# Patient Record
Sex: FEMALE | Race: WHITE | NOT HISPANIC OR LATINO
[De-identification: names, ages, dates, MRNs, and addresses within clinical notes are randomized per-mention and may not be internally consistent; named-entity substitution may affect disease eponyms.]

---

## 2016-06-08 RX ORDER — METOPROLOL TARTRATE 50 MG
1 TABLET ORAL
Qty: 0 | Refills: 2 | DISCHARGE
Start: 2016-06-08 | End: 2016-09-05

## 2017-01-09 ENCOUNTER — APPOINTMENT (OUTPATIENT)
Dept: CARDIOLOGY | Facility: CLINIC | Age: 72
End: 2017-01-09

## 2017-04-18 ENCOUNTER — OUTPATIENT (OUTPATIENT)
Dept: OUTPATIENT SERVICES | Facility: HOSPITAL | Age: 72
LOS: 1 days | End: 2017-04-18
Payer: MEDICARE

## 2017-04-18 VITALS
TEMPERATURE: 98 F | HEART RATE: 72 BPM | HEIGHT: 67 IN | SYSTOLIC BLOOD PRESSURE: 122 MMHG | RESPIRATION RATE: 16 BRPM | WEIGHT: 174.17 LBS | DIASTOLIC BLOOD PRESSURE: 70 MMHG

## 2017-04-18 DIAGNOSIS — E11.9 TYPE 2 DIABETES MELLITUS WITHOUT COMPLICATIONS: ICD-10-CM

## 2017-04-18 DIAGNOSIS — Z01.818 ENCOUNTER FOR OTHER PREPROCEDURAL EXAMINATION: ICD-10-CM

## 2017-04-18 DIAGNOSIS — I10 ESSENTIAL (PRIMARY) HYPERTENSION: ICD-10-CM

## 2017-04-18 DIAGNOSIS — Z90.710 ACQUIRED ABSENCE OF BOTH CERVIX AND UTERUS: Chronic | ICD-10-CM

## 2017-04-18 DIAGNOSIS — Z98.89 OTHER SPECIFIED POSTPROCEDURAL STATES: Chronic | ICD-10-CM

## 2017-04-18 DIAGNOSIS — I73.9 PERIPHERAL VASCULAR DISEASE, UNSPECIFIED: ICD-10-CM

## 2017-04-18 DIAGNOSIS — I48.0 PAROXYSMAL ATRIAL FIBRILLATION: ICD-10-CM

## 2017-04-18 LAB
ANION GAP SERPL CALC-SCNC: 15 MMOL/L — SIGNIFICANT CHANGE UP (ref 5–17)
APTT BLD: 37 SEC — SIGNIFICANT CHANGE UP (ref 27.5–37.4)
BASOPHILS # BLD AUTO: 0 K/UL — SIGNIFICANT CHANGE UP (ref 0–0.2)
BASOPHILS NFR BLD AUTO: 0.2 % — SIGNIFICANT CHANGE UP (ref 0–2)
BLD GP AB SCN SERPL QL: SIGNIFICANT CHANGE UP
BUN SERPL-MCNC: 20 MG/DL — SIGNIFICANT CHANGE UP (ref 8–20)
CALCIUM SERPL-MCNC: 10.3 MG/DL — HIGH (ref 8.6–10.2)
CHLORIDE SERPL-SCNC: 97 MMOL/L — LOW (ref 98–107)
CO2 SERPL-SCNC: 27 MMOL/L — SIGNIFICANT CHANGE UP (ref 22–29)
CREAT SERPL-MCNC: 0.51 MG/DL — SIGNIFICANT CHANGE UP (ref 0.5–1.3)
EOSINOPHIL # BLD AUTO: 0.1 K/UL — SIGNIFICANT CHANGE UP (ref 0–0.5)
EOSINOPHIL NFR BLD AUTO: 1.6 % — SIGNIFICANT CHANGE UP (ref 0–6)
GLUCOSE SERPL-MCNC: 248 MG/DL — HIGH (ref 70–115)
HBA1C BLD-MCNC: 9.1 % — HIGH (ref 4–5.6)
HCT VFR BLD CALC: 40.3 % — SIGNIFICANT CHANGE UP (ref 37–47)
HGB BLD-MCNC: 13.6 G/DL — SIGNIFICANT CHANGE UP (ref 12–16)
INR BLD: 1.23 RATIO — HIGH (ref 0.88–1.16)
LYMPHOCYTES # BLD AUTO: 2.4 K/UL — SIGNIFICANT CHANGE UP (ref 1–4.8)
LYMPHOCYTES # BLD AUTO: 27.5 % — SIGNIFICANT CHANGE UP (ref 20–55)
MCHC RBC-ENTMCNC: 28.3 PG — SIGNIFICANT CHANGE UP (ref 27–31)
MCHC RBC-ENTMCNC: 33.7 G/DL — SIGNIFICANT CHANGE UP (ref 32–36)
MCV RBC AUTO: 84 FL — SIGNIFICANT CHANGE UP (ref 81–99)
MONOCYTES # BLD AUTO: 0.6 K/UL — SIGNIFICANT CHANGE UP (ref 0–0.8)
MONOCYTES NFR BLD AUTO: 6.2 % — SIGNIFICANT CHANGE UP (ref 3–10)
NEUTROPHILS # BLD AUTO: 5.7 K/UL — SIGNIFICANT CHANGE UP (ref 1.8–8)
NEUTROPHILS NFR BLD AUTO: 64.4 % — SIGNIFICANT CHANGE UP (ref 37–73)
PLATELET # BLD AUTO: 286 K/UL — SIGNIFICANT CHANGE UP (ref 150–400)
POTASSIUM SERPL-MCNC: 4.8 MMOL/L — SIGNIFICANT CHANGE UP (ref 3.5–5.3)
POTASSIUM SERPL-SCNC: 4.8 MMOL/L — SIGNIFICANT CHANGE UP (ref 3.5–5.3)
PROTHROM AB SERPL-ACNC: 13.6 SEC — HIGH (ref 9.8–12.7)
RBC # BLD: 4.8 M/UL — SIGNIFICANT CHANGE UP (ref 4.4–5.2)
RBC # FLD: 14.5 % — SIGNIFICANT CHANGE UP (ref 11–15.6)
SODIUM SERPL-SCNC: 139 MMOL/L — SIGNIFICANT CHANGE UP (ref 135–145)
TYPE + AB SCN PNL BLD: SIGNIFICANT CHANGE UP
WBC # BLD: 8.8 K/UL — SIGNIFICANT CHANGE UP (ref 4.8–10.8)
WBC # FLD AUTO: 8.8 K/UL — SIGNIFICANT CHANGE UP (ref 4.8–10.8)

## 2017-04-18 PROCEDURE — 85610 PROTHROMBIN TIME: CPT

## 2017-04-18 PROCEDURE — 83036 HEMOGLOBIN GLYCOSYLATED A1C: CPT

## 2017-04-18 PROCEDURE — 86901 BLOOD TYPING SEROLOGIC RH(D): CPT

## 2017-04-18 PROCEDURE — 85730 THROMBOPLASTIN TIME PARTIAL: CPT

## 2017-04-18 PROCEDURE — 86900 BLOOD TYPING SEROLOGIC ABO: CPT

## 2017-04-18 PROCEDURE — 86850 RBC ANTIBODY SCREEN: CPT

## 2017-04-18 PROCEDURE — 85027 COMPLETE CBC AUTOMATED: CPT

## 2017-04-18 PROCEDURE — 80048 BASIC METABOLIC PNL TOTAL CA: CPT

## 2017-04-18 PROCEDURE — G0463: CPT

## 2017-04-18 NOTE — H&P PST ADULT - PMH
Diabetes    Hypercholesterolemia    Hypertension    PAD (peripheral artery disease)    Paroxysmal atrial fibrillation

## 2017-04-18 NOTE — H&P PST ADULT - NSANTHOSAYNRD_GEN_A_CORE
No. TOYIN screening performed.  STOP BANG Legend: 0-2 = LOW Risk; 3-4 = INTERMEDIATE Risk; 5-8 = HIGH Risk

## 2017-04-18 NOTE — H&P PST ADULT - FAMILY HISTORY
Father  Still living? No  Family history of abdominal aortic aneurysm, Age at diagnosis: 51-60  Family history of thoracic aortic aneurysm, Age at diagnosis: 61-70

## 2017-04-18 NOTE — H&P PST ADULT - PROBLEM SELECTOR PLAN 1
Right groin exploration; common femoral, deep femoral and superficial femoral endarterectomy with patch, left groin exploration; common femoral, deep femoral and superficial femoral endarterectomy with patch. Aortogram with bilateral common iliac angioplasty and stenting. Bilateral superficial femoral angioplasty.

## 2017-04-19 DIAGNOSIS — Z01.818 ENCOUNTER FOR OTHER PREPROCEDURAL EXAMINATION: ICD-10-CM

## 2017-04-19 DIAGNOSIS — I70.213 ATHEROSCLEROSIS OF NATIVE ARTERIES OF EXTREMITIES WITH INTERMITTENT CLAUDICATION, BILATERAL LEGS: ICD-10-CM

## 2017-04-24 ENCOUNTER — RESULT REVIEW (OUTPATIENT)
Age: 72
End: 2017-04-24

## 2017-04-25 ENCOUNTER — INPATIENT (INPATIENT)
Facility: HOSPITAL | Age: 72
LOS: 1 days | Discharge: HOME | DRG: 254 | End: 2017-04-27
Attending: SURGERY | Admitting: SURGERY
Payer: MEDICARE

## 2017-04-25 VITALS
HEIGHT: 67 IN | HEART RATE: 62 BPM | WEIGHT: 174.17 LBS | TEMPERATURE: 98 F | SYSTOLIC BLOOD PRESSURE: 115 MMHG | DIASTOLIC BLOOD PRESSURE: 72 MMHG | RESPIRATION RATE: 16 BRPM | OXYGEN SATURATION: 100 %

## 2017-04-25 DIAGNOSIS — Z98.89 OTHER SPECIFIED POSTPROCEDURAL STATES: Chronic | ICD-10-CM

## 2017-04-25 DIAGNOSIS — Z90.710 ACQUIRED ABSENCE OF BOTH CERVIX AND UTERUS: Chronic | ICD-10-CM

## 2017-04-25 DIAGNOSIS — I70.213 ATHEROSCLEROSIS OF NATIVE ARTERIES OF EXTREMITIES WITH INTERMITTENT CLAUDICATION, BILATERAL LEGS: ICD-10-CM

## 2017-04-25 LAB
ANION GAP SERPL CALC-SCNC: 12 MMOL/L — SIGNIFICANT CHANGE UP (ref 5–17)
APTT BLD: 28.5 SEC — SIGNIFICANT CHANGE UP (ref 27.5–37.4)
BASOPHILS # BLD AUTO: 0 K/UL — SIGNIFICANT CHANGE UP (ref 0–0.2)
BASOPHILS NFR BLD AUTO: 0.1 % — SIGNIFICANT CHANGE UP (ref 0–2)
BLD GP AB SCN SERPL QL: SIGNIFICANT CHANGE UP
BUN SERPL-MCNC: 15 MG/DL — SIGNIFICANT CHANGE UP (ref 8–20)
CALCIUM SERPL-MCNC: 8 MG/DL — LOW (ref 8.6–10.2)
CHLORIDE SERPL-SCNC: 101 MMOL/L — SIGNIFICANT CHANGE UP (ref 98–107)
CO2 SERPL-SCNC: 24 MMOL/L — SIGNIFICANT CHANGE UP (ref 22–29)
CREAT SERPL-MCNC: 0.48 MG/DL — LOW (ref 0.5–1.3)
EOSINOPHIL # BLD AUTO: 0 K/UL — SIGNIFICANT CHANGE UP (ref 0–0.5)
EOSINOPHIL NFR BLD AUTO: 0.1 % — SIGNIFICANT CHANGE UP (ref 0–6)
GLUCOSE SERPL-MCNC: 288 MG/DL — HIGH (ref 70–115)
HCT VFR BLD CALC: 30.4 % — LOW (ref 37–47)
HGB BLD-MCNC: 10.1 G/DL — LOW (ref 12–16)
INR BLD: 0.98 RATIO — SIGNIFICANT CHANGE UP (ref 0.88–1.16)
LYMPHOCYTES # BLD AUTO: 0.9 K/UL — LOW (ref 1–4.8)
LYMPHOCYTES # BLD AUTO: 7.9 % — LOW (ref 20–55)
MCHC RBC-ENTMCNC: 27.8 PG — SIGNIFICANT CHANGE UP (ref 27–31)
MCHC RBC-ENTMCNC: 33.2 G/DL — SIGNIFICANT CHANGE UP (ref 32–36)
MCV RBC AUTO: 83.7 FL — SIGNIFICANT CHANGE UP (ref 81–99)
MONOCYTES # BLD AUTO: 0.3 K/UL — SIGNIFICANT CHANGE UP (ref 0–0.8)
MONOCYTES NFR BLD AUTO: 2.5 % — LOW (ref 3–10)
NEUTROPHILS # BLD AUTO: 10.5 K/UL — HIGH (ref 1.8–8)
NEUTROPHILS NFR BLD AUTO: 89.1 % — HIGH (ref 37–73)
PLATELET # BLD AUTO: 270 K/UL — SIGNIFICANT CHANGE UP (ref 150–400)
POTASSIUM SERPL-MCNC: 4 MMOL/L — SIGNIFICANT CHANGE UP (ref 3.5–5.3)
POTASSIUM SERPL-SCNC: 4 MMOL/L — SIGNIFICANT CHANGE UP (ref 3.5–5.3)
PROTHROM AB SERPL-ACNC: 10.8 SEC — SIGNIFICANT CHANGE UP (ref 9.8–12.7)
RBC # BLD: 3.63 M/UL — LOW (ref 4.4–5.2)
RBC # FLD: 14.6 % — SIGNIFICANT CHANGE UP (ref 11–15.6)
SODIUM SERPL-SCNC: 137 MMOL/L — SIGNIFICANT CHANGE UP (ref 135–145)
TYPE + AB SCN PNL BLD: SIGNIFICANT CHANGE UP
WBC # BLD: 11.8 K/UL — HIGH (ref 4.8–10.8)
WBC # FLD AUTO: 11.8 K/UL — HIGH (ref 4.8–10.8)

## 2017-04-25 PROCEDURE — 88311 DECALCIFY TISSUE: CPT | Mod: 26

## 2017-04-25 PROCEDURE — 88304 TISSUE EXAM BY PATHOLOGIST: CPT | Mod: 26

## 2017-04-25 RX ORDER — DOCUSATE SODIUM 100 MG
100 CAPSULE ORAL THREE TIMES A DAY
Qty: 0 | Refills: 0 | Status: DISCONTINUED | OUTPATIENT
Start: 2017-04-25 | End: 2017-04-27

## 2017-04-25 RX ORDER — METOPROLOL TARTRATE 50 MG
100 TABLET ORAL DAILY
Qty: 0 | Refills: 0 | Status: DISCONTINUED | OUTPATIENT
Start: 2017-04-25 | End: 2017-04-27

## 2017-04-25 RX ORDER — SODIUM CHLORIDE 9 MG/ML
1000 INJECTION, SOLUTION INTRAVENOUS
Qty: 0 | Refills: 0 | Status: DISCONTINUED | OUTPATIENT
Start: 2017-04-25 | End: 2017-04-27

## 2017-04-25 RX ORDER — FENTANYL CITRATE 50 UG/ML
25 INJECTION INTRAVENOUS
Qty: 0 | Refills: 0 | Status: DISCONTINUED | OUTPATIENT
Start: 2017-04-25 | End: 2017-04-25

## 2017-04-25 RX ORDER — DEXTROSE 50 % IN WATER 50 %
25 SYRINGE (ML) INTRAVENOUS ONCE
Qty: 0 | Refills: 0 | Status: DISCONTINUED | OUTPATIENT
Start: 2017-04-25 | End: 2017-04-27

## 2017-04-25 RX ORDER — MORPHINE SULFATE 50 MG/1
4 CAPSULE, EXTENDED RELEASE ORAL EVERY 4 HOURS
Qty: 0 | Refills: 0 | Status: DISCONTINUED | OUTPATIENT
Start: 2017-04-25 | End: 2017-04-26

## 2017-04-25 RX ORDER — SODIUM CHLORIDE 9 MG/ML
1000 INJECTION, SOLUTION INTRAVENOUS
Qty: 0 | Refills: 0 | Status: DISCONTINUED | OUTPATIENT
Start: 2017-04-25 | End: 2017-04-26

## 2017-04-25 RX ORDER — INSULIN LISPRO 100/ML
VIAL (ML) SUBCUTANEOUS
Qty: 0 | Refills: 0 | Status: DISCONTINUED | OUTPATIENT
Start: 2017-04-25 | End: 2017-04-27

## 2017-04-25 RX ORDER — DEXTROSE 50 % IN WATER 50 %
12.5 SYRINGE (ML) INTRAVENOUS ONCE
Qty: 0 | Refills: 0 | Status: DISCONTINUED | OUTPATIENT
Start: 2017-04-25 | End: 2017-04-27

## 2017-04-25 RX ORDER — MORPHINE SULFATE 50 MG/1
2 CAPSULE, EXTENDED RELEASE ORAL EVERY 4 HOURS
Qty: 0 | Refills: 0 | Status: DISCONTINUED | OUTPATIENT
Start: 2017-04-25 | End: 2017-04-26

## 2017-04-25 RX ORDER — CEFAZOLIN SODIUM 1 G
2000 VIAL (EA) INJECTION EVERY 8 HOURS
Qty: 0 | Refills: 0 | Status: DISCONTINUED | OUTPATIENT
Start: 2017-04-25 | End: 2017-04-27

## 2017-04-25 RX ORDER — CEFAZOLIN SODIUM 1 G
2000 VIAL (EA) INJECTION ONCE
Qty: 0 | Refills: 0 | Status: COMPLETED | OUTPATIENT
Start: 2017-04-25 | End: 2017-04-25

## 2017-04-25 RX ORDER — ONDANSETRON 8 MG/1
4 TABLET, FILM COATED ORAL ONCE
Qty: 0 | Refills: 0 | Status: DISCONTINUED | OUTPATIENT
Start: 2017-04-25 | End: 2017-04-25

## 2017-04-25 RX ORDER — CEFAZOLIN SODIUM 1 G
VIAL (EA) INJECTION
Qty: 0 | Refills: 0 | Status: DISCONTINUED | OUTPATIENT
Start: 2017-04-25 | End: 2017-04-27

## 2017-04-25 RX ORDER — GLUCAGON INJECTION, SOLUTION 0.5 MG/.1ML
1 INJECTION, SOLUTION SUBCUTANEOUS ONCE
Qty: 0 | Refills: 0 | Status: DISCONTINUED | OUTPATIENT
Start: 2017-04-25 | End: 2017-04-27

## 2017-04-25 RX ORDER — DEXTROSE 50 % IN WATER 50 %
1 SYRINGE (ML) INTRAVENOUS ONCE
Qty: 0 | Refills: 0 | Status: DISCONTINUED | OUTPATIENT
Start: 2017-04-25 | End: 2017-04-27

## 2017-04-25 RX ORDER — SODIUM CHLORIDE 9 MG/ML
1000 INJECTION, SOLUTION INTRAVENOUS
Qty: 0 | Refills: 0 | Status: DISCONTINUED | OUTPATIENT
Start: 2017-04-25 | End: 2017-04-25

## 2017-04-25 RX ORDER — SODIUM CHLORIDE 9 MG/ML
3 INJECTION INTRAMUSCULAR; INTRAVENOUS; SUBCUTANEOUS EVERY 8 HOURS
Qty: 0 | Refills: 0 | Status: DISCONTINUED | OUTPATIENT
Start: 2017-04-25 | End: 2017-04-25

## 2017-04-25 RX ORDER — ASPIRIN/CALCIUM CARB/MAGNESIUM 324 MG
81 TABLET ORAL DAILY
Qty: 0 | Refills: 0 | Status: DISCONTINUED | OUTPATIENT
Start: 2017-04-25 | End: 2017-04-27

## 2017-04-25 RX ADMIN — Medication 100 MILLIGRAM(S): at 18:51

## 2017-04-25 RX ADMIN — SODIUM CHLORIDE 100 MILLILITER(S): 9 INJECTION, SOLUTION INTRAVENOUS at 17:20

## 2017-04-25 RX ADMIN — Medication 100 MILLIGRAM(S): at 21:44

## 2017-04-25 RX ADMIN — Medication 5 MILLIGRAM(S): at 16:32

## 2017-04-25 RX ADMIN — Medication 2: at 19:28

## 2017-04-25 RX ADMIN — Medication 81 MILLIGRAM(S): at 16:32

## 2017-04-25 NOTE — BRIEF OPERATIVE NOTE - OPERATION/FINDINGS
Right groin exploration with common, superficial, and deep femoral endarterectomy with bovine patch 2x14cm, Left groin exploration with common, deep and superficial femoral endarterectomy with patch 2x9 cm.  Right SFA angioplasty and stenting 6x25 and 6x5 Viabahn, Left SFA angioplasty and stenting 6x25 and 6x10 Viabahn, Bilateral lower extremity angiogram.

## 2017-04-26 LAB
ANION GAP SERPL CALC-SCNC: 13 MMOL/L — SIGNIFICANT CHANGE UP (ref 5–17)
BUN SERPL-MCNC: 9 MG/DL — SIGNIFICANT CHANGE UP (ref 8–20)
CALCIUM SERPL-MCNC: 8.3 MG/DL — LOW (ref 8.6–10.2)
CHLORIDE SERPL-SCNC: 100 MMOL/L — SIGNIFICANT CHANGE UP (ref 98–107)
CO2 SERPL-SCNC: 25 MMOL/L — SIGNIFICANT CHANGE UP (ref 22–29)
CREAT SERPL-MCNC: 0.59 MG/DL — SIGNIFICANT CHANGE UP (ref 0.5–1.3)
GLUCOSE SERPL-MCNC: 295 MG/DL — HIGH (ref 70–115)
POTASSIUM SERPL-MCNC: 3.7 MMOL/L — SIGNIFICANT CHANGE UP (ref 3.5–5.3)
POTASSIUM SERPL-SCNC: 3.7 MMOL/L — SIGNIFICANT CHANGE UP (ref 3.5–5.3)
SODIUM SERPL-SCNC: 138 MMOL/L — SIGNIFICANT CHANGE UP (ref 135–145)

## 2017-04-26 RX ORDER — CLOPIDOGREL BISULFATE 75 MG/1
75 TABLET, FILM COATED ORAL DAILY
Qty: 0 | Refills: 0 | Status: DISCONTINUED | OUTPATIENT
Start: 2017-04-26 | End: 2017-04-27

## 2017-04-26 RX ORDER — POTASSIUM CHLORIDE 20 MEQ
40 PACKET (EA) ORAL ONCE
Qty: 0 | Refills: 0 | Status: COMPLETED | OUTPATIENT
Start: 2017-04-26 | End: 2017-04-26

## 2017-04-26 RX ORDER — MORPHINE SULFATE 50 MG/1
2 CAPSULE, EXTENDED RELEASE ORAL EVERY 4 HOURS
Qty: 0 | Refills: 0 | Status: DISCONTINUED | OUTPATIENT
Start: 2017-04-26 | End: 2017-04-27

## 2017-04-26 RX ADMIN — MORPHINE SULFATE 4 MILLIGRAM(S): 50 CAPSULE, EXTENDED RELEASE ORAL at 02:25

## 2017-04-26 RX ADMIN — CLOPIDOGREL BISULFATE 75 MILLIGRAM(S): 75 TABLET, FILM COATED ORAL at 11:44

## 2017-04-26 RX ADMIN — Medication 3: at 12:11

## 2017-04-26 RX ADMIN — Medication 4: at 16:56

## 2017-04-26 RX ADMIN — Medication 100 MILLIGRAM(S): at 14:42

## 2017-04-26 RX ADMIN — Medication 40 MILLIEQUIVALENT(S): at 14:42

## 2017-04-26 RX ADMIN — Medication 100 MILLIGRAM(S): at 22:31

## 2017-04-26 RX ADMIN — SODIUM CHLORIDE 100 MILLILITER(S): 9 INJECTION, SOLUTION INTRAVENOUS at 01:23

## 2017-04-26 RX ADMIN — Medication 100 MILLIGRAM(S): at 05:36

## 2017-04-26 RX ADMIN — Medication 81 MILLIGRAM(S): at 11:44

## 2017-04-26 RX ADMIN — MORPHINE SULFATE 4 MILLIGRAM(S): 50 CAPSULE, EXTENDED RELEASE ORAL at 01:55

## 2017-04-26 RX ADMIN — Medication 3: at 09:00

## 2017-04-26 NOTE — PROGRESS NOTE ADULT - SUBJECTIVE AND OBJECTIVE BOX
pt pod 1 sp right and left groin exploration etc. under GETA. Tolerated well. Denies any A/c.  pt with no n/v @ this time. using pain meds as ordered/needed with some pain relief. vss. spont vent. no issues with anesthesia at this time. pt resting comfortably @ this time.

## 2017-04-26 NOTE — PROGRESS NOTE ADULT - SUBJECTIVE AND OBJECTIVE BOX
POST OPERATIVE ASSESSMENT     INTERVAL HPI/OVERNIGHT EVENTS: 70 yo female s/p right groin exploration with common, superficial, and deep femoral endarterectomy with bovine patch, left groin exploration with common, deep and superficial femoral endarterectomy with bovine patch. Right SFA angioplasty and stenting, left SFA angioplasty and stenting, bilateral lower extremity angiogram, 4/25/17, POD #1. Patient endorses groin pain, denies b/l leg pain, nausea, vomiting, shortness of breath, chest pain.       MEDICATIONS  (STANDING):  lactated ringers. 1000milliLiter(s) IV Continuous <Continuous>  ceFAZolin   IVPB  IV Intermittent   aspirin enteric coated 81milliGRAM(s) Oral daily  docusate sodium 100milliGRAM(s) Oral three times a day  enalapril 5milliGRAM(s) Oral daily  metoprolol succinate ER 100milliGRAM(s) Oral daily  ceFAZolin   IVPB 2000milliGRAM(s) IV Intermittent every 8 hours  insulin lispro (HumaLOG) corrective regimen sliding scale  SubCutaneous three times a day before meals  dextrose 5%. 1000milliLiter(s) IV Continuous <Continuous>  dextrose 50% Injectable 12.5Gram(s) IV Push once  dextrose 50% Injectable 25Gram(s) IV Push once  dextrose 50% Injectable 25Gram(s) IV Push once    MEDICATIONS  (PRN):  morphine  - Injectable 2milliGRAM(s) IV Push every 4 hours PRN Moderate Pain  morphine  - Injectable 4milliGRAM(s) IV Push every 4 hours PRN Severe Pain  dextrose Gel 1Dose(s) Oral once PRN Blood Glucose LESS THAN 70 milliGRAM(s)/deciliter  glucagon  Injectable 1milliGRAM(s) IntraMuscular once PRN Glucose LESS THAN 70 milligrams/deciliter      Vital Signs Last 24 Hrs  T(C): 37.2, Max: 37.4 (04-25 @ 14:25)  T(F): 99, Max: 99.4 (04-25 @ 14:25)  HR: 71 (62 - 80)  BP: 88/46 (88/46 - 142/67)  BP(mean): --  RR: 18 (12 - 18)  SpO2: 97% (97% - 100%)    PHYSICAL EXAM:       Constitutional: A&Ox3, in NAD, laying comfortably in bed.     Eyes: EOMI.     Respiratory: Breathing comfortably on room air.     Cardiovascular: RRR    Gastrointestinal: Abdomen soft, NT, ND.     Genitourinary: Faith catheter in place, draining.     Extremities: Femoral dressings C/D/I, b/l. B/L LE +PT, DP pulses. Feet warm, bilaterally.         I&O's Detail    I & Os for current day (as of 26 Apr 2017 01:16)  =============================================  IN:    lactated ringers.: 600 ml    Total IN: 600 ml  ---------------------------------------------  OUT:    Indwelling Catheter - Urethral: 875 ml    Total OUT: 875 ml  ---------------------------------------------  Total NET: -275 ml      LABS:                        10.1   11.8  )-----------( 270      ( 25 Apr 2017 15:26 )             30.4     04-25    137  |  101  |  15.0  ----------------------------<  288<H>  4.0   |  24.0  |  0.48<L>    Ca    8.0<L>      25 Apr 2017 15:26      PT/INR - ( 25 Apr 2017 08:38 )   PT: 10.8 sec;   INR: 0.98 ratio         PTT - ( 25 Apr 2017 08:38 )  PTT:28.5 sec      RADIOLOGY & ADDITIONAL STUDIES:

## 2017-04-27 ENCOUNTER — TRANSCRIPTION ENCOUNTER (OUTPATIENT)
Age: 72
End: 2017-04-27

## 2017-04-27 VITALS
TEMPERATURE: 99 F | HEART RATE: 94 BPM | RESPIRATION RATE: 18 BRPM | DIASTOLIC BLOOD PRESSURE: 64 MMHG | SYSTOLIC BLOOD PRESSURE: 109 MMHG | OXYGEN SATURATION: 95 %

## 2017-04-27 DIAGNOSIS — I73.9 PERIPHERAL VASCULAR DISEASE, UNSPECIFIED: ICD-10-CM

## 2017-04-27 LAB
HCT VFR BLD CALC: 29 % — LOW (ref 37–47)
HGB BLD-MCNC: 9.5 G/DL — LOW (ref 12–16)
MCHC RBC-ENTMCNC: 28 PG — SIGNIFICANT CHANGE UP (ref 27–31)
MCHC RBC-ENTMCNC: 32.8 G/DL — SIGNIFICANT CHANGE UP (ref 32–36)
MCV RBC AUTO: 85.5 FL — SIGNIFICANT CHANGE UP (ref 81–99)
PLATELET # BLD AUTO: 250 K/UL — SIGNIFICANT CHANGE UP (ref 150–400)
RBC # BLD: 3.39 M/UL — LOW (ref 4.4–5.2)
RBC # FLD: 14.7 % — SIGNIFICANT CHANGE UP (ref 11–15.6)
WBC # BLD: 11.7 K/UL — HIGH (ref 4.8–10.8)
WBC # FLD AUTO: 11.7 K/UL — HIGH (ref 4.8–10.8)

## 2017-04-27 PROCEDURE — C1725: CPT

## 2017-04-27 PROCEDURE — 86920 COMPATIBILITY TEST SPIN: CPT

## 2017-04-27 PROCEDURE — 80048 BASIC METABOLIC PNL TOTAL CA: CPT

## 2017-04-27 PROCEDURE — C1889: CPT

## 2017-04-27 PROCEDURE — 88311 DECALCIFY TISSUE: CPT

## 2017-04-27 PROCEDURE — 85730 THROMBOPLASTIN TIME PARTIAL: CPT

## 2017-04-27 PROCEDURE — 88304 TISSUE EXAM BY PATHOLOGIST: CPT

## 2017-04-27 PROCEDURE — 76000 FLUOROSCOPY <1 HR PHYS/QHP: CPT

## 2017-04-27 PROCEDURE — 85610 PROTHROMBIN TIME: CPT

## 2017-04-27 PROCEDURE — 85027 COMPLETE CBC AUTOMATED: CPT

## 2017-04-27 PROCEDURE — 86850 RBC ANTIBODY SCREEN: CPT

## 2017-04-27 PROCEDURE — C1887: CPT

## 2017-04-27 PROCEDURE — 86900 BLOOD TYPING SEROLOGIC ABO: CPT

## 2017-04-27 PROCEDURE — C1874: CPT

## 2017-04-27 PROCEDURE — 86901 BLOOD TYPING SEROLOGIC RH(D): CPT

## 2017-04-27 PROCEDURE — C1894: CPT

## 2017-04-27 PROCEDURE — 36415 COLL VENOUS BLD VENIPUNCTURE: CPT

## 2017-04-27 RX ORDER — OXYCODONE HYDROCHLORIDE 5 MG/1
1 TABLET ORAL
Qty: 0 | Refills: 0 | COMMUNITY
Start: 2017-04-27

## 2017-04-27 RX ORDER — INSULIN LISPRO 100/ML
4 VIAL (ML) SUBCUTANEOUS ONCE
Qty: 0 | Refills: 0 | Status: COMPLETED | OUTPATIENT
Start: 2017-04-27 | End: 2017-04-27

## 2017-04-27 RX ORDER — OXYCODONE HYDROCHLORIDE 5 MG/1
2 TABLET ORAL
Qty: 36 | Refills: 0 | OUTPATIENT
Start: 2017-04-27 | End: 2017-04-30

## 2017-04-27 RX ORDER — ASPIRIN/CALCIUM CARB/MAGNESIUM 324 MG
1 TABLET ORAL
Qty: 0 | Refills: 0 | DISCHARGE
Start: 2017-04-27

## 2017-04-27 RX ORDER — CLOPIDOGREL BISULFATE 75 MG/1
1 TABLET, FILM COATED ORAL
Qty: 30 | Refills: 0
Start: 2017-04-27 | End: 2017-05-27

## 2017-04-27 RX ADMIN — Medication 4: at 08:23

## 2017-04-27 RX ADMIN — Medication 81 MILLIGRAM(S): at 11:35

## 2017-04-27 RX ADMIN — CLOPIDOGREL BISULFATE 75 MILLIGRAM(S): 75 TABLET, FILM COATED ORAL at 11:35

## 2017-04-27 RX ADMIN — Medication 2: at 17:20

## 2017-04-27 RX ADMIN — Medication 100 MILLIGRAM(S): at 13:40

## 2017-04-27 RX ADMIN — Medication 100 MILLIGRAM(S): at 05:27

## 2017-04-27 RX ADMIN — Medication 4 UNIT(S): at 05:20

## 2017-04-27 RX ADMIN — Medication 100 MILLIGRAM(S): at 06:04

## 2017-04-27 RX ADMIN — Medication 5 MILLIGRAM(S): at 11:35

## 2017-04-27 RX ADMIN — Medication 5: at 11:35

## 2017-04-27 NOTE — DISCHARGE NOTE ADULT - PLAN OF CARE
Post op recovery and follow up Patient should schedule a follow up appointment with Dr. Haywood in 1-2 weeks. Keep incisions clean and dry gently after gentle cleansing.  Do not submerge in pool or tub.  Medications to be taken as prescribed.

## 2017-04-27 NOTE — DISCHARGE NOTE ADULT - HOSPITAL COURSE
71F with PAD presented for scheduled vascular surgery.  She underwent the following: Right groin exploration with common, superficial, and deep femoral endarterectomy with bovine patch 2x14cm, Left groin exploration with common, deep and superficial femoral endarterectomy with patch 2x9 cm.  Right SFA angioplasty and stenting 6x25 and 6x5 Viabahn, Left SFA angioplasty and stenting 6x25 and 6x10 Viabahn, Bilateral lower extremity angiogram.    Patient tolerated the procedure well and was monitored over the following days for wound healing, bleeding, etc.  Her shaw was removed on POD 1 and she voided appropriately. She was able to get out of bed and ambulate, and tolerated a diet.  She recovered into POD 2 when she was deemed safe for discharge home after being seen by Dr. Haywood.  She was provided all necessary prescriptions and instructions to schedule a follow up visit with her surgeon in 1-2 weeks.

## 2017-04-27 NOTE — DISCHARGE NOTE ADULT - CARE PLAN
Principal Discharge DX:	PAD (peripheral artery disease)  Goal:	Post op recovery and follow up  Instructions for follow-up, activity and diet:	Patient should schedule a follow up appointment with Dr. Haywood in 1-2 weeks. Keep incisions clean and dry gently after gentle cleansing.  Do not submerge in pool or tub.  Medications to be taken as prescribed.  Secondary Diagnosis:	Diabetes  Secondary Diagnosis:	Hypercholesterolemia  Secondary Diagnosis:	Hypertension  Secondary Diagnosis:	Paroxysmal atrial fibrillation

## 2017-04-27 NOTE — PROGRESS NOTE ADULT - ASSESSMENT
72 yo female s/p right groin exploration with common, superficial, and deep femoral endarterectomy with bovine patch, left groin exploration with common, deep and superficial femoral endarterectomy with bovine patch. Right SFA angioplasty and stenting, left SFA angioplasty and stenting, bilateral lower extremity angiogram.  / continue pain control  / Continue CLD diet, ADAT  / OOB ambulating as per Dr. Haywood  / Plan per Dr. Haywood
stable

## 2017-04-27 NOTE — PROGRESS NOTE ADULT - SUBJECTIVE AND OBJECTIVE BOX
Pt seen and examined.  Ambulating well, incisions clean and dry, 2+ PT pulses bilaterally.  Stable for d/c on Plavix 75 mg daily.  Pt to resume Xarelto 20 mg daily Saturday.  Follow-up in 10 days.

## 2017-04-27 NOTE — PROGRESS NOTE ADULT - SUBJECTIVE AND OBJECTIVE BOX
SURGICAL PA NOTE:     STATUS POST:    Diagnosis:   Pre-Op Diagnosis:  PVD (peripheral vascular disease) with claudication  04/25/2017    Active  Dennis Haywood.    Post-Op Dx:  PVD (peripheral vascular disease) with claudication  04/25/2017    Active  Dennis Haywood.    Procedure:   Procedure:  <<-----Click on this checkbox to enter Procedure .      Operative Findings:  · Operative Findings	Right groin exploration with common, superficial, and deep femoral endarterectomy with bovine patch 2x14cm, Left groin exploration with common, deep and superficial femoral endarterectomy with patch 2x9 cm.  Right SFA angioplasty and stenting 6x25 and 6x5 Viabahn, Left SFA angioplasty and stenting 6x25 and 6x10 Viabahn, Bilateral lower extremity angiogram.	    Specimens/Blood Loss/IV/Output/Protocol/VTE:   Specimens/Blood Loss/IV/Output/Protocol/VTE:  · Specimens	Plaque both groins	  · Estimated Blood Loss	700 milliLiter(s)	      POST OPERATIVE DAY #: 1    Vital Signs Last 24 Hrs  T(C): 36.8, Max: 36.8 (04-26 @ 15:30)  T(F): 98.3, Max: 98.3 (04-26 @ 15:30)  HR: 90 (81 - 90)  BP: 102/67 (91/53 - 110/63)  BP(mean): --  RR: 16 (16 - 17)  SpO2: 93% (93% - 96%)    HPI:      Atherosclerosis of native artery of both lower extremities with intermittent claudication  Family history of thoracic aortic aneurysm (Father)  Family history of abdominal aortic aneurysm (Father)  No pertinent family history in first degree relatives  Handoff  MEWS Score  PAD (peripheral artery disease)  Paroxysmal atrial fibrillation  Hypercholesterolemia  Diabetes  Hypertension  Diabetes  Hypertension  PVD (peripheral vascular disease) with claudication  PVD (peripheral vascular disease) with claudication  H/O hernia repair  H/O abdominal hysterectomy  No significant past surgical history  No significant past surgical history  No significant past surgical history  ATHSCL NATIVE ARTERIES OF EXTR      SUBJECTIVE: Pt seen lying supine with HOB up, c/o mild RLE swelling, no c/o pain, says sugars were very high overnight - given humulog insulin, able to ambulate for BRP    Diet: regular/diabetic    Activity: OOB for BRP    Fevers: [ ]Yes [ x]NO  Chills: [ ] Yes [x ] NO  SOB:  [ ] YES [x ] NO  Dyspnea: [ ]YES [x ]NO  Chest Discomfort: [ ] YES [ x] NO    Nausea: [ ] YES [x ] NO           Vomiting: [ ] YES [x ] NO  Flatus: [x ] YES [ ] NO             Bowel Movement: [ ] YES [x] NO  Diarrhea: [ ] YES [ x] NO         Void: [ x]YES [ ]No  Constipation: [ ] YES [ ] NO       Pain (0-10):           3   Pain Control Adequate: [x YES [ ] NO    Faith:    NGT:      I&O's Detail    I & Os for current day (as of 27 Apr 2017 08:54)  =============================================  IN:    IV PiggyBack: 100 ml    Total IN: 100 ml  ---------------------------------------------  OUT:    Voided: 600 ml    Total OUT: 600 ml  ---------------------------------------------  Total NET: -500 ml    I&O's Summary    I & Os for current day (as of 27 Apr 2017 08:54)  =============================================  IN: 100 ml / OUT: 600 ml / NET: -500 ml    I&O's Detail    I & Os for current day (as of 27 Apr 2017 08:54)  =============================================  IN:    IV PiggyBack: 100 ml    Total IN: 100 ml  ---------------------------------------------  OUT:    Voided: 600 ml    Total OUT: 600 ml  ---------------------------------------------  Total NET: -500 ml      MEDICATIONS  (STANDING):  ceFAZolin   IVPB  IV Intermittent   aspirin enteric coated 81milliGRAM(s) Oral daily  docusate sodium 100milliGRAM(s) Oral three times a day  enalapril 5milliGRAM(s) Oral daily  metoprolol succinate ER 100milliGRAM(s) Oral daily  ceFAZolin   IVPB 2000milliGRAM(s) IV Intermittent every 8 hours  insulin lispro (HumaLOG) corrective regimen sliding scale  SubCutaneous three times a day before meals  dextrose 5%. 1000milliLiter(s) IV Continuous <Continuous>  dextrose 50% Injectable 12.5Gram(s) IV Push once  dextrose 50% Injectable 25Gram(s) IV Push once  dextrose 50% Injectable 25Gram(s) IV Push once  clopidogrel Tablet 75milliGRAM(s) Oral daily    MEDICATIONS  (PRN):  dextrose Gel 1Dose(s) Oral once PRN Blood Glucose LESS THAN 70 milliGRAM(s)/deciliter  glucagon  Injectable 1milliGRAM(s) IntraMuscular once PRN Glucose LESS THAN 70 milligrams/deciliter  oxyCODONE  5 mG/acetaminophen 325 mG 1Tablet(s) Oral every 4 hours PRN Moderate Pain (4 - 6)  oxyCODONE  5 mG/acetaminophen 325 mG 2Tablet(s) Oral every 4 hours PRN Severe Pain (7 - 10)  morphine  - Injectable 2milliGRAM(s) IV Push every 4 hours PRN Breakthrough pain      LABS:                        9.5    11.7  )-----------( 250      ( 27 Apr 2017 07:10 )             29.0     04-26    138  |  100  |  9.0  ----------------------------<  295<H>  3.7   |  25.0  |  0.59    Ca    8.3<L>      26 Apr 2017 10:11              RADIOLOGY & ADDITIONAL STUDIES:

## 2017-04-27 NOTE — DISCHARGE NOTE ADULT - MEDICATION SUMMARY - MEDICATIONS TO TAKE
I will START or STAY ON the medications listed below when I get home from the hospital:    Vitamin B  -- 1 tab(s) by mouth once a day  -- Indication: For Per PMD    acetaminophen-oxycodone 325 mg-5 mg oral tablet  -- 1 tab(s) by mouth every 4 hours, As needed, Moderate Pain (4 - 6)  -- Indication: For PAin    acetaminophen-oxycodone 325 mg-5 mg oral tablet  -- 2 tab(s) by mouth every 4 hours, As needed, Severe Pain (7 - 10) MDD:6  -- Indication: For PAin    aspirin 81 mg oral delayed release tablet  -- 1 tab(s) by mouth once a day  -- Indication: For Per PMD    enalapril 5 mg oral tablet  -- 1 tab(s) by mouth once a day  -- Indication: For Per PMD    Xarelto 20 mg oral tablet  -- 1 tab(s) by mouth once a day (in the evening)  -- Indication: For Per PMD    Tresiba FlexTouch 100 units/mL subcutaneous solution  -- 25 milligram(s) subcutaneous once a day  -- Indication: For Per PMD    Byetta Prefilled Pen 10 mcg/0.04 mL subcutaneous solution  --  subcutaneous 2 times a day  -- Indication: For Per PMD    metFORMIN 500 mg oral tablet  -- 3 tab(s) by mouth once a day  -- given in pm  -- Indication: For Per PMD    metFORMIN 500 mg oral tablet  -- 2 tab(s) by mouth once a day evening  -- Indication: For Per PMD    pravastatin 80 mg oral tablet  -- 1 tab(s) by mouth once a day (at bedtime)  -- Indication: For Per PMD    clopidogrel 75 mg oral tablet  -- 1 tab(s) by mouth once a day  -- Indication: For Antiplatelet    Toprol- mg oral tablet, extended release  -- 1 tab(s) by mouth once a day  -- It is very important that you take or use this exactly as directed.  Do not skip doses or discontinue unless directed by your doctor.  May cause drowsiness.  Alcohol may intensify this effect.  Use care when operating dangerous machinery.  Some non-prescription drugs may aggravate your condition.  Read all labels carefully.  If a warning appears, check with your doctor before taking.  Swallow whole.  Do not crush.  Take with food or milk.  This drug may impair the ability to drive or operate machinery.  Use care until you become familiar with its effects.    -- Indication: For Per PMD    magnesium oxide 200 mg oral tablet  -- 1 tab(s) by mouth 2 times a day  -- Indication: For Per PMD    Co Q-10  -- 200 milligram(s) by mouth once a day  -- Indication: For Per PMD    multivitamin  -- 1 tab(s) by mouth once a day  -- Indication: For Per PMD    Vitamin C 1000 mg oral tablet  -- 1 tab(s) by mouth once a day  -- Indication: For Per PMD    Vitamin D3 5000 intl units oral tablet  -- 1 tab(s) by mouth once a day  -- Indication: For Per PMD

## 2017-04-27 NOTE — DISCHARGE NOTE ADULT - CARE PROVIDER_API CALL
Dennis Haywood), Surgery  [address] [place] [id number]  Phone: [phone]  Fax: [phone]

## 2017-04-27 NOTE — PROGRESS NOTE ADULT - VASCULAR DETAILS
Bilat groin dressings CDI, no hematoma  mild RLE edema  bilat LE: warm, mobile, distal NVM intact, toes warm/mobile

## 2017-04-27 NOTE — DISCHARGE NOTE ADULT - PATIENT PORTAL LINK FT
“You can access the [employer] Patient Portal, offered by [facility], by registering with the following website: [email or web address]”

## 2017-04-27 NOTE — DISCHARGE NOTE ADULT - NS AS ACTIVITY OBS
Walking-Indoors allowed/Showering allowed/Do not make important decisions/Do not drive or operate machinery/No Heavy lifting/straining/Walking-Outdoors allowed/Stairs allowed

## 2017-04-28 LAB — SURGICAL PATHOLOGY FINAL REPORT - CH: SIGNIFICANT CHANGE UP

## 2018-04-22 ENCOUNTER — TRANSCRIPTION ENCOUNTER (OUTPATIENT)
Age: 73
End: 2018-04-22

## 2018-04-29 ENCOUNTER — TRANSCRIPTION ENCOUNTER (OUTPATIENT)
Age: 73
End: 2018-04-29

## 2018-09-14 PROBLEM — I48.0 PAROXYSMAL ATRIAL FIBRILLATION: Chronic | Status: ACTIVE | Noted: 2017-04-18

## 2018-09-14 PROBLEM — I73.9 PERIPHERAL VASCULAR DISEASE, UNSPECIFIED: Chronic | Status: ACTIVE | Noted: 2017-04-18

## 2018-09-21 ENCOUNTER — OUTPATIENT (OUTPATIENT)
Dept: OUTPATIENT SERVICES | Facility: HOSPITAL | Age: 73
LOS: 1 days | End: 2018-09-21
Payer: MEDICARE

## 2018-09-21 VITALS
HEART RATE: 73 BPM | WEIGHT: 189.6 LBS | DIASTOLIC BLOOD PRESSURE: 78 MMHG | TEMPERATURE: 97 F | SYSTOLIC BLOOD PRESSURE: 126 MMHG | RESPIRATION RATE: 16 BRPM | HEIGHT: 68 IN

## 2018-09-21 DIAGNOSIS — Z01.818 ENCOUNTER FOR OTHER PREPROCEDURAL EXAMINATION: ICD-10-CM

## 2018-09-21 DIAGNOSIS — Z98.89 OTHER SPECIFIED POSTPROCEDURAL STATES: Chronic | ICD-10-CM

## 2018-09-21 DIAGNOSIS — E11.9 TYPE 2 DIABETES MELLITUS WITHOUT COMPLICATIONS: ICD-10-CM

## 2018-09-21 DIAGNOSIS — I10 ESSENTIAL (PRIMARY) HYPERTENSION: ICD-10-CM

## 2018-09-21 DIAGNOSIS — Z90.710 ACQUIRED ABSENCE OF BOTH CERVIX AND UTERUS: Chronic | ICD-10-CM

## 2018-09-21 DIAGNOSIS — E78.00 PURE HYPERCHOLESTEROLEMIA, UNSPECIFIED: ICD-10-CM

## 2018-09-21 DIAGNOSIS — I48.0 PAROXYSMAL ATRIAL FIBRILLATION: ICD-10-CM

## 2018-09-21 DIAGNOSIS — Z29.9 ENCOUNTER FOR PROPHYLACTIC MEASURES, UNSPECIFIED: ICD-10-CM

## 2018-09-21 DIAGNOSIS — I70.213 ATHEROSCLEROSIS OF NATIVE ARTERIES OF EXTREMITIES WITH INTERMITTENT CLAUDICATION, BILATERAL LEGS: ICD-10-CM

## 2018-09-21 LAB
ALBUMIN SERPL ELPH-MCNC: 4.8 G/DL — SIGNIFICANT CHANGE UP (ref 3.3–5.2)
ALP SERPL-CCNC: 54 U/L — SIGNIFICANT CHANGE UP (ref 40–120)
ALT FLD-CCNC: 19 U/L — SIGNIFICANT CHANGE UP
ANION GAP SERPL CALC-SCNC: 14 MMOL/L — SIGNIFICANT CHANGE UP (ref 5–17)
APTT BLD: 37.5 SEC — HIGH (ref 27.5–37.4)
AST SERPL-CCNC: 20 U/L — SIGNIFICANT CHANGE UP
BASOPHILS # BLD AUTO: 0 K/UL — SIGNIFICANT CHANGE UP (ref 0–0.2)
BASOPHILS NFR BLD AUTO: 0.3 % — SIGNIFICANT CHANGE UP (ref 0–2)
BILIRUB SERPL-MCNC: 1.1 MG/DL — SIGNIFICANT CHANGE UP (ref 0.4–2)
BLD GP AB SCN SERPL QL: SIGNIFICANT CHANGE UP
BUN SERPL-MCNC: 20 MG/DL — SIGNIFICANT CHANGE UP (ref 8–20)
CALCIUM SERPL-MCNC: 10.1 MG/DL — SIGNIFICANT CHANGE UP (ref 8.6–10.2)
CHLORIDE SERPL-SCNC: 103 MMOL/L — SIGNIFICANT CHANGE UP (ref 98–107)
CO2 SERPL-SCNC: 25 MMOL/L — SIGNIFICANT CHANGE UP (ref 22–29)
CREAT SERPL-MCNC: 0.54 MG/DL — SIGNIFICANT CHANGE UP (ref 0.5–1.3)
EOSINOPHIL # BLD AUTO: 0.1 K/UL — SIGNIFICANT CHANGE UP (ref 0–0.5)
EOSINOPHIL NFR BLD AUTO: 1.1 % — SIGNIFICANT CHANGE UP (ref 0–6)
GLUCOSE SERPL-MCNC: 203 MG/DL — HIGH (ref 70–115)
HCT VFR BLD CALC: 38.7 % — SIGNIFICANT CHANGE UP (ref 37–47)
HGB BLD-MCNC: 12.8 G/DL — SIGNIFICANT CHANGE UP (ref 12–16)
INR BLD: 1.67 RATIO — HIGH (ref 0.88–1.16)
LYMPHOCYTES # BLD AUTO: 1.6 K/UL — SIGNIFICANT CHANGE UP (ref 1–4.8)
LYMPHOCYTES # BLD AUTO: 20.8 % — SIGNIFICANT CHANGE UP (ref 20–55)
MCHC RBC-ENTMCNC: 27.9 PG — SIGNIFICANT CHANGE UP (ref 27–31)
MCHC RBC-ENTMCNC: 33.1 G/DL — SIGNIFICANT CHANGE UP (ref 32–36)
MCV RBC AUTO: 84.5 FL — SIGNIFICANT CHANGE UP (ref 81–99)
MONOCYTES # BLD AUTO: 0.7 K/UL — SIGNIFICANT CHANGE UP (ref 0–0.8)
MONOCYTES NFR BLD AUTO: 8.8 % — SIGNIFICANT CHANGE UP (ref 3–10)
NEUTROPHILS # BLD AUTO: 5.4 K/UL — SIGNIFICANT CHANGE UP (ref 1.8–8)
NEUTROPHILS NFR BLD AUTO: 68.7 % — SIGNIFICANT CHANGE UP (ref 37–73)
PLATELET # BLD AUTO: 338 K/UL — SIGNIFICANT CHANGE UP (ref 150–400)
POTASSIUM SERPL-MCNC: 4.7 MMOL/L — SIGNIFICANT CHANGE UP (ref 3.5–5.3)
POTASSIUM SERPL-SCNC: 4.7 MMOL/L — SIGNIFICANT CHANGE UP (ref 3.5–5.3)
PROT SERPL-MCNC: 7.5 G/DL — SIGNIFICANT CHANGE UP (ref 6.6–8.7)
PROTHROM AB SERPL-ACNC: 18.6 SEC — HIGH (ref 9.8–12.7)
RBC # BLD: 4.58 M/UL — SIGNIFICANT CHANGE UP (ref 4.4–5.2)
RBC # FLD: 14.9 % — SIGNIFICANT CHANGE UP (ref 11–15.6)
SODIUM SERPL-SCNC: 142 MMOL/L — SIGNIFICANT CHANGE UP (ref 135–145)
TYPE + AB SCN PNL BLD: SIGNIFICANT CHANGE UP
WBC # BLD: 7.9 K/UL — SIGNIFICANT CHANGE UP (ref 4.8–10.8)
WBC # FLD AUTO: 7.9 K/UL — SIGNIFICANT CHANGE UP (ref 4.8–10.8)

## 2018-09-21 PROCEDURE — 93010 ELECTROCARDIOGRAM REPORT: CPT

## 2018-09-21 RX ORDER — MAGNESIUM OXIDE 400 MG ORAL TABLET 241.3 MG
1 TABLET ORAL
Qty: 0 | Refills: 0 | COMMUNITY

## 2018-09-21 RX ORDER — INSULIN DEGLUDEC 100 U/ML
25 INJECTION, SOLUTION SUBCUTANEOUS
Qty: 0 | Refills: 0 | COMMUNITY

## 2018-09-21 RX ORDER — INFLUENZA VIRUS VACCINE 15; 15; 15; 15 UG/.5ML; UG/.5ML; UG/.5ML; UG/.5ML
0.5 SUSPENSION INTRAMUSCULAR ONCE
Qty: 0 | Refills: 0 | Status: COMPLETED | OUTPATIENT
Start: 2018-09-21 | End: 2018-09-21

## 2018-09-21 NOTE — H&P PST ADULT - ASSESSMENT
medications reviewed, instructions given on what medications to take and what not to take. Patient was instructed by dr. Haywood's office to stop Plavix a week before and start ASA and also about holding Xarelto and Metformin before the surgery.  CAPRINI SCORE [CLOT]    AGE RELATED RISK FACTORS                                                       MOBILITY RELATED FACTORS  [ ] Age 41-60 years                                            (1 Point)                  [ ] Bed rest                                                        (1 Point)  [x ] Age: 61-74 years                                           (2 Points)                 [ ] Plaster cast                                                   (2 Points)  [ ] Age= 75 years                                              (3 Points)                 [ ] Bed bound for more than 72 hours                 (2 Points)    DISEASE RELATED RISK FACTORS                                               GENDER SPECIFIC FACTORS  [x ] Edema in the lower extremities                       (1 Point)                  [ ] Pregnancy                                                     (1 Point)  [ ] Varicose veins                                               (1 Point)                  [ ] Post-partum < 6 weeks                                   (1 Point)             [x ] BMI > 25 Kg/m2                                            (1 Point)                  [ ] Hormonal therapy  or oral contraception          (1 Point)                 [ ] Sepsis (in the previous month)                        (1 Point)                  [ ] History of pregnancy complications                 (1 point)  [ ] Pneumonia or serious lung disease                                               [ ] Unexplained or recurrent                     (1 Point)           (in the previous month)                               (1 Point)  [ ] Abnormal pulmonary function test                     (1 Point)                 SURGERY RELATED RISK FACTORS  [ ] Acute myocardial infarction                              (1 Point)                 [ ]  Section                                             (1 Point)  [ ] Congestive heart failure (in the previous month)  (1 Point)               [ ] Minor surgery                                                  (1 Point)   [ ] Inflammatory bowel disease                             (1 Point)                 [ ] Arthroscopic surgery                                        (2 Points)  [ ] Central venous access                                      (2 Points)                [ x] General surgery lasting more than 45 minutes   (2 Points)       [ ] Stroke (in the previous month)                          (5 Points)               [ ] Elective arthroplasty                                         (5 Points)                                                                                                                                               HEMATOLOGY RELATED FACTORS                                                 TRAUMA RELATED RISK FACTORS  [ ] Prior episodes of VTE                                     (3 Points)                 [ ] Fracture of the hip, pelvis, or leg                       (5 Points)  [ ] Positive family history for VTE                         (3 Points)                 [ ] Acute spinal cord injury (in the previous month)  (5 Points)  [ ] Prothrombin 23814 A                                     (3 Points)                 [ ] Paralysis  (less than 1 month)                             (5 Points)  [ ] Factor V Leiden                                             (3 Points)                  [ ] Multiple Trauma within 1 month                        (5 Points)  [ ] Lupus anticoagulants                                     (3 Points)                                                           [ ] Anticardiolipin antibodies                               (3 Points)                                                       [ ] High homocysteine in the blood                      (3 Points)                                             [ ] Other congenital or acquired thrombophilia      (3 Points)                                                [ ] Heparin induced thrombocytopenia                  (3 Points)                                          Total Score [     6     ]

## 2018-09-21 NOTE — H&P PST ADULT - HISTORY OF PRESENT ILLNESS
72 yo female with bilateral PAD, with leg pain for 8 months. 71 yo female with bilateral PAD, with leg pain for many years, she had angioplasty with stents done a year ago but she states that all the stents are blocked and she has redness and swelling to her right leg and pain in both LE.

## 2018-09-21 NOTE — H&P PST ADULT - PROBLEM SELECTOR PLAN 2
right femoral popliteal bypass with Goretex, right leg angiogram arteriogram left leg angiogram with left superficial femoral artery angioplasty and stenting.  Medical and cardiac clearance pending

## 2018-10-03 ENCOUNTER — TRANSCRIPTION ENCOUNTER (OUTPATIENT)
Age: 73
End: 2018-10-03

## 2018-10-04 ENCOUNTER — RESULT REVIEW (OUTPATIENT)
Age: 73
End: 2018-10-04

## 2018-10-04 ENCOUNTER — INPATIENT (INPATIENT)
Facility: HOSPITAL | Age: 73
LOS: 4 days | Discharge: HOME | DRG: 254 | End: 2018-10-09
Attending: SURGERY | Admitting: SURGERY
Payer: MEDICARE

## 2018-10-04 VITALS — WEIGHT: 179.9 LBS | HEIGHT: 68 IN | HEART RATE: 95 BPM | OXYGEN SATURATION: 100 % | RESPIRATION RATE: 16 BRPM

## 2018-10-04 DIAGNOSIS — I70.213 ATHEROSCLEROSIS OF NATIVE ARTERIES OF EXTREMITIES WITH INTERMITTENT CLAUDICATION, BILATERAL LEGS: ICD-10-CM

## 2018-10-04 DIAGNOSIS — Z90.710 ACQUIRED ABSENCE OF BOTH CERVIX AND UTERUS: Chronic | ICD-10-CM

## 2018-10-04 DIAGNOSIS — Z98.89 OTHER SPECIFIED POSTPROCEDURAL STATES: Chronic | ICD-10-CM

## 2018-10-04 LAB
APTT BLD: 28.6 SEC — SIGNIFICANT CHANGE UP (ref 27.5–37.4)
INR BLD: 1.11 RATIO — SIGNIFICANT CHANGE UP (ref 0.88–1.16)
PROTHROM AB SERPL-ACNC: 12.2 SEC — SIGNIFICANT CHANGE UP (ref 9.8–12.7)

## 2018-10-04 PROCEDURE — 36415 COLL VENOUS BLD VENIPUNCTURE: CPT

## 2018-10-04 PROCEDURE — 88304 TISSUE EXAM BY PATHOLOGIST: CPT | Mod: 26

## 2018-10-04 PROCEDURE — 85610 PROTHROMBIN TIME: CPT

## 2018-10-04 PROCEDURE — 93005 ELECTROCARDIOGRAM TRACING: CPT

## 2018-10-04 PROCEDURE — 80053 COMPREHEN METABOLIC PANEL: CPT

## 2018-10-04 PROCEDURE — G0463: CPT

## 2018-10-04 PROCEDURE — 86850 RBC ANTIBODY SCREEN: CPT

## 2018-10-04 PROCEDURE — 86900 BLOOD TYPING SEROLOGIC ABO: CPT

## 2018-10-04 PROCEDURE — 86923 COMPATIBILITY TEST ELECTRIC: CPT

## 2018-10-04 PROCEDURE — 85027 COMPLETE CBC AUTOMATED: CPT

## 2018-10-04 PROCEDURE — 86901 BLOOD TYPING SEROLOGIC RH(D): CPT

## 2018-10-04 PROCEDURE — 85730 THROMBOPLASTIN TIME PARTIAL: CPT

## 2018-10-04 PROCEDURE — 88311 DECALCIFY TISSUE: CPT | Mod: 26

## 2018-10-04 RX ORDER — GLUCAGON INJECTION, SOLUTION 0.5 MG/.1ML
1 INJECTION, SOLUTION SUBCUTANEOUS ONCE
Qty: 0 | Refills: 0 | Status: DISCONTINUED | OUTPATIENT
Start: 2018-10-04 | End: 2018-10-09

## 2018-10-04 RX ORDER — METOPROLOL TARTRATE 50 MG
100 TABLET ORAL DAILY
Qty: 0 | Refills: 0 | Status: DISCONTINUED | OUTPATIENT
Start: 2018-10-04 | End: 2018-10-05

## 2018-10-04 RX ORDER — DEXTROSE 50 % IN WATER 50 %
25 SYRINGE (ML) INTRAVENOUS ONCE
Qty: 0 | Refills: 0 | Status: DISCONTINUED | OUTPATIENT
Start: 2018-10-04 | End: 2018-10-09

## 2018-10-04 RX ORDER — CEFAZOLIN SODIUM 1 G
2000 VIAL (EA) INJECTION EVERY 8 HOURS
Qty: 0 | Refills: 0 | Status: COMPLETED | OUTPATIENT
Start: 2018-10-04 | End: 2018-10-05

## 2018-10-04 RX ORDER — FENTANYL CITRATE 50 UG/ML
50 INJECTION INTRAVENOUS
Qty: 0 | Refills: 0 | Status: DISCONTINUED | OUTPATIENT
Start: 2018-10-04 | End: 2018-10-04

## 2018-10-04 RX ORDER — SODIUM CHLORIDE 9 MG/ML
3 INJECTION INTRAMUSCULAR; INTRAVENOUS; SUBCUTANEOUS EVERY 8 HOURS
Qty: 0 | Refills: 0 | Status: DISCONTINUED | OUTPATIENT
Start: 2018-10-04 | End: 2018-10-04

## 2018-10-04 RX ORDER — CEFAZOLIN SODIUM 1 G
2000 VIAL (EA) INJECTION ONCE
Qty: 0 | Refills: 0 | Status: COMPLETED | OUTPATIENT
Start: 2018-10-04 | End: 2018-10-04

## 2018-10-04 RX ORDER — HYDROMORPHONE HYDROCHLORIDE 2 MG/ML
1 INJECTION INTRAMUSCULAR; INTRAVENOUS; SUBCUTANEOUS EVERY 4 HOURS
Qty: 0 | Refills: 0 | Status: DISCONTINUED | OUTPATIENT
Start: 2018-10-04 | End: 2018-10-09

## 2018-10-04 RX ORDER — ASPIRIN/CALCIUM CARB/MAGNESIUM 324 MG
81 TABLET ORAL DAILY
Qty: 0 | Refills: 0 | Status: DISCONTINUED | OUTPATIENT
Start: 2018-10-04 | End: 2018-10-09

## 2018-10-04 RX ORDER — HYDROMORPHONE HYDROCHLORIDE 2 MG/ML
2 INJECTION INTRAMUSCULAR; INTRAVENOUS; SUBCUTANEOUS EVERY 4 HOURS
Qty: 0 | Refills: 0 | Status: DISCONTINUED | OUTPATIENT
Start: 2018-10-04 | End: 2018-10-09

## 2018-10-04 RX ORDER — SODIUM CHLORIDE 9 MG/ML
1000 INJECTION, SOLUTION INTRAVENOUS
Qty: 0 | Refills: 0 | Status: DISCONTINUED | OUTPATIENT
Start: 2018-10-04 | End: 2018-10-04

## 2018-10-04 RX ORDER — DOCUSATE SODIUM 100 MG
100 CAPSULE ORAL THREE TIMES A DAY
Qty: 0 | Refills: 0 | Status: DISCONTINUED | OUTPATIENT
Start: 2018-10-04 | End: 2018-10-09

## 2018-10-04 RX ORDER — ONDANSETRON 8 MG/1
4 TABLET, FILM COATED ORAL ONCE
Qty: 0 | Refills: 0 | Status: DISCONTINUED | OUTPATIENT
Start: 2018-10-04 | End: 2018-10-04

## 2018-10-04 RX ORDER — SODIUM CHLORIDE 9 MG/ML
1000 INJECTION, SOLUTION INTRAVENOUS
Qty: 0 | Refills: 0 | Status: DISCONTINUED | OUTPATIENT
Start: 2018-10-04 | End: 2018-10-08

## 2018-10-04 RX ORDER — INSULIN LISPRO 100/ML
VIAL (ML) SUBCUTANEOUS AT BEDTIME
Qty: 0 | Refills: 0 | Status: DISCONTINUED | OUTPATIENT
Start: 2018-10-04 | End: 2018-10-05

## 2018-10-04 RX ORDER — DEXTROSE 50 % IN WATER 50 %
12.5 SYRINGE (ML) INTRAVENOUS ONCE
Qty: 0 | Refills: 0 | Status: DISCONTINUED | OUTPATIENT
Start: 2018-10-04 | End: 2018-10-09

## 2018-10-04 RX ORDER — DEXTROSE 50 % IN WATER 50 %
15 SYRINGE (ML) INTRAVENOUS ONCE
Qty: 0 | Refills: 0 | Status: DISCONTINUED | OUTPATIENT
Start: 2018-10-04 | End: 2018-10-09

## 2018-10-04 RX ORDER — SODIUM CHLORIDE 9 MG/ML
1000 INJECTION, SOLUTION INTRAVENOUS
Qty: 0 | Refills: 0 | Status: DISCONTINUED | OUTPATIENT
Start: 2018-10-04 | End: 2018-10-09

## 2018-10-04 RX ORDER — OXYCODONE AND ACETAMINOPHEN 5; 325 MG/1; MG/1
1 TABLET ORAL EVERY 4 HOURS
Qty: 0 | Refills: 0 | Status: DISCONTINUED | OUTPATIENT
Start: 2018-10-04 | End: 2018-10-09

## 2018-10-04 RX ADMIN — Medication 100 MILLIGRAM(S): at 14:45

## 2018-10-04 RX ADMIN — Medication 100 MILLIGRAM(S): at 22:03

## 2018-10-04 RX ADMIN — Medication 100 MILLIGRAM(S): at 22:02

## 2018-10-04 RX ADMIN — FENTANYL CITRATE 50 MICROGRAM(S): 50 INJECTION INTRAVENOUS at 20:13

## 2018-10-04 RX ADMIN — OXYCODONE AND ACETAMINOPHEN 1 TABLET(S): 5; 325 TABLET ORAL at 23:03

## 2018-10-04 RX ADMIN — FENTANYL CITRATE 50 MICROGRAM(S): 50 INJECTION INTRAVENOUS at 20:00

## 2018-10-04 NOTE — BRIEF OPERATIVE NOTE - PROCEDURE
<<-----Click on this checkbox to enter Procedure Femoral artery stent placement  10/04/2018  Right femoral to below knee popliteal bypass with 8 mm gortex, right femoral and popiteal endarterectomy, left SFA angioplasty and stenting 6x40 luminex, aortogram with bilateral runoff  Active  [id number]

## 2018-10-05 LAB
ANION GAP SERPL CALC-SCNC: 13 MMOL/L — SIGNIFICANT CHANGE UP (ref 5–17)
BUN SERPL-MCNC: 11 MG/DL — SIGNIFICANT CHANGE UP (ref 8–20)
CALCIUM SERPL-MCNC: 8.8 MG/DL — SIGNIFICANT CHANGE UP (ref 8.6–10.2)
CHLORIDE SERPL-SCNC: 101 MMOL/L — SIGNIFICANT CHANGE UP (ref 98–107)
CO2 SERPL-SCNC: 26 MMOL/L — SIGNIFICANT CHANGE UP (ref 22–29)
CREAT SERPL-MCNC: 0.78 MG/DL — SIGNIFICANT CHANGE UP (ref 0.5–1.3)
GLUCOSE SERPL-MCNC: 238 MG/DL — HIGH (ref 70–115)
HBA1C BLD-MCNC: 8.6 % — HIGH (ref 4–5.6)
HCT VFR BLD CALC: 34.5 % — LOW (ref 37–47)
HGB BLD-MCNC: 10.5 G/DL — LOW (ref 12–16)
MAGNESIUM SERPL-MCNC: 1.7 MG/DL — SIGNIFICANT CHANGE UP (ref 1.6–2.6)
MCHC RBC-ENTMCNC: 26.5 PG — LOW (ref 27–31)
MCHC RBC-ENTMCNC: 30.4 G/DL — LOW (ref 32–36)
MCV RBC AUTO: 87.1 FL — SIGNIFICANT CHANGE UP (ref 81–99)
PHOSPHATE SERPL-MCNC: 4.3 MG/DL — SIGNIFICANT CHANGE UP (ref 2.4–4.7)
PLATELET # BLD AUTO: 278 K/UL — SIGNIFICANT CHANGE UP (ref 150–400)
POTASSIUM SERPL-MCNC: 4.8 MMOL/L — SIGNIFICANT CHANGE UP (ref 3.5–5.3)
POTASSIUM SERPL-SCNC: 4.8 MMOL/L — SIGNIFICANT CHANGE UP (ref 3.5–5.3)
RBC # BLD: 3.96 M/UL — LOW (ref 4.4–5.2)
RBC # FLD: 15.2 % — SIGNIFICANT CHANGE UP (ref 11–15.6)
SODIUM SERPL-SCNC: 140 MMOL/L — SIGNIFICANT CHANGE UP (ref 135–145)
WBC # BLD: 8.8 K/UL — SIGNIFICANT CHANGE UP (ref 4.8–10.8)
WBC # FLD AUTO: 8.8 K/UL — SIGNIFICANT CHANGE UP (ref 4.8–10.8)

## 2018-10-05 RX ORDER — INSULIN LISPRO 100/ML
4 VIAL (ML) SUBCUTANEOUS ONCE
Qty: 0 | Refills: 0 | Status: COMPLETED | OUTPATIENT
Start: 2018-10-05 | End: 2018-10-05

## 2018-10-05 RX ORDER — DEXTROSE 50 % IN WATER 50 %
25 SYRINGE (ML) INTRAVENOUS ONCE
Qty: 0 | Refills: 0 | Status: DISCONTINUED | OUTPATIENT
Start: 2018-10-05 | End: 2018-10-09

## 2018-10-05 RX ORDER — SODIUM CHLORIDE 9 MG/ML
500 INJECTION, SOLUTION INTRAVENOUS ONCE
Qty: 0 | Refills: 0 | Status: COMPLETED | OUTPATIENT
Start: 2018-10-05 | End: 2018-10-05

## 2018-10-05 RX ORDER — CLOPIDOGREL BISULFATE 75 MG/1
75 TABLET, FILM COATED ORAL DAILY
Qty: 0 | Refills: 0 | Status: DISCONTINUED | OUTPATIENT
Start: 2018-10-05 | End: 2018-10-09

## 2018-10-05 RX ORDER — SODIUM CHLORIDE 9 MG/ML
1000 INJECTION, SOLUTION INTRAVENOUS
Qty: 0 | Refills: 0 | Status: DISCONTINUED | OUTPATIENT
Start: 2018-10-05 | End: 2018-10-09

## 2018-10-05 RX ORDER — INSULIN GLARGINE 100 [IU]/ML
30 INJECTION, SOLUTION SUBCUTANEOUS EVERY MORNING
Qty: 0 | Refills: 0 | Status: DISCONTINUED | OUTPATIENT
Start: 2018-10-05 | End: 2018-10-09

## 2018-10-05 RX ORDER — DEXTROSE 50 % IN WATER 50 %
15 SYRINGE (ML) INTRAVENOUS ONCE
Qty: 0 | Refills: 0 | Status: DISCONTINUED | OUTPATIENT
Start: 2018-10-05 | End: 2018-10-09

## 2018-10-05 RX ORDER — METOPROLOL TARTRATE 50 MG
100 TABLET ORAL
Qty: 0 | Refills: 0 | Status: DISCONTINUED | OUTPATIENT
Start: 2018-10-05 | End: 2018-10-09

## 2018-10-05 RX ORDER — INSULIN LISPRO 100/ML
VIAL (ML) SUBCUTANEOUS
Qty: 0 | Refills: 0 | Status: DISCONTINUED | OUTPATIENT
Start: 2018-10-05 | End: 2018-10-06

## 2018-10-05 RX ORDER — ACETAMINOPHEN 500 MG
1000 TABLET ORAL ONCE
Qty: 0 | Refills: 0 | Status: COMPLETED | OUTPATIENT
Start: 2018-10-05 | End: 2018-10-05

## 2018-10-05 RX ORDER — DEXTROSE 50 % IN WATER 50 %
12.5 SYRINGE (ML) INTRAVENOUS ONCE
Qty: 0 | Refills: 0 | Status: DISCONTINUED | OUTPATIENT
Start: 2018-10-05 | End: 2018-10-09

## 2018-10-05 RX ORDER — INSULIN LISPRO 100/ML
VIAL (ML) SUBCUTANEOUS AT BEDTIME
Qty: 0 | Refills: 0 | Status: DISCONTINUED | OUTPATIENT
Start: 2018-10-05 | End: 2018-10-06

## 2018-10-05 RX ORDER — GLUCAGON INJECTION, SOLUTION 0.5 MG/.1ML
1 INJECTION, SOLUTION SUBCUTANEOUS ONCE
Qty: 0 | Refills: 0 | Status: DISCONTINUED | OUTPATIENT
Start: 2018-10-05 | End: 2018-10-09

## 2018-10-05 RX ORDER — INSULIN GLARGINE 100 [IU]/ML
15 INJECTION, SOLUTION SUBCUTANEOUS AT BEDTIME
Qty: 0 | Refills: 0 | Status: DISCONTINUED | OUTPATIENT
Start: 2018-10-05 | End: 2018-10-09

## 2018-10-05 RX ADMIN — INSULIN GLARGINE 30 UNIT(S): 100 INJECTION, SOLUTION SUBCUTANEOUS at 09:01

## 2018-10-05 RX ADMIN — CLOPIDOGREL BISULFATE 75 MILLIGRAM(S): 75 TABLET, FILM COATED ORAL at 13:23

## 2018-10-05 RX ADMIN — Medication 100 MILLIGRAM(S): at 05:45

## 2018-10-05 RX ADMIN — Medication 100 MILLIGRAM(S): at 23:48

## 2018-10-05 RX ADMIN — Medication 1000 MILLIGRAM(S): at 15:05

## 2018-10-05 RX ADMIN — Medication 100 MILLIGRAM(S): at 18:08

## 2018-10-05 RX ADMIN — OXYCODONE AND ACETAMINOPHEN 1 TABLET(S): 5; 325 TABLET ORAL at 06:47

## 2018-10-05 RX ADMIN — Medication 100 MILLIGRAM(S): at 13:23

## 2018-10-05 RX ADMIN — OXYCODONE AND ACETAMINOPHEN 1 TABLET(S): 5; 325 TABLET ORAL at 05:45

## 2018-10-05 RX ADMIN — INSULIN GLARGINE 15 UNIT(S): 100 INJECTION, SOLUTION SUBCUTANEOUS at 23:48

## 2018-10-05 RX ADMIN — OXYCODONE AND ACETAMINOPHEN 1 TABLET(S): 5; 325 TABLET ORAL at 00:00

## 2018-10-05 RX ADMIN — Medication 5 MILLIGRAM(S): at 05:45

## 2018-10-05 RX ADMIN — OXYCODONE AND ACETAMINOPHEN 1 TABLET(S): 5; 325 TABLET ORAL at 23:56

## 2018-10-05 RX ADMIN — Medication 400 MILLIGRAM(S): at 14:50

## 2018-10-05 RX ADMIN — SODIUM CHLORIDE 100 MILLILITER(S): 9 INJECTION, SOLUTION INTRAVENOUS at 05:49

## 2018-10-05 RX ADMIN — Medication 10: at 13:23

## 2018-10-05 RX ADMIN — Medication 100 MILLIGRAM(S): at 05:44

## 2018-10-05 RX ADMIN — SODIUM CHLORIDE 500 MILLILITER(S): 9 INJECTION, SOLUTION INTRAVENOUS at 10:18

## 2018-10-05 RX ADMIN — Medication 81 MILLIGRAM(S): at 13:23

## 2018-10-05 RX ADMIN — Medication 4 UNIT(S): at 18:33

## 2018-10-05 NOTE — PROGRESS NOTE ADULT - SUBJECTIVE AND OBJECTIVE BOX
Pt seen and examined.  POD # 1 s/p right femoral to popliteal bypass and left SFA angioplasty and stenting.  Complaint of incision pain this am.  Dressing clean and dry.  2+ PT pulse bilaterally.  Currently on aspirin.  Plan to resume plavix today but will hold off on resuming Xarelto until Monday due to concerns over post operative bleeding.  Resume Diabetic meds, cardiology evaluation for rapid afib.

## 2018-10-05 NOTE — CONSULT NOTE ADULT - SUBJECTIVE AND OBJECTIVE BOX
[facility], [facility]                                   [address] [address]                                                      PHONE: [phone]                                                         FAX: [phone]  ----------------------------------------------------------------------------------------------------    73y Female with past medical history as under  s/p right femoral to popliteal bypass and left SFA angioplasty and stenting.  She denies any associated chest discomfort has infrequent symptoms of palpitations with her  atrial fibrillation. As her last visit, she reported no significant dyspnea and she has been free from smoking for 2 years. COPD has been well-managed on her current medical therapy. At the time of evaluation, pt reports mild incisional pain. She does reports any significant palpitations.    PAST MEDICAL & SURGICAL HISTORY:  PAD (peripheral artery disease)  Paroxysmal atrial fibrillation  Hypercholesterolemia  Diabetes  Hypertension  H/O hernia repair  H/O abdominal hysterectomy      warfarin (Rash)      Review of Systems:  Positive for leg pain  Rest of the systems were reviewed and was negative.     Family history:   No pertinent family history in first degree relative of early CAD, sudden cardiac death or  congenital heart disease    Social History:  No smoking   No alcohol  No other drug use    Vital Signs Last 24 Hrs  T(C): 37.3 (05 Oct 2018 08:05), Max: 37.3 (05 Oct 2018 08:05)  T(F): 99.1 (05 Oct 2018 08:05), Max: 99.1 (05 Oct 2018 08:05)  HR: 116 (05 Oct 2018 08:05) (66 - 116)  BP: 99/65 (05 Oct 2018 08:05) (99/65 - 137/71)  BP(mean): --  RR: 18 (05 Oct 2018 08:05) (11 - 18)  SpO2: 93% (05 Oct 2018 08:05) (93% - 100%)    PHYSICAL EXAM:  Constitutional: Appears well developed, well nourished; alert and co-operative  HEENT:     Head: Normocephalic and atraumatic  Eyes: Conjunctiva normal, No scleral icterus  Neck: Supple, No JVD  Mouth/Throat: Mucous membranes are normal. Mucosa are not pale or dry.  Cardiovascular: S1, S2 irregular  Respiratory: Lungs clear to auscultation; no crepitations, no wheeze  Gastrointestinal:  Soft, Non-tender, + BS	  Musculoskeletal: Normal range of motion. No edema  Skin: Dressing clean and dry. No cyanosis . No diaphoresis.  Neurologic: Alert oriented to time place and person. Normal strength and no tremor.  Psychiatric: Normal mood and affect, Speech is normal and behavior is normal.        LABS:        PT/INR - ( 04 Oct 2018 12:20 )   PT: 12.2 sec;   INR: 1.11 ratio         PTT - ( 04 Oct 2018 12:20 )  PTT:28.6 sec    RADIOLOGY & ADDITIONAL STUDIES: (reviewed)    CARDIOLOGY TESTING:(reviewed)     ECG (Independent visualization): AF with mild RVR    August 2016 pharmacologic nuclear stress test normal stress rest myocardial perfusion no evidence of ischemia ejection fraction 63%     June 2016 GERALDINE LVEF normal 65% aortic sclerosis without stenosis nor insufficiency    MEDICATIONS:(reviewed)  MEDICATIONS  (STANDING):  aspirin enteric coated 81 milliGRAM(s) Oral daily  clopidogrel Tablet 75 milliGRAM(s) Oral daily  dextrose 5%. 1000 milliLiter(s) (50 mL/Hr) IV Continuous <Continuous>  dextrose 5%. 1000 milliLiter(s) (50 mL/Hr) IV Continuous <Continuous>  dextrose 50% Injectable 12.5 Gram(s) IV Push once  dextrose 50% Injectable 25 Gram(s) IV Push once  dextrose 50% Injectable 25 Gram(s) IV Push once  dextrose 50% Injectable 12.5 Gram(s) IV Push once  dextrose 50% Injectable 25 Gram(s) IV Push once  dextrose 50% Injectable 25 Gram(s) IV Push once  docusate sodium 100 milliGRAM(s) Oral three times a day  enalapril 5 milliGRAM(s) Oral daily  influenza   Vaccine 0.5 milliLiter(s) IntraMuscular once  insulin glargine Injectable (LANTUS) 30 Unit(s) SubCutaneous every morning  insulin glargine Injectable (LANTUS) 15 Unit(s) SubCutaneous at bedtime  insulin lispro (HumaLOG) corrective regimen sliding scale   SubCutaneous at bedtime  insulin lispro (HumaLOG) corrective regimen sliding scale   SubCutaneous at bedtime  lactated ringers. 1000 milliLiter(s) (50 mL/Hr) IV Continuous <Continuous>  metoprolol succinate  milliGRAM(s) Oral daily    MEDICATIONS  (PRN):  dextrose 40% Gel 15 Gram(s) Oral once PRN Blood Glucose LESS THAN 70 milliGRAM(s)/deciliter  dextrose 40% Gel 15 Gram(s) Oral once PRN Blood Glucose LESS THAN 70 milliGRAM(s)/deciliter  glucagon  Injectable 1 milliGRAM(s) IntraMuscular once PRN Glucose LESS THAN 70 milligrams/deciliter  glucagon  Injectable 1 milliGRAM(s) IntraMuscular once PRN Glucose LESS THAN 70 milligrams/deciliter  HYDROmorphone  Injectable 1 milliGRAM(s) IV Push every 4 hours PRN Moderate Pain (4 - 6)  HYDROmorphone  Injectable 2 milliGRAM(s) IV Push every 4 hours PRN Severe Pain (7 - 10)  oxyCODONE    5 mG/acetaminophen 325 mG 1 Tablet(s) Oral every 4 hours PRN Moderate Pain (4 - 6)    ASSESSMENT AND PLAN:    73y Female with prior history of HTN, persistent atrial fibrillation ChadsVasc score of 3 on Xarelto, hyperlipidemia, no evidence of significant structural nor ischemic heart disease on prior noninvasive testing, PVD status post lower extremity stenting with persistent claudication  s/p right femoral to popliteal bypass and left SFA angioplasty and stenting.  Noted to be in AF with mild RVR post op. Mild leg pain but no significant shortness of breath or palpitations. Volume status stable.    1.	AF:  - Rates mildly elevated. Increase to 100 mg BID for now  - Continue telemetry monitoring for now  - Plan to resume plavix today but will hold off on resuming Xarelto until Monday due to concerns over post operative bleeding as per surgery    2.	s/p right femoral to popliteal bypass and left SFA angioplasty and stenting.   - Mx as per vascular surgery  - Pain control

## 2018-10-05 NOTE — CHART NOTE - NSCHARTNOTEFT_GEN_A_CORE
Was called by nasrin olmos that patient is sustaining rapid afib for longer periods of time, reaching 120's-150's for > 1 min.  Patient seen and examined, very pleasant and asymptomatic.   Spoke with Dr. Ronquillo, he will start cardizem.

## 2018-10-05 NOTE — PROGRESS NOTE ADULT - SUBJECTIVE AND OBJECTIVE BOX
Pt seen, chart reviewed.  S/p Right Femoral Popliteal Bypass with Goretex, Right Leg Angiogram, Arteriogram, Left Leg Angiogram with Left Superficial Femoral Artery, Angioplasty and Stenting, POD#1.  VSS.  Adequate pain control.  Resting comfortably.   Tolerating PO intake.  No N/V.  No anesthesia problems noted.

## 2018-10-05 NOTE — PROGRESS NOTE ADULT - SUBJECTIVE AND OBJECTIVE BOX
INTERVAL HPI/OVERNIGHT EVENTS/SUBJECTIVE:  Pt seen and examined this AM. c/o incisional right calf pain. Tolerating CLD. Denies cp, sob, n/v/d, loss of sensation, HA.     ICU Vital Signs Last 24 Hrs  T(C): 37.3 (05 Oct 2018 08:05), Max: 37.3 (05 Oct 2018 08:05)  T(F): 99.1 (05 Oct 2018 08:05), Max: 99.1 (05 Oct 2018 08:05)  HR: 116 (05 Oct 2018 08:05) (66 - 116)  BP: 99/65 (05 Oct 2018 08:05) (99/65 - 137/71)  BP(mean): --  ABP: --  ABP(mean): --  RR: 18 (05 Oct 2018 08:05) (11 - 18)  SpO2: 93% (05 Oct 2018 08:05) (93% - 100%)      I&O's Detail    04 Oct 2018 07:01  -  05 Oct 2018 07:00  --------------------------------------------------------  IN:    lactated ringers.: 100 mL  Total IN: 100 mL    OUT:    Indwelling Catheter - Urethral: 1225 mL  Total OUT: 1225 mL    Total NET: -1125 mL    MEDICATIONS  (STANDING):  aspirin enteric coated 81 milliGRAM(s) Oral daily  clopidogrel Tablet 75 milliGRAM(s) Oral daily  dextrose 5%. 1000 milliLiter(s) (50 mL/Hr) IV Continuous <Continuous>  dextrose 5%. 1000 milliLiter(s) (50 mL/Hr) IV Continuous <Continuous>  dextrose 50% Injectable 12.5 Gram(s) IV Push once  dextrose 50% Injectable 25 Gram(s) IV Push once  dextrose 50% Injectable 25 Gram(s) IV Push once  dextrose 50% Injectable 12.5 Gram(s) IV Push once  dextrose 50% Injectable 25 Gram(s) IV Push once  dextrose 50% Injectable 25 Gram(s) IV Push once  docusate sodium 100 milliGRAM(s) Oral three times a day  enalapril 5 milliGRAM(s) Oral daily  influenza   Vaccine 0.5 milliLiter(s) IntraMuscular once  insulin glargine Injectable (LANTUS) 30 Unit(s) SubCutaneous every morning  insulin glargine Injectable (LANTUS) 15 Unit(s) SubCutaneous at bedtime  insulin lispro (HumaLOG) corrective regimen sliding scale   SubCutaneous at bedtime  insulin lispro (HumaLOG) corrective regimen sliding scale   SubCutaneous at bedtime  lactated ringers. 1000 milliLiter(s) (50 mL/Hr) IV Continuous <Continuous>  metoprolol succinate  milliGRAM(s) Oral daily    MEDICATIONS  (PRN):  dextrose 40% Gel 15 Gram(s) Oral once PRN Blood Glucose LESS THAN 70 milliGRAM(s)/deciliter  dextrose 40% Gel 15 Gram(s) Oral once PRN Blood Glucose LESS THAN 70 milliGRAM(s)/deciliter  glucagon  Injectable 1 milliGRAM(s) IntraMuscular once PRN Glucose LESS THAN 70 milligrams/deciliter  glucagon  Injectable 1 milliGRAM(s) IntraMuscular once PRN Glucose LESS THAN 70 milligrams/deciliter  HYDROmorphone  Injectable 1 milliGRAM(s) IV Push every 4 hours PRN Moderate Pain (4 - 6)  HYDROmorphone  Injectable 2 milliGRAM(s) IV Push every 4 hours PRN Severe Pain (7 - 10)  oxyCODONE    5 mG/acetaminophen 325 mG 1 Tablet(s) Oral every 4 hours PRN Moderate Pain (4 - 6)    MISC:     PHYSICAL EXAM:    Gen: pleasant, NAD, laying comfortably.     Neurological: neurovasc intact, no loss of sensation.     Neck: supple    Pulmonary: non-labored    Cardiovascular: slightly tachycardic     Gastrointestinal: benign     Genitourinary: shaw with outputs as recorded     Extremities: dopplerable PT pulses, extremities WWP, sensations intact, compartments soft, no signs of hematoma, dressings c/d/i        PT/INR - ( 04 Oct 2018 12:20 )   PT: 12.2 sec;   INR: 1.11 ratio         PTT - ( 04 Oct 2018 12:20 )  PTT:28.6 sec

## 2018-10-05 NOTE — PROGRESS NOTE ADULT - ASSESSMENT
ASSESSMENT/PLAN:  73yFemale s/p fem-pop bypass. Has periods of rapid afib this AM, with hx of PAF. Pain well controlled. tolerating diet. Faiht with outputs as recorded.  -restart plavix, continue asa - hold xarelto for now  -advance to cc diet  -cards consult for PAF  -bed rest, may get OOB for up to 1 hour today  -nv checks per routine  -monitor HR  -check labs  -discussed with attending

## 2018-10-06 LAB — HBA1C BLD-MCNC: 8.6 % — HIGH (ref 4–5.6)

## 2018-10-06 PROCEDURE — 93971 EXTREMITY STUDY: CPT | Mod: 26,RT

## 2018-10-06 RX ORDER — INSULIN LISPRO 100/ML
VIAL (ML) SUBCUTANEOUS
Qty: 0 | Refills: 0 | Status: DISCONTINUED | OUTPATIENT
Start: 2018-10-06 | End: 2018-10-09

## 2018-10-06 RX ADMIN — OXYCODONE AND ACETAMINOPHEN 1 TABLET(S): 5; 325 TABLET ORAL at 17:48

## 2018-10-06 RX ADMIN — Medication 4: at 12:43

## 2018-10-06 RX ADMIN — CLOPIDOGREL BISULFATE 75 MILLIGRAM(S): 75 TABLET, FILM COATED ORAL at 12:42

## 2018-10-06 RX ADMIN — Medication 100 MILLIGRAM(S): at 14:18

## 2018-10-06 RX ADMIN — OXYCODONE AND ACETAMINOPHEN 1 TABLET(S): 5; 325 TABLET ORAL at 00:56

## 2018-10-06 RX ADMIN — Medication 81 MILLIGRAM(S): at 12:43

## 2018-10-06 RX ADMIN — OXYCODONE AND ACETAMINOPHEN 1 TABLET(S): 5; 325 TABLET ORAL at 18:37

## 2018-10-06 RX ADMIN — Medication 4: at 08:42

## 2018-10-06 RX ADMIN — INSULIN GLARGINE 30 UNIT(S): 100 INJECTION, SOLUTION SUBCUTANEOUS at 08:46

## 2018-10-06 RX ADMIN — Medication 100 MILLIGRAM(S): at 05:43

## 2018-10-06 RX ADMIN — Medication 100 MILLIGRAM(S): at 21:11

## 2018-10-06 RX ADMIN — Medication 100 MILLIGRAM(S): at 17:43

## 2018-10-06 RX ADMIN — SODIUM CHLORIDE 50 MILLILITER(S): 9 INJECTION, SOLUTION INTRAVENOUS at 06:37

## 2018-10-06 RX ADMIN — Medication 5 MILLIGRAM(S): at 05:43

## 2018-10-06 RX ADMIN — INSULIN GLARGINE 15 UNIT(S): 100 INJECTION, SOLUTION SUBCUTANEOUS at 21:10

## 2018-10-06 RX ADMIN — Medication 6: at 17:42

## 2018-10-06 NOTE — PROGRESS NOTE ADULT - SUBJECTIVE AND OBJECTIVE BOX
Pt seen and examined on rounds this AM, was c/o right calf pain which felt "crampy, like a gini horse."  Otherwise, pain well controlled.     Dressings removed and replaced with island dressings. No active drainage or bleeding. no signs of hematoma. compartments soft. incisions with staples, well approximated.     shaw removed and passed TOV  tolerating regular diet  duplex of was ordered which showed b/l PT DVT's (spoke with radiologist Dr. Goodwin) - Dr. Collazo made aware  At this time, risk of bleeding out weight benefits of restarting AC. Patient is on ASA and plavix at this time. Continue plan to restart xarelto on monday.   Will continue to monitor closely.

## 2018-10-06 NOTE — PROGRESS NOTE ADULT - SUBJECTIVE AND OBJECTIVE BOX
[place] [facility], [facility]                                   [address] [place] [address]                                                      PHONE: [phone]                                                         FAX: [phone]  [email or web address]  ---------------------------------------------------------------------------------------------------------------------------------    Overnight events/patient complaints: AF rates controlled now      warfarin (Rash)    MEDICATIONS  (STANDING):  aspirin enteric coated 81 milliGRAM(s) Oral daily  clopidogrel Tablet 75 milliGRAM(s) Oral daily  dextrose 5%. 1000 milliLiter(s) (50 mL/Hr) IV Continuous <Continuous>  dextrose 5%. 1000 milliLiter(s) (50 mL/Hr) IV Continuous <Continuous>  dextrose 50% Injectable 12.5 Gram(s) IV Push once  dextrose 50% Injectable 25 Gram(s) IV Push once  dextrose 50% Injectable 25 Gram(s) IV Push once  dextrose 50% Injectable 12.5 Gram(s) IV Push once  dextrose 50% Injectable 25 Gram(s) IV Push once  dextrose 50% Injectable 25 Gram(s) IV Push once  diltiazem    Tablet 60 milliGRAM(s) Oral three times a day  docusate sodium 100 milliGRAM(s) Oral three times a day  enalapril 5 milliGRAM(s) Oral daily  influenza   Vaccine 0.5 milliLiter(s) IntraMuscular once  insulin glargine Injectable (LANTUS) 30 Unit(s) SubCutaneous every morning  insulin glargine Injectable (LANTUS) 15 Unit(s) SubCutaneous at bedtime  insulin lispro (HumaLOG) corrective regimen sliding scale   SubCutaneous at bedtime  insulin lispro (HumaLOG) corrective regimen sliding scale   SubCutaneous three times a day before meals  lactated ringers. 1000 milliLiter(s) (50 mL/Hr) IV Continuous <Continuous>  metoprolol succinate  milliGRAM(s) Oral two times a day    MEDICATIONS  (PRN):  dextrose 40% Gel 15 Gram(s) Oral once PRN Blood Glucose LESS THAN 70 milliGRAM(s)/deciliter  dextrose 40% Gel 15 Gram(s) Oral once PRN Blood Glucose LESS THAN 70 milliGRAM(s)/deciliter  glucagon  Injectable 1 milliGRAM(s) IntraMuscular once PRN Glucose LESS THAN 70 milligrams/deciliter  glucagon  Injectable 1 milliGRAM(s) IntraMuscular once PRN Glucose LESS THAN 70 milligrams/deciliter  HYDROmorphone  Injectable 1 milliGRAM(s) IV Push every 4 hours PRN Moderate Pain (4 - 6)  HYDROmorphone  Injectable 2 milliGRAM(s) IV Push every 4 hours PRN Severe Pain (7 - 10)  oxyCODONE    5 mG/acetaminophen 325 mG 1 Tablet(s) Oral every 4 hours PRN Moderate Pain (4 - 6)      Vital Signs Last 24 Hrs  T(C): 36.3 (06 Oct 2018 04:59), Max: 37.3 (05 Oct 2018 08:05)  T(F): 97.3 (06 Oct 2018 04:59), Max: 99.1 (05 Oct 2018 08:05)  HR: 100 (06 Oct 2018 04:59) (77 - 116)  BP: 113/65 (06 Oct 2018 04:59) (98/55 - 113/89)  BP(mean): --  RR: 18 (06 Oct 2018 04:59) (18 - 189)  SpO2: 96% (06 Oct 2018 04:59) (93% - 100%)  ICU Vital Signs Last 24 Hrs  DONALD SAUNDERS  I&O's Detail    04 Oct 2018 07:01  -  05 Oct 2018 07:00  --------------------------------------------------------  IN:    lactated ringers.: 100 mL  Total IN: 100 mL    OUT:    Indwelling Catheter - Urethral: 1225 mL  Total OUT: 1225 mL    Total NET: -1125 mL      05 Oct 2018 07:01  -  06 Oct 2018 06:43  --------------------------------------------------------  IN:  Total IN: 0 mL    OUT:    Indwelling Catheter - Urethral: 1500 mL  Total OUT: 1500 mL    Total NET: -1500 mL        I&O's Summary    04 Oct 2018 07:01  -  05 Oct 2018 07:00  --------------------------------------------------------  IN: 100 mL / OUT: 1225 mL / NET: -1125 mL    05 Oct 2018 07:01  -  06 Oct 2018 06:43  --------------------------------------------------------  IN: 0 mL / OUT: 1500 mL / NET: -1500 mL      Drug Dosing Weight  DONALD CHIP      PHYSICAL EXAM:  General: NAD.  HEENT: Head; normocephalic.  Eyes: Pupils reactive, cornea wnl.  Neck: Supple, no nodes adenopathy.  CARDIOVASCULAR: Irregular.   LUNGS: Normal breath sounds bilaterally.  ABDOMEN: Soft, nontender.  EXTREMITIES: No clubbing, cyanosis or edema.   SKIN: warm and dry.  NEURO: Alert/oriented x 3.    PSYCH: normal affect.        LABS:                        10.5   8.8   )-----------( 278      ( 05 Oct 2018 11:27 )             34.5     10-05    140  |  101  |  11.0  ----------------------------<  238<H>  4.8   |  26.0  |  0.78    Ca    8.8      05 Oct 2018 11:27  Phos  4.3     10-05  Mg     1.7     10-05      DONALD SAUNDERS      PT/INR - ( 04 Oct 2018 12:20 )   PT: 12.2 sec;   INR: 1.11 ratio         PTT - ( 04 Oct 2018 12:20 )  PTT:28.6 sec      RADIOLOGY & ADDITIONAL STUDIES:    INTERPRETATION OF TELEMETRY (personally reviewed): AF 80's        ASSESSMENT AND PLAN:  73y Female with prior history of HTN, persistent atrial fibrillation, ChadsVasc score of 3 on Xarelto, hyperlipidemia, no evidence of significant structural nor ischemic heart disease on prior noninvasive testing, PVD status post lower extremity stenting with persistent claudication  s/p right femoral to popliteal bypass and left SFA angioplasty and stenting.  Noted to be in AF with mild RVR post op. Mild leg pain but no significant shortness of breath or palpitations. Volume status stable.    AF:  - Rates controlled now. Continue BB and CCB.  - Continue telemetry monitoring  - Resumed plavix yesterday but will hold off on resuming Xarelto until Monday due to concerns over post operative bleeding as per surgery    s/p right femoral to popliteal bypass and left SFA angioplasty and stenting:  - plans per vascular surgery

## 2018-10-06 NOTE — PROGRESS NOTE ADULT - SUBJECTIVE AND OBJECTIVE BOX
Pt with right calf pain on extension, otherwise without complaints, tolerating diet      AVSS  Gen NAD  Abd soft  Extr RLE incisions c/d/i

## 2018-10-07 LAB
ANION GAP SERPL CALC-SCNC: 12 MMOL/L — SIGNIFICANT CHANGE UP (ref 5–17)
BUN SERPL-MCNC: 12 MG/DL — SIGNIFICANT CHANGE UP (ref 8–20)
CALCIUM SERPL-MCNC: 8.7 MG/DL — SIGNIFICANT CHANGE UP (ref 8.6–10.2)
CHLORIDE SERPL-SCNC: 101 MMOL/L — SIGNIFICANT CHANGE UP (ref 98–107)
CO2 SERPL-SCNC: 25 MMOL/L — SIGNIFICANT CHANGE UP (ref 22–29)
CREAT SERPL-MCNC: 0.56 MG/DL — SIGNIFICANT CHANGE UP (ref 0.5–1.3)
GLUCOSE SERPL-MCNC: 207 MG/DL — HIGH (ref 70–115)
HCT VFR BLD CALC: 28.6 % — LOW (ref 37–47)
HGB BLD-MCNC: 9.3 G/DL — LOW (ref 12–16)
MAGNESIUM SERPL-MCNC: 1.9 MG/DL — SIGNIFICANT CHANGE UP (ref 1.6–2.6)
MCHC RBC-ENTMCNC: 27.9 PG — SIGNIFICANT CHANGE UP (ref 27–31)
MCHC RBC-ENTMCNC: 32.5 G/DL — SIGNIFICANT CHANGE UP (ref 32–36)
MCV RBC AUTO: 85.9 FL — SIGNIFICANT CHANGE UP (ref 81–99)
PHOSPHATE SERPL-MCNC: 2.4 MG/DL — SIGNIFICANT CHANGE UP (ref 2.4–4.7)
PLATELET # BLD AUTO: 247 K/UL — SIGNIFICANT CHANGE UP (ref 150–400)
POTASSIUM SERPL-MCNC: 4.2 MMOL/L — SIGNIFICANT CHANGE UP (ref 3.5–5.3)
POTASSIUM SERPL-SCNC: 4.2 MMOL/L — SIGNIFICANT CHANGE UP (ref 3.5–5.3)
RBC # BLD: 3.33 M/UL — LOW (ref 4.4–5.2)
RBC # FLD: 14.7 % — SIGNIFICANT CHANGE UP (ref 11–15.6)
SODIUM SERPL-SCNC: 138 MMOL/L — SIGNIFICANT CHANGE UP (ref 135–145)
WBC # BLD: 7.8 K/UL — SIGNIFICANT CHANGE UP (ref 4.8–10.8)
WBC # FLD AUTO: 7.8 K/UL — SIGNIFICANT CHANGE UP (ref 4.8–10.8)

## 2018-10-07 RX ORDER — RIVAROXABAN 15 MG-20MG
20 KIT ORAL
Qty: 0 | Refills: 0 | Status: DISCONTINUED | OUTPATIENT
Start: 2018-10-07 | End: 2018-10-07

## 2018-10-07 RX ORDER — RIVAROXABAN 15 MG-20MG
20 KIT ORAL EVERY 24 HOURS
Qty: 0 | Refills: 0 | Status: DISCONTINUED | OUTPATIENT
Start: 2018-10-07 | End: 2018-10-07

## 2018-10-07 RX ORDER — RIVAROXABAN 15 MG-20MG
20 KIT ORAL ONCE
Qty: 0 | Refills: 0 | Status: COMPLETED | OUTPATIENT
Start: 2018-10-07 | End: 2018-10-07

## 2018-10-07 RX ORDER — RIVAROXABAN 15 MG-20MG
20 KIT ORAL EVERY 24 HOURS
Qty: 0 | Refills: 0 | Status: DISCONTINUED | OUTPATIENT
Start: 2018-10-08 | End: 2018-10-09

## 2018-10-07 RX ADMIN — Medication 4: at 11:53

## 2018-10-07 RX ADMIN — OXYCODONE AND ACETAMINOPHEN 1 TABLET(S): 5; 325 TABLET ORAL at 23:58

## 2018-10-07 RX ADMIN — INSULIN GLARGINE 30 UNIT(S): 100 INJECTION, SOLUTION SUBCUTANEOUS at 11:15

## 2018-10-07 RX ADMIN — Medication 100 MILLIGRAM(S): at 05:33

## 2018-10-07 RX ADMIN — OXYCODONE AND ACETAMINOPHEN 1 TABLET(S): 5; 325 TABLET ORAL at 16:25

## 2018-10-07 RX ADMIN — Medication 100 MILLIGRAM(S): at 13:54

## 2018-10-07 RX ADMIN — Medication 100 MILLIGRAM(S): at 17:28

## 2018-10-07 RX ADMIN — SODIUM CHLORIDE 50 MILLILITER(S): 9 INJECTION, SOLUTION INTRAVENOUS at 05:35

## 2018-10-07 RX ADMIN — Medication 4: at 16:39

## 2018-10-07 RX ADMIN — RIVAROXABAN 20 MILLIGRAM(S): KIT at 13:55

## 2018-10-07 RX ADMIN — Medication 100 MILLIGRAM(S): at 22:17

## 2018-10-07 RX ADMIN — OXYCODONE AND ACETAMINOPHEN 1 TABLET(S): 5; 325 TABLET ORAL at 18:07

## 2018-10-07 RX ADMIN — Medication 81 MILLIGRAM(S): at 11:20

## 2018-10-07 RX ADMIN — Medication 4: at 09:06

## 2018-10-07 RX ADMIN — CLOPIDOGREL BISULFATE 75 MILLIGRAM(S): 75 TABLET, FILM COATED ORAL at 11:20

## 2018-10-07 RX ADMIN — Medication 5 MILLIGRAM(S): at 05:33

## 2018-10-07 RX ADMIN — INSULIN GLARGINE 15 UNIT(S): 100 INJECTION, SOLUTION SUBCUTANEOUS at 22:18

## 2018-10-07 RX ADMIN — Medication 100 MILLIGRAM(S): at 05:34

## 2018-10-07 NOTE — PROGRESS NOTE ADULT - ASSESSMENT
S/p right leg bypass, doing well, afib, PVD.  POD#2.  Faith out today.  OOB as tolerated.  Pt on antiplatelets, watchful waiting on DVT, risk of below knee DVT low, will restart AC tomorrow.

## 2018-10-07 NOTE — PROGRESS NOTE ADULT - SUBJECTIVE AND OBJECTIVE BOX
INTERVAL HPI/OVERNIGHT EVENTS/SUBJECTIVE:  Pt seen and examined. Very pleasant. Still c/o right calf pain, pt found with acute R PT DVT. Otherwise, pain well controlled. Heart rate NSR. afebrile.   Denies cp, sob, n/v/d, numbness/tingling, loss of sensation.     ICU Vital Signs Last 24 Hrs  T(C): 36.7 (07 Oct 2018 08:19), Max: 37.6 (07 Oct 2018 04:40)  T(F): 98.1 (07 Oct 2018 08:19), Max: 99.7 (07 Oct 2018 04:40)  HR: 88 (07 Oct 2018 08:19) (83 - 97)  BP: 101/65 (07 Oct 2018 08:19) (97/55 - 127/66)  BP(mean): --  ABP: --  ABP(mean): --  RR: 17 (07 Oct 2018 08:19) (16 - 18)  SpO2: 96% (07 Oct 2018 08:19) (94% - 98%)      I&O's Detail    06 Oct 2018 07:01  -  07 Oct 2018 07:00  --------------------------------------------------------  IN:    lactated ringers.: 1050 mL  Total IN: 1050 mL    OUT:    Voided: 975 mL  Total OUT: 975 mL    Total NET: 75 mL    MEDICATIONS  (STANDING):  aspirin enteric coated 81 milliGRAM(s) Oral daily  clopidogrel Tablet 75 milliGRAM(s) Oral daily  dextrose 5%. 1000 milliLiter(s) (50 mL/Hr) IV Continuous <Continuous>  dextrose 5%. 1000 milliLiter(s) (50 mL/Hr) IV Continuous <Continuous>  dextrose 50% Injectable 12.5 Gram(s) IV Push once  dextrose 50% Injectable 25 Gram(s) IV Push once  dextrose 50% Injectable 25 Gram(s) IV Push once  dextrose 50% Injectable 12.5 Gram(s) IV Push once  dextrose 50% Injectable 25 Gram(s) IV Push once  dextrose 50% Injectable 25 Gram(s) IV Push once  diltiazem    Tablet 60 milliGRAM(s) Oral three times a day  docusate sodium 100 milliGRAM(s) Oral three times a day  enalapril 5 milliGRAM(s) Oral daily  influenza   Vaccine 0.5 milliLiter(s) IntraMuscular once  insulin glargine Injectable (LANTUS) 30 Unit(s) SubCutaneous every morning  insulin glargine Injectable (LANTUS) 15 Unit(s) SubCutaneous at bedtime  insulin lispro (HumaLOG) corrective regimen sliding scale   SubCutaneous three times a day before meals  lactated ringers. 1000 milliLiter(s) (50 mL/Hr) IV Continuous <Continuous>  metoprolol succinate  milliGRAM(s) Oral two times a day  rivaroxaban 20 milliGRAM(s) Oral <User Schedule>    MEDICATIONS  (PRN):  dextrose 40% Gel 15 Gram(s) Oral once PRN Blood Glucose LESS THAN 70 milliGRAM(s)/deciliter  dextrose 40% Gel 15 Gram(s) Oral once PRN Blood Glucose LESS THAN 70 milliGRAM(s)/deciliter  glucagon  Injectable 1 milliGRAM(s) IntraMuscular once PRN Glucose LESS THAN 70 milligrams/deciliter  glucagon  Injectable 1 milliGRAM(s) IntraMuscular once PRN Glucose LESS THAN 70 milligrams/deciliter  HYDROmorphone  Injectable 1 milliGRAM(s) IV Push every 4 hours PRN Moderate Pain (4 - 6)  HYDROmorphone  Injectable 2 milliGRAM(s) IV Push every 4 hours PRN Severe Pain (7 - 10)  oxyCODONE    5 mG/acetaminophen 325 mG 1 Tablet(s) Oral every 4 hours PRN Moderate Pain (4 - 6)      MISC:     PHYSICAL EXAM:    Gen: pleasant, NAD, laying comfortably    Neurological: NV intact, motor/sensory intact    Neck: supple    Pulmonary: non-labored, no accessory muscle use    Cardiovascular: RRR, s1/s2    Genitourinary: voiding spontaneously    Extremities: RLE dressings removed and replaced, no active bleeding or drainage, compartments soft, no signs of hematomas, R groin soft. Strong dopplerable R PT and DP pulses, still with R calf TTP. RLE calf-foot more edematous today.       RADIOLOGY & ADDITIONAL STUDIES:  < from: US Duplex Venous Lower Ext Ltd, Right (10.06.18 @ 18:41) >    IMPRESSION:     Limited visualization of the right lower extremity venous system due to   overlying bandages.    Deep venous thrombosis involving the right posterior tibial veins (below   the knee DVT).    < end of copied text >      ASSESSMENT/PLAN:  73yFemale s/p R fem-pop bypass. With acute right posterior tibial DVT. HR better controlled. Pain to R calf, otherwise, no complaints.   -Per Dr. Haywood, will restart xarelto today  -continue asa,plavix  -PT consult ordered  -regular diet  -f/u am labs  -glucose control  -discussed with attending Dr. Collazo

## 2018-10-07 NOTE — PROGRESS NOTE ADULT - SUBJECTIVE AND OBJECTIVE BOX
Pt with right calf pain on extension, otherwise without complaints, tolerating diet, venous duplex yesterday shows acute posterior tibial vein DVT    Gen NAD  Abd soft  Extr dressings with minimal strike thru, otherwise clear and intact

## 2018-10-08 RX ADMIN — INSULIN GLARGINE 30 UNIT(S): 100 INJECTION, SOLUTION SUBCUTANEOUS at 09:08

## 2018-10-08 RX ADMIN — Medication 100 MILLIGRAM(S): at 17:12

## 2018-10-08 RX ADMIN — Medication 4: at 12:27

## 2018-10-08 RX ADMIN — Medication 2: at 17:12

## 2018-10-08 RX ADMIN — Medication 100 MILLIGRAM(S): at 14:19

## 2018-10-08 RX ADMIN — OXYCODONE AND ACETAMINOPHEN 1 TABLET(S): 5; 325 TABLET ORAL at 19:51

## 2018-10-08 RX ADMIN — OXYCODONE AND ACETAMINOPHEN 1 TABLET(S): 5; 325 TABLET ORAL at 20:51

## 2018-10-08 RX ADMIN — Medication 100 MILLIGRAM(S): at 21:13

## 2018-10-08 RX ADMIN — RIVAROXABAN 20 MILLIGRAM(S): KIT at 17:12

## 2018-10-08 RX ADMIN — OXYCODONE AND ACETAMINOPHEN 1 TABLET(S): 5; 325 TABLET ORAL at 12:00

## 2018-10-08 RX ADMIN — Medication 5 MILLIGRAM(S): at 06:24

## 2018-10-08 RX ADMIN — Medication 100 MILLIGRAM(S): at 06:24

## 2018-10-08 RX ADMIN — INSULIN GLARGINE 15 UNIT(S): 100 INJECTION, SOLUTION SUBCUTANEOUS at 21:37

## 2018-10-08 RX ADMIN — OXYCODONE AND ACETAMINOPHEN 1 TABLET(S): 5; 325 TABLET ORAL at 00:58

## 2018-10-08 RX ADMIN — OXYCODONE AND ACETAMINOPHEN 1 TABLET(S): 5; 325 TABLET ORAL at 10:52

## 2018-10-08 RX ADMIN — Medication 2: at 09:07

## 2018-10-08 RX ADMIN — Medication 81 MILLIGRAM(S): at 11:35

## 2018-10-08 RX ADMIN — CLOPIDOGREL BISULFATE 75 MILLIGRAM(S): 75 TABLET, FILM COATED ORAL at 11:35

## 2018-10-08 NOTE — PROGRESS NOTE ADULT - SUBJECTIVE AND OBJECTIVE BOX
Pt seen and examined.  Events of weekend noted.  Right PT DVT on duplex, restarted on full dose anti-coagulation.  (Required for afib).  Difficult time with ambulation.  Plan PT ambulation.  Heplock IVF.

## 2018-10-08 NOTE — PHYSICAL THERAPY INITIAL EVALUATION ADULT - ADDITIONAL COMMENTS
90.3
Pt lives in a house with 3 steps to enter with rails and  3 stairs inside with  right rail.  Pt owns medical equipment: None   Pt lives with: Spouse

## 2018-10-08 NOTE — PHYSICAL THERAPY INITIAL EVALUATION ADULT - GAIT TRAINING, PT EVAL
Time Frame:   wks  days   Goal:   Modified Independent with RW x 150  feet / Stairs: pt will navigate  3+3   stairs with 1 rail independently

## 2018-10-08 NOTE — PHYSICAL THERAPY INITIAL EVALUATION ADULT - DID THE PATIENT HAVE SURGERY?
Femoral artery stent placement  10/04/2018  Right femoral to below knee popliteal bypass with 8 mm gortex, right femoral and popiteal endarterectomy, left SFA angioplasty and stenting 6x40 luminex, aortogram with bilateral runoff: 10/7 pt found with acute Right posterior tibial DVT/yes

## 2018-10-08 NOTE — PHYSICAL THERAPY INITIAL EVALUATION ADULT - GAIT DEVIATIONS NOTED, PT EVAL
Pt unable to straighten right leg and is wbat through toes / forefoot/decreased padmini/decreased step length/decreased stride length

## 2018-10-09 ENCOUNTER — TRANSCRIPTION ENCOUNTER (OUTPATIENT)
Age: 73
End: 2018-10-09

## 2018-10-09 VITALS
TEMPERATURE: 98 F | RESPIRATION RATE: 18 BRPM | OXYGEN SATURATION: 100 % | DIASTOLIC BLOOD PRESSURE: 66 MMHG | HEART RATE: 85 BPM | SYSTOLIC BLOOD PRESSURE: 116 MMHG

## 2018-10-09 RX ORDER — RIVAROXABAN 15 MG-20MG
1 KIT ORAL
Qty: 30 | Refills: 2
Start: 2018-10-09 | End: 2019-01-06

## 2018-10-09 RX ORDER — DILTIAZEM HCL 120 MG
1 CAPSULE, EXT RELEASE 24 HR ORAL
Qty: 90 | Refills: 1
Start: 2018-10-09 | End: 2018-12-07

## 2018-10-09 RX ORDER — RIVAROXABAN 15 MG-20MG
1 KIT ORAL
Qty: 0 | Refills: 0 | COMMUNITY

## 2018-10-09 RX ORDER — DILTIAZEM HCL 120 MG
1 CAPSULE, EXT RELEASE 24 HR ORAL
Qty: 0 | Refills: 0 | COMMUNITY
Start: 2018-10-09

## 2018-10-09 RX ADMIN — Medication 100 MILLIGRAM(S): at 12:40

## 2018-10-09 RX ADMIN — Medication 5 MILLIGRAM(S): at 12:41

## 2018-10-09 RX ADMIN — Medication 100 MILLIGRAM(S): at 17:01

## 2018-10-09 RX ADMIN — Medication 100 MILLIGRAM(S): at 05:49

## 2018-10-09 RX ADMIN — CLOPIDOGREL BISULFATE 75 MILLIGRAM(S): 75 TABLET, FILM COATED ORAL at 12:40

## 2018-10-09 RX ADMIN — Medication 4: at 12:40

## 2018-10-09 RX ADMIN — Medication 81 MILLIGRAM(S): at 12:40

## 2018-10-09 RX ADMIN — OXYCODONE AND ACETAMINOPHEN 1 TABLET(S): 5; 325 TABLET ORAL at 11:33

## 2018-10-09 RX ADMIN — Medication 2: at 17:01

## 2018-10-09 RX ADMIN — RIVAROXABAN 20 MILLIGRAM(S): KIT at 17:01

## 2018-10-09 RX ADMIN — INSULIN GLARGINE 30 UNIT(S): 100 INJECTION, SOLUTION SUBCUTANEOUS at 09:17

## 2018-10-09 RX ADMIN — Medication 2: at 09:19

## 2018-10-09 RX ADMIN — OXYCODONE AND ACETAMINOPHEN 1 TABLET(S): 5; 325 TABLET ORAL at 10:59

## 2018-10-09 NOTE — PROGRESS NOTE ADULT - SUBJECTIVE AND OBJECTIVE BOX
Pt seen and examined.  Ambulated with PT.  Incisions clean and dry, 1+ right leg swelling in setting of right PT DVT.  On Xarelto, asa and plavix.  Plan to d/c home.

## 2018-10-09 NOTE — DISCHARGE NOTE ADULT - PATIENT PORTAL LINK FT
You can access the [facility], offered by [facility], by registering with the following website: [email or web address]

## 2018-10-09 NOTE — DISCHARGE NOTE ADULT - MEDICATION SUMMARY - MEDICATIONS TO TAKE
I will START or STAY ON the medications listed below when I get home from the hospital:    Vitamin B  -- 1 tab(s) by mouth once a day  -- Indication: For Atherosclerosis of native artery of both lower extremities with intermittent claudication    aspirin 81 mg oral delayed release tablet  -- 1 tab(s) by mouth once a day  -- Indication: For Atherosclerosis of native artery of both lower extremities with intermittent claudication    oxyCODONE-acetaminophen 5 mg-325 mg oral tablet  -- 1 tab(s) by mouth every 4 hours, As needed, Moderate Pain (4 - 6)  -- Indication: For Atherosclerosis of native artery of both lower extremities with intermittent claudication    enalapril 5 mg oral tablet  -- 1 tab(s) by mouth once a day  -- Indication: For Atherosclerosis of native artery of both lower extremities with intermittent claudication    dilTIAZem 60 mg oral tablet  -- 1 tab(s) by mouth 3 times a day  -- Indication: For Atherosclerosis of native artery of both lower extremities with intermittent claudication    Xarelto 20 mg oral tablet  -- 1 tab(s) by mouth once a day (in the evening)  -- Indication: For Atherosclerosis of native artery of both lower extremities with intermittent claudication    metFORMIN 500 mg oral tablet  -- 3 tab(s) by mouth once a day  -- given in pm  -- Indication: For Atherosclerosis of native artery of both lower extremities with intermittent claudication    metFORMIN 500 mg oral tablet  -- 2 tab(s) by mouth once a day evening  -- Indication: For Atherosclerosis of native artery of both lower extremities with intermittent claudication    glipiZIDE 10 mg oral tablet  -- 1 tab(s) by mouth 2 times a day  -- Indication: For Atherosclerosis of native artery of both lower extremities with intermittent claudication    Levemir FlexPen 100 units/mL subcutaneous solution  -- 30 unit(s) subcutaneous 2 times a day  -- Indication: For Atherosclerosis of native artery of both lower extremities with intermittent claudication    pravastatin 80 mg oral tablet  -- 1 tab(s) by mouth once a day (at bedtime)  -- Indication: For Atherosclerosis of native artery of both lower extremities with intermittent claudication    clopidogrel 75 mg oral tablet  -- 1 tab(s) by mouth once a day  -- Indication: For Atherosclerosis of native artery of both lower extremities with intermittent claudication    Toprol- mg oral tablet, extended release  -- 1 tab(s) by mouth once a day  -- It is very important that you take or use this exactly as directed.  Do not skip doses or discontinue unless directed by your doctor.  May cause drowsiness.  Alcohol may intensify this effect.  Use care when operating dangerous machinery.  Some non-prescription drugs may aggravate your condition.  Read all labels carefully.  If a warning appears, check with your doctor before taking.  Swallow whole.  Do not crush.  Take with food or milk.  This drug may impair the ability to drive or operate machinery.  Use care until you become familiar with its effects.    -- Indication: For Atherosclerosis of native artery of both lower extremities with intermittent claudication    magnesium oxide 200 mg oral tablet  -- 6 tab(s) by mouth once a day  -- Indication: For Atherosclerosis of native artery of both lower extremities with intermittent claudication    Zinc 50 mg Pink oral capsule  -- 1 cap(s) by mouth once a day  -- Indication: For Atherosclerosis of native artery of both lower extremities with intermittent claudication    Co Q-10  -- 200 milligram(s) by mouth once a day  -- Indication: For Atherosclerosis of native artery of both lower extremities with intermittent claudication    multivitamin  -- 1 tab(s) by mouth once a day  -- Indication: For Atherosclerosis of native artery of both lower extremities with intermittent claudication    Vitamin C 1000 mg oral tablet  -- 1 tab(s) by mouth once a day  -- Indication: For Atherosclerosis of native artery of both lower extremities with intermittent claudication    Vitamin D3 5000 intl units oral tablet  -- 1 tab(s) by mouth once a day  -- Indication: For Atherosclerosis of native artery of both lower extremities with intermittent claudication    vitamin E 400 intl units oral capsule  -- 2 cap(s) by mouth once a day  -- Indication: For Atherosclerosis of native artery of both lower extremities with intermittent claudication    vitamin A 10,000 intl units oral capsule  -- orally once a day  -- Indication: For Atherosclerosis of native artery of both lower extremities with intermittent claudication    Vitamin B12 1000 mcg oral tablet  -- 5 tab(s) by mouth once a day  -- Indication: For Atherosclerosis of native artery of both lower extremities with intermittent claudication I will START or STAY ON the medications listed below when I get home from the hospital:    Vitamin B  -- 1 tab(s) by mouth once a day  -- Indication: For Atherosclerosis of native artery of both lower extremities with intermittent claudication    ROLLING WALKER  -- Indication: For Ambulation    aspirin 81 mg oral delayed release tablet  -- 1 tab(s) by mouth once a day  -- Indication: For Atherosclerosis of native artery of both lower extremities with intermittent claudication    oxyCODONE-acetaminophen 5 mg-325 mg oral tablet  -- 1 tab(s) by mouth every 6 hours, As Needed -Moderate Pain (4 - 6) MDD:4 tabs  -- Indication: For pain    enalapril 5 mg oral tablet  -- 1 tab(s) by mouth once a day  -- Indication: For Atherosclerosis of native artery of both lower extremities with intermittent claudication    dilTIAZem 60 mg oral tablet  -- 1 tab(s) by mouth 3 times a day MDD:3 tabs  -- Indication: For heart rate    Xarelto 20 mg oral tablet  -- 1 tab(s) by mouth once a day (in the evening) MDD:1 tab  -- Indication: For blood thinner    metFORMIN 500 mg oral tablet  -- 3 tab(s) by mouth once a day  -- given in pm  -- Indication: For Atherosclerosis of native artery of both lower extremities with intermittent claudication    metFORMIN 500 mg oral tablet  -- 2 tab(s) by mouth once a day evening  -- Indication: For Atherosclerosis of native artery of both lower extremities with intermittent claudication    glipiZIDE 10 mg oral tablet  -- 1 tab(s) by mouth 2 times a day  -- Indication: For Atherosclerosis of native artery of both lower extremities with intermittent claudication    Levemir FlexPen 100 units/mL subcutaneous solution  -- 30 unit(s) subcutaneous 2 times a day  -- Indication: For Atherosclerosis of native artery of both lower extremities with intermittent claudication    pravastatin 80 mg oral tablet  -- 1 tab(s) by mouth once a day (at bedtime)  -- Indication: For Atherosclerosis of native artery of both lower extremities with intermittent claudication    clopidogrel 75 mg oral tablet  -- 1 tab(s) by mouth once a day  -- Indication: For Atherosclerosis of native artery of both lower extremities with intermittent claudication    Toprol- mg oral tablet, extended release  -- 1 tab(s) by mouth once a day  -- It is very important that you take or use this exactly as directed.  Do not skip doses or discontinue unless directed by your doctor.  May cause drowsiness.  Alcohol may intensify this effect.  Use care when operating dangerous machinery.  Some non-prescription drugs may aggravate your condition.  Read all labels carefully.  If a warning appears, check with your doctor before taking.  Swallow whole.  Do not crush.  Take with food or milk.  This drug may impair the ability to drive or operate machinery.  Use care until you become familiar with its effects.    -- Indication: For Atherosclerosis of native artery of both lower extremities with intermittent claudication    magnesium oxide 200 mg oral tablet  -- 6 tab(s) by mouth once a day  -- Indication: For Atherosclerosis of native artery of both lower extremities with intermittent claudication    Zinc 50 mg Pink oral capsule  -- 1 cap(s) by mouth once a day  -- Indication: For Atherosclerosis of native artery of both lower extremities with intermittent claudication    Co Q-10  -- 200 milligram(s) by mouth once a day  -- Indication: For Atherosclerosis of native artery of both lower extremities with intermittent claudication    multivitamin  -- 1 tab(s) by mouth once a day  -- Indication: For Atherosclerosis of native artery of both lower extremities with intermittent claudication    Vitamin C 1000 mg oral tablet  -- 1 tab(s) by mouth once a day  -- Indication: For Atherosclerosis of native artery of both lower extremities with intermittent claudication    Vitamin D3 5000 intl units oral tablet  -- 1 tab(s) by mouth once a day  -- Indication: For Atherosclerosis of native artery of both lower extremities with intermittent claudication    vitamin E 400 intl units oral capsule  -- 2 cap(s) by mouth once a day  -- Indication: For Atherosclerosis of native artery of both lower extremities with intermittent claudication    vitamin A 10,000 intl units oral capsule  -- orally once a day  -- Indication: For Atherosclerosis of native artery of both lower extremities with intermittent claudication    Vitamin B12 1000 mcg oral tablet  -- 5 tab(s) by mouth once a day  -- Indication: For Atherosclerosis of native artery of both lower extremities with intermittent claudication

## 2018-10-09 NOTE — DISCHARGE NOTE ADULT - CARE PLAN
Principal Discharge DX:	Atherosclerosis of native artery of both lower extremities with intermittent claudication  Goal:	resume activities of daily living  Assessment and plan of treatment:	stable, follow up with Dr Haywood in 1-2 weeks

## 2018-10-09 NOTE — DISCHARGE NOTE ADULT - CARE PROVIDER_API CALL
Dennis Haywood), Surgery  [address] [place] [address]  Phone: [phone]  Fax: [phone]

## 2018-10-09 NOTE — DISCHARGE NOTE ADULT - NS AS ACTIVITY OBS
Do not make important decisions/Stairs allowed/Walking-Indoors allowed/No Heavy lifting/straining/Do not drive or operate machinery/Walking-Outdoors allowed

## 2018-10-09 NOTE — DISCHARGE NOTE ADULT - HOSPITAL COURSE
71 yo female with bilateral PAD, with leg pain for many years, she had angioplasty with stents done a year ago but she states that all the stents are blocked and she has redness and swelling to her right leg and pain in both LE.    Pt underwent:     Procedure:    Procedure:  Femoral artery stent placement  10/04/2018  Right femoral to below knee popliteal bypass with 8 mm gortex, right femoral and popiteal endarterectomy, left SFA angioplasty and stenting 6x40 luminex, aortogram with bilateral runoff      Pt c/o calf pain on surgical limb. Found to have DVT. Started on Xarelto.    Cardiology consulted for A fib with RVR. Pt started on Diltiazem with good response.    DR Haywood saw pt 10/9. Ok for d/c home for outpt follow up.

## 2018-10-12 LAB — SURGICAL PATHOLOGY FINAL REPORT - CH: SIGNIFICANT CHANGE UP

## 2018-11-11 PROCEDURE — C1768: CPT

## 2018-11-11 PROCEDURE — 88304 TISSUE EXAM BY PATHOLOGIST: CPT

## 2018-11-11 PROCEDURE — 84100 ASSAY OF PHOSPHORUS: CPT

## 2018-11-11 PROCEDURE — 80048 BASIC METABOLIC PNL TOTAL CA: CPT

## 2018-11-11 PROCEDURE — C1769: CPT

## 2018-11-11 PROCEDURE — 88311 DECALCIFY TISSUE: CPT

## 2018-11-11 PROCEDURE — 97110 THERAPEUTIC EXERCISES: CPT

## 2018-11-11 PROCEDURE — 83735 ASSAY OF MAGNESIUM: CPT

## 2018-11-11 PROCEDURE — 82962 GLUCOSE BLOOD TEST: CPT

## 2018-11-11 PROCEDURE — C1725: CPT

## 2018-11-11 PROCEDURE — 85027 COMPLETE CBC AUTOMATED: CPT

## 2018-11-11 PROCEDURE — 85730 THROMBOPLASTIN TIME PARTIAL: CPT

## 2018-11-11 PROCEDURE — C1889: CPT

## 2018-11-11 PROCEDURE — C1876: CPT

## 2018-11-11 PROCEDURE — 36415 COLL VENOUS BLD VENIPUNCTURE: CPT

## 2018-11-11 PROCEDURE — 93971 EXTREMITY STUDY: CPT

## 2018-11-11 PROCEDURE — 85610 PROTHROMBIN TIME: CPT

## 2018-11-11 PROCEDURE — 97163 PT EVAL HIGH COMPLEX 45 MIN: CPT

## 2018-11-11 PROCEDURE — C1894: CPT

## 2018-11-11 PROCEDURE — C1887: CPT

## 2018-11-11 PROCEDURE — 76000 FLUOROSCOPY <1 HR PHYS/QHP: CPT

## 2018-11-11 PROCEDURE — 83036 HEMOGLOBIN GLYCOSYLATED A1C: CPT

## 2018-11-11 PROCEDURE — 97116 GAIT TRAINING THERAPY: CPT

## 2019-05-08 ENCOUNTER — APPOINTMENT (OUTPATIENT)
Dept: PULMONOLOGY | Facility: CLINIC | Age: 74
End: 2019-05-08
Payer: MEDICARE

## 2019-05-08 VITALS
HEART RATE: 62 BPM | WEIGHT: 195 LBS | SYSTOLIC BLOOD PRESSURE: 130 MMHG | OXYGEN SATURATION: 97 % | DIASTOLIC BLOOD PRESSURE: 72 MMHG | HEIGHT: 66.5 IN | BODY MASS INDEX: 30.97 KG/M2

## 2019-05-08 DIAGNOSIS — I10 ESSENTIAL (PRIMARY) HYPERTENSION: ICD-10-CM

## 2019-05-08 DIAGNOSIS — Z83.3 FAMILY HISTORY OF DIABETES MELLITUS: ICD-10-CM

## 2019-05-08 DIAGNOSIS — Z87.891 PERSONAL HISTORY OF NICOTINE DEPENDENCE: ICD-10-CM

## 2019-05-08 DIAGNOSIS — Z86.39 PERSONAL HISTORY OF OTHER ENDOCRINE, NUTRITIONAL AND METABOLIC DISEASE: ICD-10-CM

## 2019-05-08 PROCEDURE — 94010 BREATHING CAPACITY TEST: CPT

## 2019-05-08 PROCEDURE — 94729 DIFFUSING CAPACITY: CPT

## 2019-05-08 PROCEDURE — 99204 OFFICE O/P NEW MOD 45 MIN: CPT | Mod: 25

## 2019-05-08 PROCEDURE — 85018 HEMOGLOBIN: CPT | Mod: QW

## 2019-05-08 PROCEDURE — 94727 GAS DIL/WSHOT DETER LNG VOL: CPT

## 2019-05-08 RX ORDER — ZINC SULFATE 50(220)MG
50 CAPSULE ORAL
Refills: 0 | Status: ACTIVE | COMMUNITY

## 2019-05-08 RX ORDER — UBIDECARENONE 100 MG
CAPSULE ORAL
Refills: 0 | Status: ACTIVE | COMMUNITY

## 2019-05-08 RX ORDER — VITAMIN E ACID SUCCINATE 268 MG
TABLET ORAL
Refills: 0 | Status: ACTIVE | COMMUNITY

## 2019-05-08 RX ORDER — B-COMPLEX WITH VITAMIN C
TABLET ORAL
Refills: 0 | Status: ACTIVE | COMMUNITY

## 2019-05-08 RX ORDER — MULTIVIT-MIN/FOLIC/VIT K/LYCOP 400-300MCG
500 TABLET ORAL
Refills: 0 | Status: ACTIVE | COMMUNITY

## 2019-05-08 NOTE — REVIEW OF SYSTEMS
[As Noted in HPI] : as noted in HPI [Hypertension] : ~T hypertension [Dysrhythmia] : dysrhythmia [Palpitations] : palpitations [Diabetes] : diabetes mellitus [Negative] : Sleep Disorder

## 2019-05-08 NOTE — PHYSICAL EXAM
[General Appearance - Well Developed] : well developed [Well Groomed] : well groomed [Normal Appearance] : normal appearance [No Deformities] : no deformities [General Appearance - Well Nourished] : well nourished [General Appearance - In No Acute Distress] : no acute distress [Normal Conjunctiva] : the conjunctiva exhibited no abnormalities [Eyelids - No Xanthelasma] : the eyelids demonstrated no xanthelasmas [Normal Oropharynx] : normal oropharynx [Neck Appearance] : the appearance of the neck was normal [Neck Cervical Mass (___cm)] : no neck mass was observed [Jugular Venous Distention Increased] : there was no jugular-venous distention [Thyroid Nodule] : there were no palpable thyroid nodules [Thyroid Diffuse Enlargement] : the thyroid was not enlarged [Murmurs] : no murmurs present [Heart Sounds] : normal S1 and S2 [Arterial Pulses Normal] : the arterial pulses were normal [Normal] : the heart rate was normal [Respiration, Rhythm And Depth] : normal respiratory rhythm and effort [Irregularly Irregular] : the rhythm was irregularly irregular [Auscultation Breath Sounds / Voice Sounds] : lungs were clear to auscultation bilaterally [Abdomen Soft] : soft [Exaggerated Use Of Accessory Muscles For Inspiration] : no accessory muscle use [Abdomen Tenderness] : non-tender [Abdomen Mass (___ Cm)] : no abdominal mass palpated [] : no hepato-splenomegaly [Abnormal Walk] : normal gait [Gait - Sufficient For Exercise Testing] : the gait was sufficient for exercise testing [Nail Clubbing] : no clubbing of the fingernails [No Focal Deficits] : no focal deficits [Cyanosis, Localized] : no localized cyanosis [Oriented To Time, Place, And Person] : oriented to person, place, and time [Affect] : the affect was normal [Impaired Insight] : insight and judgment were intact

## 2019-05-08 NOTE — CONSULT LETTER
[Dear  ___] : Dear  [unfilled], [Consult Letter:] : I had the pleasure of evaluating your patient, [unfilled]. [Please see my note below.] : Please see my note below. [FreeTextEntry3] : Esteban Mercer MD\par  [Consult Closing:] : Thank you very much for allowing me to participate in the care of this patient.  If you have any questions, please do not hesitate to contact me. [Sincerely,] : Sincerely, [DrFaviola  ___] : Dr. VORA

## 2019-06-12 ENCOUNTER — OUTPATIENT (OUTPATIENT)
Dept: OUTPATIENT SERVICES | Facility: HOSPITAL | Age: 74
LOS: 1 days | End: 2019-06-12
Payer: MEDICARE

## 2019-06-12 VITALS
SYSTOLIC BLOOD PRESSURE: 165 MMHG | DIASTOLIC BLOOD PRESSURE: 94 MMHG | TEMPERATURE: 98 F | RESPIRATION RATE: 18 BRPM | HEART RATE: 85 BPM | OXYGEN SATURATION: 98 %

## 2019-06-12 DIAGNOSIS — Z98.89 OTHER SPECIFIED POSTPROCEDURAL STATES: Chronic | ICD-10-CM

## 2019-06-12 DIAGNOSIS — Z95.828 PRESENCE OF OTHER VASCULAR IMPLANTS AND GRAFTS: Chronic | ICD-10-CM

## 2019-06-12 DIAGNOSIS — Z90.710 ACQUIRED ABSENCE OF BOTH CERVIX AND UTERUS: Chronic | ICD-10-CM

## 2019-06-12 DIAGNOSIS — Z01.810 ENCOUNTER FOR PREPROCEDURAL CARDIOVASCULAR EXAMINATION: ICD-10-CM

## 2019-06-12 DIAGNOSIS — Z95.820 PERIPHERAL VASCULAR ANGIOPLASTY STATUS WITH IMPLANTS AND GRAFTS: Chronic | ICD-10-CM

## 2019-06-12 LAB
ANION GAP SERPL CALC-SCNC: 15 MMOL/L — SIGNIFICANT CHANGE UP (ref 5–17)
APTT BLD: 37.6 SEC — HIGH (ref 27.5–36.3)
BUN SERPL-MCNC: 21 MG/DL — HIGH (ref 8–20)
CALCIUM SERPL-MCNC: 10.2 MG/DL — SIGNIFICANT CHANGE UP (ref 8.6–10.2)
CHLORIDE SERPL-SCNC: 100 MMOL/L — SIGNIFICANT CHANGE UP (ref 98–107)
CO2 SERPL-SCNC: 24 MMOL/L — SIGNIFICANT CHANGE UP (ref 22–29)
CREAT SERPL-MCNC: 0.54 MG/DL — SIGNIFICANT CHANGE UP (ref 0.5–1.3)
GLUCOSE SERPL-MCNC: 315 MG/DL — HIGH (ref 70–115)
HCT VFR BLD CALC: 40.2 % — SIGNIFICANT CHANGE UP (ref 37–47)
HGB BLD-MCNC: 13.1 G/DL — SIGNIFICANT CHANGE UP (ref 12–16)
INR BLD: 1.98 RATIO — HIGH (ref 0.88–1.16)
MCHC RBC-ENTMCNC: 27.2 PG — SIGNIFICANT CHANGE UP (ref 27–31)
MCHC RBC-ENTMCNC: 32.6 G/DL — SIGNIFICANT CHANGE UP (ref 32–36)
MCV RBC AUTO: 83.4 FL — SIGNIFICANT CHANGE UP (ref 81–99)
PLATELET # BLD AUTO: 273 K/UL — SIGNIFICANT CHANGE UP (ref 150–400)
POTASSIUM SERPL-MCNC: 4.7 MMOL/L — SIGNIFICANT CHANGE UP (ref 3.5–5.3)
POTASSIUM SERPL-SCNC: 4.7 MMOL/L — SIGNIFICANT CHANGE UP (ref 3.5–5.3)
PROTHROM AB SERPL-ACNC: 23.2 SEC — HIGH (ref 10–12.9)
RBC # BLD: 4.82 M/UL — SIGNIFICANT CHANGE UP (ref 4.4–5.2)
RBC # FLD: 15.1 % — SIGNIFICANT CHANGE UP (ref 11–15.6)
SODIUM SERPL-SCNC: 139 MMOL/L — SIGNIFICANT CHANGE UP (ref 135–145)
WBC # BLD: 6.9 K/UL — SIGNIFICANT CHANGE UP (ref 4.8–10.8)
WBC # FLD AUTO: 6.9 K/UL — SIGNIFICANT CHANGE UP (ref 4.8–10.8)

## 2019-06-12 PROCEDURE — G0463: CPT

## 2019-06-12 PROCEDURE — 85730 THROMBOPLASTIN TIME PARTIAL: CPT

## 2019-06-12 PROCEDURE — 36415 COLL VENOUS BLD VENIPUNCTURE: CPT

## 2019-06-12 PROCEDURE — 93005 ELECTROCARDIOGRAM TRACING: CPT

## 2019-06-12 PROCEDURE — 85027 COMPLETE CBC AUTOMATED: CPT

## 2019-06-12 PROCEDURE — 93010 ELECTROCARDIOGRAM REPORT: CPT

## 2019-06-12 PROCEDURE — 80048 BASIC METABOLIC PNL TOTAL CA: CPT

## 2019-06-12 PROCEDURE — 85610 PROTHROMBIN TIME: CPT

## 2019-06-12 NOTE — H&P PST ADULT - NSICDXFAMILYHX_GEN_ALL_CORE_FT
FAMILY HISTORY:  Father  Still living? No  Family history of abdominal aortic aneurysm, Age at diagnosis: 51-60  Family history of thoracic aortic aneurysm, Age at diagnosis: 61-70

## 2019-06-12 NOTE — H&P PST ADULT - ASSESSMENT
Addended by: MARVIN VILLALOBOS on: 8/30/2018 07:08 AM     Modules accepted: Maranda Rodriguez    
Persistent atrial fibrillation for elective GERALDINE/DCCV on AC (Xarelto)    Note:  Elevated serum glucose--check FS preprocedure; may continue all meds

## 2019-06-12 NOTE — H&P PST ADULT - HISTORY OF PRESENT ILLNESS
This is a 72 y/o female with h/o paroxysmal atrial fibrillation chads score 3 on chronic AC, HPL, previous noninvasive workup without evidence of significant structural nor ischemic heart disease, PVD s/p LE revascularization now with persistent atrial fibrillation with adequate rate control as seen on Holter monitor.  Denies any associated chest discomfort, palpitations, nor dyspnea.  TTE revealed mild MR, mild TR, borderline left atrial enlargement, borderline dilated right atrium, LV normal in size, EF 55-60%; RV normal in size; normal RV systolic function.  She presents today for PST for elective GERALDINE/DCCV.

## 2019-06-12 NOTE — H&P PST ADULT - NSICDXPASTMEDICALHX_GEN_ALL_CORE_FT
PAST MEDICAL HISTORY:  Diabetes     Hypercholesterolemia     Hypertension     PAD (peripheral artery disease)     Paroxysmal atrial fibrillation

## 2019-06-12 NOTE — H&P PST ADULT - NSICDXPASTSURGICALHX_GEN_ALL_CORE_FT
PAST SURGICAL HISTORY:  H/O abdominal hysterectomy     H/O hernia repair     S/P femoral-popliteal bypass surgery     S/P peripheral artery angioplasty with stent placement

## 2019-06-17 ENCOUNTER — TRANSCRIPTION ENCOUNTER (OUTPATIENT)
Age: 74
End: 2019-06-17

## 2019-06-17 ENCOUNTER — OUTPATIENT (OUTPATIENT)
Dept: OUTPATIENT SERVICES | Facility: HOSPITAL | Age: 74
LOS: 1 days | End: 2019-06-17
Payer: MEDICARE

## 2019-06-17 VITALS
SYSTOLIC BLOOD PRESSURE: 163 MMHG | WEIGHT: 190.04 LBS | RESPIRATION RATE: 20 BRPM | OXYGEN SATURATION: 95 % | DIASTOLIC BLOOD PRESSURE: 76 MMHG | HEART RATE: 76 BPM | HEIGHT: 68 IN | TEMPERATURE: 98 F

## 2019-06-17 DIAGNOSIS — Z98.89 OTHER SPECIFIED POSTPROCEDURAL STATES: Chronic | ICD-10-CM

## 2019-06-17 DIAGNOSIS — Z90.710 ACQUIRED ABSENCE OF BOTH CERVIX AND UTERUS: Chronic | ICD-10-CM

## 2019-06-17 DIAGNOSIS — Z95.828 PRESENCE OF OTHER VASCULAR IMPLANTS AND GRAFTS: Chronic | ICD-10-CM

## 2019-06-17 DIAGNOSIS — I48.91 UNSPECIFIED ATRIAL FIBRILLATION: ICD-10-CM

## 2019-06-17 DIAGNOSIS — Z95.820 PERIPHERAL VASCULAR ANGIOPLASTY STATUS WITH IMPLANTS AND GRAFTS: Chronic | ICD-10-CM

## 2019-06-17 LAB
GLUCOSE BLDC GLUCOMTR-MCNC: 137 MG/DL — HIGH (ref 70–99)
GLUCOSE BLDC GLUCOMTR-MCNC: 147 MG/DL — HIGH (ref 70–99)

## 2019-06-17 PROCEDURE — 82962 GLUCOSE BLOOD TEST: CPT

## 2019-06-17 PROCEDURE — 93325 DOPPLER ECHO COLOR FLOW MAPG: CPT

## 2019-06-17 PROCEDURE — 93005 ELECTROCARDIOGRAM TRACING: CPT

## 2019-06-17 PROCEDURE — 93320 DOPPLER ECHO COMPLETE: CPT

## 2019-06-17 PROCEDURE — 93312 ECHO TRANSESOPHAGEAL: CPT

## 2019-06-17 PROCEDURE — 93010 ELECTROCARDIOGRAM REPORT: CPT

## 2019-06-17 RX ORDER — SODIUM CHLORIDE 9 MG/ML
1000 INJECTION INTRAMUSCULAR; INTRAVENOUS; SUBCUTANEOUS
Refills: 0 | Status: DISCONTINUED | OUTPATIENT
Start: 2019-06-17 | End: 2019-07-02

## 2019-06-17 RX ADMIN — SODIUM CHLORIDE 50 MILLILITER(S): 9 INJECTION INTRAMUSCULAR; INTRAVENOUS; SUBCUTANEOUS at 07:59

## 2019-06-17 NOTE — DISCHARGE NOTE PROVIDER - CARE PROVIDER_API CALL
Mary Man)  Internal Medicine  [address] [place] [id number]  Phone: [phone]  Fax: [phone]  Follow Up Time:

## 2019-06-17 NOTE — DISCHARGE NOTE PROVIDER - NSDCFUADDAPPT_GEN_ALL_CORE_FT
Choose lean meats and poultry without skin and prepare them without added saturated and trans fat.  Eat fish at least twice a week. Recent research shows that eating oily fish containing omega-3 fatty acids (for example, salmon, trout and herring) may help lower your risk of death from coronary artery disease.  Select fat-free, 1 percent fat and low-fat dairy products.  Cut back on foods containing partially hydrogenated vegetable oils to reduce trans fat in your diet.   To lower cholesterol, reduce saturated fat to no more than 5 to 6 percent of total calories. For someone eating 2,000 calories a day, that’s about 13 grams of saturated fat.  Cut back on beverages and foods with added sugars.  Choose and prepare foods with little or no salt. To lower blood pressure, aim to eat no more than 2,400 milligrams of sodium per day. Reducing daily intake to 1,500 mg is desirable because it can lower blood pressure even further.  If you drink alcohol, drink in moderation. That means one drink per day if you’re a woman and two drinks  per day if you’re a man.  Follow the American Heart Association recommendations when you eat out, and keep an eye on your portion sizes.  No caffiene

## 2019-06-17 NOTE — PROGRESS NOTE ADULT - SUBJECTIVE AND OBJECTIVE BOX
[place] [facility]                                   [address] [place] [address]                                                      PHONE: [phone]                                                         FAX: [phone]  [email or web address]  ---------------------------------------------------------------------------------------------------------------------------------    Overnight events/patient complaints: here for GERALDINE DCCV      warfarin (Rash)    MEDICATIONS  (STANDING):  sodium chloride 0.9%. 1000 milliLiter(s) (50 mL/Hr) IV Continuous <Continuous>    MEDICATIONS  (PRN):      Vital Signs Last 24 Hrs  T(C): 36.7 (17 Jun 2019 07:36), Max: 36.7 (17 Jun 2019 07:36)  T(F): 98.1 (17 Jun 2019 07:36), Max: 98.1 (17 Jun 2019 07:36)  HR: 76 (17 Jun 2019 07:36) (76 - 76)  BP: 163/76 (17 Jun 2019 07:36) (163/76 - 163/76)  BP(mean): --  RR: 20 (17 Jun 2019 07:36) (20 - 20)  SpO2: 95% (17 Jun 2019 07:36) (95% - 95%)  Daily Height in cm: 172.72 (17 Jun 2019 07:36)    Daily   ICU Vital Signs Last 24 Hrs  DONALD SAUNDERS  I&O's Detail    I&O's Summary    Drug Dosing Weight  DONALD SAUNDERS      PHYSICAL EXAM:  General: Appears well developed, well nourished alert and cooperative.  HEENT: Head; normocephalic, atraumatic.  Eyes: Pupils reactive, cornea wnl.  Neck: Supple, no nodes adenopathy, no NVD or carotid bruit or thyromegaly.  CARDIOVASCULAR: Normal S1 and S2, No murmur, rub, gallop or lift.   LUNGS: No rales, rhonchi or wheeze. Normal breath sounds bilaterally.  ABDOMEN: Soft, nontender without mass or organomegaly. bowel sounds normoactive.  EXTREMITIES: No clubbing, cyanosis or edema. Distal pulses wnl.   SKIN: warm and dry with normal turgor.  NEURO: Alert/oriented x 3/normal motor exam. No pathologic reflexes.    PSYCH: normal affect.        LABS:          DONALD SAUNDERS      Pt underwent GERALDINE with anesthesia standby  Results  LVH with normal EF  mild MR  enlarged LA/DUANE w/o thrombus  aortic sclerosis    In light of absence of thrombus pt underwent successfull DCCV 200 j to restore NSR

## 2019-06-17 NOTE — DISCHARGE NOTE PROVIDER - NSDCCPTREATMENT_GEN_ALL_CORE_FT
PRINCIPAL PROCEDURE  Procedure: Transesophageal echocardiography (GERALDINE)  Findings and Treatment:       SECONDARY PROCEDURE  Procedure: Cardioversion, external  Findings and Treatment:

## 2019-06-17 NOTE — PROGRESS NOTE ADULT - ASSESSMENT
Assessment  s/p GERALDINE DCCV aflutter to NSR  mild MR normal EF  CAD  HTN  COPD      Rec   cont current meds incl xarelto  monitor on tele post DCCV  holter in 1 week to assess heart rate in SR  if recurrent aflutter will refer for possible ablation

## 2019-06-17 NOTE — PROGRESS NOTE ADULT - SUBJECTIVE AND OBJECTIVE BOX
NPO> 8hours  Took Xarelto this morning and states daily compliance  IVF ordered  Labs reviewed INR noted  Spouse at bedside for escort discharge  Consent pending  History of Present Illness		  This is a 74 y/o female with h/o paroxysmal atrial fibrillation chads score 3 on chronic AC, HPL, previous noninvasive workup without evidence of significant structural nor ischemic heart disease, PVD s/p LE revascularization now with persistent atrial fibrillation with adequate rate control as seen on Holter monitor.  Denies any associated chest discomfort, palpitations, nor dyspnea.  TTE revealed mild MR, mild TR, borderline left atrial enlargement, borderline dilated right atrium, LV normal in size, EF 55-60%; RV normal in size; normal RV systolic function.  She presents today for PST for elective GERALDINE/DCCV.            REVIEW OF SYSTEMS:  Denies SOB, CP, NV, HA, dizziness, palpitations, site pain    PHYSICAL EXAM: A&Ox3 NAD Skin warm and dry  NEURO: Speech intact +gag +swallow Tongue midline [place]  NECK: No JVD, trachea midline. Eupneic  HEART: irreg/irreg AFIb on watAgame VR 78-84 no ventricular ectopy  PULMONARY:  CTA kiah  ABDOMEN: Soft nontender X4 +BS Vdg/eating  EXTREMITIES: No edema All PP+

## 2019-06-17 NOTE — DISCHARGE NOTE PROVIDER - HOSPITAL COURSE
This is a 72 y/o female with h/o paroxysmal atrial fibrillation chads score 3 on chronic AC, HPL, previous noninvasive workup without evidence of significant structural nor ischemic heart disease, PVD s/p LE revascularization now with persistent atrial fibrillation with adequate rate control as seen on Holter monitor.  Denies any associated chest discomfort, palpitations, nor dyspnea.  TTE revealed mild MR, mild TR, borderline left atrial enlargement, borderline dilated right atrium, LV normal in size, EF 55-60%; RV normal in size; normal RV systolic function.  She presents today for PST for elective GERALDINE/DCCV.        S/P GERALDINE no clot    Successful CV at 200 joules 1st attempt    Post procedure teaching done    Patient verbalizes understanding    VSS    ICU Vital Signs Last 24 Hrs    T(C): 36.7 (17 Jun 2019 07:36), Max: 36.7 (17 Jun 2019 07:36)    T(F): 98.1 (17 Jun 2019 07:36), Max: 98.1 (17 Jun 2019 07:36)    HR: 76 (17 Jun 2019 07:36) (76 - 76)    BP: 163/76 (17 Jun 2019 07:36) (163/76 - 163/76)    BP(mean): --    ABP: --    ABP(mean): --    RR: 20 (17 Jun 2019 07:36) (20 - 20)    SpO2: 95% (17 Jun 2019 07:36) (95% - 95%)    12 lead EKG NSR nl axis no ectopy HR 61    OK to d/c home    F/U as outpatient in 7-10 days

## 2019-06-17 NOTE — DISCHARGE NOTE PROVIDER - NSDCFUADDINST_GEN_ALL_CORE_FT
NPO until 1030  Continue medications as prior  Follow up in 2 weeks with cardiology  D/C when tolerating po fluids

## 2019-06-17 NOTE — DISCHARGE NOTE NURSING/CASE MANAGEMENT/SOCIAL WORK - NSDCDPATPORTLINK_GEN_ALL_CORE
You can access the [facility] Patient Portal, offered by [facility], by registering with the following website: [email or web address]

## 2021-01-09 ENCOUNTER — EMERGENCY (EMERGENCY)
Facility: HOSPITAL | Age: 76
LOS: 1 days | Discharge: HOME | End: 2021-01-09
Attending: EMERGENCY MEDICINE
Payer: MEDICARE

## 2021-01-09 VITALS
HEIGHT: 68 IN | SYSTOLIC BLOOD PRESSURE: 152 MMHG | DIASTOLIC BLOOD PRESSURE: 72 MMHG | OXYGEN SATURATION: 98 % | RESPIRATION RATE: 18 BRPM | TEMPERATURE: 98 F | HEART RATE: 56 BPM | WEIGHT: 169.98 LBS

## 2021-01-09 DIAGNOSIS — Z95.828 PRESENCE OF OTHER VASCULAR IMPLANTS AND GRAFTS: Chronic | ICD-10-CM

## 2021-01-09 DIAGNOSIS — Z95.820 PERIPHERAL VASCULAR ANGIOPLASTY STATUS WITH IMPLANTS AND GRAFTS: Chronic | ICD-10-CM

## 2021-01-09 DIAGNOSIS — Z90.710 ACQUIRED ABSENCE OF BOTH CERVIX AND UTERUS: Chronic | ICD-10-CM

## 2021-01-09 DIAGNOSIS — Z98.89 OTHER SPECIFIED POSTPROCEDURAL STATES: Chronic | ICD-10-CM

## 2021-01-09 LAB
ANION GAP SERPL CALC-SCNC: 12 MMOL/L — SIGNIFICANT CHANGE UP (ref 5–17)
BASOPHILS # BLD AUTO: 0.04 K/UL — SIGNIFICANT CHANGE UP (ref 0–0.2)
BASOPHILS NFR BLD AUTO: 0.3 % — SIGNIFICANT CHANGE UP (ref 0–2)
BUN SERPL-MCNC: 16 MG/DL — SIGNIFICANT CHANGE UP (ref 8–20)
CALCIUM SERPL-MCNC: 9.1 MG/DL — SIGNIFICANT CHANGE UP (ref 8.6–10.2)
CHLORIDE SERPL-SCNC: 105 MMOL/L — SIGNIFICANT CHANGE UP (ref 98–107)
CO2 SERPL-SCNC: 22 MMOL/L — SIGNIFICANT CHANGE UP (ref 22–29)
CREAT SERPL-MCNC: 0.64 MG/DL — SIGNIFICANT CHANGE UP (ref 0.5–1.3)
EOSINOPHIL # BLD AUTO: 0.09 K/UL — SIGNIFICANT CHANGE UP (ref 0–0.5)
EOSINOPHIL NFR BLD AUTO: 0.8 % — SIGNIFICANT CHANGE UP (ref 0–6)
GLUCOSE SERPL-MCNC: 177 MG/DL — HIGH (ref 70–99)
HCT VFR BLD CALC: 33.5 % — LOW (ref 34.5–45)
HGB BLD-MCNC: 10.3 G/DL — LOW (ref 11.5–15.5)
IMM GRANULOCYTES NFR BLD AUTO: 0.3 % — SIGNIFICANT CHANGE UP (ref 0–1.5)
LYMPHOCYTES # BLD AUTO: 1.14 K/UL — SIGNIFICANT CHANGE UP (ref 1–3.3)
LYMPHOCYTES # BLD AUTO: 9.6 % — LOW (ref 13–44)
MCHC RBC-ENTMCNC: 26.5 PG — LOW (ref 27–34)
MCHC RBC-ENTMCNC: 30.7 GM/DL — LOW (ref 32–36)
MCV RBC AUTO: 86.1 FL — SIGNIFICANT CHANGE UP (ref 80–100)
MONOCYTES # BLD AUTO: 0.69 K/UL — SIGNIFICANT CHANGE UP (ref 0–0.9)
MONOCYTES NFR BLD AUTO: 5.8 % — SIGNIFICANT CHANGE UP (ref 2–14)
NEUTROPHILS # BLD AUTO: 9.84 K/UL — HIGH (ref 1.8–7.4)
NEUTROPHILS NFR BLD AUTO: 83.2 % — HIGH (ref 43–77)
PLATELET # BLD AUTO: 291 K/UL — SIGNIFICANT CHANGE UP (ref 150–400)
POTASSIUM SERPL-MCNC: 5 MMOL/L — SIGNIFICANT CHANGE UP (ref 3.5–5.3)
POTASSIUM SERPL-SCNC: 5 MMOL/L — SIGNIFICANT CHANGE UP (ref 3.5–5.3)
RBC # BLD: 3.89 M/UL — SIGNIFICANT CHANGE UP (ref 3.8–5.2)
RBC # FLD: 15.5 % — HIGH (ref 10.3–14.5)
SODIUM SERPL-SCNC: 139 MMOL/L — SIGNIFICANT CHANGE UP (ref 135–145)
WBC # BLD: 11.83 K/UL — HIGH (ref 3.8–10.5)
WBC # FLD AUTO: 11.83 K/UL — HIGH (ref 3.8–10.5)

## 2021-01-09 PROCEDURE — 99284 EMERGENCY DEPT VISIT MOD MDM: CPT

## 2021-01-09 RX ORDER — ACETAMINOPHEN 500 MG
975 TABLET ORAL ONCE
Refills: 0 | Status: DISCONTINUED | OUTPATIENT
Start: 2021-01-09 | End: 2021-01-09

## 2021-01-10 PROCEDURE — 36415 COLL VENOUS BLD VENIPUNCTURE: CPT

## 2021-01-10 PROCEDURE — 85025 COMPLETE CBC W/AUTO DIFF WBC: CPT

## 2021-01-10 PROCEDURE — 75635 CT ANGIO ABDOMINAL ARTERIES: CPT | Mod: 26

## 2021-01-10 PROCEDURE — 99284 EMERGENCY DEPT VISIT MOD MDM: CPT

## 2021-01-10 PROCEDURE — 80048 BASIC METABOLIC PNL TOTAL CA: CPT

## 2021-01-10 PROCEDURE — 75635 CT ANGIO ABDOMINAL ARTERIES: CPT

## 2021-01-10 RX ORDER — OXYCODONE HYDROCHLORIDE 5 MG/1
1 TABLET ORAL
Qty: 9 | Refills: 0
Start: 2021-01-10 | End: 2021-01-12

## 2021-01-10 RX ORDER — OXYCODONE HYDROCHLORIDE 5 MG/1
5 TABLET ORAL ONCE
Refills: 0 | Status: DISCONTINUED | OUTPATIENT
Start: 2021-01-10 | End: 2021-01-10

## 2021-01-10 RX ADMIN — OXYCODONE HYDROCHLORIDE 5 MILLIGRAM(S): 5 TABLET ORAL at 02:51

## 2021-01-10 NOTE — ED PROVIDER NOTE - PSH
H/O abdominal hysterectomy    H/O hernia repair    S/P femoral-popliteal bypass surgery    S/P peripheral artery angioplasty with stent placement

## 2021-01-10 NOTE — ED PROVIDER NOTE - CARE PROVIDER_API CALL
Dennis Haywood)  Surgery  [address] [place] [address]  Phone: [phone]  Fax: [phone]  Follow Up Time:

## 2021-01-10 NOTE — ED PROVIDER NOTE - PHYSICAL EXAMINATION
RLE: + large hematoma to the right lateral thigh area, compartments are soft, skin intact, + TTP over site. Right foot is warm to the touch, unable to palpate dorsalis pedis or PT pulses ( PT reports this  is her baseline after the surgery), no calf TTP, no lower extremity edema

## 2021-01-10 NOTE — ED PROVIDER NOTE - NSFOLLOWUPINSTRUCTIONS_ED_ALL_ED_FT
Contusion    A contusion is a deep bruise. Contusions are the result of a blunt injury to tissues and muscle fibers under the skin. The skin overlying the contusion may turn blue, purple, or yellow. Symptoms also include pain and swelling in the injured area.    SEEK IMMEDIATE MEDICAL CARE IF YOU HAVE ANY OF THE FOLLOWING SYMPTOMS: severe pain, numbness, tingling, pain, weakness, or skin color/temperature change in any part of your body distal to the injury.    Please follow up with your doctor within 24 hours  Please follow up with vascular surgery within 48-72 hours

## 2021-01-10 NOTE — ED ADULT NURSE NOTE - CAS ELECT INFOMATION PROVIDED
Pt evaluated by MD, treated as ordered, cleared for discharge. Pt ambulated out of the ED in NAD and without medical devices./DC instructions

## 2021-01-10 NOTE — ED PROVIDER NOTE - OBJECTIVE STATEMENT
74 y/o female on Plavix and Xarelto presents c/o right lateral thigh pain after striking her leg into a cabinet. PT reports swelling to the area. PT also reports h/o fem/pop bypass and is concerned the swelling could be causing a problem with her surgeries. She denies any numbness to foot, temperature changes to foot, tingling in foot.

## 2021-01-10 NOTE — ED PROVIDER NOTE - PROGRESS NOTE DETAILS
Results discussed. ED return precautions discussed. PT is ambulatory in ED. Will d/c with analgesics / supportive care follow up with vascular surgeon and PMD

## 2021-01-10 NOTE — ED PROVIDER NOTE - PATIENT PORTAL LINK FT
You can access the FollowMyHealth Patient Portal offered by [facility] by registering at the following website: [email or web address]. By joining [employer]’s FollowMyHealth portal, you will also be able to view your health information using other applications (apps) compatible with our system.

## 2021-01-10 NOTE — ED PROVIDER NOTE - ATTENDING CONTRIBUTION TO CARE
I, Reji House, performed a face to face bedside interview with this patient regarding history of present illness, review of symptoms and relevant past medical, social and family history.  I completed an independent physical examination. I have communicated the patient’s plan of care and disposition with the ACP.      76 yo female pmh PAD, with h/o fem-pop bypass surgery on xarelto with trauma to rt leg with increased swelling and pain; pe awake alert nad; heent ncat neck supple cor s1 s2 lungs clear abd soft  rt thigh moderate swelling contusion laterally   ; hip and knee nontender;  dx thigh contusion PAD, labs, ct angio of leg;

## 2021-01-10 NOTE — ED PROVIDER NOTE - CLINICAL SUMMARY MEDICAL DECISION MAKING FREE TEXT BOX
Right leg hematoma on blood thinners CT results noted / ED return precautions discussed.   Compression , Elevation, Analgesics, follow up with PMD/ vascular surgery

## 2021-01-11 ENCOUNTER — INPATIENT (INPATIENT)
Facility: HOSPITAL | Age: 76
LOS: 16 days | Discharge: NURSING, REHAB OR LONG-TERM CARE | DRG: 216 | End: 2021-01-28
Attending: THORACIC SURGERY (CARDIOTHORACIC VASCULAR SURGERY) | Admitting: HOSPITALIST
Payer: MEDICARE

## 2021-01-11 VITALS
HEIGHT: 68 IN | DIASTOLIC BLOOD PRESSURE: 56 MMHG | OXYGEN SATURATION: 100 % | SYSTOLIC BLOOD PRESSURE: 120 MMHG | WEIGHT: 190.04 LBS | HEART RATE: 143 BPM | RESPIRATION RATE: 20 BRPM

## 2021-01-11 DIAGNOSIS — Z95.820 PERIPHERAL VASCULAR ANGIOPLASTY STATUS WITH IMPLANTS AND GRAFTS: Chronic | ICD-10-CM

## 2021-01-11 DIAGNOSIS — Z95.828 PRESENCE OF OTHER VASCULAR IMPLANTS AND GRAFTS: Chronic | ICD-10-CM

## 2021-01-11 DIAGNOSIS — Z98.89 OTHER SPECIFIED POSTPROCEDURAL STATES: Chronic | ICD-10-CM

## 2021-01-11 DIAGNOSIS — Z90.710 ACQUIRED ABSENCE OF BOTH CERVIX AND UTERUS: Chronic | ICD-10-CM

## 2021-01-11 LAB
ALBUMIN SERPL ELPH-MCNC: 4.1 G/DL — SIGNIFICANT CHANGE UP (ref 3.3–5.2)
ALP SERPL-CCNC: 65 U/L — SIGNIFICANT CHANGE UP (ref 40–120)
ALT FLD-CCNC: 27 U/L — SIGNIFICANT CHANGE UP
ANION GAP SERPL CALC-SCNC: 15 MMOL/L — SIGNIFICANT CHANGE UP (ref 5–17)
AST SERPL-CCNC: 151 U/L — HIGH
BASOPHILS # BLD AUTO: 0.02 K/UL — SIGNIFICANT CHANGE UP (ref 0–0.2)
BASOPHILS NFR BLD AUTO: 0.1 % — SIGNIFICANT CHANGE UP (ref 0–2)
BILIRUB SERPL-MCNC: 0.9 MG/DL — SIGNIFICANT CHANGE UP (ref 0.4–2)
BUN SERPL-MCNC: 19 MG/DL — SIGNIFICANT CHANGE UP (ref 8–20)
CALCIUM SERPL-MCNC: 9.1 MG/DL — SIGNIFICANT CHANGE UP (ref 8.6–10.2)
CHLORIDE SERPL-SCNC: 101 MMOL/L — SIGNIFICANT CHANGE UP (ref 98–107)
CO2 SERPL-SCNC: 23 MMOL/L — SIGNIFICANT CHANGE UP (ref 22–29)
CREAT SERPL-MCNC: 0.75 MG/DL — SIGNIFICANT CHANGE UP (ref 0.5–1.3)
EOSINOPHIL # BLD AUTO: 0 K/UL — SIGNIFICANT CHANGE UP (ref 0–0.5)
EOSINOPHIL NFR BLD AUTO: 0 % — SIGNIFICANT CHANGE UP (ref 0–6)
GLUCOSE SERPL-MCNC: 142 MG/DL — HIGH (ref 70–99)
HCT VFR BLD CALC: 28.9 % — LOW (ref 34.5–45)
HGB BLD-MCNC: 9.1 G/DL — LOW (ref 11.5–15.5)
IMM GRANULOCYTES NFR BLD AUTO: 0.4 % — SIGNIFICANT CHANGE UP (ref 0–1.5)
INR BLD: 1.67 RATIO — HIGH (ref 0.88–1.16)
LIDOCAIN IGE QN: 16 U/L — LOW (ref 22–51)
LYMPHOCYTES # BLD AUTO: 1.36 K/UL — SIGNIFICANT CHANGE UP (ref 1–3.3)
LYMPHOCYTES # BLD AUTO: 8.3 % — LOW (ref 13–44)
MAGNESIUM SERPL-MCNC: 2.1 MG/DL — SIGNIFICANT CHANGE UP (ref 1.6–2.6)
MCHC RBC-ENTMCNC: 26.6 PG — LOW (ref 27–34)
MCHC RBC-ENTMCNC: 31.5 GM/DL — LOW (ref 32–36)
MCV RBC AUTO: 84.5 FL — SIGNIFICANT CHANGE UP (ref 80–100)
MONOCYTES # BLD AUTO: 1.02 K/UL — HIGH (ref 0–0.9)
MONOCYTES NFR BLD AUTO: 6.3 % — SIGNIFICANT CHANGE UP (ref 2–14)
NEUTROPHILS # BLD AUTO: 13.85 K/UL — HIGH (ref 1.8–7.4)
NEUTROPHILS NFR BLD AUTO: 84.9 % — HIGH (ref 43–77)
NT-PROBNP SERPL-SCNC: 3604 PG/ML — HIGH (ref 0–300)
PLATELET # BLD AUTO: 372 K/UL — SIGNIFICANT CHANGE UP (ref 150–400)
POTASSIUM SERPL-MCNC: 4 MMOL/L — SIGNIFICANT CHANGE UP (ref 3.5–5.3)
POTASSIUM SERPL-SCNC: 4 MMOL/L — SIGNIFICANT CHANGE UP (ref 3.5–5.3)
PROT SERPL-MCNC: 7 G/DL — SIGNIFICANT CHANGE UP (ref 6.6–8.7)
PROTHROM AB SERPL-ACNC: 18.9 SEC — HIGH (ref 10.6–13.6)
RBC # BLD: 3.42 M/UL — LOW (ref 3.8–5.2)
RBC # FLD: 15.8 % — HIGH (ref 10.3–14.5)
SODIUM SERPL-SCNC: 139 MMOL/L — SIGNIFICANT CHANGE UP (ref 135–145)
TROPONIN T SERPL-MCNC: 0.61 NG/ML — HIGH (ref 0–0.06)
WBC # BLD: 16.32 K/UL — HIGH (ref 3.8–10.5)
WBC # FLD AUTO: 16.32 K/UL — HIGH (ref 3.8–10.5)

## 2021-01-11 PROCEDURE — 71045 X-RAY EXAM CHEST 1 VIEW: CPT | Mod: 26

## 2021-01-11 PROCEDURE — 99053 MED SERV 10PM-8AM 24 HR FAC: CPT

## 2021-01-11 PROCEDURE — 99291 CRITICAL CARE FIRST HOUR: CPT

## 2021-01-11 RX ORDER — DILTIAZEM HCL 120 MG
10 CAPSULE, EXT RELEASE 24 HR ORAL ONCE
Refills: 0 | Status: COMPLETED | OUTPATIENT
Start: 2021-01-11 | End: 2021-01-11

## 2021-01-11 RX ADMIN — Medication 10 MILLIGRAM(S): at 22:51

## 2021-01-11 NOTE — ED PROVIDER NOTE - PROGRESS NOTE DETAILS
Pt HR noted to improve , blood pressure somewhat smaller in the 90/50. Labs noted with elevate troponin which indicate acute myocardiac ischemia. Spoke with Dr López who is on call for Dr Santos and he recommend giving pt an oral dose digoxin and iv fluid to help bp and rate control. He states when rate control not reached and blood pressure is better to give dose of oral Beta blockers.

## 2021-01-11 NOTE — ED ADULT NURSE NOTE - OBJECTIVE STATEMENT
patient a&ox4 patient states that this morning she woke up with shortness of breath, states that she has a history of afib. patient states that tonight she was having chest pain and was unable to lean back in bed due to the pain. Patient received 25mg cardizem by ems, patient received an additional 10mg IVP of cardizem with some change. patient states that she has intermittent periods of SOB she states when her heart rate increases.

## 2021-01-11 NOTE — ED PROVIDER NOTE - OBJECTIVE STATEMENT
76 y/o F BIBA with PMHx of Diabetes, Hypercholesterolemia, Hypertension, PAD, Paroxysmal atrial fibrillation. PSHx of abdominal hysterectomy, hernia repair,  femoral-popliteal bypass surgery,  peripheral artery angioplasty with stent placement presents to the ED c/o worsening SOB onset this morning. Pt states that she started feeling SOB this morning and RENTERIA which worsened over time until she had SOB while resting which prompted her to call for an ambulance. Pt received 25mg Cardizem by EMS and was put on nasal canula. Pt on blood thinners and was here yesterday secondary to hitting her R leg to a cabinet, her R leg was scanned.   Denies n/v

## 2021-01-11 NOTE — ED ADULT TRIAGE NOTE - CHIEF COMPLAINT QUOTE
patient states that this morning she woke up with shortness of breath, states that she has a history of afib. patient states that tonight she was having chest pain and was unable to lean back in bed. History of afib. Patient received 25mg cardizem by ems, patient brought to critical care Dr Salazar at bedside

## 2021-01-11 NOTE — ED PROVIDER NOTE - SKIN, MLM
Pale, warm, dry and intact. No evidence of rash. Swelling to the right leg with hematoma to the right lateral thigh.

## 2021-01-12 DIAGNOSIS — I48.91 UNSPECIFIED ATRIAL FIBRILLATION: ICD-10-CM

## 2021-01-12 LAB
A1C WITH ESTIMATED AVERAGE GLUCOSE RESULT: 7.8 % — HIGH (ref 4–5.6)
ANION GAP SERPL CALC-SCNC: 14 MMOL/L — SIGNIFICANT CHANGE UP (ref 5–17)
APPEARANCE UR: CLEAR — SIGNIFICANT CHANGE UP
APTT BLD: 28.9 SEC — SIGNIFICANT CHANGE UP (ref 27.5–35.5)
BACTERIA # UR AUTO: ABNORMAL
BASOPHILS # BLD AUTO: 0.01 K/UL — SIGNIFICANT CHANGE UP (ref 0–0.2)
BASOPHILS NFR BLD AUTO: 0.1 % — SIGNIFICANT CHANGE UP (ref 0–2)
BILIRUB UR-MCNC: NEGATIVE — SIGNIFICANT CHANGE UP
BLD GP AB SCN SERPL QL: SIGNIFICANT CHANGE UP
BUN SERPL-MCNC: 18 MG/DL — SIGNIFICANT CHANGE UP (ref 8–20)
CALCIUM SERPL-MCNC: 8.3 MG/DL — LOW (ref 8.6–10.2)
CHLORIDE SERPL-SCNC: 102 MMOL/L — SIGNIFICANT CHANGE UP (ref 98–107)
CK MB CFR SERPL CALC: 275.7 NG/ML — HIGH (ref 0–6.7)
CK MB CFR SERPL CALC: 435.7 NG/ML — HIGH (ref 0–6.7)
CK SERPL-CCNC: 1801 U/L — HIGH (ref 25–170)
CK SERPL-CCNC: 3181 U/L — HIGH (ref 25–170)
CO2 SERPL-SCNC: 20 MMOL/L — LOW (ref 22–29)
COLOR SPEC: YELLOW — SIGNIFICANT CHANGE UP
CREAT SERPL-MCNC: 0.71 MG/DL — SIGNIFICANT CHANGE UP (ref 0.5–1.3)
DIFF PNL FLD: NEGATIVE — SIGNIFICANT CHANGE UP
EOSINOPHIL # BLD AUTO: 0 K/UL — SIGNIFICANT CHANGE UP (ref 0–0.5)
EOSINOPHIL NFR BLD AUTO: 0 % — SIGNIFICANT CHANGE UP (ref 0–6)
EPI CELLS # UR: ABNORMAL
ESTIMATED AVERAGE GLUCOSE: 177 MG/DL — HIGH (ref 68–114)
GLUCOSE BLDC GLUCOMTR-MCNC: 119 MG/DL — HIGH (ref 70–99)
GLUCOSE BLDC GLUCOMTR-MCNC: 121 MG/DL — HIGH (ref 70–99)
GLUCOSE BLDC GLUCOMTR-MCNC: 137 MG/DL — HIGH (ref 70–99)
GLUCOSE BLDC GLUCOMTR-MCNC: 59 MG/DL — LOW (ref 70–99)
GLUCOSE BLDC GLUCOMTR-MCNC: 62 MG/DL — LOW (ref 70–99)
GLUCOSE BLDC GLUCOMTR-MCNC: 72 MG/DL — SIGNIFICANT CHANGE UP (ref 70–99)
GLUCOSE BLDC GLUCOMTR-MCNC: 91 MG/DL — SIGNIFICANT CHANGE UP (ref 70–99)
GLUCOSE SERPL-MCNC: 108 MG/DL — HIGH (ref 70–99)
GLUCOSE UR QL: NEGATIVE MG/DL — SIGNIFICANT CHANGE UP
HCT VFR BLD CALC: 25.6 % — LOW (ref 34.5–45)
HCT VFR BLD CALC: 27 % — LOW (ref 34.5–45)
HGB BLD-MCNC: 8.1 G/DL — LOW (ref 11.5–15.5)
HGB BLD-MCNC: 8.4 G/DL — LOW (ref 11.5–15.5)
IMM GRANULOCYTES NFR BLD AUTO: 0.4 % — SIGNIFICANT CHANGE UP (ref 0–1.5)
KETONES UR-MCNC: ABNORMAL
LEUKOCYTE ESTERASE UR-ACNC: ABNORMAL
LYMPHOCYTES # BLD AUTO: 1.32 K/UL — SIGNIFICANT CHANGE UP (ref 1–3.3)
LYMPHOCYTES # BLD AUTO: 8.9 % — LOW (ref 13–44)
MAGNESIUM SERPL-MCNC: 2.1 MG/DL — SIGNIFICANT CHANGE UP (ref 1.6–2.6)
MCHC RBC-ENTMCNC: 26.5 PG — LOW (ref 27–34)
MCHC RBC-ENTMCNC: 31.1 GM/DL — LOW (ref 32–36)
MCV RBC AUTO: 85.2 FL — SIGNIFICANT CHANGE UP (ref 80–100)
MONOCYTES # BLD AUTO: 1.21 K/UL — HIGH (ref 0–0.9)
MONOCYTES NFR BLD AUTO: 8.1 % — SIGNIFICANT CHANGE UP (ref 2–14)
NEUTROPHILS # BLD AUTO: 12.31 K/UL — HIGH (ref 1.8–7.4)
NEUTROPHILS NFR BLD AUTO: 82.5 % — HIGH (ref 43–77)
NITRITE UR-MCNC: NEGATIVE — SIGNIFICANT CHANGE UP
PH UR: 5 — SIGNIFICANT CHANGE UP (ref 5–8)
PHOSPHATE SERPL-MCNC: 3.6 MG/DL — SIGNIFICANT CHANGE UP (ref 2.4–4.7)
PLATELET # BLD AUTO: 329 K/UL — SIGNIFICANT CHANGE UP (ref 150–400)
POTASSIUM SERPL-MCNC: 4.1 MMOL/L — SIGNIFICANT CHANGE UP (ref 3.5–5.3)
POTASSIUM SERPL-SCNC: 4.1 MMOL/L — SIGNIFICANT CHANGE UP (ref 3.5–5.3)
PROCALCITONIN SERPL-MCNC: 0.1 NG/ML — SIGNIFICANT CHANGE UP (ref 0.02–0.1)
PROT UR-MCNC: 30 MG/DL
RAPID RVP RESULT: SIGNIFICANT CHANGE UP
RBC # BLD: 3.17 M/UL — LOW (ref 3.8–5.2)
RBC # FLD: 15.9 % — HIGH (ref 10.3–14.5)
RBC CASTS # UR COMP ASSIST: ABNORMAL /HPF (ref 0–4)
SARS-COV-2 RNA SPEC QL NAA+PROBE: SIGNIFICANT CHANGE UP
SODIUM SERPL-SCNC: 136 MMOL/L — SIGNIFICANT CHANGE UP (ref 135–145)
SP GR SPEC: 1.02 — SIGNIFICANT CHANGE UP (ref 1.01–1.02)
TROPONIN T SERPL-MCNC: 0.9 NG/ML — HIGH (ref 0–0.06)
TROPONIN T SERPL-MCNC: 1.69 NG/ML — HIGH (ref 0–0.06)
UROBILINOGEN FLD QL: NEGATIVE MG/DL — SIGNIFICANT CHANGE UP
WBC # BLD: 14.91 K/UL — HIGH (ref 3.8–10.5)
WBC # FLD AUTO: 14.91 K/UL — HIGH (ref 3.8–10.5)
WBC UR QL: ABNORMAL

## 2021-01-12 PROCEDURE — 93010 ELECTROCARDIOGRAM REPORT: CPT

## 2021-01-12 PROCEDURE — 99223 1ST HOSP IP/OBS HIGH 75: CPT

## 2021-01-12 PROCEDURE — 12345: CPT | Mod: NC

## 2021-01-12 RX ORDER — DEXTROSE 50 % IN WATER 50 %
12.5 SYRINGE (ML) INTRAVENOUS ONCE
Refills: 0 | Status: DISCONTINUED | OUTPATIENT
Start: 2021-01-12 | End: 2021-01-14

## 2021-01-12 RX ORDER — MORPHINE SULFATE 50 MG/1
2 CAPSULE, EXTENDED RELEASE ORAL ONCE
Refills: 0 | Status: DISCONTINUED | OUTPATIENT
Start: 2021-01-12 | End: 2021-01-12

## 2021-01-12 RX ORDER — DIPHENHYDRAMINE HCL 50 MG
25 CAPSULE ORAL ONCE
Refills: 0 | Status: COMPLETED | OUTPATIENT
Start: 2021-01-12 | End: 2021-01-12

## 2021-01-12 RX ORDER — DEXTROSE 50 % IN WATER 50 %
25 SYRINGE (ML) INTRAVENOUS ONCE
Refills: 0 | Status: DISCONTINUED | OUTPATIENT
Start: 2021-01-12 | End: 2021-01-14

## 2021-01-12 RX ORDER — PREGABALIN 225 MG/1
5000 CAPSULE ORAL DAILY
Refills: 0 | Status: DISCONTINUED | OUTPATIENT
Start: 2021-01-12 | End: 2021-01-15

## 2021-01-12 RX ORDER — SODIUM CHLORIDE 9 MG/ML
1000 INJECTION, SOLUTION INTRAVENOUS
Refills: 0 | Status: DISCONTINUED | OUTPATIENT
Start: 2021-01-12 | End: 2021-01-14

## 2021-01-12 RX ORDER — ALPRAZOLAM 0.25 MG
0.25 TABLET ORAL ONCE
Refills: 0 | Status: DISCONTINUED | OUTPATIENT
Start: 2021-01-12 | End: 2021-01-12

## 2021-01-12 RX ORDER — CLOPIDOGREL BISULFATE 75 MG/1
75 TABLET, FILM COATED ORAL DAILY
Refills: 0 | Status: DISCONTINUED | OUTPATIENT
Start: 2021-01-12 | End: 2021-01-13

## 2021-01-12 RX ORDER — DIGOXIN 250 MCG
0.25 TABLET ORAL ONCE
Refills: 0 | Status: COMPLETED | OUTPATIENT
Start: 2021-01-12 | End: 2021-01-12

## 2021-01-12 RX ORDER — ACETAMINOPHEN 500 MG
650 TABLET ORAL EVERY 6 HOURS
Refills: 0 | Status: DISCONTINUED | OUTPATIENT
Start: 2021-01-12 | End: 2021-01-19

## 2021-01-12 RX ORDER — METOPROLOL TARTRATE 50 MG
50 TABLET ORAL EVERY 8 HOURS
Refills: 0 | Status: DISCONTINUED | OUTPATIENT
Start: 2021-01-12 | End: 2021-01-13

## 2021-01-12 RX ORDER — DILTIAZEM HCL 120 MG
30 CAPSULE, EXT RELEASE 24 HR ORAL ONCE
Refills: 0 | Status: DISCONTINUED | OUTPATIENT
Start: 2021-01-12 | End: 2021-01-12

## 2021-01-12 RX ORDER — DEXTROSE 50 % IN WATER 50 %
15 SYRINGE (ML) INTRAVENOUS ONCE
Refills: 0 | Status: COMPLETED | OUTPATIENT
Start: 2021-01-12 | End: 2021-01-12

## 2021-01-12 RX ORDER — FUROSEMIDE 40 MG
20 TABLET ORAL ONCE
Refills: 0 | Status: COMPLETED | OUTPATIENT
Start: 2021-01-12 | End: 2021-01-13

## 2021-01-12 RX ORDER — METFORMIN HYDROCHLORIDE 850 MG/1
3 TABLET ORAL
Qty: 0 | Refills: 0 | DISCHARGE

## 2021-01-12 RX ORDER — FUROSEMIDE 40 MG
40 TABLET ORAL DAILY
Refills: 0 | Status: DISCONTINUED | OUTPATIENT
Start: 2021-01-12 | End: 2021-01-13

## 2021-01-12 RX ORDER — INSULIN LISPRO 100/ML
VIAL (ML) SUBCUTANEOUS
Refills: 0 | Status: DISCONTINUED | OUTPATIENT
Start: 2021-01-12 | End: 2021-01-14

## 2021-01-12 RX ORDER — VITAMIN E 100 UNIT
400 CAPSULE ORAL DAILY
Refills: 0 | Status: DISCONTINUED | OUTPATIENT
Start: 2021-01-12 | End: 2021-01-13

## 2021-01-12 RX ORDER — ASCORBIC ACID 60 MG
500 TABLET,CHEWABLE ORAL DAILY
Refills: 0 | Status: DISCONTINUED | OUTPATIENT
Start: 2021-01-12 | End: 2021-01-19

## 2021-01-12 RX ORDER — DEXTROSE 50 % IN WATER 50 %
15 SYRINGE (ML) INTRAVENOUS ONCE
Refills: 0 | Status: DISCONTINUED | OUTPATIENT
Start: 2021-01-12 | End: 2021-01-14

## 2021-01-12 RX ORDER — ONDANSETRON 8 MG/1
4 TABLET, FILM COATED ORAL EVERY 6 HOURS
Refills: 0 | Status: DISCONTINUED | OUTPATIENT
Start: 2021-01-12 | End: 2021-01-19

## 2021-01-12 RX ORDER — CHOLECALCIFEROL (VITAMIN D3) 125 MCG
5000 CAPSULE ORAL DAILY
Refills: 0 | Status: DISCONTINUED | OUTPATIENT
Start: 2021-01-12 | End: 2021-01-15

## 2021-01-12 RX ORDER — ONDANSETRON 8 MG/1
4 TABLET, FILM COATED ORAL ONCE
Refills: 0 | Status: COMPLETED | OUTPATIENT
Start: 2021-01-12 | End: 2021-01-12

## 2021-01-12 RX ORDER — VITAMIN E 100 UNIT
2 CAPSULE ORAL
Qty: 0 | Refills: 0 | DISCHARGE

## 2021-01-12 RX ORDER — METFORMIN HYDROCHLORIDE 850 MG/1
2 TABLET ORAL
Qty: 0 | Refills: 0 | DISCHARGE

## 2021-01-12 RX ORDER — INSULIN DETEMIR 100/ML (3)
30 INSULIN PEN (ML) SUBCUTANEOUS
Qty: 0 | Refills: 0 | DISCHARGE

## 2021-01-12 RX ORDER — RIVAROXABAN 15 MG-20MG
20 KIT ORAL DAILY
Refills: 0 | Status: DISCONTINUED | OUTPATIENT
Start: 2021-01-12 | End: 2021-01-12

## 2021-01-12 RX ORDER — METOPROLOL TARTRATE 50 MG
50 TABLET ORAL
Refills: 0 | Status: DISCONTINUED | OUTPATIENT
Start: 2021-01-12 | End: 2021-01-12

## 2021-01-12 RX ORDER — SODIUM CHLORIDE 9 MG/ML
1000 INJECTION INTRAMUSCULAR; INTRAVENOUS; SUBCUTANEOUS ONCE
Refills: 0 | Status: COMPLETED | OUTPATIENT
Start: 2021-01-12 | End: 2021-01-12

## 2021-01-12 RX ORDER — METOPROLOL TARTRATE 50 MG
25 TABLET ORAL ONCE
Refills: 0 | Status: COMPLETED | OUTPATIENT
Start: 2021-01-12 | End: 2021-01-12

## 2021-01-12 RX ORDER — INSULIN LISPRO 100/ML
VIAL (ML) SUBCUTANEOUS AT BEDTIME
Refills: 0 | Status: DISCONTINUED | OUTPATIENT
Start: 2021-01-12 | End: 2021-01-14

## 2021-01-12 RX ORDER — SODIUM CHLORIDE 9 MG/ML
1000 INJECTION INTRAMUSCULAR; INTRAVENOUS; SUBCUTANEOUS
Refills: 0 | Status: DISCONTINUED | OUTPATIENT
Start: 2021-01-12 | End: 2021-01-12

## 2021-01-12 RX ORDER — FAMOTIDINE 10 MG/ML
20 INJECTION INTRAVENOUS ONCE
Refills: 0 | Status: DISCONTINUED | OUTPATIENT
Start: 2021-01-12 | End: 2021-01-12

## 2021-01-12 RX ORDER — ATORVASTATIN CALCIUM 80 MG/1
20 TABLET, FILM COATED ORAL AT BEDTIME
Refills: 0 | Status: DISCONTINUED | OUTPATIENT
Start: 2021-01-12 | End: 2021-01-19

## 2021-01-12 RX ORDER — SODIUM CHLORIDE 9 MG/ML
1000 INJECTION INTRAMUSCULAR; INTRAVENOUS; SUBCUTANEOUS
Refills: 0 | Status: DISCONTINUED | OUTPATIENT
Start: 2021-01-12 | End: 2021-01-13

## 2021-01-12 RX ORDER — GLUCAGON INJECTION, SOLUTION 0.5 MG/.1ML
1 INJECTION, SOLUTION SUBCUTANEOUS ONCE
Refills: 0 | Status: DISCONTINUED | OUTPATIENT
Start: 2021-01-12 | End: 2021-01-14

## 2021-01-12 RX ADMIN — Medication 400 INTERNATIONAL UNIT(S): at 11:40

## 2021-01-12 RX ADMIN — RIVAROXABAN 20 MILLIGRAM(S): KIT at 11:38

## 2021-01-12 RX ADMIN — SODIUM CHLORIDE 75 MILLILITER(S): 9 INJECTION INTRAMUSCULAR; INTRAVENOUS; SUBCUTANEOUS at 05:33

## 2021-01-12 RX ADMIN — Medication 40 MILLIGRAM(S): at 16:47

## 2021-01-12 RX ADMIN — CLOPIDOGREL BISULFATE 75 MILLIGRAM(S): 75 TABLET, FILM COATED ORAL at 11:39

## 2021-01-12 RX ADMIN — ATORVASTATIN CALCIUM 20 MILLIGRAM(S): 80 TABLET, FILM COATED ORAL at 20:13

## 2021-01-12 RX ADMIN — PREGABALIN 5000 MICROGRAM(S): 225 CAPSULE ORAL at 11:40

## 2021-01-12 RX ADMIN — Medication 500 MILLIGRAM(S): at 11:39

## 2021-01-12 RX ADMIN — SODIUM CHLORIDE 1000 MILLILITER(S): 9 INJECTION INTRAMUSCULAR; INTRAVENOUS; SUBCUTANEOUS at 00:14

## 2021-01-12 RX ADMIN — Medication 0.25 MILLIGRAM(S): at 22:28

## 2021-01-12 RX ADMIN — ONDANSETRON 4 MILLIGRAM(S): 8 TABLET, FILM COATED ORAL at 20:13

## 2021-01-12 RX ADMIN — Medication 50 MILLIGRAM(S): at 20:13

## 2021-01-12 RX ADMIN — Medication 15 GRAM(S): at 08:33

## 2021-01-12 RX ADMIN — Medication 0.25 MILLIGRAM(S): at 00:15

## 2021-01-12 RX ADMIN — Medication 25 MILLIGRAM(S): at 03:21

## 2021-01-12 RX ADMIN — ONDANSETRON 4 MILLIGRAM(S): 8 TABLET, FILM COATED ORAL at 05:33

## 2021-01-12 RX ADMIN — SODIUM CHLORIDE 1000 MILLILITER(S): 9 INJECTION INTRAMUSCULAR; INTRAVENOUS; SUBCUTANEOUS at 02:16

## 2021-01-12 RX ADMIN — Medication 5000 UNIT(S): at 11:40

## 2021-01-12 RX ADMIN — MORPHINE SULFATE 2 MILLIGRAM(S): 50 CAPSULE, EXTENDED RELEASE ORAL at 03:59

## 2021-01-12 RX ADMIN — Medication 0.25 MILLIGRAM(S): at 03:36

## 2021-01-12 RX ADMIN — ONDANSETRON 4 MILLIGRAM(S): 8 TABLET, FILM COATED ORAL at 00:16

## 2021-01-12 RX ADMIN — Medication 650 MILLIGRAM(S): at 23:52

## 2021-01-12 RX ADMIN — Medication 25 MILLIGRAM(S): at 23:51

## 2021-01-12 NOTE — H&P ADULT - ASSESSMENT
75yoF hx Afib on Xarelto, PAD s/p B-bsqicje-nybzvqejl bypass, R-femoral angioplasty with stenting, on Plavix, HTN, HLD, DM presenting with acute dyspnea and palpitations yesterday, found to be in Afib with RVR    Afib with hypotension  -On admission, HR in 140s, episodic relative hypotension with SBP 90s  -In ED, received IV Cardizem 10mg and PO metoprolol 25mg without improvement  -ED called [place] [facility], spoke with Dr. Duque who recommended digoxin and IVF  -Received 2L in ED, will start maintenance IVF at NS 75cc/hr x 14hr  -Pt received IV digoxin 0.25mg x 2 with improvement in HR and BP  -Admitted to step down, close BP monitoring  -Held home dose Cardizem for now, resume as clinically indicated  -Resumed metoprolol but at lower dose after BP improved (reduced from 100mg to 50mg BID)  -Xarelto resumed  -Cardiology to follow up in AM    Elevated troponin  -Troponin 0.61, repeat troponin rising, no chest pain  -Suspect demand ischemia from Afib with RVR  -EKG showing ST depressions in leads I, II, V4-V6  -Pt already on Xarelto, no heparin gtt recommended by cards to ED team  -Trend cardiac enzymes and repeat EKG  -Telemetry monitoring    Leukocytosis  -WBC 16, pt with some vomiting yesterday, now resolved but no other localizing symptoms  -Possibly reactive from recent fall with R-thigh hematoma  -Check UA, urine cx and procalcitonin     Right thigh hematoma with normocytic anemia  -Pt hit R-thigh into cabinet at home on 1/9  -Came to ED found to have R-thigh hematoma, no active extravasation on CT  -R-thigh wrapped in ACE bandage  -Pt was instructed to continue AC upon recent ED discharge  -Hgb decrease from 10.3 to 9.1 noted  -Cautiously resumed Xarelto and Plavix for now as no active bleeding of hematoma  -Monitor CBC closely, if down-trending, re-assess leg, hold AC, repeat imaging to see if hematoma worsening    PAD  -Plavix resumed    DM   -On Tresiba 25U and 30U, held basal insulin due to vomiting/poor PO intake  -ISS for now, consistent carbohydrate diet    HTN  -Holding Cardizem and enalapril due to Afib with RVR and hypotension  -Metoprolol resumed at lower dose    HLD  -Statin resumed    Prophylactic measure  -On Xarelto 75yoF hx Afib on Xarelto, PAD s/p E-dugcysh-kqqhipyvp bypass, R-femoral angioplasty with stenting, on Plavix, HTN, HLD, DM presenting with acute dyspnea and palpitations yesterday, found to be in Afib with RVR    Afib with hypotension  -On admission, HR in 140s, episodic relative hypotension with SBP 90s  -In ED, received IV Cardizem 10mg and PO metoprolol 25mg without improvement  -ED called [place] [facility], spoke with Dr. Duque who recommended digoxin and IVF  -Received 2L in ED, will start maintenance IVF at NS 75cc/hr x 14hr  -Pt received IV digoxin 0.25mg x 2 with improvement in HR and BP  -Admitted to step down, close BP monitoring  -Held home dose Cardizem for now, resume as clinically indicated  -Resumed metoprolol but at lower dose after BP improved (reduced from 100mg to 50mg BID)  -Xarelto resumed  -Cardiology to follow up in AM    Elevated troponin  -Troponin 0.61, repeat troponin rising, no chest pain  -Suspect demand ischemia from Afib with RVR  -EKG showing ST depressions in leads I, II, V4-V6  -Pt already on Xarelto, no heparin gtt recommended by cards to ED team  -Trend cardiac enzymes and repeat EKG  -Telemetry monitoring    Leukocytosis  -WBC 16, afebrile, pt with some vomiting yesterday, now resolved, no other localizing symptoms  -Possibly reactive from recent fall with R-thigh hematoma  -Check UA, urine cx and procalcitonin   -Monitoring off abx  -Trend CBC    Right thigh hematoma with normocytic anemia  -Pt hit R-thigh into cabinet at home on 1/9  -Came to ED found to have R-thigh hematoma, no active extravasation on CT  -R-thigh wrapped in ACE bandage  -Pt was instructed to continue AC upon recent ED discharge  -Hgb decrease from 10.3 to 9.1 noted  -Cautiously resumed Xarelto and Plavix for now as no active bleeding of hematoma  -Monitor CBC closely, if down-trending, re-assess leg, hold AC, repeat imaging to see if hematoma worsening    PAD  -Plavix resumed    DM   -On Tresiba 25U and 30U, held basal insulin due to vomiting/poor PO intake  -ISS for now, consistent carbohydrate diet    HTN  -Holding Cardizem and enalapril due to Afib with RVR and hypotension  -Metoprolol resumed at lower dose    HLD  -Statin resumed    Prophylactic measure  -On Xarelto 75yoF hx Afib on Xarelto, PAD s/p M-gygljqr-mjqjzrnnp bypass, R-femoral angioplasty with stenting, on Plavix, HTN, HLD, DM presenting with acute dyspnea and palpitations yesterday, found to be in Afib with RVR    Afib with hypotension  -On admission, HR in 140s, episodic relative hypotension with SBP 90s  -In ED, received IV Cardizem 10mg and PO metoprolol 25mg without improvement  -ED called [place] [facility], spoke with Dr. Duque who recommended digoxin and IVF  -Received 2L in ED, will start maintenance IVF at NS 75cc/hr x 14hr  -Pt received IV digoxin 0.25mg x 2 with improvement in HR and BP  -Admitted to step down, close BP monitoring  -Held home dose Cardizem for now, resume as clinically indicated  -Resumed metoprolol but at lower dose after BP improved (reduced from 100mg to 50mg BID)  -Xarelto resumed  -Cardiology to follow up in AM    Elevated troponin  -Troponin 0.61, repeat troponin rising, no chest pain  -Suspect demand ischemia from Afib with RVR  -EKG showing ST depressions in leads I, II, V4-V6  -Pt already on Xarelto, no heparin gtt recommended by cards to ED team  -Trend cardiac enzymes and repeat EKG  -Telemetry monitoring    Leukocytosis  -WBC 16, afebrile, pt with some vomiting yesterday, now resolved, no other localizing symptoms  -Possibly reactive from recent fall with R-thigh hematoma  -CXR with some R-sided haziness, follow up read  -Check UA, urine cx and procalcitonin   -Monitoring off abx  -Trend CBC    Right thigh hematoma with normocytic anemia  -Pt hit R-thigh into cabinet at home on 1/9  -Came to ED found to have R-thigh hematoma, no active extravasation on CT  -R-thigh wrapped in ACE bandage  -Pt was instructed to continue AC upon recent ED discharge  -Hgb decrease from 10.3 to 9.1 noted  -Cautiously resumed Xarelto and Plavix for now as no active bleeding of hematoma  -Monitor CBC closely, if down-trending, re-assess leg, hold AC, repeat imaging to see if hematoma worsening    PAD  -Plavix resumed    DM   -On Tresiba 25U and 30U, held basal insulin due to vomiting/poor PO intake  -ISS for now, consistent carbohydrate diet    HTN  -Holding Cardizem and enalapril due to Afib with RVR and hypotension  -Metoprolol resumed at lower dose    HLD  -Statin resumed    Prophylactic measure  -On Xarelto

## 2021-01-12 NOTE — PROGRESS NOTE ADULT - SUBJECTIVE AND OBJECTIVE BOX
Patient ordered to receive 1Unit PRBC after having low H/H fro right tigh hematoma. Patient with mild dyspnea, will transfuse slowly overnight and give Lasix 20mg IVP prior,  Will premedicate with Tylenol 650mg x1 and Benadryl 25mg oral x1.  CBC in AM

## 2021-01-12 NOTE — CONSULT NOTE ADULT - SUBJECTIVE AND OBJECTIVE BOX
[place] [facility], THE HEART CENTER                                   [address] [place] [address]                                                      PHONE: [phone]                                                         FAX: [phone]  [email or web address]  ---------------------------------------------------------------------------------------------------------------------------------    Reason for Consult: Af    HPI:  DONALD SAUNDERS is an 75y Female with HTN HLD PVD s/p LE revasc in past, PAF previously in SR on AC, normal EF mild MR, negative ischemia workup admitted with palpitations, recurrent Af and trop elevation.    PAST MEDICAL & SURGICAL HISTORY:  PAD (peripheral artery disease)    Paroxysmal atrial fibrillation    Hypercholesterolemia    Diabetes    Hypertension    S/P peripheral artery angioplasty with stent placement    S/P femoral-popliteal bypass surgery    H/O hernia repair    H/O abdominal hysterectomy        warfarin (Rash)      MEDICATIONS  (STANDING):  ascorbic acid 500 milliGRAM(s) Oral daily  atorvastatin 20 milliGRAM(s) Oral at bedtime  cholecalciferol 5000 Unit(s) Oral daily  clopidogrel Tablet 75 milliGRAM(s) Oral daily  cyanocobalamin 5000 MICROGram(s) Oral daily  dextrose 40% Gel 15 Gram(s) Oral once  dextrose 5%. 1000 milliLiter(s) (50 mL/Hr) IV Continuous <Continuous>  dextrose 5%. 1000 milliLiter(s) (100 mL/Hr) IV Continuous <Continuous>  dextrose 50% Injectable 25 Gram(s) IV Push once  dextrose 50% Injectable 12.5 Gram(s) IV Push once  dextrose 50% Injectable 25 Gram(s) IV Push once  glucagon  Injectable 1 milliGRAM(s) IntraMuscular once  insulin lispro (ADMELOG) corrective regimen sliding scale   SubCutaneous three times a day before meals  insulin lispro (ADMELOG) corrective regimen sliding scale   SubCutaneous at bedtime  metoprolol succinate ER 50 milliGRAM(s) Oral two times a day  rivaroxaban 20 milliGRAM(s) Oral daily  sodium chloride 0.9%. 1000 milliLiter(s) (75 mL/Hr) IV Continuous <Continuous>  vitamin E 400 International Unit(s) Oral daily    MEDICATIONS  (PRN):  ondansetron Injectable 4 milliGRAM(s) IV Push every 6 hours PRN Nausea and/or Vomiting      Social History:  Cigarettes:        neg            Alchohol:           neg      Illicit Drug Abuse:  neg  neg FH CAD    ROS: Negative other than as mentioned in HPI.    Vital Signs Last 24 Hrs  T(C): 36.4 (2021 08:00), Max: 36.6 (2021 04:00)  T(F): 97.5 (2021 08:00), Max: 97.8 (2021 04:00)  HR: 89 (2021 08:00) (83 - 143)  BP: 100/63 (2021 05:59) (95/57 - 138/77)  BP(mean): --  RR: 20 (2021 08:00) (18 - 20)  SpO2: 100% (2021 08:00) (100% - 100%)  ICU Vital Signs Last 24 Hrs  DONALD SAUNDERS  I&O's Detail    2021 07:01  -  2021 07:00  --------------------------------------------------------  IN:    sodium chloride 0.9%: 225 mL  Total IN: 225 mL    OUT:  Total OUT: 0 mL    Total NET: 225 mL        I&O's Summary    2021 07:01  -  2021 07:00  --------------------------------------------------------  IN: 225 mL / OUT: 0 mL / NET: 225 mL      Drug Dosing Weight  DONALD SAUNDERS      PHYSICAL EXAM:  General: Appears well developed, well nourished alert and cooperative.  HEENT: Head; normocephalic, atraumatic.  Eyes: Pupils reactive, cornea wnl.  Neck: Supple, no nodes adenopathy, no NVD or carotid bruit or thyromegaly.  CARDIOVASCULAR: Normal S1 and S2, No murmur, rub, gallop or lift.   LUNGS: No rales, rhonchi or wheeze. Normal breath sounds bilaterally.  ABDOMEN: Soft, nontender without mass or organomegaly. bowel sounds normoactive.  EXTREMITIES: No clubbing, cyanosis or edema. Distal pulses wnl.   SKIN: warm and dry with normal turgor.  NEURO: Alert/oriented x 3/normal motor exam. No pathologic reflexes.    PSYCH: normal affect.        LABS:                        8.4    14.91 )-----------( 329      ( 2021 08:22 )             27.0     01-    139  |  101  |  19.0  ----------------------------<  142<H>  4.0   |  23.0  |  0.75    Ca    9.1      2021 23:15  Mg     2.1         TPro  7.0  /  Alb  4.1  /  TBili  0.9  /  DBili  x   /  AST  151<H>  /  ALT  27  /  AlkPhos  65  0111    DONALD SAUNDERS  CARDIAC MARKERS ( 2021 08:22 )  x     / 1.69 ng/mL / 3181 U/L / x     / 435.7 ng/mL  CARDIAC MARKERS ( 2021 02:25 )  x     / 0.90 ng/mL / 1801 U/L / x     / 275.7 ng/mL  CARDIAC MARKERS ( 2021 23:15 )  x     / 0.61 ng/mL / x     / x     / x          PT/INR - ( 2021 23:15 )   PT: 18.9 sec;   INR: 1.67 ratio           Urinalysis Basic - ( 2021 06:04 )    Color: Yellow / Appearance: Clear / S.025 / pH: x  Gluc: x / Ketone: Trace  / Bili: Negative / Urobili: Negative mg/dL   Blood: x / Protein: 30 mg/dL / Nitrite: Negative   Leuk Esterase: Small / RBC: 3-5 /HPF / WBC 6-10   Sq Epi: x / Non Sq Epi: Many / Bacteria: Few        RADIOLOGY & ADDITIONAL STUDIES:    INTERPRETATION OF TELEMETRY (personally reviewed):    ECG: Af with  MVR diffuse St depressions    ECHO:< from: [facility] Echo Doppler (19 @ 08:16) >  Summary:   1. Left ventricular ejection fraction, by visual estimation, is 55 to   60%.   2. Mildly increased LV wall thickness.   3. Normal left ventricular internal cavity size.   4. The left ventricular diastolic function could not be assessed in this   study.   5. There is mild concentric left ventricular hypertrophy.   6. Mod SEC in LA and DUANE w/o thrombus.   7. Moderately enlarged left atrium.   8. Mild mitral valve regurgitation.   9. Moderate tricuspid regurgitation.  10. Color flow doppler and intravenous injection of agitated saline   demonstrates the presence of an intact intra atrial septum.  11. No left atrial appendage thrombus, prominent left atrial appendage   and normal left atrial appendage velocities.  12. In light of the absence of intracardiac thrombus, pt underwent   successful DCCV 200 j converting atrial flutter to NSR    MD Sammy Electronically signed on 2019 at 9:00:31 AM              *** Final ***                  JOSE CORMIER M.D.  This document has been electronically signed. 2019  8:16AM        < end of copied text >      STRESS TEST:< from: Nuclear Stress Test-Pharmacologic (16 @ 09:22) >    IMPRESSIONS:Normal Study  * Chest Pain: No chest pain with administration of  Regadenoson.  * Symptom: No Symptom.  * HR Response: Appropriate.  * BP Response: Appropriate.  * Heart Rhythm: Sinus Bradycardia - 59 BPM.  * ECG Abnormalities: There were no diagnostic changes.  * Arrhythmia: None.  * Review of raw data shows: Increased bowel uptake.  * The left ventricle was normal in size. Normal myocardial  perfusion scan, with no evidence of infarction or  inducible ischemia.  * Gated wall motion analysis is performed, and shows  normal wall motion with post stress LVEF of 63%.    ------------------------------------------------------------------------      ------------------------------------------------------------------------    Confirmed on  2016 - 18:08:38 by Evan Hoff MD   Cardiology Fellow: SERGIO Rae-C  ------------------------------------------------------------------------    < end of copied text >                         [place] [facility], [facility]                                   [address] [place] [address]                                                      PHONE: [phone]                                                         FAX: [phone]  [email or web address]  ---------------------------------------------------------------------------------------------------------------------------------    Reason for Consult: Af    HPI:  DONALD SAUNDERS is an 75y Female with HTN HLD PVD s/p LE revasc in past, PAF previously in SR on AC, normal EF mild MR, negative ischemia workup admitted with palpitations, recurrent Af and trop elevation.  Pt came to ER c/o 1 day h/o dyspnea, orthopnea and PND, chest pain approx 12 hours relieved by morphine.  Currently w/o chest pain.  Recent leg trauma on xarelto w/o obviously bleeding.    PAST MEDICAL & SURGICAL HISTORY:  PAD (peripheral artery disease)    Paroxysmal atrial fibrillation    Hypercholesterolemia    Diabetes    Hypertension    S/P peripheral artery angioplasty with stent placement    S/P femoral-popliteal bypass surgery    H/O hernia repair    H/O abdominal hysterectomy        warfarin (Rash)      MEDICATIONS  (STANDING):  ascorbic acid 500 milliGRAM(s) Oral daily  atorvastatin 20 milliGRAM(s) Oral at bedtime  cholecalciferol 5000 Unit(s) Oral daily  clopidogrel Tablet 75 milliGRAM(s) Oral daily  cyanocobalamin 5000 MICROGram(s) Oral daily  dextrose 40% Gel 15 Gram(s) Oral once  dextrose 5%. 1000 milliLiter(s) (50 mL/Hr) IV Continuous <Continuous>  dextrose 5%. 1000 milliLiter(s) (100 mL/Hr) IV Continuous <Continuous>  dextrose 50% Injectable 25 Gram(s) IV Push once  dextrose 50% Injectable 12.5 Gram(s) IV Push once  dextrose 50% Injectable 25 Gram(s) IV Push once  glucagon  Injectable 1 milliGRAM(s) IntraMuscular once  insulin lispro (ADMELOG) corrective regimen sliding scale   SubCutaneous three times a day before meals  insulin lispro (ADMELOG) corrective regimen sliding scale   SubCutaneous at bedtime  metoprolol succinate ER 50 milliGRAM(s) Oral two times a day  rivaroxaban 20 milliGRAM(s) Oral daily  sodium chloride 0.9%. 1000 milliLiter(s) (75 mL/Hr) IV Continuous <Continuous>  vitamin E 400 International Unit(s) Oral daily    MEDICATIONS  (PRN):  ondansetron Injectable 4 milliGRAM(s) IV Push every 6 hours PRN Nausea and/or Vomiting      Social History:  Cigarettes:        neg            Alchohol:           neg      Illicit Drug Abuse:  neg  neg FH CAD    ROS: Negative other than as mentioned in HPI.    Vital Signs Last 24 Hrs  T(C): 36.4 (2021 08:00), Max: 36.6 (2021 04:00)  T(F): 97.5 (2021 08:00), Max: 97.8 (2021 04:00)  HR: 89 (2021 08:00) (83 - 143)  BP: 100/63 (2021 05:59) (95/57 - 138/77)  BP(mean): --  RR: 20 (2021 08:00) (18 - 20)  SpO2: 100% (2021 08:00) (100% - 100%)  ICU Vital Signs Last 24 Hrs  DONALD SAUNDERS  I&O's Detail    2021 07:01  -  2021 07:00  --------------------------------------------------------  IN:    sodium chloride 0.9%: 225 mL  Total IN: 225 mL    OUT:  Total OUT: 0 mL    Total NET: 225 mL        I&O's Summary    2021 07:01  -  2021 07:00  --------------------------------------------------------  IN: 225 mL / OUT: 0 mL / NET: 225 mL      Drug Dosing Weight  DONALD SAUNDERS      PHYSICAL EXAM:  General: Appears well developed, well nourished alert and cooperative.  HEENT: Head; normocephalic, atraumatic.  Eyes: Pupils reactive, cornea wnl.  Neck: Supple, no nodes adenopathy, pos JVD or carotid bruit or thyromegaly.  CARDIOVASCULAR: Normal S1 and S2, No murmur, rub, gallop or lift.   LUNGS:rales 1/3 right  ABDOMEN: Soft, nontender without mass or organomegaly. bowel sounds normoactive.  EXTREMITIES: No clubbing, cyanosis or edema. Distal pulses wnl.   SKIN: warm and dry with normal turgor.  NEURO: Alert/oriented x 3/normal motor exam. No pathologic reflexes.    PSYCH: normal affect.        LABS:                        8.4    14.91 )-----------( 329      ( 2021 08:22 )             27.0     01-    139  |  101  |  19.0  ----------------------------<  142<H>  4.0   |  23.0  |  0.75    Ca    9.1      2021 23:15  Mg     2.1         TPro  7.0  /  Alb  4.1  /  TBili  0.9  /  DBili  x   /  AST  151<H>  /  ALT  27  /  AlkPhos  65      DONALD SAUNDERS  CARDIAC MARKERS ( 2021 08:22 )  x     / 1.69 ng/mL / 3181 U/L / x     / 435.7 ng/mL  CARDIAC MARKERS ( 2021 02:25 )  x     / 0.90 ng/mL / 1801 U/L / x     / 275.7 ng/mL  CARDIAC MARKERS ( 2021 23:15 )  x     / 0.61 ng/mL / x     / x     / x          PT/INR - ( 2021 23:15 )   PT: 18.9 sec;   INR: 1.67 ratio           Urinalysis Basic - ( 2021 06:04 )    Color: Yellow / Appearance: Clear / S.025 / pH: x  Gluc: x / Ketone: Trace  / Bili: Negative / Urobili: Negative mg/dL   Blood: x / Protein: 30 mg/dL / Nitrite: Negative   Leuk Esterase: Small / RBC: 3-5 /HPF / WBC 6-10   Sq Epi: x / Non Sq Epi: Many / Bacteria: Few        RADIOLOGY & ADDITIONAL STUDIES:    INTERPRETATION OF TELEMETRY (personally reviewed):    ECG: Af with  MVR diffuse St depressions    ECHO:< from: [facility] Echo Doppler (19 @ 08:16) >  Summary:   1. Left ventricular ejection fraction, by visual estimation, is 55 to   60%.   2. Mildly increased LV wall thickness.   3. Normal left ventricular internal cavity size.   4. The left ventricular diastolic function could not be assessed in this   study.   5. There is mild concentric left ventricular hypertrophy.   6. Mod SEC in LA and DUANE w/o thrombus.   7. Moderately enlarged left atrium.   8. Mild mitral valve regurgitation.   9. Moderate tricuspid regurgitation.  10. Color flow doppler and intravenous injection of agitated saline   demonstrates the presence of an intact intra atrial septum.  11. No left atrial appendage thrombus, prominent left atrial appendage   and normal left atrial appendage velocities.  12. In light of the absence of intracardiac thrombus, pt underwent   successful DCCV 200 j converting atrial flutter to NSR    MD Sammy Electronically signed on 2019 at 9:00:31 AM              *** Final ***                  JOSE CORMIER M.D.  This document has been electronically signed. 2019  8:16AM          Repeat echo today reviewed:  LVEF 40% mid to distal ant, apical akinesia, mod MR , normal RV  < end of copied text >      STRESS TEST:< from: Nuclear Stress Test-Pharmacologic (16 @ 09:22) >    IMPRESSIONS:Normal Study  * Chest Pain: No chest pain with administration of  Regadenoson.  * Symptom: No Symptom.  * HR Response: Appropriate.  * BP Response: Appropriate.  * Heart Rhythm: Sinus Bradycardia - 59 BPM.  * ECG Abnormalities: There were no diagnostic changes.  * Arrhythmia: None.  * Review of raw data shows: Increased bowel uptake.  * The left ventricle was normal in size. Normal myocardial  perfusion scan, with no evidence of infarction or  inducible ischemia.  * Gated wall motion analysis is performed, and shows  normal wall motion with post stress LVEF of 63%.    ------------------------------------------------------------------------      ------------------------------------------------------------------------    Confirmed on  2016 - 18:08:38 by Evan Hoff MD   Cardiology Fellow: GABRIELLA Rae  ------------------------------------------------------------------------    < end of copied text >    < from: Xray Chest 1 View-PORTABLE IMMEDIATE (21 @ 23:12) >  IMPRESSION:    Ill-defined infiltrates bilaterally and a possible right pleural effusion. Correlate with CT imaging.              KACEY PALMA MD; Attending Radiologist  This document has been electronically signed. 2021  9:15AM    < end of copied text >

## 2021-01-12 NOTE — H&P ADULT - NSHPLABSRESULTS_GEN_ALL_CORE
9.1    16.32 )-----------( 372      ( 11 Jan 2021 23:15 )             28.9       01-11    139  |  101  |  19.0  ----------------------------<  142<H>  4.0   |  23.0  |  0.75    Ca    9.1      11 Jan 2021 23:15  Mg     2.1     01-11    TPro  7.0  /  Alb  4.1  /  TBili  0.9  /  DBili  x   /  AST  151<H>  /  ALT  27  /  AlkPhos  65  01-11      EKG: Afib, ST depressions in leads I, II, V4-V6

## 2021-01-12 NOTE — H&P ADULT - HISTORY OF PRESENT ILLNESS
75yoF hx Afib on Xarelto, PAD s/p W-qrmzdyh-bbmekgzgq bypass, R-femoral angioplasty with stenting, on Plavix, HTN, HLD, DM presenting with acute dyspnea and palpitations yesterday.  Pt was in her normal state of health, then developed acute onset of exertional dyspnea associated with palpitations that progressed to dyspnea at rest which prompted her to come to the hospital.  She also reports some transient vomiting yesterday that is now improving.  In ED, pt was in Afib with RVR associated with soft SBP in 90s.  She was given IV Cardizem and PO metoprolol without improvement and ED staff spoke with [place] cardiology who recommended digoxin.   Labs notable for elevated troponin.  Pt denies any chest pain, lightheadedness, syncope.     Of note, pt came to [facility] ED on 1/9 for R-thigh pain after walking into a cabinet at home. She underwent a CT angio A/P that showed R-thigh hematoma w/out extravasation.  Her leg was wrapped and she was d/c’ed from ED with instruction to continue wtith blood thinners and outpatient follow up. She denies any worsening R-thigh or leg pain.   75yoF hx Afib on Xarelto, PAD s/p L-ebqgrdp-voqoysjoh bypass, R-femoral angioplasty with stenting, on Plavix, HTN, HLD, DM presenting with acute dyspnea and palpitations yesterday.  Pt was in her normal state of health, then developed acute onset of exertional dyspnea associated with palpitations that progressed to dyspnea at rest which prompted her to come to the hospital.  She also reports some transient vomiting yesterday that is now improving.  In ED, pt was in Afib with RVR associated with soft SBP in 90s.  She was given IV Cardizem and PO metoprolol without improvement and ED staff spoke with [place] cardiology who recommended digoxin.   Labs notable for leukocytosis and elevated troponin.  Pt denies any chest pain, lightheadedness, syncope, cough, abdominal pain, diarrhea, or urinary symptoms.     Of note, pt came to [facility] ED on 1/9 for R-thigh pain after walking into a cabinet at home. She underwent a CT angio A/P that showed R-thigh hematoma w/out extravasation.  Her leg was wrapped and she was d/c’ed from ED with instruction to continue wtith blood thinners and outpatient follow up. She denies any worsening R-thigh or leg pain. 75yoF hx Afib on Xarelto, PAD s/p R-phslyte-erptorbnz bypass, R-femoral angioplasty with stenting, on Plavix, HTN, HLD, DM presenting with acute dyspnea and palpitations yesterday.  Pt was in her normal state of health, then developed acute onset of exertional dyspnea associated with palpitations that progressed to dyspnea at rest which prompted her to come to the hospital.  She also reports some transient vomiting yesterday that is now improving.  In ED, pt was in Afib with RVR associated with soft SBP in 90s.  She was given IV Cardizem and PO metoprolol without improvement and ED staff spoke with [place] cardiology who recommended digoxin.   Labs notable for leukocytosis and elevated troponin.  Pt denies any chest pain, lightheadedness, syncope, cough, abdominal pain, diarrhea, or urinary symptoms.   Of note, pt came to [facility] ED on 1/9 for R-thigh pain after walking into a cabinet at home. She underwent a CT angio A/P that showed R-thigh hematoma w/out extravasation.  Her leg was wrapped and she was d/c’ed from ED with instruction to continue wtith blood thinners and outpatient follow up. She denies any worsening R-thigh or leg pain. Patient comes into the hospital with afib

## 2021-01-12 NOTE — H&P ADULT - NSHPPHYSICALEXAM_GEN_ALL_CORE
Vital Signs Last 24 Hrs  T(C): 36.6 (12 Jan 2021 04:00), Max: 36.6 (12 Jan 2021 04:00)  T(F): 97.8 (12 Jan 2021 04:00), Max: 97.8 (12 Jan 2021 04:00)  HR: 84 (12 Jan 2021 05:59) (83 - 143)  BP: 100/63 (12 Jan 2021 05:59) (95/57 - 138/77)  BP(mean): --  RR: 18 (12 Jan 2021 04:33) (18 - 20)  SpO2: 100% (12 Jan 2021 04:33) (100% - 100%)    GENERAL:  Well-appearing, not in acute distress  EYES:  Clear conjuctiva, extraocular movement intact  ENT: Moist mucous membranes  RESP:  Non-labored breathing pattern, lungs clear to ausculation in anterior fields  CV: Regular rate and rhythm, no murmurs appreciated, no lower extremity edema  GI: Soft, non-tender, non-distended  NEURO: Awake, alert, conversant, upper and lower extremity strength 5/5, light touch sensation grossly intact  PSYCH: Calm, cooperative  SKIN: No rash or lesions, warm and dry

## 2021-01-12 NOTE — CHART NOTE - NSCHARTNOTEFT_GEN_A_CORE
Patient seen and examined at bedside. Agree with above H & P. Pt c/o exertional SOB & fatigue with palpitations. HR currently controlled in 80's. No cough, diarrhea, N/V or other signs of infection. Will monitor WBC. leukocytosis likely reactive. Panculture. 12 beats of NSVT. Non sustained. Pt was asymptomatic. Increase metoprolol to 50 q8 as per Dr. Moyer. F/U repeat H/H. Transfuse if Hg < 8. c/w IVF for rhabdo. f/u CPK. Vascular sx called for CTA showing Atherosclerotic vascular disease. Occluded bilateral anterior tibial arteries and probable dorsalis pedis arteries. Plantar arteries are patent. Vascular sx to f/u as outpatient        PHYSICAL EXAM-  GENERAL: NAD, well-groomed, well-developed  HEAD:  Atraumatic, Normocephalic  EYES: EOMI, PERRLA, conjunctiva and sclera clear  NECK: Supple, No JVD  NERVOUS SYSTEM:  Alert & Oriented X3, Motor Strength 5/5 B/L upper and lower extremities; DTRs 2+ intact and symmetric  CHEST/LUNG: Clear to auscultation bilaterally; No rales, rhonchi, wheezing, or rubs  HEART: Regular rate and rhythm; No murmurs, rubs, or gallops  ABDOMEN: Soft, Nontender, Nondistended; Bowel sounds present  EXTREMITIES:  2+ Peripheral Pulses, moderate sized bulging R thigh hematoma with L leg edema

## 2021-01-12 NOTE — CONSULT NOTE ADULT - ASSESSMENT
assessment  recurrent Af with MVR/ ECg changes and trop c/w NSTEMI  HTN  HLD  Remote GERALDINE DCCV  Mild MR normal Ef, remote negative ischemia workup  PVD s/p LE revasc      Rec   assessment  Recurrent Af with MVR/ ECg changes and trop c/w NSTEMI- echo revealed new anteroapical wall motion abnormality EF 40%  PAF on AC last dose yesterday  Dyspnea c/w acute systolic heart failure in setting of recent NSTEMI  New anemia w/o overt bleeding r/o GI source  HTN  HLD  DM  Remote GERALDINE DCCV  PVD s/p LE revasc      Rec  IV lasix  cont lopressor for rate control AF  hold AC in light of anemia  stool guiac  repeat H/H  transfuse Hgb , 8.5 with IV lasix  asa  statin  hold ACEI in light of BP  If Hgb stable and no evidence of bleeding will perform cardiac cath in am

## 2021-01-13 DIAGNOSIS — E11.9 TYPE 2 DIABETES MELLITUS WITHOUT COMPLICATIONS: ICD-10-CM

## 2021-01-13 DIAGNOSIS — I25.118 ATHEROSCLEROTIC HEART DISEASE OF NATIVE CORONARY ARTERY WITH OTHER FORMS OF ANGINA PECTORIS: ICD-10-CM

## 2021-01-13 DIAGNOSIS — I48.91 UNSPECIFIED ATRIAL FIBRILLATION: ICD-10-CM

## 2021-01-13 DIAGNOSIS — I48.0 PAROXYSMAL ATRIAL FIBRILLATION: ICD-10-CM

## 2021-01-13 DIAGNOSIS — E78.00 PURE HYPERCHOLESTEROLEMIA, UNSPECIFIED: ICD-10-CM

## 2021-01-13 LAB
ALBUMIN SERPL ELPH-MCNC: 3.8 G/DL — SIGNIFICANT CHANGE UP (ref 3.3–5.2)
ALP SERPL-CCNC: 63 U/L — SIGNIFICANT CHANGE UP (ref 40–120)
ALT FLD-CCNC: 369 U/L — HIGH
ANION GAP SERPL CALC-SCNC: 14 MMOL/L — SIGNIFICANT CHANGE UP (ref 5–17)
ANION GAP SERPL CALC-SCNC: 14 MMOL/L — SIGNIFICANT CHANGE UP (ref 5–17)
APTT BLD: 88.2 SEC — HIGH (ref 27.5–35.5)
AST SERPL-CCNC: 756 U/L — HIGH
BASOPHILS # BLD AUTO: 0.02 K/UL — SIGNIFICANT CHANGE UP (ref 0–0.2)
BASOPHILS NFR BLD AUTO: 0.1 % — SIGNIFICANT CHANGE UP (ref 0–2)
BILIRUB SERPL-MCNC: 2.6 MG/DL — HIGH (ref 0.4–2)
BLD GP AB SCN SERPL QL: SIGNIFICANT CHANGE UP
BUN SERPL-MCNC: 23 MG/DL — HIGH (ref 8–20)
BUN SERPL-MCNC: 25 MG/DL — HIGH (ref 8–20)
CALCIUM SERPL-MCNC: 8.4 MG/DL — LOW (ref 8.6–10.2)
CALCIUM SERPL-MCNC: 8.7 MG/DL — SIGNIFICANT CHANGE UP (ref 8.6–10.2)
CHLORIDE SERPL-SCNC: 101 MMOL/L — SIGNIFICANT CHANGE UP (ref 98–107)
CHLORIDE SERPL-SCNC: 99 MMOL/L — SIGNIFICANT CHANGE UP (ref 98–107)
CK MB CFR SERPL CALC: 188.6 NG/ML — HIGH (ref 0–6.7)
CK SERPL-CCNC: 2305 U/L — HIGH (ref 25–170)
CO2 SERPL-SCNC: 22 MMOL/L — SIGNIFICANT CHANGE UP (ref 22–29)
CO2 SERPL-SCNC: 23 MMOL/L — SIGNIFICANT CHANGE UP (ref 22–29)
CREAT SERPL-MCNC: 0.75 MG/DL — SIGNIFICANT CHANGE UP (ref 0.5–1.3)
CREAT SERPL-MCNC: 0.75 MG/DL — SIGNIFICANT CHANGE UP (ref 0.5–1.3)
CULTURE RESULTS: SIGNIFICANT CHANGE UP
EOSINOPHIL # BLD AUTO: 0 K/UL — SIGNIFICANT CHANGE UP (ref 0–0.5)
EOSINOPHIL NFR BLD AUTO: 0 % — SIGNIFICANT CHANGE UP (ref 0–6)
GLUCOSE BLDC GLUCOMTR-MCNC: 86 MG/DL — SIGNIFICANT CHANGE UP (ref 70–99)
GLUCOSE BLDC GLUCOMTR-MCNC: 86 MG/DL — SIGNIFICANT CHANGE UP (ref 70–99)
GLUCOSE BLDC GLUCOMTR-MCNC: 96 MG/DL — SIGNIFICANT CHANGE UP (ref 70–99)
GLUCOSE SERPL-MCNC: 63 MG/DL — LOW (ref 70–99)
GLUCOSE SERPL-MCNC: 88 MG/DL — SIGNIFICANT CHANGE UP (ref 70–99)
HCT VFR BLD CALC: 25.7 % — LOW (ref 34.5–45)
HCT VFR BLD CALC: 27.4 % — LOW (ref 34.5–45)
HCT VFR BLD CALC: 29.3 % — LOW (ref 34.5–45)
HCV AB S/CO SERPL IA: 0.1 S/CO — SIGNIFICANT CHANGE UP (ref 0–0.99)
HCV AB SERPL-IMP: SIGNIFICANT CHANGE UP
HGB BLD-MCNC: 8.1 G/DL — LOW (ref 11.5–15.5)
HGB BLD-MCNC: 8.8 G/DL — LOW (ref 11.5–15.5)
HGB BLD-MCNC: 9.3 G/DL — LOW (ref 11.5–15.5)
IMM GRANULOCYTES NFR BLD AUTO: 0.7 % — SIGNIFICANT CHANGE UP (ref 0–1.5)
INR BLD: 1.98 RATIO — HIGH (ref 0.88–1.16)
LYMPHOCYTES # BLD AUTO: 1.96 K/UL — SIGNIFICANT CHANGE UP (ref 1–3.3)
LYMPHOCYTES # BLD AUTO: 12.8 % — LOW (ref 13–44)
MAGNESIUM SERPL-MCNC: 2.1 MG/DL — SIGNIFICANT CHANGE UP (ref 1.6–2.6)
MAGNESIUM SERPL-MCNC: 2.2 MG/DL — SIGNIFICANT CHANGE UP (ref 1.6–2.6)
MCHC RBC-ENTMCNC: 26.6 PG — LOW (ref 27–34)
MCHC RBC-ENTMCNC: 26.9 PG — LOW (ref 27–34)
MCHC RBC-ENTMCNC: 31.5 GM/DL — LOW (ref 32–36)
MCHC RBC-ENTMCNC: 31.7 GM/DL — LOW (ref 32–36)
MCV RBC AUTO: 84.5 FL — SIGNIFICANT CHANGE UP (ref 80–100)
MCV RBC AUTO: 84.7 FL — SIGNIFICANT CHANGE UP (ref 80–100)
MONOCYTES # BLD AUTO: 1.67 K/UL — HIGH (ref 0–0.9)
MONOCYTES NFR BLD AUTO: 10.9 % — SIGNIFICANT CHANGE UP (ref 2–14)
NEUTROPHILS # BLD AUTO: 11.53 K/UL — HIGH (ref 1.8–7.4)
NEUTROPHILS NFR BLD AUTO: 75.5 % — SIGNIFICANT CHANGE UP (ref 43–77)
NT-PROBNP SERPL-SCNC: HIGH PG/ML (ref 0–300)
PA ADP PRP-ACNC: 267 PRU — SIGNIFICANT CHANGE UP (ref 180–376)
PHOSPHATE SERPL-MCNC: 4.2 MG/DL — SIGNIFICANT CHANGE UP (ref 2.4–4.7)
PLATELET # BLD AUTO: 301 K/UL — SIGNIFICANT CHANGE UP (ref 150–400)
PLATELET # BLD AUTO: 314 K/UL — SIGNIFICANT CHANGE UP (ref 150–400)
POTASSIUM SERPL-MCNC: 3.9 MMOL/L — SIGNIFICANT CHANGE UP (ref 3.5–5.3)
POTASSIUM SERPL-MCNC: 4.5 MMOL/L — SIGNIFICANT CHANGE UP (ref 3.5–5.3)
POTASSIUM SERPL-SCNC: 3.9 MMOL/L — SIGNIFICANT CHANGE UP (ref 3.5–5.3)
POTASSIUM SERPL-SCNC: 4.5 MMOL/L — SIGNIFICANT CHANGE UP (ref 3.5–5.3)
PROCALCITONIN SERPL-MCNC: 0.21 NG/ML — HIGH (ref 0.02–0.1)
PROT SERPL-MCNC: 6.5 G/DL — LOW (ref 6.6–8.7)
PROTHROM AB SERPL-ACNC: 22.2 SEC — HIGH (ref 10.6–13.6)
RBC # BLD: 3.04 M/UL — LOW (ref 3.8–5.2)
RBC # BLD: 3.46 M/UL — LOW (ref 3.8–5.2)
RBC # FLD: 15.9 % — HIGH (ref 10.3–14.5)
RBC # FLD: 16.2 % — HIGH (ref 10.3–14.5)
SARS-COV-2 IGG SERPL QL IA: NEGATIVE — SIGNIFICANT CHANGE UP
SARS-COV-2 IGM SERPL IA-ACNC: 0.07 INDEX — SIGNIFICANT CHANGE UP
SODIUM SERPL-SCNC: 136 MMOL/L — SIGNIFICANT CHANGE UP (ref 135–145)
SODIUM SERPL-SCNC: 137 MMOL/L — SIGNIFICANT CHANGE UP (ref 135–145)
SPECIMEN SOURCE: SIGNIFICANT CHANGE UP
TROPONIN T SERPL-MCNC: 2.85 NG/ML — HIGH (ref 0–0.06)
TROPONIN T SERPL-MCNC: 4.33 NG/ML — HIGH (ref 0–0.06)
TROPONIN T SERPL-MCNC: 5.85 NG/ML — HIGH (ref 0–0.06)
WBC # BLD: 12.63 K/UL — HIGH (ref 3.8–10.5)
WBC # BLD: 15.29 K/UL — HIGH (ref 3.8–10.5)
WBC # FLD AUTO: 12.63 K/UL — HIGH (ref 3.8–10.5)
WBC # FLD AUTO: 15.29 K/UL — HIGH (ref 3.8–10.5)

## 2021-01-13 PROCEDURE — 93010 ELECTROCARDIOGRAM REPORT: CPT

## 2021-01-13 PROCEDURE — 71045 X-RAY EXAM CHEST 1 VIEW: CPT | Mod: 26

## 2021-01-13 PROCEDURE — 71045 X-RAY EXAM CHEST 1 VIEW: CPT | Mod: 26,77

## 2021-01-13 PROCEDURE — 93880 EXTRACRANIAL BILAT STUDY: CPT | Mod: 26

## 2021-01-13 PROCEDURE — 99232 SBSQ HOSP IP/OBS MODERATE 35: CPT

## 2021-01-13 RX ORDER — CHLORHEXIDINE GLUCONATE 213 G/1000ML
4 SOLUTION TOPICAL ONCE
Refills: 0 | Status: DISCONTINUED | OUTPATIENT
Start: 2021-01-13 | End: 2021-01-14

## 2021-01-13 RX ORDER — HEPARIN SODIUM 5000 [USP'U]/ML
1400 INJECTION INTRAVENOUS; SUBCUTANEOUS
Qty: 25000 | Refills: 0 | Status: DISCONTINUED | OUTPATIENT
Start: 2021-01-13 | End: 2021-01-13

## 2021-01-13 RX ORDER — METOPROLOL TARTRATE 50 MG
50 TABLET ORAL EVERY 6 HOURS
Refills: 0 | Status: DISCONTINUED | OUTPATIENT
Start: 2021-01-13 | End: 2021-01-13

## 2021-01-13 RX ORDER — PANTOPRAZOLE SODIUM 20 MG/1
40 TABLET, DELAYED RELEASE ORAL
Refills: 0 | Status: DISCONTINUED | OUTPATIENT
Start: 2021-01-13 | End: 2021-01-19

## 2021-01-13 RX ORDER — CEFUROXIME AXETIL 250 MG
1500 TABLET ORAL ONCE
Refills: 0 | Status: DISCONTINUED | OUTPATIENT
Start: 2021-01-13 | End: 2021-01-14

## 2021-01-13 RX ORDER — HEPARIN SODIUM 5000 [USP'U]/ML
7000 INJECTION INTRAVENOUS; SUBCUTANEOUS EVERY 6 HOURS
Refills: 0 | Status: DISCONTINUED | OUTPATIENT
Start: 2021-01-13 | End: 2021-01-13

## 2021-01-13 RX ORDER — FUROSEMIDE 40 MG
40 TABLET ORAL EVERY 12 HOURS
Refills: 0 | Status: DISCONTINUED | OUTPATIENT
Start: 2021-01-13 | End: 2021-01-14

## 2021-01-13 RX ORDER — FUROSEMIDE 40 MG
40 TABLET ORAL EVERY 12 HOURS
Refills: 0 | Status: DISCONTINUED | OUTPATIENT
Start: 2021-01-13 | End: 2021-01-13

## 2021-01-13 RX ORDER — IPRATROPIUM/ALBUTEROL SULFATE 18-103MCG
3 AEROSOL WITH ADAPTER (GRAM) INHALATION ONCE
Refills: 0 | Status: COMPLETED | OUTPATIENT
Start: 2021-01-13 | End: 2021-01-13

## 2021-01-13 RX ORDER — CHLORHEXIDINE GLUCONATE 213 G/1000ML
1 SOLUTION TOPICAL ONCE
Refills: 0 | Status: COMPLETED | OUTPATIENT
Start: 2021-01-13 | End: 2021-01-14

## 2021-01-13 RX ORDER — HEPARIN SODIUM 5000 [USP'U]/ML
1300 INJECTION INTRAVENOUS; SUBCUTANEOUS
Qty: 25000 | Refills: 0 | Status: DISCONTINUED | OUTPATIENT
Start: 2021-01-13 | End: 2021-01-14

## 2021-01-13 RX ORDER — METOPROLOL TARTRATE 50 MG
25 TABLET ORAL EVERY 12 HOURS
Refills: 0 | Status: DISCONTINUED | OUTPATIENT
Start: 2021-01-13 | End: 2021-01-14

## 2021-01-13 RX ORDER — HEPARIN SODIUM 5000 [USP'U]/ML
3500 INJECTION INTRAVENOUS; SUBCUTANEOUS EVERY 6 HOURS
Refills: 0 | Status: DISCONTINUED | OUTPATIENT
Start: 2021-01-13 | End: 2021-01-13

## 2021-01-13 RX ORDER — SENNA PLUS 8.6 MG/1
2 TABLET ORAL AT BEDTIME
Refills: 0 | Status: DISCONTINUED | OUTPATIENT
Start: 2021-01-13 | End: 2021-01-19

## 2021-01-13 RX ADMIN — SODIUM CHLORIDE 75 MILLILITER(S): 9 INJECTION INTRAMUSCULAR; INTRAVENOUS; SUBCUTANEOUS at 05:29

## 2021-01-13 RX ADMIN — Medication 40 MILLIGRAM(S): at 17:40

## 2021-01-13 RX ADMIN — HEPARIN SODIUM 13 UNIT(S)/HR: 5000 INJECTION INTRAVENOUS; SUBCUTANEOUS at 21:00

## 2021-01-13 RX ADMIN — CLOPIDOGREL BISULFATE 75 MILLIGRAM(S): 75 TABLET, FILM COATED ORAL at 12:09

## 2021-01-13 RX ADMIN — Medication 3 MILLILITER(S): at 06:59

## 2021-01-13 RX ADMIN — PANTOPRAZOLE SODIUM 40 MILLIGRAM(S): 20 TABLET, DELAYED RELEASE ORAL at 21:58

## 2021-01-13 RX ADMIN — Medication 25 MILLIGRAM(S): at 17:40

## 2021-01-13 RX ADMIN — Medication 5000 UNIT(S): at 21:57

## 2021-01-13 RX ADMIN — SENNA PLUS 2 TABLET(S): 8.6 TABLET ORAL at 21:57

## 2021-01-13 RX ADMIN — Medication 50 MILLIGRAM(S): at 05:28

## 2021-01-13 RX ADMIN — Medication 500 MILLIGRAM(S): at 17:40

## 2021-01-13 RX ADMIN — ATORVASTATIN CALCIUM 20 MILLIGRAM(S): 80 TABLET, FILM COATED ORAL at 21:58

## 2021-01-13 RX ADMIN — PREGABALIN 5000 MICROGRAM(S): 225 CAPSULE ORAL at 21:58

## 2021-01-13 RX ADMIN — ONDANSETRON 4 MILLIGRAM(S): 8 TABLET, FILM COATED ORAL at 06:08

## 2021-01-13 RX ADMIN — Medication 20 MILLIGRAM(S): at 03:28

## 2021-01-13 RX ADMIN — Medication 40 MILLIGRAM(S): at 06:08

## 2021-01-13 NOTE — CONSULT NOTE ADULT - PROBLEM SELECTOR RECOMMENDATION 9
Patient with multivessel disease now with IABP   remains on heparin gtt for now   pre op for possible OHS is pending at this time

## 2021-01-13 NOTE — PROGRESS NOTE ADULT - SUBJECTIVE AND OBJECTIVE BOX
75yoF hx Afib on Xarelto, PAD s/p F-fxghvks-kqkadhccz bypass, R-femoral angioplasty with stenting, on Plavix, HTN, HLD, DM presenting with acute dyspnea and palpitations yesterday.  Pt was in her normal state of health, then developed acute onset of exertional dyspnea associated with palpitations that progressed to dyspnea at rest which prompted her to come to the hospital.  She also reports some transient vomiting yesterday that is now improving.  In ED, pt was in Afib with RVR associated with soft SBP in 90s.  She was given IV Cardizem and PO metoprolol without improvement and ED staff spoke with [place] cardiology who recommended digoxin.   Labs notable for leukocytosis and elevated troponin. Admitted as NSTEMI, last dose of Xarelto 1/12 am. Has been on IV lasix with good effect; but still with rales throughout.    Patient seen and examined in cath lab holding. VSS, not able to lay flat for planned procedure. Discussed with Dr. Smith who at this time is recommending BIPAP for oxygenation support and possibly ability to lay flat and have left heart catheterization performed. Respiratory called and will try patient on BIPAP in holding. If patient is able to lay flat, will then go ahead with planned procedure.  75yoF hx Afib on Xarelto, PAD s/p I-yfrmplb-hmkfhbjpt bypass, R-femoral angioplasty with stenting, on Plavix, HTN, HLD, DM presenting with acute dyspnea and palpitations yesterday.  Pt was in her normal state of health, then developed acute onset of exertional dyspnea associated with palpitations that progressed to dyspnea at rest which prompted her to come to the hospital.  She also reports some transient vomiting yesterday that is now improving.  In ED, pt was in Afib with RVR associated with soft SBP in 90s.  She was given IV Cardizem and PO metoprolol without improvement and ED staff spoke with [place] cardiology who recommended digoxin.   Labs notable for leukocytosis and elevated troponin. Admitted as NSTEMI, last dose of Xarelto 1/12 am. Has been on IV lasix with good effect; but still with rales throughout.    Patient seen and examined in cath lab holding. VSS, not able to lay flat for planned procedure. Discussed with Dr. Smith who at this time is recommending BIPAP for oxygenation support and possibly ability to lay flat and have left heart catheterization performed. Respiratory called and will try patient on BIPAP in holding. If patient is able to lay flat, will then go ahead with planned procedure.       General: No fatigue, no fevers/chills  Respiratory: dyspneic, rales on auscultation   CV: No chest pain, no palpitations  Abd: No nausea  Neuro: No headache, no dizziness  warfarin (Rash)      Objective:  Vital Signs Last 24 Hrs  T(C): 36.9 (13 Jan 2021 11:49), Max: 37.2 (13 Jan 2021 10:09)  T(F): 98.4 (13 Jan 2021 11:49), Max: 99 (13 Jan 2021 10:09)  HR: 92 (13 Jan 2021 13:59) (68 - 134)  BP: 116/69 (13 Jan 2021 12:56) (100/76 - 121/68)  BP(mean): 77 (13 Jan 2021 06:04) (77 - 88)  RR: 17 (13 Jan 2021 13:07) (17 - 24)  SpO2: 100% (13 Jan 2021 13:59) (97% - 100%)    CM: SR  Neuro: A&OX3, CN 2-12 intact  HEENT: NC, AT  Lungs: dyspneic, rales on auscultation; placed on BIPAP 12/5 30%    CV: S1, S2, no murmur, RRR  Abd: Soft  Right Groin: Soft, no bleeding, no hematoma; Balloon pump in place 1:1, aug 150's  Extremity: + distal pulses by doppler   EKG:   Atrial Fibrillation  75yoF hx Afib on Xarelto, PAD s/p I-wqrnynd-yujcqrkwh bypass, R-femoral angioplasty with stenting, on Plavix, HTN, HLD, DM presenting with acute dyspnea and palpitations yesterday.  Pt was in her normal state of health, then developed acute onset of exertional dyspnea associated with palpitations that progressed to dyspnea at rest which prompted her to come to the hospital.  She also reports some transient vomiting yesterday that is now improving.  In ED, pt was in Afib with RVR associated with soft SBP in 90s.  She was given IV Cardizem and PO metoprolol without improvement and ED staff spoke with [place] cardiology who recommended digoxin.   Labs notable for leukocytosis and elevated troponin. Admitted as NSTEMI, last dose of Xarelto 1/12 am. Has been on IV lasix with good effect; but still with rales throughout.    Patient seen and examined in cath lab holding. VSS, not able to lay flat for planned procedure. Discussed with Dr. Smith who at this time is recommending BIPAP for oxygenation support and possibly ability to lay flat and have left heart catheterization performed. Respiratory called and will try patient on BIPAP in holding. If patient is able to lay flat, will then go ahead with planned procedure.       General: No fatigue, no fevers/chills  Respiratory: dyspneic, rales on auscultation   CV: No chest pain, no palpitations  Abd: No nausea  Neuro: No headache, no dizziness  warfarin (Rash)      Objective:  Vital Signs Last 24 Hrs  T(C): 36.9 (13 Jan 2021 11:49), Max: 37.2 (13 Jan 2021 10:09)  T(F): 98.4 (13 Jan 2021 11:49), Max: 99 (13 Jan 2021 10:09)  HR: 92 (13 Jan 2021 13:59) (68 - 134)  BP: 116/69 (13 Jan 2021 12:56) (100/76 - 121/68)  BP(mean): 77 (13 Jan 2021 06:04) (77 - 88)  RR: 17 (13 Jan 2021 13:07) (17 - 24)  SpO2: 100% (13 Jan 2021 13:59) (97% - 100%)    CM: SR  Neuro: A&OX3, CN 2-12 intact  HEENT: NC, AT  Lungs: dyspneic, rales on auscultation; placed on BIPAP 12/5 30%    CV: S1, S2, no murmur, RRR  Abd: Soft  Right Groin: Soft, no bleeding, no hematoma; Balloon pump in place 1:1, aug 150's  Extremity: + distal pulses by doppler   EKG:   Atrial Fibrillation     < from: TTE Echo Complete w/o Contrast w/ Doppler (01.12.21 @ 10:48) >  Summary:   1. Left ventricular ejection fraction, by visual estimation, is 40 to 45%.   2. Mildly to moderately decreased global left ventricular systolic function.   3. Entire apex is abnormal as described above.   4. The mitral in-flow pattern reveals no discernable A-wave, therefore no comment on diastolic function can be made.   5. Moderately enlarged left atrium.   6. Moderately enlarged right atrium.   7. There is no evidence of pericardial effusion.   8. Mild to moderate mitral valve regurgitation.   9. Moderate tricuspid regurgitation.  10. Estimated pulmonary artery systolic pressure is 59.6 mmHg assuming a right atrial pressure of 10 mmHg, which is consistent with moderate pulmonary hypertension.  11. Endocardial visualization was enhanced with intravenous echo contrast.  12. Peak transaortic gradient equals 18.7 mmHg, mean transaortic gradient equals 9.0 mmHg, the calculated aortic valve area equals 1.34 cm² by the continuity equation consistent with moderate aortic stenosis.    MD Sarah Electronically signed on 1/12/2021 at 3:46:18 PM  *** Final ***      NISHA MAN MD; Attending Cardiologist  This document has been electronically signed. Jan 12 2021 10:48AM    < end of copied text >      PLAN:    - LHC with Dr. Smith to evaluate progression of CAD  -Xarelto last dose 1/12 am; Plavix 75 mg given prior to cath 1/13   -right thigh hematoma 2/2 trauma from home; hb 10.3 --> 8.1 s/p 1 unit PRBC hb 9.3 today   -acute CHF on IV lasix 40 mg BID; with rales on exam  -started on BIPAP 12/5 30% for respiratory support   -Plan of care discussed with patient

## 2021-01-13 NOTE — CONSULT NOTE ADULT - ASSESSMENT
1/13 patient comes in with SOB, had LHC and was found to have MVD (right ostial, left main and LAD) , left IABP (cath was via the right radial atery)

## 2021-01-13 NOTE — CONSULT NOTE ADULT - SUBJECTIVE AND OBJECTIVE BOX
Surgeon:    Consult requesting by:    HISTORY OF PRESENT ILLNESS:  75y Female    PAST MEDICAL & SURGICAL HISTORY:  PAD (peripheral artery disease)    Paroxysmal atrial fibrillation    Hypercholesterolemia    Diabetes    Hypertension    S/P peripheral artery angioplasty with stent placement    S/P femoral-popliteal bypass surgery    H/O hernia repair    H/O abdominal hysterectomy        MEDICATIONS  (STANDING):  ascorbic acid 500 milliGRAM(s) Oral daily  atorvastatin 20 milliGRAM(s) Oral at bedtime  cholecalciferol 5000 Unit(s) Oral daily  cyanocobalamin 5000 MICROGram(s) Oral daily  dextrose 40% Gel 15 Gram(s) Oral once  dextrose 5%. 1000 milliLiter(s) (50 mL/Hr) IV Continuous <Continuous>  dextrose 5%. 1000 milliLiter(s) (100 mL/Hr) IV Continuous <Continuous>  dextrose 50% Injectable 25 Gram(s) IV Push once  dextrose 50% Injectable 12.5 Gram(s) IV Push once  dextrose 50% Injectable 25 Gram(s) IV Push once  furosemide   Injectable 40 milliGRAM(s) IV Push every 12 hours  glucagon  Injectable 1 milliGRAM(s) IntraMuscular once  heparin  Infusion. 1400 Unit(s)/Hr (14 mL/Hr) IV Continuous <Continuous>  insulin lispro (ADMELOG) corrective regimen sliding scale   SubCutaneous three times a day before meals  insulin lispro (ADMELOG) corrective regimen sliding scale   SubCutaneous at bedtime  vitamin E 400 International Unit(s) Oral daily    MEDICATIONS  (PRN):  acetaminophen   Tablet .. 650 milliGRAM(s) Oral every 6 hours PRN Temp greater or equal to 38C (100.4F), Mild Pain (1 - 3)  heparin   Injectable 7000 Unit(s) IV Push every 6 hours PRN For aPTT less than 40  heparin   Injectable 3500 Unit(s) IV Push every 6 hours PRN For aPTT between 40 - 57  ondansetron Injectable 4 milliGRAM(s) IV Push every 6 hours PRN Nausea and/or Vomiting    Antiplatelet therapy:                           Last dose/amt:    Allergies    warfarin (Rash)    Intolerances        SOCIAL HISTORY:  Smoker: [ ] Yes  [ ] No        PACK YEARS:                         WHEN QUIT?  ETOH use: [ ] Yes  [ ] No              FREQUENCY / QUANTITY:  Ilicit Drug use:  [ ] Yes  [ ] No  Occupation:  Live with:  Assisted device use:    FAMILY HISTORY:  Family history of thoracic aortic aneurysm (Father)    Family history of abdominal aortic aneurysm (Father)        Review of Systems  CONSTITUTIONAL:  Fevers[ ] chills[ ] sweats[ ] fatigue[ ] weight loss[ ] weight gain [ ]                                     NEGATIVE [ ]   NEURO:  parathesias[ ] seizures [ ]  syncope [ ]  confusion [ ]                                                                                NEGATIVE[ ]   EYES: glasses[ ]  blurry vision[ ]  discharge[ ] pain[ ] glaucoma [ ]                                                                          NEGATIVE[ ]   ENMT:  difficulty hearing [ ]  vertigo[ ]  dysphagia[ ] epistaxis[ ] recent dental work [ ]                                    NEGATIVE[ ]   CV:  chest pain[ ] palpitations[ ] RENTERIA [ ] diaphoresis [ ]                                                                                           NEGATIVE[ ]   RESPIRATORY:  wheezing[ ] SOB[ ] cough [ ] sputum[ ] hemoptysis[ ]                                                                  NEGATIVE[ ]   GI:  nausea[ ]  vommiting [ ]  diarrhea[ ] constipation [ ] melena [ ]                                                                      NEGATIVE[ ]   : hematuria[ ]  dysuria[ ] urgency[ ] incontinence[ ]                                                                                            NEGATIVE[ ]   MUSKULOSKELETAL:  arthritis[ ]  joint swelling [ ] muscle weakness [ ]                                                                NEGATIVE[ ]   SKIN/BREAST:  rash[ ] itching [ ]  hair loss[ ] masses[ ]                                                                                              NEGATIVE[ ]   PSYCH:  dementia [ ] depresion [ ] anxiety[ ]                                                                                                               NEGATIVE[ ]   HEME/LYMPH:  bruises easily[ ] enlarged lymph nodes[ ] tender lymph nodes[ ]                                               NEGATIVE[ ]   ENDOCRINE:  cold intolerance[ ] heat intolerance[ ] polydipsia[ ]                                                                          NEGATIVE[ ]     PHYSICAL EXAM  Vital Signs Last 24 Hrs  T(C): 36.9 (2021 11:49), Max: 37.2 (2021 10:09)  T(F): 98.4 (2021 11:49), Max: 99 (2021 10:09)  HR: 92 (2021 13:59) (68 - 134)  BP: 116/69 (2021 12:56) (100/76 - 121/68)  BP(mean): 77 (2021 06:04) (77 - 88)  RR: 17 (2021 13:07) (17 - 24)  SpO2: 100% (2021 13:59) (97% - 100%)    CONSTITUTIONAL:                                                                          WNL[ ]   Neuro: WNL[ ] Normal exam oriented to person/place & time with no focal motor or sensory  deficits. Other                     Eyes: WNL[ ]   Normal exam of conjunctiva & lids, pupils equally reactive. Other     ENT: WNL[ ]    Normal exam of nasal/oral mucosa with absence of cyanosis. Other  Neck: WNL[ ]  Normal exam of jugular veins, trachea & thyroid. Other  Chest: WNL[ ] Normal lung exam with good air movement absence of wheezes, rales, or rhonchi: Other                                                                                CV:  Auscultation: normal [ ] S3[ ] S4[ ] Irregular [ ] Rub[ ] Clicks[ ]    Murmurs none:[ ]systolic [ ]  diastolic [ ] holosystolic [ ]  Carotids: No Bruits[ ] Other                 Abdominal Aorta: normal [ ] nonpalpable[ ]Other                                                                                      GI:           WNL[ ] Normal exam of abdomen, liver & spleen with no noted masses or tenderness. Other                                                                                                        Extremities: WNL[ ] Normal no evidence of cyanosis or deformity Edema: none[ ]trace[ ]1+[ ]2+[ ]3+[ ]4+[ ]  Lower Extremity Pulses: Right[ ] Left[ ]Varicosities[ ]  SKIN :WNL[ ] Normal exam to inspection & palation. Other:                                                          LABS:                        9.3    15.29 )-----------( 314      ( 2021 07:21 )             29.3     01-    137  |  101  |  23.0<H>  ----------------------------<  88  4.5   |  22.0  |  0.75    Ca    8.7      2021 07:21  Phos  4.2     01-13  Mg     2.2     -    TPro  7.0  /  Alb  4.1  /  TBili  0.9  /  DBili  x   /  AST  151<H>  /  ALT  27  /  AlkPhos  65  01-11    PT/INR - ( 2021 23:15 )   PT: 18.9 sec;   INR: 1.67 ratio         PTT - ( 2021 12:32 )  PTT:28.9 sec  Urinalysis Basic - ( 2021 06:04 )    Color: Yellow / Appearance: Clear / S.025 / pH: x  Gluc: x / Ketone: Trace  / Bili: Negative / Urobili: Negative mg/dL   Blood: x / Protein: 30 mg/dL / Nitrite: Negative   Leuk Esterase: Small / RBC: 3-5 /HPF / WBC 6-10   Sq Epi: x / Non Sq Epi: Many / Bacteria: Few      CARDIAC MARKERS ( 2021 07:21 )  x     / 2.85 ng/mL / 2305 U/L / x     / 188.6 ng/mL  CARDIAC MARKERS ( 2021 08:22 )  x     / 1.69 ng/mL / 3181 U/L / x     / 435.7 ng/mL  CARDIAC MARKERS ( 2021 02:25 )  x     / 0.90 ng/mL / 1801 U/L / x     / 275.7 ng/mL  CARDIAC MARKERS ( 2021 23:15 )  x     / 0.61 ng/mL / x     / x     / x              Cardiac Cath:    TTE / GERALDINE:                               Surgeon: Dr. Orellana     Consult requesting by: Dr. Smith     HISTORY OF PRESENT ILLNESS:   75yoF hx Afib on Xarelto, PAD s/p F-pvpwnmr-lnhhbnzrk bypass, R-femoral angioplasty with stenting, on Plavix, HTN, HLD, DM presenting with acute dyspnea and palpitations yesterday.  Pt was in her normal state of health, then developed acute onset of exertional dyspnea associated with palpitations that progressed to dyspnea at rest which prompted her to come to the hospital.  She also reports some transient vomiting yesterday that is now improving.  In ED, pt was in Afib with RVR associated with soft SBP in 90s.  She was given IV Cardizem and PO metoprolol without improvement and ED staff spoke with [place] cardiology who recommended digoxin.   Labs notable for leukocytosis and elevated troponin. Admitted as NSTEMI, last dose of Xarelto 1/12 am. Has been on IV lasix with good effect; but still with rales throughout. Patient was having difficulty breathing with lying flat and was placed on bipap.  Patient has a Left Heart Catheterization with Dr. Smith which showed severe diffuse disease: Ostial, LM, LAD. LFA Balloon pump was placed: 1:1, aug 150's, heparin 1400 units/hr   RRA band in place     PAST MEDICAL & SURGICAL HISTORY:  PAD (peripheral artery disease)  Paroxysmal atrial fibrillation  Hypercholesterolemia  Diabetes  Hypertension  S/P peripheral artery angioplasty with stent placement  S/P femoral-popliteal bypass surgery  H/O hernia repair  H/O abdominal hysterectomy        MEDICATIONS  (STANDING):  ascorbic acid 500 milliGRAM(s) Oral daily  atorvastatin 20 milliGRAM(s) Oral at bedtime  cholecalciferol 5000 Unit(s) Oral daily  cyanocobalamin 5000 MICROGram(s) Oral daily  dextrose 40% Gel 15 Gram(s) Oral once  dextrose 5%. 1000 milliLiter(s) (50 mL/Hr) IV Continuous <Continuous>  dextrose 5%. 1000 milliLiter(s) (100 mL/Hr) IV Continuous <Continuous>  dextrose 50% Injectable 25 Gram(s) IV Push once  dextrose 50% Injectable 12.5 Gram(s) IV Push once  dextrose 50% Injectable 25 Gram(s) IV Push once  furosemide   Injectable 40 milliGRAM(s) IV Push every 12 hours  glucagon  Injectable 1 milliGRAM(s) IntraMuscular once  heparin  Infusion. 1400 Unit(s)/Hr (14 mL/Hr) IV Continuous <Continuous>  insulin lispro (ADMELOG) corrective regimen sliding scale   SubCutaneous three times a day before meals  insulin lispro (ADMELOG) corrective regimen sliding scale   SubCutaneous at bedtime  vitamin E 400 International Unit(s) Oral daily    MEDICATIONS  (PRN):  acetaminophen   Tablet .. 650 milliGRAM(s) Oral every 6 hours PRN Temp greater or equal to 38C (100.4F), Mild Pain (1 - 3)  heparin   Injectable 7000 Unit(s) IV Push every 6 hours PRN For aPTT less than 40  heparin   Injectable 3500 Unit(s) IV Push every 6 hours PRN For aPTT between 40 - 57  ondansetron Injectable 4 milliGRAM(s) IV Push every 6 hours PRN Nausea and/or Vomiting    Antiplatelet therapy: xarelto and plavix     Allergies:  warfarin (Rash)        SOCIAL HISTORY:  Smoker: [ ] Yes  [x ] No        PACK YEARS: 20 years, 1/2 pac a day                        WHEN QUIT 4 years ago   ETOH use: [ ] Yes  [x ] No              FREQUENCY / QUANTITY:  Ilicit Drug use:  [ ] Yes  [x ] No  Occupation:  retired, from [employer]   Live with: home with  and grandson   Assisted device use: none     FAMILY HISTORY:  Family history of thoracic aortic aneurysm (Father)  Family history of abdominal aortic aneurysm (Father)      Review of Systems  ]   NEURO:  parathesias[ ] seizures [ ]  syncope [ ]  confusion [ ]                                                                                                                                                        CV:  chest pain[ ] palpitations[ ] RENTERIA [ ] diaphoresis [ ]                                                                                           NEGATIVE[ ]   RESPIRATORY:  wheezing[ ] SOB[x ] cough [ ] sputum[ ] hemoptysis[ ]                                                                  NEGATIVE[ ]   GI:  nausea[ ]  vommiting [ ]  diarrhea[ ] constipation [ ] melena [ ]                                                                      NEGATIVE[ ]   : hematuria[ ]  dysuria[x ] urgency[ ] incontinence[ ]                                                                                                                                                                               NEGATIVE[ ]   PSYCH:  dementia [ ] depresion [ ] anxiety[ ]                                                                                                               NEGATIVE[ ]       PHYSICAL EXAM  Vital Signs Last 24 Hrs  T(C): 36.9 (2021 11:49), Max: 37.2 (2021 10:09)  T(F): 98.4 (2021 11:49), Max: 99 (2021 10:09)  HR: 92 (2021 13:59) (68 - 134)  BP: 116/69 (2021 12:56) (100/76 - 121/68)  BP(mean): 77 (2021 06:04) (77 - 88)  RR: 17 (2021 13:07) (17 - 24)  SpO2: 100% (2021 13:59) (97% - 100%)    CONSTITUTIONAL:                                                                        Constitutional: NAD, well developed, well nourished  Neuro: A+O x 3, non-focal, speech clear and intact  HEENT: NC/AT, PERRL, EOMI, anicteric sclerae, oral mucosa pink and moist  Neck: supple, no JVD  CV: regular rate, regular rhythm, +S1S2, no murmurs or rub  Pulm/chest: lung sounds CTA and equal bilaterally, no accessory muscle use noted  Abd: soft, NT, ND, +BS  Ext: [place] x 4, no C/C/E, left IABP, distal pulses intact   Skin: warm, well perfused  Psych: calm, appropriate affect                                                           LABS:                        9.3    15.29 )-----------( 314      ( 2021 07:21 )             29.3     01-13    137  |  101  |  23.0<H>  ----------------------------<  88  4.5   |  22.0  |  0.75    Ca    8.7      2021 07:21  Phos  4.2     01-13  Mg     2.2     01-13    TPro  7.0  /  Alb  4.1  /  TBili  0.9  /  DBili  x   /  AST  151<H>  /  ALT  27  /  AlkPhos  65  01-11    PT/INR - ( 2021 23:15 )   PT: 18.9 sec;   INR: 1.67 ratio         PTT - ( 2021 12:32 )  PTT:28.9 sec  Urinalysis Basic - ( 2021 06:04 )    Color: Yellow / Appearance: Clear / S.025 / pH: x  Gluc: x / Ketone: Trace  / Bili: Negative / Urobili: Negative mg/dL   Blood: x / Protein: 30 mg/dL / Nitrite: Negative   Leuk Esterase: Small / RBC: 3-5 /HPF / WBC 6-10   Sq Epi: x / Non Sq Epi: Many / Bacteria: Few      CARDIAC MARKERS ( 2021 07:21 )  x     / 2.85 ng/mL / 2305 U/L / x     / 188.6 ng/mL  CARDIAC MARKERS ( 2021 08:22 )  x     / 1.69 ng/mL / 3181 U/L / x     / 435.7 ng/mL  CARDIAC MARKERS ( 2021 02:25 )  x     / 0.90 ng/mL / 1801 U/L / x     / 275.7 ng/mL  CARDIAC MARKERS ( 2021 23:15 )  x     / 0.61 ng/mL / x     / x     / x          Cardiac Cath:  < from: Cardiac Cath Lab - Adult (21 @ 13:45) >  CORONARY VESSELS: The coronary circulation is left dominant.  LM:   --  Proximal left main: There was a 90 % stenosis.  --  Distal left main: There was a 90 % stenosis.  LAD:   --  Proximal LAD: There was a 70 % stenosis.  --  Mid LAD: There was a 70 % stenosis.  --  Distal LAD: Angiography showed minor luminal irregularities with no  flow limiting lesions.  CX:   --  Proximal circumflex: Normal.  --  Mid circumflex: Normal.  --  Distal circumflex: Normal.  --  OM1: There was a 20 % stenosis.  --  L AV groove: Angiography showed minor luminal irregularities with no  flow limiting lesions.  --  LPDA: Angiography showed minor luminal irregularities with no flow  limiting lesions.  RCA:   --  Proximal RCA: There was a 70 % stenosis.  COMPLICATIONS: There were no complications. No complications occurred  during the cath lab visit.    < end of copied text >      TTE / GERALDINE: pending       Labs pending

## 2021-01-13 NOTE — CHART NOTE - NSCHARTNOTEFT_GEN_A_CORE
pt. received from Cath lab on bipap12/5/30% machine placed on standby as per provider 2ln/c in use 02 sat 99%

## 2021-01-13 NOTE — PROGRESS NOTE ADULT - ASSESSMENT
Assessment  Acute systolic HF improved  s/p NSTEMI EF 40-45% apical MI  PAF with MVR  thigh hematoma with blood loss  mild to mod AS  COPD  PVD s/p LE PCI in past      Rec  cont lasix IV  increase lopressor for rate control  cardiac cath today with Dr Smith   AC on hold until hematoma resolves  monitor DP pulses

## 2021-01-13 NOTE — PROGRESS NOTE ADULT - SUBJECTIVE AND OBJECTIVE BOX
Post Procedure Cardiac Cath NP Note       Narrative:    75yoF hx Afib on Xarelto, PAD s/p Y-skihxdh-xxgznrrnz bypass, R-femoral angioplasty with stenting, on Plavix, HTN, HLD, DM presenting with acute dyspnea and palpitations yesterday.  Pt was in her normal state of health, then developed acute onset of exertional dyspnea associated with palpitations that progressed to dyspnea at rest which prompted her to come to the hospital.  She also reports some transient vomiting yesterday that is now improving.  In ED, pt was in Afib with RVR associated with soft SBP in 90s.  She was given IV Cardizem and PO metoprolol without improvement and ED staff spoke with [place] cardiology who recommended digoxin.   Labs notable for leukocytosis and elevated troponin. Admitted as NSTEMI, last dose of Xarelto 1/12 am. Has been on IV lasix with good effect; but still with rales throughout.    Patient seen and examined in cath lab holding. VSS, not able to lay flat for planned procedure. Discussed with Dr. Smith who at this time is recommending BIPAP for oxygenation support and possibly ability to lay flat and have left heart catheterization performed. Respiratory called and will try patient on BIPAP in holding. If patient is able to lay flat, will then go ahead with planned procedure.       Left Heart Catheterization with Dr. Smith which showed severe diffuse disease: Ostial, LM, RCA   Balloon pump placement   CTS evaluation - PA called  report given   Patient remains on BIPAP       PAST MEDICAL & SURGICAL HISTORY:  PAD (peripheral artery disease)  Paroxysmal atrial fibrillation  Hypercholesterolemia  Diabetes  Hypertension  S/P peripheral artery angioplasty with stent placement  S/P femoral-popliteal bypass surgery  H/O hernia repair  H/O abdominal hysterectomy      FAMILY HISTORY:  Family history of thoracic aortic aneurysm (Father)  Family history of abdominal aortic aneurysm (Father)      Home Medications:  Co Q-10: 200 milligram(s) orally once a day (12 Jan 2021 06:51)  dilTIAZem 180 mg/24 hours oral capsule, extended release: 1 cap(s) orally once a day (12 Jan 2021 06:51)  enalapril 5 mg oral tablet: 1 tab(s) orally once a day (12 Jan 2021 06:51)  magnesium oxide 200 mg oral tablet: 6 tab(s) orally once a day (12 Jan 2021 06:51)  metFORMIN 500 mg oral tablet: 2 tab(s) orally 2 times a day (12 Jan 2021 06:51)  pravastatin 80 mg oral tablet: 1 tab(s) orally once a day (at bedtime) (12 Jan 2021 06:51)  Toprol- mg oral tablet, extended release: 1 tab(s) orally 2 times a day (12 Jan 2021 06:51)  Tresiba 100 units/mL subcutaneous solution: twice daily, 25U in AM and 30U in PM (12 Jan 2021 06:51)  vitamin A 10,000 intl units oral capsule: orally once a day (12 Jan 2021 06:51)  Vitamin B12 1000 mcg oral tablet: 5 tab(s) orally once a day (12 Jan 2021 06:51)  Vitamin C 1000 mg oral tablet: 1 tab(s) orally once a day (12 Jan 2021 06:51)  Vitamin D3 5000 intl units oral tablet: 1 tab(s) orally once a day (12 Jan 2021 06:51)  vitamin E 400 intl units oral capsule: 1 cap(s) orally once a day (12 Jan 2021 06:51)  Zinc 50 mg Pink oral capsule: 1 cap(s) orally once a day (12 Jan 2021 06:51)                   9.3    15.29 )-----------( 314      ( 13 Jan 2021 07:21 )             29.3     01-13    137  |  101  |  23.0<H>  ----------------------------<  88  4.5   |  22.0  |  0.75    Ca    8.7      13 Jan 2021 07:21  Phos  4.2     01-13  Mg     2.2     01-13    TPro  7.0  /  Alb  4.1  /  TBili  0.9  /  DBili  x   /  AST  151<H>  /  ALT  27  /  AlkPhos  65  01-11  PT/INR - ( 11 Jan 2021 23:15 )   PT: 18.9 sec;   INR: 1.67 ratio    PTT - ( 12 Jan 2021 12:32 )  PTT:28.9 sec    Patient is a 75y old  Female who presents with a chief complaint of Afib with RVR with hypotension, elevated troponin (13 Jan 2021 12:43)    HEALTH ISSUES - PROBLEM Dx:    HPI:  75yoF hx Afib on Xarelto, PAD s/p Q-movkskq-nrrcduehy bypass, R-femoral angioplasty with stenting, on Plavix, HTN, HLD, DM presenting with acute dyspnea and palpitations yesterday.  Pt was in her normal state of health, then developed acute onset of exertional dyspnea associated with palpitations that progressed to dyspnea at rest which prompted her to come to the hospital.  She also reports some transient vomiting yesterday that is now improving.  In ED, pt was in Afib with RVR associated with soft SBP in 90s.  She was given IV Cardizem and PO metoprolol without improvement and ED staff spoke with [place] cardiology who recommended digoxin.   Labs notable for leukocytosis and elevated troponin.  Pt denies any chest pain, lightheadedness, syncope, cough, abdominal pain, diarrhea, or urinary symptoms.     Of note, pt came to [facility] ED on 1/9 for R-thigh pain after walking into a cabinet at home. She underwent a CT angio A/P that showed R-thigh hematoma w/out extravasation.  Her leg was wrapped and she was d/c’ed from ED with instruction to continue wtith blood thinners and outpatient follow up. She denies any worsening R-thigh or leg pain. (12 Jan 2021 03:52)      General: No fatigue, no fevers/chills  Respiratory: No dyspnea, no cough, no wheeze  CV: No chest pain, no palpitations  Abd: No nausea  Neuro: No headache, no dizziness  warfarin (Rash)      Objective:  Vital Signs Last 24 Hrs  T(C): 36.9 (13 Jan 2021 11:49), Max: 37.2 (13 Jan 2021 10:09)  T(F): 98.4 (13 Jan 2021 11:49), Max: 99 (13 Jan 2021 10:09)  HR: 92 (13 Jan 2021 13:59) (68 - 134)  BP: 116/69 (13 Jan 2021 12:56) (100/76 - 121/68)  BP(mean): 77 (13 Jan 2021 06:04) (77 - 88)  RR: 17 (13 Jan 2021 13:07) (17 - 24)  SpO2: 100% (13 Jan 2021 13:59) (97% - 100%)    CM: SR  Neuro: A&OX3, CN 2-12 intact  HEENT: NC, AT  Lungs: CTA B/L  CV: S1, S2, no murmur, RRR  Abd: Soft  Right Groin: Soft, no bleeding, no hematoma  Extremity: + distal pulses  EKG:   Atrial Fibrillation     DIAGNOSTICS:     75yoF hx Afib on Xarelto, PAD s/p M-jlnqmae-qteigyucc bypass, R-femoral angioplasty with stenting, on Plavix, HTN, HLD, DM presenting with acute dyspnea and palpitations yesterday.  Pt was in her normal state of health, then developed acute onset of exertional dyspnea associated with palpitations that progressed to dyspnea at rest which prompted her to come to the hospital. she is now s/p LHC which showed severe diffuse disease requiring balloon pump assist and CTS evaluation.      ASSESSMENT/PLAN:    Coronary artery disease  -bilateral groin precautions/ protocol  -strict bed rest; balloon pump in place  -hx of PAF Xarelto currently on hold; consider starting heparin gtt   -CTS evaluation pending, will need CABG this admit  -Plan of care discussed with patient  -labs, EKG ordered                                                     Post Procedure Cardiac Cath NP Note       Narrative:    75yoF hx Afib on Xarelto, PAD s/p D-slugsmu-ydooqswqw bypass, R-femoral angioplasty with stenting, on Plavix, HTN, HLD, DM presenting with acute dyspnea and palpitations yesterday.  Pt was in her normal state of health, then developed acute onset of exertional dyspnea associated with palpitations that progressed to dyspnea at rest which prompted her to come to the hospital.  She also reports some transient vomiting yesterday that is now improving.  In ED, pt was in Afib with RVR associated with soft SBP in 90s.  She was given IV Cardizem and PO metoprolol without improvement and ED staff spoke with [place] cardiology who recommended digoxin.   Labs notable for leukocytosis and elevated troponin. Admitted as NSTEMI, last dose of Xarelto 1/12 am. Has been on IV lasix with good effect; but still with rales throughout.    Patient seen and examined in cath lab holding. VSS, not able to lay flat for planned procedure. Discussed with Dr. Smith who at this time is recommending BIPAP for oxygenation support and possibly ability to lay flat and have left heart catheterization performed. Respiratory called and will try patient on BIPAP in holding. If patient is able to lay flat, will then go ahead with planned procedure.       Left Heart Catheterization with Dr. Smith which showed severe diffuse disease: Ostial, LM, RCA   Balloon pump placement   CTS evaluation - PA called  report given   Patient remains on BIPAP       PAST MEDICAL & SURGICAL HISTORY:  PAD (peripheral artery disease)  Paroxysmal atrial fibrillation  Hypercholesterolemia  Diabetes  Hypertension  S/P peripheral artery angioplasty with stent placement  S/P femoral-popliteal bypass surgery  H/O hernia repair  H/O abdominal hysterectomy      FAMILY HISTORY:  Family history of thoracic aortic aneurysm (Father)  Family history of abdominal aortic aneurysm (Father)      Home Medications:  Co Q-10: 200 milligram(s) orally once a day (12 Jan 2021 06:51)  dilTIAZem 180 mg/24 hours oral capsule, extended release: 1 cap(s) orally once a day (12 Jan 2021 06:51)  enalapril 5 mg oral tablet: 1 tab(s) orally once a day (12 Jan 2021 06:51)  magnesium oxide 200 mg oral tablet: 6 tab(s) orally once a day (12 Jan 2021 06:51)  metFORMIN 500 mg oral tablet: 2 tab(s) orally 2 times a day (12 Jan 2021 06:51)  pravastatin 80 mg oral tablet: 1 tab(s) orally once a day (at bedtime) (12 Jan 2021 06:51)  Toprol- mg oral tablet, extended release: 1 tab(s) orally 2 times a day (12 Jan 2021 06:51)  Tresiba 100 units/mL subcutaneous solution: twice daily, 25U in AM and 30U in PM (12 Jan 2021 06:51)  vitamin A 10,000 intl units oral capsule: orally once a day (12 Jan 2021 06:51)  Vitamin B12 1000 mcg oral tablet: 5 tab(s) orally once a day (12 Jan 2021 06:51)  Vitamin C 1000 mg oral tablet: 1 tab(s) orally once a day (12 Jan 2021 06:51)  Vitamin D3 5000 intl units oral tablet: 1 tab(s) orally once a day (12 Jan 2021 06:51)  vitamin E 400 intl units oral capsule: 1 cap(s) orally once a day (12 Jan 2021 06:51)  Zinc 50 mg Pink oral capsule: 1 cap(s) orally once a day (12 Jan 2021 06:51)                   9.3    15.29 )-----------( 314      ( 13 Jan 2021 07:21 )             29.3     01-13    137  |  101  |  23.0<H>  ----------------------------<  88  4.5   |  22.0  |  0.75    Ca    8.7      13 Jan 2021 07:21  Phos  4.2     01-13  Mg     2.2     01-13    TPro  7.0  /  Alb  4.1  /  TBili  0.9  /  DBili  x   /  AST  151<H>  /  ALT  27  /  AlkPhos  65  01-11  PT/INR - ( 11 Jan 2021 23:15 )   PT: 18.9 sec;   INR: 1.67 ratio    PTT - ( 12 Jan 2021 12:32 )  PTT:28.9 sec    Patient is a 75y old  Female who presents with a chief complaint of Afib with RVR with hypotension, elevated troponin (13 Jan 2021 12:43)    HEALTH ISSUES - PROBLEM Dx:    HPI:  75yoF hx Afib on Xarelto, PAD s/p I-bfkgyrc-gvoembwmf bypass, R-femoral angioplasty with stenting, on Plavix, HTN, HLD, DM presenting with acute dyspnea and palpitations yesterday.  Pt was in her normal state of health, then developed acute onset of exertional dyspnea associated with palpitations that progressed to dyspnea at rest which prompted her to come to the hospital.  She also reports some transient vomiting yesterday that is now improving.  In ED, pt was in Afib with RVR associated with soft SBP in 90s.  She was given IV Cardizem and PO metoprolol without improvement and ED staff spoke with [place] cardiology who recommended digoxin.   Labs notable for leukocytosis and elevated troponin.  Pt denies any chest pain, lightheadedness, syncope, cough, abdominal pain, diarrhea, or urinary symptoms.     Of note, pt came to [facility] ED on 1/9 for R-thigh pain after walking into a cabinet at home. She underwent a CT angio A/P that showed R-thigh hematoma w/out extravasation.  Her leg was wrapped and she was d/c’ed from ED with instruction to continue wtith blood thinners and outpatient follow up. She denies any worsening R-thigh or leg pain. (12 Jan 2021 03:52)      General: No fatigue, no fevers/chills  Respiratory: No dyspnea, no cough, no wheeze  CV: No chest pain, no palpitations  Abd: No nausea  Neuro: No headache, no dizziness  warfarin (Rash)      Objective:  Vital Signs Last 24 Hrs  T(C): 36.9 (13 Jan 2021 11:49), Max: 37.2 (13 Jan 2021 10:09)  T(F): 98.4 (13 Jan 2021 11:49), Max: 99 (13 Jan 2021 10:09)  HR: 92 (13 Jan 2021 13:59) (68 - 134)  BP: 116/69 (13 Jan 2021 12:56) (100/76 - 121/68)  BP(mean): 77 (13 Jan 2021 06:04) (77 - 88)  RR: 17 (13 Jan 2021 13:07) (17 - 24)  SpO2: 100% (13 Jan 2021 13:59) (97% - 100%)    CM: SR  Neuro: A&OX3, CN 2-12 intact  HEENT: NC, AT  Lungs: CTA B/L  CV: S1, S2, no murmur, RRR  Abd: Soft  Right Groin: Soft, no bleeding, no hematoma  Extremity: + distal pulses  EKG:   Atrial Fibrillation     DIAGNOSTICS:     75yoF hx Afib on Xarelto, PAD s/p H-ijtrjhj-xhpfdehxd bypass, R-femoral angioplasty with stenting, on Plavix, HTN, HLD, DM presenting with acute dyspnea and palpitations yesterday.  Pt was in her normal state of health, then developed acute onset of exertional dyspnea associated with palpitations that progressed to dyspnea at rest which prompted her to come to the hospital. she is now s/p LHC which showed severe diffuse disease requiring balloon pump assist and CTS evaluation.      ASSESSMENT/PLAN:    Coronary artery disease  -bilateral groin precautions/ protocol  -strict bed rest; balloon pump in place  -hx of PAF Xarelto currently on hold; consider starting heparin gtt if acceptable with CTS while awaiting OR   -xarelto last dose 1/12 am; Plavix 75 mg given prior to cath 1/13   -CTS evaluation pending, will need CABG this admit  -Plan of care discussed with patient  -labs, EKG ordered                                                           Post Procedure Cardiac Cath NP Note       Narrative:    75yoF hx Afib on Xarelto, PAD s/p E-yiylzeq-yqnrlndtd bypass, R-femoral angioplasty with stenting, on Plavix, HTN, HLD, DM presenting with acute dyspnea and palpitations yesterday.  Pt was in her normal state of health, then developed acute onset of exertional dyspnea associated with palpitations that progressed to dyspnea at rest which prompted her to come to the hospital.  She also reports some transient vomiting yesterday that is now improving.  In ED, pt was in Afib with RVR associated with soft SBP in 90s.  She was given IV Cardizem and PO metoprolol without improvement and ED staff spoke with [facility] cardiology who recommended digoxin.   Labs notable for leukocytosis and elevated troponin. Admitted as NSTEMI, last dose of Xarelto 1/12 am. Has been on IV lasix with good effect; but still with rales throughout.    Patient seen and examined in cath lab holding. VSS, not able to lay flat for planned procedure. Discussed with Dr. Smith who at this time is recommending BIPAP for oxygenation support and possibly ability to lay flat and have left heart catheterization performed. Respiratory called and will try patient on BIPAP in holding. If patient is able to lay flat, will then go ahead with planned procedure.       Left Heart Catheterization with Dr. Smith which showed severe diffuse disease: Ostial, LM, RCA   Balloon pump placement   CTS evaluation - PA called  report given   Patient remains on BIPAP       PAST MEDICAL & SURGICAL HISTORY:  PAD (peripheral artery disease)  Paroxysmal atrial fibrillation  Hypercholesterolemia  Diabetes  Hypertension  S/P peripheral artery angioplasty with stent placement  S/P femoral-popliteal bypass surgery  H/O hernia repair  H/O abdominal hysterectomy      FAMILY HISTORY:  Family history of thoracic aortic aneurysm (Father)  Family history of abdominal aortic aneurysm (Father)      Home Medications:  Co Q-10: 200 milligram(s) orally once a day (12 Jan 2021 06:51)  dilTIAZem 180 mg/24 hours oral capsule, extended release: 1 cap(s) orally once a day (12 Jan 2021 06:51)  enalapril 5 mg oral tablet: 1 tab(s) orally once a day (12 Jan 2021 06:51)  magnesium oxide 200 mg oral tablet: 6 tab(s) orally once a day (12 Jan 2021 06:51)  metFORMIN 500 mg oral tablet: 2 tab(s) orally 2 times a day (12 Jan 2021 06:51)  pravastatin 80 mg oral tablet: 1 tab(s) orally once a day (at bedtime) (12 Jan 2021 06:51)  Toprol- mg oral tablet, extended release: 1 tab(s) orally 2 times a day (12 Jan 2021 06:51)  Tresiba 100 units/mL subcutaneous solution: twice daily, 25U in AM and 30U in PM (12 Jan 2021 06:51)  vitamin A 10,000 intl units oral capsule: orally once a day (12 Jan 2021 06:51)  Vitamin B12 1000 mcg oral tablet: 5 tab(s) orally once a day (12 Jan 2021 06:51)  Vitamin C 1000 mg oral tablet: 1 tab(s) orally once a day (12 Jan 2021 06:51)  Vitamin D3 5000 intl units oral tablet: 1 tab(s) orally once a day (12 Jan 2021 06:51)  vitamin E 400 intl units oral capsule: 1 cap(s) orally once a day (12 Jan 2021 06:51)  Zinc 50 mg Pink oral capsule: 1 cap(s) orally once a day (12 Jan 2021 06:51)                   9.3    15.29 )-----------( 314      ( 13 Jan 2021 07:21 )             29.3     01-13    137  |  101  |  23.0<H>  ----------------------------<  88  4.5   |  22.0  |  0.75    Ca    8.7      13 Jan 2021 07:21  Phos  4.2     01-13  Mg     2.2     01-13    TPro  7.0  /  Alb  4.1  /  TBili  0.9  /  DBili  x   /  AST  151<H>  /  ALT  27  /  AlkPhos  65  01-11  PT/INR - ( 11 Jan 2021 23:15 )   PT: 18.9 sec;   INR: 1.67 ratio    PTT - ( 12 Jan 2021 12:32 )  PTT:28.9 sec    Patient is a 75y old  Female who presents with a chief complaint of Afib with RVR with hypotension, elevated troponin (13 Jan 2021 12:43)    HEALTH ISSUES - PROBLEM Dx:    HPI:  75yoF hx Afib on Xarelto, PAD s/p O-lbwupqm-pbnaftodo bypass, R-femoral angioplasty with stenting, on Plavix, HTN, HLD, DM presenting with acute dyspnea and palpitations yesterday.  Pt was in her normal state of health, then developed acute onset of exertional dyspnea associated with palpitations that progressed to dyspnea at rest which prompted her to come to the hospital.  She also reports some transient vomiting yesterday that is now improving.  In ED, pt was in Afib with RVR associated with soft SBP in 90s.  She was given IV Cardizem and PO metoprolol without improvement and ED staff spoke with [facility] cardiology who recommended digoxin.   Labs notable for leukocytosis and elevated troponin.  Pt denies any chest pain, lightheadedness, syncope, cough, abdominal pain, diarrhea, or urinary symptoms.     Of note, pt came to [facility] ED on 1/9 for R-thigh pain after walking into a cabinet at home. She underwent a CT angio A/P that showed R-thigh hematoma w/out extravasation.  Her leg was wrapped and she was d/c’ed from ED with instruction to continue wtith blood thinners and outpatient follow up. She denies any worsening R-thigh or leg pain. (12 Jan 2021 03:52)      General: No fatigue, no fevers/chills  Respiratory: No dyspnea, no cough, no wheeze  CV: No chest pain, no palpitations  Abd: No nausea  Neuro: No headache, no dizziness  warfarin (Rash)      Objective:  Vital Signs Last 24 Hrs  T(C): 36.9 (13 Jan 2021 11:49), Max: 37.2 (13 Jan 2021 10:09)  T(F): 98.4 (13 Jan 2021 11:49), Max: 99 (13 Jan 2021 10:09)  HR: 92 (13 Jan 2021 13:59) (68 - 134)  BP: 116/69 (13 Jan 2021 12:56) (100/76 - 121/68)  BP(mean): 77 (13 Jan 2021 06:04) (77 - 88)  RR: 17 (13 Jan 2021 13:07) (17 - 24)  SpO2: 100% (13 Jan 2021 13:59) (97% - 100%)    CM: SR  Neuro: A&OX3, CN 2-12 intact  HEENT: NC, AT  Lungs: CTA B/L  CV: S1, S2, no murmur, RRR  Abd: Soft  Right Groin: Soft, no bleeding, no hematoma  Extremity: + distal pulses  EKG:   Atrial Fibrillation     DIAGNOSTICS:     75yoF hx Afib on Xarelto, PAD s/p F-xjhutnk-vbntbrhad bypass, R-femoral angioplasty with stenting, on Plavix, HTN, HLD, DM presenting with acute dyspnea and palpitations yesterday.  Pt was in her normal state of health, then developed acute onset of exertional dyspnea associated with palpitations that progressed to dyspnea at rest which prompted her to come to the hospital. she is now s/p C which showed severe diffuse disease requiring balloon pump assist and CTS evaluation.      ASSESSMENT/PLAN:    Severe Coronary artery disease  -bilateral groin precautions/ protocol  -strict bed rest; balloon pump in place 1:1, augmenting 150's   -heparin gtt started at 1400 units/hr   -hx of PAF Xarelto currently on hold   -Xarelto last dose 1/12 am; Plavix 75 mg given prior to cath 1/13   -CTS evaluation pending, will need CABG this admit  -Plan of care discussed with patient  -labs, EKG ordered                                                           Post Procedure Cardiac Cath NP Note       Narrative:    75yoF hx Afib on Xarelto, PAD s/p F-lbvwive-mzrswttyh bypass, R-femoral angioplasty with stenting, on Plavix, HTN, HLD, DM presenting with acute dyspnea and palpitations yesterday.  Pt was in her normal state of health, then developed acute onset of exertional dyspnea associated with palpitations that progressed to dyspnea at rest which prompted her to come to the hospital.  She also reports some transient vomiting yesterday that is now improving.  In ED, pt was in Afib with RVR associated with soft SBP in 90s.  She was given IV Cardizem and PO metoprolol without improvement and ED staff spoke with [place] cardiology who recommended digoxin.   Labs notable for leukocytosis and elevated troponin. Admitted as NSTEMI, last dose of Xarelto 1/12 am. Has been on IV lasix with good effect; but still with rales throughout.    Patient seen and examined in cath lab holding. VSS, not able to lay flat for planned procedure. Discussed with Dr. Smith who at this time is recommending BIPAP for oxygenation support and possibly ability to lay flat and have left heart catheterization performed. Respiratory called and will try patient on BIPAP in holding. If patient is able to lay flat, will then go ahead with planned procedure.       Left Heart Catheterization with Dr. Smith which showed severe diffuse disease: Ostial, LM, RCA   Balloon pump placement   CTS evaluation - PA called  report given   Patient remains on BIPAP       PAST MEDICAL & SURGICAL HISTORY:  PAD (peripheral artery disease)  Paroxysmal atrial fibrillation  Hypercholesterolemia  Diabetes  Hypertension  S/P peripheral artery angioplasty with stent placement  S/P femoral-popliteal bypass surgery  H/O hernia repair  H/O abdominal hysterectomy      FAMILY HISTORY:  Family history of thoracic aortic aneurysm (Father)  Family history of abdominal aortic aneurysm (Father)      Home Medications:  Co Q-10: 200 milligram(s) orally once a day (12 Jan 2021 06:51)  dilTIAZem 180 mg/24 hours oral capsule, extended release: 1 cap(s) orally once a day (12 Jan 2021 06:51)  enalapril 5 mg oral tablet: 1 tab(s) orally once a day (12 Jan 2021 06:51)  magnesium oxide 200 mg oral tablet: 6 tab(s) orally once a day (12 Jan 2021 06:51)  metFORMIN 500 mg oral tablet: 2 tab(s) orally 2 times a day (12 Jan 2021 06:51)  pravastatin 80 mg oral tablet: 1 tab(s) orally once a day (at bedtime) (12 Jan 2021 06:51)  Toprol- mg oral tablet, extended release: 1 tab(s) orally 2 times a day (12 Jan 2021 06:51)  Tresiba 100 units/mL subcutaneous solution: twice daily, 25U in AM and 30U in PM (12 Jan 2021 06:51)  vitamin A 10,000 intl units oral capsule: orally once a day (12 Jan 2021 06:51)  Vitamin B12 1000 mcg oral tablet: 5 tab(s) orally once a day (12 Jan 2021 06:51)  Vitamin C 1000 mg oral tablet: 1 tab(s) orally once a day (12 Jan 2021 06:51)  Vitamin D3 5000 intl units oral tablet: 1 tab(s) orally once a day (12 Jan 2021 06:51)  vitamin E 400 intl units oral capsule: 1 cap(s) orally once a day (12 Jan 2021 06:51)  Zinc 50 mg Pink oral capsule: 1 cap(s) orally once a day (12 Jan 2021 06:51)                   9.3    15.29 )-----------( 314      ( 13 Jan 2021 07:21 )             29.3     01-13    137  |  101  |  23.0<H>  ----------------------------<  88  4.5   |  22.0  |  0.75    Ca    8.7      13 Jan 2021 07:21  Phos  4.2     01-13  Mg     2.2     01-13    TPro  7.0  /  Alb  4.1  /  TBili  0.9  /  DBili  x   /  AST  151<H>  /  ALT  27  /  AlkPhos  65  01-11  PT/INR - ( 11 Jan 2021 23:15 )   PT: 18.9 sec;   INR: 1.67 ratio    PTT - ( 12 Jan 2021 12:32 )  PTT:28.9 sec    Patient is a 75y old  Female who presents with a chief complaint of Afib with RVR with hypotension, elevated troponin (13 Jan 2021 12:43)    HEALTH ISSUES - PROBLEM Dx:    HPI:  75yoF hx Afib on Xarelto, PAD s/p O-leblpix-nypdgenwy bypass, R-femoral angioplasty with stenting, on Plavix, HTN, HLD, DM presenting with acute dyspnea and palpitations yesterday.  Pt was in her normal state of health, then developed acute onset of exertional dyspnea associated with palpitations that progressed to dyspnea at rest which prompted her to come to the hospital.  She also reports some transient vomiting yesterday that is now improving.  In ED, pt was in Afib with RVR associated with soft SBP in 90s.  She was given IV Cardizem and PO metoprolol without improvement and ED staff spoke with [place] cardiology who recommended digoxin.   Labs notable for leukocytosis and elevated troponin.  Pt denies any chest pain, lightheadedness, syncope, cough, abdominal pain, diarrhea, or urinary symptoms.     Of note, pt came to [facility] ED on 1/9 for R-thigh pain after walking into a cabinet at home. She underwent a CT angio A/P that showed R-thigh hematoma w/out extravasation.  Her leg was wrapped and she was d/c’ed from ED with instruction to continue wtith blood thinners and outpatient follow up. She denies any worsening R-thigh or leg pain. (12 Jan 2021 03:52)      General: No fatigue, no fevers/chills  Respiratory: No dyspnea, no cough, no wheeze  CV: No chest pain, no palpitations  Abd: No nausea  Neuro: No headache, no dizziness  warfarin (Rash)      Objective:  Vital Signs Last 24 Hrs  T(C): 36.9 (13 Jan 2021 11:49), Max: 37.2 (13 Jan 2021 10:09)  T(F): 98.4 (13 Jan 2021 11:49), Max: 99 (13 Jan 2021 10:09)  HR: 92 (13 Jan 2021 13:59) (68 - 134)  BP: 116/69 (13 Jan 2021 12:56) (100/76 - 121/68)  BP(mean): 77 (13 Jan 2021 06:04) (77 - 88)  RR: 17 (13 Jan 2021 13:07) (17 - 24)  SpO2: 100% (13 Jan 2021 13:59) (97% - 100%)    CM: SR  Neuro: A&OX3, CN 2-12 intact  HEENT: NC, AT  Lungs: CTA B/L  CV: S1, S2, no murmur, RRR  Abd: Soft  Right Groin: Soft, no bleeding, no hematoma  Extremity: + distal pulses  EKG:   Atrial Fibrillation     DIAGNOSTICS:     75yoF hx Afib on Xarelto, PAD s/p V-dndpoln-qfzpfwxjl bypass, R-femoral angioplasty with stenting, on Plavix, HTN, HLD, DM presenting with acute dyspnea and palpitations yesterday.  Pt was in her normal state of health, then developed acute onset of exertional dyspnea associated with palpitations that progressed to dyspnea at rest which prompted her to come to the hospital. she is now s/p C which showed severe diffuse disease requiring balloon pump assist and CTS evaluation.      ASSESSMENT/PLAN:    Severe Coronary artery disease  -bilateral groin precautions/ protocol  -strict bed rest; balloon pump in place 1:1, augmenting 150's   -heparin gtt started at 1400 units/hr   -hx of PAF Xarelto currently on hold   -Xarelto last dose 1/12 am; Plavix 75 mg given prior to cath 1/13   -acute CHF on IV lasix 40 mg BID; with rales on exam  -started on BIPAP 12/5 30% for respiratory support   -CTS evaluation pending, will need CABG this admit  -Plan of care discussed with patient  -labs, EKG ordered                                                           Post Procedure Cardiac Cath NP Note       Narrative:    75yoF hx Afib on Xarelto, PAD s/p C-zdeekeu-gmewgmdaw bypass, R-femoral angioplasty with stenting, on Plavix, HTN, HLD, DM presenting with acute dyspnea and palpitations yesterday.  Pt was in her normal state of health, then developed acute onset of exertional dyspnea associated with palpitations that progressed to dyspnea at rest which prompted her to come to the hospital.  She also reports some transient vomiting yesterday that is now improving.  In ED, pt was in Afib with RVR associated with soft SBP in 90s.  She was given IV Cardizem and PO metoprolol without improvement and ED staff spoke with [place] cardiology who recommended digoxin.   Labs notable for leukocytosis and elevated troponin. Admitted as NSTEMI, last dose of Xarelto 1/12 am. Has been on IV lasix with good effect; but still with rales throughout.    Patient seen and examined in cath lab holding. VSS, not able to lay flat for planned procedure. Discussed with Dr. Smith who at this time is recommending BIPAP for oxygenation support and possibly ability to lay flat and have left heart catheterization performed. Respiratory called and will try patient on BIPAP in holding. If patient is able to lay flat, will then go ahead with planned procedure.       Left Heart Catheterization with Dr. Smith which showed severe diffuse disease: Ostial, LM, RCA   Balloon pump placement   CTS evaluation - PA called  report given   Patient remains on BIPAP       PAST MEDICAL & SURGICAL HISTORY:  PAD (peripheral artery disease)  Paroxysmal atrial fibrillation  Hypercholesterolemia  Diabetes  Hypertension  S/P peripheral artery angioplasty with stent placement  S/P femoral-popliteal bypass surgery  H/O hernia repair  H/O abdominal hysterectomy      FAMILY HISTORY:  Family history of thoracic aortic aneurysm (Father)  Family history of abdominal aortic aneurysm (Father)      Home Medications:  Co Q-10: 200 milligram(s) orally once a day (12 Jan 2021 06:51)  dilTIAZem 180 mg/24 hours oral capsule, extended release: 1 cap(s) orally once a day (12 Jan 2021 06:51)  enalapril 5 mg oral tablet: 1 tab(s) orally once a day (12 Jan 2021 06:51)  magnesium oxide 200 mg oral tablet: 6 tab(s) orally once a day (12 Jan 2021 06:51)  metFORMIN 500 mg oral tablet: 2 tab(s) orally 2 times a day (12 Jan 2021 06:51)  pravastatin 80 mg oral tablet: 1 tab(s) orally once a day (at bedtime) (12 Jan 2021 06:51)  Toprol- mg oral tablet, extended release: 1 tab(s) orally 2 times a day (12 Jan 2021 06:51)  Tresiba 100 units/mL subcutaneous solution: twice daily, 25U in AM and 30U in PM (12 Jan 2021 06:51)  vitamin A 10,000 intl units oral capsule: orally once a day (12 Jan 2021 06:51)  Vitamin B12 1000 mcg oral tablet: 5 tab(s) orally once a day (12 Jan 2021 06:51)  Vitamin C 1000 mg oral tablet: 1 tab(s) orally once a day (12 Jan 2021 06:51)  Vitamin D3 5000 intl units oral tablet: 1 tab(s) orally once a day (12 Jan 2021 06:51)  vitamin E 400 intl units oral capsule: 1 cap(s) orally once a day (12 Jan 2021 06:51)  Zinc 50 mg Pink oral capsule: 1 cap(s) orally once a day (12 Jan 2021 06:51)                   9.3    15.29 )-----------( 314      ( 13 Jan 2021 07:21 )             29.3     01-13    137  |  101  |  23.0<H>  ----------------------------<  88  4.5   |  22.0  |  0.75    Ca    8.7      13 Jan 2021 07:21  Phos  4.2     01-13  Mg     2.2     01-13    TPro  7.0  /  Alb  4.1  /  TBili  0.9  /  DBili  x   /  AST  151<H>  /  ALT  27  /  AlkPhos  65  01-11  PT/INR - ( 11 Jan 2021 23:15 )   PT: 18.9 sec;   INR: 1.67 ratio    PTT - ( 12 Jan 2021 12:32 )  PTT:28.9 sec    Patient is a 75y old  Female who presents with a chief complaint of Afib with RVR with hypotension, elevated troponin (13 Jan 2021 12:43)    HEALTH ISSUES - PROBLEM Dx:    HPI:  75yoF hx Afib on Xarelto, PAD s/p G-koyaoez-psbgzptxo bypass, R-femoral angioplasty with stenting, on Plavix, HTN, HLD, DM presenting with acute dyspnea and palpitations yesterday.  Pt was in her normal state of health, then developed acute onset of exertional dyspnea associated with palpitations that progressed to dyspnea at rest which prompted her to come to the hospital.  She also reports some transient vomiting yesterday that is now improving.  In ED, pt was in Afib with RVR associated with soft SBP in 90s.  She was given IV Cardizem and PO metoprolol without improvement and ED staff spoke with [place] cardiology who recommended digoxin.   Labs notable for leukocytosis and elevated troponin.  Pt denies any chest pain, lightheadedness, syncope, cough, abdominal pain, diarrhea, or urinary symptoms.     Of note, pt came to [facility] ED on 1/9 for R-thigh pain after walking into a cabinet at home. She underwent a CT angio A/P that showed R-thigh hematoma w/out extravasation.  Her leg was wrapped and she was d/c’ed from ED with instruction to continue wtith blood thinners and outpatient follow up. She denies any worsening R-thigh or leg pain. (12 Jan 2021 03:52)      General: No fatigue, no fevers/chills  Respiratory: dyspneic, rales on auscultation   CV: No chest pain, no palpitations  Abd: No nausea  Neuro: No headache, no dizziness  warfarin (Rash)      Objective:  Vital Signs Last 24 Hrs  T(C): 36.9 (13 Jan 2021 11:49), Max: 37.2 (13 Jan 2021 10:09)  T(F): 98.4 (13 Jan 2021 11:49), Max: 99 (13 Jan 2021 10:09)  HR: 92 (13 Jan 2021 13:59) (68 - 134)  BP: 116/69 (13 Jan 2021 12:56) (100/76 - 121/68)  BP(mean): 77 (13 Jan 2021 06:04) (77 - 88)  RR: 17 (13 Jan 2021 13:07) (17 - 24)  SpO2: 100% (13 Jan 2021 13:59) (97% - 100%)    CM: SR  Neuro: A&OX3, CN 2-12 intact  HEENT: NC, AT  Lungs: dyspneic, rales on auscultation; placed on BIPAP 12/5 30%    CV: S1, S2, no murmur, RRR  Abd: Soft  Right Groin: Soft, no bleeding, no hematoma; Balloon pump in place 1:1, aug 150's  Extremity: + distal pulses  EKG:   Atrial Fibrillation     DIAGNOSTICS:     75yoF hx Afib on Xarelto, PAD s/p K-xibnynw-hboirjzcv bypass, R-femoral angioplasty with stenting, on Plavix, HTN, HLD, DM presenting with acute dyspnea and palpitations yesterday.  Pt was in her normal state of health, then developed acute onset of exertional dyspnea associated with palpitations that progressed to dyspnea at rest which prompted her to come to the hospital. she is now s/p C which showed severe diffuse disease requiring balloon pump assist and CTS evaluation.      ASSESSMENT/PLAN:    Severe Coronary artery disease  -bilateral groin precautions/ protocol  -strict bed rest; balloon pump in place 1:1, augmenting 150's   -heparin gtt started at 1400 units/hr   -hx of PAF Xarelto currently on hold   -Xarelto last dose 1/12 am; Plavix 75 mg given prior to cath 1/13   -acute CHF on IV lasix 40 mg BID; with rales on exam  -started on BIPAP 12/5 30% for respiratory support   -CTS evaluation pending, will need CABG this admit  -Plan of care discussed with patient  -labs, EKG ordered                                                           Post Procedure Cardiac Cath NP Note       Narrative:    75yoF hx Afib on Xarelto, PAD s/p U-abhgcig-nfoarnmrc bypass, R-femoral angioplasty with stenting, on Plavix, HTN, HLD, DM presenting with acute dyspnea and palpitations yesterday.  Pt was in her normal state of health, then developed acute onset of exertional dyspnea associated with palpitations that progressed to dyspnea at rest which prompted her to come to the hospital.  She also reports some transient vomiting yesterday that is now improving.  In ED, pt was in Afib with RVR associated with soft SBP in 90s.  She was given IV Cardizem and PO metoprolol without improvement and ED staff spoke with [place] cardiology who recommended digoxin.   Labs notable for leukocytosis and elevated troponin. Admitted as NSTEMI, last dose of Xarelto 1/12 am. Has been on IV lasix with good effect; but still with rales throughout.    Patient seen and examined in cath lab holding. VSS, not able to lay flat for planned procedure. Discussed with Dr. Smith who at this time is recommending BIPAP for oxygenation support and possibly ability to lay flat and have left heart catheterization performed. Respiratory called and will try patient on BIPAP in holding. If patient is able to lay flat, will then go ahead with planned procedure.       Left Heart Catheterization with Dr. Smith which showed severe diffuse disease: Ostial, LM, LAD   Balloon pump placement   CTS evaluation - PA called  report given   Patient remains on BIPAP       PAST MEDICAL & SURGICAL HISTORY:  PAD (peripheral artery disease)  Paroxysmal atrial fibrillation  Hypercholesterolemia  Diabetes  Hypertension  S/P peripheral artery angioplasty with stent placement  S/P femoral-popliteal bypass surgery  H/O hernia repair  H/O abdominal hysterectomy      FAMILY HISTORY:  Family history of thoracic aortic aneurysm (Father)  Family history of abdominal aortic aneurysm (Father)      Home Medications:  Co Q-10: 200 milligram(s) orally once a day (12 Jan 2021 06:51)  dilTIAZem 180 mg/24 hours oral capsule, extended release: 1 cap(s) orally once a day (12 Jan 2021 06:51)  enalapril 5 mg oral tablet: 1 tab(s) orally once a day (12 Jan 2021 06:51)  magnesium oxide 200 mg oral tablet: 6 tab(s) orally once a day (12 Jan 2021 06:51)  metFORMIN 500 mg oral tablet: 2 tab(s) orally 2 times a day (12 Jan 2021 06:51)  pravastatin 80 mg oral tablet: 1 tab(s) orally once a day (at bedtime) (12 Jan 2021 06:51)  Toprol- mg oral tablet, extended release: 1 tab(s) orally 2 times a day (12 Jan 2021 06:51)  Tresiba 100 units/mL subcutaneous solution: twice daily, 25U in AM and 30U in PM (12 Jan 2021 06:51)  vitamin A 10,000 intl units oral capsule: orally once a day (12 Jan 2021 06:51)  Vitamin B12 1000 mcg oral tablet: 5 tab(s) orally once a day (12 Jan 2021 06:51)  Vitamin C 1000 mg oral tablet: 1 tab(s) orally once a day (12 Jan 2021 06:51)  Vitamin D3 5000 intl units oral tablet: 1 tab(s) orally once a day (12 Jan 2021 06:51)  vitamin E 400 intl units oral capsule: 1 cap(s) orally once a day (12 Jan 2021 06:51)  Zinc 50 mg Pink oral capsule: 1 cap(s) orally once a day (12 Jan 2021 06:51)                   9.3    15.29 )-----------( 314      ( 13 Jan 2021 07:21 )             29.3     01-13    137  |  101  |  23.0<H>  ----------------------------<  88  4.5   |  22.0  |  0.75    Ca    8.7      13 Jan 2021 07:21  Phos  4.2     01-13  Mg     2.2     01-13    TPro  7.0  /  Alb  4.1  /  TBili  0.9  /  DBili  x   /  AST  151<H>  /  ALT  27  /  AlkPhos  65  01-11  PT/INR - ( 11 Jan 2021 23:15 )   PT: 18.9 sec;   INR: 1.67 ratio    PTT - ( 12 Jan 2021 12:32 )  PTT:28.9 sec    Patient is a 75y old  Female who presents with a chief complaint of Afib with RVR with hypotension, elevated troponin (13 Jan 2021 12:43)    HEALTH ISSUES - PROBLEM Dx:    HPI:  75yoF hx Afib on Xarelto, PAD s/p F-ntfcfiy-vofrslcam bypass, R-femoral angioplasty with stenting, on Plavix, HTN, HLD, DM presenting with acute dyspnea and palpitations yesterday.  Pt was in her normal state of health, then developed acute onset of exertional dyspnea associated with palpitations that progressed to dyspnea at rest which prompted her to come to the hospital.  She also reports some transient vomiting yesterday that is now improving.  In ED, pt was in Afib with RVR associated with soft SBP in 90s.  She was given IV Cardizem and PO metoprolol without improvement and ED staff spoke with [place] cardiology who recommended digoxin.   Labs notable for leukocytosis and elevated troponin.  Pt denies any chest pain, lightheadedness, syncope, cough, abdominal pain, diarrhea, or urinary symptoms.     Of note, pt came to [facility] ED on 1/9 for R-thigh pain after walking into a cabinet at home. She underwent a CT angio A/P that showed R-thigh hematoma w/out extravasation.  Her leg was wrapped and she was d/c’ed from ED with instruction to continue wtith blood thinners and outpatient follow up. She denies any worsening R-thigh or leg pain. (12 Jan 2021 03:52)      General: No fatigue, no fevers/chills  Respiratory: dyspneic, rales on auscultation   CV: No chest pain, no palpitations  Abd: No nausea  Neuro: No headache, no dizziness  warfarin (Rash)      Objective:  Vital Signs Last 24 Hrs  T(C): 36.9 (13 Jan 2021 11:49), Max: 37.2 (13 Jan 2021 10:09)  T(F): 98.4 (13 Jan 2021 11:49), Max: 99 (13 Jan 2021 10:09)  HR: 92 (13 Jan 2021 13:59) (68 - 134)  BP: 116/69 (13 Jan 2021 12:56) (100/76 - 121/68)  BP(mean): 77 (13 Jan 2021 06:04) (77 - 88)  RR: 17 (13 Jan 2021 13:07) (17 - 24)  SpO2: 100% (13 Jan 2021 13:59) (97% - 100%)    CM: SR  Neuro: A&OX3, CN 2-12 intact  HEENT: NC, AT  Lungs: dyspneic, rales on auscultation; placed on BIPAP 12/5 30%    CV: S1, S2, no murmur, RRR  Abd: Soft  Right Groin: Soft, no bleeding, no hematoma; Balloon pump in place 1:1, aug 150's  Extremity: + distal pulses  EKG:   Atrial Fibrillation     DIAGNOSTICS:     75yoF hx Afib on Xarelto, PAD s/p A-kfnjeeb-yxmjdcvqw bypass, R-femoral angioplasty with stenting, on Plavix, HTN, HLD, DM presenting with acute dyspnea and palpitations yesterday.  Pt was in her normal state of health, then developed acute onset of exertional dyspnea associated with palpitations that progressed to dyspnea at rest which prompted her to come to the hospital. she is now s/p LHC which showed severe diffuse disease requiring balloon pump assist and CTS evaluation.      ASSESSMENT/PLAN:    Severe Coronary artery disease: Ostial, Left Main, LAD   -bilateral groin precautions/ protocol  -strict bed rest; balloon pump in place 1:1, augmenting 150's   -heparin gtt started at 1400 units/hr   -hx of PAF Xarelto currently on hold   -Xarelto last dose 1/12 am; Plavix 75 mg given prior to cath 1/13   -right thigh hematoma 2/2 trauma from home; hb 10.3 --> 8.1 s/p 1 unit PRBC hb 9.3 today   -acute CHF on IV lasix 40 mg BID; with rales on exam  -started on BIPAP 12/5 30% for respiratory support   -CTS evaluation pending, will need CABG this admit  -Plan of care discussed with patient  -labs, EKG ordered                                                           Post Procedure Cardiac Cath NP Note       Narrative:    75yoF hx Afib on Xarelto, PAD s/p P-ktuxcqj-oxbngxacx bypass, R-femoral angioplasty with stenting, on Plavix, HTN, HLD, DM presenting with acute dyspnea and palpitations yesterday.  Pt was in her normal state of health, then developed acute onset of exertional dyspnea associated with palpitations that progressed to dyspnea at rest which prompted her to come to the hospital.  She also reports some transient vomiting yesterday that is now improving.  In ED, pt was in Afib with RVR associated with soft SBP in 90s.  She was given IV Cardizem and PO metoprolol without improvement and ED staff spoke with [place] cardiology who recommended digoxin.   Labs notable for leukocytosis and elevated troponin. Admitted as NSTEMI, last dose of Xarelto 1/12 am. Has been on IV lasix with good effect; but still with rales throughout.    Patient seen and examined in cath lab holding. VSS, not able to lay flat for planned procedure. Discussed with Dr. Smith who at this time is recommending BIPAP for oxygenation support and possibly ability to lay flat and have left heart catheterization performed. Respiratory called and will try patient on BIPAP in holding. If patient is able to lay flat, will then go ahead with planned procedure.       Left Heart Catheterization with Dr. Smith which showed severe diffuse disease: Ostial, LM, LAD   LFA Balloon pump placement: 1:1, aug 150's, heparin 1400 units/hr   RRA band in place   CTS evaluation - PA called  report given / pt seen in Cath lab by CTS team  Patient remains on BIPAP 12/5 30%       PAST MEDICAL & SURGICAL HISTORY:  PAD (peripheral artery disease)  Paroxysmal atrial fibrillation  Hypercholesterolemia  Diabetes  Hypertension  S/P peripheral artery angioplasty with stent placement  S/P femoral-popliteal bypass surgery  H/O hernia repair  H/O abdominal hysterectomy      FAMILY HISTORY:  Family history of thoracic aortic aneurysm (Father)  Family history of abdominal aortic aneurysm (Father)      Home Medications:  Co Q-10: 200 milligram(s) orally once a day (12 Jan 2021 06:51)  dilTIAZem 180 mg/24 hours oral capsule, extended release: 1 cap(s) orally once a day (12 Jan 2021 06:51)  enalapril 5 mg oral tablet: 1 tab(s) orally once a day (12 Jan 2021 06:51)  magnesium oxide 200 mg oral tablet: 6 tab(s) orally once a day (12 Jan 2021 06:51)  metFORMIN 500 mg oral tablet: 2 tab(s) orally 2 times a day (12 Jan 2021 06:51)  pravastatin 80 mg oral tablet: 1 tab(s) orally once a day (at bedtime) (12 Jan 2021 06:51)  Toprol- mg oral tablet, extended release: 1 tab(s) orally 2 times a day (12 Jan 2021 06:51)  Tresiba 100 units/mL subcutaneous solution: twice daily, 25U in AM and 30U in PM (12 Jan 2021 06:51)  vitamin A 10,000 intl units oral capsule: orally once a day (12 Jan 2021 06:51)  Vitamin B12 1000 mcg oral tablet: 5 tab(s) orally once a day (12 Jan 2021 06:51)  Vitamin C 1000 mg oral tablet: 1 tab(s) orally once a day (12 Jan 2021 06:51)  Vitamin D3 5000 intl units oral tablet: 1 tab(s) orally once a day (12 Jan 2021 06:51)  vitamin E 400 intl units oral capsule: 1 cap(s) orally once a day (12 Jan 2021 06:51)  Zinc 50 mg Pink oral capsule: 1 cap(s) orally once a day (12 Jan 2021 06:51)                   9.3    15.29 )-----------( 314      ( 13 Jan 2021 07:21 )             29.3     01-13    137  |  101  |  23.0<H>  ----------------------------<  88  4.5   |  22.0  |  0.75    Ca    8.7      13 Jan 2021 07:21  Phos  4.2     01-13  Mg     2.2     01-13    TPro  7.0  /  Alb  4.1  /  TBili  0.9  /  DBili  x   /  AST  151<H>  /  ALT  27  /  AlkPhos  65  01-11  PT/INR - ( 11 Jan 2021 23:15 )   PT: 18.9 sec;   INR: 1.67 ratio    PTT - ( 12 Jan 2021 12:32 )  PTT:28.9 sec    Patient is a 75y old  Female who presents with a chief complaint of Afib with RVR with hypotension, elevated troponin (13 Jan 2021 12:43)    HEALTH ISSUES - PROBLEM Dx:    HPI:  75yoF hx Afib on Xarelto, PAD s/p F-hkzybno-magwqkdfo bypass, R-femoral angioplasty with stenting, on Plavix, HTN, HLD, DM presenting with acute dyspnea and palpitations yesterday.  Pt was in her normal state of health, then developed acute onset of exertional dyspnea associated with palpitations that progressed to dyspnea at rest which prompted her to come to the hospital.  She also reports some transient vomiting yesterday that is now improving.  In ED, pt was in Afib with RVR associated with soft SBP in 90s.  She was given IV Cardizem and PO metoprolol without improvement and ED staff spoke with [place] cardiology who recommended digoxin.   Labs notable for leukocytosis and elevated troponin.  Pt denies any chest pain, lightheadedness, syncope, cough, abdominal pain, diarrhea, or urinary symptoms.     Of note, pt came to [facility] ED on 1/9 for R-thigh pain after walking into a cabinet at home. She underwent a CT angio A/P that showed R-thigh hematoma w/out extravasation.  Her leg was wrapped and she was d/c’ed from ED with instruction to continue wtith blood thinners and outpatient follow up. She denies any worsening R-thigh or leg pain. (12 Jan 2021 03:52)      General: No fatigue, no fevers/chills  Respiratory: dyspneic, rales on auscultation   CV: No chest pain, no palpitations  Abd: No nausea  Neuro: No headache, no dizziness  warfarin (Rash)      Objective:  Vital Signs Last 24 Hrs  T(C): 36.9 (13 Jan 2021 11:49), Max: 37.2 (13 Jan 2021 10:09)  T(F): 98.4 (13 Jan 2021 11:49), Max: 99 (13 Jan 2021 10:09)  HR: 92 (13 Jan 2021 13:59) (68 - 134)  BP: 116/69 (13 Jan 2021 12:56) (100/76 - 121/68)  BP(mean): 77 (13 Jan 2021 06:04) (77 - 88)  RR: 17 (13 Jan 2021 13:07) (17 - 24)  SpO2: 100% (13 Jan 2021 13:59) (97% - 100%)    CM: SR  Neuro: A&OX3, CN 2-12 intact  HEENT: NC, AT  Lungs: dyspneic, rales on auscultation; placed on BIPAP 12/5 30%    CV: S1, S2, no murmur, RRR  Abd: Soft  Right Groin: Soft, no bleeding, no hematoma; Balloon pump in place 1:1, aug 150's  Extremity: + distal pulses by doppler   EKG:   Atrial Fibrillation     DIAGNOSTICS:     75yoF hx Afib on Xarelto, PAD s/p B-dxhptnh-bolqjlrti bypass, R-femoral angioplasty with stenting, on Plavix, HTN, HLD, DM presenting with acute dyspnea and palpitations yesterday.  Pt was in her normal state of health, then developed acute onset of exertional dyspnea associated with palpitations that progressed to dyspnea at rest which prompted her to come to the hospital. she is now s/p LHC which showed severe diffuse disease requiring balloon pump assist and CTS evaluation.      ASSESSMENT/PLAN:    Severe Coronary artery disease: Ostial, Left Main, LAD   -bilateral groin precautions/ protocol  -LFA balloon pump in place 1:1, augmenting 150's  - strict bedrest   -RRA band in place   -heparin gtt started at 1400 units/hr   -hx of PAF Xarelto currently on hold   -Xarelto last dose 1/12 am; Plavix 75 mg given prior to cath 1/13   -right thigh hematoma 2/2 trauma from home; hb 10.3 --> 8.1 s/p 1 unit PRBC hb 9.3 today   -acute CHF on IV lasix 40 mg BID; with rales on exam  -started on BIPAP 12/5 30% for respiratory support   -Plan of care discussed with patient  -labs, EKG ordered   -CTS eval; going to Rm 4102, pt was seen by CTS team in cath lab Rm 2  -plan for OR tomorrow                                                           Post Procedure Cardiac Cath NP Note       Narrative:    75yoF hx Afib on Xarelto, PAD s/p F-eybprve-riutihwki bypass, R-femoral angioplasty with stenting, on Plavix, HTN, HLD, DM presenting with acute dyspnea and palpitations yesterday.  Pt was in her normal state of health, then developed acute onset of exertional dyspnea associated with palpitations that progressed to dyspnea at rest which prompted her to come to the hospital.  She also reports some transient vomiting yesterday that is now improving.  In ED, pt was in Afib with RVR associated with soft SBP in 90s.  She was given IV Cardizem and PO metoprolol without improvement and ED staff spoke with [place] cardiology who recommended digoxin.   Labs notable for leukocytosis and elevated troponin. Admitted as NSTEMI, last dose of Xarelto 1/12 am. Has been on IV lasix with good effect; but still with rales throughout.    Patient seen and examined in cath lab holding. VSS, not able to lay flat for planned procedure. Discussed with Dr. Smith who at this time is recommending BIPAP for oxygenation support and possibly ability to lay flat and have left heart catheterization performed. Respiratory called and will try patient on BIPAP in holding. If patient is able to lay flat, will then go ahead with planned procedure.       Left Heart Catheterization with Dr. Smith which showed severe diffuse disease: Ostial, LM, LAD   LFA Balloon pump placement: 1:1, aug 150's, heparin 1400 units/hr   RRA band in place   CTS evaluation - PA called  report given / pt seen in Cath lab by CTS team  Patient remains on BIPAP 12/5 30%       PAST MEDICAL & SURGICAL HISTORY:  PAD (peripheral artery disease)  Paroxysmal atrial fibrillation  Hypercholesterolemia  Diabetes  Hypertension  S/P peripheral artery angioplasty with stent placement  S/P femoral-popliteal bypass surgery  H/O hernia repair  H/O abdominal hysterectomy      FAMILY HISTORY:  Family history of thoracic aortic aneurysm (Father)  Family history of abdominal aortic aneurysm (Father)      Home Medications:  Co Q-10: 200 milligram(s) orally once a day (12 Jan 2021 06:51)  dilTIAZem 180 mg/24 hours oral capsule, extended release: 1 cap(s) orally once a day (12 Jan 2021 06:51)  enalapril 5 mg oral tablet: 1 tab(s) orally once a day (12 Jan 2021 06:51)  magnesium oxide 200 mg oral tablet: 6 tab(s) orally once a day (12 Jan 2021 06:51)  metFORMIN 500 mg oral tablet: 2 tab(s) orally 2 times a day (12 Jan 2021 06:51)  pravastatin 80 mg oral tablet: 1 tab(s) orally once a day (at bedtime) (12 Jan 2021 06:51)  Toprol- mg oral tablet, extended release: 1 tab(s) orally 2 times a day (12 Jan 2021 06:51)  Tresiba 100 units/mL subcutaneous solution: twice daily, 25U in AM and 30U in PM (12 Jan 2021 06:51)  vitamin A 10,000 intl units oral capsule: orally once a day (12 Jan 2021 06:51)  Vitamin B12 1000 mcg oral tablet: 5 tab(s) orally once a day (12 Jan 2021 06:51)  Vitamin C 1000 mg oral tablet: 1 tab(s) orally once a day (12 Jan 2021 06:51)  Vitamin D3 5000 intl units oral tablet: 1 tab(s) orally once a day (12 Jan 2021 06:51)  vitamin E 400 intl units oral capsule: 1 cap(s) orally once a day (12 Jan 2021 06:51)  Zinc 50 mg Pink oral capsule: 1 cap(s) orally once a day (12 Jan 2021 06:51)                   9.3    15.29 )-----------( 314      ( 13 Jan 2021 07:21 )             29.3     01-13    137  |  101  |  23.0<H>  ----------------------------<  88  4.5   |  22.0  |  0.75    Ca    8.7      13 Jan 2021 07:21  Phos  4.2     01-13  Mg     2.2     01-13    TPro  7.0  /  Alb  4.1  /  TBili  0.9  /  DBili  x   /  AST  151<H>  /  ALT  27  /  AlkPhos  65  01-11  PT/INR - ( 11 Jan 2021 23:15 )   PT: 18.9 sec;   INR: 1.67 ratio    PTT - ( 12 Jan 2021 12:32 )  PTT:28.9 sec    Patient is a 75y old  Female who presents with a chief complaint of Afib with RVR with hypotension, elevated troponin (13 Jan 2021 12:43)    HEALTH ISSUES - PROBLEM Dx:    HPI:  75yoF hx Afib on Xarelto, PAD s/p Z-kwsgiqv-jccquauot bypass, R-femoral angioplasty with stenting, on Plavix, HTN, HLD, DM presenting with acute dyspnea and palpitations yesterday.  Pt was in her normal state of health, then developed acute onset of exertional dyspnea associated with palpitations that progressed to dyspnea at rest which prompted her to come to the hospital.  She also reports some transient vomiting yesterday that is now improving.  In ED, pt was in Afib with RVR associated with soft SBP in 90s.  She was given IV Cardizem and PO metoprolol without improvement and ED staff spoke with [place] cardiology who recommended digoxin.   Labs notable for leukocytosis and elevated troponin.  Pt denies any chest pain, lightheadedness, syncope, cough, abdominal pain, diarrhea, or urinary symptoms.     Of note, pt came to [facility] ED on 1/9 for R-thigh pain after walking into a cabinet at home. She underwent a CT angio A/P that showed R-thigh hematoma w/out extravasation.  Her leg was wrapped and she was d/c’ed from ED with instruction to continue wtith blood thinners and outpatient follow up. She denies any worsening R-thigh or leg pain. (12 Jan 2021 03:52)      General: No fatigue, no fevers/chills  Respiratory: dyspneic, rales on auscultation   CV: No chest pain, no palpitations  Abd: No nausea  Neuro: No headache, no dizziness  warfarin (Rash)      Objective:  Vital Signs Last 24 Hrs  T(C): 36.9 (13 Jan 2021 11:49), Max: 37.2 (13 Jan 2021 10:09)  T(F): 98.4 (13 Jan 2021 11:49), Max: 99 (13 Jan 2021 10:09)  HR: 92 (13 Jan 2021 13:59) (68 - 134)  BP: 116/69 (13 Jan 2021 12:56) (100/76 - 121/68)  BP(mean): 77 (13 Jan 2021 06:04) (77 - 88)  RR: 17 (13 Jan 2021 13:07) (17 - 24)  SpO2: 100% (13 Jan 2021 13:59) (97% - 100%)    CM: SR  Neuro: A&OX3, CN 2-12 intact  HEENT: NC, AT  Lungs: dyspneic, rales on auscultation; placed on BIPAP 12/5 30%    CV: S1, S2, no murmur, RRR  Abd: Soft  Right Groin: Soft, no bleeding, no hematoma; Balloon pump in place 1:1, aug 150's  Extremity: + distal pulses by doppler   EKG:   Atrial Fibrillation     DIAGNOSTICS:     75yoF hx Afib on Xarelto, PAD s/p B-xmleogw-khbcvugcy bypass, R-femoral angioplasty with stenting, on Plavix, HTN, HLD, DM presenting with acute dyspnea and palpitations yesterday.  Pt was in her normal state of health, then developed acute onset of exertional dyspnea associated with palpitations that progressed to dyspnea at rest which prompted her to come to the hospital. she is now s/p LHC which showed severe diffuse disease requiring balloon pump assist and CTS evaluation.      ASSESSMENT/PLAN:    Severe Coronary artery disease: Ostial, Left Main, LAD   -bilateral groin precautions/ protocol  -LFA balloon pump in place 1:1, augmenting 150's  - strict bedrest   -RRA band in place   -heparin gtt started at 1400 units/hr   -hx of PAF Xarelto currently on hold   -Xarelto last dose 1/12 am; Plavix 75 mg given prior to cath 1/13   -right thigh hematoma 2/2 trauma from home; hb 10.3 --> 8.1 s/p 1 unit PRBC hb 9.3 today   -acute CHF on IV lasix 40 mg BID; with rales on exam  -started on BIPAP 12/5 30% for respiratory support   -Metoprolol and Plavxi d/c   -Plan of care discussed with patient  -labs, EKG ordered   -CTS eval; going to Room 4102, pt was seen by CTS team in cath lab Rm 2  -plan for OR tomorrow

## 2021-01-13 NOTE — PROGRESS NOTE ADULT - SUBJECTIVE AND OBJECTIVE BOX
CHIEF COMPLAINT/INTERVAL HISTORY:    Patient is a 75y old  Female who presents with a chief complaint of Afib with RVR with hypotension, elevated troponin (2021 12:43)      HPI:  75yoF hx Afib on Xarelto, PAD s/p R-thqvead-bgqmuzwrq bypass, R-femoral angioplasty with stenting, on Plavix, HTN, HLD, DM presenting with acute dyspnea and palpitations yesterday.  Pt was in her normal state of health, then developed acute onset of exertional dyspnea associated with palpitations that progressed to dyspnea at rest which prompted her to come to the hospital.  She also reports some transient vomiting yesterday that is now improving.  In ED, pt was in Afib with RVR associated with soft SBP in 90s.  She was given IV Cardizem and PO metoprolol without improvement and ED staff spoke with [place] cardiology who recommended digoxin.   Labs notable for leukocytosis and elevated troponin.  Pt denies any chest pain, lightheadedness, syncope, cough, abdominal pain, diarrhea, or urinary symptoms.     Of note, pt came to [facility] ED on  for R-thigh pain after walking into a cabinet at home. She underwent a CT angio A/P that showed R-thigh hematoma w/out extravasation.  Her leg was wrapped and she was d/c’ed from ED with instruction to continue wtith blood thinners and outpatient follow up. She denies any worsening R-thigh or leg pain. (2021 03:52)      SUBJECTIVE & OBJECTIVE/ ROS: Pt seen and examined at bedside. c/o SOB & chest pain this am. Awaiting cath. Was given lasix 60 IVP. No palpitations,  light headedness/dizziness, cough, fevers/chills, abdominal pain, n/v, diarrhea/constipation, dysuria or increased urinary frequency.     ICU Vital Signs Last 24 Hrs  T(C): 36.9 (2021 11:49), Max: 37.2 (2021 10:09)  T(F): 98.4 (2021 11:49), Max: 99 (2021 10:09)  HR: 92 (2021 13:59) (68 - 134)  BP: 116/69 (2021 12:56) (100/76 - 121/68)  BP(mean): 77 (2021 06:04) (77 - 88)  ABP: --  ABP(mean): --  RR: 17 (2021 13:07) (17 - 24)  SpO2: 100% (2021 13:59) (97% - 100%)      REVIEW OF SYSTEMS:    CONSTITUTIONAL: No weakness, fevers or chills  EYES/ENT: No visual changes;  No vertigo or throat pain   NECK: No pain or stiffness  RESPIRATORY: No cough, wheezing, hemoptysis;   CARDIOVASCULAR: No palpitations  GASTROINTESTINAL: No abdominal or epigastric pain. No nausea, vomiting, or hematemesis; No diarrhea or constipation. No melena or hematochezia.  GENITOURINARY: No dysuria, frequency or hematuria  NEUROLOGICAL: No numbness or weakness  SKIN: No itching, rashes        MEDICATIONS  (STANDING):  ascorbic acid 500 milliGRAM(s) Oral daily  atorvastatin 20 milliGRAM(s) Oral at bedtime  cholecalciferol 5000 Unit(s) Oral daily  clopidogrel Tablet 75 milliGRAM(s) Oral daily  cyanocobalamin 5000 MICROGram(s) Oral daily  dextrose 40% Gel 15 Gram(s) Oral once  dextrose 5%. 1000 milliLiter(s) (50 mL/Hr) IV Continuous <Continuous>  dextrose 5%. 1000 milliLiter(s) (100 mL/Hr) IV Continuous <Continuous>  dextrose 50% Injectable 25 Gram(s) IV Push once  dextrose 50% Injectable 12.5 Gram(s) IV Push once  dextrose 50% Injectable 25 Gram(s) IV Push once  furosemide   Injectable 40 milliGRAM(s) IV Push every 12 hours  glucagon  Injectable 1 milliGRAM(s) IntraMuscular once  insulin lispro (ADMELOG) corrective regimen sliding scale   SubCutaneous three times a day before meals  insulin lispro (ADMELOG) corrective regimen sliding scale   SubCutaneous at bedtime  metoprolol tartrate 50 milliGRAM(s) Oral every 6 hours  vitamin E 400 International Unit(s) Oral daily    MEDICATIONS  (PRN):  acetaminophen   Tablet .. 650 milliGRAM(s) Oral every 6 hours PRN Temp greater or equal to 38C (100.4F), Mild Pain (1 - 3)  ondansetron Injectable 4 milliGRAM(s) IV Push every 6 hours PRN Nausea and/or Vomiting      LABS:                        9.3    15.29 )-----------( 314      ( 2021 07:21 )             29.3         137  |  101  |  23.0<H>  ----------------------------<  88  4.5   |  22.0  |  0.75    Ca    8.7      2021 07:21  Phos  4.2       Mg     2.2         TPro  7.0  /  Alb  4.1  /  TBili  0.9  /  DBili  x   /  AST  151<H>  /  ALT  27  /  AlkPhos  65  11    PT/INR - ( 2021 23:15 )   PT: 18.9 sec;   INR: 1.67 ratio         PTT - ( 2021 12:32 )  PTT:28.9 sec  Urinalysis Basic - ( 2021 06:04 )    Color: Yellow / Appearance: Clear / S.025 / pH: x  Gluc: x / Ketone: Trace  / Bili: Negative / Urobili: Negative mg/dL   Blood: x / Protein: 30 mg/dL / Nitrite: Negative   Leuk Esterase: Small / RBC: 3-5 /HPF / WBC 6-10   Sq Epi: x / Non Sq Epi: Many / Bacteria: Few        CAPILLARY BLOOD GLUCOSE      POCT Blood Glucose.: 96 mg/dL (2021 12:34)  POCT Blood Glucose.: 86 mg/dL (2021 08:26)  POCT Blood Glucose.: 119 mg/dL (2021 21:22)  POCT Blood Glucose.: 137 mg/dL (2021 20:16)  POCT Blood Glucose.: 121 mg/dL (2021 17:06)      RECENT CULTURES:        21 @ 07:21 @ 07:00  --------------------------------------------------------  IN: 5 mL / OUT: 0 mL / NET: 2075 mL    21 @ 07:21 @ 14:28  --------------------------------------------------------  IN: 0 mL / OUT: 550 mL / NET: -550 mL          RADIOLOGY & ADDITIONAL TESTS:      PHYSICAL EXAM:    PHYSICAL EXAM-  GENERAL: NAD, well-groomed, well-developed  HEAD:  Atraumatic, Normocephalic  EYES: EOMI, PERRLA, conjunctiva and sclera clear  NECK: Supple, No JVD  NERVOUS SYSTEM:  Alert & Oriented X3, Motor Strength 5/5 B/L upper and lower extremities; DTRs 2+ intact and symmetric  CHEST/LUNG: bibasilar crackles  HEART: Regular rate and rhythm; No murmurs, rubs, or gallops  ABDOMEN: Soft, Nontender, Nondistended; Bowel sounds present  EXTREMITIES:  2+ Peripheral Pulses, moderate sized bulging R thigh hematoma with L leg edema.

## 2021-01-13 NOTE — PROGRESS NOTE ADULT - SUBJECTIVE AND OBJECTIVE BOX
[place] [facility] - [facility], [facility]                                   [address] [place] [address]                                                      PHONE: [phone]                                                         FAX: [phone]  [email or web address]  ---------------------------------------------------------------------------------------------------------------------------------    Overnight events/patient complaints:  tele AF with MVR, duriesed/ s/p 1 u prbc      warfarin (Rash)    MEDICATIONS  (STANDING):  ascorbic acid 500 milliGRAM(s) Oral daily  atorvastatin 20 milliGRAM(s) Oral at bedtime  cholecalciferol 5000 Unit(s) Oral daily  clopidogrel Tablet 75 milliGRAM(s) Oral daily  cyanocobalamin 5000 MICROGram(s) Oral daily  dextrose 40% Gel 15 Gram(s) Oral once  dextrose 5%. 1000 milliLiter(s) (50 mL/Hr) IV Continuous <Continuous>  dextrose 5%. 1000 milliLiter(s) (100 mL/Hr) IV Continuous <Continuous>  dextrose 50% Injectable 25 Gram(s) IV Push once  dextrose 50% Injectable 12.5 Gram(s) IV Push once  dextrose 50% Injectable 25 Gram(s) IV Push once  furosemide   Injectable 40 milliGRAM(s) IV Push daily  glucagon  Injectable 1 milliGRAM(s) IntraMuscular once  insulin lispro (ADMELOG) corrective regimen sliding scale   SubCutaneous three times a day before meals  insulin lispro (ADMELOG) corrective regimen sliding scale   SubCutaneous at bedtime  metoprolol tartrate 50 milliGRAM(s) Oral every 8 hours  vitamin E 400 International Unit(s) Oral daily    MEDICATIONS  (PRN):  acetaminophen   Tablet .. 650 milliGRAM(s) Oral every 6 hours PRN Temp greater or equal to 38C (100.4F), Mild Pain (1 - 3)  ondansetron Injectable 4 milliGRAM(s) IV Push every 6 hours PRN Nausea and/or Vomiting      Vital Signs Last 24 Hrs  T(C): 36.8 (2021 03:05), Max: 36.9 (2021 00:19)  T(F): 98.2 (2021 03:05), Max: 98.5 (2021 00:19)  HR: 79 (2021 08:00) (68 - 134)  BP: 109/69 (2021 08:00) (100/76 - 121/68)  BP(mean): 77 (2021 06:04) (73 - 88)  RR: 22 (2021 06:52) (18 - 24)  SpO2: 98% (2021 06:59) (97% - 100%)  Daily     Daily   ICU Vital Signs Last 24 Hrs  DONALD SAUNDERS  I&O's Detail    2021 07:01  -  2021 07:00  --------------------------------------------------------  IN:    PRBCs (Packed Red Blood Cells): 275 mL    sodium chloride 0.9%: 1650 mL    sodium chloride 0.9%: 150 mL  Total IN: 2075 mL    OUT:  Total OUT: 0 mL    Total NET: 2075 mL        I&O's Summary    2021 07:01  -  2021 07:00  --------------------------------------------------------  IN: 2075 mL / OUT: 0 mL / NET: 2075 mL      Drug Dosing Weight  DONALD SAUNDERS      PHYSICAL EXAM:  General: Appears well developed, well nourished alert and cooperative.  HEENT: Head; normocephalic, atraumatic.  Eyes: Pupils reactive, cornea wnl.  Neck: Supple, no nodes adenopathy, no NVD or carotid bruit or thyromegaly.  CARDIOVASCULAR: Normal S1 and S2, No murmur, rub, gallop or lift.   LUNGS: decreased BS right base  ABDOMEN: Soft, nontender without mass or organomegaly. bowel sounds normoactive.  EXTREMITIES: No clubbing, cyanosis or edema. Distal pulses wnl.   SKIN: warm and dry with normal turgor.  NEURO: Alert/oriented x 3/normal motor exam. No pathologic reflexes.    PSYCH: normal affect.        LABS:                        9.3    15.29 )-----------( 314      ( 2021 07:21 )             29.3     01-13    137  |  101  |  23.0<H>  ----------------------------<  88  4.5   |  22.0  |  0.75    Ca    8.7      2021 07:21  Phos  4.2     01-  Mg     2.2     -    TPro  7.0  /  Alb  4.1  /  TBili  0.9  /  DBili  x   /  AST  151<H>  /  ALT  27  /  AlkPhos  65  01-11    DONALD SAUNDERS  CARDIAC MARKERS ( 2021 07:21 )  x     / 2.85 ng/mL / 2305 U/L / x     / 188.6 ng/mL  CARDIAC MARKERS ( 2021 08:22 )  x     / 1.69 ng/mL / 3181 U/L / x     / 435.7 ng/mL  CARDIAC MARKERS ( 2021 02:25 )  x     / 0.90 ng/mL / 1801 U/L / x     / 275.7 ng/mL  CARDIAC MARKERS ( 2021 23:15 )  x     / 0.61 ng/mL / x     / x     / x          PT/INR - ( 2021 23:15 )   PT: 18.9 sec;   INR: 1.67 ratio         PTT - ( 2021 12:32 )  PTT:28.9 sec  Urinalysis Basic - ( 2021 06:04 )    Color: Yellow / Appearance: Clear / S.025 / pH: x  Gluc: x / Ketone: Trace  / Bili: Negative / Urobili: Negative mg/dL   Blood: x / Protein: 30 mg/dL / Nitrite: Negative   Leuk Esterase: Small / RBC: 3-5 /HPF / WBC 6-10   Sq Epi: x / Non Sq Epi: Many / Bacteria: Few        RADIOLOGY & ADDITIONAL STUDIES:    INTERPRETATION OF TELEMETRY (personally reviewed):    ECG:    ECHO:< from: TTE Echo Complete w/o Contrast w/ Doppler (21 @ 10:48) >    PHYSICIAN INTERPRETATION:  Left Ventricle: Endocardial visualization was enhanced with intravenous echo contrast. The left ventricular internal cavity size is normal. Left ventricular wall thickness is normal.  Global LV systolic function was mildly to moderately decreased. Left ventricular ejection fraction, by visual estimation, is 40 to 45%. The mitral in-flow pattern reveals no discernableA-wave, therefore no comment on diastolic function can be made.      LV Wall Scoring:  The entire apex is hypokinetic.    Right Ventricle: Normal right ventricular size and function.  Left Atrium: Moderately enlarged left atrium.  Right Atrium: Moderately enlarged right atrium.  Pericardium: There is no evidence of pericardial effusion. There is a small pleural effusion in both left and right lateral regions.  Mitral Valve: Structurally normal mitral valve, with normal leaflet excursion. Mild to moderate mitral valve regurgitation is seen.  Tricuspid Valve: Structurally normal tricuspid valve, with normal leaflet excursion. Moderate tricuspid regurgitation is visualized. Estimated pulmonary artery systolic pressure is 59.6 mmHg assuming a right atrial pressure of 10 mmHg, which is consistent with moderate pulmonary hypertension.  Aortic Valve: Peak transaortic gradient equals 18.7 mmHg, mean transaortic gradient equals 9.0 mmHg, the calculated aortic valve area equals 1.34 cm² by the continuity equation consistent with moderate aortic stenosis. No evidence of aortic valve regurgitation is seen.  Pulmonic Valve: Structurally normal pulmonic valve, with normal leaflet excursion. Trace pulmonic valve regurgitation.  Aorta: The aortic root is normal in size and structure.  Pulmonary Artery: The main pulmonary artery is normal in size.  Venous: The inferior vena cava was dilated, with respiratory size variation less than 50%.      Summary:   1. Left ventricular ejection fraction, by visual estimation, is 40 to 45%.   2. Mildly to moderately decreased global left ventricular systolic function.   3. Entire apex is abnormal as described above.   4. The mitral in-flow pattern reveals no discernable A-wave, therefore no comment on diastolic function can be made.   5. Moderately enlarged left atrium.   6. Moderately enlarged right atrium.   7. There is no evidence of pericardial effusion.   8. Mild to moderate mitral valve regurgitation.   9. Moderate tricuspid regurgitation.  10. Estimated pulmonary artery systolic pressure is 59.6 mmHg assuming a right atrial pressure of 10 mmHg, which is consistent with moderate pulmonary hypertension.  11. Endocardial visualization was enhanced with intravenous echo contrast.  12. Peak transaortic gradient equals 18.7 mmHg, mean transaortic gradient equals 9.0 mmHg, the calculated aortic valve area equals 1.34 cm² by the continuity equation consistent with moderate aortic stenosis.    MD Sarah Electronically signed on 2021 at 3:46:18 PM            *** Final ***

## 2021-01-13 NOTE — PROGRESS NOTE ADULT - ASSESSMENT
75yoF hx Afib on Xarelto, PAD s/p G-zbkirlt-vknsdvhdb bypass, R-femoral angioplasty with stenting, on Plavix, HTN, HLD, DM presenting with acute dyspnea and palpitations yesterday, found to be in Afib with RVR    Acute systolic HF-  Pt was given IVF yesterday as was hypotensive on arrival.   D/c all IVF  increase Lasix 40 IV daily to q12  CXR with worsening Pulm vasc congestion & b/l pleural effusions  Strict I & O  Daily weight  Fluid restriction < 1L  Echo: EF 40%, mod MR< mod TR, mod PHTN, mod AS  Cardio appreciated      PAF with RVR  -Held home dose Cardizem for now, resume as clinically indicated  -metoprolol 50 q6  -Xarelto held per cardio  -Cardiology appreciated  c/t tele      NSTEMI EF 40-45% apical MI  -Troponin 0.61, repeat troponin rising to 2.1  -EKG showing ST depressions in leads I, II, V4-V6  -Pt already on Xarelto, no heparin gtt recommended by cards to ED team  -Trend cardiac enzymes and repeat EKG  -Telemetry monitoring  Awaiting cath today      Leukocytosis  -WBC 16, afebrile, pt with no signs  of infection  -Possibly reactive from recent fall with R-thigh hematoma  -CXR with  pulm vasc congestion  -UA weakly positive, f/u urine cx & blood cx   procalcitonin wnl  -Monitoring off abx  -Trend CBC      Right thigh hematoma with normocytic anemia  -Pt hit R-thigh into cabinet at home on 1/9  -Came to ED found to have R-thigh hematoma, no active extravasation on CT  -R-thigh wrapped in ACE bandage  -Pt was instructed to continue AC upon recent ED discharge  -Hgb decrease from 10.3 to 9.1 to 8.1 noted  -D/fran  Xarelto as per cardio but Plavix to yet continue as per them      PAD  -Plavix resumed    DM   -On Tresiba 25U and 30U, held basal insulin due to vomiting/poor PO intake  -ISS for now, consistent carbohydrate diet    HTN  -Holding Cardizem and enalapril due to Afib with RVR and hypotension  -Metoprolol resumed at lower dose    HLD  -Statin resumed    Prophylactic measure  -On Xarelto

## 2021-01-14 DIAGNOSIS — Z29.9 ENCOUNTER FOR PROPHYLACTIC MEASURES, UNSPECIFIED: ICD-10-CM

## 2021-01-14 DIAGNOSIS — I10 ESSENTIAL (PRIMARY) HYPERTENSION: ICD-10-CM

## 2021-01-14 LAB
ALBUMIN SERPL ELPH-MCNC: 3.5 G/DL — SIGNIFICANT CHANGE UP (ref 3.3–5.2)
ALP SERPL-CCNC: 63 U/L — SIGNIFICANT CHANGE UP (ref 40–120)
ALT FLD-CCNC: 352 U/L — HIGH
ANION GAP SERPL CALC-SCNC: 10 MMOL/L — SIGNIFICANT CHANGE UP (ref 5–17)
APTT BLD: 35.5 SEC — SIGNIFICANT CHANGE UP (ref 27.5–35.5)
APTT BLD: 44.6 SEC — HIGH (ref 27.5–35.5)
APTT BLD: 49 SEC — HIGH (ref 27.5–35.5)
AST SERPL-CCNC: 486 U/L — HIGH
BASOPHILS # BLD AUTO: 0.03 K/UL — SIGNIFICANT CHANGE UP (ref 0–0.2)
BASOPHILS NFR BLD AUTO: 0.2 % — SIGNIFICANT CHANGE UP (ref 0–2)
BILIRUB SERPL-MCNC: 2.5 MG/DL — HIGH (ref 0.4–2)
BUN SERPL-MCNC: 25 MG/DL — HIGH (ref 8–20)
CALCIUM SERPL-MCNC: 8.3 MG/DL — LOW (ref 8.6–10.2)
CHLORIDE SERPL-SCNC: 98 MMOL/L — SIGNIFICANT CHANGE UP (ref 98–107)
CK MB CFR SERPL CALC: 49.4 NG/ML — HIGH (ref 0–6.7)
CK SERPL-CCNC: 1139 U/L — HIGH (ref 25–170)
CO2 SERPL-SCNC: 26 MMOL/L — SIGNIFICANT CHANGE UP (ref 22–29)
CREAT SERPL-MCNC: 0.84 MG/DL — SIGNIFICANT CHANGE UP (ref 0.5–1.3)
EOSINOPHIL # BLD AUTO: 0.01 K/UL — SIGNIFICANT CHANGE UP (ref 0–0.5)
EOSINOPHIL NFR BLD AUTO: 0.1 % — SIGNIFICANT CHANGE UP (ref 0–6)
GLUCOSE BLDC GLUCOMTR-MCNC: 125 MG/DL — HIGH (ref 70–99)
GLUCOSE BLDC GLUCOMTR-MCNC: 148 MG/DL — HIGH (ref 70–99)
GLUCOSE BLDC GLUCOMTR-MCNC: 179 MG/DL — HIGH (ref 70–99)
GLUCOSE SERPL-MCNC: 104 MG/DL — HIGH (ref 70–99)
HCT VFR BLD CALC: 25.2 % — LOW (ref 34.5–45)
HGB BLD-MCNC: 8.3 G/DL — LOW (ref 11.5–15.5)
IMM GRANULOCYTES NFR BLD AUTO: 0.5 % — SIGNIFICANT CHANGE UP (ref 0–1.5)
INR BLD: 1.63 RATIO — HIGH (ref 0.88–1.16)
INR BLD: 1.65 RATIO — HIGH (ref 0.88–1.16)
LYMPHOCYTES # BLD AUTO: 16.3 % — SIGNIFICANT CHANGE UP (ref 13–44)
LYMPHOCYTES # BLD AUTO: 2.01 K/UL — SIGNIFICANT CHANGE UP (ref 1–3.3)
MAGNESIUM SERPL-MCNC: 2.1 MG/DL — SIGNIFICANT CHANGE UP (ref 1.6–2.6)
MCHC RBC-ENTMCNC: 27.5 PG — SIGNIFICANT CHANGE UP (ref 27–34)
MCHC RBC-ENTMCNC: 32.9 GM/DL — SIGNIFICANT CHANGE UP (ref 32–36)
MCV RBC AUTO: 83.4 FL — SIGNIFICANT CHANGE UP (ref 80–100)
MONOCYTES # BLD AUTO: 1.24 K/UL — HIGH (ref 0–0.9)
MONOCYTES NFR BLD AUTO: 10 % — SIGNIFICANT CHANGE UP (ref 2–14)
MRSA PCR RESULT.: SIGNIFICANT CHANGE UP
NEUTROPHILS # BLD AUTO: 9 K/UL — HIGH (ref 1.8–7.4)
NEUTROPHILS NFR BLD AUTO: 72.9 % — SIGNIFICANT CHANGE UP (ref 43–77)
PHOSPHATE SERPL-MCNC: 3 MG/DL — SIGNIFICANT CHANGE UP (ref 2.4–4.7)
PLATELET # BLD AUTO: 335 K/UL — SIGNIFICANT CHANGE UP (ref 150–400)
POTASSIUM SERPL-MCNC: 3.6 MMOL/L — SIGNIFICANT CHANGE UP (ref 3.5–5.3)
POTASSIUM SERPL-SCNC: 3.6 MMOL/L — SIGNIFICANT CHANGE UP (ref 3.5–5.3)
PROT SERPL-MCNC: 6.6 G/DL — SIGNIFICANT CHANGE UP (ref 6.6–8.7)
PROTHROM AB SERPL-ACNC: 18.5 SEC — HIGH (ref 10.6–13.6)
PROTHROM AB SERPL-ACNC: 18.7 SEC — HIGH (ref 10.6–13.6)
RBC # BLD: 3.02 M/UL — LOW (ref 3.8–5.2)
RBC # FLD: 15.9 % — HIGH (ref 10.3–14.5)
S AUREUS DNA NOSE QL NAA+PROBE: DETECTED
SODIUM SERPL-SCNC: 134 MMOL/L — LOW (ref 135–145)
WBC # BLD: 12.35 K/UL — HIGH (ref 3.8–10.5)
WBC # FLD AUTO: 12.35 K/UL — HIGH (ref 3.8–10.5)

## 2021-01-14 PROCEDURE — 99232 SBSQ HOSP IP/OBS MODERATE 35: CPT

## 2021-01-14 PROCEDURE — 93010 ELECTROCARDIOGRAM REPORT: CPT

## 2021-01-14 PROCEDURE — 71045 X-RAY EXAM CHEST 1 VIEW: CPT | Mod: 26

## 2021-01-14 RX ORDER — HEPARIN SODIUM 5000 [USP'U]/ML
1400 INJECTION INTRAVENOUS; SUBCUTANEOUS
Qty: 25000 | Refills: 0 | Status: DISCONTINUED | OUTPATIENT
Start: 2021-01-14 | End: 2021-01-14

## 2021-01-14 RX ORDER — METOPROLOL TARTRATE 50 MG
50 TABLET ORAL EVERY 12 HOURS
Refills: 0 | Status: DISCONTINUED | OUTPATIENT
Start: 2021-01-14 | End: 2021-01-15

## 2021-01-14 RX ORDER — POTASSIUM CHLORIDE 20 MEQ
40 PACKET (EA) ORAL ONCE
Refills: 0 | Status: COMPLETED | OUTPATIENT
Start: 2021-01-14 | End: 2021-01-14

## 2021-01-14 RX ORDER — POTASSIUM CHLORIDE 20 MEQ
10 PACKET (EA) ORAL
Refills: 0 | Status: COMPLETED | OUTPATIENT
Start: 2021-01-14 | End: 2021-01-14

## 2021-01-14 RX ORDER — BUMETANIDE 0.25 MG/ML
2 INJECTION INTRAMUSCULAR; INTRAVENOUS EVERY 12 HOURS
Refills: 0 | Status: DISCONTINUED | OUTPATIENT
Start: 2021-01-14 | End: 2021-01-18

## 2021-01-14 RX ORDER — NICARDIPINE HYDROCHLORIDE 30 MG/1
5 CAPSULE, EXTENDED RELEASE ORAL
Qty: 40 | Refills: 0 | Status: DISCONTINUED | OUTPATIENT
Start: 2021-01-14 | End: 2021-01-14

## 2021-01-14 RX ORDER — ALPRAZOLAM 0.25 MG
0.25 TABLET ORAL ONCE
Refills: 0 | Status: DISCONTINUED | OUTPATIENT
Start: 2021-01-14 | End: 2021-01-14

## 2021-01-14 RX ORDER — INSULIN LISPRO 100/ML
VIAL (ML) SUBCUTANEOUS
Refills: 0 | Status: DISCONTINUED | OUTPATIENT
Start: 2021-01-14 | End: 2021-01-19

## 2021-01-14 RX ADMIN — PREGABALIN 5000 MICROGRAM(S): 225 CAPSULE ORAL at 12:45

## 2021-01-14 RX ADMIN — Medication 500 MILLIGRAM(S): at 12:45

## 2021-01-14 RX ADMIN — Medication 50 MILLIGRAM(S): at 05:02

## 2021-01-14 RX ADMIN — HEPARIN SODIUM 14 UNIT(S)/HR: 5000 INJECTION INTRAVENOUS; SUBCUTANEOUS at 06:08

## 2021-01-14 RX ADMIN — Medication 100 MILLIEQUIVALENT(S): at 09:22

## 2021-01-14 RX ADMIN — Medication 2: at 23:28

## 2021-01-14 RX ADMIN — Medication 40 MILLIGRAM(S): at 05:02

## 2021-01-14 RX ADMIN — Medication 100 MILLIEQUIVALENT(S): at 07:15

## 2021-01-14 RX ADMIN — CHLORHEXIDINE GLUCONATE 1 APPLICATION(S): 213 SOLUTION TOPICAL at 00:05

## 2021-01-14 RX ADMIN — NICARDIPINE HYDROCHLORIDE 25 MG/HR: 30 CAPSULE, EXTENDED RELEASE ORAL at 06:08

## 2021-01-14 RX ADMIN — SENNA PLUS 2 TABLET(S): 8.6 TABLET ORAL at 23:24

## 2021-01-14 RX ADMIN — PANTOPRAZOLE SODIUM 40 MILLIGRAM(S): 20 TABLET, DELAYED RELEASE ORAL at 12:44

## 2021-01-14 RX ADMIN — Medication 40 MILLIEQUIVALENT(S): at 17:19

## 2021-01-14 RX ADMIN — Medication 100 MILLIEQUIVALENT(S): at 06:42

## 2021-01-14 RX ADMIN — Medication 5000 UNIT(S): at 15:28

## 2021-01-14 RX ADMIN — BUMETANIDE 2 MILLIGRAM(S): 0.25 INJECTION INTRAMUSCULAR; INTRAVENOUS at 17:19

## 2021-01-14 RX ADMIN — Medication 0.25 MILLIGRAM(S): at 06:07

## 2021-01-14 RX ADMIN — ATORVASTATIN CALCIUM 20 MILLIGRAM(S): 80 TABLET, FILM COATED ORAL at 23:24

## 2021-01-14 RX ADMIN — Medication 50 MILLIGRAM(S): at 17:19

## 2021-01-14 NOTE — CHART NOTE - NSCHARTNOTEFT_GEN_A_CORE
IABP in place.  Groin soft, nontender, no hematoma, no bleed, DP 2+. IABP in place.  Left groin soft, nontender, no hematoma, no bleed, DP 2+.

## 2021-01-14 NOTE — PROGRESS NOTE ADULT - SUBJECTIVE AND OBJECTIVE BOX
GERALDINE Report    1. Moderately decreased global left ventricular systolic function. Left ventricular ejection fraction, by visual estimation, is 35 to 40%.   2. Normal right ventricular size and function.   3. Mild to moderately enlarged left atrium.   4. Mildly enlarged right atrium.   5. Moderate mitral valve regurgitation.   6. Mild-moderate tricuspid regurgitation.   7. Mild aortic valve stenosis. The non-coronary cusp is calcified and immobile, but the right and left coronary cusps open normally. The aortic valve area is 1.6 cm2 by planimetry.   8. No left atrial appendage thrombus. There is spontaneous echo contrast seen in the left atrial appendage, but there is no filling defect seen with the administration of Definity.

## 2021-01-14 NOTE — DIETITIAN INITIAL EVALUATION ADULT. - OTHER INFO
Pt is a 75 year old Female with pmhx Afib on Xarelto, PAD s/p H-iyprdym-vvstrgilj bypass, R-femoral angioplasty with stenting, on Plavix, HTN, HLD, DM presenting with acute dyspnea and palpitations yesterday.  Pt was in her normal state of health, then developed acute onset of exertional dyspnea associated with palpitations that progressed to dyspnea at rest which prompted her to come to the hospital.  She also reports some transient vomiting yesterday that is now improving.  In ED, pt was in Afib with RVR associated with soft SBP in 90s.  She was given IV Cardizem and PO metoprolol without improvement and ED staff spoke with [place] cardiology who recommended digoxin.   Labs notable for leukocytosis and elevated troponin.  Of note, pt came to [facility] ED on 1/9 for R-thigh pain after walking into a cabinet at home. She underwent a CT angio A/P that showed R-thigh hematoma w/out extravasation.  Her leg was wrapped and she was d/c’ed from ED with instruction to continue with blood thinners and outpatient follow up. She denies any worsening R-thigh or leg pain. Patient comes into the hospital with afib. Pt was found to have MVD (right ostial, left main and LAD) , left IABP (cath was via the right radial artery).  Plan for OR Friday 1/15 for CABG with Dr. Orellana. Pt required BIPAP post IABP, NPO status maintained at this time. Unable to interview pt at this time.

## 2021-01-14 NOTE — DIETITIAN INITIAL EVALUATION ADULT. - PERTINENT LABORATORY DATA
01-14 Na134 mmol/L<L> Glu 104 mg/dL<H> K+ 3.6 mmol/L Cr  0.84 mg/dL BUN 25.0 mg/dL<H> Phos 3.0 mg/dL Alb 3.5 g/dL PAB n/a

## 2021-01-14 NOTE — PROGRESS NOTE ADULT - ASSESSMENT
1/13 patient comes in with SOB, had LHC and was found to have MVD (right ostial, left main and LAD) , left IABP (cath was via the right radial atery).  Plan for OR Friday 1/15 for CABG with Dr. Orellana.

## 2021-01-14 NOTE — PROGRESS NOTE ADULT - SUBJECTIVE AND OBJECTIVE BOX
Subjective:  76yo F resting comfortable, no c/o pain.      HPI:  75yoF hx Afib on Xarelto, PAD s/p N-eszmsmf-vhygzndln bypass, R-femoral angioplasty with stenting, on Plavix, HTN, HLD, DM presenting with acute dyspnea and palpitations yesterday.  Pt was in her normal state of health, then developed acute onset of exertional dyspnea associated with palpitations that progressed to dyspnea at rest which prompted her to come to the hospital.  She also reports some transient vomiting yesterday that is now improving.  In ED, pt was in Afib with RVR associated with soft SBP in 90s.  She was given IV Cardizem and PO metoprolol without improvement and ED staff spoke with [place] cardiology who recommended digoxin.   Labs notable for leukocytosis and elevated troponin.  Pt denies any chest pain, lightheadedness, syncope, cough, abdominal pain, diarrhea, or urinary symptoms.   Of note, pt came to [facility] ED on 1/9 for R-thigh pain after walking into a cabinet at home. She underwent a CT angio A/P that showed R-thigh hematoma w/out extravasation.  Her leg was wrapped and she was d/c’ed from ED with instruction to continue wtith blood thinners and outpatient follow up. She denies any worsening R-thigh or leg pain. Patient comes into the hospital with afib (12 Jan 2021 03:52)          PAST MEDICAL & SURGICAL HISTORY:  PAD (peripheral artery disease)    Paroxysmal atrial fibrillation    Hypercholesterolemia    Diabetes    Hypertension    S/P peripheral artery angioplasty with stent placement    S/P femoral-popliteal bypass surgery    H/O hernia repair    H/O abdominal hysterectomy            MEDICATIONS  (STANDING):  ascorbic acid 500 milliGRAM(s) Oral daily  atorvastatin 20 milliGRAM(s) Oral at bedtime  cefuroxime  IVPB 1500 milliGRAM(s) IV Intermittent once  chlorhexidine 0.12% Liquid 4 milliLiter(s) Swish and Spit once  cholecalciferol 5000 Unit(s) Oral daily  cyanocobalamin 5000 MICROGram(s) Oral daily  dextrose 40% Gel 15 Gram(s) Oral once  dextrose 5%. 1000 milliLiter(s) (50 mL/Hr) IV Continuous <Continuous>  dextrose 5%. 1000 milliLiter(s) (100 mL/Hr) IV Continuous <Continuous>  dextrose 50% Injectable 25 Gram(s) IV Push once  dextrose 50% Injectable 12.5 Gram(s) IV Push once  dextrose 50% Injectable 25 Gram(s) IV Push once  furosemide   Injectable 40 milliGRAM(s) IV Push every 12 hours  glucagon  Injectable 1 milliGRAM(s) IntraMuscular once  heparin  Infusion 1300 Unit(s)/Hr (13 mL/Hr) IV Continuous <Continuous>  insulin lispro (ADMELOG) corrective regimen sliding scale   SubCutaneous three times a day before meals  insulin lispro (ADMELOG) corrective regimen sliding scale   SubCutaneous at bedtime  metoprolol tartrate 25 milliGRAM(s) Oral every 12 hours  niCARdipine Infusion 5 mG/Hr (25 mL/Hr) IV Continuous <Continuous>  pantoprazole    Tablet 40 milliGRAM(s) Oral before breakfast  senna 2 Tablet(s) Oral at bedtime    MEDICATIONS  (PRN):  acetaminophen   Tablet .. 650 milliGRAM(s) Oral every 6 hours PRN Temp greater or equal to 38C (100.4F), Mild Pain (1 - 3)  ondansetron Injectable 4 milliGRAM(s) IV Push every 6 hours PRN Nausea and/or Vomiting          Allergies    warfarin (Rash)    Intolerances          WEIGHTS:  Daily Height in cm: 177.8 (13 Jan 2021 11:49)    Daily   Admit Wt: Drug Dosing Weight  Height (cm): 177.8 (13 Jan 2021 11:49)  Weight (kg): 86.2 (13 Jan 2021 11:49)  BMI (kg/m2): 27.3 (13 Jan 2021 11:49)  BSA (m2): 2.04 (13 Jan 2021 11:49)  I&O's Summary    12 Jan 2021 07:01  -  13 Jan 2021 07:00  --------------------------------------------------------  IN: 2075 mL / OUT: 0 mL / NET: 2075 mL    13 Jan 2021 07:01  -  14 Jan 2021 03:15  --------------------------------------------------------  IN: 350.5 mL / OUT: 1350 mL / NET: -999.5 mL        VITAL SIGNS:  ICU Vital Signs Last 24 Hrs  T(C): 36.7 (14 Jan 2021 00:37), Max: 37.2 (13 Jan 2021 10:09)  T(F): 98 (14 Jan 2021 00:37), Max: 99 (13 Jan 2021 10:09)  HR: 101 (14 Jan 2021 03:00) (68 - 113)  BP: 147/69 (14 Jan 2021 03:00) (103/58 - 182/70)  BP(mean): 99 (14 Jan 2021 03:00) (77 - 113)  ABP: --  ABP(mean): --  RR: 21 (14 Jan 2021 03:00) (17 - 31)  SpO2: 92% (14 Jan 2021 03:00) (91% - 100%)        All laboratory results, radiology and medications reviewed.    LABS:  01-13    136  |  99  |  25.0<H>  ----------------------------<  63<L>  3.9   |  23.0  |  0.75    Ca    8.4<L>      13 Jan 2021 16:47  Phos  4.2     01-13  Mg     2.1     01-13    TPro  6.5<L>  /  Alb  3.8  /  TBili  2.6<H>  /  DBili  x   /  AST  756<H>  /  ALT  369<H>  /  AlkPhos  63  01-13                                 8.8    x     )-----------( x        ( 13 Jan 2021 22:35 )             27.4          PT/INR - ( 13 Jan 2021 16:47 )   PT: 22.2 sec;   INR: 1.98 ratio         PTT - ( 13 Jan 2021 16:47 )  PTT:88.2 sec  Bilirubin Total, Serum: 2.6 mg/dL (01-13 @ 16:47)      CARDIAC MARKERS ( 13 Jan 2021 22:34 )  x     / 5.85 ng/mL / x     / x     / x      CARDIAC MARKERS ( 13 Jan 2021 16:45 )  x     / 4.33 ng/mL / x     / x     / x      CARDIAC MARKERS ( 13 Jan 2021 07:21 )  x     / 2.85 ng/mL / 2305 U/L / x     / 188.6 ng/mL  CARDIAC MARKERS ( 12 Jan 2021 08:22 )  x     / 1.69 ng/mL / 3181 U/L / x     / 435.7 ng/mL      CAPILLARY BLOOD GLUCOSE      POCT Blood Glucose.: 86 mg/dL (13 Jan 2021 20:35)  POCT Blood Glucose.: 96 mg/dL (13 Jan 2021 12:34)  POCT Blood Glucose.: 86 mg/dL (13 Jan 2021 08:26)             PHYSICAL EXAM:  General:  well nourished, no acute distress  Neurology:  alert and oriented X 4, nonfocal, no gross deficits  Respiratory:  clear to auscultation bilateral  CV:  A Fib, rate controlled  Abdomen:  soft, nontender, nondistended, positive bowel sounds  Extremities:  warm, well perfused, trace edema +DP pulses

## 2021-01-14 NOTE — CHART NOTE - NSCHARTNOTEFT_GEN_A_CORE
spoke to pt's son Derrick at her request to update him on her current condition, anticipated plan of care as well as answer any questions he had

## 2021-01-15 LAB
ANION GAP SERPL CALC-SCNC: 14 MMOL/L — SIGNIFICANT CHANGE UP (ref 5–17)
ANION GAP SERPL CALC-SCNC: 16 MMOL/L — SIGNIFICANT CHANGE UP (ref 5–17)
APTT BLD: 27 SEC — LOW (ref 27.5–35.5)
BUN SERPL-MCNC: 23 MG/DL — HIGH (ref 8–20)
BUN SERPL-MCNC: 23 MG/DL — HIGH (ref 8–20)
CALCIUM SERPL-MCNC: 8.5 MG/DL — LOW (ref 8.6–10.2)
CALCIUM SERPL-MCNC: 8.6 MG/DL — SIGNIFICANT CHANGE UP (ref 8.6–10.2)
CHLORIDE SERPL-SCNC: 96 MMOL/L — LOW (ref 98–107)
CHLORIDE SERPL-SCNC: 97 MMOL/L — LOW (ref 98–107)
CK MB CFR SERPL CALC: 13.2 NG/ML — HIGH (ref 0–6.7)
CK SERPL-CCNC: 404 U/L — HIGH (ref 25–170)
CO2 SERPL-SCNC: 25 MMOL/L — SIGNIFICANT CHANGE UP (ref 22–29)
CO2 SERPL-SCNC: 27 MMOL/L — SIGNIFICANT CHANGE UP (ref 22–29)
CREAT SERPL-MCNC: 0.77 MG/DL — SIGNIFICANT CHANGE UP (ref 0.5–1.3)
CREAT SERPL-MCNC: 0.78 MG/DL — SIGNIFICANT CHANGE UP (ref 0.5–1.3)
GLUCOSE BLDC GLUCOMTR-MCNC: 128 MG/DL — HIGH (ref 70–99)
GLUCOSE BLDC GLUCOMTR-MCNC: 166 MG/DL — HIGH (ref 70–99)
GLUCOSE BLDC GLUCOMTR-MCNC: 249 MG/DL — HIGH (ref 70–99)
GLUCOSE SERPL-MCNC: 127 MG/DL — HIGH (ref 70–99)
GLUCOSE SERPL-MCNC: 144 MG/DL — HIGH (ref 70–99)
HCT VFR BLD CALC: 28.4 % — LOW (ref 34.5–45)
HGB BLD-MCNC: 9.2 G/DL — LOW (ref 11.5–15.5)
INR BLD: 1.56 RATIO — HIGH (ref 0.88–1.16)
MAGNESIUM SERPL-MCNC: 1.9 MG/DL — SIGNIFICANT CHANGE UP (ref 1.6–2.6)
MAGNESIUM SERPL-MCNC: 2 MG/DL — SIGNIFICANT CHANGE UP (ref 1.8–2.6)
MCHC RBC-ENTMCNC: 27.3 PG — SIGNIFICANT CHANGE UP (ref 27–34)
MCHC RBC-ENTMCNC: 32.4 GM/DL — SIGNIFICANT CHANGE UP (ref 32–36)
MCV RBC AUTO: 84.3 FL — SIGNIFICANT CHANGE UP (ref 80–100)
PHOSPHATE SERPL-MCNC: 2.7 MG/DL — SIGNIFICANT CHANGE UP (ref 2.4–4.7)
PLATELET # BLD AUTO: 310 K/UL — SIGNIFICANT CHANGE UP (ref 150–400)
POTASSIUM SERPL-MCNC: 3.5 MMOL/L — SIGNIFICANT CHANGE UP (ref 3.5–5.3)
POTASSIUM SERPL-MCNC: 3.8 MMOL/L — SIGNIFICANT CHANGE UP (ref 3.5–5.3)
POTASSIUM SERPL-SCNC: 3.5 MMOL/L — SIGNIFICANT CHANGE UP (ref 3.5–5.3)
POTASSIUM SERPL-SCNC: 3.8 MMOL/L — SIGNIFICANT CHANGE UP (ref 3.5–5.3)
PROTHROM AB SERPL-ACNC: 17.7 SEC — HIGH (ref 10.6–13.6)
RBC # BLD: 3.37 M/UL — LOW (ref 3.8–5.2)
RBC # FLD: 15.7 % — HIGH (ref 10.3–14.5)
SODIUM SERPL-SCNC: 136 MMOL/L — SIGNIFICANT CHANGE UP (ref 135–145)
SODIUM SERPL-SCNC: 138 MMOL/L — SIGNIFICANT CHANGE UP (ref 135–145)
TROPONIN T SERPL-MCNC: 7.55 NG/ML — HIGH (ref 0–0.06)
WBC # BLD: 11.31 K/UL — HIGH (ref 3.8–10.5)
WBC # FLD AUTO: 11.31 K/UL — HIGH (ref 3.8–10.5)

## 2021-01-15 PROCEDURE — 99232 SBSQ HOSP IP/OBS MODERATE 35: CPT | Mod: 25

## 2021-01-15 PROCEDURE — 71045 X-RAY EXAM CHEST 1 VIEW: CPT | Mod: 26

## 2021-01-15 PROCEDURE — 76937 US GUIDE VASCULAR ACCESS: CPT | Mod: 26,59

## 2021-01-15 PROCEDURE — 36569 INSJ PICC 5 YR+ W/O IMAGING: CPT

## 2021-01-15 PROCEDURE — 33968 REMOVE AORTIC ASSIST DEVICE: CPT

## 2021-01-15 RX ORDER — ONDANSETRON 8 MG/1
4 TABLET, FILM COATED ORAL ONCE
Refills: 0 | Status: COMPLETED | OUTPATIENT
Start: 2021-01-15 | End: 2021-01-15

## 2021-01-15 RX ORDER — MUPIROCIN 20 MG/G
1 OINTMENT TOPICAL
Refills: 0 | Status: DISCONTINUED | OUTPATIENT
Start: 2021-01-15 | End: 2021-01-19

## 2021-01-15 RX ORDER — POTASSIUM CHLORIDE 20 MEQ
10 PACKET (EA) ORAL
Refills: 0 | Status: COMPLETED | OUTPATIENT
Start: 2021-01-15 | End: 2021-01-15

## 2021-01-15 RX ORDER — MAGNESIUM SULFATE 500 MG/ML
2 VIAL (ML) INJECTION ONCE
Refills: 0 | Status: COMPLETED | OUTPATIENT
Start: 2021-01-15 | End: 2021-01-15

## 2021-01-15 RX ORDER — ALPRAZOLAM 0.25 MG
0.25 TABLET ORAL ONCE
Refills: 0 | Status: DISCONTINUED | OUTPATIENT
Start: 2021-01-15 | End: 2021-01-15

## 2021-01-15 RX ORDER — AMIODARONE HYDROCHLORIDE 400 MG/1
150 TABLET ORAL ONCE
Refills: 0 | Status: COMPLETED | OUTPATIENT
Start: 2021-01-15 | End: 2021-01-15

## 2021-01-15 RX ORDER — AMIODARONE HYDROCHLORIDE 400 MG/1
200 TABLET ORAL DAILY
Refills: 0 | Status: DISCONTINUED | OUTPATIENT
Start: 2021-01-19 | End: 2021-01-19

## 2021-01-15 RX ORDER — METOPROLOL TARTRATE 50 MG
5 TABLET ORAL ONCE
Refills: 0 | Status: COMPLETED | OUTPATIENT
Start: 2021-01-15 | End: 2021-01-15

## 2021-01-15 RX ORDER — POTASSIUM CHLORIDE 20 MEQ
40 PACKET (EA) ORAL ONCE
Refills: 0 | Status: COMPLETED | OUTPATIENT
Start: 2021-01-15 | End: 2021-01-15

## 2021-01-15 RX ORDER — AMIODARONE HYDROCHLORIDE 400 MG/1
400 TABLET ORAL EVERY 8 HOURS
Refills: 0 | Status: COMPLETED | OUTPATIENT
Start: 2021-01-15 | End: 2021-01-18

## 2021-01-15 RX ORDER — SODIUM CHLORIDE 9 MG/ML
3 INJECTION INTRAMUSCULAR; INTRAVENOUS; SUBCUTANEOUS EVERY 8 HOURS
Refills: 0 | Status: DISCONTINUED | OUTPATIENT
Start: 2021-01-15 | End: 2021-01-19

## 2021-01-15 RX ORDER — ONDANSETRON 8 MG/1
4 TABLET, FILM COATED ORAL ONCE
Refills: 0 | Status: DISCONTINUED | OUTPATIENT
Start: 2021-01-15 | End: 2021-01-15

## 2021-01-15 RX ORDER — IPRATROPIUM/ALBUTEROL SULFATE 18-103MCG
3 AEROSOL WITH ADAPTER (GRAM) INHALATION EVERY 6 HOURS
Refills: 0 | Status: DISCONTINUED | OUTPATIENT
Start: 2021-01-15 | End: 2021-01-19

## 2021-01-15 RX ORDER — METOPROLOL TARTRATE 50 MG
50 TABLET ORAL EVERY 8 HOURS
Refills: 0 | Status: DISCONTINUED | OUTPATIENT
Start: 2021-01-15 | End: 2021-01-19

## 2021-01-15 RX ORDER — HYDRALAZINE HCL 50 MG
10 TABLET ORAL ONCE
Refills: 0 | Status: COMPLETED | OUTPATIENT
Start: 2021-01-15 | End: 2021-01-15

## 2021-01-15 RX ORDER — ASPIRIN/CALCIUM CARB/MAGNESIUM 324 MG
81 TABLET ORAL DAILY
Refills: 0 | Status: DISCONTINUED | OUTPATIENT
Start: 2021-01-15 | End: 2021-01-19

## 2021-01-15 RX ADMIN — PANTOPRAZOLE SODIUM 40 MILLIGRAM(S): 20 TABLET, DELAYED RELEASE ORAL at 06:23

## 2021-01-15 RX ADMIN — Medication 40 MILLIEQUIVALENT(S): at 13:45

## 2021-01-15 RX ADMIN — SODIUM CHLORIDE 3 MILLILITER(S): 9 INJECTION INTRAMUSCULAR; INTRAVENOUS; SUBCUTANEOUS at 23:00

## 2021-01-15 RX ADMIN — SENNA PLUS 2 TABLET(S): 8.6 TABLET ORAL at 21:35

## 2021-01-15 RX ADMIN — BUMETANIDE 2 MILLIGRAM(S): 0.25 INJECTION INTRAMUSCULAR; INTRAVENOUS at 18:56

## 2021-01-15 RX ADMIN — Medication 10 MILLIGRAM(S): at 05:25

## 2021-01-15 RX ADMIN — Medication 2: at 17:48

## 2021-01-15 RX ADMIN — Medication 500 MILLIGRAM(S): at 12:47

## 2021-01-15 RX ADMIN — ATORVASTATIN CALCIUM 20 MILLIGRAM(S): 80 TABLET, FILM COATED ORAL at 21:35

## 2021-01-15 RX ADMIN — AMIODARONE HYDROCHLORIDE 400 MILLIGRAM(S): 400 TABLET ORAL at 21:35

## 2021-01-15 RX ADMIN — Medication 50 MILLIGRAM(S): at 05:10

## 2021-01-15 RX ADMIN — BUMETANIDE 2 MILLIGRAM(S): 0.25 INJECTION INTRAMUSCULAR; INTRAVENOUS at 06:21

## 2021-01-15 RX ADMIN — Medication 100 MILLIEQUIVALENT(S): at 15:00

## 2021-01-15 RX ADMIN — Medication 50 MILLIGRAM(S): at 21:35

## 2021-01-15 RX ADMIN — Medication 5000 UNIT(S): at 12:47

## 2021-01-15 RX ADMIN — ONDANSETRON 4 MILLIGRAM(S): 8 TABLET, FILM COATED ORAL at 08:00

## 2021-01-15 RX ADMIN — MUPIROCIN 1 APPLICATION(S): 20 OINTMENT TOPICAL at 17:48

## 2021-01-15 RX ADMIN — Medication 3 MILLILITER(S): at 08:50

## 2021-01-15 RX ADMIN — Medication 100 MILLIEQUIVALENT(S): at 16:30

## 2021-01-15 RX ADMIN — Medication 5 MILLIGRAM(S): at 12:46

## 2021-01-15 RX ADMIN — Medication 0.25 MILLIGRAM(S): at 08:00

## 2021-01-15 RX ADMIN — AMIODARONE HYDROCHLORIDE 400 MILLIGRAM(S): 400 TABLET ORAL at 12:51

## 2021-01-15 RX ADMIN — AMIODARONE HYDROCHLORIDE 618 MILLIGRAM(S): 400 TABLET ORAL at 12:46

## 2021-01-15 RX ADMIN — Medication 100 MILLIEQUIVALENT(S): at 13:46

## 2021-01-15 RX ADMIN — Medication 4: at 21:44

## 2021-01-15 RX ADMIN — Medication 50 GRAM(S): at 13:45

## 2021-01-15 NOTE — PROGRESS NOTE ADULT - ASSESSMENT
1/13 patient comes in with SOB, had LHC and was found to have MVD (right ostial, left main and LAD) , left IABP (cath was via the right radial atery).  Plan for OR week of Jan 19th for CABG with Dr. Orellana.

## 2021-01-15 NOTE — CHART NOTE - NSCHARTNOTEFT_GEN_A_CORE
Removal of Intra-Aortic Balloon Pump Removal Note    The IABP (intra-aortic balloon pump) was weaned according to protocol. Hemodynamics remained stable. Pulses in the left lower extremity are palpable. The patient was placed in the supine position. The insertion site was identified and the stat locks were removed. The IABP was turned off and the balloon deflated. The IABP was then removed. Direct pressure was applied for 32 minutes. A sandbag was applied and is to remain in place for three hours.    Monitoring of the left groin and both lower extremities including neuro-vascular checks and vital signs every 15 minutes x 4, then every 30 minutes x 2, then every 1 hour x 4 was ordered.    Complications: none

## 2021-01-15 NOTE — PROGRESS NOTE ADULT - SUBJECTIVE AND OBJECTIVE BOX
Subjective:  74yo F resting comfortable, no c/o pain.      HPI:  75yoF hx Afib on Xarelto, PAD s/p M-tjpkxib-znvrclnok bypass, R-femoral angioplasty with stenting, on Plavix, HTN, HLD, DM presenting with acute dyspnea and palpitations yesterday.  Pt was in her normal state of health, then developed acute onset of exertional dyspnea associated with palpitations that progressed to dyspnea at rest which prompted her to come to the hospital.  She also reports some transient vomiting yesterday that is now improving.  In ED, pt was in Afib with RVR associated with soft SBP in 90s.  She was given IV Cardizem and PO metoprolol without improvement and ED staff spoke with [place] cardiology who recommended digoxin.   Labs notable for leukocytosis and elevated troponin.  Pt denies any chest pain, lightheadedness, syncope, cough, abdominal pain, diarrhea, or urinary symptoms.   Of note, pt came to [facility] ED on  for R-thigh pain after walking into a cabinet at home. She underwent a CT angio A/P that showed R-thigh hematoma w/out extravasation.  Her leg was wrapped and she was d/c’ed from ED with instruction to continue wtith blood thinners and outpatient follow up. She denies any worsening R-thigh or leg pain. Patient comes into the hospital with afib (2021 03:52)          PAST MEDICAL & SURGICAL HISTORY:  PAD (peripheral artery disease)    Paroxysmal atrial fibrillation    Hypercholesterolemia    Diabetes    Hypertension    S/P peripheral artery angioplasty with stent placement    S/P femoral-popliteal bypass surgery    H/O hernia repair    H/O abdominal hysterectomy            MEDICATIONS  (STANDING):  ascorbic acid 500 milliGRAM(s) Oral daily  atorvastatin 20 milliGRAM(s) Oral at bedtime  buMETAnide Injectable 2 milliGRAM(s) IV Push every 12 hours  cholecalciferol 5000 Unit(s) Oral daily  cyanocobalamin 5000 MICROGram(s) Oral daily  insulin lispro (ADMELOG) corrective regimen sliding scale   SubCutaneous Before meals and at bedtime  metoprolol tartrate 50 milliGRAM(s) Oral every 12 hours  pantoprazole    Tablet 40 milliGRAM(s) Oral before breakfast  senna 2 Tablet(s) Oral at bedtime    MEDICATIONS  (PRN):  acetaminophen   Tablet .. 650 milliGRAM(s) Oral every 6 hours PRN Temp greater or equal to 38C (100.4F), Mild Pain (1 - 3)  ondansetron Injectable 4 milliGRAM(s) IV Push every 6 hours PRN Nausea and/or Vomiting          Allergies    warfarin (Rash)    Intolerances          WEIGHTS:  Daily     Daily Weight in k (2021 06:15)  Admit Wt: Drug Dosing Weight  Height (cm): 177.8 (2021 11:49)  Weight (kg): 86.2 (2021 11:49)  BMI (kg/m2): 27.3 (2021 11:49)  BSA (m2): 2.04 (2021 11:49)  I&O's Summary    2021 07:01  -  2021 07:00  --------------------------------------------------------  IN: 567 mL / OUT: 1350 mL / NET: -783 mL    2021 07:01  -  15 Addison 2021 02:27  --------------------------------------------------------  IN: 1247 mL / OUT: 1000 mL / NET: 247 mL        VITAL SIGNS:  ICU Vital Signs Last 24 Hrs  T(C): 37.1 (15 Addison 2021 00:55), Max: 37.1 (2021 08:01)  T(F): 98.8 (15 Addison 2021 00:55), Max: 98.8 (2021 08:01)  HR: 75 (15 Addison 2021 01:00) (75 - 124)  BP: 155/103 (15 Addison 2021 01:00) (98/61 - 165/84)  BP(mean): 124 (15 Addison 2021 01:00) (75 - 124)  ABP: --  ABP(mean): --  RR: 24 (15 Addison 2021 01:00) (15 - 32)  SpO2: 97% (15 Addison 2021 01:00) (90% - 100%)        All laboratory results, radiology and medications reviewed.    LABS:      134<L>  |  98  |  25.0<H>  ----------------------------<  104<H>  3.6   |  26.0  |  0.84    Ca    8.3<L>      2021 04:14  Phos  3.0       Mg     2.1         TPro  6.6  /  Alb  3.5  /  TBili  2.5<H>  /  DBili  x   /  AST  486<H>  /  ALT  352<H>  /  AlkPhos  63                                   8.3    12.35 )-----------( 335      ( 2021 04:14 )             25.2          PT/INR - ( 2021 14:51 )   PT: 18.5 sec;   INR: 1.63 ratio         PTT - ( 2021 23:36 )  PTT:49.0 sec  Bilirubin Total, Serum: 2.5 mg/dL ( @ 04:14)      CARDIAC MARKERS ( 2021 04:14 )  x     / x     / 1139 U/L / x     / 49.4 ng/mL  CARDIAC MARKERS ( 2021 22:34 )  x     / 5.85 ng/mL / x     / x     / x      CARDIAC MARKERS ( 2021 16:45 )  x     / 4.33 ng/mL / x     / x     / x      CARDIAC MARKERS ( 2021 07:21 )  x     / 2.85 ng/mL / 2305 U/L / x     / 188.6 ng/mL      CAPILLARY BLOOD GLUCOSE      POCT Blood Glucose.: 179 mg/dL (2021 23:27)  POCT Blood Glucose.: 148 mg/dL (2021 17:07)  POCT Blood Glucose.: 125 mg/dL (2021 12:38)             PHYSICAL EXAM:  General:  well nourished, no acute distress  Neurology:  alert and oriented X 4, nonfocal, no gross deficits  Respiratory:  clear to auscultation bilaterally  CV:  A Fib, rate controlled  Abdomen:  soft, nontender, nondistended, positive bowel sounds  Extremities:  warm, well perfused, no edema +DP pulses, large old hematoma R lateral thigh area

## 2021-01-15 NOTE — PROCEDURE NOTE - ADDITIONAL PROCEDURE DETAILS
Lot #: [id number]  catheter trimmed to 10 cm in length  arm circumference at catheter insertion site is 34 cm

## 2021-01-15 NOTE — CHART NOTE - NSCHARTNOTEFT_GEN_A_CORE
6 hour post-removal of IABP (intra-aortic balloon pump) evaluation    Vital signs are stable, neuro-vascular status of the lower extremities is intact/stable and there is no evidence of hematoma of the left groin.

## 2021-01-15 NOTE — PROGRESS NOTE ADULT - SUBJECTIVE AND OBJECTIVE BOX
[facility] - [facility]                                   [address] [place] [address]                                                      PHONE: [phone]                                                         FAX: [phone]  [email or web address]  ---------------------------------------------------------------------------------------------------------------------------------    Overnight events/patient complaints: patient seen at bedside. She complains of SOB but denies chest pain.       warfarin (Rash)    MEDICATIONS  (STANDING):  aMIOdarone    Tablet 400 milliGRAM(s) Oral every 8 hours  ascorbic acid 500 milliGRAM(s) Oral daily  atorvastatin 20 milliGRAM(s) Oral at bedtime  buMETAnide Injectable 2 milliGRAM(s) IV Push every 12 hours  cholecalciferol 5000 Unit(s) Oral daily  insulin lispro (ADMELOG) corrective regimen sliding scale   SubCutaneous Before meals and at bedtime  metoprolol tartrate 50 milliGRAM(s) Oral every 12 hours  mupirocin 2% Nasal 1 Application(s) Nasal two times a day  pantoprazole    Tablet 40 milliGRAM(s) Oral before breakfast  senna 2 Tablet(s) Oral at bedtime    MEDICATIONS  (PRN):  acetaminophen   Tablet .. 650 milliGRAM(s) Oral every 6 hours PRN Temp greater or equal to 38C (100.4F), Mild Pain (1 - 3)  albuterol/ipratropium for Nebulization 3 milliLiter(s) Nebulizer every 6 hours PRN Shortness of Breath and/or Wheezing  ondansetron Injectable 4 milliGRAM(s) IV Push every 6 hours PRN Nausea and/or Vomiting      Vital Signs Last 24 Hrs  T(C): 36.4 (15 Addison 2021 12:00), Max: 37.1 (15 Addison 2021 00:00)  T(F): 97.5 (15 Addison 2021 12:00), Max: 98.8 (15 Addison 2021 00:00)  HR: 92 (15 Addison 2021 12:00) (72 - 96)  BP: 131/68 (15 Addison 2021 12:00) (110/55 - 180/104)  BP(mean): 95 (15 Addison 2021 12:00) (77 - 135)  RR: 26 (15 Addison 2021 12:00) (19 - 31)  SpO2: 95% (15 Addison 2021 12:00) (89% - 98%)  ICU Vital Signs Last 24 Hrs  DONALD SAUNDERS  I&O's Detail    14 Jan 2021 07:01  -  15 Addison 2021 07:00  --------------------------------------------------------  IN:    Heparin: 232 mL    IV PiggyBack: 300 mL    NiCARdipine: 25 mL    Oral Fluid: 450 mL    PRBCs (Packed Red Blood Cells): 300 mL  Total IN: 1307 mL    OUT:    Voided (mL): 2650 mL  Total OUT: 2650 mL    Total NET: -1343 mL        Drug Dosing Weight  DONALD SAUNDERS      PHYSICAL EXAM:  General: Appears well developed, well nourished alert and cooperative.  HEENT: Head; normocephalic, atraumatic.  Eyes: Pupils reactive, cornea wnl.  Neck: Supple, no nodes adenopathy, no NVD or carotid bruit or thyromegaly.  CARDIOVASCULAR: Normal S1 and S2, No murmur, rub, gallop or lift.   LUNGS: reduced breath sounds b/l   ABDOMEN: Soft, nontender without mass or organomegaly. bowel sounds normoactive.  EXTREMITIES: No clubbing, cyanosis or edema. Distal pulses wnl.   SKIN: warm and dry with normal turgor.  NEURO: Alert/oriented x 3/normal motor exam. No pathologic reflexes.    PSYCH: normal affect.        LABS:                        9.2    11.31 )-----------( 310      ( 15 Addison 2021 03:32 )             28.4     01-15    138  |  96<L>  |  23.0<H>  ----------------------------<  144<H>  3.5   |  27.0  |  0.78    Ca    8.5<L>      15 Addison 2021 12:48  Phos  2.7     01-15  Mg     1.9     01-15    TPro  6.6  /  Alb  3.5  /  TBili  2.5<H>  /  DBili  x   /  AST  486<H>  /  ALT  352<H>  /  AlkPhos  63  01-14    DONALD SAUNDERS  CARDIAC MARKERS ( 15 Addison 2021 03:32 )  x     / 7.55 ng/mL / 404 U/L / x     / 13.2 ng/mL  CARDIAC MARKERS ( 14 Jan 2021 04:14 )  x     / x     / 1139 U/L / x     / 49.4 ng/mL  CARDIAC MARKERS ( 13 Jan 2021 22:34 )  x     / 5.85 ng/mL / x     / x     / x      CARDIAC MARKERS ( 13 Jan 2021 16:45 )  x     / 4.33 ng/mL / x     / x     / x          PT/INR - ( 15 Addison 2021 03:32 )   PT: 17.7 sec;   INR: 1.56 ratio         PTT - ( 15 Addison 2021 03:32 )  PTT:27.0 sec      RADIOLOGY & ADDITIONAL STUDIES:    INTERPRETATION OF TELEMETRY (personally reviewed): normal sinus rhythm     GERALDINE 1/14/21  1. Moderately decreased global left ventricular systolic function. Left ventricular ejection fraction, by visual estimation, is 35 to 40%.   2. Normal right ventricular size and function.   3. Mild to moderately enlarged left atrium.   4. Mildly enlarged right atrium.   5. Moderate mitral valve regurgitation.   6. Mild-moderate tricuspid regurgitation.   7. Mild aortic valve stenosis. The non-coronary cusp is calcified and immobile, but the right and left coronary cusps open normally. The aortic valve area is 1.6 cm2 by planimetry.   8. No left atrial appendage thrombus. There is spontaneous echo contrast seen in the left atrial appendage, but there is no filling defect seen with the administration of Definity.    CARDIAC CATHETERIZATION 1/13/21   90% left main coronary artery stenosis  70% proximal and 70% mid LAD stenosis  70% stenosis of proximal RCA in a very small and nondominant artery      ASSESSMENT AND PLAN:  In summary, DONALD SAUNDERS is an 75y Female with past medical history significant for HTN, HLD, PVD s/p LE revasc in past, PAF on AC admitted with palpitations, recurrent Af and trop elevation.  Pt came to ER c/o 1 day h/o dyspnea, orthopnea and PND, chest pain approx 12 hours relieved by morphine.    Severe Multivessel CAD, NSTEMI, Paroxysmal Afib, Moderate MR, PVD -- cath shows severe 3V disease with severe LM lesion. GERALDINE shows moderate MR and mild AS. She is planned for CABG on 1/19/21.  - continue Amiodarone PO load 400 mg TID  - continue ASA and statin therapy  - continue Bumex 2 mg IVP BID  - continue Lopressor 50 mg BID  - supportive care per CT ICU    Will follow closely with you.

## 2021-01-15 NOTE — PROCEDURE NOTE - NSANESTHESIA_GEN_A_CORE
Pt waiting in room to schedule CT and lab appt. Also will need education by Jhoana today and then to lab for Port flush. Is considering when he wants CT and labs done, this week or early next.
2cc/1% lidocaine

## 2021-01-16 LAB
ANION GAP SERPL CALC-SCNC: 15 MMOL/L — SIGNIFICANT CHANGE UP (ref 5–17)
BUN SERPL-MCNC: 23 MG/DL — HIGH (ref 8–20)
CALCIUM SERPL-MCNC: 8.6 MG/DL — SIGNIFICANT CHANGE UP (ref 8.6–10.2)
CHLORIDE SERPL-SCNC: 96 MMOL/L — LOW (ref 98–107)
CO2 SERPL-SCNC: 26 MMOL/L — SIGNIFICANT CHANGE UP (ref 22–29)
CREAT SERPL-MCNC: 0.72 MG/DL — SIGNIFICANT CHANGE UP (ref 0.5–1.3)
GLUCOSE BLDC GLUCOMTR-MCNC: 172 MG/DL — HIGH (ref 70–99)
GLUCOSE BLDC GLUCOMTR-MCNC: 238 MG/DL — HIGH (ref 70–99)
GLUCOSE BLDC GLUCOMTR-MCNC: 255 MG/DL — HIGH (ref 70–99)
GLUCOSE BLDC GLUCOMTR-MCNC: 256 MG/DL — HIGH (ref 70–99)
GLUCOSE BLDC GLUCOMTR-MCNC: 263 MG/DL — HIGH (ref 70–99)
GLUCOSE SERPL-MCNC: 152 MG/DL — HIGH (ref 70–99)
HCT VFR BLD CALC: 33.5 % — LOW (ref 34.5–45)
HGB BLD-MCNC: 10.8 G/DL — LOW (ref 11.5–15.5)
INR BLD: 1.53 RATIO — HIGH (ref 0.88–1.16)
MAGNESIUM SERPL-MCNC: 2.2 MG/DL — SIGNIFICANT CHANGE UP (ref 1.6–2.6)
MCHC RBC-ENTMCNC: 27.5 PG — SIGNIFICANT CHANGE UP (ref 27–34)
MCHC RBC-ENTMCNC: 32.2 GM/DL — SIGNIFICANT CHANGE UP (ref 32–36)
MCV RBC AUTO: 85.2 FL — SIGNIFICANT CHANGE UP (ref 80–100)
PHOSPHATE SERPL-MCNC: 2.5 MG/DL — SIGNIFICANT CHANGE UP (ref 2.4–4.7)
PLATELET # BLD AUTO: 295 K/UL — SIGNIFICANT CHANGE UP (ref 150–400)
POTASSIUM SERPL-MCNC: 3.9 MMOL/L — SIGNIFICANT CHANGE UP (ref 3.5–5.3)
POTASSIUM SERPL-SCNC: 3.9 MMOL/L — SIGNIFICANT CHANGE UP (ref 3.5–5.3)
PROTHROM AB SERPL-ACNC: 17.4 SEC — HIGH (ref 10.6–13.6)
RBC # BLD: 3.93 M/UL — SIGNIFICANT CHANGE UP (ref 3.8–5.2)
RBC # FLD: 15.5 % — HIGH (ref 10.3–14.5)
SODIUM SERPL-SCNC: 137 MMOL/L — SIGNIFICANT CHANGE UP (ref 135–145)
WBC # BLD: 11 K/UL — HIGH (ref 3.8–10.5)
WBC # FLD AUTO: 11 K/UL — HIGH (ref 3.8–10.5)

## 2021-01-16 PROCEDURE — 99223 1ST HOSP IP/OBS HIGH 75: CPT

## 2021-01-16 PROCEDURE — 71045 X-RAY EXAM CHEST 1 VIEW: CPT | Mod: 26

## 2021-01-16 PROCEDURE — 93010 ELECTROCARDIOGRAM REPORT: CPT

## 2021-01-16 PROCEDURE — 99232 SBSQ HOSP IP/OBS MODERATE 35: CPT

## 2021-01-16 RX ORDER — INSULIN LISPRO 100/ML
4 VIAL (ML) SUBCUTANEOUS
Refills: 0 | Status: DISCONTINUED | OUTPATIENT
Start: 2021-01-16 | End: 2021-01-17

## 2021-01-16 RX ORDER — PHYTONADIONE (VIT K1) 5 MG
5 TABLET ORAL DAILY
Refills: 0 | Status: COMPLETED | OUTPATIENT
Start: 2021-01-16 | End: 2021-01-18

## 2021-01-16 RX ORDER — ALPRAZOLAM 0.25 MG
0.25 TABLET ORAL ONCE
Refills: 0 | Status: DISCONTINUED | OUTPATIENT
Start: 2021-01-16 | End: 2021-01-16

## 2021-01-16 RX ORDER — INSULIN GLARGINE 100 [IU]/ML
15 INJECTION, SOLUTION SUBCUTANEOUS AT BEDTIME
Refills: 0 | Status: DISCONTINUED | OUTPATIENT
Start: 2021-01-16 | End: 2021-01-17

## 2021-01-16 RX ORDER — POTASSIUM CHLORIDE 20 MEQ
40 PACKET (EA) ORAL EVERY 4 HOURS
Refills: 0 | Status: COMPLETED | OUTPATIENT
Start: 2021-01-16 | End: 2021-01-16

## 2021-01-16 RX ORDER — DILTIAZEM HCL 120 MG
30 CAPSULE, EXT RELEASE 24 HR ORAL
Refills: 0 | Status: DISCONTINUED | OUTPATIENT
Start: 2021-01-16 | End: 2021-01-19

## 2021-01-16 RX ORDER — POLYETHYLENE GLYCOL 3350 17 G/17G
17 POWDER, FOR SOLUTION ORAL AT BEDTIME
Refills: 0 | Status: DISCONTINUED | OUTPATIENT
Start: 2021-01-16 | End: 2021-01-19

## 2021-01-16 RX ADMIN — Medication 50 MILLIGRAM(S): at 06:24

## 2021-01-16 RX ADMIN — AMIODARONE HYDROCHLORIDE 400 MILLIGRAM(S): 400 TABLET ORAL at 14:27

## 2021-01-16 RX ADMIN — SENNA PLUS 2 TABLET(S): 8.6 TABLET ORAL at 22:30

## 2021-01-16 RX ADMIN — SODIUM CHLORIDE 3 MILLILITER(S): 9 INJECTION INTRAMUSCULAR; INTRAVENOUS; SUBCUTANEOUS at 06:22

## 2021-01-16 RX ADMIN — AMIODARONE HYDROCHLORIDE 400 MILLIGRAM(S): 400 TABLET ORAL at 06:24

## 2021-01-16 RX ADMIN — Medication 30 MILLIGRAM(S): at 18:23

## 2021-01-16 RX ADMIN — Medication 101 MILLIGRAM(S): at 16:35

## 2021-01-16 RX ADMIN — MUPIROCIN 1 APPLICATION(S): 20 OINTMENT TOPICAL at 18:23

## 2021-01-16 RX ADMIN — Medication 30 MILLIGRAM(S): at 12:31

## 2021-01-16 RX ADMIN — Medication 500 MILLIGRAM(S): at 12:31

## 2021-01-16 RX ADMIN — Medication 2: at 08:35

## 2021-01-16 RX ADMIN — Medication 81 MILLIGRAM(S): at 12:31

## 2021-01-16 RX ADMIN — Medication 4 UNIT(S): at 17:51

## 2021-01-16 RX ADMIN — Medication 50 MILLIGRAM(S): at 14:27

## 2021-01-16 RX ADMIN — MUPIROCIN 1 APPLICATION(S): 20 OINTMENT TOPICAL at 06:24

## 2021-01-16 RX ADMIN — Medication 0.25 MILLIGRAM(S): at 22:30

## 2021-01-16 RX ADMIN — Medication 6: at 17:50

## 2021-01-16 RX ADMIN — ATORVASTATIN CALCIUM 20 MILLIGRAM(S): 80 TABLET, FILM COATED ORAL at 22:31

## 2021-01-16 RX ADMIN — SODIUM CHLORIDE 3 MILLILITER(S): 9 INJECTION INTRAMUSCULAR; INTRAVENOUS; SUBCUTANEOUS at 13:57

## 2021-01-16 RX ADMIN — Medication 50 MILLIGRAM(S): at 22:31

## 2021-01-16 RX ADMIN — Medication 4 UNIT(S): at 12:32

## 2021-01-16 RX ADMIN — BUMETANIDE 2 MILLIGRAM(S): 0.25 INJECTION INTRAMUSCULAR; INTRAVENOUS at 06:24

## 2021-01-16 RX ADMIN — BUMETANIDE 2 MILLIGRAM(S): 0.25 INJECTION INTRAMUSCULAR; INTRAVENOUS at 18:23

## 2021-01-16 RX ADMIN — Medication 0.25 MILLIGRAM(S): at 01:25

## 2021-01-16 RX ADMIN — PANTOPRAZOLE SODIUM 40 MILLIGRAM(S): 20 TABLET, DELAYED RELEASE ORAL at 06:24

## 2021-01-16 RX ADMIN — Medication 4: at 12:32

## 2021-01-16 RX ADMIN — Medication 30 MILLIGRAM(S): at 23:37

## 2021-01-16 RX ADMIN — Medication 6: at 22:44

## 2021-01-16 RX ADMIN — Medication 40 MILLIEQUIVALENT(S): at 20:24

## 2021-01-16 RX ADMIN — INSULIN GLARGINE 15 UNIT(S): 100 INJECTION, SOLUTION SUBCUTANEOUS at 22:44

## 2021-01-16 RX ADMIN — POLYETHYLENE GLYCOL 3350 17 GRAM(S): 17 POWDER, FOR SOLUTION ORAL at 22:30

## 2021-01-16 RX ADMIN — Medication 40 MILLIEQUIVALENT(S): at 23:36

## 2021-01-16 RX ADMIN — AMIODARONE HYDROCHLORIDE 400 MILLIGRAM(S): 400 TABLET ORAL at 22:30

## 2021-01-16 RX ADMIN — SODIUM CHLORIDE 3 MILLILITER(S): 9 INJECTION INTRAMUSCULAR; INTRAVENOUS; SUBCUTANEOUS at 22:48

## 2021-01-16 NOTE — CONSULT NOTE ADULT - SUBJECTIVE AND OBJECTIVE BOX
HPI:  75yoF hx Afib on Xarelto, PAD s/p A-epypihb-ygsnuzeps bypass, R-femoral angioplasty with stenting, on Plavix, HTN, HLD, DM presenting with acute dyspnea and palpitations yesterday.  Pt was in her normal state of health, then developed acute onset of exertional dyspnea associated with palpitations that progressed to dyspnea at rest which prompted her to come to the hospital.  She also reports some transient vomiting yesterday that is now improving.  In ED, pt was in Afib with RVR associated with soft SBP in 90s.  She was given IV Cardizem and PO metoprolol without improvement and ED staff spoke with [place] cardiology who recommended digoxin.   Labs notable for leukocytosis and elevated troponin.  Pt denies any chest pain, lightheadedness, syncope, cough, abdominal pain, diarrhea, or urinary symptoms.   Of note, pt came to [facility] ED on 1/9 for R-thigh pain after walking into a cabinet at home. She underwent a CT angio A/P that showed R-thigh hematoma w/out extravasation.  Her leg was wrapped and she was d/c’ed from ED with instruction to continue wtith blood thinners and outpatient follow up. She denies any worsening R-thigh or leg pain.  Pt  scheduled to go for cardiac surgery         PAST MEDICAL & SURGICAL HISTORY:  PAD (peripheral artery disease) s/p fem-pop bypass    Paroxysmal atrial fibrillation    Hypercholesterolemia    Diabetes x 30 years   No retinopathy   +neuropathy    On tresiba 25 untis  in AM and 30 in Pm   as well as MFN 500mg bid  Amaryl 4 mg bid   checks BS bid  occasionally fells low around 70's   Hypertension    S/P peripheral artery angioplasty with stent placement    S/P femoral-popliteal bypass surgery    H/O hernia repair    H/O abdominal hysterectomy        FAMILY HISTORY:  Family history of thoracic aortic aneurysm (Father)  PAt GM  DM   Paternal uncle DM     Family history of abdominal aortic aneurysm (Father)        SOCIAL HISTORY: Former smoker  quit 4 years ago    retired RN     REVIEW OF SYSTEMS:    Constitutional: No fever, no chills, no change in weight.    Eyes: No eye swelling,no  blurry vision, no redness, no loss of vision.    Neck: No neck pain, no change in voice.    Lungs: No shortness of breath, no wheezing, no cough    CV: No chest pain, no palpitations, no pain with walking.    GI: No nausea, no vomiting, no constipation, no diarrhea, no abdominal pain    : No urinary frequency, no blood in urine, no urinary burning, no difficulty voiding.    Musculoskeletal:right  thigh still painful     Skin: No rash, no infections.    Neurologic: No headaches, no weakness, no burning or pain in feet, no tremor.    Endocrine: No heat intolerance, no cold intolerance, no increased sweating, no shakiness between meals.    Psych: No depression, no anxiety, no trouble concentrating    MEDICATIONS  (STANDING):  aMIOdarone    Tablet 400 milliGRAM(s) Oral every 8 hours  ascorbic acid 500 milliGRAM(s) Oral daily  aspirin  chewable 81 milliGRAM(s) Oral daily  atorvastatin 20 milliGRAM(s) Oral at bedtime  buMETAnide Injectable 2 milliGRAM(s) IV Push every 12 hours  diltiazem    Tablet 30 milliGRAM(s) Oral four times a day  insulin glargine Injectable (LANTUS) 15 Unit(s) SubCutaneous at bedtime  insulin lispro (ADMELOG) corrective regimen sliding scale   SubCutaneous Before meals and at bedtime  insulin lispro Injectable (ADMELOG) 4 Unit(s) SubCutaneous three times a day before meals  metoprolol tartrate 50 milliGRAM(s) Oral every 8 hours  mupirocin 2% Nasal 1 Application(s) Nasal two times a day  pantoprazole    Tablet 40 milliGRAM(s) Oral before breakfast  phytonadione  IVPB 5 milliGRAM(s) IV Intermittent daily  senna 2 Tablet(s) Oral at bedtime  sodium chloride 0.9% lock flush 3 milliLiter(s) IV Push every 8 hours    MEDICATIONS  (PRN):  acetaminophen   Tablet .. 650 milliGRAM(s) Oral every 6 hours PRN Temp greater or equal to 38C (100.4F), Mild Pain (1 - 3)  albuterol/ipratropium for Nebulization 3 milliLiter(s) Nebulizer every 6 hours PRN Shortness of Breath and/or Wheezing  ondansetron Injectable 4 milliGRAM(s) IV Push every 6 hours PRN Nausea and/or Vomiting  polyethylene glycol 3350 17 Gram(s) Oral at bedtime PRN Constipation      Allergies    warfarin (Rash)    Intolerances          CAPILLARY BLOOD GLUCOSE      POCT Blood Glucose.: 255 mg/dL (16 Jan 2021 22:43)      PHYSICAL EXAM:    Vital Signs Last 24 Hrs  T(C): 37.6 (16 Jan 2021 22:27), Max: 37.6 (16 Jan 2021 22:27)  T(F): 99.7 (16 Jan 2021 22:27), Max: 99.7 (16 Jan 2021 22:27)  HR: 90 (16 Jan 2021 23:33) (79 - 112)  BP: 122/78 (16 Jan 2021 23:33) (104/74 - 132/78)  BP(mean): --  RR: 18 (16 Jan 2021 23:33) (17 - 18)  SpO2: 93% (16 Jan 2021 23:33) (93% - 99%)    General appearance: Well developed, well nourished.    Eyes: Pupils equal and reactive to light. EOM full. No exophthalmos.    Neck: Trachea midline. No thyroid enlargement.    Lungs: Normal respiratory excursion. Lungs clear.    CV: Regular cardiac rhythm. No murmur. 1+ Bialteral pedal pulses   Musculoskeletal: right thigh large hematoma Skin: Warm and moist. No rash. No acanthosis.    Neuro: Cranial nerves intact. Normal motor and sensory function. DTR's normal.    Psych: Normal affect, good judgement.            LABS:                        10.8   11.00 )-----------( 295      ( 16 Jan 2021 07:03 )             33.5     01-16    137  |  96<L>  |  23.0<H>  ----------------------------<  152<H>  3.9   |  26.0  |  0.72    Ca    8.6      16 Jan 2021 07:03  Phos  2.5     01-16  Mg     2.2     01-16        A1C with Estimated Average Glucose Result: 7.8 % (01.12.21 @ 09:22)         CAPILLARY BLOOD GLUCOSE      RADIOLOGY & ADDITIONAL STUDIES:

## 2021-01-16 NOTE — CONSULT NOTE ADULT - ASSESSMENT
T2DM   started on Lantus and premela Admelog   cotn RX    will follow with you whike in pt    Pt does have outpt ENdo-  Dr Soriano

## 2021-01-16 NOTE — PROGRESS NOTE ADULT - PROBLEM SELECTOR PLAN 3
Continue ROBERT ACHS  Elevated HgbA1C > Endocrine consult called  Lantus and premeal initiated  Diabetic specialist consult requested

## 2021-01-16 NOTE — PROGRESS NOTE ADULT - ASSESSMENT
75F h/o AF, PAC, s/p R fem-pop bypass, HTN, HLD, DM presented to ED c/o SOB on 1/11. On 1/13 she underwent a LHC and was found to have MVD (right ostial, left main and LAD), left IABP (cath was via the right radial artery). CTS consulted and preop workup initiated. Plan for OR likely Tuesday Jan 19th for CABG with Dr. Orellana.

## 2021-01-16 NOTE — PROGRESS NOTE ADULT - SUBJECTIVE AND OBJECTIVE BOX
[facility]                                   [address] [place] [address]                                                      PHONE: [phone]                                                         FAX: [phone]  [email or web address]  ---------------------------------------------------------------------------------------------------------------------------------    Overnight events/patient complaints: patient seen at bedside. She denies chest pain or SOB.       warfarin (Rash)    MEDICATIONS  (STANDING):  aMIOdarone    Tablet 400 milliGRAM(s) Oral every 8 hours  ascorbic acid 500 milliGRAM(s) Oral daily  aspirin  chewable 81 milliGRAM(s) Oral daily  atorvastatin 20 milliGRAM(s) Oral at bedtime  buMETAnide Injectable 2 milliGRAM(s) IV Push every 12 hours  diltiazem    Tablet 30 milliGRAM(s) Oral four times a day  insulin glargine Injectable (LANTUS) 15 Unit(s) SubCutaneous at bedtime  insulin lispro (ADMELOG) corrective regimen sliding scale   SubCutaneous Before meals and at bedtime  insulin lispro Injectable (ADMELOG) 4 Unit(s) SubCutaneous three times a day before meals  metoprolol tartrate 50 milliGRAM(s) Oral every 8 hours  mupirocin 2% Nasal 1 Application(s) Nasal two times a day  pantoprazole    Tablet 40 milliGRAM(s) Oral before breakfast  phytonadione  IVPB 5 milliGRAM(s) IV Intermittent daily  senna 2 Tablet(s) Oral at bedtime  sodium chloride 0.9% lock flush 3 milliLiter(s) IV Push every 8 hours    MEDICATIONS  (PRN):  acetaminophen   Tablet .. 650 milliGRAM(s) Oral every 6 hours PRN Temp greater or equal to 38C (100.4F), Mild Pain (1 - 3)  albuterol/ipratropium for Nebulization 3 milliLiter(s) Nebulizer every 6 hours PRN Shortness of Breath and/or Wheezing  ondansetron Injectable 4 milliGRAM(s) IV Push every 6 hours PRN Nausea and/or Vomiting      Vital Signs Last 24 Hrs  T(C): 36.8 (16 Jan 2021 09:34), Max: 37.1 (16 Jan 2021 06:14)  T(F): 98.2 (16 Jan 2021 09:34), Max: 98.8 (16 Jan 2021 06:14)  HR: 96 (16 Jan 2021 09:34) (91 - 145)  BP: 119/72 (16 Jan 2021 09:34) (119/72 - 144/71)  BP(mean): 99 (15 Addison 2021 22:00) (84 - 113)  RR: 18 (16 Jan 2021 09:34) (18 - 32)  SpO2: 94% (16 Jan 2021 09:34) (93% - 100%)  ICU Vital Signs Last 24 Hrs  DONALD SAUNDERS  I&O's Detail    15 Addison 2021 07:01  -  16 Jan 2021 07:00  --------------------------------------------------------  IN:    IV PiggyBack: 300 mL  Total IN: 300 mL    OUT:    Voided (mL): 2600 mL  Total OUT: 2600 mL    Total NET: -2300 mL        Drug Dosing Weight  DONALD CHIP      PHYSICAL EXAM:  General: Appears well developed, well nourished alert and cooperative.  HEENT: Head; normocephalic, atraumatic.  Eyes: Pupils reactive, cornea wnl.  Neck: Supple, no nodes adenopathy, no NVD or carotid bruit or thyromegaly.  CARDIOVASCULAR: Normal S1 and S2, No murmur, rub, gallop or lift.   LUNGS: No rales, rhonchi or wheeze. Normal breath sounds bilaterally.  ABDOMEN: Soft, nontender without mass or organomegaly. bowel sounds normoactive.  EXTREMITIES: No clubbing, cyanosis or edema. Distal pulses wnl.   SKIN: warm and dry with normal turgor.  NEURO: Alert/oriented x 3/normal motor exam. No pathologic reflexes.    PSYCH: normal affect.        LABS:                        10.8   11.00 )-----------( 295      ( 16 Jan 2021 07:03 )             33.5     01-16    137  |  96<L>  |  23.0<H>  ----------------------------<  152<H>  3.9   |  26.0  |  0.72    Ca    8.6      16 Jan 2021 07:03  Phos  2.5     01-16  Mg     2.2     01-16      DONALD SAUNDERS  CARDIAC MARKERS ( 15 Addison 2021 03:32 )  x     / 7.55 ng/mL / 404 U/L / x     / 13.2 ng/mL      PT/INR - ( 16 Jan 2021 07:03 )   PT: 17.4 sec;   INR: 1.53 ratio         PTT - ( 15 Addison 2021 03:32 )  PTT:27.0 sec      RADIOLOGY & ADDITIONAL STUDIES:    INTERPRETATION OF TELEMETRY (personally reviewed): atrial fibrillation at controlled HR    ASSESSMENT AND PLAN:  In summary, DONALD SAUNDERS is an 75y Female with past medical history significant for HTN, HLD, PVD s/p LE revasc in past, PAF on AC admitted with palpitations, recurrent Af and trop elevation.  Pt came to ER c/o 1 day h/o dyspnea, orthopnea and PND, chest pain approx 12 hours relieved by morphine.    Acute Hypoxic Respiratory Failure, Acute HFrEF, Ischemic Cardiomyopathy, Severe Multivessel CAD, NSTEMI, Paroxysmal Afib, Moderate MR, PVD -- cath shows severe 3V disease with severe LM lesion. GERALDINE shows moderate MR and mild AS. .  - continue Amiodarone PO load 400 mg TID  - continue ASA and statin therapy  - continue Bumex 2 mg IVP BID  - continue Lopressor 50 mg TID -- would be cautious with Cardizem in the setting of low EF and heart failure  - supportive care per CT ICU -- plan for CABG with Dr. Orellana on 1/19/21    Will follow closely with you.

## 2021-01-16 NOTE — PROGRESS NOTE ADULT - SUBJECTIVE AND OBJECTIVE BOX
Subjective: "I feel ok, much better than I did before" NAD denies cp sob.     VITAL SIGNS  Vital Signs Last 24 Hrs  T(C): 36.8 (01-16-21 @ 15:55), Max: 37.1 (01-16-21 @ 06:14)  T(F): 98.2 (01-16-21 @ 15:55), Max: 98.8 (01-16-21 @ 06:14)  HR: 79 (01-16-21 @ 15:55) (79 - 118)  BP: 111/73 (01-16-21 @ 15:55) (104/74 - 144/71)  RR: 17 (01-16-21 @ 15:55) (17 - 32)  SpO2: 99% (01-16-21 @ 15:55) (93% - 100%)  on 2L NC <-> RA         Telemetry/Alarms:  AF , RVR to 140s overnight  LVEF: 40    MEDICATIONS  acetaminophen   Tablet .. 650 milliGRAM(s) Oral every 6 hours PRN  albuterol/ipratropium for Nebulization 3 milliLiter(s) Nebulizer every 6 hours PRN  aMIOdarone    Tablet 400 milliGRAM(s) Oral every 8 hours  ascorbic acid 500 milliGRAM(s) Oral daily  aspirin  chewable 81 milliGRAM(s) Oral daily  atorvastatin 20 milliGRAM(s) Oral at bedtime  buMETAnide Injectable 2 milliGRAM(s) IV Push every 12 hours  diltiazem    Tablet 30 milliGRAM(s) Oral four times a day  insulin glargine Injectable (LANTUS) 15 Unit(s) SubCutaneous at bedtime  insulin lispro (ADMELOG) corrective regimen sliding scale   SubCutaneous Before meals and at bedtime  insulin lispro Injectable (ADMELOG) 4 Unit(s) SubCutaneous three times a day before meals  metoprolol tartrate 50 milliGRAM(s) Oral every 8 hours  mupirocin 2% Nasal 1 Application(s) Nasal two times a day  ondansetron Injectable 4 milliGRAM(s) IV Push every 6 hours PRN  pantoprazole    Tablet 40 milliGRAM(s) Oral before breakfast  phytonadione  IVPB 5 milliGRAM(s) IV Intermittent daily  senna 2 Tablet(s) Oral at bedtime  sodium chloride 0.9% lock flush 3 milliLiter(s) IV Push every 8 hours      PHYSICAL EXAM  General: well nourished, well developed, no acute distress  Neurology: alert and oriented x 3, nonfocal, no gross deficits  Respiratory: few crackles b/l bases  CV: regular rate and rhythm, normal S1, S2  Abdomen: soft, nontender, nondistended, positive bowel sounds   Extremities: warm, well perfused. no edema. + DP pulses  R lateral thigh large hematoma and extensive bruising        01-15 @ 07:01  -  01-16 @ 07:00  --------------------------------------------------------  IN: 300 mL / OUT: 2600 mL / NET: -2300 mL    01-16 @ 07:01 - 01-16 @ 16:46  --------------------------------------------------------  IN: 1140 mL / OUT: 0 mL / NET: 1140 mL        Weights:  Daily     Daily   Admit Wt: Drug Dosing Weight  Height (cm): 177.8 (13 Jan 2021 11:49)  Weight (kg): 86.2 (13 Jan 2021 11:49)  BMI (kg/m2): 27.3 (13 Jan 2021 11:49)  BSA (m2): 2.04 (13 Jan 2021 11:49)    All laboratory results, radiology and medications reviewed.    LABS  01-16    137  |  96<L>  |  23.0<H>  ----------------------------<  152<H>  3.9   |  26.0  |  0.72    Ca    8.6      16 Jan 2021 07:03  Phos  2.5     01-16  Mg     2.2     01-16                                   10.8   11.00 )-----------( 295      ( 16 Jan 2021 07:03 )             33.5          PT/INR - ( 16 Jan 2021 07:03 )   PT: 17.4 sec;   INR: 1.53 ratio         PTT - ( 15 Addison 2021 03:32 )  PTT:27.0 sec    CAPILLARY BLOOD GLUCOSE      POCT Blood Glucose.: 238 mg/dL (16 Jan 2021 12:09)  POCT Blood Glucose.: 172 mg/dL (16 Jan 2021 08:25)  POCT Blood Glucose.: 249 mg/dL (15 Addison 2021 21:40)  POCT Blood Glucose.: 166 mg/dL (15 Addison 2021 17:46)           PAST MEDICAL & SURGICAL HISTORY:  PAD (peripheral artery disease)    Paroxysmal atrial fibrillation    Hypercholesterolemia    Diabetes    Hypertension    S/P peripheral artery angioplasty with stent placement    S/P femoral-popliteal bypass surgery    H/O hernia repair    H/O abdominal hysterectomy

## 2021-01-17 LAB
ANION GAP SERPL CALC-SCNC: 14 MMOL/L — SIGNIFICANT CHANGE UP (ref 5–17)
APTT BLD: 33.2 SEC — SIGNIFICANT CHANGE UP (ref 27.5–35.5)
BUN SERPL-MCNC: 21 MG/DL — HIGH (ref 8–20)
CALCIUM SERPL-MCNC: 8.8 MG/DL — SIGNIFICANT CHANGE UP (ref 8.6–10.2)
CHLORIDE SERPL-SCNC: 96 MMOL/L — LOW (ref 98–107)
CO2 SERPL-SCNC: 27 MMOL/L — SIGNIFICANT CHANGE UP (ref 22–29)
CREAT SERPL-MCNC: 0.67 MG/DL — SIGNIFICANT CHANGE UP (ref 0.5–1.3)
CULTURE RESULTS: SIGNIFICANT CHANGE UP
CULTURE RESULTS: SIGNIFICANT CHANGE UP
GLUCOSE BLDC GLUCOMTR-MCNC: 201 MG/DL — HIGH (ref 70–99)
GLUCOSE BLDC GLUCOMTR-MCNC: 214 MG/DL — HIGH (ref 70–99)
GLUCOSE BLDC GLUCOMTR-MCNC: 227 MG/DL — HIGH (ref 70–99)
GLUCOSE BLDC GLUCOMTR-MCNC: 365 MG/DL — HIGH (ref 70–99)
GLUCOSE SERPL-MCNC: 197 MG/DL — HIGH (ref 70–99)
HCT VFR BLD CALC: 35.8 % — SIGNIFICANT CHANGE UP (ref 34.5–45)
HGB BLD-MCNC: 11.6 G/DL — SIGNIFICANT CHANGE UP (ref 11.5–15.5)
INR BLD: 1.44 RATIO — HIGH (ref 0.88–1.16)
MAGNESIUM SERPL-MCNC: 1.9 MG/DL — SIGNIFICANT CHANGE UP (ref 1.6–2.6)
MCHC RBC-ENTMCNC: 27.4 PG — SIGNIFICANT CHANGE UP (ref 27–34)
MCHC RBC-ENTMCNC: 32.4 GM/DL — SIGNIFICANT CHANGE UP (ref 32–36)
MCV RBC AUTO: 84.6 FL — SIGNIFICANT CHANGE UP (ref 80–100)
NT-PROBNP SERPL-SCNC: 8077 PG/ML — HIGH (ref 0–300)
PLATELET # BLD AUTO: 319 K/UL — SIGNIFICANT CHANGE UP (ref 150–400)
POTASSIUM SERPL-MCNC: 4.1 MMOL/L — SIGNIFICANT CHANGE UP (ref 3.5–5.3)
POTASSIUM SERPL-SCNC: 4.1 MMOL/L — SIGNIFICANT CHANGE UP (ref 3.5–5.3)
PROTHROM AB SERPL-ACNC: 16.4 SEC — HIGH (ref 10.6–13.6)
RBC # BLD: 4.23 M/UL — SIGNIFICANT CHANGE UP (ref 3.8–5.2)
RBC # FLD: 15.7 % — HIGH (ref 10.3–14.5)
SODIUM SERPL-SCNC: 137 MMOL/L — SIGNIFICANT CHANGE UP (ref 135–145)
SPECIMEN SOURCE: SIGNIFICANT CHANGE UP
SPECIMEN SOURCE: SIGNIFICANT CHANGE UP
WBC # BLD: 11.44 K/UL — HIGH (ref 3.8–10.5)
WBC # FLD AUTO: 11.44 K/UL — HIGH (ref 3.8–10.5)

## 2021-01-17 PROCEDURE — 71045 X-RAY EXAM CHEST 1 VIEW: CPT | Mod: 26

## 2021-01-17 PROCEDURE — 99232 SBSQ HOSP IP/OBS MODERATE 35: CPT

## 2021-01-17 RX ORDER — INSULIN LISPRO 100/ML
6 VIAL (ML) SUBCUTANEOUS
Refills: 0 | Status: DISCONTINUED | OUTPATIENT
Start: 2021-01-17 | End: 2021-01-19

## 2021-01-17 RX ORDER — CHLORHEXIDINE GLUCONATE 213 G/1000ML
15 SOLUTION TOPICAL
Refills: 0 | Status: DISCONTINUED | OUTPATIENT
Start: 2021-01-17 | End: 2021-01-19

## 2021-01-17 RX ORDER — CHLORHEXIDINE GLUCONATE 213 G/1000ML
1 SOLUTION TOPICAL
Refills: 0 | Status: DISCONTINUED | OUTPATIENT
Start: 2021-01-17 | End: 2021-01-19

## 2021-01-17 RX ORDER — INSULIN GLARGINE 100 [IU]/ML
20 INJECTION, SOLUTION SUBCUTANEOUS AT BEDTIME
Refills: 0 | Status: DISCONTINUED | OUTPATIENT
Start: 2021-01-17 | End: 2021-01-19

## 2021-01-17 RX ORDER — HEPARIN SODIUM 5000 [USP'U]/ML
800 INJECTION INTRAVENOUS; SUBCUTANEOUS
Qty: 25000 | Refills: 0 | Status: DISCONTINUED | OUTPATIENT
Start: 2021-01-17 | End: 2021-01-19

## 2021-01-17 RX ADMIN — Medication 30 MILLIGRAM(S): at 05:36

## 2021-01-17 RX ADMIN — Medication 4: at 12:29

## 2021-01-17 RX ADMIN — CHLORHEXIDINE GLUCONATE 15 MILLILITER(S): 213 SOLUTION TOPICAL at 17:53

## 2021-01-17 RX ADMIN — Medication 50 MILLIGRAM(S): at 05:36

## 2021-01-17 RX ADMIN — MUPIROCIN 1 APPLICATION(S): 20 OINTMENT TOPICAL at 05:37

## 2021-01-17 RX ADMIN — Medication 30 MILLIGRAM(S): at 17:53

## 2021-01-17 RX ADMIN — Medication 4 UNIT(S): at 08:40

## 2021-01-17 RX ADMIN — Medication 4: at 08:39

## 2021-01-17 RX ADMIN — AMIODARONE HYDROCHLORIDE 400 MILLIGRAM(S): 400 TABLET ORAL at 15:11

## 2021-01-17 RX ADMIN — CHLORHEXIDINE GLUCONATE 15 MILLILITER(S): 213 SOLUTION TOPICAL at 05:36

## 2021-01-17 RX ADMIN — HEPARIN SODIUM 8 UNIT(S)/HR: 5000 INJECTION INTRAVENOUS; SUBCUTANEOUS at 11:57

## 2021-01-17 RX ADMIN — MUPIROCIN 1 APPLICATION(S): 20 OINTMENT TOPICAL at 17:53

## 2021-01-17 RX ADMIN — SODIUM CHLORIDE 3 MILLILITER(S): 9 INJECTION INTRAMUSCULAR; INTRAVENOUS; SUBCUTANEOUS at 05:46

## 2021-01-17 RX ADMIN — Medication 6 UNIT(S): at 17:52

## 2021-01-17 RX ADMIN — CHLORHEXIDINE GLUCONATE 1 APPLICATION(S): 213 SOLUTION TOPICAL at 22:33

## 2021-01-17 RX ADMIN — Medication 4: at 22:49

## 2021-01-17 RX ADMIN — AMIODARONE HYDROCHLORIDE 400 MILLIGRAM(S): 400 TABLET ORAL at 22:46

## 2021-01-17 RX ADMIN — Medication 30 MILLIGRAM(S): at 11:59

## 2021-01-17 RX ADMIN — Medication 500 MILLIGRAM(S): at 11:59

## 2021-01-17 RX ADMIN — SENNA PLUS 2 TABLET(S): 8.6 TABLET ORAL at 22:46

## 2021-01-17 RX ADMIN — Medication 81 MILLIGRAM(S): at 11:59

## 2021-01-17 RX ADMIN — AMIODARONE HYDROCHLORIDE 400 MILLIGRAM(S): 400 TABLET ORAL at 05:36

## 2021-01-17 RX ADMIN — ATORVASTATIN CALCIUM 20 MILLIGRAM(S): 80 TABLET, FILM COATED ORAL at 22:47

## 2021-01-17 RX ADMIN — SODIUM CHLORIDE 3 MILLILITER(S): 9 INJECTION INTRAMUSCULAR; INTRAVENOUS; SUBCUTANEOUS at 22:48

## 2021-01-17 RX ADMIN — BUMETANIDE 2 MILLIGRAM(S): 0.25 INJECTION INTRAMUSCULAR; INTRAVENOUS at 17:52

## 2021-01-17 RX ADMIN — PANTOPRAZOLE SODIUM 40 MILLIGRAM(S): 20 TABLET, DELAYED RELEASE ORAL at 05:36

## 2021-01-17 RX ADMIN — Medication 50 MILLIGRAM(S): at 22:47

## 2021-01-17 RX ADMIN — BUMETANIDE 2 MILLIGRAM(S): 0.25 INJECTION INTRAMUSCULAR; INTRAVENOUS at 05:36

## 2021-01-17 RX ADMIN — Medication 101 MILLIGRAM(S): at 11:59

## 2021-01-17 RX ADMIN — Medication 50 MILLIGRAM(S): at 15:11

## 2021-01-17 RX ADMIN — SODIUM CHLORIDE 3 MILLILITER(S): 9 INJECTION INTRAMUSCULAR; INTRAVENOUS; SUBCUTANEOUS at 15:12

## 2021-01-17 RX ADMIN — INSULIN GLARGINE 20 UNIT(S): 100 INJECTION, SOLUTION SUBCUTANEOUS at 22:46

## 2021-01-17 RX ADMIN — Medication 4 UNIT(S): at 12:29

## 2021-01-17 RX ADMIN — Medication 10: at 17:52

## 2021-01-17 NOTE — PROGRESS NOTE ADULT - PROBLEM SELECTOR PLAN 3
HA1c on 7.8 on Metformin and Tresiba as an outpatient.  Lantus 15u and premeal 4u ordered.  Fingersticks AC/HS with ROBERT ordered for further BS management.   Endocrine consult appreciated.   Diabetic specialist consult requested.

## 2021-01-17 NOTE — PROGRESS NOTE ADULT - ASSESSMENT
75 year old Female with a medical history of paroxysmal Afib (on Xarelto), PAD (s/p Z-gfhgywp-ujppkaqcw bypass, R-femoral angioplasty with stenting, maintained on Plavix), HTN, HLD, type 2 diabetes (HA1c on 7.8 on Metformin and Tresiba as an outpatient), and recent trauma to Right thigh with stable hematoma, presented with SOB, acute on chronic combined diastolic and systolic heart failure (requiring IV diuresis), and NSTEMI on 1/11/20. On 1/13 she underwent a LHC via Right radial artery and was found to have Multivessel CAD (right ostial, left main and LAD) with left femoral IABP placed. GERALDINE 1/14 showed Moderate MR and Mild AS. IABP removed successfully 1/15. Preoperative course significant for afib with RVR (requiring PO amio load, increased Lopressor dosing, and initiation of Cardizem). Plan for OR likely Tuesday 1/19 for CABG and possible MVR with Dr. Orellana.

## 2021-01-17 NOTE — PROGRESS NOTE ADULT - SUBJECTIVE AND OBJECTIVE BOX
Preoperative workup for patient with Multivessel CAD and Moderate MR    PAST MEDICAL & SURGICAL HISTORY:  PAD (peripheral artery disease)  Paroxysmal atrial fibrillation  Hypercholesterolemia  Diabetes  Hypertension  S/P peripheral artery angioplasty with stent placement  S/P femoral-popliteal bypass surgery  H/O hernia repair  H/O abdominal hysterectomy    FAMILY HISTORY:  Family history of thoracic aortic aneurysm (Father)  Family history of abdominal aortic aneurysm (Father)    Brief Hospital Course: Patient presented with SOB, acute on chronic combined diastolic and systolic heart failure (requiring IV diuresis), and NSTEMI on 1/11/20. On 1/13 she underwent a LHC via Right radial artery and was found to have Multivessel CAD (right ostial, left main and LAD) with left femoral IABP placed. GERALDINE 1/14 showed Moderate MR and Mild AS. IABP removed successfully 1/15. Preoperative course significant for afib with RVR (requiring PO amio load, increased Lopressor dosing, and initiation of Cardizem), as well as hyperglycemia (requiring insulin coverage and Endocrine consult). Plan for OR likely Tuesday 1/19 for CABG and possible MVR with Dr. Orellana.    Subjective: Patient sitting up in bed in no acute distress. +Pain in Right thigh stable secondary to hematoma since arrival. +Tolerating diet. +Constipation. Denies fevers, chills, lightheadedness, dizziness, HA, CP, palpitations, SOB, cough, abdominal pain, N/V, diarrhea, numbness/tingling in extremities, or any other acute complaints.    MEDICATIONS  (STANDING):  aMIOdarone    Tablet 400 milliGRAM(s) Oral every 8 hours  ascorbic acid 500 milliGRAM(s) Oral daily  aspirin  chewable 81 milliGRAM(s) Oral daily  atorvastatin 20 milliGRAM(s) Oral at bedtime  buMETAnide Injectable 2 milliGRAM(s) IV Push every 12 hours  diltiazem    Tablet 30 milliGRAM(s) Oral four times a day  insulin glargine Injectable (LANTUS) 15 Unit(s) SubCutaneous at bedtime  insulin lispro (ADMELOG) corrective regimen sliding scale   SubCutaneous Before meals and at bedtime  insulin lispro Injectable (ADMELOG) 4 Unit(s) SubCutaneous three times a day before meals  metoprolol tartrate 50 milliGRAM(s) Oral every 8 hours  mupirocin 2% Nasal 1 Application(s) Nasal two times a day  pantoprazole    Tablet 40 milliGRAM(s) Oral before breakfast  phytonadione  IVPB 5 milliGRAM(s) IV Intermittent daily  senna 2 Tablet(s) Oral at bedtime  sodium chloride 0.9% lock flush 3 milliLiter(s) IV Push every 8 hours    MEDICATIONS  (PRN):  acetaminophen   Tablet .. 650 milliGRAM(s) Oral every 6 hours PRN Temp greater or equal to 38C (100.4F), Mild Pain (1 - 3)  albuterol/ipratropium for Nebulization 3 milliLiter(s) Nebulizer every 6 hours PRN Shortness of Breath and/or Wheezing  ondansetron Injectable 4 milliGRAM(s) IV Push every 6 hours PRN Nausea and/or Vomiting  polyethylene glycol 3350 17 Gram(s) Oral at bedtime PRN Constipation    Allergies: warfarin (Rash)    Vitals   T(C): 36.4 (17 Jan 2021 01:20), Max: 37.6 (16 Jan 2021 22:27)  T(F): 97.6 (17 Jan 2021 01:20), Max: 99.7 (16 Jan 2021 22:27)  HR: 85 (17 Jan 2021 01:20) (79 - 112)  BP: 117/71 (17 Jan 2021 01:20) (104/74 - 132/78)  RR: 18 (17 Jan 2021 01:20) (17 - 18)  SpO2: 91% (17 Jan 2021 01:20) (91% - 99%)    I&O's Detail    15 Addison 2021 07:01  -  16 Jan 2021 07:00  --------------------------------------------------------  IN:    IV PiggyBack: 300 mL  Total IN: 300 mL    OUT:    Voided (mL): 2600 mL  Total OUT: 2600 mL    Total NET: -2300 mL      16 Jan 2021 07:01  -  17 Jan 2021 03:48  --------------------------------------------------------  IN:    Oral Fluid: 1140 mL  Total IN: 1140 mL    OUT:    Voided (mL): 950 mL  Total OUT: 950 mL    Total NET: 190 mL    Physical Exam  Neuro: A+O x 3, non-focal, speech clear and intact  HEENT:  NCAT, PERRL, EOMI. No conjuctival edema or icterus, no thrush.    Neck:  Supple, trachea midline  Pulm: Few crackles at b/l bases, otherwise no stridor or wheezing noted, no accessory muscle use noted  CV: regular rate, regular rhythm, +S1S2, no murmur or rub noted  Abd: soft, NT, ND, + BS  Ext: Right lateral thigh large hematoma +ecchymosis, MCLAUGHLIN x 4, no edema, no cyanosis or clubbing, distal motor/neuro/circ intact  Skin: warm, dry, well perfused    LABS                        10.8   11.00 )-----------( 295      ( 16 Jan 2021 07:03 )             33.5     01-16    137  |  96<L>  |  23.0<H>  ----------------------------<  152<H>  3.9   |  26.0  |  0.72    Ca    8.6      16 Jan 2021 07:03  Phos  2.5     01-16  Mg     2.2     01-16    PT/INR - ( 16 Jan 2021 07:03 )   PT: 17.4 sec;   INR: 1.53 ratio      POCT Blood Glucose.: 255 mg/dL (01-16-21 @ 22:43)  POCT Blood Glucose.: 256 mg/dL (01-16-21 @ 21:20)  POCT Blood Glucose.: 263 mg/dL (01-16-21 @ 16:59)  POCT Blood Glucose.: 238 mg/dL (01-16-21 @ 12:09)  POCT Blood Glucose.: 172 mg/dL (01-16-21 @ 08:25)      Last CXR:  < from: Xray Chest 1 View- PORTABLE-Routine (Xray Chest 1 View- PORTABLE-Routine in AM.) (01.15.21 @ 07:25) >  IMPRESSION: Bilateral effusions and/or basilar airspace disease.  Aortic balloon pump catheter tip just below aortic knob.  < end of copied text >

## 2021-01-17 NOTE — CHART NOTE - NSCHARTNOTEFT_GEN_A_CORE
Per Dr. Orellana, plan to start heparin gtt at 800units/HR with PTT goal 45-55 and will not titrate above 800 units until tomorrow AM. Plan to discontinue heparin gtt at midnight on day of OR.

## 2021-01-17 NOTE — PROGRESS NOTE ADULT - PROBLEM SELECTOR PLAN 6
Pantoprazole for GI prophylaxis.  SCDs for DVT prophylaxis.    Plan to be discussed further with CT Surgeon Dr. Villarreal in AM rounds

## 2021-01-17 NOTE — PROGRESS NOTE ADULT - SUBJECTIVE AND OBJECTIVE BOX
[facility], THE [facility]ART CENTER                                   [address] [place] [address]                                                      PHONE: [phone]                                                         FAX: [phone]  [email or web address]  ---------------------------------------------------------------------------------------------------------------------------------    Overnight events/patient complaints: patient seen at bedside. She denies chest pain or SOB this morning.       warfarin (Rash)    MEDICATIONS  (STANDING):  aMIOdarone    Tablet 400 milliGRAM(s) Oral every 8 hours  ascorbic acid 500 milliGRAM(s) Oral daily  aspirin  chewable 81 milliGRAM(s) Oral daily  atorvastatin 20 milliGRAM(s) Oral at bedtime  buMETAnide Injectable 2 milliGRAM(s) IV Push every 12 hours  chlorhexidine 0.12% Liquid 15 milliLiter(s) Swish and Spit two times a day  chlorhexidine 4% Liquid 1 Application(s) Topical two times a day  diltiazem    Tablet 30 milliGRAM(s) Oral four times a day  heparin  Infusion 800 Unit(s)/Hr (8 mL/Hr) IV Continuous <Continuous>  insulin glargine Injectable (LANTUS) 15 Unit(s) SubCutaneous at bedtime  insulin lispro (ADMELOG) corrective regimen sliding scale   SubCutaneous Before meals and at bedtime  insulin lispro Injectable (ADMELOG) 4 Unit(s) SubCutaneous three times a day before meals  metoprolol tartrate 50 milliGRAM(s) Oral every 8 hours  mupirocin 2% Nasal 1 Application(s) Nasal two times a day  pantoprazole    Tablet 40 milliGRAM(s) Oral before breakfast  phytonadione  IVPB 5 milliGRAM(s) IV Intermittent daily  senna 2 Tablet(s) Oral at bedtime  sodium chloride 0.9% lock flush 3 milliLiter(s) IV Push every 8 hours    MEDICATIONS  (PRN):  acetaminophen   Tablet .. 650 milliGRAM(s) Oral every 6 hours PRN Temp greater or equal to 38C (100.4F), Mild Pain (1 - 3)  albuterol/ipratropium for Nebulization 3 milliLiter(s) Nebulizer every 6 hours PRN Shortness of Breath and/or Wheezing  ondansetron Injectable 4 milliGRAM(s) IV Push every 6 hours PRN Nausea and/or Vomiting  polyethylene glycol 3350 17 Gram(s) Oral at bedtime PRN Constipation      Vital Signs Last 24 Hrs  T(C): 36.7 (17 Jan 2021 09:36), Max: 37.6 (16 Jan 2021 22:27)  T(F): 98.1 (17 Jan 2021 09:36), Max: 99.7 (16 Jan 2021 22:27)  HR: 78 (17 Jan 2021 09:36) (78 - 112)  BP: 129/68 (17 Jan 2021 09:36) (104/74 - 132/78)  BP(mean): --  RR: 16 (17 Jan 2021 09:36) (16 - 18)  SpO2: 95% (17 Jan 2021 09:36) (91% - 99%)  ICU Vital Signs Last 24 Hrs  DONALD SAUNDERS  I&O's Detail    16 Jan 2021 07:01  -  17 Jan 2021 07:00  --------------------------------------------------------  IN:    Oral Fluid: 1140 mL  Total IN: 1140 mL    OUT:    Voided (mL): 1450 mL  Total OUT: 1450 mL    Total NET: -310 mL        Drug Dosing Weight  DONALD SAUNDERS      PHYSICAL EXAM:  General: Appears well developed, well nourished alert and cooperative.  HEENT: Head; normocephalic, atraumatic.  Eyes: Pupils reactive, cornea wnl.  Neck: Supple, no nodes adenopathy, no NVD or carotid bruit or thyromegaly.  CARDIOVASCULAR: Normal S1 and S2, No murmur, rub, gallop or lift.   LUNGS: No rales, rhonchi or wheeze. Normal breath sounds bilaterally.  ABDOMEN: Soft, nontender without mass or organomegaly. bowel sounds normoactive.  EXTREMITIES: No clubbing, cyanosis or edema. Distal pulses wnl.   SKIN: warm and dry with normal turgor.  NEURO: Alert/oriented x 3/normal motor exam. No pathologic reflexes.    PSYCH: normal affect.        LABS:                        11.6   11.44 )-----------( 319      ( 17 Jan 2021 07:39 )             35.8     01-17    137  |  96<L>  |  21.0<H>  ----------------------------<  197<H>  4.1   |  27.0  |  0.67    Ca    8.8      17 Jan 2021 07:39  Phos  2.5     01-16  Mg     1.9     01-17      DONALD SAUNDERS      PT/INR - ( 17 Jan 2021 07:39 )   PT: 16.4 sec;   INR: 1.44 ratio        INTERPRETATION OF TELEMETRY (personally reviewed): atrial fibrillation at controlled HR    ASSESSMENT AND PLAN:  In summary, DONALD SAUNDERS is an 75y Female with past medical history significant for HTN, HLD, PVD s/p LE revasc in past, PAF on AC admitted with palpitations, recurrent Af and trop elevation.  Pt came to ER c/o 1 day h/o dyspnea, orthopnea and PND, chest pain approx 12 hours relieved by morphine.    Acute Hypoxic Respiratory Failure, Acute HFrEF, Ischemic Cardiomyopathy, Severe Multivessel CAD, NSTEMI, Paroxysmal Afib, Moderate MR, PVD -- cath shows severe 3V disease with severe LM lesion. GERALDINE shows moderate MR and mild AS. .  - continue Amiodarone PO load 400 mg TID  - continue ASA and statin therapy  - continue Bumex 2 mg IVP BID  - continue Lopressor 50 mg TID -- would be cautious with Cardizem in the setting of low EF and heart failure  - supportive care per CT ICU -- plan for CABG with Dr. Orellana on 1/19/21    Will follow closely with you.

## 2021-01-18 ENCOUNTER — TRANSCRIPTION ENCOUNTER (OUTPATIENT)
Age: 76
End: 2021-01-18

## 2021-01-18 LAB
ALBUMIN SERPL ELPH-MCNC: 3.4 G/DL — SIGNIFICANT CHANGE UP (ref 3.3–5.2)
ALBUMIN SERPL ELPH-MCNC: 3.6 G/DL — SIGNIFICANT CHANGE UP (ref 3.3–5.2)
ALP SERPL-CCNC: 75 U/L — SIGNIFICANT CHANGE UP (ref 40–120)
ALP SERPL-CCNC: 76 U/L — SIGNIFICANT CHANGE UP (ref 40–120)
ALT FLD-CCNC: 110 U/L — HIGH
ALT FLD-CCNC: 112 U/L — HIGH
ANION GAP SERPL CALC-SCNC: 13 MMOL/L — SIGNIFICANT CHANGE UP (ref 5–17)
ANION GAP SERPL CALC-SCNC: 14 MMOL/L — SIGNIFICANT CHANGE UP (ref 5–17)
APTT BLD: 31 SEC — SIGNIFICANT CHANGE UP (ref 27.5–35.5)
APTT BLD: 32.1 SEC — SIGNIFICANT CHANGE UP (ref 27.5–35.5)
AST SERPL-CCNC: 32 U/L — HIGH
AST SERPL-CCNC: 33 U/L — HIGH
BILIRUB DIRECT SERPL-MCNC: 0.6 MG/DL — HIGH (ref 0–0.3)
BILIRUB INDIRECT FLD-MCNC: 2.6 MG/DL — HIGH (ref 0.2–1)
BILIRUB SERPL-MCNC: 3.1 MG/DL — HIGH (ref 0.4–2)
BILIRUB SERPL-MCNC: 3.2 MG/DL — HIGH (ref 0.4–2)
BLD GP AB SCN SERPL QL: SIGNIFICANT CHANGE UP
BUN SERPL-MCNC: 21 MG/DL — HIGH (ref 8–20)
BUN SERPL-MCNC: 21 MG/DL — HIGH (ref 8–20)
CALCIUM SERPL-MCNC: 9 MG/DL — SIGNIFICANT CHANGE UP (ref 8.6–10.2)
CALCIUM SERPL-MCNC: 9.1 MG/DL — SIGNIFICANT CHANGE UP (ref 8.6–10.2)
CHLORIDE SERPL-SCNC: 94 MMOL/L — LOW (ref 98–107)
CHLORIDE SERPL-SCNC: 97 MMOL/L — LOW (ref 98–107)
CO2 SERPL-SCNC: 26 MMOL/L — SIGNIFICANT CHANGE UP (ref 22–29)
CO2 SERPL-SCNC: 29 MMOL/L — SIGNIFICANT CHANGE UP (ref 22–29)
CREAT SERPL-MCNC: 0.86 MG/DL — SIGNIFICANT CHANGE UP (ref 0.5–1.3)
CREAT SERPL-MCNC: 0.94 MG/DL — SIGNIFICANT CHANGE UP (ref 0.5–1.3)
GLUCOSE BLDC GLUCOMTR-MCNC: 168 MG/DL — HIGH (ref 70–99)
GLUCOSE BLDC GLUCOMTR-MCNC: 257 MG/DL — HIGH (ref 70–99)
GLUCOSE BLDC GLUCOMTR-MCNC: 280 MG/DL — HIGH (ref 70–99)
GLUCOSE BLDC GLUCOMTR-MCNC: 385 MG/DL — HIGH (ref 70–99)
GLUCOSE SERPL-MCNC: 147 MG/DL — HIGH (ref 70–99)
GLUCOSE SERPL-MCNC: 220 MG/DL — HIGH (ref 70–99)
HCT VFR BLD CALC: 35.2 % — SIGNIFICANT CHANGE UP (ref 34.5–45)
HGB BLD-MCNC: 11.3 G/DL — LOW (ref 11.5–15.5)
INR BLD: 1.32 RATIO — HIGH (ref 0.88–1.16)
MAGNESIUM SERPL-MCNC: 1.8 MG/DL — SIGNIFICANT CHANGE UP (ref 1.8–2.6)
MAGNESIUM SERPL-MCNC: 2.6 MG/DL — SIGNIFICANT CHANGE UP (ref 1.6–2.6)
MCHC RBC-ENTMCNC: 27.2 PG — SIGNIFICANT CHANGE UP (ref 27–34)
MCHC RBC-ENTMCNC: 32.1 GM/DL — SIGNIFICANT CHANGE UP (ref 32–36)
MCV RBC AUTO: 84.8 FL — SIGNIFICANT CHANGE UP (ref 80–100)
PLATELET # BLD AUTO: 336 K/UL — SIGNIFICANT CHANGE UP (ref 150–400)
POTASSIUM SERPL-MCNC: 3.6 MMOL/L — SIGNIFICANT CHANGE UP (ref 3.5–5.3)
POTASSIUM SERPL-MCNC: 3.9 MMOL/L — SIGNIFICANT CHANGE UP (ref 3.5–5.3)
POTASSIUM SERPL-SCNC: 3.6 MMOL/L — SIGNIFICANT CHANGE UP (ref 3.5–5.3)
POTASSIUM SERPL-SCNC: 3.9 MMOL/L — SIGNIFICANT CHANGE UP (ref 3.5–5.3)
PROT SERPL-MCNC: 6.8 G/DL — SIGNIFICANT CHANGE UP (ref 6.6–8.7)
PROT SERPL-MCNC: 6.8 G/DL — SIGNIFICANT CHANGE UP (ref 6.6–8.7)
PROTHROM AB SERPL-ACNC: 15.1 SEC — HIGH (ref 10.6–13.6)
RBC # BLD: 4.15 M/UL — SIGNIFICANT CHANGE UP (ref 3.8–5.2)
RBC # FLD: 15.9 % — HIGH (ref 10.3–14.5)
SARS-COV-2 RNA SPEC QL NAA+PROBE: SIGNIFICANT CHANGE UP
SODIUM SERPL-SCNC: 134 MMOL/L — LOW (ref 135–145)
SODIUM SERPL-SCNC: 139 MMOL/L — SIGNIFICANT CHANGE UP (ref 135–145)
T3 SERPL-MCNC: 75 NG/DL — LOW (ref 80–200)
T4 AB SER-ACNC: 8.8 UG/DL — SIGNIFICANT CHANGE UP (ref 4.5–12)
TSH RECEP AB FLD-ACNC: <1.1 IU/L — SIGNIFICANT CHANGE UP (ref 0–1.75)
TSH SERPL-MCNC: 3.56 UIU/ML — SIGNIFICANT CHANGE UP (ref 0.27–4.2)
WBC # BLD: 10.77 K/UL — HIGH (ref 3.8–10.5)
WBC # FLD AUTO: 10.77 K/UL — HIGH (ref 3.8–10.5)

## 2021-01-18 PROCEDURE — 99232 SBSQ HOSP IP/OBS MODERATE 35: CPT

## 2021-01-18 RX ORDER — POTASSIUM CHLORIDE 20 MEQ
40 PACKET (EA) ORAL ONCE
Refills: 0 | Status: COMPLETED | OUTPATIENT
Start: 2021-01-18 | End: 2021-01-18

## 2021-01-18 RX ORDER — ALPRAZOLAM 0.25 MG
0.25 TABLET ORAL ONCE
Refills: 0 | Status: DISCONTINUED | OUTPATIENT
Start: 2021-01-18 | End: 2021-01-18

## 2021-01-18 RX ORDER — CEFUROXIME AXETIL 250 MG
1500 TABLET ORAL ONCE
Refills: 0 | Status: DISCONTINUED | OUTPATIENT
Start: 2021-01-18 | End: 2021-01-18

## 2021-01-18 RX ORDER — MAGNESIUM SULFATE 500 MG/ML
2 VIAL (ML) INJECTION ONCE
Refills: 0 | Status: COMPLETED | OUTPATIENT
Start: 2021-01-18 | End: 2021-01-18

## 2021-01-18 RX ORDER — VANCOMYCIN HCL 1 G
1250 VIAL (EA) INTRAVENOUS ONCE
Refills: 0 | Status: DISCONTINUED | OUTPATIENT
Start: 2021-01-18 | End: 2021-01-19

## 2021-01-18 RX ORDER — CEFUROXIME AXETIL 250 MG
1500 TABLET ORAL ONCE
Refills: 0 | Status: DISCONTINUED | OUTPATIENT
Start: 2021-01-18 | End: 2021-01-19

## 2021-01-18 RX ADMIN — Medication 40 MILLIEQUIVALENT(S): at 14:48

## 2021-01-18 RX ADMIN — AMIODARONE HYDROCHLORIDE 400 MILLIGRAM(S): 400 TABLET ORAL at 12:47

## 2021-01-18 RX ADMIN — Medication 101 MILLIGRAM(S): at 14:48

## 2021-01-18 RX ADMIN — CHLORHEXIDINE GLUCONATE 15 MILLILITER(S): 213 SOLUTION TOPICAL at 21:29

## 2021-01-18 RX ADMIN — Medication 0.25 MILLIGRAM(S): at 23:44

## 2021-01-18 RX ADMIN — CHLORHEXIDINE GLUCONATE 1 APPLICATION(S): 213 SOLUTION TOPICAL at 12:37

## 2021-01-18 RX ADMIN — Medication 50 MILLIGRAM(S): at 21:30

## 2021-01-18 RX ADMIN — Medication 10: at 17:18

## 2021-01-18 RX ADMIN — Medication 30 MILLIGRAM(S): at 00:30

## 2021-01-18 RX ADMIN — SODIUM CHLORIDE 3 MILLILITER(S): 9 INJECTION INTRAMUSCULAR; INTRAVENOUS; SUBCUTANEOUS at 20:12

## 2021-01-18 RX ADMIN — Medication 50 GRAM(S): at 09:10

## 2021-01-18 RX ADMIN — Medication 40 MILLIEQUIVALENT(S): at 08:25

## 2021-01-18 RX ADMIN — Medication 50 MILLIGRAM(S): at 12:48

## 2021-01-18 RX ADMIN — SODIUM CHLORIDE 3 MILLILITER(S): 9 INJECTION INTRAMUSCULAR; INTRAVENOUS; SUBCUTANEOUS at 05:40

## 2021-01-18 RX ADMIN — Medication 0.25 MILLIGRAM(S): at 00:30

## 2021-01-18 RX ADMIN — Medication 6 UNIT(S): at 12:46

## 2021-01-18 RX ADMIN — ATORVASTATIN CALCIUM 20 MILLIGRAM(S): 80 TABLET, FILM COATED ORAL at 21:30

## 2021-01-18 RX ADMIN — Medication 81 MILLIGRAM(S): at 08:24

## 2021-01-18 RX ADMIN — Medication 5 MILLIGRAM(S): at 10:27

## 2021-01-18 RX ADMIN — Medication 30 MILLIGRAM(S): at 12:48

## 2021-01-18 RX ADMIN — Medication 30 MILLIGRAM(S): at 23:44

## 2021-01-18 RX ADMIN — MUPIROCIN 1 APPLICATION(S): 20 OINTMENT TOPICAL at 17:18

## 2021-01-18 RX ADMIN — Medication 500 MILLIGRAM(S): at 08:24

## 2021-01-18 RX ADMIN — SENNA PLUS 2 TABLET(S): 8.6 TABLET ORAL at 21:30

## 2021-01-18 RX ADMIN — AMIODARONE HYDROCHLORIDE 400 MILLIGRAM(S): 400 TABLET ORAL at 05:39

## 2021-01-18 RX ADMIN — SODIUM CHLORIDE 3 MILLILITER(S): 9 INJECTION INTRAMUSCULAR; INTRAVENOUS; SUBCUTANEOUS at 12:51

## 2021-01-18 RX ADMIN — Medication 2: at 08:24

## 2021-01-18 RX ADMIN — CHLORHEXIDINE GLUCONATE 1 APPLICATION(S): 213 SOLUTION TOPICAL at 21:26

## 2021-01-18 RX ADMIN — Medication 6 UNIT(S): at 17:17

## 2021-01-18 RX ADMIN — Medication 6: at 21:32

## 2021-01-18 RX ADMIN — AMIODARONE HYDROCHLORIDE 400 MILLIGRAM(S): 400 TABLET ORAL at 21:29

## 2021-01-18 RX ADMIN — PANTOPRAZOLE SODIUM 40 MILLIGRAM(S): 20 TABLET, DELAYED RELEASE ORAL at 05:39

## 2021-01-18 RX ADMIN — Medication 30 MILLIGRAM(S): at 17:21

## 2021-01-18 RX ADMIN — Medication 6: at 12:46

## 2021-01-18 RX ADMIN — CHLORHEXIDINE GLUCONATE 15 MILLILITER(S): 213 SOLUTION TOPICAL at 05:39

## 2021-01-18 RX ADMIN — Medication 6 UNIT(S): at 08:24

## 2021-01-18 RX ADMIN — MUPIROCIN 1 APPLICATION(S): 20 OINTMENT TOPICAL at 05:48

## 2021-01-18 RX ADMIN — INSULIN GLARGINE 20 UNIT(S): 100 INJECTION, SOLUTION SUBCUTANEOUS at 21:29

## 2021-01-18 NOTE — PROGRESS NOTE ADULT - ASSESSMENT
Patient scheduled to go fror cardiac surgery.  Endo consulted for diabetes management.    T2DM  continue  Lantus 20 units daily.  Go up on  dose of  Admelog to 8 units daily  fingersticks ac tid hs  Pt does have outpt ENdo-  Dr Soriano

## 2021-01-18 NOTE — PROGRESS NOTE ADULT - SUBJECTIVE AND OBJECTIVE BOX
Cardiac Surgery Pre-op Note:    CC: Patient is a 75y old  Female who presents with a chief complaint of Afib with RVR with hypotension, elevated troponin (18 Jan 2021 11:24)                                                                                                             Surgeon:    Procedure:     Allergies    warfarin (Rash)    Intolerances    OHS (Unknown)      HPI:  75yoF hx Afib on Xarelto, PAD s/p O-shcshvc-vwignmoke bypass, R-femoral angioplasty with stenting, on Plavix, HTN, HLD, DM presenting with acute dyspnea and palpitations yesterday.  Pt was in her normal state of health, then developed acute onset of exertional dyspnea associated with palpitations that progressed to dyspnea at rest which prompted her to come to the hospital.  She also reports some transient vomiting yesterday that is now improving.  In ED, pt was in Afib with RVR associated with soft SBP in 90s.  She was given IV Cardizem and PO metoprolol without improvement and ED staff spoke with [facility] cardiology who recommended digoxin.   Labs notable for leukocytosis and elevated troponin.  Pt denies any chest pain, lightheadedness, syncope, cough, abdominal pain, diarrhea, or urinary symptoms.   Of note, pt came to [facility] ED on 1/9 for R-thigh pain after walking into a cabinet at home. She underwent a CT angio A/P that showed R-thigh hematoma w/out extravasation.  Her leg was wrapped and she was d/c’ed from ED with instruction to continue wtith blood thinners and outpatient follow up. She denies any worsening R-thigh or leg pain. Patient comes into the hospital with afib (12 Jan 2021 03:52)      PAST MEDICAL & SURGICAL HISTORY:  PAD (peripheral artery disease)    Paroxysmal atrial fibrillation    Hypercholesterolemia    Diabetes    Hypertension    S/P peripheral artery angioplasty with stent placement    S/P femoral-popliteal bypass surgery    H/O hernia repair    H/O abdominal hysterectomy        MEDICATIONS  (STANDING):  aMIOdarone    Tablet 400 milliGRAM(s) Oral every 8 hours  ascorbic acid 500 milliGRAM(s) Oral daily  aspirin  chewable 81 milliGRAM(s) Oral daily  atorvastatin 20 milliGRAM(s) Oral at bedtime  cefuroxime  IVPB 1500 milliGRAM(s) IV Intermittent once  chlorhexidine 0.12% Liquid 15 milliLiter(s) Swish and Spit two times a day  chlorhexidine 4% Liquid 1 Application(s) Topical two times a day  diltiazem    Tablet 30 milliGRAM(s) Oral four times a day  heparin  Infusion 800 Unit(s)/Hr (8 mL/Hr) IV Continuous <Continuous>  insulin glargine Injectable (LANTUS) 20 Unit(s) SubCutaneous at bedtime  insulin lispro (ADMELOG) corrective regimen sliding scale   SubCutaneous Before meals and at bedtime  insulin lispro Injectable (ADMELOG) 6 Unit(s) SubCutaneous three times a day before meals  metoprolol tartrate 50 milliGRAM(s) Oral every 8 hours  mupirocin 2% Nasal 1 Application(s) Nasal two times a day  pantoprazole    Tablet 40 milliGRAM(s) Oral before breakfast  phytonadione  IVPB 5 milliGRAM(s) IV Intermittent daily  senna 2 Tablet(s) Oral at bedtime  sodium chloride 0.9% lock flush 3 milliLiter(s) IV Push every 8 hours    MEDICATIONS  (PRN):  acetaminophen   Tablet .. 650 milliGRAM(s) Oral every 6 hours PRN Temp greater or equal to 38C (100.4F), Mild Pain (1 - 3)  albuterol/ipratropium for Nebulization 3 milliLiter(s) Nebulizer every 6 hours PRN Shortness of Breath and/or Wheezing  ondansetron Injectable 4 milliGRAM(s) IV Push every 6 hours PRN Nausea and/or Vomiting  polyethylene glycol 3350 17 Gram(s) Oral at bedtime PRN Constipation        Labs:                        11.3   10.77 )-----------( 336      ( 18 Jan 2021 06:04 )             35.2     01-18    134<L>  |  94<L>  |  21.0<H>  ----------------------------<  220<H>  3.9   |  26.0  |  0.86    Ca    9.1      18 Jan 2021 11:36  Mg     2.6     01-18    TPro  6.8  /  Alb  3.6  /  TBili  3.2<H>  /  DBili  0.6<H>  /  AST  32<H>  /  ALT  112<H>  /  AlkPhos  75  01-18    PT/INR - ( 18 Jan 2021 06:04 )   PT: 15.1 sec;   INR: 1.32 ratio         PTT - ( 18 Jan 2021 11:38 )  PTT:32.1 sec    Blood Type: Type + Screen (01.13.21 @ 22:25)    ABO RH Interpretation: A NEG      HGB A1C: A1C with Estimated Average Glucose (01.12.21 @ 09:22)    A1C with Estimated Average Glucose Result: 7.8 %    Estimated Average Glucose: 177 mg/dL      Pro-BNP: Serum Pro-Brain Natriuretic Peptide: 8077 pg/mL (01-17 @ 07:39)    Thyroid Panel: 01-18 @ 11:36/3.56  --/8.8/75    MRSA: MRSA PCR Result.: NotDetec (01-14 @ 16:27)   / MSSA:       CXR: < from: Xray Chest 1 View- PORTABLE-Routine (Xray Chest 1 View- PORTABLE-Routine in AM.) (01.17.21 @ 05:35) >  The lungs are clear of airspace consolidations or effusions. No pneumothorax.    The heart and mediastinum are within normal limits.    Visualized osseous structures are intact.    < end of copied text >      EKG: < from: 12 Lead ECG (01.16.21 @ 09:04) >  Atrial fibrillation  Cannot rule out Anterior infarct , age undetermined  Marked ST abnormality, possible inferolateral subendocardial injury  Abnormal ECG    < end of copied text >      Carotid Duplex:  < from: US Duplex Carotid Arteries Complete, Bilateral (01.13.21 @ 19:05) >  RIGHT: Small amount of calcified plaque in the bulb and internal carotid artery    CCA: 102  ICA: 94  ICA/CCA ratio: 0.9  There is antegrade flow in the right vertebral artery.    LEFT: Small amount of calcified plaque in the bulb and internal carotid artery    CCA: 88  ICA: 1:15  ICA/CCA ratio: 1.3  There is antegrade flow in the left vertebral artery.    Multiple small thyroid nodules are seen bilaterally and may be further evaluated with ultrasound on a nonemergent basis    No evidence of hemodynamically significant stenosis by velocity measurements.    < end of copied text >      PFT's:    Echocardiogram:< from: GERALDINE Echo Doppler (01.14.21 @ 09:20) >  Summary:   1. Moderately decreased global left ventricular systolic function. Left ventricular ejection fraction, by visual estimation, is 35 to 40%.   2. Normal right ventricular size and function.   3. Mild to moderately enlarged left atrium.   4. Mildly enlarged right atrium.   5. Moderate mitral valve regurgitation.   6. Mild-moderate tricuspid regurgitation.   7. Mild aortic valve stenosis. The non-coronary cusp is calcified and immobile, but the right and left coronary cusps open normally. The aortic valve area is 1.6 cm2 by planimetry.   8. No left atrial appendage thrombus. There is spontaneous echo contrast seen in the left atrial appendage, but there is no filling defect seen with the administration of Definity.        Cardiac catheterization:  < from: Cardiac Cath Lab - Adult (01.13.21 @ 13:45) >  VENTRICLES:Global left ventricular function was severely depressed. EF  calculated by contrast ventriculography was 25 %.  VALVES: MITRAL VALVE: The mitral valve exhibited moderate regurgitation.  CORONARY VESSELS: The coronary circulation is left dominant.  LM:   --  Proximal left main: There was a 90 % stenosis.  --  Distal left main: There was a 90 % stenosis.  LAD:   --  Proximal LAD: There was a 70 % stenosis.  --  Mid LAD: There was a 70 % stenosis.  --  Distal LAD: Angiography showed minor luminal irregularities with no  flow limiting lesions.  CX:   --  Proximal circumflex: Normal.  --  Mid circumflex: Normal.  --  Distal circumflex: Normal.  --  OM1: There was a 20 % stenosis.  --  L AV groove: Angiography showed minor luminal irregularities with no  flow limiting lesions.  --  LPDA: Angiography showed minor luminal irregularities with no flow  limiting lesions.  RCA:   --  Proximal RCA: There was a 70 % stenosis.    < end of copied text >    Vein Mapping:    Gen: WN/WD NAD  Neuro: AAOx3, nonfocal  Pulm: CTA B/L  CV: RRR, S1S2  Abd: Soft, NT, ND +BS  Ext: No edema, + peripheral pulses      Pt has AICD/PPM [ x] Yes  [ ] No             Brand Name:  Pre-op Beta Blocker ordered within 24 hrs of surgery?  [ x] Yes  [ ] No  If not, Why?  Type & Cross  [ x] Yes  [ ] No  NPO after Midnight [ x] Yes  [ ] No  Pre-op ABX ordered, to be taped on chart:  [x ] Yes  [ ] No     Hibiclens/Peridex ordered [x Yes  [ ] No  Intraop on Hold: PRBCs, CXR, GERALDINE [ x   Consent obtained  [ ] Yes  [ ] No

## 2021-01-18 NOTE — PROGRESS NOTE ADULT - SUBJECTIVE AND OBJECTIVE BOX
[facility], THE HEART CENTER                                   [address] [place] [address]                                                      PHONE: [phone]                                                         FAX: [phone]  [email or web address]  ---------------------------------------------------------------------------------------------------------------------------------    Overnight events/patient complaints:  Pt feels well breathing is back to normal, RLE outer thigh hematoma stable     OHS (Unknown)  warfarin (Rash)    MEDICATIONS  (STANDING):  aMIOdarone    Tablet 400 milliGRAM(s) Oral every 8 hours  ascorbic acid 500 milliGRAM(s) Oral daily  aspirin  chewable 81 milliGRAM(s) Oral daily  atorvastatin 20 milliGRAM(s) Oral at bedtime  buMETAnide Injectable 2 milliGRAM(s) IV Push every 12 hours  chlorhexidine 0.12% Liquid 15 milliLiter(s) Swish and Spit two times a day  chlorhexidine 4% Liquid 1 Application(s) Topical two times a day  diltiazem    Tablet 30 milliGRAM(s) Oral four times a day  heparin  Infusion 800 Unit(s)/Hr (8 mL/Hr) IV Continuous <Continuous>  insulin glargine Injectable (LANTUS) 20 Unit(s) SubCutaneous at bedtime  insulin lispro (ADMELOG) corrective regimen sliding scale   SubCutaneous Before meals and at bedtime  insulin lispro Injectable (ADMELOG) 6 Unit(s) SubCutaneous three times a day before meals  magnesium sulfate  IVPB 2 Gram(s) IV Intermittent once  metoprolol tartrate 50 milliGRAM(s) Oral every 8 hours  mupirocin 2% Nasal 1 Application(s) Nasal two times a day  pantoprazole    Tablet 40 milliGRAM(s) Oral before breakfast  phytonadione  IVPB 5 milliGRAM(s) IV Intermittent daily  potassium chloride    Tablet ER 40 milliEquivalent(s) Oral once  senna 2 Tablet(s) Oral at bedtime  sodium chloride 0.9% lock flush 3 milliLiter(s) IV Push every 8 hours    MEDICATIONS  (PRN):  acetaminophen   Tablet .. 650 milliGRAM(s) Oral every 6 hours PRN Temp greater or equal to 38C (100.4F), Mild Pain (1 - 3)  albuterol/ipratropium for Nebulization 3 milliLiter(s) Nebulizer every 6 hours PRN Shortness of Breath and/or Wheezing  ondansetron Injectable 4 milliGRAM(s) IV Push every 6 hours PRN Nausea and/or Vomiting  polyethylene glycol 3350 17 Gram(s) Oral at bedtime PRN Constipation      Vital Signs Last 24 Hrs  T(C): 36.4 (18 Jan 2021 05:30), Max: 37.5 (17 Jan 2021 22:57)  T(F): 97.5 (18 Jan 2021 05:30), Max: 99.5 (17 Jan 2021 22:57)  HR: 83 (18 Jan 2021 05:30) (78 - 97)  BP: 97/62 (18 Jan 2021 05:30) (97/62 - 129/68)  BP(mean): --  RR: 18 (18 Jan 2021 05:30) (16 - 18)  SpO2: 96% (18 Jan 2021 05:30) (95% - 98%)  ICU Vital Signs Last 24 Hrs  DONALD SAUNDERS  I&O's Detail    17 Jan 2021 07:01  -  18 Jan 2021 07:00  --------------------------------------------------------  IN:    Heparin: 80 mL  Total IN: 80 mL    OUT:  Total OUT: 0 mL    Total NET: 80 mL        Drug Dosing Weight  DONALD SAUNDERS      PHYSICAL EXAM:  General: Appears well developed, well nourished alert and cooperative.  HEENT: Head; normocephalic, atraumatic.  Eyes: Pupils reactive, cornea wnl.  Neck: Supple, no nodes adenopathy, no NVD or carotid bruit or thyromegaly.  CARDIOVASCULAR: Normal S1 and S2, No murmur, rub, gallop or lift.   LUNGS: No rales, rhonchi or wheeze. Normal breath sounds bilaterally.  ABDOMEN: Soft, nontender without mass or organomegaly. bowel sounds normoactive.  EXTREMITIES: No clubbing, cyanosis or edema. Distal pulses wnl. Right outer thigh hematoma stable  SKIN: warm and dry with normal turgor.  NEURO: Alert/oriented x 3/normal motor exam. No pathologic reflexes.    PSYCH: normal affect.        LABS:                        11.3   10.77 )-----------( 336      ( 18 Jan 2021 06:04 )             35.2     01-18    139  |  97<L>  |  21.0<H>  ----------------------------<  147<H>  3.6   |  29.0  |  0.94    Ca    9.0      18 Jan 2021 06:04  Mg     1.8     01-18      DONALD SAUNDERS      PT/INR - ( 18 Jan 2021 06:04 )   PT: 15.1 sec;   INR: 1.32 ratio         PTT - ( 18 Jan 2021 06:04 )  PTT:31.0 sec      RADIOLOGY & ADDITIONAL STUDIES:    INTERPRETATION OF TELEMETRY (personally reviewed): afib HRs controlled     ECHO: < from: GERALDINE Echo Doppler (01.14.21 @ 09:20) >    Summary:   1. Moderately decreased global left ventricular systolic function. Left ventricular ejection fraction, by visual estimation, is 35 to 40%.   2. Normal right ventricular size and function.   3. Mild to moderately enlarged left atrium.   4. Mildly enlarged right atrium.   5. Moderate mitral valve regurgitation.   6. Mild-moderate tricuspid regurgitation.   7. Mild aortic valve stenosis. The non-coronary cusp is calcified and immobile, but the right and left coronary cusps open normally. The aortic valve area is 1.6 cm2 by planimetry.   8. No left atrial appendage thrombus. There is spontaneous echo contrast seen in the left atrial appendage, but there is no filling defect seen with the administration of Definity.    Juan Diego Arroyo MD Electronically signed on 1/14/2021 at 10:57:13 AM    < end of copied text >         CARDIAC CATHETERIZATION: < from: Cardiac Cath Lab - Adult (01.13.21 @ 13:45) >  HEMODYNAMICS: Hemodynamic assessment demonstrates moderately elevated  LVEDP.  VENTRICLES:Global left ventricular function was severely depressed. EF  calculated by contrast ventriculography was 25 %.  VALVES: MITRAL VALVE: The mitral valve exhibited moderate regurgitation.  CORONARY VESSELS: The coronary circulation is left dominant.  LM:   --  Proximal left main: There was a 90 % stenosis.  --  Distal left main: There was a 90 % stenosis.  LAD:   --  Proximal LAD: There was a 70 % stenosis.  --  Mid LAD: There was a 70 % stenosis.  --  Distal LAD: Angiography showed minor luminal irregularities with no  flow limiting lesions.  CX:   --  Proximal circumflex: Normal.  --  Mid circumflex: Normal.  --  Distal circumflex: Normal.  --  OM1: There was a 20 % stenosis.  --  L AV groove: Angiography showed minor luminal irregularities with no  flow limiting lesions.  --  LPDA: Angiography showed minor luminal irregularities with no flow  limiting lesions.  RCA:   --  Proximal RCA: There was a 70 % stenosis.  COMPLICATIONS: There were no complications. No complications occurred  during the cath lab visit.  SUMMARY:  HEMODYNAMICS: Hemodynamic assessment demonstrates moderately elevated  LVEDP.  DIAGNOSTIC IMPRESSIONS: 90% left main coronary artery stenosis  70% proximal and 70% mid LAD stenosis  70% stenosis of proximal RCA in a very small and nondominant artery  DIAGNOSTIC RECOMMENDATIONS: Balloon pump was placed for severe LMCA  stenosis and elevated LVEDP/decompensated heart failure.  Heparin drip at 1400U/h  Continue asa 81mg daily  Can hold plavix  Would hold beta blocker due to decompensated heart failure. Can give back  half the dose (25mg q6h) if patient become tachycardic.  Continue diuresis with lasix 40mg IV bid  CT surgery evaluation for LIMA to LAD and SVG to LCx  INTERVENTIONAL IMPRESSIONS: 90% left main coronary artery stenosis  70% proximal and 70% mid LAD stenosis  70% stenosis of proximal RCA in a very small and nondominant artery  INTERVENTIONAL RECOMMENDATIONS: Balloon pump was placed for severe LMCA  stenosis and elevated LVEDP/decompensated heart failure.  Heparin drip at 1400U/h  Continue asa 81mg daily  Can hold plavix  Would hold beta blocker due to decompensated heart failure. Can give back  half the dose (25mg q6h) if patient become tachycardic.  Continue diuresis with lasix 40mg IV bid  CT surgery evaluation for LIMA to LAD and SVG to LCx  Prepared and signed by  David Smith MD  Signed 01/13/2021 15:10:23    < end of copied text >      ASSESSMENT AND PLAN:  In summary, DONALD SAUNDERS is an 75y Female with past medical history significant for HTN, HLD, PAD, PAF on Xarelto, aw acute HFrEF found to have severe 3V CAD/LM, Mod MR.    1) Pt currently euvolemic would DC diuretics  2) CW amiodarone load, heparin gtt  3) Cw ASA, Statin for CAD  -Plan for CABG tomorrow with possible MV repair/replacement  4) LE hematoma is stable   5) Will follow on tele

## 2021-01-18 NOTE — PROGRESS NOTE ADULT - SUBJECTIVE AND OBJECTIVE BOX
INTERVAL HPI/OVERNIGHT EVENTS:  Follow up on diabetes.  a1c 7.8%    MEDICATIONS  (STANDING):  aMIOdarone    Tablet 400 milliGRAM(s) Oral every 8 hours  ascorbic acid 500 milliGRAM(s) Oral daily  aspirin  chewable 81 milliGRAM(s) Oral daily  atorvastatin 20 milliGRAM(s) Oral at bedtime  cefuroxime  IVPB 1500 milliGRAM(s) IV Intermittent once  chlorhexidine 0.12% Liquid 15 milliLiter(s) Swish and Spit two times a day  chlorhexidine 4% Liquid 1 Application(s) Topical two times a day  diltiazem    Tablet 30 milliGRAM(s) Oral four times a day  heparin  Infusion 800 Unit(s)/Hr (8 mL/Hr) IV Continuous <Continuous>  insulin glargine Injectable (LANTUS) 20 Unit(s) SubCutaneous at bedtime  insulin lispro (ADMELOG) corrective regimen sliding scale   SubCutaneous Before meals and at bedtime  insulin lispro Injectable (ADMELOG) 6 Unit(s) SubCutaneous three times a day before meals  metoprolol tartrate 50 milliGRAM(s) Oral every 8 hours  mupirocin 2% Nasal 1 Application(s) Nasal two times a day  pantoprazole    Tablet 40 milliGRAM(s) Oral before breakfast  phytonadione  IVPB 5 milliGRAM(s) IV Intermittent daily  senna 2 Tablet(s) Oral at bedtime  sodium chloride 0.9% lock flush 3 milliLiter(s) IV Push every 8 hours    MEDICATIONS  (PRN):  acetaminophen   Tablet .. 650 milliGRAM(s) Oral every 6 hours PRN Temp greater or equal to 38C (100.4F), Mild Pain (1 - 3)  albuterol/ipratropium for Nebulization 3 milliLiter(s) Nebulizer every 6 hours PRN Shortness of Breath and/or Wheezing  ondansetron Injectable 4 milliGRAM(s) IV Push every 6 hours PRN Nausea and/or Vomiting  polyethylene glycol 3350 17 Gram(s) Oral at bedtime PRN Constipation      Allergies    warfarin (Rash)        Review of systems:  No CP, no SOB, no n/v    Vital Signs Last 24 Hrs  T(C): 36.8 (18 Jan 2021 10:00), Max: 37.5 (17 Jan 2021 22:57)  T(F): 98.3 (18 Jan 2021 10:00), Max: 99.5 (17 Jan 2021 22:57)  HR: 82 (18 Jan 2021 10:00) (82 - 97)  BP: 108/68 (18 Jan 2021 10:00) (97/62 - 113/67)  BP(mean): --  RR: 18 (18 Jan 2021 10:00) (17 - 18)  SpO2: 96% (18 Jan 2021 10:00) (96% - 98%)    PHYSICAL EXAM:    Constitutional: NAD, well-groomed, well-developed  Respiratory: CTAB  Cardiovascular: S1 and S2, RRR, no M/G/R  Gastrointestinal: BS+, soft, no organomegaly or mass  Extremities: No peripheral edema, no pedal lesions  Psychiatric: Normal mood, normal affect        LABS:                        11.3   10.77 )-----------( 336      ( 18 Jan 2021 06:04 )             35.2     01-18    139  |  97<L>  |  21.0<H>  ----------------------------<  147<H>  3.6   |  29.0  |  0.94    Ca    9.0      18 Jan 2021 06:04  Mg     1.8     01-18

## 2021-01-19 ENCOUNTER — APPOINTMENT (OUTPATIENT)
Dept: CARDIOTHORACIC SURGERY | Facility: HOSPITAL | Age: 76
End: 2021-01-19
Payer: MEDICARE

## 2021-01-19 ENCOUNTER — RESULT REVIEW (OUTPATIENT)
Age: 76
End: 2021-01-19

## 2021-01-19 LAB
ALBUMIN SERPL ELPH-MCNC: 2.7 G/DL — LOW (ref 3.3–5.2)
ALP SERPL-CCNC: 51 U/L — SIGNIFICANT CHANGE UP (ref 40–120)
ALT FLD-CCNC: 52 U/L — HIGH
ANION GAP SERPL CALC-SCNC: 16 MMOL/L — SIGNIFICANT CHANGE UP (ref 5–17)
APTT BLD: 27.6 SEC — SIGNIFICANT CHANGE UP (ref 27.5–35.5)
AST SERPL-CCNC: 66 U/L — HIGH
BASE EXCESS BLDV CALC-SCNC: -4.1 MMOL/L — LOW (ref -2–2)
BILIRUB SERPL-MCNC: 3.8 MG/DL — HIGH (ref 0.4–2)
BUN SERPL-MCNC: 16 MG/DL — SIGNIFICANT CHANGE UP (ref 8–20)
CALCIUM SERPL-MCNC: 8.4 MG/DL — LOW (ref 8.6–10.2)
CHLORIDE SERPL-SCNC: 103 MMOL/L — SIGNIFICANT CHANGE UP (ref 98–107)
CO2 SERPL-SCNC: 21 MMOL/L — LOW (ref 22–29)
CREAT SERPL-MCNC: 0.51 MG/DL — SIGNIFICANT CHANGE UP (ref 0.5–1.3)
GAS PNL BLDA: SIGNIFICANT CHANGE UP
GAS PNL BLDA: SIGNIFICANT CHANGE UP
GAS PNL BLDV: SIGNIFICANT CHANGE UP
GLUCOSE BLDC GLUCOMTR-MCNC: 157 MG/DL — HIGH (ref 70–99)
GLUCOSE BLDC GLUCOMTR-MCNC: 390 MG/DL — HIGH (ref 70–99)
GLUCOSE BLDC GLUCOMTR-MCNC: 397 MG/DL — HIGH (ref 70–99)
GLUCOSE BLDC GLUCOMTR-MCNC: 408 MG/DL — HIGH (ref 70–99)
GLUCOSE BLDC GLUCOMTR-MCNC: 408 MG/DL — HIGH (ref 70–99)
GLUCOSE BLDC GLUCOMTR-MCNC: 410 MG/DL — HIGH (ref 70–99)
GLUCOSE BLDC GLUCOMTR-MCNC: 412 MG/DL — HIGH (ref 70–99)
GLUCOSE BLDC GLUCOMTR-MCNC: 433 MG/DL — HIGH (ref 70–99)
GLUCOSE SERPL-MCNC: 323 MG/DL — HIGH (ref 70–99)
HCO3 BLDV-SCNC: 20 MMOL/L — LOW (ref 21–29)
HCT VFR BLD CALC: 31.7 % — LOW (ref 34.5–45)
HGB BLD-MCNC: 10.3 G/DL — LOW (ref 11.5–15.5)
HOROWITZ INDEX BLDV+IHG-RTO: 1 — SIGNIFICANT CHANGE UP
INR BLD: 1.31 RATIO — HIGH (ref 0.88–1.16)
INR BLD: 1.54 RATIO — HIGH (ref 0.88–1.16)
MAGNESIUM SERPL-MCNC: 2.9 MG/DL — HIGH (ref 1.8–2.6)
MCHC RBC-ENTMCNC: 28.4 PG — SIGNIFICANT CHANGE UP (ref 27–34)
MCHC RBC-ENTMCNC: 32.5 GM/DL — SIGNIFICANT CHANGE UP (ref 32–36)
MCV RBC AUTO: 87.3 FL — SIGNIFICANT CHANGE UP (ref 80–100)
PCO2 BLDV: 47 MMHG — SIGNIFICANT CHANGE UP (ref 35–50)
PH BLDV: 7.29 — LOW (ref 7.32–7.43)
PLATELET # BLD AUTO: 293 K/UL — SIGNIFICANT CHANGE UP (ref 150–400)
PO2 BLDV: 37 MMHG — SIGNIFICANT CHANGE UP (ref 25–45)
POTASSIUM SERPL-MCNC: 4.7 MMOL/L — SIGNIFICANT CHANGE UP (ref 3.5–5.3)
POTASSIUM SERPL-SCNC: 4.7 MMOL/L — SIGNIFICANT CHANGE UP (ref 3.5–5.3)
PROT SERPL-MCNC: 4.8 G/DL — LOW (ref 6.6–8.7)
PROTHROM AB SERPL-ACNC: 15 SEC — HIGH (ref 10.6–13.6)
PROTHROM AB SERPL-ACNC: 17.5 SEC — HIGH (ref 10.6–13.6)
RBC # BLD: 3.63 M/UL — LOW (ref 3.8–5.2)
RBC # FLD: 15.5 % — HIGH (ref 10.3–14.5)
SAO2 % BLDV: 62 % — SIGNIFICANT CHANGE UP
SODIUM SERPL-SCNC: 139 MMOL/L — SIGNIFICANT CHANGE UP (ref 135–145)
WBC # BLD: 25.82 K/UL — HIGH (ref 3.8–10.5)
WBC # FLD AUTO: 25.82 K/UL — HIGH (ref 3.8–10.5)

## 2021-01-19 PROCEDURE — 33518 CABG ARTERY-VEIN TWO: CPT | Mod: AS

## 2021-01-19 PROCEDURE — 33426 REPAIR OF MITRAL VALVE: CPT

## 2021-01-19 PROCEDURE — 71045 X-RAY EXAM CHEST 1 VIEW: CPT | Mod: 26

## 2021-01-19 PROCEDURE — 33533 CABG ARTERIAL SINGLE: CPT | Mod: AS

## 2021-01-19 PROCEDURE — 33518 CABG ARTERY-VEIN TWO: CPT

## 2021-01-19 PROCEDURE — 33533 CABG ARTERIAL SINGLE: CPT

## 2021-01-19 PROCEDURE — 33508 ENDOSCOPIC VEIN HARVEST: CPT | Mod: 59

## 2021-01-19 PROCEDURE — 93010 ELECTROCARDIOGRAM REPORT: CPT

## 2021-01-19 PROCEDURE — 88304 TISSUE EXAM BY PATHOLOGIST: CPT | Mod: 26

## 2021-01-19 PROCEDURE — 33426 REPAIR OF MITRAL VALVE: CPT | Mod: AS

## 2021-01-19 RX ORDER — AMIODARONE HYDROCHLORIDE 400 MG/1
150 TABLET ORAL ONCE
Refills: 0 | Status: COMPLETED | OUTPATIENT
Start: 2021-01-19 | End: 2021-01-20

## 2021-01-19 RX ORDER — SODIUM CHLORIDE 9 MG/ML
1000 INJECTION INTRAMUSCULAR; INTRAVENOUS; SUBCUTANEOUS
Refills: 0 | Status: DISCONTINUED | OUTPATIENT
Start: 2021-01-19 | End: 2021-01-24

## 2021-01-19 RX ORDER — CHLORHEXIDINE GLUCONATE 213 G/1000ML
15 SOLUTION TOPICAL EVERY 12 HOURS
Refills: 0 | Status: DISCONTINUED | OUTPATIENT
Start: 2021-01-19 | End: 2021-01-19

## 2021-01-19 RX ORDER — PANTOPRAZOLE SODIUM 20 MG/1
40 TABLET, DELAYED RELEASE ORAL DAILY
Refills: 0 | Status: DISCONTINUED | OUTPATIENT
Start: 2021-01-20 | End: 2021-01-28

## 2021-01-19 RX ORDER — EPINEPHRINE 0.3 MG/.3ML
0.03 INJECTION INTRAMUSCULAR; SUBCUTANEOUS
Qty: 4 | Refills: 0 | Status: DISCONTINUED | OUTPATIENT
Start: 2021-01-19 | End: 2021-01-19

## 2021-01-19 RX ORDER — ALBUMIN HUMAN 25 %
250 VIAL (ML) INTRAVENOUS ONCE
Refills: 0 | Status: COMPLETED | OUTPATIENT
Start: 2021-01-19 | End: 2021-01-19

## 2021-01-19 RX ORDER — DEXTROSE 50 % IN WATER 50 %
50 SYRINGE (ML) INTRAVENOUS
Refills: 0 | Status: DISCONTINUED | OUTPATIENT
Start: 2021-01-19 | End: 2021-01-28

## 2021-01-19 RX ORDER — DEXTROSE 50 % IN WATER 50 %
25 SYRINGE (ML) INTRAVENOUS
Refills: 0 | Status: DISCONTINUED | OUTPATIENT
Start: 2021-01-19 | End: 2021-01-28

## 2021-01-19 RX ORDER — SENNA PLUS 8.6 MG/1
2 TABLET ORAL AT BEDTIME
Refills: 0 | Status: DISCONTINUED | OUTPATIENT
Start: 2021-01-19 | End: 2021-01-28

## 2021-01-19 RX ORDER — MILRINONE LACTATE 1 MG/ML
0.12 INJECTION, SOLUTION INTRAVENOUS
Qty: 20 | Refills: 0 | Status: DISCONTINUED | OUTPATIENT
Start: 2021-01-19 | End: 2021-01-21

## 2021-01-19 RX ORDER — PROPOFOL 10 MG/ML
10 INJECTION, EMULSION INTRAVENOUS
Qty: 1000 | Refills: 0 | Status: DISCONTINUED | OUTPATIENT
Start: 2021-01-19 | End: 2021-01-19

## 2021-01-19 RX ORDER — SODIUM CHLORIDE 9 MG/ML
1000 INJECTION, SOLUTION INTRAVENOUS ONCE
Refills: 0 | Status: COMPLETED | OUTPATIENT
Start: 2021-01-19 | End: 2021-01-19

## 2021-01-19 RX ORDER — AMIODARONE HYDROCHLORIDE 400 MG/1
TABLET ORAL
Refills: 0 | Status: DISCONTINUED | OUTPATIENT
Start: 2021-01-19 | End: 2021-01-20

## 2021-01-19 RX ORDER — ASPIRIN/CALCIUM CARB/MAGNESIUM 324 MG
81 TABLET ORAL DAILY
Refills: 0 | Status: DISCONTINUED | OUTPATIENT
Start: 2021-01-20 | End: 2021-01-28

## 2021-01-19 RX ORDER — AMIODARONE HYDROCHLORIDE 400 MG/1
1 TABLET ORAL
Qty: 900 | Refills: 0 | Status: DISCONTINUED | OUTPATIENT
Start: 2021-01-19 | End: 2021-01-20

## 2021-01-19 RX ORDER — POTASSIUM CHLORIDE 20 MEQ
10 PACKET (EA) ORAL
Refills: 0 | Status: DISCONTINUED | OUTPATIENT
Start: 2021-01-19 | End: 2021-01-19

## 2021-01-19 RX ORDER — VANCOMYCIN HCL 1 G
1250 VIAL (EA) INTRAVENOUS EVERY 12 HOURS
Refills: 0 | Status: DISCONTINUED | OUTPATIENT
Start: 2021-01-19 | End: 2021-01-21

## 2021-01-19 RX ORDER — CHLORHEXIDINE GLUCONATE 213 G/1000ML
1 SOLUTION TOPICAL DAILY
Refills: 0 | Status: DISCONTINUED | OUTPATIENT
Start: 2021-01-19 | End: 2021-01-23

## 2021-01-19 RX ORDER — AMIODARONE HYDROCHLORIDE 400 MG/1
400 TABLET ORAL EVERY 8 HOURS
Refills: 0 | Status: DISCONTINUED | OUTPATIENT
Start: 2021-01-19 | End: 2021-01-20

## 2021-01-19 RX ORDER — INSULIN HUMAN 100 [IU]/ML
2 INJECTION, SOLUTION SUBCUTANEOUS
Qty: 50 | Refills: 0 | Status: DISCONTINUED | OUTPATIENT
Start: 2021-01-19 | End: 2021-01-20

## 2021-01-19 RX ORDER — POLYETHYLENE GLYCOL 3350 17 G/17G
17 POWDER, FOR SOLUTION ORAL DAILY
Refills: 0 | Status: DISCONTINUED | OUTPATIENT
Start: 2021-01-21 | End: 2021-01-23

## 2021-01-19 RX ORDER — ACETAMINOPHEN 500 MG
1000 TABLET ORAL ONCE
Refills: 0 | Status: COMPLETED | OUTPATIENT
Start: 2021-01-19 | End: 2021-01-19

## 2021-01-19 RX ORDER — ONDANSETRON 8 MG/1
4 TABLET, FILM COATED ORAL EVERY 4 HOURS
Refills: 0 | Status: DISCONTINUED | OUTPATIENT
Start: 2021-01-19 | End: 2021-01-24

## 2021-01-19 RX ORDER — FENTANYL CITRATE 50 UG/ML
50 INJECTION INTRAVENOUS
Refills: 0 | Status: DISCONTINUED | OUTPATIENT
Start: 2021-01-19 | End: 2021-01-19

## 2021-01-19 RX ORDER — PANTOPRAZOLE SODIUM 20 MG/1
40 TABLET, DELAYED RELEASE ORAL ONCE
Refills: 0 | Status: COMPLETED | OUTPATIENT
Start: 2021-01-19 | End: 2021-01-19

## 2021-01-19 RX ORDER — CEFUROXIME AXETIL 250 MG
1500 TABLET ORAL EVERY 8 HOURS
Refills: 0 | Status: COMPLETED | OUTPATIENT
Start: 2021-01-19 | End: 2021-01-21

## 2021-01-19 RX ORDER — INSULIN HUMAN 100 [IU]/ML
10 INJECTION, SOLUTION SUBCUTANEOUS ONCE
Refills: 0 | Status: COMPLETED | OUTPATIENT
Start: 2021-01-19 | End: 2021-01-19

## 2021-01-19 RX ORDER — INSULIN HUMAN 100 [IU]/ML
3 INJECTION, SOLUTION SUBCUTANEOUS ONCE
Refills: 0 | Status: COMPLETED | OUTPATIENT
Start: 2021-01-19 | End: 2021-01-19

## 2021-01-19 RX ORDER — DOBUTAMINE HCL 250MG/20ML
2 VIAL (ML) INTRAVENOUS
Qty: 500 | Refills: 0 | Status: DISCONTINUED | OUTPATIENT
Start: 2021-01-19 | End: 2021-01-24

## 2021-01-19 RX ORDER — ASPIRIN/CALCIUM CARB/MAGNESIUM 324 MG
81 TABLET ORAL ONCE
Refills: 0 | Status: COMPLETED | OUTPATIENT
Start: 2021-01-19 | End: 2021-01-19

## 2021-01-19 RX ORDER — NOREPINEPHRINE BITARTRATE/D5W 8 MG/250ML
0.05 PLASTIC BAG, INJECTION (ML) INTRAVENOUS
Qty: 8 | Refills: 0 | Status: DISCONTINUED | OUTPATIENT
Start: 2021-01-19 | End: 2021-01-21

## 2021-01-19 RX ORDER — ATORVASTATIN CALCIUM 80 MG/1
40 TABLET, FILM COATED ORAL AT BEDTIME
Refills: 0 | Status: DISCONTINUED | OUTPATIENT
Start: 2021-01-19 | End: 2021-01-28

## 2021-01-19 RX ADMIN — MUPIROCIN 1 APPLICATION(S): 20 OINTMENT TOPICAL at 05:21

## 2021-01-19 RX ADMIN — ATORVASTATIN CALCIUM 40 MILLIGRAM(S): 80 TABLET, FILM COATED ORAL at 21:07

## 2021-01-19 RX ADMIN — CHLORHEXIDINE GLUCONATE 15 MILLILITER(S): 213 SOLUTION TOPICAL at 05:21

## 2021-01-19 RX ADMIN — Medication 400 MILLIGRAM(S): at 21:37

## 2021-01-19 RX ADMIN — SODIUM CHLORIDE 1000 MILLILITER(S): 9 INJECTION, SOLUTION INTRAVENOUS at 15:30

## 2021-01-19 RX ADMIN — AMIODARONE HYDROCHLORIDE 200 MILLIGRAM(S): 400 TABLET ORAL at 05:24

## 2021-01-19 RX ADMIN — Medication 166.67 MILLIGRAM(S): at 20:09

## 2021-01-19 RX ADMIN — CHLORHEXIDINE GLUCONATE 1 APPLICATION(S): 213 SOLUTION TOPICAL at 05:17

## 2021-01-19 RX ADMIN — Medication 30 MILLIGRAM(S): at 05:24

## 2021-01-19 RX ADMIN — SODIUM CHLORIDE 3 MILLILITER(S): 9 INJECTION INTRAMUSCULAR; INTRAVENOUS; SUBCUTANEOUS at 05:17

## 2021-01-19 RX ADMIN — PANTOPRAZOLE SODIUM 40 MILLIGRAM(S): 20 TABLET, DELAYED RELEASE ORAL at 05:24

## 2021-01-19 RX ADMIN — SENNA PLUS 2 TABLET(S): 8.6 TABLET ORAL at 21:07

## 2021-01-19 RX ADMIN — FENTANYL CITRATE 50 MICROGRAM(S): 50 INJECTION INTRAVENOUS at 16:33

## 2021-01-19 RX ADMIN — PANTOPRAZOLE SODIUM 40 MILLIGRAM(S): 20 TABLET, DELAYED RELEASE ORAL at 16:35

## 2021-01-19 RX ADMIN — INSULIN HUMAN 3 UNIT(S): 100 INJECTION, SOLUTION SUBCUTANEOUS at 17:15

## 2021-01-19 RX ADMIN — Medication 125 MILLILITER(S): at 18:16

## 2021-01-19 RX ADMIN — CHLORHEXIDINE GLUCONATE 15 MILLILITER(S): 213 SOLUTION TOPICAL at 16:35

## 2021-01-19 RX ADMIN — Medication 81 MILLIGRAM(S): at 20:09

## 2021-01-19 RX ADMIN — INSULIN HUMAN 10 UNIT(S): 100 INJECTION, SOLUTION SUBCUTANEOUS at 18:16

## 2021-01-19 RX ADMIN — Medication 100 MILLIGRAM(S): at 16:35

## 2021-01-19 NOTE — BRIEF OPERATIVE NOTE - NSICDXBRIEFPOSTOP_GEN_ALL_CORE_FT
POST-OP DIAGNOSIS:  Mitral regurgitation 19-Jan-2021 13:59:18  Doug Lal  Afib 19-Jan-2021 13:59:03  Doug Lal  CAD, multiple vessel 19-Jan-2021 13:58:55  Doug Lal

## 2021-01-19 NOTE — BRIEF OPERATIVE NOTE - OPERATION/FINDINGS
CAD in multiple vessels, moderate mitral regurgitation, left atrial thrombus seen on usha CAD in multiple vessels, moderate mitral regurgitation, left atrial thrombus seen on usha    116 minute cross clamp time

## 2021-01-19 NOTE — BRIEF OPERATIVE NOTE - NSICDXBRIEFPROCEDURE_GEN_ALL_CORE_FT
PROCEDURES:  Atrial thrombus removal 19-Jan-2021 14:00:39  Doug Lal  Clipping, left atrial appendage 19-Jan-2021 13:56:44  Doug Lal  Repair, mitral valve, with GERALDINE 19-Jan-2021 13:56:21  Doug Lal  CABG, with GERALDINE 19-Jan-2021 13:56:00  Doug Lal

## 2021-01-19 NOTE — ASU PREOP CHECKLIST - HEART RATE (BEATS/MIN)
"RX PROGRESS NOTE: Vancomycin Therapeutic Drug Monitoring    Day of therapy: 4    Indication and target trough: UTI (10-15 mcg/mL) (originally ordered for sepsis with goal 15-20 mcg/mL)    Current vancomycin dosing regimen: 1250 mg IVPB every 12 hours    Most recent height and weight information:  Weight: 77.2 kg (12/30/18 0423)  Height: 6' 1"" (185.4 cm) (12/28/18 1322)    The Following are the Calculated  Current Weights for [place]     Adjusted Ideal        77.2 kg 79.9 kg            Labs:  Serum Creatinine and Creatinine Clearance:  Serum creatinine: 0.87 mg/dL 12/29/18 0550  Estimated creatinine clearance: 92.4 mL/min    Vancomycin Serum Concentrations:  VANCOMYCIN (TROUGH) (mcg/mL)   Date/Time Value   12/30/2018 0530 23.1 (H)       Assessment:  Serum concentration of 23.1 mcg/mL after the 5th dose. Based on the serum concentration, will adjust regimen to vancomycin 1250 mg IVPB every 24 hours. Additional serum concentrations may be necessary depending on pathogen identified, risk factors for adverse events, and/or duration of therapy. Pharmacy will continue to follow and adjust as needed.     Thank you,    Carissa Joel, [facility]  12/30/2018 7:23 AM  Contact # [phone]    " 80

## 2021-01-20 ENCOUNTER — NON-APPOINTMENT (OUTPATIENT)
Age: 76
End: 2021-01-20

## 2021-01-20 LAB
ALBUMIN SERPL ELPH-MCNC: 2.9 G/DL — LOW (ref 3.3–5.2)
ALP SERPL-CCNC: 46 U/L — SIGNIFICANT CHANGE UP (ref 40–120)
ALT FLD-CCNC: 54 U/L — HIGH
ANION GAP SERPL CALC-SCNC: 12 MMOL/L — SIGNIFICANT CHANGE UP (ref 5–17)
ANION GAP SERPL CALC-SCNC: 15 MMOL/L — SIGNIFICANT CHANGE UP (ref 5–17)
AST SERPL-CCNC: 71 U/L — HIGH
BILIRUB SERPL-MCNC: 4.9 MG/DL — HIGH (ref 0.4–2)
BUN SERPL-MCNC: 22 MG/DL — HIGH (ref 8–20)
BUN SERPL-MCNC: 24 MG/DL — HIGH (ref 8–20)
CALCIUM SERPL-MCNC: 8.1 MG/DL — LOW (ref 8.6–10.2)
CALCIUM SERPL-MCNC: 8.1 MG/DL — LOW (ref 8.6–10.2)
CHLORIDE SERPL-SCNC: 103 MMOL/L — SIGNIFICANT CHANGE UP (ref 98–107)
CHLORIDE SERPL-SCNC: 105 MMOL/L — SIGNIFICANT CHANGE UP (ref 98–107)
CO2 SERPL-SCNC: 21 MMOL/L — LOW (ref 22–29)
CO2 SERPL-SCNC: 22 MMOL/L — SIGNIFICANT CHANGE UP (ref 22–29)
CREAT SERPL-MCNC: 1.06 MG/DL — SIGNIFICANT CHANGE UP (ref 0.5–1.3)
CREAT SERPL-MCNC: 1.1 MG/DL — SIGNIFICANT CHANGE UP (ref 0.5–1.3)
GAS PNL BLDA: SIGNIFICANT CHANGE UP
GAS PNL BLDA: SIGNIFICANT CHANGE UP
GLUCOSE BLDC GLUCOMTR-MCNC: 115 MG/DL — HIGH (ref 70–99)
GLUCOSE BLDC GLUCOMTR-MCNC: 115 MG/DL — HIGH (ref 70–99)
GLUCOSE BLDC GLUCOMTR-MCNC: 121 MG/DL — HIGH (ref 70–99)
GLUCOSE BLDC GLUCOMTR-MCNC: 123 MG/DL — HIGH (ref 70–99)
GLUCOSE BLDC GLUCOMTR-MCNC: 131 MG/DL — HIGH (ref 70–99)
GLUCOSE BLDC GLUCOMTR-MCNC: 141 MG/DL — HIGH (ref 70–99)
GLUCOSE BLDC GLUCOMTR-MCNC: 145 MG/DL — HIGH (ref 70–99)
GLUCOSE BLDC GLUCOMTR-MCNC: 145 MG/DL — HIGH (ref 70–99)
GLUCOSE BLDC GLUCOMTR-MCNC: 162 MG/DL — HIGH (ref 70–99)
GLUCOSE BLDC GLUCOMTR-MCNC: 188 MG/DL — HIGH (ref 70–99)
GLUCOSE BLDC GLUCOMTR-MCNC: 192 MG/DL — HIGH (ref 70–99)
GLUCOSE BLDC GLUCOMTR-MCNC: 193 MG/DL — HIGH (ref 70–99)
GLUCOSE BLDC GLUCOMTR-MCNC: 221 MG/DL — HIGH (ref 70–99)
GLUCOSE BLDC GLUCOMTR-MCNC: 240 MG/DL — HIGH (ref 70–99)
GLUCOSE BLDC GLUCOMTR-MCNC: 271 MG/DL — HIGH (ref 70–99)
GLUCOSE BLDC GLUCOMTR-MCNC: 301 MG/DL — HIGH (ref 70–99)
GLUCOSE BLDC GLUCOMTR-MCNC: 305 MG/DL — HIGH (ref 70–99)
GLUCOSE BLDC GLUCOMTR-MCNC: 351 MG/DL — HIGH (ref 70–99)
GLUCOSE BLDC GLUCOMTR-MCNC: 359 MG/DL — HIGH (ref 70–99)
GLUCOSE BLDC GLUCOMTR-MCNC: 380 MG/DL — HIGH (ref 70–99)
GLUCOSE SERPL-MCNC: 122 MG/DL — HIGH (ref 70–99)
GLUCOSE SERPL-MCNC: 332 MG/DL — HIGH (ref 70–99)
HCT VFR BLD CALC: 28.4 % — LOW (ref 34.5–45)
HCT VFR BLD CALC: 28.5 % — LOW (ref 34.5–45)
HGB BLD-MCNC: 9.2 G/DL — LOW (ref 11.5–15.5)
HGB BLD-MCNC: 9.2 G/DL — LOW (ref 11.5–15.5)
MAGNESIUM SERPL-MCNC: 2.5 MG/DL — SIGNIFICANT CHANGE UP (ref 1.6–2.6)
MAGNESIUM SERPL-MCNC: 2.5 MG/DL — SIGNIFICANT CHANGE UP (ref 1.8–2.6)
MCHC RBC-ENTMCNC: 27.9 PG — SIGNIFICANT CHANGE UP (ref 27–34)
MCHC RBC-ENTMCNC: 28 PG — SIGNIFICANT CHANGE UP (ref 27–34)
MCHC RBC-ENTMCNC: 32.3 GM/DL — SIGNIFICANT CHANGE UP (ref 32–36)
MCHC RBC-ENTMCNC: 32.4 GM/DL — SIGNIFICANT CHANGE UP (ref 32–36)
MCV RBC AUTO: 86.4 FL — SIGNIFICANT CHANGE UP (ref 80–100)
MCV RBC AUTO: 86.6 FL — SIGNIFICANT CHANGE UP (ref 80–100)
PLATELET # BLD AUTO: 195 K/UL — SIGNIFICANT CHANGE UP (ref 150–400)
PLATELET # BLD AUTO: 200 K/UL — SIGNIFICANT CHANGE UP (ref 150–400)
POTASSIUM SERPL-MCNC: 4.1 MMOL/L — SIGNIFICANT CHANGE UP (ref 3.5–5.3)
POTASSIUM SERPL-MCNC: 5.1 MMOL/L — SIGNIFICANT CHANGE UP (ref 3.5–5.3)
POTASSIUM SERPL-SCNC: 4.1 MMOL/L — SIGNIFICANT CHANGE UP (ref 3.5–5.3)
POTASSIUM SERPL-SCNC: 5.1 MMOL/L — SIGNIFICANT CHANGE UP (ref 3.5–5.3)
PROT SERPL-MCNC: 4.9 G/DL — LOW (ref 6.6–8.7)
RBC # BLD: 3.28 M/UL — LOW (ref 3.8–5.2)
RBC # BLD: 3.3 M/UL — LOW (ref 3.8–5.2)
RBC # FLD: 15.5 % — HIGH (ref 10.3–14.5)
RBC # FLD: 16 % — HIGH (ref 10.3–14.5)
SODIUM SERPL-SCNC: 138 MMOL/L — SIGNIFICANT CHANGE UP (ref 135–145)
SODIUM SERPL-SCNC: 139 MMOL/L — SIGNIFICANT CHANGE UP (ref 135–145)
WBC # BLD: 16.74 K/UL — HIGH (ref 3.8–10.5)
WBC # BLD: 18.83 K/UL — HIGH (ref 3.8–10.5)
WBC # FLD AUTO: 16.74 K/UL — HIGH (ref 3.8–10.5)
WBC # FLD AUTO: 18.83 K/UL — HIGH (ref 3.8–10.5)

## 2021-01-20 PROCEDURE — 99232 SBSQ HOSP IP/OBS MODERATE 35: CPT

## 2021-01-20 PROCEDURE — 93010 ELECTROCARDIOGRAM REPORT: CPT

## 2021-01-20 PROCEDURE — 71045 X-RAY EXAM CHEST 1 VIEW: CPT | Mod: 26

## 2021-01-20 RX ORDER — SODIUM CHLORIDE 9 MG/ML
1000 INJECTION, SOLUTION INTRAVENOUS
Refills: 0 | Status: DISCONTINUED | OUTPATIENT
Start: 2021-01-20 | End: 2021-01-21

## 2021-01-20 RX ORDER — INSULIN LISPRO 100/ML
VIAL (ML) SUBCUTANEOUS
Refills: 0 | Status: DISCONTINUED | OUTPATIENT
Start: 2021-01-20 | End: 2021-01-28

## 2021-01-20 RX ORDER — INSULIN LISPRO 100/ML
4 VIAL (ML) SUBCUTANEOUS
Refills: 0 | Status: DISCONTINUED | OUTPATIENT
Start: 2021-01-20 | End: 2021-01-20

## 2021-01-20 RX ORDER — NICARDIPINE HYDROCHLORIDE 30 MG/1
5 CAPSULE, EXTENDED RELEASE ORAL
Qty: 40 | Refills: 0 | Status: DISCONTINUED | OUTPATIENT
Start: 2021-01-20 | End: 2021-01-21

## 2021-01-20 RX ORDER — ALBUMIN HUMAN 25 %
250 VIAL (ML) INTRAVENOUS ONCE
Refills: 0 | Status: COMPLETED | OUTPATIENT
Start: 2021-01-20 | End: 2021-01-20

## 2021-01-20 RX ORDER — ACETAMINOPHEN 500 MG
1000 TABLET ORAL ONCE
Refills: 0 | Status: COMPLETED | OUTPATIENT
Start: 2021-01-20 | End: 2021-01-20

## 2021-01-20 RX ORDER — INSULIN GLARGINE 100 [IU]/ML
40 INJECTION, SOLUTION SUBCUTANEOUS AT BEDTIME
Refills: 0 | Status: DISCONTINUED | OUTPATIENT
Start: 2021-01-20 | End: 2021-01-21

## 2021-01-20 RX ORDER — INSULIN LISPRO 100/ML
8 VIAL (ML) SUBCUTANEOUS
Refills: 0 | Status: DISCONTINUED | OUTPATIENT
Start: 2021-01-20 | End: 2021-01-25

## 2021-01-20 RX ORDER — HYDROMORPHONE HYDROCHLORIDE 2 MG/ML
0.25 INJECTION INTRAMUSCULAR; INTRAVENOUS; SUBCUTANEOUS ONCE
Refills: 0 | Status: DISCONTINUED | OUTPATIENT
Start: 2021-01-20 | End: 2021-01-20

## 2021-01-20 RX ORDER — AMIODARONE HYDROCHLORIDE 400 MG/1
200 TABLET ORAL DAILY
Refills: 0 | Status: DISCONTINUED | OUTPATIENT
Start: 2021-01-20 | End: 2021-01-28

## 2021-01-20 RX ORDER — FUROSEMIDE 40 MG
40 TABLET ORAL ONCE
Refills: 0 | Status: COMPLETED | OUTPATIENT
Start: 2021-01-20 | End: 2021-01-20

## 2021-01-20 RX ORDER — INSULIN LISPRO 100/ML
10 VIAL (ML) SUBCUTANEOUS ONCE
Refills: 0 | Status: COMPLETED | OUTPATIENT
Start: 2021-01-20 | End: 2021-01-20

## 2021-01-20 RX ORDER — FUROSEMIDE 40 MG
20 TABLET ORAL ONCE
Refills: 0 | Status: COMPLETED | OUTPATIENT
Start: 2021-01-20 | End: 2021-01-20

## 2021-01-20 RX ORDER — DEXTROSE 50 % IN WATER 50 %
15 SYRINGE (ML) INTRAVENOUS ONCE
Refills: 0 | Status: DISCONTINUED | OUTPATIENT
Start: 2021-01-20 | End: 2021-01-21

## 2021-01-20 RX ORDER — GLUCAGON INJECTION, SOLUTION 0.5 MG/.1ML
1 INJECTION, SOLUTION SUBCUTANEOUS ONCE
Refills: 0 | Status: DISCONTINUED | OUTPATIENT
Start: 2021-01-20 | End: 2021-01-21

## 2021-01-20 RX ORDER — INSULIN GLARGINE 100 [IU]/ML
10 INJECTION, SOLUTION SUBCUTANEOUS ONCE
Refills: 0 | Status: COMPLETED | OUTPATIENT
Start: 2021-01-20 | End: 2021-01-20

## 2021-01-20 RX ORDER — BUMETANIDE 0.25 MG/ML
2 INJECTION INTRAMUSCULAR; INTRAVENOUS ONCE
Refills: 0 | Status: COMPLETED | OUTPATIENT
Start: 2021-01-20 | End: 2021-01-20

## 2021-01-20 RX ORDER — BUMETANIDE 0.25 MG/ML
1 INJECTION INTRAMUSCULAR; INTRAVENOUS
Qty: 20 | Refills: 0 | Status: DISCONTINUED | OUTPATIENT
Start: 2021-01-20 | End: 2021-01-21

## 2021-01-20 RX ORDER — ENOXAPARIN SODIUM 100 MG/ML
40 INJECTION SUBCUTANEOUS DAILY
Refills: 0 | Status: DISCONTINUED | OUTPATIENT
Start: 2021-01-20 | End: 2021-01-22

## 2021-01-20 RX ADMIN — Medication 100 MILLIGRAM(S): at 00:03

## 2021-01-20 RX ADMIN — ENOXAPARIN SODIUM 40 MILLIGRAM(S): 100 INJECTION SUBCUTANEOUS at 11:58

## 2021-01-20 RX ADMIN — Medication 125 MILLILITER(S): at 03:08

## 2021-01-20 RX ADMIN — INSULIN HUMAN 2 UNIT(S)/HR: 100 INJECTION, SOLUTION SUBCUTANEOUS at 08:41

## 2021-01-20 RX ADMIN — Medication 400 MILLIGRAM(S): at 12:05

## 2021-01-20 RX ADMIN — Medication 2: at 21:52

## 2021-01-20 RX ADMIN — HYDROMORPHONE HYDROCHLORIDE 0.25 MILLIGRAM(S): 2 INJECTION INTRAMUSCULAR; INTRAVENOUS; SUBCUTANEOUS at 08:41

## 2021-01-20 RX ADMIN — Medication 8 UNIT(S): at 22:20

## 2021-01-20 RX ADMIN — Medication 40 MILLIGRAM(S): at 16:30

## 2021-01-20 RX ADMIN — Medication 400 MILLIGRAM(S): at 04:08

## 2021-01-20 RX ADMIN — Medication 10 UNIT(S): at 02:15

## 2021-01-20 RX ADMIN — MILRINONE LACTATE 6.45 MICROGRAM(S)/KG/MIN: 1 INJECTION, SOLUTION INTRAVENOUS at 00:02

## 2021-01-20 RX ADMIN — Medication 100 MILLIGRAM(S): at 17:06

## 2021-01-20 RX ADMIN — INSULIN GLARGINE 40 UNIT(S): 100 INJECTION, SOLUTION SUBCUTANEOUS at 23:09

## 2021-01-20 RX ADMIN — Medication 20 MILLIGRAM(S): at 11:58

## 2021-01-20 RX ADMIN — HYDROMORPHONE HYDROCHLORIDE 0.25 MILLIGRAM(S): 2 INJECTION INTRAMUSCULAR; INTRAVENOUS; SUBCUTANEOUS at 16:45

## 2021-01-20 RX ADMIN — Medication 166.67 MILLIGRAM(S): at 21:15

## 2021-01-20 RX ADMIN — Medication 12.9 MICROGRAM(S)/KG/MIN: at 17:05

## 2021-01-20 RX ADMIN — AMIODARONE HYDROCHLORIDE 200 MILLIGRAM(S): 400 TABLET ORAL at 11:58

## 2021-01-20 RX ADMIN — AMIODARONE HYDROCHLORIDE 618 MILLIGRAM(S): 400 TABLET ORAL at 00:07

## 2021-01-20 RX ADMIN — ONDANSETRON 4 MILLIGRAM(S): 8 TABLET, FILM COATED ORAL at 14:10

## 2021-01-20 RX ADMIN — ATORVASTATIN CALCIUM 40 MILLIGRAM(S): 80 TABLET, FILM COATED ORAL at 21:47

## 2021-01-20 RX ADMIN — Medication 125 MILLILITER(S): at 07:00

## 2021-01-20 RX ADMIN — BUMETANIDE 2 MILLIGRAM(S): 0.25 INJECTION INTRAMUSCULAR; INTRAVENOUS at 20:23

## 2021-01-20 RX ADMIN — Medication 100 MILLIGRAM(S): at 08:41

## 2021-01-20 RX ADMIN — AMIODARONE HYDROCHLORIDE 33.3 MG/MIN: 400 TABLET ORAL at 00:02

## 2021-01-20 RX ADMIN — Medication 166.67 MILLIGRAM(S): at 08:59

## 2021-01-20 RX ADMIN — Medication 81 MILLIGRAM(S): at 11:58

## 2021-01-20 RX ADMIN — AMIODARONE HYDROCHLORIDE 400 MILLIGRAM(S): 400 TABLET ORAL at 00:06

## 2021-01-20 RX ADMIN — INSULIN HUMAN 2 UNIT(S)/HR: 100 INJECTION, SOLUTION SUBCUTANEOUS at 00:02

## 2021-01-20 RX ADMIN — Medication 125 MILLILITER(S): at 05:57

## 2021-01-20 RX ADMIN — INSULIN HUMAN 2 UNIT(S)/HR: 100 INJECTION, SOLUTION SUBCUTANEOUS at 04:08

## 2021-01-20 RX ADMIN — ONDANSETRON 4 MILLIGRAM(S): 8 TABLET, FILM COATED ORAL at 05:57

## 2021-01-20 RX ADMIN — Medication 400 MILLIGRAM(S): at 23:10

## 2021-01-20 RX ADMIN — CHLORHEXIDINE GLUCONATE 1 APPLICATION(S): 213 SOLUTION TOPICAL at 14:10

## 2021-01-20 RX ADMIN — INSULIN GLARGINE 10 UNIT(S): 100 INJECTION, SOLUTION SUBCUTANEOUS at 01:02

## 2021-01-20 RX ADMIN — BUMETANIDE 5 MG/HR: 0.25 INJECTION INTRAMUSCULAR; INTRAVENOUS at 21:44

## 2021-01-20 RX ADMIN — PANTOPRAZOLE SODIUM 40 MILLIGRAM(S): 20 TABLET, DELAYED RELEASE ORAL at 14:10

## 2021-01-20 RX ADMIN — SENNA PLUS 2 TABLET(S): 8.6 TABLET ORAL at 21:47

## 2021-01-20 NOTE — PROGRESS NOTE ADULT - SUBJECTIVE AND OBJECTIVE BOX
Subjective:  74yo F resting comfortable, extubated, no c/o pain.      HPI:  75yoF hx Afib on Xarelto, PAD s/p F-[id number]-nocfyrpkk bypass, R-femoral angioplasty with stenting, on Plavix, HTN, HLD, DM presenting with acute dyspnea and palpitations yesterday.  Pt was in her normal state of health, then developed acute onset of exertional dyspnea associated with palpitations that progressed to dyspnea at rest which prompted her to come to the hospital.  She also reports some transient vomiting yesterday that is now improving.  In ED, pt was in Afib with RVR associated with soft SBP in 90s.  She was given IV Cardizem and PO metoprolol without improvement and ED staff spoke with [facility] who recommended digoxin.   Labs notable for leukocytosis and elevated troponin.  Pt denies any chest pain, lightheadedness, syncope, cough, abdominal pain, diarrhea, or urinary symptoms.   Of note, pt came to [facility] ED on  for R-thigh pain after walking into a cabinet at home. She underwent a CT angio A/P that showed R-thigh hematoma w/out extravasation.  Her leg was wrapped and she was d/c’ed from ED with instruction to continue wtith blood thinners and outpatient follow up. She denies any worsening R-thigh or leg pain. Patient comes into the hospital with afib (2021 03:52)          PAST MEDICAL & SURGICAL HISTORY:  PAD (peripheral artery disease)    Paroxysmal atrial fibrillation    Hypercholesterolemia    Diabetes    Hypertension    S/P peripheral artery angioplasty with stent placement    S/P femoral-popliteal bypass surgery    H/O hernia repair    H/O abdominal hysterectomy            MEDICATIONS  (STANDING):  aMIOdarone    Tablet 400 milliGRAM(s) Oral every 8 hours  aMIOdarone    Tablet   Oral   aMIOdarone Infusion 1 mG/Min (33.3 mL/Hr) IV Continuous <Continuous>  aspirin enteric coated 81 milliGRAM(s) Oral daily  atorvastatin 40 milliGRAM(s) Oral at bedtime  cefuroxime  IVPB 1500 milliGRAM(s) IV Intermittent every 8 hours  chlorhexidine 2% Cloths 1 Application(s) Topical daily  dextrose 50% Injectable 50 milliLiter(s) IV Push every 15 minutes  dextrose 50% Injectable 25 milliLiter(s) IV Push every 15 minutes  DOBUTamine Infusion 5 MICROgram(s)/kG/Min (12.9 mL/Hr) IV Continuous <Continuous>  insulin lispro Injectable (ADMELOG). 10 Unit(s) SubCutaneous once  insulin regular Infusion 2 Unit(s)/Hr (2 mL/Hr) IV Continuous <Continuous>  milrinone Infusion 0.25 MICROgram(s)/kG/Min (6.45 mL/Hr) IV Continuous <Continuous>  norepinephrine Infusion 0.05 MICROgram(s)/kG/Min (8.06 mL/Hr) IV Continuous <Continuous>  pantoprazole    Tablet 40 milliGRAM(s) Oral daily  senna 2 Tablet(s) Oral at bedtime  sodium chloride 0.9%. 1000 milliLiter(s) (10 mL/Hr) IV Continuous <Continuous>  sodium chloride 0.9%. 1000 milliLiter(s) (5 mL/Hr) IV Continuous <Continuous>  vancomycin  IVPB 1250 milliGRAM(s) IV Intermittent every 12 hours    MEDICATIONS  (PRN):  ondansetron Injectable 4 milliGRAM(s) IV Push every 4 hours PRN Nausea and/or Vomiting          Allergies    warfarin (Rash)    Intolerances    OHS (Unknown)        WEIGHTS:  Daily Height in cm: 177.8 (2021 06:50)    Daily Weight in k.6 (2021 04:00)  Admit Wt: Drug Dosing Weight  Height (cm): 177.8 (2021 06:50)  Weight (kg): 86 (2021 06:50)  BMI (kg/m2): 27.2 (2021 06:50)  BSA (m2): 2.04 (2021 06:50)  I&O's Summary    2021 07:01  -  2021 07:00  --------------------------------------------------------  IN: 0 mL / OUT: 450 mL / NET: -450 mL    2021 07:01  -  2021 02:08  --------------------------------------------------------  IN: 2213.6 mL / OUT: 1025 mL / NET: 1188.6 mL        VITAL SIGNS:  ICU Vital Signs Last 24 Hrs  T(C): 36.8 (2021 23:00), Max: 37 (2021 05:00)  T(F): 98.2 (2021 23:00), Max: 98.6 (2021 05:00)  HR: 72 (2021 01:15) (54 - 80)  BP: 105/60 (2021 06:50) (105/60 - 107/65)  BP(mean): --  ABP: 134/46 (2021 01:15) (33/8 - 171/56)  ABP(mean): 65 (2021 01:15) (45 - 83)  RR: 15 (2021 01:15) (10 - 36)  SpO2: 96% (2021 01:15) (95% - 100%)        All laboratory results, radiology and medications reviewed.    LABS:      139  |  103  |  16.0  ----------------------------<  323<H>  4.7   |  21.0<L>  |  0.51    Ca    8.4<L>      2021 15:50  Mg     2.9         TPro  4.8<L>  /  Alb  2.7<L>  /  TBili  3.8<H>  /  DBili  x   /  AST  66<H>  /  ALT  52<H>  /  AlkPhos  51                                   10.3   25.82 )-----------( 293      ( 2021 15:50 )             31.7          PT/INR - ( 2021 15:50 )   PT: 17.5 sec;   INR: 1.54 ratio         PTT - ( 2021 15:50 )  PTT:27.6 sec  Bilirubin Total, Serum: 3.8 mg/dL ( @ 15:50)    ABG - ( 2021 22:12 )  pH, Arterial: 7.38  pH, Blood: x     /  pCO2: 39    /  pO2: 99    / HCO3: 23    / Base Excess: -1.7  /  SaO2: 98                    CAPILLARY BLOOD GLUCOSE      POCT Blood Glucose.: 351 mg/dL (2021 01:55)  POCT Blood Glucose.: 359 mg/dL (2021 01:00)  POCT Blood Glucose.: 380 mg/dL (2021 00:16)  POCT Blood Glucose.: 397 mg/dL (2021 22:58)  POCT Blood Glucose.: 408 mg/dL (2021 22:01)  POCT Blood Glucose.: 390 mg/dL (2021 20:52)  POCT Blood Glucose.: 433 mg/dL (2021 19:56)  POCT Blood Glucose.: 408 mg/dL (2021 19:08)  POCT Blood Glucose.: 412 mg/dL (2021 18:09)  POCT Blood Glucose.: 410 mg/dL (2021 17:20)  POCT Blood Glucose.: 157 mg/dL (2021 02:17)             PHYSICAL EXAM:  General:  well nourished, no acute distress  Neurology:  alert and oriented X 4, nonfocal, no gross deficits  Respiratory:  clear to auscultation bilaterally  CV:  regular rate and rhythm, normal S1 S2, short burst of A Fib, rate controlled  Abdomen:  soft, nontender, nondistended, positive bowel sounds  Extremities:  warm, well perfused, no edema +DP pulses  Incisions:  midline sternal incision, c/d/i, sternum stable

## 2021-01-20 NOTE — PROGRESS NOTE ADULT - SUBJECTIVE AND OBJECTIVE BOX
INTERVAL HPI/OVERNIGHT EVENTS:  Follow up on diabetes.  a1c 7.8%    MEDICATIONS  (STANDING):  aMIOdarone    Tablet 400 milliGRAM(s) Oral every 8 hours  ascorbic acid 500 milliGRAM(s) Oral daily  aspirin  chewable 81 milliGRAM(s) Oral daily  atorvastatin 20 milliGRAM(s) Oral at bedtime  cefuroxime  IVPB 1500 milliGRAM(s) IV Intermittent once  chlorhexidine 0.12% Liquid 15 milliLiter(s) Swish and Spit two times a day  chlorhexidine 4% Liquid 1 Application(s) Topical two times a day  diltiazem    Tablet 30 milliGRAM(s) Oral four times a day  heparin  Infusion 800 Unit(s)/Hr (8 mL/Hr) IV Continuous <Continuous>  insulin glargine Injectable (LANTUS) 20 Unit(s) SubCutaneous at bedtime  insulin lispro (ADMELOG) corrective regimen sliding scale   SubCutaneous Before meals and at bedtime  insulin lispro Injectable (ADMELOG) 6 Unit(s) SubCutaneous three times a day before meals  metoprolol tartrate 50 milliGRAM(s) Oral every 8 hours  mupirocin 2% Nasal 1 Application(s) Nasal two times a day  pantoprazole    Tablet 40 milliGRAM(s) Oral before breakfast  phytonadione  IVPB 5 milliGRAM(s) IV Intermittent daily  senna 2 Tablet(s) Oral at bedtime  sodium chloride 0.9% lock flush 3 milliLiter(s) IV Push every 8 hours    MEDICATIONS  (PRN):  acetaminophen   Tablet .. 650 milliGRAM(s) Oral every 6 hours PRN Temp greater or equal to 38C (100.4F), Mild Pain (1 - 3)  albuterol/ipratropium for Nebulization 3 milliLiter(s) Nebulizer every 6 hours PRN Shortness of Breath and/or Wheezing  ondansetron Injectable 4 milliGRAM(s) IV Push every 6 hours PRN Nausea and/or Vomiting  polyethylene glycol 3350 17 Gram(s) Oral at bedtime PRN Constipation      Allergies    warfarin (Rash)        Review of systems:  No CP, no SOB, no n/v    Vital Signs Last 24 Hrs  T(C): 36.8 (18 Jan 2021 10:00), Max: 37.5 (17 Jan 2021 22:57)  T(F): 98.3 (18 Jan 2021 10:00), Max: 99.5 (17 Jan 2021 22:57)  HR: 82 (18 Jan 2021 10:00) (82 - 97)  BP: 108/68 (18 Jan 2021 10:00) (97/62 - 113/67)  BP(mean): --  RR: 18 (18 Jan 2021 10:00) (17 - 18)  SpO2: 96% (18 Jan 2021 10:00) (96% - 98%)    PHYSICAL EXAM:    Constitutional: NAD, well-groomed, well-developed  Respiratory: CTAB  Cardiovascular: S1 and S2, RRR, no M/G/R  Gastrointestinal: BS+, soft, no organomegaly or mass  Extremities: No peripheral edema, no pedal lesions  Psychiatric: Normal mood, normal affect        LABS:                        11.3   10.77 )-----------( 336      ( 18 Jan 2021 06:04 )             35.2     01-18    139  |  97<L>  |  21.0<H>  ----------------------------<  147<H>  3.6   |  29.0  |  0.94    Ca    9.0      18 Jan 2021 06:04  Mg     1.8     01-18         INTERVAL HPI/OVERNIGHT EVENTS:  Follow up on diabetes.  a1c 7.8%    MEDICATIONS  (STANDING):  aMIOdarone    Tablet 400 milliGRAM(s) Oral every 8 hours  ascorbic acid 500 milliGRAM(s) Oral daily  aspirin  chewable 81 milliGRAM(s) Oral daily  atorvastatin 20 milliGRAM(s) Oral at bedtime  cefuroxime  IVPB 1500 milliGRAM(s) IV Intermittent once  chlorhexidine 0.12% Liquid 15 milliLiter(s) Swish and Spit two times a day  chlorhexidine 4% Liquid 1 Application(s) Topical two times a day  diltiazem    Tablet 30 milliGRAM(s) Oral four times a day  heparin  Infusion 800 Unit(s)/Hr (8 mL/Hr) IV Continuous <Continuous>  insulin glargine Injectable (LANTUS) 20 Unit(s) SubCutaneous at bedtime  insulin lispro (ADMELOG) corrective regimen sliding scale   SubCutaneous Before meals and at bedtime  insulin lispro Injectable (ADMELOG) 6 Unit(s) SubCutaneous three times a day before meals  metoprolol tartrate 50 milliGRAM(s) Oral every 8 hours  mupirocin 2% Nasal 1 Application(s) Nasal two times a day  pantoprazole    Tablet 40 milliGRAM(s) Oral before breakfast  phytonadione  IVPB 5 milliGRAM(s) IV Intermittent daily  senna 2 Tablet(s) Oral at bedtime  sodium chloride 0.9% lock flush 3 milliLiter(s) IV Push every 8 hours    MEDICATIONS  (PRN):  acetaminophen   Tablet .. 650 milliGRAM(s) Oral every 6 hours PRN Temp greater or equal to 38C (100.4F), Mild Pain (1 - 3)  albuterol/ipratropium for Nebulization 3 milliLiter(s) Nebulizer every 6 hours PRN Shortness of Breath and/or Wheezing  ondansetron Injectable 4 milliGRAM(s) IV Push every 6 hours PRN Nausea and/or Vomiting  polyethylene glycol 3350 17 Gram(s) Oral at bedtime PRN Constipation      Allergies    warfarin (Rash)        Review of systems:  Not feeling that great, some discomfort.      Vital Signs Last 24 Hrs  T(C): 36.8 (18 Jan 2021 10:00), Max: 37.5 (17 Jan 2021 22:57)  T(F): 98.3 (18 Jan 2021 10:00), Max: 99.5 (17 Jan 2021 22:57)  HR: 82 (18 Jan 2021 10:00) (82 - 97)  BP: 108/68 (18 Jan 2021 10:00) (97/62 - 113/67)  BP(mean): --  RR: 18 (18 Jan 2021 10:00) (17 - 18)  SpO2: 96% (18 Jan 2021 10:00) (96% - 98%)    PHYSICAL EXAM:  Constitutional: NAD, well-groomed, well-developed  Respiratory: CTAB  Cardiovascular: S1 and S2, RRR, no M/G/R  Psychiatric: Normal mood, normal affect        LABS:                        11.3   10.77 )-----------( 336      ( 18 Jan 2021 06:04 )             35.2     01-18    139  |  97<L>  |  21.0<H>  ----------------------------<  147<H>  3.6   |  29.0  |  0.94    Ca    9.0      18 Jan 2021 06:04  Mg     1.8     01-18

## 2021-01-20 NOTE — PROGRESS NOTE ADULT - SUBJECTIVE AND OBJECTIVE BOX
[facility] - [facility], [facility]                                   [address] [place] [address]                                                      PHONE: [phone]                                                         FAX: [phone]  [email or web address]  ---------------------------------------------------------------------------------------------------------------------------------    Overnight events/patient complaints:    POD 1 - s/p C3/DUANE Clip/MV Repair and DUANE thrombectomy with Dr. Orellana on 1/19   Pt extubated overnight seen HAILE BLANKENSHIP (Unknown)  warfarin (Rash)    MEDICATIONS  (STANDING):  aMIOdarone    Tablet 400 milliGRAM(s) Oral every 8 hours  ascorbic acid 500 milliGRAM(s) Oral daily  aspirin  chewable 81 milliGRAM(s) Oral daily  atorvastatin 20 milliGRAM(s) Oral at bedtime  buMETAnide Injectable 2 milliGRAM(s) IV Push every 12 hours  chlorhexidine 0.12% Liquid 15 milliLiter(s) Swish and Spit two times a day  chlorhexidine 4% Liquid 1 Application(s) Topical two times a day  diltiazem    Tablet 30 milliGRAM(s) Oral four times a day  heparin  Infusion 800 Unit(s)/Hr (8 mL/Hr) IV Continuous <Continuous>  insulin glargine Injectable (LANTUS) 20 Unit(s) SubCutaneous at bedtime  insulin lispro (ADMELOG) corrective regimen sliding scale   SubCutaneous Before meals and at bedtime  insulin lispro Injectable (ADMELOG) 6 Unit(s) SubCutaneous three times a day before meals  magnesium sulfate  IVPB 2 Gram(s) IV Intermittent once  metoprolol tartrate 50 milliGRAM(s) Oral every 8 hours  mupirocin 2% Nasal 1 Application(s) Nasal two times a day  pantoprazole    Tablet 40 milliGRAM(s) Oral before breakfast  phytonadione  IVPB 5 milliGRAM(s) IV Intermittent daily  potassium chloride    Tablet ER 40 milliEquivalent(s) Oral once  senna 2 Tablet(s) Oral at bedtime  sodium chloride 0.9% lock flush 3 milliLiter(s) IV Push every 8 hours    MEDICATIONS  (PRN):  acetaminophen   Tablet .. 650 milliGRAM(s) Oral every 6 hours PRN Temp greater or equal to 38C (100.4F), Mild Pain (1 - 3)  albuterol/ipratropium for Nebulization 3 milliLiter(s) Nebulizer every 6 hours PRN Shortness of Breath and/or Wheezing  ondansetron Injectable 4 milliGRAM(s) IV Push every 6 hours PRN Nausea and/or Vomiting  polyethylene glycol 3350 17 Gram(s) Oral at bedtime PRN Constipation      Vital Signs Last 24 Hrs  T(C): 36.4 (18 Jan 2021 05:30), Max: 37.5 (17 Jan 2021 22:57)  T(F): 97.5 (18 Jan 2021 05:30), Max: 99.5 (17 Jan 2021 22:57)  HR: 83 (18 Jan 2021 05:30) (78 - 97)  BP: 97/62 (18 Jan 2021 05:30) (97/62 - 129/68)  BP(mean): --  RR: 18 (18 Jan 2021 05:30) (16 - 18)  SpO2: 96% (18 Jan 2021 05:30) (95% - 98%)  ICU Vital Signs Last 24 Hrs  DONALD SAUNDERS  I&O's Detail    17 Jan 2021 07:01  -  18 Jan 2021 07:00  --------------------------------------------------------  IN:    Heparin: 80 mL  Total IN: 80 mL    OUT:  Total OUT: 0 mL    Total NET: 80 mL        Drug Dosing Weight  DONALD SAUNDERS      PHYSICAL EXAM:  General: Appears well developed, well nourished alert and cooperative.  HEENT: Head; normocephalic, atraumatic.  Eyes: Pupils reactive, cornea wnl.  Neck: Supple, no nodes adenopathy, no NVD or carotid bruit or thyromegaly.  CARDIOVASCULAR: Normal S1 and S2, No murmur, rub, gallop or lift.   LUNGS: No rales, rhonchi or wheeze. Normal breath sounds bilaterally.  ABDOMEN: Soft, nontender without mass or organomegaly. bowel sounds normoactive.  EXTREMITIES: No clubbing, cyanosis or edema. Distal pulses wnl. Right outer thigh hematoma stable  SKIN: warm and dry with normal turgor.  NEURO: Alert/oriented x 3/normal motor exam. No pathologic reflexes.    PSYCH: normal affect.        LABS:                        11.3   10.77 )-----------( 336      ( 18 Jan 2021 06:04 )             35.2     01-18    139  |  97<L>  |  21.0<H>  ----------------------------<  147<H>  3.6   |  29.0  |  0.94    Ca    9.0      18 Jan 2021 06:04  Mg     1.8     01-18      DONALD SAUNDERS      PT/INR - ( 18 Jan 2021 06:04 )   PT: 15.1 sec;   INR: 1.32 ratio         PTT - ( 18 Jan 2021 06:04 )  PTT:31.0 sec      RADIOLOGY & ADDITIONAL STUDIES:    INTERPRETATION OF TELEMETRY (personally reviewed): afib HRs controlled     ECHO: < from: [facility] Echo Doppler (01.14.21 @ 09:20) >    Summary:   1. Moderately decreased global left ventricular systolic function. Left ventricular ejection fraction, by visual estimation, is 35 to 40%.   2. Normal right ventricular size and function.   3. Mild to moderately enlarged left atrium.   4. Mildly enlarged right atrium.   5. Moderate mitral valve regurgitation.   6. Mild-moderate tricuspid regurgitation.   7. Mild aortic valve stenosis. The non-coronary cusp is calcified and immobile, but the right and left coronary cusps open normally. The aortic valve area is 1.6 cm2 by planimetry.   8. No left atrial appendage thrombus. There is spontaneous echo contrast seen in the left atrial appendage, but there is no filling defect seen with the administration of Definity.    Juan Diego Arroyo MD Electronically signed on 1/14/2021 at 10:57:13 AM    < end of copied text >         CARDIAC CATHETERIZATION: < from: Cardiac Cath Lab - Adult (01.13.21 @ 13:45) >  HEMODYNAMICS: Hemodynamic assessment demonstrates moderately elevated  LVEDP.  VENTRICLES:Global left ventricular function was severely depressed. EF  calculated by contrast ventriculography was 25 %.  VALVES: MITRAL VALVE: The mitral valve exhibited moderate regurgitation.  CORONARY VESSELS: The coronary circulation is left dominant.  LM:   --  Proximal left main: There was a 90 % stenosis.  --  Distal left main: There was a 90 % stenosis.  LAD:   --  Proximal LAD: There was a 70 % stenosis.  --  Mid LAD: There was a 70 % stenosis.  --  Distal LAD: Angiography showed minor luminal irregularities with no  flow limiting lesions.  CX:   --  Proximal circumflex: Normal.  --  Mid circumflex: Normal.  --  Distal circumflex: Normal.  --  OM1: There was a 20 % stenosis.  --  L AV groove: Angiography showed minor luminal irregularities with no  flow limiting lesions.  --  LPDA: Angiography showed minor luminal irregularities with no flow  limiting lesions.  RCA:   --  Proximal RCA: There was a 70 % stenosis.  COMPLICATIONS: There were no complications. No complications occurred  during the cath lab visit.  SUMMARY:  HEMODYNAMICS: Hemodynamic assessment demonstrates moderately elevated  LVEDP.  DIAGNOSTIC IMPRESSIONS: 90% left main coronary artery stenosis  70% proximal and 70% mid LAD stenosis  70% stenosis of proximal RCA in a very small and nondominant artery  DIAGNOSTIC RECOMMENDATIONS: Balloon pump was placed for severe LMCA  stenosis and elevated LVEDP/decompensated heart failure.  Heparin drip at 1400U/h  Continue asa 81mg daily  Can hold plavix  Would hold beta blocker due to decompensated heart failure. Can give back  half the dose (25mg q6h) if patient become tachycardic.  Continue diuresis with lasix 40mg IV bid  CT surgery evaluation for LIMA to LAD and SVG to LCx  INTERVENTIONAL IMPRESSIONS: 90% left main coronary artery stenosis  70% proximal and 70% mid LAD stenosis  70% stenosis of proximal RCA in a very small and nondominant artery  INTERVENTIONAL RECOMMENDATIONS: Balloon pump was placed for severe LMCA  stenosis and elevated LVEDP/decompensated heart failure.  Heparin drip at 1400U/h  Continue asa 81mg daily  Can hold plavix  Would hold beta blocker due to decompensated heart failure. Can give back  half the dose (25mg q6h) if patient become tachycardic.  Continue diuresis with lasix 40mg IV bid  CT surgery evaluation for LIMA to LAD and SVG to LCx  Prepared and signed by  David Smith MD  Signed 01/13/2021 15:10:23    < end of copied text >      ASSESSMENT AND PLAN:  In summary, DONALD SAUNDERS is an 75y Female with past medical history significant for HTN, HLD, PAD, PAF on Xarelto, aw acute HFrEF found to have severe 3V CAD/LM, Mod MR.  s/p C3/DUANE Clip/MV Repair and UDANE thrombectomy with Dr. Orellana on 1/19    1) Pt currently EXTUBATED OOB  2) CT Surgical care    Discussed with CT Team

## 2021-01-20 NOTE — PROGRESS NOTE ADULT - ASSESSMENT
75 year old Female with a medical history of paroxysmal Afib (on Xarelto), PAD (s/p W-gafdjjq-naszvgxvp bypass, R-femoral angioplasty with stenting, maintained on Plavix), HTN, HLD, type 2 diabetes (HA1c on 7.8 on Metformin and Tresiba as an outpatient), and recent trauma to Right thigh with stable hematoma, presented with SOB, acute on chronic combined diastolic and systolic heart failure (requiring IV diuresis), and NSTEMI on 1/11/20. On 1/13 she underwent a LHC via Right radial artery and was found to have Multivessel CAD (right ostial, left main and LAD) with left femoral IABP placed. GERALDINE 1/14 showed Moderate MR and Mild AS. IABP removed successfully 1/15. Preoperative course significant for afib with RVR (requiring PO amio load, increased Lopressor dosing, and initiation of Cardizem).     On 1/19/2021 pt underwent C3/DUANE Clip/MV Repair and DUANE thrombectomy with Dr. Orellana.

## 2021-01-20 NOTE — PROGRESS NOTE ADULT - PROBLEM SELECTOR PLAN 3
HA1c on 7.8 on Metformin and Tresiba as an outpatient.  Continue Insulin gtt in the immediate post op period  Lantus 10 uts given for glucoses in 350 range on 21 uts insulin q 1 hr  Several Reg insulin boluses given  All gttts in NS  Continue accuchecks q 1 hr with aggressive management

## 2021-01-20 NOTE — PROGRESS NOTE ADULT - ASSESSMENT
On 1/19/2021 pt underwent C3/DUANE Clip/MV Repair and DUANE thrombectomy with Dr. Orellana.  Today POD#1  Before surgery she was on  Lantus 20 units daily  and Admelog to 8 units daily.    Post op sugars were high started on insulin ggt.  Currently on insulin ggt 2 u/hr      T2DM  Will recommend transition when appropriate.  fingersticks ac tid hs  Pt does have outpt ENdo-  Dr Soriano      On 1/19/2021 pt underwent C3/DUANE Clip/MV Repair and DUANE thrombectomy with Dr. Orellana.  Today POD#1  Before surgery she was on  Lantus 20 units daily  and Admelog to 8 units daily.    Post op sugars were high started on insulin ggt.  Her insulin requirements have been very high, now slowly coming down.  Currently on insulin ggt 3 u/hr      T2DM  Will recommend transition when appropriate.  fingersticks ac tid hs  Pt does have outpt ENdo-  Dr Soriano

## 2021-01-21 LAB
ANION GAP SERPL CALC-SCNC: 13 MMOL/L — SIGNIFICANT CHANGE UP (ref 5–17)
ANION GAP SERPL CALC-SCNC: 15 MMOL/L — SIGNIFICANT CHANGE UP (ref 5–17)
BUN SERPL-MCNC: 31 MG/DL — HIGH (ref 8–20)
BUN SERPL-MCNC: 34 MG/DL — HIGH (ref 8–20)
CALCIUM SERPL-MCNC: 7.7 MG/DL — LOW (ref 8.6–10.2)
CALCIUM SERPL-MCNC: 8.5 MG/DL — LOW (ref 8.6–10.2)
CHLORIDE SERPL-SCNC: 100 MMOL/L — SIGNIFICANT CHANGE UP (ref 98–107)
CHLORIDE SERPL-SCNC: 101 MMOL/L — SIGNIFICANT CHANGE UP (ref 98–107)
CO2 SERPL-SCNC: 21 MMOL/L — LOW (ref 22–29)
CO2 SERPL-SCNC: 25 MMOL/L — SIGNIFICANT CHANGE UP (ref 22–29)
CREAT SERPL-MCNC: 1.28 MG/DL — SIGNIFICANT CHANGE UP (ref 0.5–1.3)
CREAT SERPL-MCNC: 1.55 MG/DL — HIGH (ref 0.5–1.3)
GAS PNL BLDA: SIGNIFICANT CHANGE UP
GAS PNL BLDA: SIGNIFICANT CHANGE UP
GLUCOSE BLDC GLUCOMTR-MCNC: 114 MG/DL — HIGH (ref 70–99)
GLUCOSE BLDC GLUCOMTR-MCNC: 117 MG/DL — HIGH (ref 70–99)
GLUCOSE BLDC GLUCOMTR-MCNC: 119 MG/DL — HIGH (ref 70–99)
GLUCOSE BLDC GLUCOMTR-MCNC: 121 MG/DL — HIGH (ref 70–99)
GLUCOSE BLDC GLUCOMTR-MCNC: 127 MG/DL — HIGH (ref 70–99)
GLUCOSE BLDC GLUCOMTR-MCNC: 140 MG/DL — HIGH (ref 70–99)
GLUCOSE BLDC GLUCOMTR-MCNC: 168 MG/DL — HIGH (ref 70–99)
GLUCOSE BLDC GLUCOMTR-MCNC: 227 MG/DL — HIGH (ref 70–99)
GLUCOSE BLDC GLUCOMTR-MCNC: 232 MG/DL — HIGH (ref 70–99)
GLUCOSE BLDC GLUCOMTR-MCNC: 237 MG/DL — HIGH (ref 70–99)
GLUCOSE BLDC GLUCOMTR-MCNC: 253 MG/DL — HIGH (ref 70–99)
GLUCOSE BLDC GLUCOMTR-MCNC: 294 MG/DL — HIGH (ref 70–99)
GLUCOSE BLDC GLUCOMTR-MCNC: 297 MG/DL — HIGH (ref 70–99)
GLUCOSE BLDC GLUCOMTR-MCNC: 302 MG/DL — HIGH (ref 70–99)
GLUCOSE BLDC GLUCOMTR-MCNC: 330 MG/DL — HIGH (ref 70–99)
GLUCOSE BLDC GLUCOMTR-MCNC: 337 MG/DL — HIGH (ref 70–99)
GLUCOSE SERPL-MCNC: 220 MG/DL — HIGH (ref 70–99)
GLUCOSE SERPL-MCNC: 271 MG/DL — HIGH (ref 70–99)
HCT VFR BLD CALC: 28.7 % — LOW (ref 34.5–45)
HGB BLD-MCNC: 9.1 G/DL — LOW (ref 11.5–15.5)
MAGNESIUM SERPL-MCNC: 2.3 MG/DL — SIGNIFICANT CHANGE UP (ref 1.6–2.6)
MAGNESIUM SERPL-MCNC: 2.4 MG/DL — SIGNIFICANT CHANGE UP (ref 1.6–2.6)
MCHC RBC-ENTMCNC: 27.6 PG — SIGNIFICANT CHANGE UP (ref 27–34)
MCHC RBC-ENTMCNC: 31.7 GM/DL — LOW (ref 32–36)
MCV RBC AUTO: 87 FL — SIGNIFICANT CHANGE UP (ref 80–100)
PHOSPHATE SERPL-MCNC: 4.3 MG/DL — SIGNIFICANT CHANGE UP (ref 2.4–4.7)
PLATELET # BLD AUTO: 215 K/UL — SIGNIFICANT CHANGE UP (ref 150–400)
POTASSIUM SERPL-MCNC: 4 MMOL/L — SIGNIFICANT CHANGE UP (ref 3.5–5.3)
POTASSIUM SERPL-MCNC: 5.3 MMOL/L — SIGNIFICANT CHANGE UP (ref 3.5–5.3)
POTASSIUM SERPL-SCNC: 4 MMOL/L — SIGNIFICANT CHANGE UP (ref 3.5–5.3)
POTASSIUM SERPL-SCNC: 5.3 MMOL/L — SIGNIFICANT CHANGE UP (ref 3.5–5.3)
RBC # BLD: 3.3 M/UL — LOW (ref 3.8–5.2)
RBC # FLD: 16.1 % — HIGH (ref 10.3–14.5)
SODIUM SERPL-SCNC: 137 MMOL/L — SIGNIFICANT CHANGE UP (ref 135–145)
SODIUM SERPL-SCNC: 138 MMOL/L — SIGNIFICANT CHANGE UP (ref 135–145)
WBC # BLD: 18.45 K/UL — HIGH (ref 3.8–10.5)
WBC # FLD AUTO: 18.45 K/UL — HIGH (ref 3.8–10.5)

## 2021-01-21 PROCEDURE — 93010 ELECTROCARDIOGRAM REPORT: CPT

## 2021-01-21 PROCEDURE — 99232 SBSQ HOSP IP/OBS MODERATE 35: CPT

## 2021-01-21 PROCEDURE — 71045 X-RAY EXAM CHEST 1 VIEW: CPT | Mod: 26,77

## 2021-01-21 PROCEDURE — 71045 X-RAY EXAM CHEST 1 VIEW: CPT | Mod: 26,76

## 2021-01-21 PROCEDURE — 99223 1ST HOSP IP/OBS HIGH 75: CPT

## 2021-01-21 RX ORDER — INSULIN HUMAN 100 [IU]/ML
3 INJECTION, SOLUTION SUBCUTANEOUS
Qty: 100 | Refills: 0 | Status: DISCONTINUED | OUTPATIENT
Start: 2021-01-21 | End: 2021-01-22

## 2021-01-21 RX ORDER — INSULIN GLARGINE 100 [IU]/ML
50 INJECTION, SOLUTION SUBCUTANEOUS AT BEDTIME
Refills: 0 | Status: DISCONTINUED | OUTPATIENT
Start: 2021-01-21 | End: 2021-01-24

## 2021-01-21 RX ORDER — OXYCODONE HYDROCHLORIDE 5 MG/1
10 TABLET ORAL EVERY 4 HOURS
Refills: 0 | Status: DISCONTINUED | OUTPATIENT
Start: 2021-01-22 | End: 2021-01-28

## 2021-01-21 RX ORDER — OXYCODONE HYDROCHLORIDE 5 MG/1
5 TABLET ORAL EVERY 4 HOURS
Refills: 0 | Status: DISCONTINUED | OUTPATIENT
Start: 2021-01-22 | End: 2021-01-28

## 2021-01-21 RX ORDER — GABAPENTIN 400 MG/1
300 CAPSULE ORAL THREE TIMES A DAY
Refills: 0 | Status: DISCONTINUED | OUTPATIENT
Start: 2021-01-21 | End: 2021-01-28

## 2021-01-21 RX ORDER — ACETAMINOPHEN 500 MG
650 TABLET ORAL EVERY 6 HOURS
Refills: 0 | Status: COMPLETED | OUTPATIENT
Start: 2021-01-21 | End: 2021-01-23

## 2021-01-21 RX ORDER — ACETAMINOPHEN 500 MG
1000 TABLET ORAL ONCE
Refills: 0 | Status: COMPLETED | OUTPATIENT
Start: 2021-01-21 | End: 2021-01-21

## 2021-01-21 RX ORDER — ASCORBIC ACID 60 MG
500 TABLET,CHEWABLE ORAL DAILY
Refills: 0 | Status: DISCONTINUED | OUTPATIENT
Start: 2021-01-21 | End: 2021-01-28

## 2021-01-21 RX ORDER — MULTIVIT-MIN/FERROUS GLUCONATE 9 MG/15 ML
1 LIQUID (ML) ORAL DAILY
Refills: 0 | Status: DISCONTINUED | OUTPATIENT
Start: 2021-01-21 | End: 2021-01-28

## 2021-01-21 RX ORDER — CALCIUM GLUCONATE 100 MG/ML
2 VIAL (ML) INTRAVENOUS ONCE
Refills: 0 | Status: COMPLETED | OUTPATIENT
Start: 2021-01-21 | End: 2021-01-21

## 2021-01-21 RX ORDER — HYDROMORPHONE HYDROCHLORIDE 2 MG/ML
0.5 INJECTION INTRAMUSCULAR; INTRAVENOUS; SUBCUTANEOUS EVERY 4 HOURS
Refills: 0 | Status: DISCONTINUED | OUTPATIENT
Start: 2021-01-21 | End: 2021-01-23

## 2021-01-21 RX ORDER — INSULIN LISPRO 100/ML
10 VIAL (ML) SUBCUTANEOUS ONCE
Refills: 0 | Status: COMPLETED | OUTPATIENT
Start: 2021-01-21 | End: 2021-01-21

## 2021-01-21 RX ORDER — LIDOCAINE 4 G/100G
1 CREAM TOPICAL DAILY
Refills: 0 | Status: DISCONTINUED | OUTPATIENT
Start: 2021-01-21 | End: 2021-01-28

## 2021-01-21 RX ORDER — BUMETANIDE 0.25 MG/ML
2 INJECTION INTRAMUSCULAR; INTRAVENOUS ONCE
Refills: 0 | Status: COMPLETED | OUTPATIENT
Start: 2021-01-21 | End: 2021-01-21

## 2021-01-21 RX ORDER — BUMETANIDE 0.25 MG/ML
0.5 INJECTION INTRAMUSCULAR; INTRAVENOUS
Qty: 20 | Refills: 0 | Status: DISCONTINUED | OUTPATIENT
Start: 2021-01-21 | End: 2021-01-23

## 2021-01-21 RX ADMIN — HYDROMORPHONE HYDROCHLORIDE 0.5 MILLIGRAM(S): 2 INJECTION INTRAMUSCULAR; INTRAVENOUS; SUBCUTANEOUS at 08:00

## 2021-01-21 RX ADMIN — Medication 100 MILLIGRAM(S): at 07:50

## 2021-01-21 RX ADMIN — PANTOPRAZOLE SODIUM 40 MILLIGRAM(S): 20 TABLET, DELAYED RELEASE ORAL at 10:14

## 2021-01-21 RX ADMIN — Medication 1 TABLET(S): at 11:07

## 2021-01-21 RX ADMIN — Medication 500 MILLIGRAM(S): at 10:14

## 2021-01-21 RX ADMIN — SENNA PLUS 2 TABLET(S): 8.6 TABLET ORAL at 22:15

## 2021-01-21 RX ADMIN — Medication 8 UNIT(S): at 22:15

## 2021-01-21 RX ADMIN — Medication 10 UNIT(S): at 03:35

## 2021-01-21 RX ADMIN — ONDANSETRON 4 MILLIGRAM(S): 8 TABLET, FILM COATED ORAL at 22:16

## 2021-01-21 RX ADMIN — HYDROMORPHONE HYDROCHLORIDE 0.5 MILLIGRAM(S): 2 INJECTION INTRAMUSCULAR; INTRAVENOUS; SUBCUTANEOUS at 15:04

## 2021-01-21 RX ADMIN — Medication 81 MILLIGRAM(S): at 10:11

## 2021-01-21 RX ADMIN — AMIODARONE HYDROCHLORIDE 200 MILLIGRAM(S): 400 TABLET ORAL at 05:54

## 2021-01-21 RX ADMIN — Medication 650 MILLIGRAM(S): at 17:45

## 2021-01-21 RX ADMIN — Medication 8 UNIT(S): at 13:26

## 2021-01-21 RX ADMIN — INSULIN GLARGINE 50 UNIT(S): 100 INJECTION, SOLUTION SUBCUTANEOUS at 22:15

## 2021-01-21 RX ADMIN — ATORVASTATIN CALCIUM 40 MILLIGRAM(S): 80 TABLET, FILM COATED ORAL at 22:22

## 2021-01-21 RX ADMIN — Medication 200 GRAM(S): at 10:09

## 2021-01-21 RX ADMIN — Medication 400 MILLIGRAM(S): at 10:10

## 2021-01-21 RX ADMIN — Medication 12.9 MICROGRAM(S)/KG/MIN: at 10:13

## 2021-01-21 RX ADMIN — Medication 8: at 06:13

## 2021-01-21 RX ADMIN — LIDOCAINE 1 PATCH: 4 CREAM TOPICAL at 19:00

## 2021-01-21 RX ADMIN — LIDOCAINE 1 PATCH: 4 CREAM TOPICAL at 10:14

## 2021-01-21 RX ADMIN — LIDOCAINE 1 PATCH: 4 CREAM TOPICAL at 22:20

## 2021-01-21 RX ADMIN — Medication 8 UNIT(S): at 06:13

## 2021-01-21 RX ADMIN — BUMETANIDE 2 MILLIGRAM(S): 0.25 INJECTION INTRAMUSCULAR; INTRAVENOUS at 01:06

## 2021-01-21 RX ADMIN — Medication 100 MILLIGRAM(S): at 00:03

## 2021-01-21 RX ADMIN — ENOXAPARIN SODIUM 40 MILLIGRAM(S): 100 INJECTION SUBCUTANEOUS at 10:16

## 2021-01-21 RX ADMIN — Medication 4: at 13:26

## 2021-01-21 RX ADMIN — GABAPENTIN 300 MILLIGRAM(S): 400 CAPSULE ORAL at 22:22

## 2021-01-21 RX ADMIN — CHLORHEXIDINE GLUCONATE 1 APPLICATION(S): 213 SOLUTION TOPICAL at 10:14

## 2021-01-21 RX ADMIN — ONDANSETRON 4 MILLIGRAM(S): 8 TABLET, FILM COATED ORAL at 16:25

## 2021-01-21 NOTE — PHYSICAL THERAPY INITIAL EVALUATION ADULT - ADDITIONAL COMMENTS
Pt. states she lives in a private home with 6 steps to enter and 6 to second floor with handrail.   Pt. states she was only walking short distance with difficulty due to right thigh injury.

## 2021-01-21 NOTE — CHART NOTE - NSCHARTNOTEFT_GEN_A_CORE
Source: Patient [x ]  Family [ ]   other [x ] EMR    Current Diet: Diet, Clear Liquid:   Consistent Carbohydrate {No Snacks} (CSTCHO)  DASH/TLC {Sodium & Cholesterol Restricted} (DASH) (01-20-21 @ 03:18)  Diet, Regular:   Consistent Carbohydrate {No Snacks} (CSTCHO)  DASH/TLC {Sodium & Cholesterol Restricted} (DASH) (01-19-21 @ 14:06)      PO intake:  < 50% [x ]   50-75%  [ ]   %  [ ]  other :    Source for PO intake [x ] Patient [ ] family [ ] chart [ ] staff [ ] other    Current Weight:   1/19: 86kg  1/14: 96kg  1/11: 86kg     % Weight Change: Unsure of accuracy of wt's secondary to inconsistency, will continue to monitor wt's for trends. (2+ Generalized edema noted)        Pertinent Medications: MEDICATIONS  (STANDING):  aMIOdarone    Tablet 200 milliGRAM(s) Oral daily  aspirin enteric coated 81 milliGRAM(s) Oral daily  atorvastatin 40 milliGRAM(s) Oral at bedtime  buMETAnide Infusion 2 mG/Hr (10 mL/Hr) IV Continuous <Continuous>  calcium gluconate IVPB 2 Gram(s) IV Intermittent once  chlorhexidine 2% Cloths 1 Application(s) Topical daily  dextrose 50% Injectable 50 milliLiter(s) IV Push every 15 minutes  dextrose 50% Injectable 25 milliLiter(s) IV Push every 15 minutes  DOBUTamine Infusion 5 MICROgram(s)/kG/Min (12.9 mL/Hr) IV Continuous <Continuous>  enoxaparin Injectable 40 milliGRAM(s) SubCutaneous daily  insulin glargine Injectable (LANTUS) 50 Unit(s) SubCutaneous at bedtime  insulin lispro (ADMELOG) corrective regimen sliding scale   SubCutaneous Before meals and at bedtime  insulin lispro Injectable (ADMELOG) 8 Unit(s) SubCutaneous Before meals and at bedtime  insulin regular Infusion 3 Unit(s)/Hr (3 mL/Hr) IV Continuous <Continuous>  metolazone 10 milliGRAM(s) Oral once  pantoprazole    Tablet 40 milliGRAM(s) Oral daily  senna 2 Tablet(s) Oral at bedtime  sodium chloride 0.9%. 1000 milliLiter(s) (10 mL/Hr) IV Continuous <Continuous>  sodium chloride 0.9%. 1000 milliLiter(s) (5 mL/Hr) IV Continuous <Continuous>    MEDICATIONS  (PRN):  ondansetron Injectable 4 milliGRAM(s) IV Push every 4 hours PRN Nausea and/or Vomiting  polyethylene glycol 3350 17 Gram(s) Oral daily PRN Constipation    Pertinent Labs: CBC Full  -  ( 21 Jan 2021 03:50 )  WBC Count : 18.45 K/uL  RBC Count : 3.30 M/uL  Hemoglobin : 9.1 g/dL  Hematocrit : 28.7 %  Platelet Count - Automated : 215 K/uL  Mean Cell Volume : 87.0 fl  Mean Cell Hemoglobin : 27.6 pg  Mean Cell Hemoglobin Concentration : 31.7 gm/dL  Auto Neutrophil # : x  Auto Lymphocyte # : x  Auto Monocyte # : x  Auto Eosinophil # : x  Auto Basophil # : x  Auto Neutrophil % : x  Auto Lymphocyte % : x  Auto Monocyte % : x  Auto Eosinophil % : x  Auto Basophil % : x        Skin: L groin IABP site, Midsternal incision, L EVH site, R leg hematoma     Nutrition focused physical exam conducted - found signs of malnutrition []absent [x ]present    Subcutaneous fat loss: Mild[x ] Orbital fat pads region, [x ]Buccal fat region, [ ]Triceps region,  [ ]Ribs region    Muscle wasting: Mild [x ]Temples region, [x ]Clavicle region, [x ]Shoulder region, [ ]Scapula region, [ ]Interosseous region,  [ ]thigh region, [ ]Calf region    Estimated Needs:   [x ] no change since previous assessment  [ ] recalculated:     Current Nutrition Diagnosis:  Pt remains at high nutrition risk secondary to Moderate (acute) protein calorie malnutrition related to inadequate protein energy intake with increased needs in setting of Coronary artery disease of native artery of native heart with stable angina pectoris and now healing (C3/DUANE Clip/MV Repair and DUANE thrombectomy) as evidenced by physical signs of mild muscle mass and fat loss and meeting less then 75% estimated energy requirements > 7 days. Pt extubated; however remains lethargic, provided limited information upon visit. Pt denies difficulty swallowing post op. Pt report poor appetite and PO intake at this time. Encouraged HBV protein at meals to promote healing. Recommendations below:     Recommendations:   1. RX: MVI and Vit. C daily (500mg)   2. Check wt daily to monitor trends  3. Encourage with meals.   4. Assistance as needed   5. Glucerna shake TID (220kcal, 10gm protein per shake) to increase calorie and protein intake.     Monitoring and Evaluation:   [x ] PO intake [x ] Tolerance to diet prescription [X] Weights  [X] Follow up per protocol [X] Labs:

## 2021-01-21 NOTE — PROGRESS NOTE ADULT - PROBLEM SELECTOR PLAN 6
Pantoprazole for GI prophylaxis.  SCDs, Lovenox for DVT prophylaxis.    Plan to be discussed further with CT Surgeon Dr. Villarreal in AM rounds

## 2021-01-21 NOTE — CONSULT NOTE ADULT - REASON FOR ADMISSION
Afib with RVR with hypotension, elevated troponin

## 2021-01-21 NOTE — PROGRESS NOTE ADULT - PROBLEM SELECTOR PLAN 3
HA1c on 7.8 on Metformin and Tresiba as an outpatient.  Insulin gtt transitioned to Lantus, Pre meal and ROBERT ACHS, monitor closely

## 2021-01-21 NOTE — PROGRESS NOTE ADULT - ASSESSMENT
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 On 1/13 she underwent a LHC via Right radial artery and was found to have Multivessel CAD (right ostial, left main and LAD) with left femoral IABP placed. GERALDINE 1/14 showed Moderate MR and Mild AS. IABP removed successfully 1/15. Preoperative course significant for afib with RVR (requiring PO amio load, increased Lopressor dosing, and initiation of Cardizem).     On 1/19/2021 pt underwent C3/DUANE Clip/MV Repair and DUANE thrombectomy with Dr. Orellana.

## 2021-01-21 NOTE — PHYSICAL THERAPY INITIAL EVALUATION ADULT - GENERAL OBSERVATIONS, REHAB EVAL
Pt. OOB; +monitor, O2 nasal canula, Right [place]-mercedes, radial a-line, chest tubes, shaw; alert and cooperative

## 2021-01-21 NOTE — PROGRESS NOTE ADULT - SUBJECTIVE AND OBJECTIVE BOX
Subjective:  76yo F resting comfortable, no c/o pain.      HPI:  75yoF hx Afib on Xarelto, PAD s/p E-fmvabaw-atybfxgvd bypass, R-femoral angioplasty with stenting, on Plavix, HTN, HLD, DM presenting with acute dyspnea and palpitations yesterday.  Pt was in her normal state of health, then developed acute onset of exertional dyspnea associated with palpitations that progressed to dyspnea at rest which prompted her to come to the hospital.  She also reports some transient vomiting yesterday that is now improving.  In ED, pt was in Afib with RVR associated with soft SBP in 90s.  She was given IV Cardizem and PO metoprolol without improvement and ED staff spoke with [facility] cardiology who recommended digoxin.   Labs notable for leukocytosis and elevated troponin.  Pt denies any chest pain, lightheadedness, syncope, cough, abdominal pain, diarrhea, or urinary symptoms.   Of note, pt came [facility] ED on 1/9 for R-thigh pain after walking into a cabinet at home. She underwent a CT angio A/P that showed R-thigh hematoma w/out extravasation.  Her leg was wrapped and she was d/c’ed from ED with instruction to continue wtith blood thinners and outpatient follow up. She denies any worsening R-thigh or leg pain. Patient comes into the hospital with afib (12 Jan 2021 03:52)          PAST MEDICAL & SURGICAL HISTORY:  PAD (peripheral artery disease)    Paroxysmal atrial fibrillation    Hypercholesterolemia    Diabetes    Hypertension    S/P peripheral artery angioplasty with stent placement    S/P femoral-popliteal bypass surgery    H/O hernia repair    H/O abdominal hysterectomy            MEDICATIONS  (STANDING):  aMIOdarone    Tablet 200 milliGRAM(s) Oral daily  aspirin enteric coated 81 milliGRAM(s) Oral daily  atorvastatin 40 milliGRAM(s) Oral at bedtime  buMETAnide Infusion 2 mG/Hr (10 mL/Hr) IV Continuous <Continuous>  cefuroxime  IVPB 1500 milliGRAM(s) IV Intermittent every 8 hours  chlorhexidine 2% Cloths 1 Application(s) Topical daily  dextrose 40% Gel 15 Gram(s) Oral once  dextrose 5%. 1000 milliLiter(s) (50 mL/Hr) IV Continuous <Continuous>  dextrose 5%. 1000 milliLiter(s) (100 mL/Hr) IV Continuous <Continuous>  dextrose 50% Injectable 50 milliLiter(s) IV Push every 15 minutes  dextrose 50% Injectable 25 milliLiter(s) IV Push every 15 minutes  DOBUTamine Infusion 5 MICROgram(s)/kG/Min (12.9 mL/Hr) IV Continuous <Continuous>  enoxaparin Injectable 40 milliGRAM(s) SubCutaneous daily  glucagon  Injectable 1 milliGRAM(s) IntraMuscular once  insulin glargine Injectable (LANTUS) 40 Unit(s) SubCutaneous at bedtime  insulin lispro (ADMELOG) corrective regimen sliding scale   SubCutaneous Before meals and at bedtime  insulin lispro Injectable (ADMELOG) 8 Unit(s) SubCutaneous Before meals and at bedtime  milrinone Infusion 0.125 MICROgram(s)/kG/Min (3.23 mL/Hr) IV Continuous <Continuous>  niCARdipine Infusion 5 mG/Hr (25 mL/Hr) IV Continuous <Continuous>  norepinephrine Infusion 0.05 MICROgram(s)/kG/Min (8.06 mL/Hr) IV Continuous <Continuous>  pantoprazole    Tablet 40 milliGRAM(s) Oral daily  senna 2 Tablet(s) Oral at bedtime  sodium chloride 0.9%. 1000 milliLiter(s) (10 mL/Hr) IV Continuous <Continuous>  sodium chloride 0.9%. 1000 milliLiter(s) (5 mL/Hr) IV Continuous <Continuous>  vancomycin  IVPB 1250 milliGRAM(s) IV Intermittent every 12 hours    MEDICATIONS  (PRN):  ondansetron Injectable 4 milliGRAM(s) IV Push every 4 hours PRN Nausea and/or Vomiting  polyethylene glycol 3350 17 Gram(s) Oral daily PRN Constipation          Allergies    warfarin (Rash)    Intolerances    OHS (Unknown)        WEIGHTS:  Daily     Daily   Admit Wt: Drug Dosing Weight  Height (cm): 177.8 (19 Jan 2021 06:50)  Weight (kg): 86 (19 Jan 2021 06:50)  BMI (kg/m2): 27.2 (19 Jan 2021 06:50)  BSA (m2): 2.04 (19 Jan 2021 06:50)  I&O's Summary    19 Jan 2021 07:01  -  20 Jan 2021 07:00  --------------------------------------------------------  IN: 3888 mL / OUT: 1275 mL / NET: 2613 mL    20 Jan 2021 07:01  -  21 Jan 2021 02:37  --------------------------------------------------------  IN: 1816.4 mL / OUT: 934 mL / NET: 882.4 mL        VITAL SIGNS:  ICU Vital Signs Last 24 Hrs  T(C): 36.2 (21 Jan 2021 02:00), Max: 36.6 (20 Jan 2021 07:15)  T(F): 97.2 (21 Jan 2021 02:00), Max: 97.9 (20 Jan 2021 07:15)  HR: 63 (21 Jan 2021 02:00) (55 - 69)  BP: 120/59 (21 Jan 2021 02:00) (93/48 - 170/74)  BP(mean): 85 (21 Jan 2021 02:00) (69 - 107)  ABP: 80/78 (20 Jan 2021 15:45) (66/66 - 152/48)  ABP(mean): 78 (20 Jan 2021 15:45) (46 - 96)  RR: 31 (21 Jan 2021 02:00) (10 - 43)  SpO2: 96% (21 Jan 2021 02:00) (89% - 100%)        All laboratory results, radiology and medications reviewed.    LABS:  01-20    139  |  103  |  24.0<H>  ----------------------------<  122<H>  5.1   |  22.0  |  1.06    Ca    8.1<L>      20 Jan 2021 14:39  Mg     2.5     01-20    TPro  4.9<L>  /  Alb  2.9<L>  /  TBili  4.9<H>  /  DBili  x   /  AST  71<H>  /  ALT  54<H>  /  AlkPhos  46  01-20                                 9.2    16.74 )-----------( 195      ( 20 Jan 2021 20:42 )             28.5          PT/INR - ( 19 Jan 2021 15:50 )   PT: 17.5 sec;   INR: 1.54 ratio         PTT - ( 19 Jan 2021 15:50 )  PTT:27.6 sec    ABG - ( 21 Jan 2021 00:48 )  pH, Arterial: 7.46  pH, Blood: x     /  pCO2: 31    /  pO2: 67    / HCO3: 23    / Base Excess: -1.2  /  SaO2: 95                    CAPILLARY BLOOD GLUCOSE      POCT Blood Glucose.: 240 mg/dL (20 Jan 2021 23:08)  POCT Blood Glucose.: 192 mg/dL (20 Jan 2021 21:48)  POCT Blood Glucose.: 188 mg/dL (20 Jan 2021 20:03)  POCT Blood Glucose.: 162 mg/dL (20 Jan 2021 18:26)  POCT Blood Glucose.: 145 mg/dL (20 Jan 2021 16:07)  POCT Blood Glucose.: 145 mg/dL (20 Jan 2021 14:26)  POCT Blood Glucose.: 131 mg/dL (20 Jan 2021 13:08)  POCT Blood Glucose.: 121 mg/dL (20 Jan 2021 12:25)  POCT Blood Glucose.: 115 mg/dL (20 Jan 2021 11:24)  POCT Blood Glucose.: 115 mg/dL (20 Jan 2021 10:14)  POCT Blood Glucose.: 123 mg/dL (20 Jan 2021 09:12)  POCT Blood Glucose.: 141 mg/dL (20 Jan 2021 08:05)  POCT Blood Glucose.: 193 mg/dL (20 Jan 2021 06:51)  POCT Blood Glucose.: 221 mg/dL (20 Jan 2021 06:06)  POCT Blood Glucose.: 271 mg/dL (20 Jan 2021 04:53)  POCT Blood Glucose.: 301 mg/dL (20 Jan 2021 04:04)  POCT Blood Glucose.: 305 mg/dL (20 Jan 2021 02:59)             PHYSICAL EXAM:  General:  well nourished, no acute distress  Neurology:  alert and oriented X 4, nonfocal, no gross deficits  Respiratory:  clear to auscultation, with diminished BS bilaterally  CV:  junctional, rate controlled  Abdomen:  soft, nontender, nondistended, positive bowel sounds  Extremities:  warm, well perfused, trace edema +DP pulses  Incisions:  midline sternal incision, c/d/i, sternum stable

## 2021-01-21 NOTE — DIETITIAN NUTRITION RISK NOTIFICATION - TREATMENT: THE FOLLOWING DIET HAS BEEN RECOMMENDED
Diet, Clear Liquid:   Consistent Carbohydrate {No Snacks} (CSTCHO)  DASH/TLC {Sodium & Cholesterol Restricted} (DASH) (01-20-21 @ 03:18) [Active]  Diet, Regular:   Consistent Carbohydrate {No Snacks} (CSTCHO)  DASH/TLC {Sodium & Cholesterol Restricted} (DASH) (01-19-21 @ 14:06) [Available for Activation]

## 2021-01-21 NOTE — PROGRESS NOTE ADULT - SUBJECTIVE AND OBJECTIVE BOX
INTERVAL HPI/OVERNIGHT EVENTS:  Follow up on diabetes.  a1c 7.8%    MEDICATIONS  (STANDING):  aMIOdarone    Tablet 400 milliGRAM(s) Oral every 8 hours  ascorbic acid 500 milliGRAM(s) Oral daily  aspirin  chewable 81 milliGRAM(s) Oral daily  atorvastatin 20 milliGRAM(s) Oral at bedtime  cefuroxime  IVPB 1500 milliGRAM(s) IV Intermittent once  chlorhexidine 0.12% Liquid 15 milliLiter(s) Swish and Spit two times a day  chlorhexidine 4% Liquid 1 Application(s) Topical two times a day  diltiazem    Tablet 30 milliGRAM(s) Oral four times a day  heparin  Infusion 800 Unit(s)/Hr (8 mL/Hr) IV Continuous <Continuous>  insulin glargine Injectable (LANTUS) 20 Unit(s) SubCutaneous at bedtime  insulin lispro (ADMELOG) corrective regimen sliding scale   SubCutaneous Before meals and at bedtime  insulin lispro Injectable (ADMELOG) 6 Unit(s) SubCutaneous three times a day before meals  metoprolol tartrate 50 milliGRAM(s) Oral every 8 hours  mupirocin 2% Nasal 1 Application(s) Nasal two times a day  pantoprazole    Tablet 40 milliGRAM(s) Oral before breakfast  phytonadione  IVPB 5 milliGRAM(s) IV Intermittent daily  senna 2 Tablet(s) Oral at bedtime  sodium chloride 0.9% lock flush 3 milliLiter(s) IV Push every 8 hours    MEDICATIONS  (PRN):  acetaminophen   Tablet .. 650 milliGRAM(s) Oral every 6 hours PRN Temp greater or equal to 38C (100.4F), Mild Pain (1 - 3)  albuterol/ipratropium for Nebulization 3 milliLiter(s) Nebulizer every 6 hours PRN Shortness of Breath and/or Wheezing  ondansetron Injectable 4 milliGRAM(s) IV Push every 6 hours PRN Nausea and/or Vomiting  polyethylene glycol 3350 17 Gram(s) Oral at bedtime PRN Constipation      Allergies    warfarin (Rash)        Review of systems:  Not feeling that great, some discomfort.      Vital Signs Last 24 Hrs  T(C): 36.8 (18 Jan 2021 10:00), Max: 37.5 (17 Jan 2021 22:57)  T(F): 98.3 (18 Jan 2021 10:00), Max: 99.5 (17 Jan 2021 22:57)  HR: 82 (18 Jan 2021 10:00) (82 - 97)  BP: 108/68 (18 Jan 2021 10:00) (97/62 - 113/67)  BP(mean): --  RR: 18 (18 Jan 2021 10:00) (17 - 18)  SpO2: 96% (18 Jan 2021 10:00) (96% - 98%)    PHYSICAL EXAM:  Constitutional: NAD, well-groomed, well-developed  Respiratory: CTAB  Cardiovascular: S1 and S2, RRR, no M/G/R  Psychiatric: Normal mood, normal affect        LABS:                        11.3   10.77 )-----------( 336      ( 18 Jan 2021 06:04 )             35.2     01-18    139  |  97<L>  |  21.0<H>  ----------------------------<  147<H>  3.6   |  29.0  |  0.94    Ca    9.0      18 Jan 2021 06:04  Mg     1.8     01-18

## 2021-01-21 NOTE — CONSULT NOTE ADULT - SUBJECTIVE AND OBJECTIVE BOX
75yF was admitted on 01-12 with acute dyspnea and palpitations yesterday along with a right thigh hematoma w/out extravasation related to blood thinners. She was found to have acute on chronic combined diastolic and systolic heart failure (requiring IV diuresis) and NSTEMI. 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 Course complicated by AFIB+RVR. She is C3/DUANE Clip/MV Repair and DUANE thrombectomy with Dr. Orellana on 1/19.     Imaging performed:  GERALDINE 1/14 - Summary:   1. Moderately decreased global left ventricular systolic function. Left ventricular ejection fraction, by visual estimation, is 35 to 40%.   2. Normal right ventricular size and function.   3. Mild to moderately enlarged left atrium.   4. Mildly enlarged right atrium.   5. Moderate mitral valve regurgitation.   6. Mild-moderate tricuspid regurgitation.   7. Mild aortic valve stenosis. The non-coronary cusp is calcified and immobile, but the right and left coronary cusps open normally. The aortic valve area is 1.6 cm2 by planimetry.   8. No left atrial appendage thrombus. There is spontaneous echo contrast seen in the left atrial appendage, but there is no filling defect seen with the administration of Definity.    CXR 1/21 - Single frontal view of the chest demonstrates mild CHF, unchanged. Line and tubes are unchanged. Mediastinal sternotomy wires. The cardiomediastinal silhouette is enlarged. No acute osseous abnormalities. Overlying EKG leads and wires are noted    Patient just worked with PT and is sitting in chair, exhausted.  Reports some chest tightness.  Also notes right leg pain/swelling related to hematoma.     REVIEW OF SYSTEMS  Constitutional - No fever, No weight loss, +fatigue  HEENT - No eye pain, No visual disturbances, No difficulty hearing, No tinnitus, No vertigo, No neck pain  Respiratory - No cough, No wheezing, +shortness of breath  Cardiovascular - +chest pain, No palpitations  Gastrointestinal - No abdominal pain, No nausea, No vomiting, No diarrhea, No constipation  Genitourinary - No dysuria, No frequency, No hematuria, No incontinence  Neurological - No headaches, No memory loss, +loss of strength, No numbness, No tremors  Skin - No itching, No rashes, +lesions   Endocrine - No temperature intolerance  Musculoskeletal - +joint pain, +joint swelling, +muscle pain  Psychiatric - +depression, No anxiety    VITALS  T(C): 36.5 (01-21-21 @ 13:00), Max: 36.5 (01-21-21 @ 12:00)  HR: 85 (01-21-21 @ 14:00) (55 - 97)  BP: 122/60 (01-21-21 @ 14:00) (93/48 - 195/73)  RR: 29 (01-21-21 @ 14:00) (12 - 43)  SpO2: 98% (01-21-21 @ 14:00) (89% - 100%)  Wt(kg): --    PAST MEDICAL & SURGICAL HISTORY  PAD (peripheral artery disease)    Paroxysmal atrial fibrillation    Hypercholesterolemia    Diabetes    Hypertension    S/P peripheral artery angioplasty with stent placement    S/P femoral-popliteal bypass surgery    S/P femoral-femoral bypass surgery    H/O hernia repair    H/O abdominal hysterectomy    No significant past surgical history    No significant past surgical history        SOCIAL HISTORY - as per documentation/history  Smoking - None  EtOH - None  Drugs - None    FUNCTIONAL HISTORY  Lives with spouse, 6 HONG  Independent    CURRENT FUNCTIONAL STATUS  1/21  Transfer: Sit to Stand:     · Level of [place]	maximum assist (25% patients effort)  · Physical Assist/Nonphysical Assist	2 person assist; verbal cues  · Assistive Device	rolling walker    Transfer: Stand to Sit:     · Level of [place]	maximum assist (25% patients effort)  · Physical Assist/Nonphysical Assist	2 person assist  · Assistive Device	rolling walker    Sit/Stand Transfer Safety Analysis:     · Impairments Contributing to Impaired Transfers	pain; Right thigh; impaired balance; decreased strength    Gait Skills:     · Level of [place]	unable to perform    Stair Negotiation:     · Level of [place]	unable to perform      FAMILY HISTORY   Family history of thoracic aortic aneurysm (Father)    Family history of abdominal aortic aneurysm (Father)    No pertinent family history in first degree relatives        RECENT LABS - Reviewed  CBC Full  -  ( 21 Jan 2021 03:50 )  WBC Count : 18.45 K/uL  RBC Count : 3.30 M/uL  Hemoglobin : 9.1 g/dL  Hematocrit : 28.7 %  Platelet Count - Automated : 215 K/uL  Mean Cell Volume : 87.0 fl  Mean Cell Hemoglobin : 27.6 pg  Mean Cell Hemoglobin Concentration : 31.7 gm/dL  Auto Neutrophil # : x  Auto Lymphocyte # : x  Auto Monocyte # : x  Auto Eosinophil # : x  Auto Basophil # : x  Auto Neutrophil % : x  Auto Lymphocyte % : x  Auto Monocyte % : x  Auto Eosinophil % : x  Auto Basophil % : x    01-21    138  |  100  |  34.0<H>  ----------------------------<  220<H>  4.0   |  25.0  |  1.55<H>    Ca    8.5<L>      21 Jan 2021 13:41  Phos  4.3     01-21  Mg     2.3     01-21    TPro  4.9<L>  /  Alb  2.9<L>  /  TBili  4.9<H>  /  DBili  x   /  AST  71<H>  /  ALT  54<H>  /  AlkPhos  46  01-20        ALLERGIES  OHS (Unknown)  warfarin (Rash)      MEDICATIONS   acetaminophen   Tablet .. 650 milliGRAM(s) Oral every 6 hours  aMIOdarone    Tablet 200 milliGRAM(s) Oral daily  ascorbic acid 500 milliGRAM(s) Oral daily  aspirin enteric coated 81 milliGRAM(s) Oral daily  atorvastatin 40 milliGRAM(s) Oral at bedtime  buMETAnide Infusion 2 mG/Hr IV Continuous <Continuous>  chlorhexidine 2% Cloths 1 Application(s) Topical daily  dextrose 50% Injectable 50 milliLiter(s) IV Push every 15 minutes  dextrose 50% Injectable 25 milliLiter(s) IV Push every 15 minutes  DOBUTamine Infusion 5 MICROgram(s)/kG/Min IV Continuous <Continuous>  enoxaparin Injectable 40 milliGRAM(s) SubCutaneous daily  gabapentin 300 milliGRAM(s) Oral three times a day  HYDROmorphone  Injectable 0.5 milliGRAM(s) IV Push every 4 hours PRN  insulin glargine Injectable (LANTUS) 50 Unit(s) SubCutaneous at bedtime  insulin lispro (ADMELOG) corrective regimen sliding scale   SubCutaneous Before meals and at bedtime  insulin lispro Injectable (ADMELOG) 8 Unit(s) SubCutaneous Before meals and at bedtime  insulin regular Infusion 3 Unit(s)/Hr IV Continuous <Continuous>  lidocaine   Patch 1 Patch Transdermal daily  multivitamin/minerals 1 Tablet(s) Oral daily  ondansetron Injectable 4 milliGRAM(s) IV Push every 4 hours PRN  pantoprazole    Tablet 40 milliGRAM(s) Oral daily  polyethylene glycol 3350 17 Gram(s) Oral daily PRN  senna 2 Tablet(s) Oral at bedtime  sodium chloride 0.9%. 1000 milliLiter(s) IV Continuous <Continuous>  sodium chloride 0.9%. 1000 milliLiter(s) IV Continuous <Continuous>      ----------------------------------------------------------------------------------------  PHYSICAL EXAM  Constitutional - NAD, Uncomfortable  HEENT - NCAT, EOMI  Neck - Supple, No limited ROM  Chest - Breathing - increased work, No wheezing, +3 chest tubes  Cardiovascular - S1S2   Abdomen - Soft   Extremities - Large right thigh hematoma, Left LE ACE wrapped, BUE hand swelling  Neurologic Exam -                    Cognitive - AAO to self, place, date, year, situation     Motor - No focal deficits                    LEFT    UE - ShAB 2/5, EF 4/5, EE 4/5,  4/5                    RIGHT UE - ShAB 2/5, EF 4/5, EE 4/5,  4/5                    LEFT    LE - HF 5/5, KE 5/5, DF 5/5, PF 5/5                    RIGHT LE - HF 5/5, KE 5/5, DF 5/5, PF 5/5        Sensory - Intact to LT     Balance - WNL Static  Psychiatric - Mood depressed, Fatigued  ----------------------------------------------------------------------------------------  ASSESSMENT/PLAN  75yFemale with functional deficits after SOB/severe CAD  C3/DUANE Clip/MV Repair and DUANE thrombectomy - ASA, Lipitor  AFIB - Amiodarone  DM2 - Lantus, Lispro  Pain - Tylenol, Oxycodone, Neurontin, Lidoderm  DVT PPX - SCDs, Lovenox  Rehab - Medically being optimized. GERALDINE planned, has 3 chest tubes. Patient currently at total assist with limited ability to tolerate much mobility. Will continue to follow and will need to demonstrate functional progress to determine rehab dispo recommendations.     Continue bedside therapy as well as OOB throughout the day with mobilization by staff to maintain cardiopulmonary function and prevention of secondary complications related to debility.     Discussed with rehab team.

## 2021-01-21 NOTE — PROGRESS NOTE ADULT - ASSESSMENT
Assessment  s/p CABG/MV repair EF 40%  Mild AS  PAF   COPD  HLD  PVD  DM        Rec  agree with taper off milrinone  rate control AF  start eliquis when cleared by CTS  eventual low dose lopressor  cont diuresis

## 2021-01-21 NOTE — PROGRESS NOTE ADULT - ASSESSMENT
On 1/19/2021 pt underwent C3/DUANE Clip/MV Repair and DUANE thrombectomy with Dr. Orellana.  Today POD#1  Before surgery she was on  Lantus 20 units daily  and Admelog to 8 units daily.    Yesterday was transitioned off insulin ggt, got 10 units of lantus , was probably not enough, sugars were still high, then restarted on insulin ggt.  Recommend to transition when patient is on a lowest possible stable dose rate for few hrs .      T2DM  Will recommend transition when appropriate.  fingersticks ac tid hs  Pt does have outpt Endo-  Dr Soriano      On 1/19/2021 pt underwent C3/DUANE Clip/MV Repair and DUANE thrombectomy with Dr. Orellana.  Today POD#1  Before surgery she was on  Lantus 20 units daily  and Admelog to 8 units daily.    Yesterday was transitioned off insulin ggt, got  40 units of lantus ,sugars were still high, then restarted on insulin ggt.  At thisn point still on very hgih doses 11units/hr  Recommend to transition when patient is on a lowest possible stable dose rate for few hrs .      T2DM  Will recommend transition when appropriate.  fingersticks ac tid hs  Pt does have outpt Endo-  Dr Soriano

## 2021-01-21 NOTE — PROGRESS NOTE ADULT - SUBJECTIVE AND OBJECTIVE BOX
[place] [facility] - [facility] THE [facility]                                   [address] [place] [address]                                                      PHONE: [phone]                                                         FAX: [phone]  [email or web address]  ---------------------------------------------------------------------------------------------------------------------------------    Overnight events/patient complaints:  OOB in chair, tele AF with CVR, on dobutamine/milrinone/bumex/insulin      OHS (Unknown)  warfarin (Rash)    MEDICATIONS  (STANDING):  aMIOdarone    Tablet 200 milliGRAM(s) Oral daily  aspirin enteric coated 81 milliGRAM(s) Oral daily  atorvastatin 40 milliGRAM(s) Oral at bedtime  buMETAnide Infusion 2 mG/Hr (10 mL/Hr) IV Continuous <Continuous>  chlorhexidine 2% Cloths 1 Application(s) Topical daily  dextrose 50% Injectable 50 milliLiter(s) IV Push every 15 minutes  dextrose 50% Injectable 25 milliLiter(s) IV Push every 15 minutes  DOBUTamine Infusion 5 MICROgram(s)/kG/Min (12.9 mL/Hr) IV Continuous <Continuous>  enoxaparin Injectable 40 milliGRAM(s) SubCutaneous daily  insulin glargine Injectable (LANTUS) 50 Unit(s) SubCutaneous at bedtime  insulin lispro (ADMELOG) corrective regimen sliding scale   SubCutaneous Before meals and at bedtime  insulin lispro Injectable (ADMELOG) 8 Unit(s) SubCutaneous Before meals and at bedtime  insulin regular Infusion 3 Unit(s)/Hr (3 mL/Hr) IV Continuous <Continuous>  metolazone 10 milliGRAM(s) Oral once  pantoprazole    Tablet 40 milliGRAM(s) Oral daily  senna 2 Tablet(s) Oral at bedtime  sodium chloride 0.9%. 1000 milliLiter(s) (10 mL/Hr) IV Continuous <Continuous>  sodium chloride 0.9%. 1000 milliLiter(s) (5 mL/Hr) IV Continuous <Continuous>    MEDICATIONS  (PRN):  ondansetron Injectable 4 milliGRAM(s) IV Push every 4 hours PRN Nausea and/or Vomiting  polyethylene glycol 3350 17 Gram(s) Oral daily PRN Constipation      Vital Signs Last 24 Hrs  T(C): 36.4 (21 Jan 2021 08:00), Max: 36.4 (21 Jan 2021 07:30)  T(F): 97.5 (21 Jan 2021 08:00), Max: 97.5 (21 Jan 2021 07:30)  HR: 87 (21 Jan 2021 08:30) (55 - 97)  BP: 115/58 (21 Jan 2021 08:30) (93/48 - 170/74)  BP(mean): 80 (21 Jan 2021 08:30) (69 - 107)  RR: 12 (21 Jan 2021 08:30) (10 - 43)  SpO2: 98% (21 Jan 2021 08:30) (89% - 100%)  Daily     Daily   ICU Vital Signs Last 24 Hrs  DONALD SAUNDERS  I&O's Detail    20 Jan 2021 07:01  -  21 Jan 2021 07:00  --------------------------------------------------------  IN:    Bumetanide: 15 mL    Bumetanide: 70 mL    DOBUTamine: 309.6 mL    Insulin: 59 mL    IV PiggyBack: 500 mL    IV PiggyBack: 200 mL    IV PiggyBack: 50 mL    IV PiggyBack: 50 mL    Milrinone: 76.8 mL    NiCARdipine: 37.5 mL    Norepinephrine: 5 mL    PRBCs (Packed Red Blood Cells): 289 mL    sodium chloride 0.9%: 240 mL    sodium chloride 0.9%: 120 mL  Total IN: 2021.9 mL    OUT:    Chest Tube (mL): 272 mL    Chest Tube (mL): 40 mL    Chest Tube (mL): 160 mL    Chest Tube (mL): 40 mL    Indwelling Catheter - Urethral (mL): 967 mL  Total OUT: 1479 mL    Total NET: 542.9 mL      21 Jan 2021 07:01  -  21 Jan 2021 08:56  --------------------------------------------------------  IN:    Bumetanide: 10 mL    DOBUTamine: 12.9 mL    Milrinone: 3.2 mL    sodium chloride 0.9%: 5 mL    sodium chloride 0.9%: 10 mL  Total IN: 41.1 mL    OUT:    Chest Tube (mL): 10 mL    Chest Tube (mL): 0 mL    Chest Tube (mL): 10 mL    Indwelling Catheter - Urethral (mL): 60 mL  Total OUT: 80 mL    Total NET: -38.9 mL        I&O's Summary    20 Jan 2021 07:01  -  21 Jan 2021 07:00  --------------------------------------------------------  IN: 2021.9 mL / OUT: 1479 mL / NET: 542.9 mL    21 Jan 2021 07:01  -  21 Jan 2021 08:56  --------------------------------------------------------  IN: 41.1 mL / OUT: 80 mL / NET: -38.9 mL      Drug Dosing Weight  DONALD SAUNDERS      PHYSICAL EXAM:  General: Appears well developed, well nourished alert and cooperative.  HEENT: Head; normocephalic, atraumatic.  Eyes: Pupils reactive, cornea wnl.  Neck: Supple, no nodes adenopathy, no NVD or carotid bruit or thyromegaly.  CARDIOVASCULAR: Normal S1 and S2, No murmur, rub, gallop or lift.   LUNGS: decreased BS bilat  ABDOMEN: Soft, nontender without mass or organomegaly. bowel sounds normoactive.  EXTREMITIES: No clubbing, cyanosis or edema. Distal pulses wnl.   SKIN: warm and dry with normal turgor.  NEURO: Alert/oriented x 3/normal motor exam. No pathologic reflexes.    PSYCH: normal affect.        LABS:                        9.1    18.45 )-----------( 215      ( 21 Jan 2021 03:50 )             28.7     01-21    137  |  101  |  31.0<H>  ----------------------------<  271<H>  5.3   |  21.0<L>  |  1.28    Ca    7.7<L>      21 Jan 2021 03:50  Mg     2.4     01-21    TPro  4.9<L>  /  Alb  2.9<L>  /  TBili  4.9<H>  /  DBili  x   /  AST  71<H>  /  ALT  54<H>  /  AlkPhos  46  01-20    DONALD SAUNDERS      PT/INR - ( 19 Jan 2021 15:50 )   PT: 17.5 sec;   INR: 1.54 ratio         PTT - ( 19 Jan 2021 15:50 )  PTT:27.6 sec      RADIOLOGY & ADDITIONAL STUDIES: ?< from: Xray Chest 1 View- PORTABLE-Routine (Xray Chest 1 View- PORTABLE-Routine in AM.) (01.20.21 @ 05:59) >  IMPRESSION:  Status post cardiac surgery.  No evidence of active chest pathology.  Catheters and/or tubes in place                    DENNIS DISLA MD; Attending Radiologist  This document has been electronically signed. Jan 20 2021  1:52PM    < end of copied text >      INTERPRETATION OF TELEMETRY (personally reviewed):    ECG:< from: 12 Lead ECG (01.20.21 @ 05:06) >    Diagnosis Line Junctional rhythm  Inferior infarct , age undetermined  ST & T wave abnormality, consider anterolateral ischemia  Abnormal ECG    Confirmed by Panfilo Peralta ([id number]) on 1/20/2021 3:58:48 PM    < end of copied text >      ECHO:< from: TTE Echo Complete w/o Contrast w/ Doppler (01.12.21 @ 10:48) >  Summary:   1. Left ventricular ejection fraction, by visual estimation, is 40 to 45%.   2. Mildly to moderately decreased global left ventricular systolic function.   3. Entire apex is abnormal as described above.   4. The mitral in-flow pattern reveals no discernable A-wave, therefore no comment on diastolic function can be made.   5. Moderately enlarged left atrium.   6. Moderately enlarged right atrium.   7. There is no evidence of pericardial effusion.   8. Mild to moderate mitral valve regurgitation.   9. Moderate tricuspid regurgitation.  10. Estimated pulmonary artery systolic pressure is 59.6 mmHg assuming a right atrial pressure of 10 mmHg, which is consistent with moderate pulmonary hypertension.  11. Endocardial visualization was enhanced with intravenous echo contrast.  12. Peak transaortic gradient equals 18.7 mmHg, mean transaortic gradient equals 9.0 mmHg, the calculated aortic valve area equals 1.34 cm² by the continuity equation consistent with moderate aortic stenosis.    MD Sarah Electronically signed on 1/12/2021 at 3:46:18 PM      < from: GERALDINE Echo Doppler (01.14.21 @ 09:20) >    Summary:   1. Moderately decreased global left ventricular systolic function. Left ventricular ejection fraction, by visual estimation, is 35 to 40%.   2. Normal right ventricular size and function.   3. Mild to moderately enlarged left atrium.   4. Mildly enlarged right atrium.   5. Moderate mitral valve regurgitation.   6. Mild-moderate tricuspid regurgitation.   7. Mild aortic valve stenosis. The non-coronary cusp is calcified and immobile, but the right and left coronary cusps open normally. The aortic valve area is 1.6 cm2 by planimetry.   8. No left atrial appendage thrombus. There is spontaneous echo contrast seen in the left atrial appendage, but there is no filling defect seen with the administration of Definity.    Juan Diego Arroyo MD Electronically signed on 1/14/2021 at 10:57:13 AM      < end of copied text >          < end of

## 2021-01-22 LAB
ANION GAP SERPL CALC-SCNC: 12 MMOL/L — SIGNIFICANT CHANGE UP (ref 5–17)
ANION GAP SERPL CALC-SCNC: 13 MMOL/L — SIGNIFICANT CHANGE UP (ref 5–17)
BUN SERPL-MCNC: 34 MG/DL — HIGH (ref 8–20)
BUN SERPL-MCNC: 36 MG/DL — HIGH (ref 8–20)
CALCIUM SERPL-MCNC: 8.4 MG/DL — LOW (ref 8.6–10.2)
CALCIUM SERPL-MCNC: 8.5 MG/DL — LOW (ref 8.6–10.2)
CHLORIDE SERPL-SCNC: 97 MMOL/L — LOW (ref 98–107)
CHLORIDE SERPL-SCNC: 98 MMOL/L — SIGNIFICANT CHANGE UP (ref 98–107)
CO2 SERPL-SCNC: 27 MMOL/L — SIGNIFICANT CHANGE UP (ref 22–29)
CO2 SERPL-SCNC: 28 MMOL/L — SIGNIFICANT CHANGE UP (ref 22–29)
CREAT SERPL-MCNC: 1.52 MG/DL — HIGH (ref 0.5–1.3)
CREAT SERPL-MCNC: 1.56 MG/DL — HIGH (ref 0.5–1.3)
GLUCOSE BLDC GLUCOMTR-MCNC: 101 MG/DL — HIGH (ref 70–99)
GLUCOSE BLDC GLUCOMTR-MCNC: 107 MG/DL — HIGH (ref 70–99)
GLUCOSE BLDC GLUCOMTR-MCNC: 121 MG/DL — HIGH (ref 70–99)
GLUCOSE BLDC GLUCOMTR-MCNC: 121 MG/DL — HIGH (ref 70–99)
GLUCOSE BLDC GLUCOMTR-MCNC: 170 MG/DL — HIGH (ref 70–99)
GLUCOSE BLDC GLUCOMTR-MCNC: 97 MG/DL — SIGNIFICANT CHANGE UP (ref 70–99)
GLUCOSE SERPL-MCNC: 92 MG/DL — SIGNIFICANT CHANGE UP (ref 70–99)
GLUCOSE SERPL-MCNC: 93 MG/DL — SIGNIFICANT CHANGE UP (ref 70–99)
HCT VFR BLD CALC: 29.4 % — LOW (ref 34.5–45)
HGB BLD-MCNC: 9.6 G/DL — LOW (ref 11.5–15.5)
MAGNESIUM SERPL-MCNC: 2.4 MG/DL — SIGNIFICANT CHANGE UP (ref 1.6–2.6)
MCHC RBC-ENTMCNC: 28 PG — SIGNIFICANT CHANGE UP (ref 27–34)
MCHC RBC-ENTMCNC: 32.7 GM/DL — SIGNIFICANT CHANGE UP (ref 32–36)
MCV RBC AUTO: 85.7 FL — SIGNIFICANT CHANGE UP (ref 80–100)
PLATELET # BLD AUTO: 218 K/UL — SIGNIFICANT CHANGE UP (ref 150–400)
POTASSIUM SERPL-MCNC: 3.4 MMOL/L — LOW (ref 3.5–5.3)
POTASSIUM SERPL-MCNC: 4.3 MMOL/L — SIGNIFICANT CHANGE UP (ref 3.5–5.3)
POTASSIUM SERPL-SCNC: 3.4 MMOL/L — LOW (ref 3.5–5.3)
POTASSIUM SERPL-SCNC: 4.3 MMOL/L — SIGNIFICANT CHANGE UP (ref 3.5–5.3)
RBC # BLD: 3.43 M/UL — LOW (ref 3.8–5.2)
RBC # FLD: 16.2 % — HIGH (ref 10.3–14.5)
SODIUM SERPL-SCNC: 137 MMOL/L — SIGNIFICANT CHANGE UP (ref 135–145)
SODIUM SERPL-SCNC: 138 MMOL/L — SIGNIFICANT CHANGE UP (ref 135–145)
WBC # BLD: 13.5 K/UL — HIGH (ref 3.8–10.5)
WBC # FLD AUTO: 13.5 K/UL — HIGH (ref 3.8–10.5)

## 2021-01-22 PROCEDURE — 99233 SBSQ HOSP IP/OBS HIGH 50: CPT

## 2021-01-22 PROCEDURE — 71045 X-RAY EXAM CHEST 1 VIEW: CPT | Mod: 26,77

## 2021-01-22 PROCEDURE — 99222 1ST HOSP IP/OBS MODERATE 55: CPT | Mod: 24

## 2021-01-22 PROCEDURE — 71045 X-RAY EXAM CHEST 1 VIEW: CPT | Mod: 26

## 2021-01-22 PROCEDURE — 99232 SBSQ HOSP IP/OBS MODERATE 35: CPT

## 2021-01-22 RX ORDER — MEGESTROL ACETATE 40 MG/ML
400 SUSPENSION ORAL DAILY
Refills: 0 | Status: DISCONTINUED | OUTPATIENT
Start: 2021-01-22 | End: 2021-01-28

## 2021-01-22 RX ORDER — MEGESTROL ACETATE 40 MG/ML
20 SUSPENSION ORAL DAILY
Refills: 0 | Status: DISCONTINUED | OUTPATIENT
Start: 2021-01-22 | End: 2021-01-22

## 2021-01-22 RX ORDER — NICARDIPINE HYDROCHLORIDE 30 MG/1
5 CAPSULE, EXTENDED RELEASE ORAL
Qty: 40 | Refills: 0 | Status: DISCONTINUED | OUTPATIENT
Start: 2021-01-22 | End: 2021-01-22

## 2021-01-22 RX ORDER — POTASSIUM CHLORIDE 20 MEQ
20 PACKET (EA) ORAL
Refills: 0 | Status: COMPLETED | OUTPATIENT
Start: 2021-01-22 | End: 2021-01-22

## 2021-01-22 RX ORDER — ENOXAPARIN SODIUM 100 MG/ML
30 INJECTION SUBCUTANEOUS DAILY
Refills: 0 | Status: DISCONTINUED | OUTPATIENT
Start: 2021-01-22 | End: 2021-01-23

## 2021-01-22 RX ADMIN — Medication 81 MILLIGRAM(S): at 12:11

## 2021-01-22 RX ADMIN — CHLORHEXIDINE GLUCONATE 1 APPLICATION(S): 213 SOLUTION TOPICAL at 12:12

## 2021-01-22 RX ADMIN — Medication 2: at 12:11

## 2021-01-22 RX ADMIN — GABAPENTIN 300 MILLIGRAM(S): 400 CAPSULE ORAL at 05:31

## 2021-01-22 RX ADMIN — MEGESTROL ACETATE 400 MILLIGRAM(S): 40 SUSPENSION ORAL at 21:48

## 2021-01-22 RX ADMIN — ATORVASTATIN CALCIUM 40 MILLIGRAM(S): 80 TABLET, FILM COATED ORAL at 21:48

## 2021-01-22 RX ADMIN — ENOXAPARIN SODIUM 30 MILLIGRAM(S): 100 INJECTION SUBCUTANEOUS at 12:12

## 2021-01-22 RX ADMIN — Medication 650 MILLIGRAM(S): at 00:45

## 2021-01-22 RX ADMIN — GABAPENTIN 300 MILLIGRAM(S): 400 CAPSULE ORAL at 21:48

## 2021-01-22 RX ADMIN — Medication 8 UNIT(S): at 08:28

## 2021-01-22 RX ADMIN — PANTOPRAZOLE SODIUM 40 MILLIGRAM(S): 20 TABLET, DELAYED RELEASE ORAL at 12:11

## 2021-01-22 RX ADMIN — OXYCODONE HYDROCHLORIDE 5 MILLIGRAM(S): 5 TABLET ORAL at 10:08

## 2021-01-22 RX ADMIN — Medication 650 MILLIGRAM(S): at 05:31

## 2021-01-22 RX ADMIN — LIDOCAINE 1 PATCH: 4 CREAM TOPICAL at 19:35

## 2021-01-22 RX ADMIN — BUMETANIDE 5 MG/HR: 0.25 INJECTION INTRAMUSCULAR; INTRAVENOUS at 02:27

## 2021-01-22 RX ADMIN — INSULIN GLARGINE 50 UNIT(S): 100 INJECTION, SOLUTION SUBCUTANEOUS at 21:48

## 2021-01-22 RX ADMIN — Medication 100 MILLIEQUIVALENT(S): at 01:25

## 2021-01-22 RX ADMIN — Medication 650 MILLIGRAM(S): at 16:20

## 2021-01-22 RX ADMIN — Medication 8 UNIT(S): at 12:11

## 2021-01-22 RX ADMIN — ONDANSETRON 4 MILLIGRAM(S): 8 TABLET, FILM COATED ORAL at 05:31

## 2021-01-22 RX ADMIN — Medication 500 MILLIGRAM(S): at 12:11

## 2021-01-22 RX ADMIN — LIDOCAINE 1 PATCH: 4 CREAM TOPICAL at 12:12

## 2021-01-22 RX ADMIN — ONDANSETRON 4 MILLIGRAM(S): 8 TABLET, FILM COATED ORAL at 21:48

## 2021-01-22 RX ADMIN — Medication 1 TABLET(S): at 12:11

## 2021-01-22 RX ADMIN — Medication 100 MILLIEQUIVALENT(S): at 02:26

## 2021-01-22 RX ADMIN — SENNA PLUS 2 TABLET(S): 8.6 TABLET ORAL at 21:48

## 2021-01-22 RX ADMIN — Medication 650 MILLIGRAM(S): at 12:11

## 2021-01-22 RX ADMIN — AMIODARONE HYDROCHLORIDE 200 MILLIGRAM(S): 400 TABLET ORAL at 05:44

## 2021-01-22 NOTE — PROGRESS NOTE ADULT - SUBJECTIVE AND OBJECTIVE BOX
Significant recent/past 24 hr events: In beginning of shift removed the right chest tube, no residual pneumo noted on unofficial read. During the night patient encourage to utilized bipap. Patient did refuse multiple times, and then eventually agreed. Diuresing a lot overnight, 200-350/hr on bumex gtt. Patient has normal EF on dobutamine, CI is slowly tapering down. Likely due to overdiuresis with normal EF. Will hold bumex gtt for 1 hour. PA pressures are 40/20 (30), CVP 13. Replaced potassium.     Subjective:    Review of Systems  Patient is annoyed she has to wear the bipap, and just "wants to be made comfortable"       Patient is a 75y old  Female who presents with a chief complaint of Afib with RVR with hypotension, elevated troponin (21 Jan 2021 14:16)    HPI:  75yoF hx Afib on Xarelto, PAD s/p A-rxmwanj-jhwqecknd bypass, R-femoral angioplasty with stenting, on Plavix, HTN, HLD, DM presenting with acute dyspnea and palpitations yesterday.  Pt was in her normal state of health, then developed acute onset of exertional dyspnea associated with palpitations that progressed to dyspnea at rest which prompted her to come to the hospital.  She also reports some transient vomiting yesterday that is now improving.  In ED, pt was in Afib with RVR associated with soft SBP in 90s.  She was given IV Cardizem and PO metoprolol without improvement and ED staff spoke with [place] cardiology who recommended digoxin.   Labs notable for leukocytosis and elevated troponin.  Pt denies any chest pain, lightheadedness, syncope, cough, abdominal pain, diarrhea, or urinary symptoms.   Of note, pt came to [facility] ED on 1/9 for R-thigh pain after walking into a cabinet at home. She underwent a CT angio A/P that showed R-thigh hematoma w/out extravasation.  Her leg was wrapped and she was d/c’ed from ED with instruction to continue wtith blood thinners and outpatient follow up. She denies any worsening R-thigh or leg pain. Patient comes into the hospital with afib (12 Jan 2021 03:52)    PAST MEDICAL & SURGICAL HISTORY:  PAD (peripheral artery disease)    Paroxysmal atrial fibrillation    Hypercholesterolemia    Diabetes    Hypertension    S/P peripheral artery angioplasty with stent placement    S/P femoral-popliteal bypass surgery    H/O hernia repair    H/O abdominal hysterectomy      FAMILY HISTORY:  Family history of thoracic aortic aneurysm (Father)    Family history of abdominal aortic aneurysm (Father)        Vitals   ICU Vital Signs Last 24 Hrs  T(C): 37.1 (22 Jan 2021 00:00), Max: 37.1 (22 Jan 2021 00:00)  T(F): 98.8 (22 Jan 2021 00:00), Max: 98.8 (22 Jan 2021 00:00)  HR: 72 (22 Jan 2021 01:00) (60 - 97)  BP: 135/62 (22 Jan 2021 01:00) (100/53 - 195/73)  BP(mean): 89 (22 Jan 2021 01:00) (61 - 105)  ABP: --  ABP(mean): --  RR: 30 (22 Jan 2021 01:00) (12 - 42)  SpO2: 100% (22 Jan 2021 01:00) (94% - 100%)      VENT SETTINGS     ABG - ( 21 Jan 2021 05:42 )  pH, Arterial: 7.47  pH, Blood: x     /  pCO2: 31    /  pO2: 79    / HCO3: 24    / Base Excess: -0.5  /  SaO2: 97                  I&O's Detail    20 Jan 2021 07:01  -  21 Jan 2021 07:00  --------------------------------------------------------  IN:    Bumetanide: 15 mL    Bumetanide: 70 mL    DOBUTamine: 309.6 mL    Insulin: 59 mL    IV PiggyBack: 500 mL    IV PiggyBack: 200 mL    IV PiggyBack: 50 mL    IV PiggyBack: 50 mL    Milrinone: 76.8 mL    NiCARdipine: 37.5 mL    Norepinephrine: 5 mL    PRBCs (Packed Red Blood Cells): 289 mL    sodium chloride 0.9%: 240 mL    sodium chloride 0.9%: 120 mL  Total IN: 2021.9 mL    OUT:    Chest Tube (mL): 272 mL    Chest Tube (mL): 40 mL    Chest Tube (mL): 160 mL    Chest Tube (mL): 40 mL    Indwelling Catheter - Urethral (mL): 967 mL  Total OUT: 1479 mL    Total NET: 542.9 mL      21 Jan 2021 07:01  -  22 Jan 2021 01:26  --------------------------------------------------------  IN:    Bumetanide: 180 mL    DOBUTamine: 232.2 mL    Insulin: 92 mL    IV PiggyBack: 50 mL    IV PiggyBack: 100 mL    IV PiggyBack: 100 mL    Milrinone: 3.2 mL    Oral Fluid: 300 mL    sodium chloride 0.9%: 90 mL    sodium chloride 0.9%: 180 mL  Total IN: 1327.4 mL    OUT:    Chest Tube (mL): 70 mL    Chest Tube (mL): 10 mL    Chest Tube (mL): 150 mL    Indwelling Catheter - Urethral (mL): 4115 mL  Total OUT: 4345 mL    Total NET: -3017.6 mL          LABS                        9.1    18.45 )-----------( 215      ( 21 Jan 2021 03:50 )             28.7     01-22    138  |  98  |  34.0<H>  ----------------------------<  92  3.4<L>   |  27.0  |  1.52<H>    Ca    8.5<L>      22 Jan 2021 00:20  Phos  4.3     01-21  Mg     2.3     01-21    TPro  4.9<L>  /  Alb  2.9<L>  /  TBili  4.9<H>  /  DBili  x   /  AST  71<H>  /  ALT  54<H>  /  AlkPhos  46  01-20              POCT Blood Glucose.: 107 mg/dL (01-22-21 @ 01:00)  POCT Blood Glucose.: 97 mg/dL (01-22-21 @ 00:36)  POCT Blood Glucose.: 119 mg/dL (01-21-21 @ 22:09)  POCT Blood Glucose.: 121 mg/dL (01-21-21 @ 20:10)  POCT Blood Glucose.: 117 mg/dL (01-21-21 @ 18:49)  POCT Blood Glucose.: 127 mg/dL (01-21-21 @ 18:04)  POCT Blood Glucose.: 114 mg/dL (01-21-21 @ 17:18)  POCT Blood Glucose.: 140 mg/dL (01-21-21 @ 16:12)  POCT Blood Glucose.: 168 mg/dL (01-21-21 @ 15:08)  POCT Blood Glucose.: 227 mg/dL (01-21-21 @ 14:10)  POCT Blood Glucose.: 237 mg/dL (01-21-21 @ 13:16)  POCT Blood Glucose.: 232 mg/dL (01-21-21 @ 12:25)  POCT Blood Glucose.: 253 mg/dL (01-21-21 @ 11:05)  POCT Blood Glucose.: 302 mg/dL (01-21-21 @ 10:02)  POCT Blood Glucose.: 294 mg/dL (01-21-21 @ 09:07)  POCT Blood Glucose.: 330 mg/dL (01-21-21 @ 07:27)  POCT Blood Glucose.: 337 mg/dL (01-21-21 @ 05:53)  POCT Blood Glucose.: 297 mg/dL (01-21-21 @ 03:15)        MEDICATIONS  (STANDING):  acetaminophen   Tablet .. 650 milliGRAM(s) Oral every 6 hours  aMIOdarone    Tablet 200 milliGRAM(s) Oral daily  ascorbic acid 500 milliGRAM(s) Oral daily  aspirin enteric coated 81 milliGRAM(s) Oral daily  atorvastatin 40 milliGRAM(s) Oral at bedtime  buMETAnide Infusion 1 mG/Hr (5 mL/Hr) IV Continuous <Continuous>  chlorhexidine 2% Cloths 1 Application(s) Topical daily  dextrose 50% Injectable 50 milliLiter(s) IV Push every 15 minutes  dextrose 50% Injectable 25 milliLiter(s) IV Push every 15 minutes  DOBUTamine Infusion 5 MICROgram(s)/kG/Min (12.9 mL/Hr) IV Continuous <Continuous>  enoxaparin Injectable 40 milliGRAM(s) SubCutaneous daily  gabapentin 300 milliGRAM(s) Oral three times a day  insulin glargine Injectable (LANTUS) 50 Unit(s) SubCutaneous at bedtime  insulin lispro (ADMELOG) corrective regimen sliding scale   SubCutaneous Before meals and at bedtime  insulin lispro Injectable (ADMELOG) 8 Unit(s) SubCutaneous Before meals and at bedtime  insulin regular Infusion 3 Unit(s)/Hr (3 mL/Hr) IV Continuous <Continuous>  lidocaine   Patch 1 Patch Transdermal daily  multivitamin/minerals 1 Tablet(s) Oral daily  pantoprazole    Tablet 40 milliGRAM(s) Oral daily  potassium chloride  20 mEq/100 mL IVPB 20 milliEquivalent(s) IV Intermittent every 1 hour  senna 2 Tablet(s) Oral at bedtime  sodium chloride 0.9%. 1000 milliLiter(s) (10 mL/Hr) IV Continuous <Continuous>  sodium chloride 0.9%. 1000 milliLiter(s) (5 mL/Hr) IV Continuous <Continuous>    MEDICATIONS  (PRN):  HYDROmorphone  Injectable 0.5 milliGRAM(s) IV Push every 4 hours PRN Severe Pain (7 - 10)  ondansetron Injectable 4 milliGRAM(s) IV Push every 4 hours PRN Nausea and/or Vomiting  oxyCODONE    IR 5 milliGRAM(s) Oral every 4 hours PRN Moderate Pain (4 - 6)  oxyCODONE    IR 10 milliGRAM(s) Oral every 4 hours PRN Severe Pain (7 - 10)  polyethylene glycol 3350 17 Gram(s) Oral daily PRN Constipation    Allergies:  OHS (Unknown)  warfarin (Rash)      Physical Exam:   Constitutional: NAD, well-groomed, well-developed  HEENT: PERRLA, EOMI, no drainage or redness  Neck: supple,  No JVD  Respiratory: Breath Sounds equal & clear bilaterally to auscultation, no rales/rhonchi/wheezing, no accessory muscle use noted  Cardiovascular: Regular rate, regular rhythm, normal S1, S2; no murmurs or rub  Gastrointestinal: Soft, non-tender, non distended, + bowel sounds  Extremities: MCLAUGHLIN x 4, no peripheral edema, no cyanosis, no clubbing   Neurological: A+O x 3; speech clear and intact; no sensory, motor  deficits, normal reflexes  Skin: warm, dry, well perfused

## 2021-01-22 NOTE — PROGRESS NOTE ADULT - ASSESSMENT
75 year old Female with a medical history of paroxysmal Afib (on Xarelto), PAD (s/p Y-yyzpzgt-wutvytnrq bypass, R-femoral angioplasty with stenting, maintained on Plavix), HTN, HLD, type 2 diabetes (HA1c on 7.8 on Metformin and Tresiba as an outpatient), and recent trauma to Right thigh with stable hematoma, presented with SOB, acute on chronic combined diastolic and systolic heart failure (requiring IV diuresis), and NSTEMI on 1/11/20. On 1/13 she underwent a LHC via Right radial artery and was found to have Multivessel CAD (right ostial, left main and LAD) with left femoral IABP placed. GERALDINE 1/14 showed Moderate MR and Mild AS. IABP removed successfully 1/15. Preoperative course significant for afib with RVR (requiring PO amio load, increased Lopressor dosing, and initiation of Cardizem).     On 1/19/2021 pt underwent C3/DUANE Clip/MV Repair and DUANE thrombectomy with Dr. Orellana.

## 2021-01-22 NOTE — PROGRESS NOTE ADULT - SUBJECTIVE AND OBJECTIVE BOX
[facility] - [facility]                                   [address] [place] [address]                                                      PHONE: [phone]                                                         FAX: [phone]  [email or web address]  ---------------------------------------------------------------------------------------------------------------------------------    Overnight events/patient complaints:  OOB in chair, pin improved, AF with improved VR      OHS (Unknown)  warfarin (Rash)    MEDICATIONS  (STANDING):  acetaminophen   Tablet .. 650 milliGRAM(s) Oral every 6 hours  aMIOdarone    Tablet 200 milliGRAM(s) Oral daily  ascorbic acid 500 milliGRAM(s) Oral daily  aspirin enteric coated 81 milliGRAM(s) Oral daily  atorvastatin 40 milliGRAM(s) Oral at bedtime  buMETAnide Infusion 1 mG/Hr (5 mL/Hr) IV Continuous <Continuous>  chlorhexidine 2% Cloths 1 Application(s) Topical daily  dextrose 50% Injectable 50 milliLiter(s) IV Push every 15 minutes  dextrose 50% Injectable 25 milliLiter(s) IV Push every 15 minutes  DOBUTamine Infusion 5 MICROgram(s)/kG/Min (12.9 mL/Hr) IV Continuous <Continuous>  enoxaparin Injectable 40 milliGRAM(s) SubCutaneous daily  gabapentin 300 milliGRAM(s) Oral three times a day  insulin glargine Injectable (LANTUS) 50 Unit(s) SubCutaneous at bedtime  insulin lispro (ADMELOG) corrective regimen sliding scale   SubCutaneous Before meals and at bedtime  insulin lispro Injectable (ADMELOG) 8 Unit(s) SubCutaneous Before meals and at bedtime  lidocaine   Patch 1 Patch Transdermal daily  multivitamin/minerals 1 Tablet(s) Oral daily  pantoprazole    Tablet 40 milliGRAM(s) Oral daily  senna 2 Tablet(s) Oral at bedtime  sodium chloride 0.9%. 1000 milliLiter(s) (10 mL/Hr) IV Continuous <Continuous>  sodium chloride 0.9%. 1000 milliLiter(s) (5 mL/Hr) IV Continuous <Continuous>    MEDICATIONS  (PRN):  HYDROmorphone  Injectable 0.5 milliGRAM(s) IV Push every 4 hours PRN Severe Pain (7 - 10)  ondansetron Injectable 4 milliGRAM(s) IV Push every 4 hours PRN Nausea and/or Vomiting  oxyCODONE    IR 5 milliGRAM(s) Oral every 4 hours PRN Moderate Pain (4 - 6)  oxyCODONE    IR 10 milliGRAM(s) Oral every 4 hours PRN Severe Pain (7 - 10)  polyethylene glycol 3350 17 Gram(s) Oral daily PRN Constipation      Vital Signs Last 24 Hrs  T(C): 36.9 (22 Jan 2021 08:00), Max: 37.1 (22 Jan 2021 00:00)  T(F): 98.5 (22 Jan 2021 08:00), Max: 98.8 (22 Jan 2021 00:00)  HR: 73 (22 Jan 2021 08:00) (67 - 94)  BP: 108/53 (22 Jan 2021 08:00) (100/53 - 195/73)  BP(mean): 75 (22 Jan 2021 08:00) (61 - 105)  RR: 20 (22 Jan 2021 08:00) (16 - 33)  SpO2: 99% (22 Jan 2021 08:00) (94% - 100%)  Daily     Daily   ICU Vital Signs Last 24 Hrs  DONALD SAUNDERS  I&O's Detail    21 Jan 2021 07:01  -  22 Jan 2021 07:00  --------------------------------------------------------  IN:    Bumetanide: 30 mL    Bumetanide: 180 mL    DOBUTamine: 309.6 mL    Insulin: 92 mL    IV PiggyBack: 200 mL    IV PiggyBack: 50 mL    IV PiggyBack: 100 mL    IV PiggyBack: 100 mL    Milrinone: 3.2 mL    Oral Fluid: 300 mL    sodium chloride 0.9%: 240 mL    sodium chloride 0.9%: 120 mL  Total IN: 1724.8 mL    OUT:    Chest Tube (mL): 10 mL    Chest Tube (mL): 150 mL    Chest Tube (mL): 70 mL    Indwelling Catheter - Urethral (mL): 5490 mL  Total OUT: 5720 mL    Total NET: -3995.2 mL      22 Jan 2021 07:01  -  22 Jan 2021 08:30  --------------------------------------------------------  IN:    Bumetanide: 10 mL    DOBUTamine: 25.8 mL    sodium chloride 0.9%: 20 mL    sodium chloride 0.9%: 10 mL  Total IN: 65.8 mL    OUT:    Chest Tube (mL): 10 mL    Chest Tube (mL): 20 mL  Total OUT: 30 mL    Total NET: 35.8 mL        I&O's Summary    21 Jan 2021 07:01  -  22 Jan 2021 07:00  --------------------------------------------------------  IN: 1724.8 mL / OUT: 5720 mL / NET: -3995.2 mL    22 Jan 2021 07:01  -  22 Jan 2021 08:30  --------------------------------------------------------  IN: 65.8 mL / OUT: 30 mL / NET: 35.8 mL      Drug Dosing Weight  DONALD SAUNDERS      PHYSICAL EXAM:  General: Appears well developed, well nourished alert and cooperative.  HEENT: Head; normocephalic, atraumatic.  Eyes: Pupils reactive, cornea wnl.  Neck: Supple, no nodes adenopathy, no NVD or carotid bruit or thyromegaly.  CARDIOVASCULAR: Normal S1 and S2, No murmur, rub, gallop or lift.   LUNGS:dcereased BS at bases  ABDOMEN: Soft, nontender without mass or organomegaly. bowel sounds normoactive.  EXTREMITIES: No clubbing, cyanosis or edema. Distal pulses wnl.   SKIN: warm and dry with normal turgor.  NEURO: Alert/oriented x 3/normal motor exam. No pathologic reflexes.    PSYCH: normal affect.        LABS:                        9.6    13.50 )-----------( 218      ( 22 Jan 2021 02:43 )             29.4     01-22    137  |  97<L>  |  36.0<H>  ----------------------------<  93  4.3   |  28.0  |  1.56<H>    Ca    8.4<L>      22 Jan 2021 02:43  Phos  4.3     01-21  Mg     2.4     01-22      DONALD CHIP            RADIOLOGY & ADDITIONAL STUDIES:< from: Xray Chest 1 View- PORTABLE-Routine (Xray Chest 1 View- PORTABLE-Routine in AM.) (01.21.21 @ 06:18) >  FINDINGS:    Single frontal view of the chest demonstrates mild congestive changes. Line and tubes are unchanged. The cardiomediastinal silhouette is enlarged. No acute osseous abnormalities. Overlying EKG leads and wires are noted    IMPRESSION: No interval change.            KAYLA GUNDERSON MD; Attending Radiologist  This document has been electronically signed. Jan 21 2021  3:26PM    < end of copied text >      INTERPRETATION OF TELEMETRY (personally reviewed):    ECG:< from: 12 Lead ECG (01.21.21 @ 05:06) >    Diagnosis Line Atrial fibrillation  Nonspecific ST and T wave abnormality  Abnormal ECG    Confirmed by Panfilo Peralta ([id number]) on 1/21/2021 10:18:33 PM    < end of copied text >      ECHO:< from: GERALDINE Echo Doppler (01.14.21 @ 09:20) >    Summary:   1. Moderately decreased global left ventricular systolic function. Left ventricular ejection fraction, by visual estimation, is 35 to 40%.   2. Normal right ventricular size and function.   3. Mild to moderately enlarged left atrium.   4. Mildly enlarged right atrium.   5. Moderate mitral valve regurgitation.   6. Mild-moderate tricuspid regurgitation.   7. Mild aortic valve stenosis. The non-coronary cusp is calcified and immobile, but the right and left coronary cusps open normally. The aortic valve area is 1.6 cm2 by planimetry.   8. No left atrial appendage thrombus. There is spontaneous echo contrast seen in the left atrial appendage, but there is no filling defect seen with the administration of Definity.    Juan Diego Arroyo MD Electronically signed on 1/14/2021 at 10:57:13 AM    < end of copied text >      STRESS TEST:    CARDIAC CATHETERIZATION:< from: Cardiac Cath Lab - Adult (01.13.21 @ 13:45) >  CORONARY VESSELS: The coronary circulation is left dominant.  LM:   --  Proximal left main: There was a 90 % stenosis.  --  Distal left main: There was a 90 % stenosis.  LAD:   --  Proximal LAD: There was a 70 % stenosis.  --  Mid LAD: There was a 70 % stenosis.  --  Distal LAD: Angiography showed minor luminal irregularities with no  flow limiting lesions.  CX:   --  Proximal circumflex: Normal.  --  Mid circumflex: Normal.  --  Distal circumflex: Normal.  --  OM1: There was a 20 % stenosis.  --  L AV groove: Angiography showed minor luminal irregularities with no  flow limiting lesions.  --  LPDA: Angiography showed minor luminal irregularities with no flow  limiting lesions.  RCA:   --  Proximal RCA: There was a 70 % stenosis.  COMPLICATIONS: There were no complications. No complications occurred  during the cath lab visit.  SUMMARY:  HEMODYNAMICS: Hemodynamic assessment demonstrates moderately elevated  LVEDP.  DIAGNOSTIC IMPRESSIONS: 90% left main coronary artery stenosis  70% proximal and 70% mid LAD stenosis  70% stenosis of proximal RCA in a very small and nondominant artery  DIAGNOSTIC RECOMMENDATIONS: Balloon pump was placed for severe LMCA  stenosis and elevated LVEDP/decompensated heart failure.  Heparin drip at 1400U/h  Continue asa 81mg daily  Can hold plavix  Would hold beta blocker due to decompensated heart failure. Can give back  half the dose (25mg q6h) if patient become tachycardic.  Continue diuresis with lasix 40mg IV bid  CT surgery evaluation for LIMA to LAD and SVG to LCx  INTERVENTIONAL IMPRESSIONS: 90% left main coronary artery stenosis  70% proximal and 70% mid LAD stenosis  70% stenosis of proximal RCA in a very small and nondominant artery  INTERVENTIONAL RECOMMENDATIONS: Balloon pump was placed for severe LMCA  stenosis and elevated LVEDP/decompensated heart failure.  Heparin drip at 1400U/h  Continue asa 81mg daily  Can hold plavix  Would hold beta blocker due to decompensated heart failure. Can give back  half the dose (25mg q6h) if patient become tachycardic.  Continue diuresis with lasix 40mg IV bid  CT surgery evaluation for LIMA to LAD and SVG to LCx  Prepared and signed by  David Smith MD    < end of copied text >  < from: Cardiac Cath Lab - Adult (01.13.21 @ 13:45) >  CORONARY VESSELS: The coronary circulation is left dominant.  LM:   --  Proximal left main: There was a 90 % stenosis.  --  Distal left main: There was a 90 % stenosis.  LAD:   --  Proximal LAD: There was a 70 % stenosis.  --  Mid LAD: There was a 70 % stenosis.  --  Distal LAD: Angiography showed minor luminal irregularities with no  flow limiting lesions.  CX:   --  Proximal circumflex: Normal.  --  Mid circumflex: Normal.  --  Distal circumflex: Normal.  --  OM1: There was a 20 % stenosis.  --  L AV groove: Angiography showed minor luminal irregularities with no  flow limiting lesions.  --  LPDA: Angiography showed minor luminal irregularities with no flow  limiting lesions.  RCA:   --  Proximal RCA: There was a 70 % stenosis.  COMPLICATIONS: There were no complications. No complications occurred  during the cath lab visit.  SUMMARY:  HEMODYNAMICS: Hemodynamic assessment demonstrates moderately elevated  LVEDP.  DIAGNOSTIC IMPRESSIONS: 90% left main coronary artery stenosis  70% proximal and 70% mid LAD stenosis  70% stenosis of proximal RCA in a very small and nondominant artery  DIAGNOSTIC RECOMMENDATIONS: Balloon pump was placed for severe LMCA  stenosis and elevated LVEDP/decompensated heart failure.  Heparin drip at 1400U/h  Continue asa 81mg daily  Can hold plavix  Would hold beta blocker due to decompensated heart failure. Can give back  half the dose (25mg q6h) if patient become tachycardic.  Continue diuresis with lasix 40mg IV bid  CT surgery evaluation for LIMA to LAD and SVG to LCx  INTERVENTIONAL IMPRESSIONS: 90% left main coronary artery stenosis  70% proximal and 70% mid LAD stenosis  70% stenosis of proximal RCA in a very small and nondominant artery  INTERVENTIONAL RECOMMENDATIONS: Balloon pump was placed for severe LMCA  stenosis and elevated LVEDP/decompensated heart failure.  Heparin drip at 1400U/h  Continue asa 81mg daily  Can hold plavix  Would hold beta blocker due to decompensated heart failure. Can give back  half the dose (25mg q6h) if patient become tachycardic.  Continue diuresis with lasix 40mg IV bid  CT surgery evaluation for LIMA to LAD and SVG to LCx  Prepared and signed by  David Smith MD    < end of copied text >

## 2021-01-22 NOTE — PROGRESS NOTE ADULT - ASSESSMENT
On 1/19/2021 pt underwent C3/DUANE Clip/MV Repair and DUANE thrombectomy with Dr. Orellana.  Today POD#1  Before surgery she was on  Lantus 20 units daily  and Admelog to 8 units daily.    Was on Insulin ggt, requiring very high doses, got transitioned last night.  got  50 units of lantus, sugars better this morning.      T2DM  To continue lantus 50 units daily and admelog 8 units tid with meals  fingersticks ac tid hs  will further adjust doses as needed  Pt does have outpt Endo-  Dr Soriano

## 2021-01-22 NOTE — PROGRESS NOTE ADULT - SUBJECTIVE AND OBJECTIVE BOX
INTERVAL HPI/OVERNIGHT EVENTS:  Follow up on diabetes.  a1c 7.8%    MEDICATIONS  (STANDING):  aMIOdarone    Tablet 400 milliGRAM(s) Oral every 8 hours  ascorbic acid 500 milliGRAM(s) Oral daily  aspirin  chewable 81 milliGRAM(s) Oral daily  atorvastatin 20 milliGRAM(s) Oral at bedtime  cefuroxime  IVPB 1500 milliGRAM(s) IV Intermittent once  chlorhexidine 0.12% Liquid 15 milliLiter(s) Swish and Spit two times a day  chlorhexidine 4% Liquid 1 Application(s) Topical two times a day  diltiazem    Tablet 30 milliGRAM(s) Oral four times a day  heparin  Infusion 800 Unit(s)/Hr (8 mL/Hr) IV Continuous <Continuous>  insulin glargine Injectable (LANTUS) 20 Unit(s) SubCutaneous at bedtime  insulin lispro (ADMELOG) corrective regimen sliding scale   SubCutaneous Before meals and at bedtime  insulin lispro Injectable (ADMELOG) 6 Unit(s) SubCutaneous three times a day before meals  metoprolol tartrate 50 milliGRAM(s) Oral every 8 hours  mupirocin 2% Nasal 1 Application(s) Nasal two times a day  pantoprazole    Tablet 40 milliGRAM(s) Oral before breakfast  phytonadione  IVPB 5 milliGRAM(s) IV Intermittent daily  senna 2 Tablet(s) Oral at bedtime  sodium chloride 0.9% lock flush 3 milliLiter(s) IV Push every 8 hours    MEDICATIONS  (PRN):  acetaminophen   Tablet .. 650 milliGRAM(s) Oral every 6 hours PRN Temp greater or equal to 38C (100.4F), Mild Pain (1 - 3)  albuterol/ipratropium for Nebulization 3 milliLiter(s) Nebulizer every 6 hours PRN Shortness of Breath and/or Wheezing  ondansetron Injectable 4 milliGRAM(s) IV Push every 6 hours PRN Nausea and/or Vomiting  polyethylene glycol 3350 17 Gram(s) Oral at bedtime PRN Constipation      Allergies    warfarin (Rash)        Review of systems:  Not feeling that great, some discomfort.      Vital Signs Last 24 Hrs  T(C): 36.8 (18 Jan 2021 10:00), Max: 37.5 (17 Jan 2021 22:57)  T(F): 98.3 (18 Jan 2021 10:00), Max: 99.5 (17 Jan 2021 22:57)  HR: 82 (18 Jan 2021 10:00) (82 - 97)  BP: 108/68 (18 Jan 2021 10:00) (97/62 - 113/67)  BP(mean): --  RR: 18 (18 Jan 2021 10:00) (17 - 18)  SpO2: 96% (18 Jan 2021 10:00) (96% - 98%)    PHYSICAL EXAM:  Constitutional: NAD, well-groomed, well-developed  Respiratory: CTAB  Cardiovascular: S1 and S2, RRR, no M/G/R  Psychiatric: Normal mood, normal affect        LABS:                        11.3   10.77 )-----------( 336      ( 18 Jan 2021 06:04 )             35.2     01-18    139  |  97<L>  |  21.0<H>  ----------------------------<  147<H>  3.6   |  29.0  |  0.94    Ca    9.0      18 Jan 2021 06:04  Mg     1.8     01-18         INTERVAL HPI/OVERNIGHT EVENTS:  Follow up on diabetes.  a1c 7.8%    MEDICATIONS  (STANDING):  aMIOdarone    Tablet 400 milliGRAM(s) Oral every 8 hours  ascorbic acid 500 milliGRAM(s) Oral daily  aspirin  chewable 81 milliGRAM(s) Oral daily  atorvastatin 20 milliGRAM(s) Oral at bedtime  cefuroxime  IVPB 1500 milliGRAM(s) IV Intermittent once  chlorhexidine 0.12% Liquid 15 milliLiter(s) Swish and Spit two times a day  chlorhexidine 4% Liquid 1 Application(s) Topical two times a day  diltiazem    Tablet 30 milliGRAM(s) Oral four times a day  heparin  Infusion 800 Unit(s)/Hr (8 mL/Hr) IV Continuous <Continuous>  insulin glargine Injectable (LANTUS) 20 Unit(s) SubCutaneous at bedtime  insulin lispro (ADMELOG) corrective regimen sliding scale   SubCutaneous Before meals and at bedtime  insulin lispro Injectable (ADMELOG) 6 Unit(s) SubCutaneous three times a day before meals  metoprolol tartrate 50 milliGRAM(s) Oral every 8 hours  mupirocin 2% Nasal 1 Application(s) Nasal two times a day  pantoprazole    Tablet 40 milliGRAM(s) Oral before breakfast  phytonadione  IVPB 5 milliGRAM(s) IV Intermittent daily  senna 2 Tablet(s) Oral at bedtime  sodium chloride 0.9% lock flush 3 milliLiter(s) IV Push every 8 hours    MEDICATIONS  (PRN):  acetaminophen   Tablet .. 650 milliGRAM(s) Oral every 6 hours PRN Temp greater or equal to 38C (100.4F), Mild Pain (1 - 3)  albuterol/ipratropium for Nebulization 3 milliLiter(s) Nebulizer every 6 hours PRN Shortness of Breath and/or Wheezing  ondansetron Injectable 4 milliGRAM(s) IV Push every 6 hours PRN Nausea and/or Vomiting  polyethylene glycol 3350 17 Gram(s) Oral at bedtime PRN Constipation      Allergies    warfarin (Rash)        Review of systems:  Not eating much, says site of food makes her nauseous. somewhat sluggish      Vital Signs Last 24 Hrs  T(C): 36.8 (18 Jan 2021 10:00), Max: 37.5 (17 Jan 2021 22:57)  T(F): 98.3 (18 Jan 2021 10:00), Max: 99.5 (17 Jan 2021 22:57)  HR: 82 (18 Jan 2021 10:00) (82 - 97)  BP: 108/68 (18 Jan 2021 10:00) (97/62 - 113/67)  BP(mean): --  RR: 18 (18 Jan 2021 10:00) (17 - 18)  SpO2: 96% (18 Jan 2021 10:00) (96% - 98%)    PHYSICAL EXAM:  Constitutional: NAD, well-groomed, well-developed  Respiratory: CTAB  Cardiovascular: S1 and S2, RRR, no M/G/R  Psychiatric: Normal mood, normal affect        LABS:                        11.3   10.77 )-----------( 336      ( 18 Jan 2021 06:04 )             35.2     01-18    139  |  97<L>  |  21.0<H>  ----------------------------<  147<H>  3.6   |  29.0  |  0.94    Ca    9.0      18 Jan 2021 06:04  Mg     1.8     01-18

## 2021-01-22 NOTE — PROGRESS NOTE ADULT - SUBJECTIVE AND OBJECTIVE BOX
Patient appears more wakeful this AM.  As per RN, was a one person assist and took a few more steps as per patient.   Having nausea.     REVIEW OF SYSTEMS  Constitutional - No fever,  +fatigue  HEENT - No vertigo, No neck pain  Neurological - No headaches, No memory loss, +loss of strength, No numbness, No tremors  Musculoskeletal - No joint pain, +joint swelling, No muscle pain  Psychiatric - +depression, No anxiety    FUNCTIONAL PROGRESS  1/21  Transfer: Sit to Stand:     · Level of [place]	maximum assist (25% patients effort)  · Physical Assist/Nonphysical Assist	2 person assist; verbal cues  · Assistive Device	rolling walker    Transfer: Stand to Sit:     · Level of [place]	maximum assist (25% patients effort)  · Physical Assist/Nonphysical Assist	2 person assist  · Assistive Device	rolling walker    Sit/Stand Transfer Safety Analysis:     · Impairments Contributing to Impaired Transfers	pain; Right thigh; impaired balance; decreased strength    Gait Skills:     · Level of [place]	unable to perform    Stair Negotiation:     · Level of [place]	unable to perform      VITALS  T(C): 36.9 (01-22-21 @ 08:00), Max: 37.1 (01-22-21 @ 00:00)  HR: 73 (01-22-21 @ 08:00) (67 - 94)  BP: 108/53 (01-22-21 @ 08:00) (100/53 - 195/73)  RR: 20 (01-22-21 @ 08:00) (16 - 33)  SpO2: 99% (01-22-21 @ 08:00) (94% - 100%)  Wt(kg): --    MEDICATIONS   acetaminophen   Tablet .. 650 milliGRAM(s) every 6 hours  aMIOdarone    Tablet 200 milliGRAM(s) daily  ascorbic acid 500 milliGRAM(s) daily  aspirin enteric coated 81 milliGRAM(s) daily  atorvastatin 40 milliGRAM(s) at bedtime  buMETAnide Infusion 1 mG/Hr <Continuous>  chlorhexidine 2% Cloths 1 Application(s) daily  dextrose 50% Injectable 50 milliLiter(s) every 15 minutes  dextrose 50% Injectable 25 milliLiter(s) every 15 minutes  DOBUTamine Infusion 5 MICROgram(s)/kG/Min <Continuous>  enoxaparin Injectable 30 milliGRAM(s) daily  gabapentin 300 milliGRAM(s) three times a day  HYDROmorphone  Injectable 0.5 milliGRAM(s) every 4 hours PRN  insulin glargine Injectable (LANTUS) 50 Unit(s) at bedtime  insulin lispro (ADMELOG) corrective regimen sliding scale   Before meals and at bedtime  insulin lispro Injectable (ADMELOG) 8 Unit(s) Before meals and at bedtime  lidocaine   Patch 1 Patch daily  multivitamin/minerals 1 Tablet(s) daily  ondansetron Injectable 4 milliGRAM(s) every 4 hours PRN  oxyCODONE    IR 5 milliGRAM(s) every 4 hours PRN  oxyCODONE    IR 10 milliGRAM(s) every 4 hours PRN  pantoprazole    Tablet 40 milliGRAM(s) daily  polyethylene glycol 3350 17 Gram(s) daily PRN  senna 2 Tablet(s) at bedtime  sodium chloride 0.9%. 1000 milliLiter(s) <Continuous>  sodium chloride 0.9%. 1000 milliLiter(s) <Continuous>      RECENT LABS/IMAGING                          9.6    13.50 )-----------( 218      ( 22 Jan 2021 02:43 )             29.4     01-22    137  |  97<L>  |  36.0<H>  ----------------------------<  93  4.3   |  28.0  |  1.56<H>    Ca    8.4<L>      22 Jan 2021 02:43  Phos  4.3     01-21  Mg     2.4     01-22                  GERALDINE 1/14 - Summary:   1. Moderately decreased global left ventricular systolic function. Left ventricular ejection fraction, by visual estimation, is 35 to 40%.   2. Normal right ventricular size and function.   3. Mild to moderately enlarged left atrium.   4. Mildly enlarged right atrium.   5. Moderate mitral valve regurgitation.   6. Mild-moderate tricuspid regurgitation.   7. Mild aortic valve stenosis. The non-coronary cusp is calcified and immobile, but the right and left coronary cusps open normally. The aortic valve area is 1.6 cm2 by planimetry.   8. No left atrial appendage thrombus. There is spontaneous echo contrast seen in the left atrial appendage, but there is no filling defect seen with the administration of Definity.    CXR 1/21 - Single frontal view of the chest demonstrates mild CHF, unchanged. Line and tubes are unchanged. Mediastinal sternotomy wires. The cardiomediastinal silhouette is enlarged. No acute osseous abnormalities. Overlying EKG leads and wires are noted    CXR 1/21 - Single frontal view of the chest demonstrates mild congestive changes. Line and tubes are unchanged. The cardiomediastinal silhouette is enlarged. No acute osseous abnormalities. Overlying EKG leads and wires are noted    ----------------------------------------------------------------------------------------  PHYSICAL EXAM  Constitutional - NAD, Comfortable  Chest - Breathing - increased work, No wheezing, +2 chest tubes  Extremities - Large right thigh hematoma, Left LE ACE wrapped, BUE hand swelling  Neurologic Exam -                    Motor -                     LEFT    UE - ShAB 2/5, EF 4/5, EE 4/5,  4/5                    RIGHT UE - ShAB 2/5, EF 4/5, EE 4/5,  4/5                    LEFT    LE - HF 5/5, KE 5/5, DF 5/5, PF 5/5                    RIGHT LE - HF 5/5, KE 5/5, DF 5/5, PF 5/5        Sensory - Intact to LT     Balance - WNL Static  Psychiatric - Mood depressed, Fatigued  ----------------------------------------------------------------------------------------  ASSESSMENT/PLAN  75yFemale with functional deficits after SOB/severe CAD  C3/DUANE Clip/MV Repair and DUANE thrombectomy - ASA, Lipitor  AFIB - Amiodarone  DM2 - Lantus, Lispro  Pain - Tylenol, Oxycodone, Neurontin, Lidoderm, Dilaudid  DVT PPX - SCDs, Lovenox  Rehab - Medically being optimized. GERALDINE planned. Patient currently at total assist with limited ability to tolerate much mobility. Will continue to follow and will need to demonstrate functional progress to determine rehab dispo recommendations.     Continue bedside therapy as well as OOB throughout the day with mobilization by staff to maintain cardiopulmonary function and prevention of secondary complications related to debility.     Discussed with rehab team.

## 2021-01-22 NOTE — PROGRESS NOTE ADULT - ASSESSMENT
Assessment  s/p CABG MV repair preop EF 35-40%  PAF with improved VR  COPD  PVD  renal insufficieny sec to diuresis/ low to normal filling pressures  right thigh hematoma - traumatic pre admission      Rec  consider decreasing diuresis  cont amio  when dobutamine off start lopressor for rate control  eventual ACE/ARB/ resume eliquis  monitor thigh hematoma  will follow

## 2021-01-23 LAB
ANION GAP SERPL CALC-SCNC: 13 MMOL/L — SIGNIFICANT CHANGE UP (ref 5–17)
ANION GAP SERPL CALC-SCNC: 13 MMOL/L — SIGNIFICANT CHANGE UP (ref 5–17)
BUN SERPL-MCNC: 43 MG/DL — HIGH (ref 8–20)
BUN SERPL-MCNC: 44 MG/DL — HIGH (ref 8–20)
CALCIUM SERPL-MCNC: 8.4 MG/DL — LOW (ref 8.6–10.2)
CALCIUM SERPL-MCNC: 8.6 MG/DL — SIGNIFICANT CHANGE UP (ref 8.6–10.2)
CHLORIDE SERPL-SCNC: 87 MMOL/L — LOW (ref 98–107)
CHLORIDE SERPL-SCNC: 88 MMOL/L — LOW (ref 98–107)
CO2 SERPL-SCNC: 35 MMOL/L — HIGH (ref 22–29)
CO2 SERPL-SCNC: 35 MMOL/L — HIGH (ref 22–29)
CREAT SERPL-MCNC: 1.53 MG/DL — HIGH (ref 0.5–1.3)
CREAT SERPL-MCNC: 1.63 MG/DL — HIGH (ref 0.5–1.3)
GLUCOSE BLDC GLUCOMTR-MCNC: 134 MG/DL — HIGH (ref 70–99)
GLUCOSE BLDC GLUCOMTR-MCNC: 143 MG/DL — HIGH (ref 70–99)
GLUCOSE BLDC GLUCOMTR-MCNC: 146 MG/DL — HIGH (ref 70–99)
GLUCOSE BLDC GLUCOMTR-MCNC: 174 MG/DL — HIGH (ref 70–99)
GLUCOSE SERPL-MCNC: 140 MG/DL — HIGH (ref 70–99)
GLUCOSE SERPL-MCNC: 156 MG/DL — HIGH (ref 70–99)
HCT VFR BLD CALC: 30.4 % — LOW (ref 34.5–45)
HGB BLD-MCNC: 10 G/DL — LOW (ref 11.5–15.5)
MAGNESIUM SERPL-MCNC: 2.4 MG/DL — SIGNIFICANT CHANGE UP (ref 1.6–2.6)
MAGNESIUM SERPL-MCNC: 2.5 MG/DL — SIGNIFICANT CHANGE UP (ref 1.6–2.6)
MCHC RBC-ENTMCNC: 27.9 PG — SIGNIFICANT CHANGE UP (ref 27–34)
MCHC RBC-ENTMCNC: 32.9 GM/DL — SIGNIFICANT CHANGE UP (ref 32–36)
MCV RBC AUTO: 84.7 FL — SIGNIFICANT CHANGE UP (ref 80–100)
PLATELET # BLD AUTO: 222 K/UL — SIGNIFICANT CHANGE UP (ref 150–400)
POTASSIUM SERPL-MCNC: 3 MMOL/L — LOW (ref 3.5–5.3)
POTASSIUM SERPL-MCNC: 3.7 MMOL/L — SIGNIFICANT CHANGE UP (ref 3.5–5.3)
POTASSIUM SERPL-SCNC: 3 MMOL/L — LOW (ref 3.5–5.3)
POTASSIUM SERPL-SCNC: 3.7 MMOL/L — SIGNIFICANT CHANGE UP (ref 3.5–5.3)
RBC # BLD: 3.59 M/UL — LOW (ref 3.8–5.2)
RBC # FLD: 16 % — HIGH (ref 10.3–14.5)
SODIUM SERPL-SCNC: 135 MMOL/L — SIGNIFICANT CHANGE UP (ref 135–145)
SODIUM SERPL-SCNC: 136 MMOL/L — SIGNIFICANT CHANGE UP (ref 135–145)
SURGICAL PATHOLOGY STUDY: SIGNIFICANT CHANGE UP
WBC # BLD: 11.35 K/UL — HIGH (ref 3.8–10.5)
WBC # FLD AUTO: 11.35 K/UL — HIGH (ref 3.8–10.5)

## 2021-01-23 PROCEDURE — 99232 SBSQ HOSP IP/OBS MODERATE 35: CPT | Mod: 24,25

## 2021-01-23 PROCEDURE — 99232 SBSQ HOSP IP/OBS MODERATE 35: CPT

## 2021-01-23 PROCEDURE — 71045 X-RAY EXAM CHEST 1 VIEW: CPT | Mod: 26

## 2021-01-23 RX ORDER — POTASSIUM CHLORIDE 20 MEQ
10 PACKET (EA) ORAL
Refills: 0 | Status: COMPLETED | OUTPATIENT
Start: 2021-01-23 | End: 2021-01-23

## 2021-01-23 RX ORDER — ALBUMIN HUMAN 25 %
250 VIAL (ML) INTRAVENOUS ONCE
Refills: 0 | Status: COMPLETED | OUTPATIENT
Start: 2021-01-23 | End: 2021-01-23

## 2021-01-23 RX ORDER — POLYETHYLENE GLYCOL 3350 17 G/17G
17 POWDER, FOR SOLUTION ORAL DAILY
Refills: 0 | Status: DISCONTINUED | OUTPATIENT
Start: 2021-01-23 | End: 2021-01-23

## 2021-01-23 RX ORDER — POTASSIUM CHLORIDE 20 MEQ
40 PACKET (EA) ORAL
Refills: 0 | Status: DISCONTINUED | OUTPATIENT
Start: 2021-01-23 | End: 2021-01-23

## 2021-01-23 RX ORDER — POLYETHYLENE GLYCOL 3350 17 G/17G
17 POWDER, FOR SOLUTION ORAL DAILY
Refills: 0 | Status: DISCONTINUED | OUTPATIENT
Start: 2021-01-23 | End: 2021-01-28

## 2021-01-23 RX ORDER — APIXABAN 2.5 MG/1
5 TABLET, FILM COATED ORAL EVERY 12 HOURS
Refills: 0 | Status: DISCONTINUED | OUTPATIENT
Start: 2021-01-24 | End: 2021-01-25

## 2021-01-23 RX ORDER — POTASSIUM CHLORIDE 20 MEQ
40 PACKET (EA) ORAL ONCE
Refills: 0 | Status: COMPLETED | OUTPATIENT
Start: 2021-01-23 | End: 2021-01-23

## 2021-01-23 RX ADMIN — Medication 2: at 21:17

## 2021-01-23 RX ADMIN — Medication 125 MILLILITER(S): at 04:10

## 2021-01-23 RX ADMIN — ATORVASTATIN CALCIUM 40 MILLIGRAM(S): 80 TABLET, FILM COATED ORAL at 21:17

## 2021-01-23 RX ADMIN — Medication 40 MILLIEQUIVALENT(S): at 03:42

## 2021-01-23 RX ADMIN — POLYETHYLENE GLYCOL 3350 17 GRAM(S): 17 POWDER, FOR SOLUTION ORAL at 11:43

## 2021-01-23 RX ADMIN — Medication 100 MILLIEQUIVALENT(S): at 03:43

## 2021-01-23 RX ADMIN — GABAPENTIN 300 MILLIGRAM(S): 400 CAPSULE ORAL at 13:30

## 2021-01-23 RX ADMIN — Medication 100 MILLIEQUIVALENT(S): at 05:30

## 2021-01-23 RX ADMIN — INSULIN GLARGINE 50 UNIT(S): 100 INJECTION, SOLUTION SUBCUTANEOUS at 21:17

## 2021-01-23 RX ADMIN — PANTOPRAZOLE SODIUM 40 MILLIGRAM(S): 20 TABLET, DELAYED RELEASE ORAL at 11:43

## 2021-01-23 RX ADMIN — Medication 1 TABLET(S): at 11:43

## 2021-01-23 RX ADMIN — Medication 650 MILLIGRAM(S): at 06:19

## 2021-01-23 RX ADMIN — Medication 100 MILLIEQUIVALENT(S): at 04:25

## 2021-01-23 RX ADMIN — Medication 7.74 MICROGRAM(S)/KG/MIN: at 06:19

## 2021-01-23 RX ADMIN — ENOXAPARIN SODIUM 30 MILLIGRAM(S): 100 INJECTION SUBCUTANEOUS at 11:44

## 2021-01-23 RX ADMIN — Medication 40 MILLIEQUIVALENT(S): at 13:26

## 2021-01-23 RX ADMIN — MEGESTROL ACETATE 400 MILLIGRAM(S): 40 SUSPENSION ORAL at 11:43

## 2021-01-23 RX ADMIN — Medication 40 MILLIEQUIVALENT(S): at 06:16

## 2021-01-23 RX ADMIN — Medication 81 MILLIGRAM(S): at 11:41

## 2021-01-23 RX ADMIN — Medication 8 UNIT(S): at 11:41

## 2021-01-23 RX ADMIN — LIDOCAINE 1 PATCH: 4 CREAM TOPICAL at 00:15

## 2021-01-23 RX ADMIN — Medication 8 UNIT(S): at 07:54

## 2021-01-23 RX ADMIN — SENNA PLUS 2 TABLET(S): 8.6 TABLET ORAL at 21:17

## 2021-01-23 RX ADMIN — Medication 500 MILLIGRAM(S): at 11:43

## 2021-01-23 RX ADMIN — AMIODARONE HYDROCHLORIDE 200 MILLIGRAM(S): 400 TABLET ORAL at 06:20

## 2021-01-23 RX ADMIN — GABAPENTIN 300 MILLIGRAM(S): 400 CAPSULE ORAL at 21:17

## 2021-01-23 RX ADMIN — Medication 8 UNIT(S): at 21:17

## 2021-01-23 RX ADMIN — GABAPENTIN 300 MILLIGRAM(S): 400 CAPSULE ORAL at 06:19

## 2021-01-23 RX ADMIN — Medication 8 UNIT(S): at 17:27

## 2021-01-23 NOTE — PROGRESS NOTE ADULT - SUBJECTIVE AND OBJECTIVE BOX
INTERVAL HPI/OVERNIGHT EVENTS:  Follow up on diabetes.  a1c 7.8%    MEDICATIONS  (STANDING):  aMIOdarone    Tablet 400 milliGRAM(s) Oral every 8 hours  ascorbic acid 500 milliGRAM(s) Oral daily  aspirin  chewable 81 milliGRAM(s) Oral daily  atorvastatin 20 milliGRAM(s) Oral at bedtime  cefuroxime  IVPB 1500 milliGRAM(s) IV Intermittent once  chlorhexidine 0.12% Liquid 15 milliLiter(s) Swish and Spit two times a day  chlorhexidine 4% Liquid 1 Application(s) Topical two times a day  diltiazem    Tablet 30 milliGRAM(s) Oral four times a day  heparin  Infusion 800 Unit(s)/Hr (8 mL/Hr) IV Continuous <Continuous>  insulin glargine Injectable (LANTUS) 20 Unit(s) SubCutaneous at bedtime  insulin lispro (ADMELOG) corrective regimen sliding scale   SubCutaneous Before meals and at bedtime  insulin lispro Injectable (ADMELOG) 6 Unit(s) SubCutaneous three times a day before meals  metoprolol tartrate 50 milliGRAM(s) Oral every 8 hours  mupirocin 2% Nasal 1 Application(s) Nasal two times a day  pantoprazole    Tablet 40 milliGRAM(s) Oral before breakfast  phytonadione  IVPB 5 milliGRAM(s) IV Intermittent daily  senna 2 Tablet(s) Oral at bedtime  sodium chloride 0.9% lock flush 3 milliLiter(s) IV Push every 8 hours    MEDICATIONS  (PRN):  acetaminophen   Tablet .. 650 milliGRAM(s) Oral every 6 hours PRN Temp greater or equal to 38C (100.4F), Mild Pain (1 - 3)  albuterol/ipratropium for Nebulization 3 milliLiter(s) Nebulizer every 6 hours PRN Shortness of Breath and/or Wheezing  ondansetron Injectable 4 milliGRAM(s) IV Push every 6 hours PRN Nausea and/or Vomiting  polyethylene glycol 3350 17 Gram(s) Oral at bedtime PRN Constipation      Allergies    warfarin (Rash)        Review of systems:  Not eating much, says site of food makes her nauseous. somewhat sluggish      Vital Signs Last 24 Hrs  T(C): 36.8 (18 Jan 2021 10:00), Max: 37.5 (17 Jan 2021 22:57)  T(F): 98.3 (18 Jan 2021 10:00), Max: 99.5 (17 Jan 2021 22:57)  HR: 82 (18 Jan 2021 10:00) (82 - 97)  BP: 108/68 (18 Jan 2021 10:00) (97/62 - 113/67)  BP(mean): --  RR: 18 (18 Jan 2021 10:00) (17 - 18)  SpO2: 96% (18 Jan 2021 10:00) (96% - 98%)    PHYSICAL EXAM:  Constitutional: NAD, well-groomed, well-developed  Respiratory: CTAB  Cardiovascular: S1 and S2, RRR, no M/G/R  Psychiatric: Normal mood, normal affect        LABS:                        11.3   10.77 )-----------( 336      ( 18 Jan 2021 06:04 )             35.2     01-18    139  |  97<L>  |  21.0<H>  ----------------------------<  147<H>  3.6   |  29.0  |  0.94    Ca    9.0      18 Jan 2021 06:04  Mg     1.8     01-18         INTERVAL HPI/OVERNIGHT EVENTS:  Follow up on diabetes.  a1c 7.8%    MEDICATIONS  (STANDING):  aMIOdarone    Tablet 400 milliGRAM(s) Oral every 8 hours  ascorbic acid 500 milliGRAM(s) Oral daily  aspirin  chewable 81 milliGRAM(s) Oral daily  atorvastatin 20 milliGRAM(s) Oral at bedtime  cefuroxime  IVPB 1500 milliGRAM(s) IV Intermittent once  chlorhexidine 0.12% Liquid 15 milliLiter(s) Swish and Spit two times a day  chlorhexidine 4% Liquid 1 Application(s) Topical two times a day  diltiazem    Tablet 30 milliGRAM(s) Oral four times a day  heparin  Infusion 800 Unit(s)/Hr (8 mL/Hr) IV Continuous <Continuous>  insulin glargine Injectable (LANTUS) 20 Unit(s) SubCutaneous at bedtime  insulin lispro (ADMELOG) corrective regimen sliding scale   SubCutaneous Before meals and at bedtime  insulin lispro Injectable (ADMELOG) 6 Unit(s) SubCutaneous three times a day before meals  metoprolol tartrate 50 milliGRAM(s) Oral every 8 hours  mupirocin 2% Nasal 1 Application(s) Nasal two times a day  pantoprazole    Tablet 40 milliGRAM(s) Oral before breakfast  phytonadione  IVPB 5 milliGRAM(s) IV Intermittent daily  senna 2 Tablet(s) Oral at bedtime  sodium chloride 0.9% lock flush 3 milliLiter(s) IV Push every 8 hours    MEDICATIONS  (PRN):  acetaminophen   Tablet .. 650 milliGRAM(s) Oral every 6 hours PRN Temp greater or equal to 38C (100.4F), Mild Pain (1 - 3)  albuterol/ipratropium for Nebulization 3 milliLiter(s) Nebulizer every 6 hours PRN Shortness of Breath and/or Wheezing  ondansetron Injectable 4 milliGRAM(s) IV Push every 6 hours PRN Nausea and/or Vomiting  polyethylene glycol 3350 17 Gram(s) Oral at bedtime PRN Constipation      Allergies    warfarin (Rash)        Review of systems:  feeling better today      Vital Signs Last 24 Hrs  T(C): 36.8 (18 Jan 2021 10:00), Max: 37.5 (17 Jan 2021 22:57)  T(F): 98.3 (18 Jan 2021 10:00), Max: 99.5 (17 Jan 2021 22:57)  HR: 82 (18 Jan 2021 10:00) (82 - 97)  BP: 108/68 (18 Jan 2021 10:00) (97/62 - 113/67)  BP(mean): --  RR: 18 (18 Jan 2021 10:00) (17 - 18)  SpO2: 96% (18 Jan 2021 10:00) (96% - 98%)    PHYSICAL EXAM:  Constitutional: NAD, well-groomed, well-developed  Respiratory: CTAB  Cardiovascular: S1 and S2, RRR, no M/G/R  Psychiatric: Normal mood, normal affect        LABS:                        11.3   10.77 )-----------( 336      ( 18 Jan 2021 06:04 )             35.2     01-18    139  |  97<L>  |  21.0<H>  ----------------------------<  147<H>  3.6   |  29.0  |  0.94    Ca    9.0      18 Jan 2021 06:04  Mg     1.8     01-18

## 2021-01-23 NOTE — PROGRESS NOTE ADULT - ASSESSMENT
75 year old Female with a medical history of paroxysmal Afib (on Xarelto), PAD (s/p U-cdrhvsh-[id number] bypass, R-femoral angioplasty with stenting, maintained on Plavix), HTN, HLD, type 2 diabetes (HA1c on 7.8 on Metformin and Tresiba as an outpatient), and recent trauma to Right thigh with stable hematoma, presented with SOB, acute on chronic combined diastolic and systolic heart failure (requiring IV diuresis), and NSTEMI on 1/11/20. On 1/13 she underwent a LHC via Right radial artery and was found to have Multivessel CAD (right ostial, left main and LAD) with left femoral IABP placed. GERALDINE 1/14 showed Moderate MR and Mild AS. IABP removed successfully 1/15. Preoperative course significant for afib with RVR (requiring PO amio load, increased Lopressor dosing, and initiation of Cardizem).     On 1/19/2021 pt underwent C3/DUANE Clip/MV Repair and DUANE thrombectomy with Dr. Orellana.

## 2021-01-23 NOTE — PROGRESS NOTE ADULT - ASSESSMENT
On 1/19/2021 pt underwent C3/DUANE Clip/MV Repair and DUANE thrombectomy with Dr. Orellana.  Today POD#1  Before surgery she was on  Lantus 20 units daily  and Admelog to 8 units daily.    transitioned off insulin ggt yesterday      T2DM  To continue lantus 50 units daily and admelog 8 units tid with meals  fingersticks ac tid hs  will further adjust doses as needed  Pt does have outpt Endo-  Dr Soriano

## 2021-01-23 NOTE — PROGRESS NOTE ADULT - PROBLEM SELECTOR PLAN 6
Pantoprazole for GI prophylaxis.  No full dose AC do to leg hematoma     Plan to be discussed further with CT Surgeon Dr. Villarreal in AM rounds

## 2021-01-23 NOTE — PROGRESS NOTE ADULT - SUBJECTIVE AND OBJECTIVE BOX
Significant recent/past 24 hr events: No issues overnight, patient is on nocturnal bipap. Still auto diuresing, will get repeat BMP     Subjective:    Review of Systems: Patient denies any complaints     Patient is a 75y old  Female who presents with a chief complaint of Afib with RVR with hypotension, elevated troponin (22 Jan 2021 11:09)    HPI:  75yoF hx Afib on Xarelto, PAD s/p B-odngomd-vyluvmpvt bypass, R-femoral angioplasty with stenting, on Plavix, HTN, HLD, DM presenting with acute dyspnea and palpitations yesterday.  Pt was in her normal state of health, then developed acute onset of exertional dyspnea associated with palpitations that progressed to dyspnea at rest which prompted her to come to the hospital.  She also reports some transient vomiting yesterday that is now improving.  In ED, pt was in Afib with RVR associated with soft SBP in 90s.  She was given IV Cardizem and PO metoprolol without improvement and ED staff spoke with [place] cardiology who recommended digoxin.   Labs notable for leukocytosis and elevated troponin.  Pt denies any chest pain, lightheadedness, syncope, cough, abdominal pain, diarrhea, or urinary symptoms.   Of note, pt came to [facility] on 1/9 for R-thigh pain after walking into a cabinet at home. She underwent a CT angio A/P that showed R-thigh hematoma w/out extravasation.  Her leg was wrapped and she was d/c’ed from ED with instruction to continue wtith blood thinners and outpatient follow up. She denies any worsening R-thigh or leg pain. Patient comes into the hospital with afib (12 Jan 2021 03:52)    PAST MEDICAL & SURGICAL HISTORY:  PAD (peripheral artery disease)  Paroxysmal atrial fibrillation  Hypercholesterolemia  Diabete  Hypertension  S/P peripheral artery angioplasty with stent placement  S/P femoral-popliteal bypass surgery  H/O hernia repair  H/O abdominal hysterectomy    FAMILY HISTORY:  Family history of thoracic aortic aneurysm (Father)    Family history of abdominal aortic aneurysm (Father)      Vitals   ICU Vital Signs Last 24 Hrs  T(C): 36.6 (22 Jan 2021 23:15), Max: 37.2 (22 Jan 2021 12:00)  T(F): 97.8 (22 Jan 2021 23:15), Max: 98.9 (22 Jan 2021 12:00)  HR: 78 (23 Jan 2021 00:00) (67 - 78)  BP: 112/64 (23 Jan 2021 00:00) (102/53 - 146/62)  BP(mean): 78 (23 Jan 2021 00:00) (74 - 95)  ABP: --  ABP(mean): --  RR: 22 (23 Jan 2021 00:00) (14 - 35)  SpO2: 97% (23 Jan 2021 00:00) (94% - 100%)      VENT SETTINGS     ABG - ( 21 Jan 2021 05:42 )  pH, Arterial: 7.47  pH, Blood: x     /  pCO2: 31    /  pO2: 79    / HCO3: 24    / Base Excess: -0.5  /  SaO2: 97              I&O's Detail    21 Jan 2021 07:01  -  22 Jan 2021 07:00  --------------------------------------------------------  IN:    Bumetanide: 30 mL    Bumetanide: 180 mL    DOBUTamine: 309.6 mL    Insulin: 92 mL    IV PiggyBack: 200 mL    IV PiggyBack: 50 mL    IV PiggyBack: 100 mL    IV PiggyBack: 100 mL    Milrinone: 3.2 mL    Oral Fluid: 300 mL    sodium chloride 0.9%: 240 mL    sodium chloride 0.9%: 120 mL  Total IN: 1724.8 mL    OUT:    Chest Tube (mL): 10 mL    Chest Tube (mL): 150 mL    Chest Tube (mL): 70 mL    Indwelling Catheter - Urethral (mL): 5490 mL  Total OUT: 5720 mL    Total NET: -3995.2 mL      22 Jan 2021 07:01  -  23 Jan 2021 01:47  --------------------------------------------------------  IN:    Bumetanide: 12.5 mL    Bumetanide: 22.5 mL    DOBUTamine: 92.7 mL    DOBUTamine: 36.1 mL    DOBUTamine: 46.4 mL    Oral Fluid: 180 mL    sodium chloride 0.9%: 110 mL    sodium chloride 0.9%: 55 mL  Total IN: 555.2 mL    OUT:    Chest Tube (mL): 10 mL    Chest Tube (mL): 20 mL    Indwelling Catheter - Urethral (mL): 4025 mL  Total OUT: 4055 mL    Total NET: -3499.8 mL          LABS                        9.6    13.50 )-----------( 218      ( 22 Jan 2021 02:43 )             29.4     01-22    137  |  97<L>  |  36.0<H>  ----------------------------<  93  4.3   |  28.0  |  1.56<H>    Ca    8.4<L>      22 Jan 2021 02:43  Phos  4.3     01-21  Mg     2.4     01-22                POCT Blood Glucose.: 121 mg/dL (01-22-21 @ 21:42)  POCT Blood Glucose.: 121 mg/dL (01-22-21 @ 16:08)  POCT Blood Glucose.: 170 mg/dL (01-22-21 @ 11:50)  POCT Blood Glucose.: 101 mg/dL (01-22-21 @ 07:34)        MEDICATIONS  (STANDING):  acetaminophen   Tablet .. 650 milliGRAM(s) Oral every 6 hours  aMIOdarone    Tablet 200 milliGRAM(s) Oral daily  ascorbic acid 500 milliGRAM(s) Oral daily  aspirin enteric coated 81 milliGRAM(s) Oral daily  atorvastatin 40 milliGRAM(s) Oral at bedtime  buMETAnide Infusion 0.5 mG/Hr (2.5 mL/Hr) IV Continuous <Continuous>  chlorhexidine 2% Cloths 1 Application(s) Topical daily  dextrose 50% Injectable 50 milliLiter(s) IV Push every 15 minutes  dextrose 50% Injectable 25 milliLiter(s) IV Push every 15 minutes  DOBUTamine Infusion 3 MICROgram(s)/kG/Min (7.74 mL/Hr) IV Continuous <Continuous>  enoxaparin Injectable 30 milliGRAM(s) SubCutaneous daily  gabapentin 300 milliGRAM(s) Oral three times a day  insulin glargine Injectable (LANTUS) 50 Unit(s) SubCutaneous at bedtime  insulin lispro (ADMELOG) corrective regimen sliding scale   SubCutaneous Before meals and at bedtime  insulin lispro Injectable (ADMELOG) 8 Unit(s) SubCutaneous Before meals and at bedtime  lidocaine   Patch 1 Patch Transdermal daily  megestrol 400 milliGRAM(s) Oral daily  multivitamin/minerals 1 Tablet(s) Oral daily  pantoprazole    Tablet 40 milliGRAM(s) Oral daily  senna 2 Tablet(s) Oral at bedtime  sodium chloride 0.9%. 1000 milliLiter(s) (10 mL/Hr) IV Continuous <Continuous>  sodium chloride 0.9%. 1000 milliLiter(s) (5 mL/Hr) IV Continuous <Continuous>    MEDICATIONS  (PRN):  HYDROmorphone  Injectable 0.5 milliGRAM(s) IV Push every 4 hours PRN Severe Pain (7 - 10)  ondansetron Injectable 4 milliGRAM(s) IV Push every 4 hours PRN Nausea and/or Vomiting  oxyCODONE    IR 5 milliGRAM(s) Oral every 4 hours PRN Moderate Pain (4 - 6)  oxyCODONE    IR 10 milliGRAM(s) Oral every 4 hours PRN Severe Pain (7 - 10)  polyethylene glycol 3350 17 Gram(s) Oral daily PRN Constipation    Allergies:  OHS (Unknown)  warfarin (Rash)      Physical Exam:   Constitutional: NAD, well-groomed, well-developed  HEENT: PERRLA, EOMI, no drainage or redness  Neck: supple,  No JVD  Respiratory: Breath Sounds equal & clear bilaterally to auscultation, no rales/rhonchi/wheezing, no accessory muscle use noted  Cardiovascular: Regular rate, regular rhythm, normal S1, S2; no murmurs or rub  Gastrointestinal: Soft, non-tender, non distended, + bowel sounds  Extremities: MCLAUGHLIN x 4, no peripheral edema, no cyanosis, no clubbing   Neurological: A+O x 3; speech clear and intact; no sensory, motor  deficits, normal reflexes  Skin: warm, dry, well perfused      Code Status:       Critical care time spent: ____minutes (reviewing chart including medication, labs and imaging results, discussions with interdisciplinary team, discussing goals of care/advanced directives, counseling patient and/or family, non-inclusive of procedures)    Case including assessment/plan of care discussed with                    [facility] attending.

## 2021-01-23 NOTE — PROGRESS NOTE ADULT - SUBJECTIVE AND OBJECTIVE BOX
[facility]                                   [address] [place] [address]                                                      PHONE: [phone]                                                         FAX: [phone]  [email or web address]  ---------------------------------------------------------------------------------------------------------------------------------    Overnight events/patient complaints:  OOB in chair, pin improved, AF with improved VR 70s  Seen OOB improved      OHS (Unknown)  warfarin (Rash)    MEDICATIONS  (STANDING):  acetaminophen   Tablet .. 650 milliGRAM(s) Oral every 6 hours  aMIOdarone    Tablet 200 milliGRAM(s) Oral daily  ascorbic acid 500 milliGRAM(s) Oral daily  aspirin enteric coated 81 milliGRAM(s) Oral daily  atorvastatin 40 milliGRAM(s) Oral at bedtime  buMETAnide Infusion 1 mG/Hr (5 mL/Hr) IV Continuous <Continuous>  chlorhexidine 2% Cloths 1 Application(s) Topical daily  dextrose 50% Injectable 50 milliLiter(s) IV Push every 15 minutes  dextrose 50% Injectable 25 milliLiter(s) IV Push every 15 minutes  DOBUTamine Infusion 5 MICROgram(s)/kG/Min (12.9 mL/Hr) IV Continuous <Continuous>  enoxaparin Injectable 40 milliGRAM(s) SubCutaneous daily  gabapentin 300 milliGRAM(s) Oral three times a day  insulin glargine Injectable (LANTUS) 50 Unit(s) SubCutaneous at bedtime  insulin lispro (ADMELOG) corrective regimen sliding scale   SubCutaneous Before meals and at bedtime  insulin lispro Injectable (ADMELOG) 8 Unit(s) SubCutaneous Before meals and at bedtime  lidocaine   Patch 1 Patch Transdermal daily  multivitamin/minerals 1 Tablet(s) Oral daily  pantoprazole    Tablet 40 milliGRAM(s) Oral daily  senna 2 Tablet(s) Oral at bedtime  sodium chloride 0.9%. 1000 milliLiter(s) (10 mL/Hr) IV Continuous <Continuous>  sodium chloride 0.9%. 1000 milliLiter(s) (5 mL/Hr) IV Continuous <Continuous>    MEDICATIONS  (PRN):  HYDROmorphone  Injectable 0.5 milliGRAM(s) IV Push every 4 hours PRN Severe Pain (7 - 10)  ondansetron Injectable 4 milliGRAM(s) IV Push every 4 hours PRN Nausea and/or Vomiting  oxyCODONE    IR 5 milliGRAM(s) Oral every 4 hours PRN Moderate Pain (4 - 6)  oxyCODONE    IR 10 milliGRAM(s) Oral every 4 hours PRN Severe Pain (7 - 10)  polyethylene glycol 3350 17 Gram(s) Oral daily PRN Constipation      Vital Signs Last 24 Hrs  T(C): 36.9 (22 Jan 2021 08:00), Max: 37.1 (22 Jan 2021 00:00)  T(F): 98.5 (22 Jan 2021 08:00), Max: 98.8 (22 Jan 2021 00:00)  HR: 73 (22 Jan 2021 08:00) (67 - 94)  BP: 108/53 (22 Jan 2021 08:00) (100/53 - 195/73)  BP(mean): 75 (22 Jan 2021 08:00) (61 - 105)  RR: 20 (22 Jan 2021 08:00) (16 - 33)  SpO2: 99% (22 Jan 2021 08:00) (94% - 100%)  Daily     Daily   ICU Vital Signs Last 24 Hrs  DONALD SAUNDERS  I&O's Detail    21 Jan 2021 07:01  -  22 Jan 2021 07:00  --------------------------------------------------------  IN:    Bumetanide: 30 mL    Bumetanide: 180 mL    DOBUTamine: 309.6 mL    Insulin: 92 mL    IV PiggyBack: 200 mL    IV PiggyBack: 50 mL    IV PiggyBack: 100 mL    IV PiggyBack: 100 mL    Milrinone: 3.2 mL    Oral Fluid: 300 mL    sodium chloride 0.9%: 240 mL    sodium chloride 0.9%: 120 mL  Total IN: 1724.8 mL    OUT:    Chest Tube (mL): 10 mL    Chest Tube (mL): 150 mL    Chest Tube (mL): 70 mL    Indwelling Catheter - Urethral (mL): 5490 mL  Total OUT: 5720 mL    Total NET: -3995.2 mL      22 Jan 2021 07:01  -  22 Jan 2021 08:30  --------------------------------------------------------  IN:    Bumetanide: 10 mL    DOBUTamine: 25.8 mL    sodium chloride 0.9%: 20 mL    sodium chloride 0.9%: 10 mL  Total IN: 65.8 mL    OUT:    Chest Tube (mL): 10 mL    Chest Tube (mL): 20 mL  Total OUT: 30 mL    Total NET: 35.8 mL        I&O's Summary    21 Jan 2021 07:01  -  22 Jan 2021 07:00  --------------------------------------------------------  IN: 1724.8 mL / OUT: 5720 mL / NET: -3995.2 mL    22 Jan 2021 07:01  -  22 Jan 2021 08:30  --------------------------------------------------------  IN: 65.8 mL / OUT: 30 mL / NET: 35.8 mL      Drug Dosing Weight  DONALD SAUNDERS      PHYSICAL EXAM:  General: Appears well developed, well nourished alert and cooperative.  HEENT: Head; normocephalic, atraumatic.  Eyes: Pupils reactive, cornea wnl.  Neck: Supple, no nodes adenopathy, no NVD or carotid bruit or thyromegaly.  CARDIOVASCULAR: Normal S1 and S2, No murmur, rub, gallop or lift.   LUNGS:dcereased BS at bases  ABDOMEN: Soft, nontender without mass or organomegaly. bowel sounds normoactive.  EXTREMITIES: No clubbing, cyanosis or edema. Distal pulses wnl.   SKIN: warm and dry with normal turgor.  NEURO: Alert/oriented x 3/normal motor exam. No pathologic reflexes.    PSYCH: normal affect.        LABS:                        9.6    13.50 )-----------( 218      ( 22 Jan 2021 02:43 )             29.4     01-22    137  |  97<L>  |  36.0<H>  ----------------------------<  93  4.3   |  28.0  |  1.56<H>    Ca    8.4<L>      22 Jan 2021 02:43  Phos  4.3     01-21  Mg     2.4     01-22      DONALD SAUNDERS            RADIOLOGY & ADDITIONAL STUDIES:< from: Xray Chest 1 View- PORTABLE-Routine (Xray Chest 1 View- PORTABLE-Routine in AM.) (01.21.21 @ 06:18) >  FINDINGS:    Single frontal view of the chest demonstrates mild congestive changes. Line and tubes are unchanged. The cardiomediastinal silhouette is enlarged. No acute osseous abnormalities. Overlying EKG leads and wires are noted    IMPRESSION: No interval change.            KAYLA GUNDERSON MD; Attending Radiologist  This document has been electronically signed. Jan 21 2021  3:26PM    < end of copied text >      INTERPRETATION OF TELEMETRY (personally reviewed):    ECG:< from: 12 Lead ECG (01.21.21 @ 05:06) >    Diagnosis Line Atrial fibrillation  Nonspecific ST and T wave abnormality  Abnormal ECG    Confirmed by Panfilo Peralta ([id number]) on 1/21/2021 10:18:33 PM    < end of copied text >      ECHO:< from: GER[facility]DINE Echo Doppler (01.14.21 @ 09:20) >    Summary:   1. Moderately decreased global left ventricular systolic function. Left ventricular ejection fraction, by visual estimation, is 35 to 40%.   2. Normal right ventricular size and function.   3. Mild to moderately enlarged left atrium.   4. Mildly enlarged right atrium.   5. Moderate mitral valve regurgitation.   6. Mild-moderate tricuspid regurgitation.   7. Mild aortic valve stenosis. The non-coronary cusp is calcified and immobile, but the right and left coronary cusps open normally. The aortic valve area is 1.6 cm2 by planimetry.   8. No left atrial appendage thrombus. There is spontaneous echo contrast seen in the left atrial appendage, but there is no filling defect seen with the administration of Definity.    Juan Diego Arroyo MD Electronically signed on 1/14/2021 at 10:57:13 AM    < end of copied text >      STRESS TEST:    CARDIAC CATHETERIZATION:< from: Cardiac Cath Lab - Adult (01.13.21 @ 13:45) >  CORONARY VESSELS: The coronary circulation is left dominant.  LM:   --  Proximal left main: There was a 90 % stenosis.  --  Distal left main: There was a 90 % stenosis.  LAD:   --  Proximal LAD: There was a 70 % stenosis.  --  Mid LAD: There was a 70 % stenosis.  --  Distal LAD: Angiography showed minor luminal irregularities with no  flow limiting lesions.  CX:   --  Proximal circumflex: Normal.  --  Mid circumflex: Normal.  --  Distal circumflex: Normal.  --  OM1: There was a 20 % stenosis.  --  L AV groove: Angiography showed minor luminal irregularities with no  flow limiting lesions.  --  LPDA: Angiography showed minor luminal irregularities with no flow  limiting lesions.  RCA:   --  Proximal RCA: There was a 70 % stenosis.  COMPLICATIONS: There were no complications. No complications occurred  during the cath lab visit.  SUMMARY:  HEMODYNAMICS: Hemodynamic assessment demonstrates moderately elevated  LVEDP.  DIAGNOSTIC IMPRESSIONS: 90% left main coronary artery stenosis  70% proximal and 70% mid LAD stenosis  70% stenosis of proximal RCA in a very small and nondominant artery  DIAGNOSTIC RECOMMENDATIONS: Balloon pump was placed for severe LMCA  stenosis and elevated LVEDP/decompensated heart failure.  Heparin drip at 1400U/h  Continue asa 81mg daily  Can hold plavix  Would hold beta blocker due to decompensated heart failure. Can give back  half the dose (25mg q6h) if patient become tachycardic.  Continue diuresis with lasix 40mg IV bid  CT surgery evaluation for LIMA to LAD and SVG to LCx  INTERVENTIONAL IMPRESSIONS: 90% left main coronary artery stenosis  70% proximal and 70% mid LAD stenosis  70% stenosis of proximal RCA in a very small and nondominant artery  INTERVENTIONAL RECOMMENDATIONS: Balloon pump was placed for severe LMCA  stenosis and elevated LVEDP/decompensated heart failure.  Heparin drip at 1400U/h  Continue asa 81mg daily  Can hold plavix  Would hold beta blocker due to decompensated heart failure. Can give back  half the dose (25mg q6h) if patient become tachycardic.  Continue diuresis with lasix 40mg IV bid  CT surgery evaluation for LIMA to LAD and SVG to LCx  Prepared and signed by  David Smith MD    < end of copied text >  < from: Cardiac Cath Lab - Adult (01.13.21 @ 13:45) >  CORONARY VESSELS: The coronary circulation is left dominant.  LM:   --  Proximal left main: There was a 90 % stenosis.  --  Distal left main: There was a 90 % stenosis.  LAD:   --  Proximal LAD: There was a 70 % stenosis.  --  Mid LAD: There was a 70 % stenosis.  --  Distal LAD: Angiography showed minor luminal irregularities with no  flow limiting lesions.  CX:   --  Proximal circumflex: Normal.  --  Mid circumflex: Normal.  --  Distal circumflex: Normal.  --  OM1: There was a 20 % stenosis.  --  L AV groove: Angiography showed minor luminal irregularities with no  flow limiting lesions.  --  LPDA: Angiography showed minor luminal irregularities with no flow  limiting lesions.  RCA:   --  Proximal RCA: There was a 70 % stenosis.  COMPLICATIONS: There were no complications. No complications occurred  during the cath lab visit.  SUMMARY:  HEMODYNAMICS: Hemodynamic assessment demonstrates moderately elevated  LVEDP.  DIAGNOSTIC IMPRESSIONS: 90% left main coronary artery stenosis  70% proximal and 70% mid LAD stenosis  70% stenosis of proximal RCA in a very small and nondominant artery  DIAGNOSTIC RECOMMENDATIONS: Balloon pump was placed for severe LMCA  stenosis and elevated LVEDP/decompensated heart failure.  Heparin drip at 1400U/h  Continue asa 81mg daily  Can hold plavix  Would hold beta blocker due to decompensated heart failure. Can give back  half the dose (25mg q6h) if patient become tachycardic.  Continue diuresis with lasix 40mg IV bid  CT surgery evaluation for LIMA to LAD and SVG to LCx  INTERVENTIONAL IMPRESSIONS: 90% left main coronary artery stenosis  70% proximal and 70% mid LAD stenosis  70% stenosis of proximal RCA in a very small and nondominant artery  INTERVENTIONAL RECOMMENDATIONS: Balloon pump was placed for severe LMCA  stenosis and elevated LVEDP/decompensated heart failure.  Heparin drip at 1400U/h  Continue asa 81mg daily  Can hold plavix  Would hold beta blocker due to decompensated heart failure. Can give back  half the dose (25mg q6h) if patient become tachycardic.  Continue diuresis with lasix 40mg IV bid  CT surgery evaluation for LIMA to LAD and SVG to LCx  Prepared and signed by  David Smith MD    < end of copied text >

## 2021-01-23 NOTE — PROGRESS NOTE ADULT - ASSESSMENT
Assessment  s/p CABG MV repair preop EF 35-40%  PAF with improved VR  COPD  PVD  renal insufficieny sec to diuresis/ low to normal filling pressures  right thigh hematoma - traumatic pre admission      Rec    CTICU  Improving AF rate well controlled  cont amio  eventual ACE/ARB/ resume eliquis  monitor thigh hematoma  will follow

## 2021-01-24 DIAGNOSIS — N17.9 ACUTE KIDNEY FAILURE, UNSPECIFIED: ICD-10-CM

## 2021-01-24 LAB
ANION GAP SERPL CALC-SCNC: 11 MMOL/L — SIGNIFICANT CHANGE UP (ref 5–17)
BUN SERPL-MCNC: 43 MG/DL — HIGH (ref 8–20)
CALCIUM SERPL-MCNC: 8.8 MG/DL — SIGNIFICANT CHANGE UP (ref 8.6–10.2)
CHLORIDE SERPL-SCNC: 88 MMOL/L — LOW (ref 98–107)
CO2 SERPL-SCNC: 36 MMOL/L — HIGH (ref 22–29)
CREAT SERPL-MCNC: 1.49 MG/DL — HIGH (ref 0.5–1.3)
GLUCOSE BLDC GLUCOMTR-MCNC: 123 MG/DL — HIGH (ref 70–99)
GLUCOSE BLDC GLUCOMTR-MCNC: 131 MG/DL — HIGH (ref 70–99)
GLUCOSE BLDC GLUCOMTR-MCNC: 170 MG/DL — HIGH (ref 70–99)
GLUCOSE BLDC GLUCOMTR-MCNC: 172 MG/DL — HIGH (ref 70–99)
GLUCOSE BLDC GLUCOMTR-MCNC: 59 MG/DL — LOW (ref 70–99)
GLUCOSE BLDC GLUCOMTR-MCNC: 61 MG/DL — LOW (ref 70–99)
GLUCOSE SERPL-MCNC: 99 MG/DL — SIGNIFICANT CHANGE UP (ref 70–99)
HCT VFR BLD CALC: 31.2 % — LOW (ref 34.5–45)
HGB BLD-MCNC: 10 G/DL — LOW (ref 11.5–15.5)
MAGNESIUM SERPL-MCNC: 2.6 MG/DL — SIGNIFICANT CHANGE UP (ref 1.6–2.6)
MCHC RBC-ENTMCNC: 27.6 PG — SIGNIFICANT CHANGE UP (ref 27–34)
MCHC RBC-ENTMCNC: 32.1 GM/DL — SIGNIFICANT CHANGE UP (ref 32–36)
MCV RBC AUTO: 86.2 FL — SIGNIFICANT CHANGE UP (ref 80–100)
PLATELET # BLD AUTO: 270 K/UL — SIGNIFICANT CHANGE UP (ref 150–400)
POTASSIUM SERPL-MCNC: 4.4 MMOL/L — SIGNIFICANT CHANGE UP (ref 3.5–5.3)
POTASSIUM SERPL-SCNC: 4.4 MMOL/L — SIGNIFICANT CHANGE UP (ref 3.5–5.3)
RBC # BLD: 3.62 M/UL — LOW (ref 3.8–5.2)
RBC # FLD: 16.3 % — HIGH (ref 10.3–14.5)
SODIUM SERPL-SCNC: 135 MMOL/L — SIGNIFICANT CHANGE UP (ref 135–145)
WBC # BLD: 9.12 K/UL — SIGNIFICANT CHANGE UP (ref 3.8–10.5)
WBC # FLD AUTO: 9.12 K/UL — SIGNIFICANT CHANGE UP (ref 3.8–10.5)

## 2021-01-24 PROCEDURE — 71045 X-RAY EXAM CHEST 1 VIEW: CPT | Mod: 26

## 2021-01-24 PROCEDURE — 99232 SBSQ HOSP IP/OBS MODERATE 35: CPT

## 2021-01-24 RX ORDER — INSULIN GLARGINE 100 [IU]/ML
40 INJECTION, SOLUTION SUBCUTANEOUS AT BEDTIME
Refills: 0 | Status: DISCONTINUED | OUTPATIENT
Start: 2021-01-24 | End: 2021-01-25

## 2021-01-24 RX ORDER — SODIUM CHLORIDE 9 MG/ML
3 INJECTION INTRAMUSCULAR; INTRAVENOUS; SUBCUTANEOUS EVERY 8 HOURS
Refills: 0 | Status: DISCONTINUED | OUTPATIENT
Start: 2021-01-24 | End: 2021-01-28

## 2021-01-24 RX ORDER — DOBUTAMINE HCL 250MG/20ML
1 VIAL (ML) INTRAVENOUS
Qty: 500 | Refills: 0 | Status: DISCONTINUED | OUTPATIENT
Start: 2021-01-24 | End: 2021-01-24

## 2021-01-24 RX ORDER — ALPRAZOLAM 0.25 MG
0.25 TABLET ORAL AT BEDTIME
Refills: 0 | Status: DISCONTINUED | OUTPATIENT
Start: 2021-01-24 | End: 2021-01-28

## 2021-01-24 RX ORDER — ALBUMIN HUMAN 25 %
250 VIAL (ML) INTRAVENOUS
Refills: 0 | Status: COMPLETED | OUTPATIENT
Start: 2021-01-24 | End: 2021-01-24

## 2021-01-24 RX ORDER — ACETAMINOPHEN 500 MG
650 TABLET ORAL EVERY 6 HOURS
Refills: 0 | Status: DISCONTINUED | OUTPATIENT
Start: 2021-01-24 | End: 2021-01-28

## 2021-01-24 RX ADMIN — Medication 250 MILLILITER(S): at 11:45

## 2021-01-24 RX ADMIN — LIDOCAINE 1 PATCH: 4 CREAM TOPICAL at 11:49

## 2021-01-24 RX ADMIN — ATORVASTATIN CALCIUM 40 MILLIGRAM(S): 80 TABLET, FILM COATED ORAL at 21:15

## 2021-01-24 RX ADMIN — GABAPENTIN 300 MILLIGRAM(S): 400 CAPSULE ORAL at 21:15

## 2021-01-24 RX ADMIN — Medication 500 MILLIGRAM(S): at 11:46

## 2021-01-24 RX ADMIN — Medication 250 MILLILITER(S): at 10:38

## 2021-01-24 RX ADMIN — Medication 2: at 22:03

## 2021-01-24 RX ADMIN — GABAPENTIN 300 MILLIGRAM(S): 400 CAPSULE ORAL at 13:12

## 2021-01-24 RX ADMIN — PANTOPRAZOLE SODIUM 40 MILLIGRAM(S): 20 TABLET, DELAYED RELEASE ORAL at 11:49

## 2021-01-24 RX ADMIN — POLYETHYLENE GLYCOL 3350 17 GRAM(S): 17 POWDER, FOR SOLUTION ORAL at 11:48

## 2021-01-24 RX ADMIN — MEGESTROL ACETATE 400 MILLIGRAM(S): 40 SUSPENSION ORAL at 17:14

## 2021-01-24 RX ADMIN — Medication 8 UNIT(S): at 11:40

## 2021-01-24 RX ADMIN — OXYCODONE HYDROCHLORIDE 5 MILLIGRAM(S): 5 TABLET ORAL at 04:08

## 2021-01-24 RX ADMIN — Medication 1 TABLET(S): at 11:46

## 2021-01-24 RX ADMIN — INSULIN GLARGINE 40 UNIT(S): 100 INJECTION, SOLUTION SUBCUTANEOUS at 22:03

## 2021-01-24 RX ADMIN — APIXABAN 5 MILLIGRAM(S): 2.5 TABLET, FILM COATED ORAL at 17:19

## 2021-01-24 RX ADMIN — LIDOCAINE 1 PATCH: 4 CREAM TOPICAL at 23:00

## 2021-01-24 RX ADMIN — LIDOCAINE 1 PATCH: 4 CREAM TOPICAL at 19:52

## 2021-01-24 RX ADMIN — SODIUM CHLORIDE 3 MILLILITER(S): 9 INJECTION INTRAMUSCULAR; INTRAVENOUS; SUBCUTANEOUS at 13:12

## 2021-01-24 RX ADMIN — SODIUM CHLORIDE 3 MILLILITER(S): 9 INJECTION INTRAMUSCULAR; INTRAVENOUS; SUBCUTANEOUS at 21:40

## 2021-01-24 RX ADMIN — Medication 0.25 MILLIGRAM(S): at 22:06

## 2021-01-24 RX ADMIN — GABAPENTIN 300 MILLIGRAM(S): 400 CAPSULE ORAL at 06:43

## 2021-01-24 RX ADMIN — Medication 8 UNIT(S): at 22:03

## 2021-01-24 RX ADMIN — Medication 2: at 11:40

## 2021-01-24 RX ADMIN — APIXABAN 5 MILLIGRAM(S): 2.5 TABLET, FILM COATED ORAL at 06:43

## 2021-01-24 RX ADMIN — AMIODARONE HYDROCHLORIDE 200 MILLIGRAM(S): 400 TABLET ORAL at 06:43

## 2021-01-24 RX ADMIN — SENNA PLUS 2 TABLET(S): 8.6 TABLET ORAL at 21:15

## 2021-01-24 RX ADMIN — Medication 81 MILLIGRAM(S): at 11:59

## 2021-01-24 RX ADMIN — Medication 8 UNIT(S): at 18:04

## 2021-01-24 NOTE — CHART NOTE - NSCHARTNOTEFT_GEN_A_CORE
Received patient from CTICU.  Currently sitting up in bed without complaints.  MSI with mepilex dressing.  Surgical bra in place.  Site from prior chest tubes with dsd c/d/i.  Denies CP or SOB.  NAD noted.

## 2021-01-24 NOTE — PROGRESS NOTE ADULT - NEGATIVE GASTROINTESTINAL SYMPTOMS
no nausea/no diarrhea/no constipation
no nausea/no vomiting/no diarrhea/no constipation

## 2021-01-24 NOTE — PROGRESS NOTE ADULT - ASSESSMENT
On 1/19/2021 pt underwent C3/DUANE Clip/MV Repair and DUANE thrombectomy with Dr. Orellana.  Today POD#1  Before surgery she was on  Lantus 20 units daily  and Admelog to 8 units daily.    transitioned off insulin ggt , was requiring high insulin dose, today morning had low sugar.  Will back off on doses      T2DM  To back off on  lantus to 40  units daily and  continue  admelog 8 units tid with meals  If sugars low tomorrow morning, to further back off to 30 units of lantus  fingersticks ac tid hs  will further adjust doses as needed  Pt does have outpt Endo-  Dr Soriano      On 1/19/2021 pt underwent C3/DUANE Clip/MV Repair and DUANE thrombectomy with Dr. Orellana.  Today POD#1  Before surgery she was on  Lantus 20 units daily  and Admelog to 8 units daily.    transitioned off insulin ggt , was requiring high insulin dose, today morning had low sugar.  Will back off on doses      T2DM  To back off on  lantus to 35 units daily and  continue  admelog 8 units tid with meals  If sugars low tomorrow morning, to further back off to 30 units of lantus  fingersticks ac tid hs  will further adjust doses as needed  Pt does have outpt Endo-  Dr Soriano

## 2021-01-24 NOTE — PROGRESS NOTE ADULT - PROBLEM SELECTOR PLAN 4
Currently normotensive  No Beta Blocker while on Dobutamine HA1c on 7.8 on Metformin and Tresiba as an outpatient.  Insulin gtt transitioned to Lantus, Pre meal and ROBERT ACHS  Endocrine following closely  Continue Megace

## 2021-01-24 NOTE — PROGRESS NOTE ADULT - NEGATIVE CARDIOVASCULAR SYMPTOMS
no chest pain/no palpitations
no chest pain/no peripheral edema
no chest pain

## 2021-01-24 NOTE — PROGRESS NOTE ADULT - PROBLEM SELECTOR PLAN 3
HA1c on 7.8 on Metformin and Tresiba as an outpatient.  Insulin gtt transitioned to Lantus, Pre meal and ROBERT ACHS  Endocrine following closely  Continue Megace Pt fluid overload, was on Bumex gtt  Bumex Stopped 1/23 am  Hold diuresis  Daily BMP

## 2021-01-24 NOTE — PROGRESS NOTE ADULT - ASSESSMENT
75 year old Female with a medical history of paroxysmal Afib (on Xarelto), PAD (s/p F-nrvbbeq-rdewqvrfv bypass, R-femoral angioplasty with stenting, maintained on Plavix), HTN, HLD, type 2 diabetes (HA1c on 7.8 on Metformin and Tresiba as an outpatient), and recent trauma to Right thigh with stable hematoma, presented with SOB, acute on chronic combined diastolic and systolic heart failure (requiring IV diuresis), and NSTEMI on 1/11/20. On 1/13 she underwent a LHC via Right radial artery and was found to have Multivessel CAD (right ostial, left main and LAD) with left femoral IABP placed. GERALDINE 1/14 showed Moderate MR and Mild AS. IABP removed successfully 1/15. Preoperative course significant for afib with RVR (requiring PO amio load, increased Lopressor dosing, and initiation of Cardizem).     On 1/19/2021 pt underwent C3/DUANE Clip/MV Repair and DUANE thrombectomy with Dr. Orellana.  Post op issues involved hypoxic respiratory failure (now resolved), and KRISTIE.

## 2021-01-24 NOTE — PROGRESS NOTE ADULT - SUBJECTIVE AND OBJECTIVE BOX
Subjective:  74yo F doing very well, comfortable, no c/o pain.      HPI:  75yoF hx Afib on Xarelto, PAD s/p O-ttthjlp-vkzwiqgau bypass, R-femoral angioplasty with stenting, on Plavix, HTN, HLD, DM presenting with acute dyspnea and palpitations yesterday.  Pt was in her normal state of health, then developed acute onset of exertional dyspnea associated with palpitations that progressed to dyspnea at rest which prompted her to come to the hospital.  She also reports some transient vomiting yesterday that is now improving.  In ED, pt was in Afib with RVR associated with soft SBP in 90s.  She was given IV Cardizem and PO metoprolol without improvement and ED staff spoke with [place] cardiology who recommended digoxin.   Labs notable for leukocytosis and elevated troponin.  Pt denies any chest pain, lightheadedness, syncope, cough, abdominal pain, diarrhea, or urinary symptoms.   Of note, pt came to [facility] ED on  for R-thigh pain after walking into a cabinet at home. She underwent a CT angio A/P that showed R-thigh hematoma w/out extravasation.  Her leg was wrapped and she was d/c’ed from ED with instruction to continue wtith blood thinners and outpatient follow up. She denies any worsening R-thigh or leg pain. Patient comes into the hospital with afib (2021 03:52)          PAST MEDICAL & SURGICAL HISTORY:  PAD (peripheral artery disease)    Paroxysmal atrial fibrillation    Hypercholesterolemia    Diabetes    Hypertension    S/P peripheral artery angioplasty with stent placement    S/P femoral-popliteal bypass surgery    H/O hernia repair    H/O abdominal hysterectomy            MEDICATIONS  (STANDING):  aMIOdarone    Tablet 200 milliGRAM(s) Oral daily  apixaban 5 milliGRAM(s) Oral every 12 hours  ascorbic acid 500 milliGRAM(s) Oral daily  aspirin enteric coated 81 milliGRAM(s) Oral daily  atorvastatin 40 milliGRAM(s) Oral at bedtime  dextrose 50% Injectable 50 milliLiter(s) IV Push every 15 minutes  dextrose 50% Injectable 25 milliLiter(s) IV Push every 15 minutes  DOBUTamine Infusion 2 MICROgram(s)/kG/Min (5.16 mL/Hr) IV Continuous <Continuous>  gabapentin 300 milliGRAM(s) Oral three times a day  insulin glargine Injectable (LANTUS) 50 Unit(s) SubCutaneous at bedtime  insulin lispro (ADMELOG) corrective regimen sliding scale   SubCutaneous Before meals and at bedtime  insulin lispro Injectable (ADMELOG) 8 Unit(s) SubCutaneous Before meals and at bedtime  lidocaine   Patch 1 Patch Transdermal daily  megestrol 400 milliGRAM(s) Oral daily  multivitamin/minerals 1 Tablet(s) Oral daily  pantoprazole    Tablet 40 milliGRAM(s) Oral daily  polyethylene glycol 3350 17 Gram(s) Oral daily  senna 2 Tablet(s) Oral at bedtime  sodium chloride 0.9%. 1000 milliLiter(s) (10 mL/Hr) IV Continuous <Continuous>  sodium chloride 0.9%. 1000 milliLiter(s) (5 mL/Hr) IV Continuous <Continuous>    MEDICATIONS  (PRN):  ondansetron Injectable 4 milliGRAM(s) IV Push every 4 hours PRN Nausea and/or Vomiting  oxyCODONE    IR 5 milliGRAM(s) Oral every 4 hours PRN Moderate Pain (4 - 6)  oxyCODONE    IR 10 milliGRAM(s) Oral every 4 hours PRN Severe Pain (7 - 10)          Allergies    warfarin (Rash)    Intolerances    OHS (Unknown)        WEIGHTS:  Daily     Daily Weight in k.5 (2021 03:52)  Admit Wt: Drug Dosing Weight  Height (cm): 177.8 (2021 06:50)  Weight (kg): 86 (2021 06:50)  BMI (kg/m2): 27.2 (2021 06:50)  BSA (m2): 2.04 (2021 06:50)  I&O's Summary    2021 07:01  -  2021 07:00  --------------------------------------------------------  IN: 1391.7 mL / OUT: 5440 mL / NET: -4048.3 mL    2021 07:01  -  2021 02:31  --------------------------------------------------------  IN: 703.8 mL / OUT: 1480 mL / NET: -776.2 mL        VITAL SIGNS:  ICU Vital Signs Last 24 Hrs  T(C): 36.5 (2021 00:52), Max: 37.6 (2021 16:05)  T(F): 97.7 (2021 00:52), Max: 99.7 (2021 16:05)  HR: 79 (2021 01:00) (68 - 81)  BP: 112/57 (2021 01:00) (91/53 - 128/59)  BP(mean): 82 (2021 01:00) (68 - 85)  ABP: --  ABP(mean): --  RR: 20 (2021 01:00) (14 - 29)  SpO2: 94% (2021 01:00) (94% - 100%)        All laboratory results, radiology and medications reviewed.    LABS:      135  |  87<L>  |  44.0<H>  ----------------------------<  156<H>  3.7   |  35.0<H>  |  1.63<H>    Ca    8.4<L>      2021 10:38  Mg     2.5                                        10.0   9.12  )-----------( 270      ( 2021 02:17 )             31.2                    CAPILLARY BLOOD GLUCOSE      POCT Blood Glucose.: 174 mg/dL (2021 21:15)  POCT Blood Glucose.: 134 mg/dL (2021 17:25)  POCT Blood Glucose.: 143 mg/dL (2021 11:37)  POCT Blood Glucose.: 146 mg/dL (2021 07:53)             PHYSICAL EXAM:  General:  well nourished, no acute distress  Neurology:  alert and oriented X 4, nonfocal, no gross deficits  Respiratory:  clear to auscultation bilaterally  CV:  A Fib, rate controlled  Abdomen:  soft, nontender, nondistended, positive bowel sounds  Extremities:  warm, well perfused, no edema +DP pulses  Incisions:  midline sternal incision, c/d/i, sternum stable

## 2021-01-24 NOTE — PROGRESS NOTE ADULT - NEGATIVE RESPIRATORY AND THORAX SYMPTOMS
no wheezing/no cough

## 2021-01-24 NOTE — PROGRESS NOTE ADULT - SUBJECTIVE AND OBJECTIVE BOX
INTERVAL HPI/OVERNIGHT EVENTS:  Follow up on diabetes.  a1c 7.8%    MEDICATIONS  (STANDING):  aMIOdarone    Tablet 400 milliGRAM(s) Oral every 8 hours  ascorbic acid 500 milliGRAM(s) Oral daily  aspirin  chewable 81 milliGRAM(s) Oral daily  atorvastatin 20 milliGRAM(s) Oral at bedtime  cefuroxime  IVPB 1500 milliGRAM(s) IV Intermittent once  chlorhexidine 0.12% Liquid 15 milliLiter(s) Swish and Spit two times a day  chlorhexidine 4% Liquid 1 Application(s) Topical two times a day  diltiazem    Tablet 30 milliGRAM(s) Oral four times a day  heparin  Infusion 800 Unit(s)/Hr (8 mL/Hr) IV Continuous <Continuous>  insulin glargine Injectable (LANTUS) 20 Unit(s) SubCutaneous at bedtime  insulin lispro (ADMELOG) corrective regimen sliding scale   SubCutaneous Before meals and at bedtime  insulin lispro Injectable (ADMELOG) 6 Unit(s) SubCutaneous three times a day before meals  metoprolol tartrate 50 milliGRAM(s) Oral every 8 hours  mupirocin 2% Nasal 1 Application(s) Nasal two times a day  pantoprazole    Tablet 40 milliGRAM(s) Oral before breakfast  phytonadione  IVPB 5 milliGRAM(s) IV Intermittent daily  senna 2 Tablet(s) Oral at bedtime  sodium chloride 0.9% lock flush 3 milliLiter(s) IV Push every 8 hours    MEDICATIONS  (PRN):  acetaminophen   Tablet .. 650 milliGRAM(s) Oral every 6 hours PRN Temp greater or equal to 38C (100.4F), Mild Pain (1 - 3)  albuterol/ipratropium for Nebulization 3 milliLiter(s) Nebulizer every 6 hours PRN Shortness of Breath and/or Wheezing  ondansetron Injectable 4 milliGRAM(s) IV Push every 6 hours PRN Nausea and/or Vomiting  polyethylene glycol 3350 17 Gram(s) Oral at bedtime PRN Constipation      Allergies    warfarin (Rash)        Review of systems:  feeling better today      Vital Signs Last 24 Hrs  T(C): 36.8 (18 Jan 2021 10:00), Max: 37.5 (17 Jan 2021 22:57)  T(F): 98.3 (18 Jan 2021 10:00), Max: 99.5 (17 Jan 2021 22:57)  HR: 82 (18 Jan 2021 10:00) (82 - 97)  BP: 108/68 (18 Jan 2021 10:00) (97/62 - 113/67)  BP(mean): --  RR: 18 (18 Jan 2021 10:00) (17 - 18)  SpO2: 96% (18 Jan 2021 10:00) (96% - 98%)    PHYSICAL EXAM:  Constitutional: NAD, well-groomed, well-developed  Respiratory: CTAB  Cardiovascular: S1 and S2, RRR, no M/G/R  Psychiatric: Normal mood, normal affect        LABS:                        11.3   10.77 )-----------( 336      ( 18 Jan 2021 06:04 )             35.2     01-18    139  |  97<L>  |  21.0<H>  ----------------------------<  147<H>  3.6   |  29.0  |  0.94    Ca    9.0      18 Jan 2021 06:04  Mg     1.8     01-18         INTERVAL HPI/OVERNIGHT EVENTS:  Follow up on diabetes.  a1c 7.8%    MEDICATIONS  (STANDING):  aMIOdarone    Tablet 400 milliGRAM(s) Oral every 8 hours  ascorbic acid 500 milliGRAM(s) Oral daily  aspirin  chewable 81 milliGRAM(s) Oral daily  atorvastatin 20 milliGRAM(s) Oral at bedtime  cefuroxime  IVPB 1500 milliGRAM(s) IV Intermittent once  chlorhexidine 0.12% Liquid 15 milliLiter(s) Swish and Spit two times a day  chlorhexidine 4% Liquid 1 Application(s) Topical two times a day  diltiazem    Tablet 30 milliGRAM(s) Oral four times a day  heparin  Infusion 800 Unit(s)/Hr (8 mL/Hr) IV Continuous <Continuous>  insulin glargine Injectable (LANTUS) 20 Unit(s) SubCutaneous at bedtime  insulin lispro (ADMELOG) corrective regimen sliding scale   SubCutaneous Before meals and at bedtime  insulin lispro Injectable (ADMELOG) 6 Unit(s) SubCutaneous three times a day before meals  metoprolol tartrate 50 milliGRAM(s) Oral every 8 hours  mupirocin 2% Nasal 1 Application(s) Nasal two times a day  pantoprazole    Tablet 40 milliGRAM(s) Oral before breakfast  phytonadione  IVPB 5 milliGRAM(s) IV Intermittent daily  senna 2 Tablet(s) Oral at bedtime  sodium chloride 0.9% lock flush 3 milliLiter(s) IV Push every 8 hours    MEDICATIONS  (PRN):  acetaminophen   Tablet .. 650 milliGRAM(s) Oral every 6 hours PRN Temp greater or equal to 38C (100.4F), Mild Pain (1 - 3)  albuterol/ipratropium for Nebulization 3 milliLiter(s) Nebulizer every 6 hours PRN Shortness of Breath and/or Wheezing  ondansetron Injectable 4 milliGRAM(s) IV Push every 6 hours PRN Nausea and/or Vomiting  polyethylene glycol 3350 17 Gram(s) Oral at bedtime PRN Constipation      Allergies    warfarin (Rash)        Review of systems:  feeling better today, eating and more awake      Vital Signs Last 24 Hrs  T(C): 36.8 (18 Jan 2021 10:00), Max: 37.5 (17 Jan 2021 22:57)  T(F): 98.3 (18 Jan 2021 10:00), Max: 99.5 (17 Jan 2021 22:57)  HR: 82 (18 Jan 2021 10:00) (82 - 97)  BP: 108/68 (18 Jan 2021 10:00) (97/62 - 113/67)  BP(mean): --  RR: 18 (18 Jan 2021 10:00) (17 - 18)  SpO2: 96% (18 Jan 2021 10:00) (96% - 98%)    PHYSICAL EXAM:  Constitutional: NAD, well-groomed, well-developed  Respiratory: CTAB  Cardiovascular: S1 and S2, RRR, no M/G/R  Psychiatric: Normal mood, normal affect        LABS:                        11.3   10.77 )-----------( 336      ( 18 Jan 2021 06:04 )             35.2     01-18    139  |  97<L>  |  21.0<H>  ----------------------------<  147<H>  3.6   |  29.0  |  0.94    Ca    9.0      18 Jan 2021 06:04  Mg     1.8     01-18

## 2021-01-25 LAB
ANION GAP SERPL CALC-SCNC: 15 MMOL/L — SIGNIFICANT CHANGE UP (ref 5–17)
ANION GAP SERPL CALC-SCNC: 15 MMOL/L — SIGNIFICANT CHANGE UP (ref 5–17)
BUN SERPL-MCNC: 40 MG/DL — HIGH (ref 8–20)
BUN SERPL-MCNC: 43 MG/DL — HIGH (ref 8–20)
CALCIUM SERPL-MCNC: 8.3 MG/DL — LOW (ref 8.6–10.2)
CALCIUM SERPL-MCNC: 8.5 MG/DL — LOW (ref 8.6–10.2)
CHLORIDE SERPL-SCNC: 85 MMOL/L — LOW (ref 98–107)
CHLORIDE SERPL-SCNC: 86 MMOL/L — LOW (ref 98–107)
CO2 SERPL-SCNC: 28 MMOL/L — SIGNIFICANT CHANGE UP (ref 22–29)
CO2 SERPL-SCNC: 30 MMOL/L — HIGH (ref 22–29)
CREAT SERPL-MCNC: 1.22 MG/DL — SIGNIFICANT CHANGE UP (ref 0.5–1.3)
CREAT SERPL-MCNC: 1.28 MG/DL — SIGNIFICANT CHANGE UP (ref 0.5–1.3)
GLUCOSE BLDC GLUCOMTR-MCNC: 193 MG/DL — HIGH (ref 70–99)
GLUCOSE BLDC GLUCOMTR-MCNC: 226 MG/DL — HIGH (ref 70–99)
GLUCOSE BLDC GLUCOMTR-MCNC: 250 MG/DL — HIGH (ref 70–99)
GLUCOSE BLDC GLUCOMTR-MCNC: 75 MG/DL — SIGNIFICANT CHANGE UP (ref 70–99)
GLUCOSE SERPL-MCNC: 231 MG/DL — HIGH (ref 70–99)
GLUCOSE SERPL-MCNC: 55 MG/DL — LOW (ref 70–99)
HCT VFR BLD CALC: 28.9 % — LOW (ref 34.5–45)
HGB BLD-MCNC: 9.4 G/DL — LOW (ref 11.5–15.5)
MAGNESIUM SERPL-MCNC: 2.6 MG/DL — SIGNIFICANT CHANGE UP (ref 1.6–2.6)
MCHC RBC-ENTMCNC: 27.7 PG — SIGNIFICANT CHANGE UP (ref 27–34)
MCHC RBC-ENTMCNC: 32.5 GM/DL — SIGNIFICANT CHANGE UP (ref 32–36)
MCV RBC AUTO: 85.3 FL — SIGNIFICANT CHANGE UP (ref 80–100)
PLATELET # BLD AUTO: 300 K/UL — SIGNIFICANT CHANGE UP (ref 150–400)
POTASSIUM SERPL-MCNC: 3.2 MMOL/L — LOW (ref 3.5–5.3)
POTASSIUM SERPL-MCNC: 3.3 MMOL/L — LOW (ref 3.5–5.3)
POTASSIUM SERPL-SCNC: 3.2 MMOL/L — LOW (ref 3.5–5.3)
POTASSIUM SERPL-SCNC: 3.3 MMOL/L — LOW (ref 3.5–5.3)
RBC # BLD: 3.39 M/UL — LOW (ref 3.8–5.2)
RBC # FLD: 16.4 % — HIGH (ref 10.3–14.5)
SODIUM SERPL-SCNC: 128 MMOL/L — LOW (ref 135–145)
SODIUM SERPL-SCNC: 131 MMOL/L — LOW (ref 135–145)
WBC # BLD: 12.32 K/UL — HIGH (ref 3.8–10.5)
WBC # FLD AUTO: 12.32 K/UL — HIGH (ref 3.8–10.5)

## 2021-01-25 PROCEDURE — 99233 SBSQ HOSP IP/OBS HIGH 50: CPT

## 2021-01-25 PROCEDURE — 71045 X-RAY EXAM CHEST 1 VIEW: CPT | Mod: 26

## 2021-01-25 PROCEDURE — 99232 SBSQ HOSP IP/OBS MODERATE 35: CPT

## 2021-01-25 RX ORDER — METOPROLOL TARTRATE 50 MG
12.5 TABLET ORAL
Refills: 0 | Status: DISCONTINUED | OUTPATIENT
Start: 2021-01-25 | End: 2021-01-28

## 2021-01-25 RX ORDER — SODIUM CHLORIDE 9 MG/ML
1 INJECTION INTRAMUSCULAR; INTRAVENOUS; SUBCUTANEOUS THREE TIMES A DAY
Refills: 0 | Status: DISCONTINUED | OUTPATIENT
Start: 2021-01-25 | End: 2021-01-28

## 2021-01-25 RX ORDER — ACETAZOLAMIDE 250 MG/1
500 TABLET ORAL
Refills: 0 | Status: DISCONTINUED | OUTPATIENT
Start: 2021-01-25 | End: 2021-01-26

## 2021-01-25 RX ORDER — INSULIN LISPRO 100/ML
7 VIAL (ML) SUBCUTANEOUS
Refills: 0 | Status: DISCONTINUED | OUTPATIENT
Start: 2021-01-25 | End: 2021-01-28

## 2021-01-25 RX ORDER — POTASSIUM CHLORIDE 20 MEQ
40 PACKET (EA) ORAL EVERY 4 HOURS
Refills: 0 | Status: COMPLETED | OUTPATIENT
Start: 2021-01-25 | End: 2021-01-25

## 2021-01-25 RX ORDER — INSULIN LISPRO 100/ML
5 VIAL (ML) SUBCUTANEOUS
Refills: 0 | Status: DISCONTINUED | OUTPATIENT
Start: 2021-01-25 | End: 2021-01-25

## 2021-01-25 RX ORDER — APIXABAN 2.5 MG/1
2.5 TABLET, FILM COATED ORAL EVERY 12 HOURS
Refills: 0 | Status: DISCONTINUED | OUTPATIENT
Start: 2021-01-25 | End: 2021-01-28

## 2021-01-25 RX ORDER — INSULIN GLARGINE 100 [IU]/ML
35 INJECTION, SOLUTION SUBCUTANEOUS AT BEDTIME
Refills: 0 | Status: DISCONTINUED | OUTPATIENT
Start: 2021-01-25 | End: 2021-01-25

## 2021-01-25 RX ORDER — POTASSIUM CHLORIDE 20 MEQ
40 PACKET (EA) ORAL ONCE
Refills: 0 | Status: COMPLETED | OUTPATIENT
Start: 2021-01-25 | End: 2021-01-25

## 2021-01-25 RX ORDER — INSULIN GLARGINE 100 [IU]/ML
25 INJECTION, SOLUTION SUBCUTANEOUS AT BEDTIME
Refills: 0 | Status: DISCONTINUED | OUTPATIENT
Start: 2021-01-25 | End: 2021-01-28

## 2021-01-25 RX ADMIN — Medication 4: at 17:36

## 2021-01-25 RX ADMIN — ACETAZOLAMIDE 110 MILLIGRAM(S): 250 TABLET ORAL at 21:51

## 2021-01-25 RX ADMIN — ATORVASTATIN CALCIUM 40 MILLIGRAM(S): 80 TABLET, FILM COATED ORAL at 21:50

## 2021-01-25 RX ADMIN — SODIUM CHLORIDE 3 MILLILITER(S): 9 INJECTION INTRAMUSCULAR; INTRAVENOUS; SUBCUTANEOUS at 12:51

## 2021-01-25 RX ADMIN — Medication 5 UNIT(S): at 12:48

## 2021-01-25 RX ADMIN — Medication 4: at 21:50

## 2021-01-25 RX ADMIN — Medication 40 MILLIEQUIVALENT(S): at 12:49

## 2021-01-25 RX ADMIN — GABAPENTIN 300 MILLIGRAM(S): 400 CAPSULE ORAL at 12:49

## 2021-01-25 RX ADMIN — ACETAZOLAMIDE 110 MILLIGRAM(S): 250 TABLET ORAL at 12:51

## 2021-01-25 RX ADMIN — PANTOPRAZOLE SODIUM 40 MILLIGRAM(S): 20 TABLET, DELAYED RELEASE ORAL at 12:50

## 2021-01-25 RX ADMIN — Medication 2: at 12:48

## 2021-01-25 RX ADMIN — Medication 0.25 MILLIGRAM(S): at 21:50

## 2021-01-25 RX ADMIN — LIDOCAINE 1 PATCH: 4 CREAM TOPICAL at 12:51

## 2021-01-25 RX ADMIN — SODIUM CHLORIDE 1 GRAM(S): 9 INJECTION INTRAMUSCULAR; INTRAVENOUS; SUBCUTANEOUS at 21:56

## 2021-01-25 RX ADMIN — Medication 81 MILLIGRAM(S): at 12:50

## 2021-01-25 RX ADMIN — AMIODARONE HYDROCHLORIDE 200 MILLIGRAM(S): 400 TABLET ORAL at 05:10

## 2021-01-25 RX ADMIN — INSULIN GLARGINE 25 UNIT(S): 100 INJECTION, SOLUTION SUBCUTANEOUS at 21:49

## 2021-01-25 RX ADMIN — Medication 12.5 MILLIGRAM(S): at 17:36

## 2021-01-25 RX ADMIN — Medication 40 MILLIEQUIVALENT(S): at 17:36

## 2021-01-25 RX ADMIN — APIXABAN 2.5 MILLIGRAM(S): 2.5 TABLET, FILM COATED ORAL at 17:36

## 2021-01-25 RX ADMIN — GABAPENTIN 300 MILLIGRAM(S): 400 CAPSULE ORAL at 05:10

## 2021-01-25 RX ADMIN — SODIUM CHLORIDE 3 MILLILITER(S): 9 INJECTION INTRAMUSCULAR; INTRAVENOUS; SUBCUTANEOUS at 05:11

## 2021-01-25 RX ADMIN — APIXABAN 5 MILLIGRAM(S): 2.5 TABLET, FILM COATED ORAL at 05:10

## 2021-01-25 RX ADMIN — Medication 500 MILLIGRAM(S): at 12:50

## 2021-01-25 RX ADMIN — Medication 1 TABLET(S): at 12:49

## 2021-01-25 RX ADMIN — GABAPENTIN 300 MILLIGRAM(S): 400 CAPSULE ORAL at 21:50

## 2021-01-25 RX ADMIN — SODIUM CHLORIDE 3 MILLILITER(S): 9 INJECTION INTRAMUSCULAR; INTRAVENOUS; SUBCUTANEOUS at 22:02

## 2021-01-25 RX ADMIN — SENNA PLUS 2 TABLET(S): 8.6 TABLET ORAL at 21:50

## 2021-01-25 RX ADMIN — Medication 40 MILLIEQUIVALENT(S): at 21:56

## 2021-01-25 RX ADMIN — POLYETHYLENE GLYCOL 3350 17 GRAM(S): 17 POWDER, FOR SOLUTION ORAL at 12:49

## 2021-01-25 NOTE — PROGRESS NOTE ADULT - SUBJECTIVE AND OBJECTIVE BOX
[facility] - [facility]                                   [address] [place] [address]                                                      PHONE: [phone]                                                         FAX: [phone]  [email or web address]  ---------------------------------------------------------------------------------------------------------------------------------    Overnight events/patient complaints: OB o chair, looks well, tele Af with CVR, thigh hematoma lessening      OHS (Unknown)  warfarin (Rash)    MEDICATIONS  (STANDING):  ALPRAZolam 0.25 milliGRAM(s) Oral at bedtime  aMIOdarone    Tablet 200 milliGRAM(s) Oral daily  apixaban 5 milliGRAM(s) Oral every 12 hours  ascorbic acid 500 milliGRAM(s) Oral daily  aspirin enteric coated 81 milliGRAM(s) Oral daily  atorvastatin 40 milliGRAM(s) Oral at bedtime  dextrose 50% Injectable 50 milliLiter(s) IV Push every 15 minutes  dextrose 50% Injectable 25 milliLiter(s) IV Push every 15 minutes  gabapentin 300 milliGRAM(s) Oral three times a day  insulin glargine Injectable (LANTUS) 25 Unit(s) SubCutaneous at bedtime  insulin lispro (ADMELOG) corrective regimen sliding scale   SubCutaneous Before meals and at bedtime  insulin lispro Injectable (ADMELOG) 5 Unit(s) SubCutaneous three times a day before meals  lidocaine   Patch 1 Patch Transdermal daily  megestrol 400 milliGRAM(s) Oral daily  multivitamin/minerals 1 Tablet(s) Oral daily  pantoprazole    Tablet 40 milliGRAM(s) Oral daily  polyethylene glycol 3350 17 Gram(s) Oral daily  senna 2 Tablet(s) Oral at bedtime  sodium chloride 0.9% lock flush 3 milliLiter(s) IV Push every 8 hours    MEDICATIONS  (PRN):  acetaminophen   Tablet .. 650 milliGRAM(s) Oral every 6 hours PRN Mild Pain (1 - 3)  oxyCODONE    IR 5 milliGRAM(s) Oral every 4 hours PRN Moderate Pain (4 - 6)  oxyCODONE    IR 10 milliGRAM(s) Oral every 4 hours PRN Severe Pain (7 - 10)      Vital Signs Last 24 Hrs  T(C): 36.6 (2021 04:42), Max: 36.9 (2021 15:25)  T(F): 97.8 (2021 04:42), Max: 98.4 (2021 15:25)  HR: 72 (2021 04:42) (65 - 86)  BP: 114/69 (2021 04:42) (112/70 - 118/70)  BP(mean): 79 (2021 12:30) (79 - 79)  RR: 17 (2021 04:42) (17 - 25)  SpO2: 94% (2021 04:42) (94% - 100%)  Daily     Daily Weight in k.4 (2021 06:00)  ICU Vital Signs Last 24 Hrs  DONALD SAUNDERS  I&O's Detail    2021 07:01  -  2021 07:00  --------------------------------------------------------  IN:    Albumin 5%  - 250 mL: 500 mL    DOBUTamine: 5.2 mL    Oral Fluid: 300 mL  Total IN: 805.2 mL    OUT:    Voided (mL): 550 mL  Total OUT: 550 mL    Total NET: 255.2 mL        I&O's Summary    2021 07:01  -  2021 07:00  --------------------------------------------------------  IN: 805.2 mL / OUT: 550 mL / NET: 255.2 mL      Drug Dosing Weight  DONALD SAUNDERS      PHYSICAL EXAM:  General: Appears well developed, well nourished alert and cooperative.  HEENT: Head; normocephalic, atraumatic.  Eyes: Pupils reactive, cornea wnl.  Neck: Supple, no nodes adenopathy, no NVD or carotid bruit or thyromegaly.  CARDIOVASCULAR: Normal S1 and S2, No murmur, rub, gallop or lift.   LUNGS: No rales, rhonchi or wheeze. Normal breath sounds bilaterally.  ABDOMEN: Soft, nontender without mass or organomegaly. bowel sounds normoactive.  EXTREMITIES: No clubbing, cyanosis or edema. Distal pulses wnl.   SKIN: warm and dry with normal turgor.  NEURO: Alert/oriented x 3/normal motor exam. No pathologic reflexes.    PSYCH: normal affect.        LABS:                        9.4    12.32 )-----------( 300      ( 2021 07:04 )             28.9         131<L>  |  86<L>  |  40.0<H>  ----------------------------<  55<L>  3.2<L>   |  30.0<H>  |  1.28    Ca    8.3<L>      2021 07:04  Mg     2.6           DONALD CHIP            RADIOLOGY & ADDITIONAL STUDIES:    INTERPRETATION OF TELEMETRY (personally reviewed):    ECG:< from: 12 Lead ECG (21 @ 05:06) >  Diagnosis Line Atrial fibrillation  Nonspecific ST and T wave abnormality  Abnormal ECG      < end of copied text >      ECHO:< from: TTE Echo Complete w/o Contrast w/ Doppler (21 @ 10:48) >    Summary:   1. Left ventricular ejection fraction, by visual estimation, is 40 to 45%.   2. Mildly to moderately decreased global left ventricular systolic function.   3. Entire apex is abnormal as described above.   4. The mitral in-flow pattern reveals no discernable A-wave, therefore no comment on diastolic function can be made.   5. Moderately enlarged left atrium.   6. Moderately enlarged right atrium.   7. There is no evidence of pericardial effusion.   8. Mild to moderate mitral valve regurgitation.   9. Moderate tricuspid regurgitation.  10. Estimated pulmonary artery systolic pressure is 59.6 mmHg assuming a right atrial pressure of 10 mmHg, which is consistent with moderate pulmonary hypertension.  11. Endocardial visualization was enhanced with intravenous echo contrast.  12. Peak transaortic gradient equals 18.7 mmHg, mean transaortic gradient equals 9.0 mmHg, the calculated aortic valve area equals 1.34 cm² by the continuity equation consistent with moderate aortic stenosis.    MD Sarah Electronically signed on 2021 at 3:46:18 PM          < end of copied text >  < from: TTE Echo Complete w/o Contrast w/ Doppler (21 @ 10:48) >    Summary:   1. Left ventricular ejection fraction, by visual estimation, is 40 to 45%.   2. Mildly to moderately decreased global left ventricular systolic function.   3. Entire apex is abnormal as described above.   4. The mitral in-flow pattern reveals no discernable A-wave, therefore no comment on diastolic function can be made.   5. Moderately enlarged left atrium.   6. Moderately enlarged right atrium.   7. There is no evidence of pericardial effusion.   8. Mild to moderate mitral valve regurgitation.   9. Moderate tricuspid regurgitation.  10. Estimated pulmonary artery systolic pressure is 59.6 mmHg assuming a right atrial pressure of 10 mmHg, which is consistent with moderate pulmonary hypertension.  11. Endocardial visualization was enhanced with intravenous echo contrast.  12. Peak transaortic gradient equals 18.7 mmHg, mean transaortic gradient equals 9.0 mmHg, the calculated aortic valve area equals 1.34 cm² by the continuity equation consistent with moderate aortic stenosis.    MD Sarah Electronically signed on 2021 at 3:46:18 PM          < end of copied text >

## 2021-01-25 NOTE — PROGRESS NOTE ADULT - ASSESSMENT
75 year old Female with a medical history of paroxysmal Afib (on Xarelto), PAD (s/p T-xrakoks-wvdzavuno bypass, R-femoral angioplasty with stenting, maintained on Plavix), HTN, HLD, type 2 diabetes (HA1c on 7.8 on Metformin and Tresiba as an outpatient), and recent trauma to Right thigh with stable hematoma, presented with SOB, acute on chronic combined diastolic and systolic heart failure (requiring IV diuresis), and NSTEMI on 1/11/20. On 1/13 she underwent a LHC via Right radial artery and was found to have Multivessel CAD (right ostial, left main and LAD) with left femoral IABP placed. GERALDINE 1/14 showed Moderate MR and Mild AS. IABP removed successfully 1/15. Preoperative course significant for afib with RVR (requiring PO amio load, increased Lopressor dosing, and initiation of Cardizem).     On 1/19/2021 pt underwent C3/DUANE Clip/MV Repair and DUANE thrombectomy with Dr. Orellana.  Post op issues involved hypoxic respiratory failure (now resolved), and KRISTIE, improving. Pt now

## 2021-01-25 NOTE — OCCUPATIONAL THERAPY INITIAL EVALUATION ADULT - MANUAL MUSCLE TESTING RESULTS, REHAB EVAL
bilateral shoulder flexion grossly assessed with partial AROM against gravity 2+/5, bilateral elbow flexion/extension grossly assessed with AROM against gravity 3/5, bilateral gross grasp 5/5

## 2021-01-25 NOTE — OCCUPATIONAL THERAPY INITIAL EVALUATION ADULT - SENSORY TESTS
pt denies any recent changes in sensation; pt reports chronic numbness/tingling to bilateral feet (right>left) from prior surgeries; pt with +capillary refill in bilateral hand digits

## 2021-01-25 NOTE — OCCUPATIONAL THERAPY INITIAL EVALUATION ADULT - LEVEL OF INDEPENDENCE:TOILET, OT EVAL
+urination on toilet during session; pt able to hygiene with adherence to sternal preacutions however guarding assistance required with standing tasks/contact guard

## 2021-01-25 NOTE — PROGRESS NOTE ADULT - PROBLEM SELECTOR PLAN 3
Creatinine better  Pt fluid overload, was on Bumex gtt, Bumex Stopped 1/23 am  Hold diuresis - consider renal eval for hyponatremia  Daily BMP

## 2021-01-25 NOTE — PROGRESS NOTE ADULT - SUBJECTIVE AND OBJECTIVE BOX
INTERVAL HPI/OVERNIGHT EVENTS: follow up of diabetes    MEDICATIONS  (STANDING):  acetaZOLAMIDE  IVPB 500 milliGRAM(s) IV Intermittent two times a day  ALPRAZolam 0.25 milliGRAM(s) Oral at bedtime  aMIOdarone    Tablet 200 milliGRAM(s) Oral daily  apixaban 2.5 milliGRAM(s) Oral every 12 hours  ascorbic acid 500 milliGRAM(s) Oral daily  aspirin enteric coated 81 milliGRAM(s) Oral daily  atorvastatin 40 milliGRAM(s) Oral at bedtime  dextrose 50% Injectable 50 milliLiter(s) IV Push every 15 minutes  dextrose 50% Injectable 25 milliLiter(s) IV Push every 15 minutes  gabapentin 300 milliGRAM(s) Oral three times a day  insulin glargine Injectable (LANTUS) 25 Unit(s) SubCutaneous at bedtime  insulin lispro (ADMELOG) corrective regimen sliding scale   SubCutaneous Before meals and at bedtime  insulin lispro Injectable (ADMELOG) 5 Unit(s) SubCutaneous three times a day before meals  lidocaine   Patch 1 Patch Transdermal daily  megestrol 400 milliGRAM(s) Oral daily  metoprolol tartrate 12.5 milliGRAM(s) Oral two times a day  multivitamin/minerals 1 Tablet(s) Oral daily  pantoprazole    Tablet 40 milliGRAM(s) Oral daily  polyethylene glycol 3350 17 Gram(s) Oral daily  potassium chloride    Tablet ER 40 milliEquivalent(s) Oral every 4 hours  senna 2 Tablet(s) Oral at bedtime  sodium chloride 0.9% lock flush 3 milliLiter(s) IV Push every 8 hours    MEDICATIONS  (PRN):  acetaminophen   Tablet .. 650 milliGRAM(s) Oral every 6 hours PRN Mild Pain (1 - 3)  oxyCODONE    IR 5 milliGRAM(s) Oral every 4 hours PRN Moderate Pain (4 - 6)  oxyCODONE    IR 10 milliGRAM(s) Oral every 4 hours PRN Severe Pain (7 - 10)      Allergies    warfarin (Rash)    Intolerances    OHS (Unknown)      Review of systems: appetite fair    Vital Signs Last 24 Hrs  T(C): 36.8 (25 Jan 2021 09:35), Max: 36.8 (25 Jan 2021 09:35)  T(F): 98.2 (25 Jan 2021 09:35), Max: 98.2 (25 Jan 2021 09:35)  HR: 62 (25 Jan 2021 09:35) (62 - 73)  BP: 99/60 (25 Jan 2021 09:35) (99/60 - 118/70)  BP(mean): --  RR: 16 (25 Jan 2021 09:35) (16 - 18)  SpO2: 98% (25 Jan 2021 09:35) (94% - 100%)      LABS:                        9.4    12.32 )-----------( 300      ( 25 Jan 2021 07:04 )             28.9     01-25    131<L>  |  86<L>  |  40.0<H>  ----------------------------<  55<L>  3.2<L>   |  30.0<H>  |  1.28    Ca    8.3<L>      25 Jan 2021 07:04  Mg     2.6     01-25            POCT Blood Glucose.: 193 mg/dL (01-25-21 @ 12:27)  POCT Blood Glucose.: 75 mg/dL (01-25-21 @ 08:44)  POCT Blood Glucose.: 170 mg/dL (01-24-21 @ 21:50)  POCT Blood Glucose.: 131 mg/dL (01-24-21 @ 17:29)  POCT Blood Glucose.: 172 mg/dL (01-24-21 @ 11:38)  POCT Blood Glucose.: 123 mg/dL (01-24-21 @ 08:48)  POCT Blood Glucose.: 59 mg/dL (01-24-21 @ 08:19)  POCT Blood Glucose.: 61 mg/dL (01-24-21 @ 08:13)  POCT Blood Glucose.: 174 mg/dL (01-23-21 @ 21:15)  POCT Blood Glucose.: 134 mg/dL (01-23-21 @ 17:25)  POCT Blood Glucose.: 143 mg/dL (01-23-21 @ 11:37)  POCT Blood Glucose.: 146 mg/dL (01-23-21 @ 07:53)  POCT Blood Glucose.: 121 mg/dL (01-22-21 @ 21:42)  POCT Blood Glucose.: 121 mg/dL (01-22-21 @ 16:08)      RADIOLOGY & ADDITIONAL TESTS:

## 2021-01-25 NOTE — OCCUPATIONAL THERAPY INITIAL EVALUATION ADULT - ADDITIONAL COMMENTS
Pt lives in house with 3+1 HONG and 3 steps inside up to bedroom and bathroom. Bathroom has bathtub with curtains. Pt does not own any DME. Pt is right handed. Pt drives. Pt's  is able and available to assist upon discharge. Pt's grandson works however otherwise able to assist.

## 2021-01-25 NOTE — CHART NOTE - NSCHARTNOTEFT_GEN_A_CORE
Source: Patient [x ]  Family [ ]   other [ ]    Current Diet: Diet, Regular:   Consistent Carbohydrate {No Snacks} (CSTCHO)  DASH/TLC {Sodium & Cholesterol Restricted} (DASH)  1000mL Fluid Restriction ([id number])  Supplement Feeding Modality:  Oral  Glucerna Shake Cans or Servings Per Day:  3       Frequency:  Three Times a day (01-25-21 @ 09:41)    PO intake:  Pt reports improved po intake this morning.  Pt states limited appetite x 4 days following cardiac sx.    Current Weight:   (1/25) 203.7 lbs  (1/19) 189 lbs   (1/14) 211 lbs   (1/11) 189 lbs      % Weight Change - wt fluctuations noted, will continue to monitor.  Aware pt with 1+ trace generalized edema noted.    Pertinent Medications: MEDICATIONS  (STANDING):  acetaZOLAMIDE  IVPB 500 milliGRAM(s) IV Intermittent two times a day  ALPRAZolam 0.25 milliGRAM(s) Oral at bedtime  aMIOdarone    Tablet 200 milliGRAM(s) Oral daily  apixaban 2.5 milliGRAM(s) Oral every 12 hours  ascorbic acid 500 milliGRAM(s) Oral daily  aspirin enteric coated 81 milliGRAM(s) Oral daily  atorvastatin 40 milliGRAM(s) Oral at bedtime  dextrose 50% Injectable 50 milliLiter(s) IV Push every 15 minutes  dextrose 50% Injectable 25 milliLiter(s) IV Push every 15 minutes  gabapentin 300 milliGRAM(s) Oral three times a day  insulin glargine Injectable (LANTUS) 25 Unit(s) SubCutaneous at bedtime  insulin lispro (ADMELOG) corrective regimen sliding scale   SubCutaneous Before meals and at bedtime  insulin lispro Injectable (ADMELOG) 5 Unit(s) SubCutaneous three times a day before meals  lidocaine   Patch 1 Patch Transdermal daily  megestrol 400 milliGRAM(s) Oral daily  metoprolol tartrate 12.5 milliGRAM(s) Oral two times a day  multivitamin/minerals 1 Tablet(s) Oral daily  pantoprazole    Tablet 40 milliGRAM(s) Oral daily  polyethylene glycol 3350 17 Gram(s) Oral daily  potassium chloride    Tablet ER 40 milliEquivalent(s) Oral every 4 hours  senna 2 Tablet(s) Oral at bedtime  sodium chloride 0.9% lock flush 3 milliLiter(s) IV Push every 8 hours    MEDICATIONS  (PRN):  acetaminophen   Tablet .. 650 milliGRAM(s) Oral every 6 hours PRN Mild Pain (1 - 3)  oxyCODONE    IR 5 milliGRAM(s) Oral every 4 hours PRN Moderate Pain (4 - 6)  oxyCODONE    IR 10 milliGRAM(s) Oral every 4 hours PRN Severe Pain (7 - 10)    Pertinent Labs: CBC Full  -  ( 25 Jan 2021 07:04 )  WBC Count : 12.32 K/uL  RBC Count : 3.39 M/uL  Hemoglobin : 9.4 g/dL  Hematocrit : 28.9 %  Platelet Count - Automated : 300 K/uL  Mean Cell Volume : 85.3 fl  Mean Cell Hemoglobin : 27.7 pg  Mean Cell Hemoglobin Concentration : 32.5 gm/dL  01-25 Na131 mmol/L<L> Glu 55 mg/dL<L> K+ 3.2 mmol/L<L> Cr  1.28 mg/dL BUN 40.0 mg/dL<H> Phos n/a   Alb n/a   PAB n/a       Skin: sx left groin    Nutrition focused physical exam previously conducted - found signs of malnutrition [ ]absent [x ]present    Subcutaneous fat loss: [x ] Orbital fat pads region, [x ]Buccal fat region, [ ]Triceps region,  [ ]Ribs region    Muscle wasting: [x ]Temples region, [x ]Clavicle region, [x ]Shoulder region, [ ]Scapula region, [ ]Interosseous region,  [ ]thigh region, [ ]Calf region    Current Nutrition Diagnosis: Pt remains at high nutrition risk secondary to moderate protein calorie malnutrition (acute) related to inability to consume sufficient protein energy intake with poor appetite following  C3L/DUANE Clip/MV Repair and DUANE thrombectomy as evidenced by pt meeting <75% estimated energy intake > 7 days, mild muscle wasting and fat loss.  Pt reports improved appetite/po intake this morning.     Recommendations:   1. Continue with Glucerna TID    2. Continue with MVI and Vit. C daily  3. Monitor daily wts    Monitoring and Evaluation:   [ x] PO intake [x ] Tolerance to diet prescription [X] Weights  [X] Follow up per protocol [X] Labs: Source: Patient [x ]  Family [ ]   other [ ]    Current Diet: Diet, Regular:   Consistent Carbohydrate {No Snacks} (CSTCHO)  DASH/TLC {Sodium & Cholesterol Restricted} (DASH)  1000mL Fluid Restriction ([id number])  Supplement Feeding Modality:  Oral  Glucerna Shake Cans or Servings Per Day:  3       Frequency:  Three Times a day (01-25-21 @ 09:41)    PO intake:  Pt reports improved po intake this morning- sips on Glucerna supplements.  Pt states limited appetite x 4 days following cardiac sx.    Current Weight:   (1/25) 203.7 lbs  (1/19) 189 lbs   (1/14) 211 lbs   (1/11) 189 lbs      % Weight Change - wt fluctuations noted, will continue to monitor.  Aware pt with 1+ trace generalized edema noted.    Pertinent Medications: MEDICATIONS  (STANDING):  acetaZOLAMIDE  IVPB 500 milliGRAM(s) IV Intermittent two times a day  ALPRAZolam 0.25 milliGRAM(s) Oral at bedtime  aMIOdarone    Tablet 200 milliGRAM(s) Oral daily  apixaban 2.5 milliGRAM(s) Oral every 12 hours  ascorbic acid 500 milliGRAM(s) Oral daily  aspirin enteric coated 81 milliGRAM(s) Oral daily  atorvastatin 40 milliGRAM(s) Oral at bedtime  dextrose 50% Injectable 50 milliLiter(s) IV Push every 15 minutes  dextrose 50% Injectable 25 milliLiter(s) IV Push every 15 minutes  gabapentin 300 milliGRAM(s) Oral three times a day  insulin glargine Injectable (LANTUS) 25 Unit(s) SubCutaneous at bedtime  insulin lispro (ADMELOG) corrective regimen sliding scale   SubCutaneous Before meals and at bedtime  insulin lispro Injectable (ADMELOG) 5 Unit(s) SubCutaneous three times a day before meals  lidocaine   Patch 1 Patch Transdermal daily  megestrol 400 milliGRAM(s) Oral daily  metoprolol tartrate 12.5 milliGRAM(s) Oral two times a day  multivitamin/minerals 1 Tablet(s) Oral daily  pantoprazole    Tablet 40 milliGRAM(s) Oral daily  polyethylene glycol 3350 17 Gram(s) Oral daily  potassium chloride    Tablet ER 40 milliEquivalent(s) Oral every 4 hours  senna 2 Tablet(s) Oral at bedtime  sodium chloride 0.9% lock flush 3 milliLiter(s) IV Push every 8 hours    MEDICATIONS  (PRN):  acetaminophen   Tablet .. 650 milliGRAM(s) Oral every 6 hours PRN Mild Pain (1 - 3)  oxyCODONE    IR 5 milliGRAM(s) Oral every 4 hours PRN Moderate Pain (4 - 6)  oxyCODONE    IR 10 milliGRAM(s) Oral every 4 hours PRN Severe Pain (7 - 10)    Pertinent Labs: CBC Full  -  ( 25 Jan 2021 07:04 )  WBC Count : 12.32 K/uL  RBC Count : 3.39 M/uL  Hemoglobin : 9.4 g/dL  Hematocrit : 28.9 %  Platelet Count - Automated : 300 K/uL  Mean Cell Volume : 85.3 fl  Mean Cell Hemoglobin : 27.7 pg  Mean Cell Hemoglobin Concentration : 32.5 gm/dL  01-25 Na131 mmol/L<L> Glu 55 mg/dL<L> K+ 3.2 mmol/L<L> Cr  1.28 mg/dL BUN 40.0 mg/dL<H> Phos n/a   Alb n/a   PAB n/a       Skin: sx left groin    Nutrition focused physical exam previously conducted - found signs of malnutrition [ ]absent [x ]present    Subcutaneous fat loss: [x ] Orbital fat pads region, [x ]Buccal fat region, [ ]Triceps region,  [ ]Ribs region    Muscle wasting: [x ]Temples region, [x ]Clavicle region, [x ]Shoulder region, [ ]Scapula region, [ ]Interosseous region,  [ ]thigh region, [ ]Calf region    Current Nutrition Diagnosis: Pt remains at high nutrition risk secondary to moderate protein calorie malnutrition (acute) related to inability to consume sufficient protein energy intake with poor appetite following  C3L/DUANE Clip/MV Repair and DUANE thrombectomy as evidenced by pt meeting <75% estimated energy intake > 7 days, mild muscle wasting and fat loss.  Pt reports improved appetite/po intake this morning.     Recommendations:   1. Continue with Glucerna TID    2. Continue with MVI and Vit. C daily  3. Monitor daily wts    Monitoring and Evaluation:   [ x] PO intake [x ] Tolerance to diet prescription [X] Weights  [X] Follow up per protocol [X] Labs:

## 2021-01-25 NOTE — PROGRESS NOTE ADULT - SUBJECTIVE AND OBJECTIVE BOX
Subjective: "I think I am feeling better, just tired" NAD denies cp, sob.     Pertinent events of the past 24 hours: Uneventful, recovering as expected    VITAL SIGNS  Vital Signs Last 24 Hrs  T(C): 36.8 (21 @ 16:10), Max: 36.8 (21 @ 09:35)  T(F): 98.2 (21 @ 16:10), Max: 98.2 (21 @ 09:35)  HR: 71 (21 @ 16:10) (62 - 73)  BP: 111/61 (21 @ 16:10) (99/60 - 118/70)  RR: 18 (21 @ 16:10) (16 - 18)  SpO2: 97% (21 @ 16:10) (94% - 100%)  on RA    Telemetry/Alarms:  AF  LVEF: 40    MEDICATIONS  acetaminophen   Tablet .. 650 milliGRAM(s) Oral every 6 hours PRN  acetaZOLAMIDE  IVPB 500 milliGRAM(s) IV Intermittent two times a day  ALPRAZolam 0.25 milliGRAM(s) Oral at bedtime  aMIOdarone    Tablet 200 milliGRAM(s) Oral daily  apixaban 2.5 milliGRAM(s) Oral every 12 hours  ascorbic acid 500 milliGRAM(s) Oral daily  aspirin enteric coated 81 milliGRAM(s) Oral daily  atorvastatin 40 milliGRAM(s) Oral at bedtime  dextrose 50% Injectable 50 milliLiter(s) IV Push every 15 minutes  dextrose 50% Injectable 25 milliLiter(s) IV Push every 15 minutes  gabapentin 300 milliGRAM(s) Oral three times a day  insulin glargine Injectable (LANTUS) 25 Unit(s) SubCutaneous at bedtime  insulin lispro (ADMELOG) corrective regimen sliding scale   SubCutaneous Before meals and at bedtime  insulin lispro Injectable (ADMELOG) 7 Unit(s) SubCutaneous three times a day before meals  lidocaine   Patch 1 Patch Transdermal daily  megestrol 400 milliGRAM(s) Oral daily  metoprolol tartrate 12.5 milliGRAM(s) Oral two times a day  multivitamin/minerals 1 Tablet(s) Oral daily  oxyCODONE    IR 5 milliGRAM(s) Oral every 4 hours PRN  oxyCODONE    IR 10 milliGRAM(s) Oral every 4 hours PRN  pantoprazole    Tablet 40 milliGRAM(s) Oral daily  polyethylene glycol 3350 17 Gram(s) Oral daily  potassium chloride    Tablet ER 40 milliEquivalent(s) Oral every 4 hours  senna 2 Tablet(s) Oral at bedtime  sodium chloride 0.9% lock flush 3 milliLiter(s) IV Push every 8 hours      PHYSICAL EXAM  General: well nourished, well developed, no acute distress  Neurology: alert and oriented x 3, nonfocal, no gross deficits  Respiratory: clear to auscultation bilaterally  CV: irregular + faint systolic murmur  Abdomen: soft, nontender, nondistended, positive bowel sounds, last bowel movement today  Extremities: warm, well perfused. +1 R > L edema. + DP pulses  Incisions: midline sternal incision, + mepilex> removed, staples> removed, c/d/i. sternum stable. L [facility] site c/d/i       @ 07: @ 07:00  --------------------------------------------------------  IN: 805.2 mL / OUT: 550 mL / NET: 255.2 mL     @ 07: @ 17:32  --------------------------------------------------------  IN: 0 mL / OUT: 651 mL / NET: -651 mL        Weights:  Daily     Daily Weight in k.4 (2021 06:00)  Admit Wt: Drug Dosing Weight  Height (cm): 177.8 (2021 06:50)  Weight (kg): 86 (2021 06:50)  BMI (kg/m2): 27.2 (2021 06:50)  BSA (m2): 2.04 (2021 06:50)    All laboratory results, radiology and medications reviewed.    LABS      128<L>  |  85<L>  |  43.0<H>  ----------------------------<  231<H>  3.3<L>   |  28.0  |  1.22    Ca    8.5<L>      2021 16:17  Mg     2.6                                        9.4    12.32 )-----------( 300      ( 2021 07:04 )             28.9              CAPILLARY BLOOD GLUCOSE      POCT Blood Glucose.: 226 mg/dL (2021 16:56)  POCT Blood Glucose.: 193 mg/dL (2021 12:27)  POCT Blood Glucose.: 75 mg/dL (2021 08:44)  POCT Blood Glucose.: 170 mg/dL (2021 21:50)           Last CXR:  < from: Xray Chest 1 View- PORTABLE-Routine (Xray Chest 1 View- PORTABLE-Routine in AM.) (21 @ 05:56) >    IMPRESSION:  Status post cardiac surgery.  No evidence of active chest pathology.      < end of copied text >    Last EKG:   < from: 12 Lead ECG (21 @ 05:06) >    Ventricular Rate 64 BPM    Atrial Rate 47 BPM    QRS Duration 98 ms    Q-T Interval 460 ms    QTC Calculation(Bazett) 474 ms    R Axis 46 degrees    T Axis 93 degrees    Diagnosis Line Atrial fibrillation  Nonspecific ST and T wave abnormality  Abnormal ECG    Confirmed by Panfilo Peralta ([id number]) on 2021 10:18:33 PM    < end of copied text >    PAST MEDICAL & SURGICAL HISTORY:  PAD (peripheral artery disease)    Paroxysmal atrial fibrillation    Hypercholesterolemia    Diabetes    Hypertension    S/P peripheral artery angioplasty with stent placement    S/P femoral-popliteal bypass surgery    H/O hernia repair    H/O abdominal hysterectomy

## 2021-01-25 NOTE — PROGRESS NOTE ADULT - SUBJECTIVE AND OBJECTIVE BOX
Patient appears much more improved.  But she feels depressed given her social circumstances.    REVIEW OF SYSTEMS  Constitutional - No fever,  +fatigue  HEENT - No vertigo, No neck pain  Neurological - No headaches, No memory loss, +loss of strength, No numbness, No tremors  Musculoskeletal - No joint pain, +joint swelling, No muscle pain  Psychiatric - +depression, +anxiety    FUNCTIONAL PROGRESS  1/22  Sit-Stand Transfer Training  Sit-to-Stand Transfer Training Rehab Potential: good, to achieve stated therapy goals  Transfer Training Sit-to-Stand Transfer: moderate assist (50% patient effort);  2 person assist;  verbal cues  Transfer Training Stand-to-Sit Transfer: moderate assist (50% patient effort);  2 person assist;  verbal cues    Gait Training  Gait Training Rehab Potential: good, to achieve stated therapy goals  Gait Training: moderate assist (50% patient effort);  2 person assist;  verbal cues;  rolling walker;  5 feet  Gait Analysis: 3-point gait   decreased hip/knee flexion;  decreased step length;  5 feet;  rolling walker        VITALS  T(C): 36.8 (01-25-21 @ 09:35), Max: 36.9 (01-24-21 @ 15:25)  HR: 62 (01-25-21 @ 09:35) (62 - 86)  BP: 99/60 (01-25-21 @ 09:35) (99/60 - 118/70)  RR: 16 (01-25-21 @ 09:35) (16 - 25)  SpO2: 98% (01-25-21 @ 09:35) (94% - 100%)  Wt(kg): --    MEDICATIONS   acetaminophen   Tablet .. 650 milliGRAM(s) every 6 hours PRN  acetaZOLAMIDE  IVPB 500 milliGRAM(s) two times a day  ALPRAZolam 0.25 milliGRAM(s) at bedtime  aMIOdarone    Tablet 200 milliGRAM(s) daily  apixaban 2.5 milliGRAM(s) every 12 hours  ascorbic acid 500 milliGRAM(s) daily  aspirin enteric coated 81 milliGRAM(s) daily  atorvastatin 40 milliGRAM(s) at bedtime  dextrose 50% Injectable 50 milliLiter(s) every 15 minutes  dextrose 50% Injectable 25 milliLiter(s) every 15 minutes  gabapentin 300 milliGRAM(s) three times a day  insulin glargine Injectable (LANTUS) 25 Unit(s) at bedtime  insulin lispro (ADMELOG) corrective regimen sliding scale   Before meals and at bedtime  insulin lispro Injectable (ADMELOG) 5 Unit(s) three times a day before meals  lidocaine   Patch 1 Patch daily  megestrol 400 milliGRAM(s) daily  metoprolol tartrate 12.5 milliGRAM(s) two times a day  multivitamin/minerals 1 Tablet(s) daily  oxyCODONE    IR 5 milliGRAM(s) every 4 hours PRN  oxyCODONE    IR 10 milliGRAM(s) every 4 hours PRN  pantoprazole    Tablet 40 milliGRAM(s) daily  polyethylene glycol 3350 17 Gram(s) daily  potassium chloride    Tablet ER 40 milliEquivalent(s) every 4 hours  senna 2 Tablet(s) at bedtime  sodium chloride 0.9% lock flush 3 milliLiter(s) every 8 hours      RECENT LABS/IMAGING                          9.4    12.32 )-----------( 300      ( 25 Jan 2021 07:04 )             28.9     01-25    131<L>  |  86<L>  |  40.0<H>  ----------------------------<  55<L>  3.2<L>   |  30.0<H>  |  1.28    Ca    8.3<L>      25 Jan 2021 07:04  Mg     2.6     01-25                    GERALDINE 1/14 - Summary:   1. Moderately decreased global left ventricular systolic function. Left ventricular ejection fraction, by visual estimation, is 35 to 40%.   2. Normal right ventricular size and function.   3. Mild to moderately enlarged left atrium.   4. Mildly enlarged right atrium.   5. Moderate mitral valve regurgitation.   6. Mild-moderate tricuspid regurgitation.   7. Mild aortic valve stenosis. The non-coronary cusp is calcified and immobile, but the right and left coronary cusps open normally. The aortic valve area is 1.6 cm2 by planimetry.   8. No left atrial appendage thrombus. There is spontaneous echo contrast seen in the left atrial appendage, but there is no filling defect seen with the administration of Definity.    CXR 1/21 - Single frontal view of the chest demonstrates mild CHF, unchanged. Line and tubes are unchanged. Mediastinal sternotomy wires. The cardiomediastinal silhouette is enlarged. No acute osseous abnormalities. Overlying EKG leads and wires are noted    CXR 1/21 - Single frontal view of the chest demonstrates mild congestive changes. Line and tubes are unchanged. The cardiomediastinal silhouette is enlarged. No acute osseous abnormalities. Overlying EKG leads and wires are noted    CXR 1/24 -  HEART:  Enlarged. Mitral valve replacement. Left atrial appendage clip LUNGS: No consolidation or effusion. Mild congestive changes. No pneumothorax. BONES: Sternal wires are seen.  ----------------------------------------------------------------------------------------  PHYSICAL EXAM  Constitutional - NAD, Comfortable  Extremities - Large right thigh hematoma   Neurologic Exam -                    Motor - No focal deficits     Sensory - Intact to LT     Balance - WNL Static  Psychiatric - Mood depressed, anxious  ----------------------------------------------------------------------------------------  ASSESSMENT/PLAN  75yFemale with functional deficits after SOB/severe CAD  C3/DUANE Clip/MV Repair and DUANE thrombectomy - ASA, Lipitor  AFIB - Eliquis, Amiodarone  HTN - Lopressor  HypoNa+ - 1L fluid restriction  DM2 - Lantus, Lispro  Mood - Xanax  Pain - Tylenol, Oxycodone, Neurontin, Lidoderm, Dilaudid  DVT PPX - SCDs, Lovenox  Rehab - Medically being optimized. Recommend ACUTE inpatient rehabilitation for the functional deficits consisting of 3 hours of therapy/day & 24 hour RN/daily PMR physician for comorbid medical management. Will continue to follow for ongoing rehab needs and recommendations. Patient will be able to tolerate 3 hours a day.    Continue bedside therapy as well as OOB throughout the day with mobilization throughout the day with staff to maintain cardiopulmonary function and prevention of secondary complications related to debility.     Discussed with rehab clinical team.

## 2021-01-25 NOTE — PROGRESS NOTE ADULT - PROBLEM SELECTOR PLAN 4
HA1c on 7.8 on Metformin and Tresiba as an outpatient.  Insulin gtt transitioned to Lantus, Pre meal and ROBERT ACHS  Hypoglycemic this morning > Lantus decreased   Premeal also decreased but then again increased with postprandial hyperglycemia  Endocrine following closely  Continue Megace

## 2021-01-25 NOTE — PROGRESS NOTE ADULT - ASSESSMENT
Assessment  s/p CABG/ MV repair/ :LA ligation for LM disease EF 45%  Mild to mod AS  LA thrombus seen intraop  chronic AF with CVR back on eliquis  thigh hematoma from preadmission trauma  HTN  DM      Rec  cont curent meds, discussed lowering eliquis with Dr Orellana given thigh hematoma  consider switching amio to lopressor as pt will be in Af chronically  FU echo  aggressive PT

## 2021-01-25 NOTE — OCCUPATIONAL THERAPY INITIAL EVALUATION ADULT - SPECIAL TRAINING, OT EVAL
Functional mobility with minimum assistance and RW due to decreased strength, decreased postural control and decreased balance; cues for foot placement, positioning with relation to RW and RW management with environment/obstacle negotiation. Pt requires increased time and verbal/tactile cues throughout mobility for safety.

## 2021-01-26 ENCOUNTER — TRANSCRIPTION ENCOUNTER (OUTPATIENT)
Age: 76
End: 2021-01-26

## 2021-01-26 LAB
ANION GAP SERPL CALC-SCNC: 17 MMOL/L — SIGNIFICANT CHANGE UP (ref 5–17)
APPEARANCE UR: CLEAR — SIGNIFICANT CHANGE UP
BACTERIA # UR AUTO: ABNORMAL
BILIRUB UR-MCNC: ABNORMAL
BUN SERPL-MCNC: 41 MG/DL — HIGH (ref 8–20)
CALCIUM SERPL-MCNC: 8.5 MG/DL — LOW (ref 8.6–10.2)
CHLORIDE SERPL-SCNC: 89 MMOL/L — LOW (ref 98–107)
CO2 SERPL-SCNC: 27 MMOL/L — SIGNIFICANT CHANGE UP (ref 22–29)
COLOR SPEC: YELLOW — SIGNIFICANT CHANGE UP
CREAT SERPL-MCNC: 1.15 MG/DL — SIGNIFICANT CHANGE UP (ref 0.5–1.3)
DIFF PNL FLD: NEGATIVE — SIGNIFICANT CHANGE UP
EPI CELLS # UR: SIGNIFICANT CHANGE UP
GLUCOSE BLDC GLUCOMTR-MCNC: 148 MG/DL — HIGH (ref 70–99)
GLUCOSE BLDC GLUCOMTR-MCNC: 177 MG/DL — HIGH (ref 70–99)
GLUCOSE BLDC GLUCOMTR-MCNC: 219 MG/DL — HIGH (ref 70–99)
GLUCOSE BLDC GLUCOMTR-MCNC: 245 MG/DL — HIGH (ref 70–99)
GLUCOSE SERPL-MCNC: 157 MG/DL — HIGH (ref 70–99)
GLUCOSE UR QL: NEGATIVE MG/DL — SIGNIFICANT CHANGE UP
HCT VFR BLD CALC: 31.6 % — LOW (ref 34.5–45)
HGB BLD-MCNC: 9.6 G/DL — LOW (ref 11.5–15.5)
KETONES UR-MCNC: NEGATIVE — SIGNIFICANT CHANGE UP
LEUKOCYTE ESTERASE UR-ACNC: NEGATIVE — SIGNIFICANT CHANGE UP
MAGNESIUM SERPL-MCNC: 2.8 MG/DL — HIGH (ref 1.6–2.6)
MCHC RBC-ENTMCNC: 27.3 PG — SIGNIFICANT CHANGE UP (ref 27–34)
MCHC RBC-ENTMCNC: 30.4 GM/DL — LOW (ref 32–36)
MCV RBC AUTO: 89.8 FL — SIGNIFICANT CHANGE UP (ref 80–100)
NITRITE UR-MCNC: NEGATIVE — SIGNIFICANT CHANGE UP
PH UR: 7 — SIGNIFICANT CHANGE UP (ref 5–8)
PLATELET # BLD AUTO: 303 K/UL — SIGNIFICANT CHANGE UP (ref 150–400)
POTASSIUM SERPL-MCNC: 3.4 MMOL/L — LOW (ref 3.5–5.3)
POTASSIUM SERPL-SCNC: 3.4 MMOL/L — LOW (ref 3.5–5.3)
PROT UR-MCNC: 30 MG/DL
RBC # BLD: 3.52 M/UL — LOW (ref 3.8–5.2)
RBC # FLD: 16.8 % — HIGH (ref 10.3–14.5)
RBC CASTS # UR COMP ASSIST: SIGNIFICANT CHANGE UP /HPF (ref 0–4)
SODIUM SERPL-SCNC: 133 MMOL/L — LOW (ref 135–145)
SP GR SPEC: 1 — LOW (ref 1.01–1.02)
UROBILINOGEN FLD QL: 8 MG/DL
WBC # BLD: 10.61 K/UL — HIGH (ref 3.8–10.5)
WBC # FLD AUTO: 10.61 K/UL — HIGH (ref 3.8–10.5)
WBC UR QL: SIGNIFICANT CHANGE UP

## 2021-01-26 PROCEDURE — 99233 SBSQ HOSP IP/OBS HIGH 50: CPT

## 2021-01-26 PROCEDURE — 71045 X-RAY EXAM CHEST 1 VIEW: CPT | Mod: 26

## 2021-01-26 RX ORDER — POTASSIUM CHLORIDE 20 MEQ
40 PACKET (EA) ORAL EVERY 4 HOURS
Refills: 0 | Status: COMPLETED | OUTPATIENT
Start: 2021-01-26 | End: 2021-01-26

## 2021-01-26 RX ADMIN — Medication 7 UNIT(S): at 08:31

## 2021-01-26 RX ADMIN — Medication 7 UNIT(S): at 17:14

## 2021-01-26 RX ADMIN — Medication 1 TABLET(S): at 10:50

## 2021-01-26 RX ADMIN — LIDOCAINE 1 PATCH: 4 CREAM TOPICAL at 17:16

## 2021-01-26 RX ADMIN — Medication 20 MILLIGRAM(S): at 10:50

## 2021-01-26 RX ADMIN — Medication 81 MILLIGRAM(S): at 08:34

## 2021-01-26 RX ADMIN — Medication 7 UNIT(S): at 12:25

## 2021-01-26 RX ADMIN — ATORVASTATIN CALCIUM 40 MILLIGRAM(S): 80 TABLET, FILM COATED ORAL at 22:37

## 2021-01-26 RX ADMIN — Medication 0.25 MILLIGRAM(S): at 22:32

## 2021-01-26 RX ADMIN — MEGESTROL ACETATE 400 MILLIGRAM(S): 40 SUSPENSION ORAL at 10:51

## 2021-01-26 RX ADMIN — SODIUM CHLORIDE 3 MILLILITER(S): 9 INJECTION INTRAMUSCULAR; INTRAVENOUS; SUBCUTANEOUS at 13:52

## 2021-01-26 RX ADMIN — SODIUM CHLORIDE 1 GRAM(S): 9 INJECTION INTRAMUSCULAR; INTRAVENOUS; SUBCUTANEOUS at 22:32

## 2021-01-26 RX ADMIN — Medication 4: at 22:32

## 2021-01-26 RX ADMIN — INSULIN GLARGINE 25 UNIT(S): 100 INJECTION, SOLUTION SUBCUTANEOUS at 22:32

## 2021-01-26 RX ADMIN — Medication 500 MILLIGRAM(S): at 08:34

## 2021-01-26 RX ADMIN — Medication 12.5 MILLIGRAM(S): at 17:16

## 2021-01-26 RX ADMIN — Medication 40 MILLIEQUIVALENT(S): at 10:50

## 2021-01-26 RX ADMIN — PANTOPRAZOLE SODIUM 40 MILLIGRAM(S): 20 TABLET, DELAYED RELEASE ORAL at 05:36

## 2021-01-26 RX ADMIN — SODIUM CHLORIDE 1 GRAM(S): 9 INJECTION INTRAMUSCULAR; INTRAVENOUS; SUBCUTANEOUS at 05:33

## 2021-01-26 RX ADMIN — SODIUM CHLORIDE 3 MILLILITER(S): 9 INJECTION INTRAMUSCULAR; INTRAVENOUS; SUBCUTANEOUS at 22:38

## 2021-01-26 RX ADMIN — AMIODARONE HYDROCHLORIDE 200 MILLIGRAM(S): 400 TABLET ORAL at 05:36

## 2021-01-26 RX ADMIN — Medication 2: at 08:31

## 2021-01-26 RX ADMIN — SODIUM CHLORIDE 3 MILLILITER(S): 9 INJECTION INTRAMUSCULAR; INTRAVENOUS; SUBCUTANEOUS at 05:37

## 2021-01-26 RX ADMIN — GABAPENTIN 300 MILLIGRAM(S): 400 CAPSULE ORAL at 05:36

## 2021-01-26 RX ADMIN — Medication 4: at 17:14

## 2021-01-26 RX ADMIN — SODIUM CHLORIDE 1 GRAM(S): 9 INJECTION INTRAMUSCULAR; INTRAVENOUS; SUBCUTANEOUS at 13:50

## 2021-01-26 RX ADMIN — Medication 12.5 MILLIGRAM(S): at 05:33

## 2021-01-26 RX ADMIN — Medication 40 MILLIEQUIVALENT(S): at 13:50

## 2021-01-26 RX ADMIN — GABAPENTIN 300 MILLIGRAM(S): 400 CAPSULE ORAL at 13:50

## 2021-01-26 RX ADMIN — GABAPENTIN 300 MILLIGRAM(S): 400 CAPSULE ORAL at 22:37

## 2021-01-26 RX ADMIN — APIXABAN 2.5 MILLIGRAM(S): 2.5 TABLET, FILM COATED ORAL at 05:35

## 2021-01-26 RX ADMIN — APIXABAN 2.5 MILLIGRAM(S): 2.5 TABLET, FILM COATED ORAL at 17:15

## 2021-01-26 RX ADMIN — SENNA PLUS 2 TABLET(S): 8.6 TABLET ORAL at 22:32

## 2021-01-26 NOTE — DISCHARGE NOTE NURSING/CASE MANAGEMENT/SOCIAL WORK - PATIENT PORTAL LINK FT
You can access the FollowMyHealth Patient Portal offered by [facility] by registering at the following website: [email or web address]. By joining [facility]’s FollowMyHealth portal, you will also be able to view your health information using other applications (apps) compatible with our system.

## 2021-01-26 NOTE — PROGRESS NOTE ADULT - PROBLEM SELECTOR PLAN 4
HA1c on 7.8 on Metformin and Tresiba as an outpatient.   Lantus, Pre meal and ROBERT ACHS  Endocrine following   Continue Megace

## 2021-01-26 NOTE — PROGRESS NOTE ADULT - SUBJECTIVE AND OBJECTIVE BOX
Subjective:  "Hello, Im ok, just waiting to go to rehab"  POOB chair, shower this am      Tele:  Afib 70s           V/S:                    T(F): 97.9 (21 @ 15:51), Max: 98.4 (21 @ 10:14)  HR: 61 (21 @ 15:51) (61 - 73)  BP: 108/61 (21 @ 15:51) (103/51 - 114/64)  RR: 17 (21 @ 15:51) (17 - 20)  SpO2: 100% (21 @ 15:51) (96% - 100%)  Wt(kg): --      LV EF:      Labs:      133<L>  |  89<L>  |  41.0<H>  ----------------------------<  157<H>  3.4<L>   |  27.0  |  1.15    Ca    8.5<L>      2021 07:04  Mg     2.8                                      9.6    10.61 )-----------( 303      ( 2021 07:04 )             31.6             CAPILLARY BLOOD GLUCOSE      POCT Blood Glucose.: 148 mg/dL (2021 12:16)  POCT Blood Glucose.: 177 mg/dL (2021 07:58)  POCT Blood Glucose.: 250 mg/dL (2021 21:42)  POCT Blood Glucose.: 226 mg/dL (2021 16:56)           CXR:    I&O's Detail    2021 07:01  -  2021 07:00  --------------------------------------------------------  IN:    Oral Fluid: 360 mL  Total IN: 360 mL    OUT:    Stool (mL): 1 mL    Voided (mL): 650 mL  Total OUT: 651 mL    Total NET: -291 mL      2021 07:01  -  2021 16:12  --------------------------------------------------------  IN:    Oral Fluid: 750 mL  Total IN: 750 mL    OUT:  Total OUT: 0 mL    Total NET: 750 mL    BOWEL MOVEMENT:  [x] YES [ ] NO      Daily     Daily Weight in k.9 (2021 06:37)  Medications:  acetaminophen   Tablet .. 650 milliGRAM(s) Oral every 6 hours PRN  ALPRAZolam 0.25 milliGRAM(s) Oral at bedtime  aMIOdarone    Tablet 200 milliGRAM(s) Oral daily  apixaban 2.5 milliGRAM(s) Oral every 12 hours  ascorbic acid 500 milliGRAM(s) Oral daily  aspirin enteric coated 81 milliGRAM(s) Oral daily  atorvastatin 40 milliGRAM(s) Oral at bedtime  dextrose 50% Injectable 50 milliLiter(s) IV Push every 15 minutes  dextrose 50% Injectable 25 milliLiter(s) IV Push every 15 minutes  gabapentin 300 milliGRAM(s) Oral three times a day  insulin glargine Injectable (LANTUS) 25 Unit(s) SubCutaneous at bedtime  insulin lispro (ADMELOG) corrective regimen sliding scale   SubCutaneous Before meals and at bedtime  insulin lispro Injectable (ADMELOG) 7 Unit(s) SubCutaneous three times a day before meals  lidocaine   Patch 1 Patch Transdermal daily  megestrol 400 milliGRAM(s) Oral daily  metoprolol tartrate 12.5 milliGRAM(s) Oral two times a day  multivitamin/minerals 1 Tablet(s) Oral daily  oxyCODONE    IR 10 milliGRAM(s) Oral every 4 hours PRN  oxyCODONE    IR 5 milliGRAM(s) Oral every 4 hours PRN  pantoprazole    Tablet 40 milliGRAM(s) Oral daily  polyethylene glycol 3350 17 Gram(s) Oral daily  senna 2 Tablet(s) Oral at bedtime  sodium chloride 1 Gram(s) Oral three times a day  sodium chloride 0.9% lock flush 3 milliLiter(s) IV Push every 8 hours  torsemide 20 milliGRAM(s) Oral daily        Physical Exam:  Neurology: alert and oriented x 3,     Respiratory: clear to auscultation bilaterally    CV: irS1 S2  irreg  irreg    Abdomen: soft, nontender, nondistended, positive bowel sounds,     Extremities: warm, well perfused. +1 R > L edema. + DP pulses  Stable R thigh hematoma    Incisions: midline sternal incision, incision clean  sternum stable. L EVH site c/d/i                   PAST MEDICAL & SURGICAL HISTORY:  PAD (peripheral artery disease)    Paroxysmal atrial fibrillation    Hypercholesterolemia    Diabetes    Hypertension    S/P peripheral artery angioplasty with stent placement    S/P femoral-popliteal bypass surgery    H/O hernia repair    H/O abdominal hysterectomy

## 2021-01-26 NOTE — PROGRESS NOTE ADULT - SUBJECTIVE AND OBJECTIVE BOX
Patient is very fatigued.  She woke up at 430 and has been sitting in the chair since.  She realizes that she needs rehab and is noting some improvement.     REVIEW OF SYSTEMS  Constitutional - No fever,  +fatigue  HEENT - No vertigo, No neck pain  Neurological - No headaches, No memory loss, +loss of strength, No numbness, No tremors  Musculoskeletal - No joint pain, +joint swelling, +muscle pain  Psychiatric - +depression, No anxiety    FUNCTIONAL PROGRESS  1/25  Bathing Training:     · Level of [place]	minimum assist (75% patients effort); sponge in sitting  · Physical Assist/Nonphysical Assist	1 person assist; nonverbal cues (demo/gestures); verbal cues; set-up required    Upper Body Dressing Training:     · Level of [place]	moderate assist (50% patients effort); sternal bra strap adjustment and gown  · Physical Assist/Nonphysical Assist	1 person assist; nonverbal cues (demo/gestures); verbal cues    Lower Body Dressing Training:     · Level of [place]	moderate assist (50% patients effort); socks in sitting  · Physical Assist/Nonphysical Assist	1 person assist; nonverbal cues (demo/gestures); verbal cues    Toilet Hygiene Training:     · Level of [place]	contact guard; +urination on toilet during session; pt able to hygiene with adherence to sternal preacutions however guarding assistance required with standing tasks  · Physical Assist/Nonphysical Assist	1 person assist; nonverbal cues (demo/gestures); verbal cues    Grooming Training:     · Level of [place]	minimum assist (75% patients effort); in sitting  · Physical Assist/Nonphysical Assist	1 person assist; nonverbal cues (demo/gestures); verbal cues; set-up required    Eating/Self-Feeding Training:     · Level of [place]	not observed      Sit-Stand Transfer Training  Sit-to-Stand Transfer Training Rehab Potential: good, to achieve stated therapy goals  Sit-to-Stand Transfer Training Symptoms Noted During/After Treatment: lightheaded, symptoms decreased with breathing exercises  Transfer Training Sit-to-Stand Transfer: minimum assist (75% patient effort);  1 person assist;  full weight-bearing   rolling walker;  pt required two attempts to complete sit to stand transfer, elevated bed to assist pt.   Transfer Training Stand-to-Sit Transfer: minimum assist (75% patient effort);  1 person assist;  verbal cues to encourage use of chair armrest to assist, pt demonstrates poor eccentric control ;  full weight-bearing   rolling walker  Sit-to-Stand Transfer Training Transfer Safety Analysis: decreased balance;  decreased step length;  decreased weight-shifting ability;  decreased strength;  impaired balance;  pain    Gait Training  Gait Training Rehab Potential: good, to achieve stated therapy goals  Gait Training Symptoms Noted During/After Treatment: c/o feeling weak and unsteady, requesting to return to chair. ;  fatigue  Gait Training: minimum assist (75% patient effort);  1 person assist;  full weight-bearing   rolling walker;  10 feet  Gait Analysis: swing-to gait   decreased padmini;  crouch;  decreased step length;  decreased stride length;  decreased weight-shifting ability;  decreased strength;  impaired balance;  pain  Type of Rest Type of Rest: sitting  Duration of Rest Duration of Rest: 3 minute seated rest break post ambulation before performing seated ther ex.       VITALS  T(C): 36.6 (01-25-21 @ 21:42), Max: 36.8 (01-25-21 @ 09:35)  HR: 68 (01-26-21 @ 05:00) (62 - 71)  BP: 103/51 (01-26-21 @ 05:00) (99/60 - 111/61)  RR: 17 (01-26-21 @ 05:00) (16 - 20)  SpO2: 97% (01-26-21 @ 05:00) (96% - 99%)  Wt(kg): --    MEDICATIONS   acetaminophen   Tablet .. 650 milliGRAM(s) every 6 hours PRN  acetaZOLAMIDE  IVPB 500 milliGRAM(s) two times a day  ALPRAZolam 0.25 milliGRAM(s) at bedtime  aMIOdarone    Tablet 200 milliGRAM(s) daily  apixaban 2.5 milliGRAM(s) every 12 hours  ascorbic acid 500 milliGRAM(s) daily  aspirin enteric coated 81 milliGRAM(s) daily  atorvastatin 40 milliGRAM(s) at bedtime  dextrose 50% Injectable 50 milliLiter(s) every 15 minutes  dextrose 50% Injectable 25 milliLiter(s) every 15 minutes  gabapentin 300 milliGRAM(s) three times a day  insulin glargine Injectable (LANTUS) 25 Unit(s) at bedtime  insulin lispro (ADMELOG) corrective regimen sliding scale   Before meals and at bedtime  insulin lispro Injectable (ADMELOG) 7 Unit(s) three times a day before meals  lidocaine   Patch 1 Patch daily  megestrol 400 milliGRAM(s) daily  metoprolol tartrate 12.5 milliGRAM(s) two times a day  multivitamin/minerals 1 Tablet(s) daily  oxyCODONE    IR 5 milliGRAM(s) every 4 hours PRN  oxyCODONE    IR 10 milliGRAM(s) every 4 hours PRN  pantoprazole    Tablet 40 milliGRAM(s) daily  polyethylene glycol 3350 17 Gram(s) daily  senna 2 Tablet(s) at bedtime  sodium chloride 1 Gram(s) three times a day  sodium chloride 0.9% lock flush 3 milliLiter(s) every 8 hours      RECENT LABS/IMAGING                          9.6    10.61 )-----------( 303      ( 26 Jan 2021 07:04 )             31.6     01-26    133<L>  |  89<L>  |  41.0<H>  ----------------------------<  157<H>  3.4<L>   |  27.0  |  1.15    Ca    8.5<L>      26 Jan 2021 07:04  Mg     2.8     01-26                  GERALDINE 1/14 - Summary:   1. Moderately decreased global left ventricular systolic function. Left ventricular ejection fraction, by visual estimation, is 35 to 40%.   2. Normal right ventricular size and function.   3. Mild to moderately enlarged left atrium.   4. Mildly enlarged right atrium.   5. Moderate mitral valve regurgitation.   6. Mild-moderate tricuspid regurgitation.   7. Mild aortic valve stenosis. The non-coronary cusp is calcified and immobile, but the right and left coronary cusps open normally. The aortic valve area is 1.6 cm2 by planimetry.   8. No left atrial appendage thrombus. There is spontaneous echo contrast seen in the left atrial appendage, but there is no filling defect seen with the administration of Definity.    CXR 1/21 - Single frontal view of the chest demonstrates mild CHF, unchanged. Line and tubes are unchanged. Mediastinal sternotomy wires. The cardiomediastinal silhouette is enlarged. No acute osseous abnormalities. Overlying EKG leads and wires are noted    CXR 1/21 - Single frontal view of the chest demonstrates mild congestive changes. Line and tubes are unchanged. The cardiomediastinal silhouette is enlarged. No acute osseous abnormalities. Overlying EKG leads and wires are noted    CXR 1/24 -  HEART:  Enlarged. Mitral valve replacement. Left atrial appendage clip LUNGS: No consolidation or effusion. Mild congestive changes. No pneumothorax. BONES: Sternal wires are seen.    CXR 1/25 - Status post cardiac surgery. No evidence of active chest pathology.  ----------------------------------------------------------------------------------------  PHYSICAL EXAM  Constitutional - NAD, Comfortable  Extremities - Large right thigh hematoma   Neurologic Exam -                    Motor - No focal deficits     Sensory - Intact to LT     Balance - WNL Static  Psychiatric - Mood depressed, anxious  ----------------------------------------------------------------------------------------  ASSESSMENT/PLAN  75yFemale with functional deficits after SOB/severe CAD  C3/DUANE Clip/MV Repair and DUANE thrombectomy - ASA, Lipitor  AFIB - Eliquis, Amiodarone  HTN - Lopressor  HypoNa+ - 1L fluid restriction, NaCl  DM2 - Lantus, Lispro  Pulm - BiPAP HS  Mood - Xanax HS  Pain - Tylenol, Oxycodone, Neurontin, Lidoderm  DVT PPX - SCDs, Lovenox  Rehab - Medically being optimized. Continue to recommend ACUTE inpatient rehabilitation for the functional deficits consisting of 3 hours of therapy/day & 24 hour RN/daily PMR physician for comorbid medical management. Will continue to follow for ongoing rehab needs and recommendations. Patient will be able to tolerate 3 hours a day.    Continue bedside therapy as well as OOB throughout the day with mobilization throughout the day with staff to maintain cardiopulmonary function and prevention of secondary complications related to debility.     Discussed with rehab clinical team.

## 2021-01-26 NOTE — PROGRESS NOTE ADULT - ASSESSMENT
75 year old Female with a medical history of paroxysmal Afib (on Xarelto), PAD (s/p E-yqhewgq-vesjnzmex bypass, R-femoral angioplasty with stenting, maintained on Plavix), HTN, HLD, type 2 diabetes (HA1c on 7.8 on Metformin and Tresiba as an outpatient), and recent trauma to Right thigh with stable hematoma, presented with SOB, acute on chronic combined diastolic and systolic heart failure (requiring IV diuresis), and NSTEMI on 1/11/20. On 1/13 she underwent a LHC via Right radial artery and was found to have Multivessel CAD (right ostial, left main and LAD) with left femoral IABP placed. GERALDINE 1/14 showed Moderate MR and Mild AS. IABP removed successfully 1/15. Preoperative course significant for afib with RVR (requiring PO amio load, increased Lopressor dosing, and initiation of Cardizem).     On 1/19/2021 pt underwent C3/DUANE Clip/MV Repair and DUANE thrombectomy with Dr. Orellana.  Post op issues involved hypoxic respiratory failure (now resolved), and KRISTIE, improving.     1/26 Add torsemide  replete potassium, Acute rehab when bed available

## 2021-01-26 NOTE — PROGRESS NOTE ADULT - ASSESSMENT
Assessment  s/p CABG/ MV repair/ :LA ligation for LM disease EF 45%  Mild to mod AS  LA thrombus seen intraop  chronic AF with CVR back on eliquis  thigh hematoma from preadmission trauma  HTN  DM      Rec    cont curent meds,  Transfer to [facility] probably today aggressive PT

## 2021-01-26 NOTE — PROGRESS NOTE ADULT - SUBJECTIVE AND OBJECTIVE BOX
[facility] - [facility], THE [facility]                                   [address] [place] [address]                                                      PHONE: [phone]                                                         FAX: [phone]  [email or web address]  ---------------------------------------------------------------------------------------------------------------------------------    Overnight events/patient complaints:     Presently in telemetry   OB o chair, looks well, tele Af with CVR,  for OLGA probably today      OHS (Unknown)  warfarin (Rash)    MEDICATIONS  (STANDING):  ALPRAZolam 0.25 milliGRAM(s) Oral at bedtime  aMIOdarone    Tablet 200 milliGRAM(s) Oral daily  apixaban 5 milliGRAM(s) Oral every 12 hours  ascorbic acid 500 milliGRAM(s) Oral daily  aspirin enteric coated 81 milliGRAM(s) Oral daily  atorvastatin 40 milliGRAM(s) Oral at bedtime  dextrose 50% Injectable 50 milliLiter(s) IV Push every 15 minutes  dextrose 50% Injectable 25 milliLiter(s) IV Push every 15 minutes  gabapentin 300 milliGRAM(s) Oral three times a day  insulin glargine Injectable (LANTUS) 25 Unit(s) SubCutaneous at bedtime  insulin lispro (ADMELOG) corrective regimen sliding scale   SubCutaneous Before meals and at bedtime  insulin lispro Injectable (ADMELOG) 5 Unit(s) SubCutaneous three times a day before meals  lidocaine   Patch 1 Patch Transdermal daily  megestrol 400 milliGRAM(s) Oral daily  multivitamin/minerals 1 Tablet(s) Oral daily  pantoprazole    Tablet 40 milliGRAM(s) Oral daily  polyethylene glycol 3350 17 Gram(s) Oral daily  senna 2 Tablet(s) Oral at bedtime  sodium chloride 0.9% lock flush 3 milliLiter(s) IV Push every 8 hours    MEDICATIONS  (PRN):  acetaminophen   Tablet .. 650 milliGRAM(s) Oral every 6 hours PRN Mild Pain (1 - 3)  oxyCODONE    IR 5 milliGRAM(s) Oral every 4 hours PRN Moderate Pain (4 - 6)  oxyCODONE    IR 10 milliGRAM(s) Oral every 4 hours PRN Severe Pain (7 - 10)      Vital Signs Last 24 Hrs  T(C): 36.6 (2021 04:42), Max: 36.9 (2021 15:25)  T(F): 97.8 (2021 04:42), Max: 98.4 (2021 15:25)  HR: 72 (2021 04:42) (65 - 86)  BP: 114/69 (2021 04:42) (112/70 - 118/70)  BP(mean): 79 (2021 12:30) (79 - 79)  RR: 17 (2021 04:42) (17 - 25)  SpO2: 94% (2021 04:42) (94% - 100%)  Daily     Daily Weight in k.4 (2021 06:00)  ICU Vital Signs Last 24 Hrs  DONALD SAUNDERS  I&O's Detail    2021 07:01  -  2021 07:00  --------------------------------------------------------  IN:    Albumin 5%  - 250 mL: 500 mL    DOBUTamine: 5.2 mL    Oral Fluid: 300 mL  Total IN: 805.2 mL    OUT:    Voided (mL): 550 mL  Total OUT: 550 mL    Total NET: 255.2 mL        I&O's Summary    2021 07:01  -  2021 07:00  --------------------------------------------------------  IN: 805.2 mL / OUT: 550 mL / NET: 255.2 mL      Drug Dosing Weight  DONALD SAUNDERS      PHYSICAL EXAM:  General: Appears well developed, well nourished alert and cooperative.  HEENT: Head; normocephalic, atraumatic.  Eyes: Pupils reactive, cornea wnl.  Neck: Supple, no nodes adenopathy, no NVD or carotid bruit or thyromegaly.  CARDIOVASCULAR: Normal S1 and S2, No murmur, rub, gallop or lift.   LUNGS: No rales, rhonchi or wheeze. Normal breath sounds bilaterally.  ABDOMEN: Soft, nontender without mass or organomegaly. bowel sounds normoactive.  EXTREMITIES: No clubbing, cyanosis or edema. Distal pulses wnl.   SKIN: warm and dry with normal turgor.  NEURO: Alert/oriented x 3/normal motor exam. No pathologic reflexes.    PSYCH: normal affect.        LABS:                        9.4    12.32 )-----------( 300      ( 2021 07:04 )             28.9         131<L>  |  86<L>  |  40.0<H>  ----------------------------<  55<L>  3.2<L>   |  30.0<H>  |  1.28    Ca    8.3<L>      2021 07:04  Mg     2.6           DONALD CHIP            RADIOLOGY & ADDITIONAL STUDIES:    INTERPRETATION OF TELEMETRY (personally reviewed):    ECG:< from: 12 Lead ECG (21 @ 05:06) >  Diagnosis Line Atrial fibrillation  Nonspecific ST and T wave abnormality  Abnormal ECG      < end of copied text >      ECHO:< from: TTE Echo Complete w/o Contrast w/ Doppler (21 @ 10:48) >    Summary:   1. Left ventricular ejection fraction, by visual estimation, is 40 to 45%.   2. Mildly to moderately decreased global left ventricular systolic function.   3. Entire apex is abnormal as described above.   4. The mitral in-flow pattern reveals no discernable A-wave, therefore no comment on diastolic function can be made.   5. Moderately enlarged left atrium.   6. Moderately enlarged right atrium.   7. There is no evidence of pericardial effusion.   8. Mild to moderate mitral valve regurgitation.   9. Moderate tricuspid regurgitation.  10. Estimated pulmonary artery systolic pressure is 59.6 mmHg assuming a right atrial pressure of 10 mmHg, which is consistent with moderate pulmonary hypertension.  11. Endocardial visualization was enhanced with intravenous echo contrast.  12. Peak transaortic gradient equals 18.7 mmHg, mean transaortic gradient equals 9.0 mmHg, the calculated aortic valve area equals 1.34 cm² by the continuity equation consistent with moderate aortic stenosis.    MD Sarah Electronically signed on 2021 at 3:46:18 PM          < end of copied text >  < from: TTE Echo Complete w/o Contrast w/ Doppler (21 @ 10:48) >    Summary:   1. Left ventricular ejection fraction, by visual estimation, is 40 to 45%.   2. Mildly to moderately decreased global left ventricular systolic function.   3. Entire apex is abnormal as described above.   4. The mitral in-flow pattern reveals no discernable A-wave, therefore no comment on diastolic function can be made.   5. Moderately enlarged left atrium.   6. Moderately enlarged right atrium.   7. There is no evidence of pericardial effusion.   8. Mild to moderate mitral valve regurgitation.   9. Moderate tricuspid regurgitation.  10. Estimated pulmonary artery systolic pressure is 59.6 mmHg assuming a right atrial pressure of 10 mmHg, which is consistent with moderate pulmonary hypertension.  11. Endocardial visualization was enhanced with intravenous echo contrast.  12. Peak transaortic gradient equals 18.7 mmHg, mean transaortic gradient equals 9.0 mmHg, the calculated aortic valve area equals 1.34 cm² by the continuity equation consistent with moderate aortic stenosis.    MD Sarah Electronically signed on 2021 at 3:46:18 PM          < end of copied text >

## 2021-01-27 LAB
ANION GAP SERPL CALC-SCNC: 15 MMOL/L — SIGNIFICANT CHANGE UP (ref 5–17)
BUN SERPL-MCNC: 40 MG/DL — HIGH (ref 8–20)
CALCIUM SERPL-MCNC: 8.4 MG/DL — LOW (ref 8.6–10.2)
CHLORIDE SERPL-SCNC: 91 MMOL/L — LOW (ref 98–107)
CO2 SERPL-SCNC: 27 MMOL/L — SIGNIFICANT CHANGE UP (ref 22–29)
CREAT SERPL-MCNC: 1.2 MG/DL — SIGNIFICANT CHANGE UP (ref 0.5–1.3)
GLUCOSE BLDC GLUCOMTR-MCNC: 159 MG/DL — HIGH (ref 70–99)
GLUCOSE BLDC GLUCOMTR-MCNC: 186 MG/DL — HIGH (ref 70–99)
GLUCOSE BLDC GLUCOMTR-MCNC: 208 MG/DL — HIGH (ref 70–99)
GLUCOSE BLDC GLUCOMTR-MCNC: 230 MG/DL — HIGH (ref 70–99)
GLUCOSE SERPL-MCNC: 137 MG/DL — HIGH (ref 70–99)
HCT VFR BLD CALC: 29.7 % — LOW (ref 34.5–45)
HGB BLD-MCNC: 9.4 G/DL — LOW (ref 11.5–15.5)
MAGNESIUM SERPL-MCNC: 2.6 MG/DL — SIGNIFICANT CHANGE UP (ref 1.6–2.6)
MCHC RBC-ENTMCNC: 27.2 PG — SIGNIFICANT CHANGE UP (ref 27–34)
MCHC RBC-ENTMCNC: 31.6 GM/DL — LOW (ref 32–36)
MCV RBC AUTO: 86.1 FL — SIGNIFICANT CHANGE UP (ref 80–100)
PLATELET # BLD AUTO: 396 K/UL — SIGNIFICANT CHANGE UP (ref 150–400)
POTASSIUM SERPL-MCNC: 3.3 MMOL/L — LOW (ref 3.5–5.3)
POTASSIUM SERPL-MCNC: 3.7 MMOL/L — SIGNIFICANT CHANGE UP (ref 3.5–5.3)
POTASSIUM SERPL-MCNC: 4.7 MMOL/L — SIGNIFICANT CHANGE UP (ref 3.5–5.3)
POTASSIUM SERPL-SCNC: 3.3 MMOL/L — LOW (ref 3.5–5.3)
POTASSIUM SERPL-SCNC: 3.7 MMOL/L — SIGNIFICANT CHANGE UP (ref 3.5–5.3)
POTASSIUM SERPL-SCNC: 4.7 MMOL/L — SIGNIFICANT CHANGE UP (ref 3.5–5.3)
RBC # BLD: 3.45 M/UL — LOW (ref 3.8–5.2)
RBC # FLD: 16.8 % — HIGH (ref 10.3–14.5)
SARS-COV-2 RNA SPEC QL NAA+PROBE: SIGNIFICANT CHANGE UP
SODIUM SERPL-SCNC: 133 MMOL/L — LOW (ref 135–145)
WBC # BLD: 12.26 K/UL — HIGH (ref 3.8–10.5)
WBC # FLD AUTO: 12.26 K/UL — HIGH (ref 3.8–10.5)

## 2021-01-27 PROCEDURE — 99024 POSTOP FOLLOW-UP VISIT: CPT

## 2021-01-27 PROCEDURE — 93306 TTE W/DOPPLER COMPLETE: CPT | Mod: 26

## 2021-01-27 PROCEDURE — 93010 ELECTROCARDIOGRAM REPORT: CPT

## 2021-01-27 PROCEDURE — 99233 SBSQ HOSP IP/OBS HIGH 50: CPT

## 2021-01-27 PROCEDURE — 71045 X-RAY EXAM CHEST 1 VIEW: CPT | Mod: 26

## 2021-01-27 RX ORDER — POTASSIUM CHLORIDE 20 MEQ
40 PACKET (EA) ORAL DAILY
Refills: 0 | Status: DISCONTINUED | OUTPATIENT
Start: 2021-01-28 | End: 2021-01-28

## 2021-01-27 RX ORDER — POTASSIUM CHLORIDE 20 MEQ
20 PACKET (EA) ORAL ONCE
Refills: 0 | Status: COMPLETED | OUTPATIENT
Start: 2021-01-27 | End: 2021-01-27

## 2021-01-27 RX ORDER — LANOLIN ALCOHOL/MO/W.PET/CERES
5 CREAM (GRAM) TOPICAL AT BEDTIME
Refills: 0 | Status: DISCONTINUED | OUTPATIENT
Start: 2021-01-27 | End: 2021-01-28

## 2021-01-27 RX ORDER — POTASSIUM CHLORIDE 20 MEQ
40 PACKET (EA) ORAL ONCE
Refills: 0 | Status: COMPLETED | OUTPATIENT
Start: 2021-01-27 | End: 2021-01-27

## 2021-01-27 RX ORDER — POTASSIUM CHLORIDE 20 MEQ
10 PACKET (EA) ORAL
Refills: 0 | Status: COMPLETED | OUTPATIENT
Start: 2021-01-27 | End: 2021-01-27

## 2021-01-27 RX ADMIN — Medication 7 UNIT(S): at 08:42

## 2021-01-27 RX ADMIN — LIDOCAINE 1 PATCH: 4 CREAM TOPICAL at 12:11

## 2021-01-27 RX ADMIN — SODIUM CHLORIDE 3 MILLILITER(S): 9 INJECTION INTRAMUSCULAR; INTRAVENOUS; SUBCUTANEOUS at 20:58

## 2021-01-27 RX ADMIN — Medication 4: at 22:05

## 2021-01-27 RX ADMIN — Medication 20 MILLIEQUIVALENT(S): at 12:10

## 2021-01-27 RX ADMIN — INSULIN GLARGINE 25 UNIT(S): 100 INJECTION, SOLUTION SUBCUTANEOUS at 22:05

## 2021-01-27 RX ADMIN — Medication 20 MILLIGRAM(S): at 12:10

## 2021-01-27 RX ADMIN — Medication 81 MILLIGRAM(S): at 12:10

## 2021-01-27 RX ADMIN — SODIUM CHLORIDE 3 MILLILITER(S): 9 INJECTION INTRAMUSCULAR; INTRAVENOUS; SUBCUTANEOUS at 14:19

## 2021-01-27 RX ADMIN — SENNA PLUS 2 TABLET(S): 8.6 TABLET ORAL at 20:57

## 2021-01-27 RX ADMIN — AMIODARONE HYDROCHLORIDE 200 MILLIGRAM(S): 400 TABLET ORAL at 06:06

## 2021-01-27 RX ADMIN — Medication 20 MILLIGRAM(S): at 06:07

## 2021-01-27 RX ADMIN — Medication 12.5 MILLIGRAM(S): at 06:06

## 2021-01-27 RX ADMIN — SODIUM CHLORIDE 1 GRAM(S): 9 INJECTION INTRAMUSCULAR; INTRAVENOUS; SUBCUTANEOUS at 20:57

## 2021-01-27 RX ADMIN — GABAPENTIN 300 MILLIGRAM(S): 400 CAPSULE ORAL at 20:57

## 2021-01-27 RX ADMIN — LIDOCAINE 1 PATCH: 4 CREAM TOPICAL at 19:54

## 2021-01-27 RX ADMIN — Medication 7 UNIT(S): at 12:12

## 2021-01-27 RX ADMIN — Medication 12.5 MILLIGRAM(S): at 17:32

## 2021-01-27 RX ADMIN — SODIUM CHLORIDE 1 GRAM(S): 9 INJECTION INTRAMUSCULAR; INTRAVENOUS; SUBCUTANEOUS at 12:11

## 2021-01-27 RX ADMIN — Medication 100 MILLIEQUIVALENT(S): at 08:43

## 2021-01-27 RX ADMIN — Medication 500 MILLIGRAM(S): at 12:10

## 2021-01-27 RX ADMIN — PANTOPRAZOLE SODIUM 40 MILLIGRAM(S): 20 TABLET, DELAYED RELEASE ORAL at 06:06

## 2021-01-27 RX ADMIN — GABAPENTIN 300 MILLIGRAM(S): 400 CAPSULE ORAL at 06:06

## 2021-01-27 RX ADMIN — ATORVASTATIN CALCIUM 40 MILLIGRAM(S): 80 TABLET, FILM COATED ORAL at 20:57

## 2021-01-27 RX ADMIN — Medication 1 TABLET(S): at 12:10

## 2021-01-27 RX ADMIN — Medication 2: at 17:31

## 2021-01-27 RX ADMIN — Medication 40 MILLIEQUIVALENT(S): at 08:43

## 2021-01-27 RX ADMIN — Medication 2: at 08:43

## 2021-01-27 RX ADMIN — Medication 0.25 MILLIGRAM(S): at 20:57

## 2021-01-27 RX ADMIN — Medication 40 MILLIEQUIVALENT(S): at 22:47

## 2021-01-27 RX ADMIN — APIXABAN 2.5 MILLIGRAM(S): 2.5 TABLET, FILM COATED ORAL at 17:32

## 2021-01-27 RX ADMIN — Medication 5 MILLIGRAM(S): at 22:47

## 2021-01-27 RX ADMIN — APIXABAN 2.5 MILLIGRAM(S): 2.5 TABLET, FILM COATED ORAL at 06:06

## 2021-01-27 RX ADMIN — SODIUM CHLORIDE 1 GRAM(S): 9 INJECTION INTRAMUSCULAR; INTRAVENOUS; SUBCUTANEOUS at 06:07

## 2021-01-27 RX ADMIN — Medication 7 UNIT(S): at 17:31

## 2021-01-27 RX ADMIN — GABAPENTIN 300 MILLIGRAM(S): 400 CAPSULE ORAL at 12:11

## 2021-01-27 RX ADMIN — Medication 4: at 12:12

## 2021-01-27 NOTE — DISCHARGE NOTE PROVIDER - HOSPITAL COURSE
75 year old F with PMH of paroxysmal Afib (on Xarelto), PAD (s/p X-fuksfvo-umewntdef bypass, R-femoral angioplasty with stenting, (on Plavix), HTN, HLD, type 2 diabetes (HA1c on 7.8 on Metformin and Tresiba as an outpatient), and recent trauma to Right thigh with stable hematoma, presented with SOB, acute on chronic combined diastolic and systolic heart failure (requiring IV diuresis), and NSTEMI on 1/11/20. On 1/13 she underwent a LHC via Right radial artery and was found to have Multivessel CAD (right ostial, left main and LAD) with left femoral IABP placed. GERALDINE 1/14 showed Moderate MR and Mild AS. IABP removed successfully 1/15. Preoperative course significant for afib with RVR (requiring PO amio load, increased Lopressor dosing, and initiation of Cardizem).   On 1/19/2021 patient underwent C3/DUANE Clip/MV Repair and DUANE thrombectomy with Dr. Orellana.  Post op issues involved hypoxic respiratory failure (now resolved) and KRISTIE, which is now improved. Patient is now stable to discharge as per Dr. Bardales.     < from: Xray Chest 1 View- PORTABLE-Routine (Xray Chest 1 View- PORTABLE-Routine in AM.) (01.26.21 @ 05:59) >    FINDINGS:   1/25/2021 chest available for review.  /The lungs are mild haziness overlying lung bases which may indicate bilateral small pleural effusions layering along posterior thoracic wall. Upper lobes clear. No pneumothorax.        The heart and mediastinum are within normal limits following cardiac  surgery.    Visualized osseous structures are intact.    IMPRESSION:  Status post cardiac surgery.  Suspect mild  bilateral pleural effusion. Confirmatory upright lateral radiograph recommended.  < end of copied text >  < from: TTE Echo Limited or F/U (01.25.21 @ 21:17) >    Summary:   1. Left ventricular ejection fraction, by visual estimation, is 45 to 50%.   2. Mildly decreased global left ventricular systolic function.   3. Trivial pericardial effusion.    < end of copied text >  < from: GERALDINE Echo Doppler (01.14.21 @ 09:20) >      Summary:   1. Moderately decreased global left ventricular systolic function. Left ventricular ejection fraction, by visual estimation, is 35 to 40%.   2. Normal right ventricular size and function.   3. Mild to moderately enlarged left atrium.   4. Mildly enlarged right atrium.   5. Moderate mitral valve regurgitation.   6. Mild-moderate tricuspid regurgitation.   7. Mild aortic valve stenosis. The non-coronary cusp is calcified and immobile, but the right and left coronary cusps open normally. The aortic valve area is 1.6 cm2 by planimetry.   8. No left atrial appendage thrombus. There is spontaneous echo contrast seen in the left atrial appendage, but there is no filling defect seen with the administration of Definity.    < end of copied text >    < from: 12 Lead ECG (01.21.21 @ 05:06) >    Ventricular Rate 64 BPM    Atrial Rate 47 BPM    QRS Duration 98 ms    Q-T Interval 460 ms    QTC Calculation(Bazett) 474 ms    R Axis 46 degrees    T Axis 93 degrees    Diagnosis Line Atrial fibrillation  Nonspecific ST and T wave abnormality  Abnormal ECG    < end of copied text >

## 2021-01-27 NOTE — PROGRESS NOTE ADULT - SUBJECTIVE AND OBJECTIVE BOX
Subjective: Patient lying in bed, no acute distress noted, stated "I cannot walk with this swelling in leg (left leg hematoma) and is making everything backward". Patient denies chest pain, pressure, dizziness, shortness of breath, abdominal pain, N/V/D.     VITAL SIGNS  Vital Signs Last 24 Hrs  T(C): 36.6 (21 @ 22:25), Max: 36.9 (21 @ 10:14)  T(F): 97.8 (21 @ 22:25), Max: 98.4 (21 @ 10:14)  HR: 65 (21 @ 22:25) (61 - 78)  BP: 107/58 (21 @ 22:25) (103/51 - 122/73)  RR: 16 (21 @ 22:25) (16 - 17)  SpO2: 98% (21 @ 23:01) (97% - 100%)  on room air             Telemetry/Alarms:  Afib  LVEF: 45%    MEDICATIONS  acetaminophen   Tablet .. 650 milliGRAM(s) Oral every 6 hours PRN  ALPRAZolam 0.25 milliGRAM(s) Oral at bedtime  aMIOdarone    Tablet 200 milliGRAM(s) Oral daily  apixaban 2.5 milliGRAM(s) Oral every 12 hours  ascorbic acid 500 milliGRAM(s) Oral daily  aspirin enteric coated 81 milliGRAM(s) Oral daily  atorvastatin 40 milliGRAM(s) Oral at bedtime  gabapentin 300 milliGRAM(s) Oral three times a day  insulin glargine Injectable (LANTUS) 25 Unit(s) SubCutaneous at bedtime  insulin lispro (ADMELOG) corrective regimen sliding scale   SubCutaneous Before meals and at bedtime  insulin lispro Injectable (ADMELOG) 7 Unit(s) SubCutaneous three times a day before meals  lidocaine   Patch 1 Patch Transdermal daily  megestrol 400 milliGRAM(s) Oral daily  metoprolol tartrate 12.5 milliGRAM(s) Oral two times a day  multivitamin/minerals 1 Tablet(s) Oral daily  oxyCODONE    IR 5 milliGRAM(s) Oral every 4 hours PRN  oxyCODONE    IR 10 milliGRAM(s) Oral every 4 hours PRN  pantoprazole    Tablet 40 milliGRAM(s) Oral daily  polyethylene glycol 3350 17 Gram(s) Oral daily  senna 2 Tablet(s) Oral at bedtime  sodium chloride 1 Gram(s) Oral three times a day  sodium chloride 0.9% lock flush 3 milliLiter(s) IV Push every 8 hours  torsemide 20 milliGRAM(s) Oral daily      PHYSICAL EXAM  General: well nourished, well developed, no acute distress  Neurology: alert and oriented x 3, nonfocal, no gross deficits  Respiratory: clear to auscultation bilaterally  CV: Irregularly irregular, normal S1, S2  Abdomen: soft, nontender, nondistended, positive bowel sounds, last bowel movement   Extremities: warm, well perfused. +1 BLE edema. + DP pulses bilaterally. Left lateral thigh hematoma remains stable  Incisions: midline sternal incision, clean dry and intact, sternum stable. Right LE harvest site C/D/I  Psych: Appropriate mood and affect       @ 07: @ 07:00  --------------------------------------------------------  IN: 360 mL / OUT: 651 mL / NET: -291 mL     @ 07: @ 01:59  --------------------------------------------------------  IN: 750 mL / OUT: 0 mL / NET: 750 mL        Weights:  Daily     Daily Weight in k.9 (2021 06:37)  Admit Wt: Drug Dosing Weight  Height (cm): 177.8 (2021 06:50)  Weight (kg): 86 (2021 06:50)  BMI (kg/m2): 27.2 (2021 06:50)  BSA (m2): 2.04 (2021 06:50)    All laboratory results, radiology and medications reviewed.    LABS      133<L>  |  89<L>  |  41.0<H>  ----------------------------<  157<H>  3.4<L>   |  27.0  |  1.15    Ca    8.5<L>      2021 07:04  Mg     2.8                                        9.6    10.61 )-----------( 303      ( 2021 07:04 )             31.6              CAPILLARY BLOOD GLUCOSE      POCT Blood Glucose.: 219 mg/dL (2021 21:42)  POCT Blood Glucose.: 245 mg/dL (2021 16:46)  POCT Blood Glucose.: 148 mg/dL (2021 12:16)  POCT Blood Glucose.: 177 mg/dL (2021 07:58)         < from: Xray Chest 1 View- PORTABLE-Routine (Xray Chest 1 View- PORTABLE-Routine in AM.) (21 @ 05:59) >  FINDINGS:   2021 chest available for review.  /The lungs are mild haziness overlying lung bases which may indicate bilateral small pleural effusions layering along posterior thoracic wall. Upper lobes clear. No pneumothorax.    The heart and mediastinum are within normal limits following cardiac  surgery.    Visualized osseous structures are intact.    IMPRESSION:  Status post cardiac surgery.  Suspect mild  bilateral pleural effusion. Confirmatory upright lateral radiograph recommended.    < end of copied text >  < from: TTE Echo Limited or F/U (21 @ 21:17) >  Summary:   1. Left ventricular ejection fraction, by visual estimation, is 45 to 50%.   2. Mildly decreased global left ventricular systolic function.   3. Trivial pericardial effusion.    < end of copied text >  < from: GERALDINE Echo Doppler (21 @ 09:20) >  Summary:   1. Moderately decreased global left ventricular systolic function. Left ventricular ejection fraction, by visual estimation, is 35 to 40%.   2. Normal right ventricular size and function.   3. Mild to moderately enlarged left atrium.   4. Mildly enlarged right atrium.   5. Moderate mitral valve regurgitation.   6. Mild-moderate tricuspid regurgitation.   7. Mild aortic valve stenosis. The non-coronary cusp is calcified and immobile, but the right and left coronary cusps open normally. The aortic valve area is 1.6 cm2 by planimetry.   8. No left atrial appendage thrombus. There is spontaneous echo contrast seen in the left atrial appendage, but there is no filling defect seen with the administration of Definity.    < end of copied text >    < from: 12 Lead ECG (21 @ 05:06) >  Ventricular Rate 64 BPM    Atrial Rate 47 BPM    QRS Duration 98 ms    Q-T Interval 460 ms    QTC Calculation(Bazett) 474 ms    R Axis 46 degrees    T Axis 93 degrees    Diagnosis Line Atrial fibrillation  Nonspecific ST and T wave abnormality  Abnormal ECG    < end of copied text >      PAST MEDICAL & SURGICAL HISTORY:  PAD (peripheral artery disease)    Paroxysmal atrial fibrillation    Hypercholesterolemia    Diabetes    Hypertension    S/P peripheral artery angioplasty with stent placement    S/P femoral-popliteal bypass surgery    H/O hernia repair    H/O abdominal hysterectomy

## 2021-01-27 NOTE — DISCHARGE NOTE PROVIDER - CARE PROVIDER_API CALL
Mary Man)  Internal Medicine  [address] [place] [id number]  Phone: [phone]  Fax: [phone]  Follow Up Time:     Doug Orellana)  Surgery; Thoracic and Cardiac Surgery  [address] [place] [address]  Phone: [phone]  Fax: [phone]  Follow Up Time:     Maryam Vazquez)  EndocrinologyMetabDiabetes; Internal Medicine  [address] [place] [address]  Phone: [phone]  Fax: [phone]  Follow Up Time:

## 2021-01-27 NOTE — PROGRESS NOTE ADULT - ASSESSMENT
Assessment  s/p CABG/ MV repair/ :LA ligation for LM disease EF 45%  Mild to mod AS  LA thrombus seen intraop  chronic AF with CVR back on eliquis  thigh hematoma from preadmission trauma  HTN  DM      Rec    cont curent meds,  Transfer to [facility] , for Covid test today  Will FUP at [facility] in a few weeks

## 2021-01-27 NOTE — PROGRESS NOTE ADULT - SUBJECTIVE AND OBJECTIVE BOX
[facility] - [facility], THE HEART [facility]                                   [address] [place] [address]                                                      PHONE: [phone]                                                         FAX: [phone]  [email or web address]  ---------------------------------------------------------------------------------------------------------------------------------    Overnight events/patient complaints:     Presently in telemetry   OB o chair, looks well, tele Af with CVR,  for OLGA probably blanquita after Covid test      OHS (Unknown)  warfarin (Rash)    MEDICATIONS  (STANDING):  ALPRAZolam 0.25 milliGRAM(s) Oral at bedtime  aMIOdarone    Tablet 200 milliGRAM(s) Oral daily  apixaban 5 milliGRAM(s) Oral every 12 hours  ascorbic acid 500 milliGRAM(s) Oral daily  aspirin enteric coated 81 milliGRAM(s) Oral daily  atorvastatin 40 milliGRAM(s) Oral at bedtime  dextrose 50% Injectable 50 milliLiter(s) IV Push every 15 minutes  dextrose 50% Injectable 25 milliLiter(s) IV Push every 15 minutes  gabapentin 300 milliGRAM(s) Oral three times a day  insulin glargine Injectable (LANTUS) 25 Unit(s) SubCutaneous at bedtime  insulin lispro (ADMELOG) corrective regimen sliding scale   SubCutaneous Before meals and at bedtime  insulin lispro Injectable (ADMELOG) 5 Unit(s) SubCutaneous three times a day before meals  lidocaine   Patch 1 Patch Transdermal daily  megestrol 400 milliGRAM(s) Oral daily  multivitamin/minerals 1 Tablet(s) Oral daily  pantoprazole    Tablet 40 milliGRAM(s) Oral daily  polyethylene glycol 3350 17 Gram(s) Oral daily  senna 2 Tablet(s) Oral at bedtime  sodium chloride 0.9% lock flush 3 milliLiter(s) IV Push every 8 hours    MEDICATIONS  (PRN):  acetaminophen   Tablet .. 650 milliGRAM(s) Oral every 6 hours PRN Mild Pain (1 - 3)  oxyCODONE    IR 5 milliGRAM(s) Oral every 4 hours PRN Moderate Pain (4 - 6)  oxyCODONE    IR 10 milliGRAM(s) Oral every 4 hours PRN Severe Pain (7 - 10)      Vital Signs Last 24 Hrs  T(C): 36.6 (2021 04:42), Max: 36.9 (2021 15:25)  T(F): 97.8 (2021 04:42), Max: 98.4 (2021 15:25)  HR: 72 (2021 04:42) (65 - 86)  BP: 114/69 (2021 04:42) (112/70 - 118/70)  BP(mean): 79 (2021 12:30) (79 - 79)  RR: 17 (2021 04:42) (17 - 25)  SpO2: 94% (2021 04:42) (94% - 100%)  Daily     Daily Weight in k.4 (2021 06:00)  ICU Vital Signs Last 24 Hrs  DONALD SAUNDERS  I&O's Detail    2021 07:01  -  2021 07:00  --------------------------------------------------------  IN:    Albumin 5%  - 250 mL: 500 mL    DOBUTamine: 5.2 mL    Oral Fluid: 300 mL  Total IN: 805.2 mL    OUT:    Voided (mL): 550 mL  Total OUT: 550 mL    Total NET: 255.2 mL        I&O's Summary    2021 07:01  -  2021 07:00  --------------------------------------------------------  IN: 805.2 mL / OUT: 550 mL / NET: 255.2 mL      Drug Dosing Weight  DONALD SAUNDERS      PHYSICAL EXAM:  General: Appears well developed, well nourished alert and cooperative.  HEENT: Head; normocephalic, atraumatic.  Eyes: Pupils reactive, cornea wnl.  Neck: Supple, no nodes adenopathy, no NVD or carotid bruit or thyromegaly.  CARDIOVASCULAR: Normal S1 and S2, No murmur, rub, gallop or lift.   LUNGS: No rales, rhonchi or wheeze. Normal breath sounds bilaterally.  ABDOMEN: Soft, nontender without mass or organomegaly. bowel sounds normoactive.  EXTREMITIES: No clubbing, cyanosis or edema. Distal pulses wnl.   SKIN: warm and dry with normal turgor.  NEURO: Alert/oriented x 3/normal motor exam. No pathologic reflexes.    PSYCH: normal affect.        LABS:                        9.4    12.32 )-----------( 300      ( 2021 07:04 )             28.9         131<L>  |  86<L>  |  40.0<H>  ----------------------------<  55<L>  3.2<L>   |  30.0<H>  |  1.28    Ca    8.3<L>      2021 07:04  Mg     2.6           DONALD CHIP            RADIOLOGY & ADDITIONAL STUDIES:    INTERPRETATION OF TELEMETRY (personally reviewed):    ECG:< from: 12 Lead ECG (21 @ 05:06) >  Diagnosis Line Atrial fibrillation  Nonspecific ST and T wave abnormality  Abnormal ECG      < end of copied text >      ECHO:< from: TTE Echo Complete w/o Contrast w/ Doppler (21 @ 10:48) >    Summary:   1. Left ventricular ejection fraction, by visual estimation, is 40 to 45%.   2. Mildly to moderately decreased global left ventricular systolic function.   3. Entire apex is abnormal as described above.   4. The mitral in-flow pattern reveals no discernable A-wave, therefore no comment on diastolic function can be made.   5. Moderately enlarged left atrium.   6. Moderately enlarged right atrium.   7. There is no evidence of pericardial effusion.   8. Mild to moderate mitral valve regurgitation.   9. Moderate tricuspid regurgitation.  10. Estimated pulmonary artery systolic pressure is 59.6 mmHg assuming a right atrial pressure of 10 mmHg, which is consistent with moderate pulmonary hypertension.  11. Endocardial visualization was enhanced with intravenous echo contrast.  12. Peak transaortic gradient equals 18.7 mmHg, mean transaortic gradient equals 9.0 mmHg, the calculated aortic valve area equals 1.34 cm² by the continuity equation consistent with moderate aortic stenosis.    MD Sarah Electronically signed on 2021 at 3:46:18 PM          < end of copied text >  < from: TTE Echo Complete w/o Contrast w/ Doppler (21 @ 10:48) >    Summary:   1. Left ventricular ejection fraction, by visual estimation, is 40 to 45%.   2. Mildly to moderately decreased global left ventricular systolic function.   3. Entire apex is abnormal as described above.   4. The mitral in-flow pattern reveals no discernable A-wave, therefore no comment on diastolic function can be made.   5. Moderately enlarged left atrium.   6. Moderately enlarged right atrium.   7. There is no evidence of pericardial effusion.   8. Mild to moderate mitral valve regurgitation.   9. Moderate tricuspid regurgitation.  10. Estimated pulmonary artery systolic pressure is 59.6 mmHg assuming a right atrial pressure of 10 mmHg, which is consistent with moderate pulmonary hypertension.  11. Endocardial visualization was enhanced with intravenous echo contrast.  12. Peak transaortic gradient equals 18.7 mmHg, mean transaortic gradient equals 9.0 mmHg, the calculated aortic valve area equals 1.34 cm² by the continuity equation consistent with moderate aortic stenosis.    MD Sarah Electronically signed on 2021 at 3:46:18 PM          < end of copied text >

## 2021-01-27 NOTE — DISCHARGE NOTE PROVIDER - NSDCMRMEDTOKEN_GEN_ALL_CORE_FT
clopidogrel 75 mg oral tablet: 1 tab(s) orally once a day  Co Q-10: 200 milligram(s) orally once a day  dilTIAZem 180 mg/24 hours oral capsule, extended release: 1 cap(s) orally once a day  enalapril 5 mg oral tablet: 1 tab(s) orally once a day  magnesium oxide 200 mg oral tablet: 6 tab(s) orally once a day  metFORMIN 500 mg oral tablet: 2 tab(s) orally 2 times a day  pravastatin 80 mg oral tablet: 1 tab(s) orally once a day (at bedtime)  Toprol- mg oral tablet, extended release: 1 tab(s) orally 2 times a day  Tresiba 100 units/mL subcutaneous solution: twice daily, 25U in AM and 30U in PM  vitamin A 10,000 intl units oral capsule: orally once a day  Vitamin B12 1000 mcg oral tablet: 5 tab(s) orally once a day  Vitamin C 1000 mg oral tablet: 1 tab(s) orally once a day  Vitamin D3 5000 intl units oral tablet: 1 tab(s) orally once a day  vitamin E 400 intl units oral capsule: 1 cap(s) orally once a day  Xarelto 20 mg oral tablet: 1 tab(s) orally once a day (in the evening) MDD:1 tab  Zinc 50 mg Pink oral capsule: 1 cap(s) orally once a day   acetaminophen 325 mg oral tablet: 2 tab(s) orally every 6 hours, As needed, Mild Pain (1 - 3)  ALPRAZolam 0.25 mg oral tablet: 1 tab(s) orally once a day (at bedtime)  amiodarone 200 mg oral tablet: 1 tab(s) orally once a day  apixaban 2.5 mg oral tablet: 1 tab(s) orally every 12 hours  ascorbic acid 500 mg oral tablet: 1 tab(s) orally once a day  aspirin 81 mg oral delayed release tablet: 1 tab(s) orally once a day  atorvastatin 40 mg oral tablet: 1 tab(s) orally once a day (at bedtime)  gabapentin 300 mg oral capsule: 1 cap(s) orally 3 times a day  insulin glargine: 25 unit(s) subcutaneous once a day (at bedtime)  insulin lispro 100 units/mL injectable solution:  injectable   lidocaine 5% topical film: 1 patch transdermally once a day, apply to back.  Patchmay remain in place for up to 12 hours in any 24 hour period  megestrol 40 mg oral tablet: 10 tab(s) orally once a day  melatonin 5 mg oral tablet: 1 tab(s) orally once a day (at bedtime)  metoprolol: 12.5 milligram(s) orally every 12 hours  Multiple Vitamins with Minerals oral tablet: 1 tab(s) orally once a day  oxyCODONE 10 mg oral tablet: 1 tab(s) orally every 4 hours, As needed, Severe Pain (7 - 10)  oxyCODONE 5 mg oral tablet: 1 tab(s) orally every 4 hours, As needed, Moderate Pain (4 - 6)  pantoprazole 40 mg oral delayed release tablet: 1 tab(s) orally once a day  polyethylene glycol 3350 oral powder for reconstitution: 17 gram(s) orally once a day  potassium chloride 20 mEq oral powder for reconstitution: 2 packet(s) orally once a day  senna oral tablet: 2 tab(s) orally once a day (at bedtime)  sodium chloride 1 g oral tablet: 1 tab(s) orally 3 times a day  torsemide 20 mg oral tablet: 1 tab(s) orally once a day   acetaminophen 325 mg oral tablet: 2 tab(s) orally every 6 hours, As needed, Mild Pain (1 - 3)  ALPRAZolam 0.25 mg oral tablet: 1 tab(s) orally once a day (at bedtime)  amiodarone 200 mg oral tablet: 1 tab(s) orally once a day  apixaban 2.5 mg oral tablet: 1 tab(s) orally every 12 hours  ascorbic acid 500 mg oral tablet: 1 tab(s) orally once a day  aspirin 81 mg oral delayed release tablet: 1 tab(s) orally once a day  atorvastatin 40 mg oral tablet: 1 tab(s) orally once a day (at bedtime)  gabapentin 300 mg oral capsule: 1 cap(s) orally 3 times a day  insulin glargine: 25 unit(s) subcutaneous once a day (at bedtime)  insulin lispro 100 units/mL injectable solution: insulin lispro (ADMELOG) corrective regimen sliding scale  2 units if glucose 151-200  4 units if glucose 201-250  6 units if glucose 251-300  8 units of glucose 301-350  10 units if glucose 351-400  12 units if glucose greater thatn 400 &amp; contact MD  lidocaine 5% topical film: 1 patch transdermally once a day, apply to back.  Patchmay remain in place for up to 12 hours in any 24 hour period  megestrol 40 mg oral tablet: 10 tab(s) orally once a day  melatonin 5 mg oral tablet: 1 tab(s) orally once a day (at bedtime)  metoprolol: 12.5 milligram(s) orally every 12 hours  Multiple Vitamins with Minerals oral tablet: 1 tab(s) orally once a day  oxyCODONE 10 mg oral tablet: 1 tab(s) orally every 4 hours, As needed, Severe Pain (7 - 10)  oxyCODONE 5 mg oral tablet: 1 tab(s) orally every 4 hours, As needed, Moderate Pain (4 - 6)  pantoprazole 40 mg oral delayed release tablet: 1 tab(s) orally once a day  polyethylene glycol 3350 oral powder for reconstitution: 17 gram(s) orally once a day  potassium chloride 20 mEq oral powder for reconstitution: 2 packet(s) orally once a day  senna oral tablet: 2 tab(s) orally once a day (at bedtime)  sodium chloride 1 g oral tablet: 1 tab(s) orally 3 times a day  torsemide 20 mg oral tablet: 1 tab(s) orally once a day

## 2021-01-27 NOTE — DISCHARGE NOTE PROVIDER - NSDCCPTREATMENT_GEN_ALL_CORE_FT
PRINCIPAL PROCEDURE  Procedure: CABG, with GERALDINE  Findings and Treatment:       SECONDARY PROCEDURE  Procedure: Repair, mitral valve, with GERALDINE  Findings and Treatment:     Procedure: Atrial thrombus removal  Findings and Treatment:     Procedure: Clipping, left atrial appendage  Findings and Treatment:

## 2021-01-27 NOTE — PROGRESS NOTE ADULT - PROBLEM SELECTOR PLAN 5
Continue Lopressor, increase dose as tolerated with holding parameters.   DASH diet  Torsemide for fluid overload.

## 2021-01-27 NOTE — DISCHARGE NOTE PROVIDER - REASON FOR ADMISSION
Shortness of breath, admitted as NSTEMI, diagnosed with multivessel disease and moderate mitral regurgitation, now s/p Mitral valve repair, 3 vessel CABG and left atrial thrombectomy and left atrial appendage clip on 1/19/21 with Dr. Orellana.  Afib with RVR with hypotension, elevated troponin

## 2021-01-27 NOTE — DISCHARGE NOTE PROVIDER - DETAILS OF MALNUTRITION DIAGNOSIS/DIAGNOSES
This patient has been assessed with a concern for Malnutrition and was treated during this hospitalization for the following Nutrition diagnosis/diagnoses:     -  01/21/2021: Moderate protein-calorie malnutrition   This patient has been assessed with a concern for Malnutrition and was treated during this hospitalization for the following Nutrition diagnosis/diagnoses:     -  01/21/2021: Moderate protein-calorie malnutrition    This patient has been assessed with a concern for Malnutrition and was treated during this hospitalization for the following Nutrition diagnosis/diagnoses:     -  01/21/2021: Moderate protein-calorie malnutrition   This patient has been assessed with a concern for Malnutrition and was treated during this hospitalization for the following Nutrition diagnosis/diagnoses:     -  01/21/2021: Moderate protein-calorie malnutrition    This patient has been assessed with a concern for Malnutrition and was treated during this hospitalization for the following Nutrition diagnosis/diagnoses:     -  01/21/2021: Moderate protein-calorie malnutrition    This patient has been assessed with a concern for Malnutrition and was treated during this hospitalization for the following Nutrition diagnosis/diagnoses:     -  01/21/2021: Moderate protein-calorie malnutrition

## 2021-01-27 NOTE — DISCHARGE NOTE PROVIDER - PROVIDER TOKENS
PROVIDER:[TOKEN:[[id number]]],PROVIDER:[TOKEN:[[id number]]],PROVIDER:[TOKEN:[[id number]]]

## 2021-01-27 NOTE — PROGRESS NOTE ADULT - SUBJECTIVE AND OBJECTIVE BOX
Patient fatigued.  Feels she is not doing as well as everyone else thinks.  Patient is anxious about her recovery and returning home to help her spouse.     REVIEW OF SYSTEMS  Constitutional - No fever,  +fatigue  Neurological - No headaches, No memory loss, +loss of strength, No numbness, No tremors  Musculoskeletal - No joint pain, +joint swelling, +muscle pain  Psychiatric - +depression, +anxiety    FUNCTIONAL PROGRESS    Bed Mobility  Bed Mobility Training Sit-to-Supine: minimum assist (75% patient effort);  1 person assist;  verbal cues;  bed rails  Bed Mobility Training Supine-to-Sit: minimum assist (75% patient effort);  1 person assist;  verbal cues;  bed rails  Bed Mobility Training Limitations: decreased ability to use arms for pushing/pulling;  impaired ability to control trunk for mobility;  decreased strength;  pain    Sit-Stand Transfer Training  Transfer Training Sit-to-Stand Transfer: minimum assist (75% patient effort);  1 person assist;  verbal cues;  weight-bearing as tolerated   rolling walker  Transfer Training Stand-to-Sit Transfer: minimum assist (75% patient effort);  1 person assist;  verbal cues;  weight-bearing as tolerated   rolling walker  Sit-to-Stand Transfer Training Transfer Safety Analysis: decreased sequencing ability;  decreased weight-shifting ability;  decreased strength;  pain;  rolling walker    Gait Training  Gait Training: minimum assist (75% patient effort);  1 person assist;  verbal cues;  weight-bearing as tolerated   rolling walker;  15 feet  Gait Analysis: swing-through gait   crouch;  decreased hip/knee flexion;  decreased step length;  shuffling;  decreased strength;  15 feet;  rolling walker      Bathing Training:     · Level of [place]	minimum assist (75% patients effort); sponge in sitting  · Physical Assist/Nonphysical Assist	1 person assist; nonverbal cues (demo/gestures); verbal cues; set-up required    Upper Body Dressing Training:     · Level of [place]	moderate assist (50% patients effort); sternal bra strap adjustment and gown  · Physical Assist/Nonphysical Assist	1 person assist; nonverbal cues (demo/gestures); verbal cues    Lower Body Dressing Training:     · Level of [place]	moderate assist (50% patients effort); socks in sitting  · Physical Assist/Nonphysical Assist	1 person assist; nonverbal cues (demo/gestures); verbal cues    Toilet Hygiene Training:     · Level of [place]	contact guard; +urination on toilet during session; pt able to hygiene with adherence to sternal preacutions however guarding assistance required with standing tasks  · Physical Assist/Nonphysical Assist	1 person assist; nonverbal cues (demo/gestures); verbal cues    Grooming Training:     · Level of [place]	minimum assist (75% patients effort); in sitting  · Physical Assist/Nonphysical Assist	1 person assist; nonverbal cues (demo/gestures); verbal cues; set-up required    Eating/Self-Feeding Training:     · Level of [place]	not observed    VITALS  T(C): 36.6 (21 @ 22:25), Max: 36.9 (21 @ 10:14)  HR: 76 (21 @ 06:05) (61 - 78)  BP: 112/58 (21 @ 06:05) (107/58 - 122/73)  RR: 20 (21 @ 06:05) (16 - 20)  SpO2: 96% (21 @ 06:05) (96% - 100%)  Wt(kg): --    MEDICATIONS   acetaminophen   Tablet .. 650 milliGRAM(s) every 6 hours PRN  ALPRAZolam 0.25 milliGRAM(s) at bedtime  aMIOdarone    Tablet 200 milliGRAM(s) daily  apixaban 2.5 milliGRAM(s) every 12 hours  ascorbic acid 500 milliGRAM(s) daily  aspirin enteric coated 81 milliGRAM(s) daily  atorvastatin 40 milliGRAM(s) at bedtime  dextrose 50% Injectable 50 milliLiter(s) every 15 minutes  dextrose 50% Injectable 25 milliLiter(s) every 15 minutes  gabapentin 300 milliGRAM(s) three times a day  insulin glargine Injectable (LANTUS) 25 Unit(s) at bedtime  insulin lispro (ADMELOG) corrective regimen sliding scale   Before meals and at bedtime  insulin lispro Injectable (ADMELOG) 7 Unit(s) three times a day before meals  lidocaine   Patch 1 Patch daily  megestrol 400 milliGRAM(s) daily  metoprolol tartrate 12.5 milliGRAM(s) two times a day  multivitamin/minerals 1 Tablet(s) daily  oxyCODONE    IR 5 milliGRAM(s) every 4 hours PRN  oxyCODONE    IR 10 milliGRAM(s) every 4 hours PRN  pantoprazole    Tablet 40 milliGRAM(s) daily  polyethylene glycol 3350 17 Gram(s) daily  potassium chloride   Powder 40 milliEquivalent(s) once  potassium chloride  10 mEq/100 mL IVPB 10 milliEquivalent(s) every 1 hour  senna 2 Tablet(s) at bedtime  sodium chloride 1 Gram(s) three times a day  sodium chloride 0.9% lock flush 3 milliLiter(s) every 8 hours  torsemide 20 milliGRAM(s) daily      RECENT LABS/IMAGING                          9.4    12.26 )-----------( 396      ( 2021 06:21 )             29.7         133<L>  |  91<L>  |  40.0<H>  ----------------------------<  137<H>  3.3<L>   |  27.0  |  1.20    Ca    8.4<L>      2021 06:21  Mg     2.6             Urinalysis Basic - ( 2021 16:31 )    Color: Yellow / Appearance: Clear / S.005 / pH: x  Gluc: x / Ketone: Negative  / Bili: Small / Urobili: 8 mg/dL   Blood: x / Protein: 30 mg/dL / Nitrite: Negative   Leuk Esterase: Negative / RBC: 0-2 /HPF / WBC 3-5   Sq Epi: x / Non Sq Epi: Few / Bacteria: Occasional                GERALDINE  - Summary:   1. Moderately decreased global left ventricular systolic function. Left ventricular ejection fraction, by visual estimation, is 35 to 40%.   2. Normal right ventricular size and function.   3. Mild to moderately enlarged left atrium.   4. Mildly enlarged right atrium.   5. Moderate mitral valve regurgitation.   6. Mild-moderate tricuspid regurgitation.   7. Mild aortic valve stenosis. The non-coronary cusp is calcified and immobile, but the right and left coronary cusps open normally. The aortic valve area is 1.6 cm2 by planimetry.   8. No left atrial appendage thrombus. There is spontaneous echo contrast seen in the left atrial appendage, but there is no filling defect seen with the administration of Definity.    CXR  - Single frontal view of the chest demonstrates mild CHF, unchanged. Line and tubes are unchanged. Mediastinal sternotomy wires. The cardiomediastinal silhouette is enlarged. No acute osseous abnormalities. Overlying EKG leads and wires are noted    CXR  - Single frontal view of the chest demonstrates mild congestive changes. Line and tubes are unchanged. The cardiomediastinal silhouette is enlarged. No acute osseous abnormalities. Overlying EKG leads and wires are noted    CXR  -  HEART:  Enlarged. Mitral valve replacement. Left atrial appendage clip LUNGS: No consolidation or effusion. Mild congestive changes. No pneumothorax. BONES: Sternal wires are seen.    CXR  - Status post cardiac surgery. No evidence of active chest pathology.    CXR  - Status post cardiac surgery. Suspect mild  bilateral pleural effusion. Confirmatory upright lateral radiograph recommended.  ----------------------------------------------------------------------------------------  PHYSICAL EXAM  Constitutional - NAD, Comfortable  Extremities - Large right thigh hematoma - appears stable  Neurologic Exam -                    Motor - No focal deficits     Sensory - Intact to LT     Balance - WNL Static  Psychiatric - Mood depressed, anxious  ----------------------------------------------------------------------------------------  ASSESSMENT/PLAN  75yFemale with functional deficits after SOB/severe CAD  C3/DUANE Clip/MV Repair and DUANE thrombectomy - ASA, Lipitor  AFIB - Eliquis, Amiodarone, Demadex  HTN - Lopressor  HypoNa+ - 1L fluid restriction, NaCl  DM2 - Lantus, Lispro  Pulm - BiPAP HS  Mood - Xanax HS  Pain - Tylenol, Oxycodone, Neurontin, Lidoderm  DVT PPX - SCDs, Lovenox  Rehab - Medically being optimized. Continue to recommend ACUTE inpatient rehabilitation for the functional deficits consisting of 3 hours of therapy/day & 24 hour RN/daily PMR physician for comorbid medical management. Will continue to follow for ongoing rehab needs and recommendations. Patient will be able to tolerate 3 hours a day.    Continue bedside therapy as well as OOB throughout the day with mobilization throughout the day with staff to maintain cardiopulmonary function and prevention of secondary complications related to debility.     Discussed with rehab clinical team.

## 2021-01-27 NOTE — DISCHARGE NOTE PROVIDER - NSDCCPCAREPLAN_GEN_ALL_CORE_FT
PRINCIPAL DISCHARGE DIAGNOSIS  Diagnosis: Coronary artery disease of native artery of native heart with stable angina pectoris  Assessment and Plan of Treatment: 1. Take ALL of your medications as ordered. Fill your prescriptions the day you are discharged and take according to the schedule you were given. Continue to take a stool softener if you are taking narcotic pain medications. AVOID medications such as ibuprofen or naproxen if you have had bypass surgery. If you have any questions or are unable to fill the prescriptions, please call the office right away at [phone].  2. Shower daily. Clean all incisions daily while showering with warm water and mild soap, pat dry with a clean towel and do not cover with any dressings unless instructed to. No bathing, swimming in a pool or the ocean until instructed by MD.  DO NOT use creams or lotions on the wound.  3. We advise that you do not drive until instructed by MD.   4. You may not return to work until instructed by MD.   5. Please eat a low fat, low cholesterol, low salt diet. (No added/extra salt)  6. Weigh yourself every day in the morning and record it in the weight log in your red folder. Notify the office of any weight gain more than 2-3 pounds in 24 hours.  7. Continue breathing exercises several times a day. Continue to use your heart pillow.  8. No heavy lifting nothing greater than 5 pounds until cleared by MD.   9. Call / Notify MD any fever greater than 101.0, any drainage from incisions or if they become red, hot or very tender to the touch.  10. Increase activity as tolerated. Walk indoors and/or outdoors at least 3 times a day.  Coronary artery disease of native artery of native heart with stable angina pectoris      SECONDARY DISCHARGE DIAGNOSES  Diagnosis: Moderate mitral valve regurgitation  Assessment and Plan of Treatment: Now s/p mitral valve repair  Continue take all medications as oredered    Diagnosis: Type 2 diabetes mellitus without complication, with long-term current use of insulin  Assessment and Plan of Treatment: Continue to take all medications  Follow up with endocrinologist as outpatient  Healthy carbohydrates such as fruits, vegetables, whole grains, legumes (beans, peas and lentils) and low-fat dairy products. Fiber-rich foods such as vegetables, fruits, nuts, legumes, whole-wheat flour and wheat bran. Heart-healthy fish at least twice a week such as cod, tuna and halibut, which are low-fat options as well as salmon, mackerel, tuna, sardines and bluefish, which are rich in omega-3 fatty acids. Avoid fish with high levels of mercury. "Good" fats such as avocados, almonds, pecans, walnuts, olives and canola, olive and peanut oils.      Diagnosis: Atrial fibrillation with RVR  Assessment and Plan of Treatment: Your heart rhythm remains Afib . Please continue to take  Eliquis as oredered to reduce your risk of a stroke with this arrhythmia. Your dose for Eliquis was decreased during the hospital stay. Please follow up with your cardiologist as outpatient. If you are experiencing chest pain or palpitations, please call us right away.      Diagnosis: Acute kidney injury  Assessment and Plan of Treatment: Now resoled. Creatinine normalized.    Diagnosis: Hypertension  Assessment and Plan of Treatment: Continue to take all medications as ordered  Follow up with your cardiologist and primary care doctor  Choose lean meats and poultry without skin and prepare them without added saturated/trans fat. Choose fish at least twice a week, especially those high in omega-3 (salmon or trout). Select fat-free or low-fat dairy products. To lower cholesterol, reduce saturated fat to no more than 5 to 6 percent of total calories. For someone eating 2,000 calories a day, that’s about 13 grams of saturated fat.  Cut back on beverages and foods with added sugars.  Choose and prepare foods with little or no salt. To lower blood pressure, reducing daily intake of sodium to 1,500 mg is desirable because it can lower blood pressure.  If you drink alcohol, drink sparingly and NOT if you are taking narcotics for pain. Follow the American Heart Association recommendations when you eat out, and keep an eye on your portion sizes.      Diagnosis: Myocardial ischemia  Assessment and Plan of Treatment:

## 2021-01-27 NOTE — DISCHARGE NOTE PROVIDER - NSDCFUADDINST_GEN_ALL_CORE_FT
Please call the Cardiothoracic Surgery office at [phone] if you are experiencing any shortness of breath, chest pain, fevers or chills, drainage from the incisions, persistent nausea, vomiting or if you have any questions about your medications. If the symptoms are severe, call 911 and go to the nearest hospital. You can also call [phone] for an emergency [facility] ambulance, which will take you to the closest [facility].    If you need any assistance for making any appointments for a new consult or referral in any specialty, please call our [facility] Clinical Coordination Center at [phone].  Your Care Navigator Nurse Practitioner will be in touch to see you in your home within a few days from discharge. The Follow Your Heart program can help ensure you understand your medications, discharge instructions and answer any questions you may have at that time. They are also a great source to address concerns during the day and may be reached at [phone].

## 2021-01-27 NOTE — CHART NOTE - NSCHARTNOTEFT_GEN_A_CORE
75 year old Female with a medical history of paroxysmal Afib (on Xarelto), PAD (s/p N-rsxrrzq-ypoypsjvn bypass, R-femoral angioplasty with stenting, maintained on Plavix), HTN, HLD, type 2 diabetes (HA1c on 7.8 on Metformin and Tresiba as an outpatient), and recent trauma to Right thigh with stable hematoma, presented with SOB, acute on chronic combined diastolic and systolic heart failure (requiring IV diuresis), and NSTEMI on 1/11/20. On 1/13 she underwent a LHC via Right radial artery and was found to have Multivessel CAD (right ostial, left main and LAD) with left femoral IABP placed. GERALDINE 1/14 showed Moderate MR and Mild AS. IABP removed successfully 1/15. Preoperative course significant for afib with RVR (requiring PO amio load, increased Lopressor dosing, and initiation of Cardizem). Patient underwent C3/DUANE Clip/MV Repair and DUANE thrombectomy with Dr. Orellana 1/19/21.  Post op issues involved hypoxic respiratory failure (now resolved), KRISTIE (now resolved), hypokalemia (being treated).     Case was reviewed with Dr. Orellana in AM rounds.    - [facility] Pre-discharge TTE to rule out pericardial effusion was done today - read pending.   - Torsemide was increased to 40mg PO once daily per Dr. Orellana. Daily potassium supplements were also added.  - Hypokalemia is being treated. PO and IV potassium was given this AM. Now pending repeat potassium serum. Continue to replete for K > 4.   - Endocrine called once again for eval prior to discharge.    Dispo: Acute rehab. Communicated with case management once again for discharge planning. Repeat COVID PCR for discharge screening sent today.

## 2021-01-27 NOTE — CHART NOTE - NSCHARTNOTESELECT_GEN_ALL_CORE
Event Note
Event Note
Site Check/Event Note
6 hr post IABP removal evaluation/Event Note
CT Surgery/Event Note
IABP removal note/Event Note
Nutrition Services
Nutrition Services
Respiratory Care/Event Note
family contact/Event Note
post midnight attending note/Event Note

## 2021-01-27 NOTE — PROGRESS NOTE ADULT - ASSESSMENT
75 year old Female with a medical history of paroxysmal Afib (on Xarelto), PAD (s/p I-nghstzt-uzjoqfmll bypass, R-femoral angioplasty with stenting, maintained on Plavix), HTN, HLD, type 2 diabetes (HA1c on 7.8 on Metformin and Tresiba as an outpatient), and recent trauma to Right thigh with stable hematoma, presented with SOB, acute on chronic combined diastolic and systolic heart failure (requiring IV diuresis), and NSTEMI on 1/11/20. On 1/13 she underwent a LHC via Right radial artery and was found to have Multivessel CAD (right ostial, left main and LAD) with left femoral IABP placed. GERALDINE 1/14 showed Moderate MR and Mild AS. IABP removed successfully 1/15. Preoperative course significant for afib with RVR (requiring PO amio load, increased Lopressor dosing, and initiation of Cardizem).     On 1/19/2021 pt underwent C3/DUANE Clip/MV Repair and DUANE thrombectomy with Dr. Orellana.  Post op issues involved hypoxic respiratory failure (now resolved), and KRISTIE, now resolved.    1/26 Add torsemide  replete potassium, Acute rehab when bed available

## 2021-01-27 NOTE — PROGRESS NOTE ADULT - PROBLEM SELECTOR PLAN 4
HA1c on 7.8 on Metformin and Tresiba as outpatient.  Continue Lantus, Pre meal, and ROBERT ACHS  Endocrine following   Continue Megace

## 2021-01-28 ENCOUNTER — INPATIENT (INPATIENT)
Facility: HOSPITAL | Age: 76
LOS: 3 days | Discharge: ANOTHER HOSPITAL | DRG: 949 | End: 2021-02-01
Attending: SPECIALIST | Admitting: SPECIALIST
Payer: MEDICARE

## 2021-01-28 VITALS
HEART RATE: 72 BPM | DIASTOLIC BLOOD PRESSURE: 64 MMHG | OXYGEN SATURATION: 94 % | RESPIRATION RATE: 20 BRPM | TEMPERATURE: 98 F | SYSTOLIC BLOOD PRESSURE: 108 MMHG

## 2021-01-28 VITALS
RESPIRATION RATE: 17 BRPM | SYSTOLIC BLOOD PRESSURE: 129 MMHG | TEMPERATURE: 98 F | OXYGEN SATURATION: 96 % | WEIGHT: 210.98 LBS | HEIGHT: 68 IN | DIASTOLIC BLOOD PRESSURE: 73 MMHG | HEART RATE: 82 BPM

## 2021-01-28 DIAGNOSIS — Z95.820 PERIPHERAL VASCULAR ANGIOPLASTY STATUS WITH IMPLANTS AND GRAFTS: Chronic | ICD-10-CM

## 2021-01-28 DIAGNOSIS — Z98.89 OTHER SPECIFIED POSTPROCEDURAL STATES: Chronic | ICD-10-CM

## 2021-01-28 DIAGNOSIS — Z95.1 PRESENCE OF AORTOCORONARY BYPASS GRAFT: ICD-10-CM

## 2021-01-28 DIAGNOSIS — Z95.828 PRESENCE OF OTHER VASCULAR IMPLANTS AND GRAFTS: Chronic | ICD-10-CM

## 2021-01-28 DIAGNOSIS — Z90.710 ACQUIRED ABSENCE OF BOTH CERVIX AND UTERUS: Chronic | ICD-10-CM

## 2021-01-28 LAB
ANION GAP SERPL CALC-SCNC: 14 MMOL/L — SIGNIFICANT CHANGE UP (ref 5–17)
BUN SERPL-MCNC: 39 MG/DL — HIGH (ref 8–20)
CALCIUM SERPL-MCNC: 8.6 MG/DL — SIGNIFICANT CHANGE UP (ref 8.6–10.2)
CHLORIDE SERPL-SCNC: 92 MMOL/L — LOW (ref 98–107)
CO2 SERPL-SCNC: 27 MMOL/L — SIGNIFICANT CHANGE UP (ref 22–29)
CREAT SERPL-MCNC: 1.01 MG/DL — SIGNIFICANT CHANGE UP (ref 0.5–1.3)
GLUCOSE BLDC GLUCOMTR-MCNC: 149 MG/DL — HIGH (ref 70–99)
GLUCOSE BLDC GLUCOMTR-MCNC: 161 MG/DL — HIGH (ref 70–99)
GLUCOSE BLDC GLUCOMTR-MCNC: 207 MG/DL — HIGH (ref 70–99)
GLUCOSE SERPL-MCNC: 155 MG/DL — HIGH (ref 70–99)
HCT VFR BLD CALC: 32.6 % — LOW (ref 34.5–45)
HGB BLD-MCNC: 10.3 G/DL — LOW (ref 11.5–15.5)
MAGNESIUM SERPL-MCNC: 2.4 MG/DL — SIGNIFICANT CHANGE UP (ref 1.6–2.6)
MCHC RBC-ENTMCNC: 27.8 PG — SIGNIFICANT CHANGE UP (ref 27–34)
MCHC RBC-ENTMCNC: 31.6 GM/DL — LOW (ref 32–36)
MCV RBC AUTO: 87.9 FL — SIGNIFICANT CHANGE UP (ref 80–100)
PLATELET # BLD AUTO: 461 K/UL — HIGH (ref 150–400)
POTASSIUM SERPL-MCNC: 3.6 MMOL/L — SIGNIFICANT CHANGE UP (ref 3.5–5.3)
POTASSIUM SERPL-SCNC: 3.6 MMOL/L — SIGNIFICANT CHANGE UP (ref 3.5–5.3)
RBC # BLD: 3.71 M/UL — LOW (ref 3.8–5.2)
RBC # FLD: 17.2 % — HIGH (ref 10.3–14.5)
SODIUM SERPL-SCNC: 133 MMOL/L — LOW (ref 135–145)
WBC # BLD: 12.08 K/UL — HIGH (ref 3.8–10.5)
WBC # FLD AUTO: 12.08 K/UL — HIGH (ref 3.8–10.5)

## 2021-01-28 PROCEDURE — 99233 SBSQ HOSP IP/OBS HIGH 50: CPT

## 2021-01-28 PROCEDURE — 99024 POSTOP FOLLOW-UP VISIT: CPT

## 2021-01-28 PROCEDURE — 71045 X-RAY EXAM CHEST 1 VIEW: CPT | Mod: 26

## 2021-01-28 RX ORDER — SODIUM CHLORIDE 9 MG/ML
1 INJECTION INTRAMUSCULAR; INTRAVENOUS; SUBCUTANEOUS
Qty: 0 | Refills: 0 | DISCHARGE
Start: 2021-01-28

## 2021-01-28 RX ORDER — ASPIRIN/CALCIUM CARB/MAGNESIUM 324 MG
1 TABLET ORAL
Qty: 0 | Refills: 0 | DISCHARGE
Start: 2021-01-28

## 2021-01-28 RX ORDER — OXYCODONE HYDROCHLORIDE 5 MG/1
1 TABLET ORAL
Qty: 0 | Refills: 0 | DISCHARGE
Start: 2021-01-28

## 2021-01-28 RX ORDER — INSULIN LISPRO 100/ML
5 VIAL (ML) SUBCUTANEOUS
Refills: 0 | Status: DISCONTINUED | OUTPATIENT
Start: 2021-01-28 | End: 2021-01-29

## 2021-01-28 RX ORDER — POLYETHYLENE GLYCOL 3350 17 G/17G
17 POWDER, FOR SOLUTION ORAL DAILY
Refills: 0 | Status: DISCONTINUED | OUTPATIENT
Start: 2021-01-28 | End: 2021-01-29

## 2021-01-28 RX ORDER — CHOLECALCIFEROL (VITAMIN D3) 125 MCG
1 CAPSULE ORAL
Qty: 0 | Refills: 0 | DISCHARGE

## 2021-01-28 RX ORDER — OXYCODONE HYDROCHLORIDE 5 MG/1
10 TABLET ORAL EVERY 4 HOURS
Refills: 0 | Status: DISCONTINUED | OUTPATIENT
Start: 2021-01-28 | End: 2021-02-01

## 2021-01-28 RX ORDER — METFORMIN HYDROCHLORIDE 850 MG/1
2 TABLET ORAL
Qty: 0 | Refills: 0 | DISCHARGE

## 2021-01-28 RX ORDER — METOPROLOL TARTRATE 50 MG
12.5 TABLET ORAL
Qty: 0 | Refills: 0 | DISCHARGE
Start: 2021-01-28

## 2021-01-28 RX ORDER — ACETAMINOPHEN 500 MG
2 TABLET ORAL
Qty: 0 | Refills: 0 | DISCHARGE
Start: 2021-01-28

## 2021-01-28 RX ORDER — INSULIN DEGLUDEC 100 U/ML
0 INJECTION, SOLUTION SUBCUTANEOUS
Qty: 0 | Refills: 0 | DISCHARGE

## 2021-01-28 RX ORDER — ALPRAZOLAM 0.25 MG
1 TABLET ORAL
Qty: 0 | Refills: 0 | DISCHARGE
Start: 2021-01-28

## 2021-01-28 RX ORDER — PANTOPRAZOLE SODIUM 20 MG/1
40 TABLET, DELAYED RELEASE ORAL DAILY
Refills: 0 | Status: DISCONTINUED | OUTPATIENT
Start: 2021-01-28 | End: 2021-02-01

## 2021-01-28 RX ORDER — POTASSIUM CHLORIDE 20 MEQ
2 PACKET (EA) ORAL
Qty: 0 | Refills: 0 | DISCHARGE
Start: 2021-01-28

## 2021-01-28 RX ORDER — ATORVASTATIN CALCIUM 80 MG/1
40 TABLET, FILM COATED ORAL AT BEDTIME
Refills: 0 | Status: DISCONTINUED | OUTPATIENT
Start: 2021-01-28 | End: 2021-02-01

## 2021-01-28 RX ORDER — ATORVASTATIN CALCIUM 80 MG/1
1 TABLET, FILM COATED ORAL
Qty: 0 | Refills: 0 | DISCHARGE
Start: 2021-01-28

## 2021-01-28 RX ORDER — SODIUM CHLORIDE 9 MG/ML
1 INJECTION INTRAMUSCULAR; INTRAVENOUS; SUBCUTANEOUS THREE TIMES A DAY
Refills: 0 | Status: DISCONTINUED | OUTPATIENT
Start: 2021-01-28 | End: 2021-02-01

## 2021-01-28 RX ORDER — POLYETHYLENE GLYCOL 3350 17 G/17G
17 POWDER, FOR SOLUTION ORAL
Qty: 0 | Refills: 0 | DISCHARGE
Start: 2021-01-28

## 2021-01-28 RX ORDER — ACETAMINOPHEN 500 MG
650 TABLET ORAL EVERY 6 HOURS
Refills: 0 | Status: DISCONTINUED | OUTPATIENT
Start: 2021-01-28 | End: 2021-02-01

## 2021-01-28 RX ORDER — ASCORBIC ACID 60 MG
1 TABLET,CHEWABLE ORAL
Qty: 0 | Refills: 0 | DISCHARGE
Start: 2021-01-28

## 2021-01-28 RX ORDER — DEXTROSE 50 % IN WATER 50 %
12.5 SYRINGE (ML) INTRAVENOUS ONCE
Refills: 0 | Status: DISCONTINUED | OUTPATIENT
Start: 2021-01-28 | End: 2021-02-01

## 2021-01-28 RX ORDER — APIXABAN 2.5 MG/1
2.5 TABLET, FILM COATED ORAL EVERY 12 HOURS
Refills: 0 | Status: DISCONTINUED | OUTPATIENT
Start: 2021-01-28 | End: 2021-02-01

## 2021-01-28 RX ORDER — DEXTROSE 50 % IN WATER 50 %
15 SYRINGE (ML) INTRAVENOUS ONCE
Refills: 0 | Status: DISCONTINUED | OUTPATIENT
Start: 2021-01-28 | End: 2021-02-01

## 2021-01-28 RX ORDER — POTASSIUM CHLORIDE 20 MEQ
40 PACKET (EA) ORAL DAILY
Refills: 0 | Status: DISCONTINUED | OUTPATIENT
Start: 2021-01-28 | End: 2021-02-01

## 2021-01-28 RX ORDER — SODIUM CHLORIDE 9 MG/ML
1000 INJECTION, SOLUTION INTRAVENOUS
Refills: 0 | Status: DISCONTINUED | OUTPATIENT
Start: 2021-01-28 | End: 2021-02-01

## 2021-01-28 RX ORDER — METOPROLOL TARTRATE 50 MG
12.5 TABLET ORAL
Refills: 0 | Status: DISCONTINUED | OUTPATIENT
Start: 2021-01-28 | End: 2021-02-01

## 2021-01-28 RX ORDER — LANOLIN ALCOHOL/MO/W.PET/CERES
6 CREAM (GRAM) TOPICAL AT BEDTIME
Refills: 0 | Status: DISCONTINUED | OUTPATIENT
Start: 2021-01-28 | End: 2021-02-01

## 2021-01-28 RX ORDER — ASPIRIN/CALCIUM CARB/MAGNESIUM 324 MG
81 TABLET ORAL DAILY
Refills: 0 | Status: DISCONTINUED | OUTPATIENT
Start: 2021-01-28 | End: 2021-02-01

## 2021-01-28 RX ORDER — GABAPENTIN 400 MG/1
300 CAPSULE ORAL THREE TIMES A DAY
Refills: 0 | Status: DISCONTINUED | OUTPATIENT
Start: 2021-01-28 | End: 2021-02-01

## 2021-01-28 RX ORDER — FOLIC ACID/VIT B COMPLEX AND C 400 MCG
0 TABLET ORAL
Qty: 0 | Refills: 0 | DISCHARGE

## 2021-01-28 RX ORDER — APIXABAN 2.5 MG/1
1 TABLET, FILM COATED ORAL
Qty: 0 | Refills: 0 | DISCHARGE
Start: 2021-01-28

## 2021-01-28 RX ORDER — DEXTROSE 50 % IN WATER 50 %
25 SYRINGE (ML) INTRAVENOUS ONCE
Refills: 0 | Status: DISCONTINUED | OUTPATIENT
Start: 2021-01-28 | End: 2021-02-01

## 2021-01-28 RX ORDER — LIDOCAINE 4 G/100G
1 CREAM TOPICAL DAILY
Refills: 0 | Status: DISCONTINUED | OUTPATIENT
Start: 2021-01-28 | End: 2021-02-01

## 2021-01-28 RX ORDER — DILTIAZEM HCL 120 MG
1 CAPSULE, EXT RELEASE 24 HR ORAL
Qty: 0 | Refills: 1 | DISCHARGE

## 2021-01-28 RX ORDER — AMIODARONE HYDROCHLORIDE 400 MG/1
200 TABLET ORAL DAILY
Refills: 0 | Status: DISCONTINUED | OUTPATIENT
Start: 2021-01-28 | End: 2021-02-01

## 2021-01-28 RX ORDER — MEGESTROL ACETATE 40 MG/ML
400 SUSPENSION ORAL DAILY
Refills: 0 | Status: DISCONTINUED | OUTPATIENT
Start: 2021-01-28 | End: 2021-01-28

## 2021-01-28 RX ORDER — ASCORBIC ACID 60 MG
500 TABLET,CHEWABLE ORAL DAILY
Refills: 0 | Status: DISCONTINUED | OUTPATIENT
Start: 2021-01-28 | End: 2021-02-01

## 2021-01-28 RX ORDER — MAGNESIUM OXIDE 400 MG ORAL TABLET 241.3 MG
6 TABLET ORAL
Qty: 0 | Refills: 0 | DISCHARGE

## 2021-01-28 RX ORDER — INSULIN LISPRO 100/ML
0 VIAL (ML) SUBCUTANEOUS
Qty: 0 | Refills: 0 | DISCHARGE
Start: 2021-01-28

## 2021-01-28 RX ORDER — MULTIVIT-MIN/FERROUS GLUCONATE 9 MG/15 ML
1 LIQUID (ML) ORAL DAILY
Refills: 0 | Status: DISCONTINUED | OUTPATIENT
Start: 2021-01-28 | End: 2021-01-28

## 2021-01-28 RX ORDER — LIDOCAINE 4 G/100G
1 CREAM TOPICAL
Qty: 0 | Refills: 0 | DISCHARGE
Start: 2021-01-28

## 2021-01-28 RX ORDER — VITAMIN E 100 UNIT
1 CAPSULE ORAL
Qty: 0 | Refills: 0 | DISCHARGE

## 2021-01-28 RX ORDER — OXYCODONE HYDROCHLORIDE 5 MG/1
5 TABLET ORAL EVERY 4 HOURS
Refills: 0 | Status: DISCONTINUED | OUTPATIENT
Start: 2021-01-28 | End: 2021-02-01

## 2021-01-28 RX ORDER — GABAPENTIN 400 MG/1
1 CAPSULE ORAL
Qty: 0 | Refills: 0 | DISCHARGE
Start: 2021-01-28

## 2021-01-28 RX ORDER — MEGESTROL ACETATE 40 MG/ML
10 SUSPENSION ORAL
Qty: 0 | Refills: 0 | DISCHARGE
Start: 2021-01-28

## 2021-01-28 RX ORDER — INSULIN GLARGINE 100 [IU]/ML
12 INJECTION, SOLUTION SUBCUTANEOUS AT BEDTIME
Refills: 0 | Status: DISCONTINUED | OUTPATIENT
Start: 2021-01-28 | End: 2021-01-29

## 2021-01-28 RX ORDER — SENNA PLUS 8.6 MG/1
2 TABLET ORAL
Qty: 0 | Refills: 0 | DISCHARGE
Start: 2021-01-28

## 2021-01-28 RX ORDER — INSULIN LISPRO 100/ML
VIAL (ML) SUBCUTANEOUS AT BEDTIME
Refills: 0 | Status: DISCONTINUED | OUTPATIENT
Start: 2021-01-28 | End: 2021-02-01

## 2021-01-28 RX ORDER — ALPRAZOLAM 0.25 MG
0.25 TABLET ORAL AT BEDTIME
Refills: 0 | Status: DISCONTINUED | OUTPATIENT
Start: 2021-01-28 | End: 2021-01-29

## 2021-01-28 RX ORDER — MEGESTROL ACETATE 40 MG/ML
400 SUSPENSION ORAL DAILY
Refills: 0 | Status: DISCONTINUED | OUTPATIENT
Start: 2021-01-28 | End: 2021-02-01

## 2021-01-28 RX ORDER — PREGABALIN 225 MG/1
5 CAPSULE ORAL
Qty: 0 | Refills: 0 | DISCHARGE

## 2021-01-28 RX ORDER — MULTIVIT-MIN/FERROUS GLUCONATE 9 MG/15 ML
1 LIQUID (ML) ORAL
Qty: 0 | Refills: 0 | DISCHARGE
Start: 2021-01-28

## 2021-01-28 RX ORDER — INSULIN GLARGINE 100 [IU]/ML
25 INJECTION, SOLUTION SUBCUTANEOUS
Qty: 0 | Refills: 0 | DISCHARGE
Start: 2021-01-28

## 2021-01-28 RX ORDER — ASCORBIC ACID 60 MG
1 TABLET,CHEWABLE ORAL
Qty: 0 | Refills: 0 | DISCHARGE

## 2021-01-28 RX ORDER — ZINC SULFATE TAB 220 MG (50 MG ZINC EQUIVALENT) 220 (50 ZN) MG
1 TAB ORAL
Qty: 0 | Refills: 0 | DISCHARGE

## 2021-01-28 RX ORDER — INSULIN LISPRO 100/ML
VIAL (ML) SUBCUTANEOUS
Refills: 0 | Status: DISCONTINUED | OUTPATIENT
Start: 2021-01-28 | End: 2021-02-01

## 2021-01-28 RX ORDER — GLUCAGON INJECTION, SOLUTION 0.5 MG/.1ML
1 INJECTION, SOLUTION SUBCUTANEOUS ONCE
Refills: 0 | Status: DISCONTINUED | OUTPATIENT
Start: 2021-01-28 | End: 2021-02-01

## 2021-01-28 RX ORDER — AMIODARONE HYDROCHLORIDE 400 MG/1
1 TABLET ORAL
Qty: 0 | Refills: 0 | DISCHARGE
Start: 2021-01-28

## 2021-01-28 RX ORDER — LANOLIN ALCOHOL/MO/W.PET/CERES
1 CREAM (GRAM) TOPICAL
Qty: 0 | Refills: 0 | DISCHARGE
Start: 2021-01-28

## 2021-01-28 RX ORDER — PANTOPRAZOLE SODIUM 20 MG/1
1 TABLET, DELAYED RELEASE ORAL
Qty: 0 | Refills: 0 | DISCHARGE
Start: 2021-01-28

## 2021-01-28 RX ORDER — SENNA PLUS 8.6 MG/1
2 TABLET ORAL AT BEDTIME
Refills: 0 | Status: DISCONTINUED | OUTPATIENT
Start: 2021-01-28 | End: 2021-01-29

## 2021-01-28 RX ADMIN — Medication 4: at 17:49

## 2021-01-28 RX ADMIN — Medication 12.5 MILLIGRAM(S): at 17:47

## 2021-01-28 RX ADMIN — Medication 7 UNIT(S): at 12:39

## 2021-01-28 RX ADMIN — APIXABAN 2.5 MILLIGRAM(S): 2.5 TABLET, FILM COATED ORAL at 17:48

## 2021-01-28 RX ADMIN — SODIUM CHLORIDE 3 MILLILITER(S): 9 INJECTION INTRAMUSCULAR; INTRAVENOUS; SUBCUTANEOUS at 12:47

## 2021-01-28 RX ADMIN — PANTOPRAZOLE SODIUM 40 MILLIGRAM(S): 20 TABLET, DELAYED RELEASE ORAL at 12:39

## 2021-01-28 RX ADMIN — Medication 7 UNIT(S): at 17:48

## 2021-01-28 RX ADMIN — Medication 81 MILLIGRAM(S): at 12:38

## 2021-01-28 RX ADMIN — GABAPENTIN 300 MILLIGRAM(S): 400 CAPSULE ORAL at 12:38

## 2021-01-28 RX ADMIN — SODIUM CHLORIDE 1 GRAM(S): 9 INJECTION INTRAMUSCULAR; INTRAVENOUS; SUBCUTANEOUS at 05:10

## 2021-01-28 RX ADMIN — Medication 2: at 12:40

## 2021-01-28 RX ADMIN — AMIODARONE HYDROCHLORIDE 200 MILLIGRAM(S): 400 TABLET ORAL at 05:10

## 2021-01-28 RX ADMIN — Medication 7 UNIT(S): at 09:01

## 2021-01-28 RX ADMIN — Medication 40 MILLIEQUIVALENT(S): at 12:40

## 2021-01-28 RX ADMIN — SODIUM CHLORIDE 1 GRAM(S): 9 INJECTION INTRAMUSCULAR; INTRAVENOUS; SUBCUTANEOUS at 12:39

## 2021-01-28 RX ADMIN — Medication 12.5 MILLIGRAM(S): at 05:10

## 2021-01-28 RX ADMIN — SODIUM CHLORIDE 3 MILLILITER(S): 9 INJECTION INTRAMUSCULAR; INTRAVENOUS; SUBCUTANEOUS at 05:15

## 2021-01-28 RX ADMIN — LIDOCAINE 1 PATCH: 4 CREAM TOPICAL at 12:47

## 2021-01-28 RX ADMIN — Medication 40 MILLIGRAM(S): at 05:10

## 2021-01-28 RX ADMIN — APIXABAN 2.5 MILLIGRAM(S): 2.5 TABLET, FILM COATED ORAL at 05:10

## 2021-01-28 RX ADMIN — GABAPENTIN 300 MILLIGRAM(S): 400 CAPSULE ORAL at 05:10

## 2021-01-28 RX ADMIN — INSULIN GLARGINE 12 UNIT(S): 100 INJECTION, SOLUTION SUBCUTANEOUS at 22:30

## 2021-01-28 RX ADMIN — Medication 500 MILLIGRAM(S): at 12:39

## 2021-01-28 RX ADMIN — LIDOCAINE 1 PATCH: 4 CREAM TOPICAL at 00:00

## 2021-01-28 RX ADMIN — Medication 1 TABLET(S): at 12:38

## 2021-01-28 NOTE — H&P ADULT - NSHPSOCIALHISTORY_GEN_ALL_CORE
Tobacco - denies  EtOH - denies  Illicit drugs - denies    Pt lives in house with 3+1 HONG and 3 steps inside up to bedroom and bathroom. Bathroom has bathtub with curtains. Pt does not own any DME. Pt is right handed. Pt drives. Pt's  is able and available to assist upon discharge. Pt's grandson works however otherwise able to assist.    Prior Level of Function: independent    Current Level of Function:  Bed mobility [place]  Transfers [place]  Gait 20' x2 [place]

## 2021-01-28 NOTE — PROGRESS NOTE ADULT - PROBLEM SELECTOR PLAN 2
Chronic, intermittent tachycardia to 140s.  Continue Lopressor, on Amio load. Cardizem started for improved rate control (was on at home)  Was on Xarelto @ home - on hold for now d/t bleeding  INR remains somewhat elevated > Vit K 5 mg IV x 3 days per Dr Orellana
Hx of CAF, in/out SR  Continue Amio PO
Hx of CAF, in/out SR  Continue Amio PO  am EKG  Eliquis A/C
Chronic afib.   Continue Lopressor, Amio load, and Cardizem.   HR now better controlled.   Continuous telemetry.   Holding Xarelto preoperatively.   INR remains somewhat elevated.   Continue to trend INR daily with Vit K ordered X 3 days per Dr. Orellana.
Hx of Afib, rate controlled  Continue Amio PO  Continue Lopressor   Continue Eliquis, dose reduced to 2.5 mg in setting of large hematoma in right thigh (pre op from trauma)  Replace electrolytes as needed, goal K>4.2, Mag >2.1
Hx of CAF, in/out SR  Continue Amio load PO
Hx of Afib, rate controlled  Continue Amio PO  Continue Lopressor   Continue Eliquis, dose reduced to 2.5 mg in setting of large hematoma in right thigh (pre op from trauma)
Hx of CAF, now in SR post op with short burst of AFib  Continue Amio load PO
Chronic, rate controlled  Continue Lopressor   Was on Xarelto @ home, will d/w Dr. Orellana AC now that Heparin is off.
Hx of CAF, in/out SR  Continue Amio PO
Chronic, rate controlled  Continue Lopressor   Was on Xarelto @ home, continue Heparin gtt

## 2021-01-28 NOTE — PROGRESS NOTE ADULT - PROVIDER SPECIALTY LIST ADULT
Endocrinology
Rehab Medicine
CT Surgery
Cardiology
Endocrinology
Endocrinology
Anesthesia
CT Surgery
Cardiology
Endocrinology
Endocrinology
CT Surgery
Cardiology
Cardiology
Endocrinology
Endocrinology
Hospitalist
Cardiology
CT Surgery

## 2021-01-28 NOTE — H&P ADULT - ATTENDING COMMENTS
Rehab Attending - patient seen and examined on 1/29- Above history,assessment and plan reviewed by me with modifications made    Moving Bowels daily  initial PVR  8AM 560cc- st cath for 850cc  PVR 2 PM 31 cc  difficulty tolerating CPAP  will check O2 sats rest and exercise  use 2l nasal O2 PRN    MEDICATIONS  (STANDING):  aMIOdarone    Tablet 200 milliGRAM(s) Oral daily  apixaban 2.5 milliGRAM(s) Oral every 12 hours  ascorbic acid 500 milliGRAM(s) Oral daily  aspirin enteric coated 81 milliGRAM(s) Oral daily  atorvastatin 40 milliGRAM(s) Oral at bedtime  dextrose 40% Gel 15 Gram(s) Oral once  dextrose 5%. 1000 milliLiter(s) (50 mL/Hr) IV Continuous <Continuous>  dextrose 5%. 1000 milliLiter(s) (100 mL/Hr) IV Continuous <Continuous>  dextrose 50% Injectable 25 Gram(s) IV Push once  dextrose 50% Injectable 12.5 Gram(s) IV Push once  dextrose 50% Injectable 25 Gram(s) IV Push once  diazepam    Tablet 2 milliGRAM(s) Oral at bedtime  gabapentin 300 milliGRAM(s) Oral three times a day  glucagon  Injectable 1 milliGRAM(s) IntraMuscular once  insulin glargine Injectable (LANTUS) 12 Unit(s) SubCutaneous at bedtime  insulin lispro (ADMELOG) corrective regimen sliding scale   SubCutaneous three times a day before meals  insulin lispro (ADMELOG) corrective regimen sliding scale   SubCutaneous at bedtime  insulin lispro Injectable (ADMELOG) 5 Unit(s) SubCutaneous three times a day before meals  lidocaine   Patch 1 Patch Transdermal daily  megestrol Suspension 400 milliGRAM(s) Oral daily  melatonin 6 milliGRAM(s) Oral at bedtime  metoprolol tartrate 12.5 milliGRAM(s) Oral two times a day  pantoprazole    Tablet 40 milliGRAM(s) Oral daily  potassium chloride   Powder 40 milliEquivalent(s) Oral daily  sodium chloride 1 Gram(s) Oral three times a day  torsemide 20 milliGRAM(s) Oral daily    MEDICATIONS  (PRN):  acetaminophen   Tablet .. 650 milliGRAM(s) Oral every 6 hours PRN Mild Pain (1 - 3)  oxyCODONE    IR 5 milliGRAM(s) Oral every 4 hours PRN Moderate Pain (4 - 6)  oxyCODONE    IR 10 milliGRAM(s) Oral every 4 hours PRN Severe Pain (7 - 10)  polyethylene glycol 3350 17 Gram(s) Oral daily PRN Constipation  senna 2 Tablet(s) Oral at bedtime PRN Constipation                            9.4    10.12 )-----------( 423      ( 29 Jan 2021 07:04 )             30.9     01-29    136  |  98  |  35<H>  ----------------------------<  168<H>  4.1   |  28  |  1.18    Ca    8.6      29 Jan 2021 07:04  Mg     2.4     01-28    TPro  6.5  /  Alb  2.6<L>  /  TBili  4.2<H>  /  DBili  x   /  AST  56<H>  /  ALT  84<H>  /  AlkPhos  261<H>  01-29        CAPILLARY BLOOD GLUCOSE      POCT Blood Glucose.: 290 mg/dL (29 Jan 2021 12:08)  POCT Blood Glucose.: 185 mg/dL (29 Jan 2021 07:43)  POCT Blood Glucose.: 192 mg/dL (28 Jan 2021 22:28)  POCT Blood Glucose.: 207 mg/dL (28 Jan 2021 17:10)      Vital Signs Last 24 Hrs  T(C): 36.7 (29 Jan 2021 09:28), Max: 36.7 (28 Jan 2021 19:46)  T(F): 98 (29 Jan 2021 09:28), Max: 98 (28 Jan 2021 19:46)  HR: 89 (29 Jan 2021 09:28) (72 - 89)  BP: 129/72 (29 Jan 2021 09:28) (108/64 - 129/78)  BP(mean): --  RR: 16 (29 Jan 2021 09:28) (16 - 20)  SpO2: 98% (29 Jan 2021 09:28) (94% - 100%)        On exam- Comfortable no CO SOB  Lungs Clear Abd Soft NT good BS  ext 2+ edema BLES ,+ Hematoma Right Thigh  FROM with MS 5/5 all ext    Labs Reviewed  valium Hs for anxiety  Nasl O2 PRN  to continue intensive Rehab program Rehab Attending - patient seen and examined on 1/29- Above history,assessment and plan reviewed by me with modifications made    Moving Bowels daily  initial PVR  8AM 560cc- st cath for 850cc  PVR 2 PM 31 cc  difficulty tolerating CPAP  will check O2 sats rest and exercise  use 2l nasal O2 PRN    MEDICATIONS  (STANDING):  aMIOdarone    Tablet 200 milliGRAM(s) Oral daily  apixaban 2.5 milliGRAM(s) Oral every 12 hours  ascorbic acid 500 milliGRAM(s) Oral daily  aspirin enteric coated 81 milliGRAM(s) Oral daily  atorvastatin 40 milliGRAM(s) Oral at bedtime  dextrose 40% Gel 15 Gram(s) Oral once  dextrose 5%. 1000 milliLiter(s) (50 mL/Hr) IV Continuous <Continuous>  dextrose 5%. 1000 milliLiter(s) (100 mL/Hr) IV Continuous <Continuous>  dextrose 50% Injectable 25 Gram(s) IV Push once  dextrose 50% Injectable 12.5 Gram(s) IV Push once  dextrose 50% Injectable 25 Gram(s) IV Push once  diazepam    Tablet 2 milliGRAM(s) Oral at bedtime  gabapentin 300 milliGRAM(s) Oral three times a day  glucagon  Injectable 1 milliGRAM(s) IntraMuscular once  insulin glargine Injectable (LANTUS) 12 Unit(s) SubCutaneous at bedtime  insulin lispro (ADMELOG) corrective regimen sliding scale   SubCutaneous three times a day before meals  insulin lispro (ADMELOG) corrective regimen sliding scale   SubCutaneous at bedtime  insulin lispro Injectable (ADMELOG) 5 Unit(s) SubCutaneous three times a day before meals  lidocaine   Patch 1 Patch Transdermal daily  megestrol Suspension 400 milliGRAM(s) Oral daily  melatonin 6 milliGRAM(s) Oral at bedtime  metoprolol tartrate 12.5 milliGRAM(s) Oral two times a day  pantoprazole    Tablet 40 milliGRAM(s) Oral daily  potassium chloride   Powder 40 milliEquivalent(s) Oral daily  sodium chloride 1 Gram(s) Oral three times a day  torsemide 20 milliGRAM(s) Oral daily    MEDICATIONS  (PRN):  acetaminophen   Tablet .. 650 milliGRAM(s) Oral every 6 hours PRN Mild Pain (1 - 3)  oxyCODONE    IR 5 milliGRAM(s) Oral every 4 hours PRN Moderate Pain (4 - 6)  oxyCODONE    IR 10 milliGRAM(s) Oral every 4 hours PRN Severe Pain (7 - 10)  polyethylene glycol 3350 17 Gram(s) Oral daily PRN Constipation  senna 2 Tablet(s) Oral at bedtime PRN Constipation                            9.4    10.12 )-----------( 423      ( 29 Jan 2021 07:04 )             30.9     01-29    136  |  98  |  35<H>  ----------------------------<  168<H>  4.1   |  28  |  1.18    Ca    8.6      29 Jan 2021 07:04  Mg     2.4     01-28    TPro  6.5  /  Alb  2.6<L>  /  TBili  4.2<H>  /  DBili  x   /  AST  56<H>  /  ALT  84<H>  /  AlkPhos  261<H>  01-29        CAPILLARY BLOOD GLUCOSE      POCT Blood Glucose.: 290 mg/dL (29 Jan 2021 12:08)  POCT Blood Glucose.: 185 mg/dL (29 Jan 2021 07:43)  POCT Blood Glucose.: 192 mg/dL (28 Jan 2021 22:28)  POCT Blood Glucose.: 207 mg/dL (28 Jan 2021 17:10)      Vital Signs Last 24 Hrs  T(C): 36.7 (29 Jan 2021 09:28), Max: 36.7 (28 Jan 2021 19:46)  T(F): 98 (29 Jan 2021 09:28), Max: 98 (28 Jan 2021 19:46)  HR: 89 (29 Jan 2021 09:28) (72 - 89)  BP: 129/72 (29 Jan 2021 09:28) (108/64 - 129/78)  BP(mean): --  RR: 16 (29 Jan 2021 09:28) (16 - 20)  SpO2: 98% (29 Jan 2021 09:28) (94% - 100%)        On exam- Comfortable no CO SOB  Lungs Clear Abd Soft NT good BS  ext 2+ edema BLES ,+ Hematoma Right Thigh  FROM with MS 5/5 all ext    Labs Reviewed  valium Hs for anxiety  Na Cl tabs Dcd  Nasal O2 PRN  to continue intensive Rehab program Rehab Attending - patient seen and examined on 1/29- Above history,assessment and plan reviewed by me with modifications made    Moving Bowels daily  initial PVR  8AM 560cc- st cath for 850cc  PVR 2 PM 31 cc  difficulty tolerating CPAP  will check O2 sats rest and exercise  use 2l nasal O2 PRN    MEDICATIONS  (STANDING):  aMIOdarone    Tablet 200 milliGRAM(s) Oral daily  apixaban 2.5 milliGRAM(s) Oral every 12 hours  ascorbic acid 500 milliGRAM(s) Oral daily  aspirin enteric coated 81 milliGRAM(s) Oral daily  atorvastatin 40 milliGRAM(s) Oral at bedtime  dextrose 40% Gel 15 Gram(s) Oral once  dextrose 5%. 1000 milliLiter(s) (50 mL/Hr) IV Continuous <Continuous>  dextrose 5%. 1000 milliLiter(s) (100 mL/Hr) IV Continuous <Continuous>  dextrose 50% Injectable 25 Gram(s) IV Push once  dextrose 50% Injectable 12.5 Gram(s) IV Push once  dextrose 50% Injectable 25 Gram(s) IV Push once  diazepam    Tablet 2 milliGRAM(s) Oral at bedtime  gabapentin 300 milliGRAM(s) Oral three times a day  glucagon  Injectable 1 milliGRAM(s) IntraMuscular once  insulin glargine Injectable (LANTUS) 12 Unit(s) SubCutaneous at bedtime  insulin lispro (ADMELOG) corrective regimen sliding scale   SubCutaneous three times a day before meals  insulin lispro (ADMELOG) corrective regimen sliding scale   SubCutaneous at bedtime  insulin lispro Injectable (ADMELOG) 5 Unit(s) SubCutaneous three times a day before meals  lidocaine   Patch 1 Patch Transdermal daily  megestrol Suspension 400 milliGRAM(s) Oral daily  melatonin 6 milliGRAM(s) Oral at bedtime  metoprolol tartrate 12.5 milliGRAM(s) Oral two times a day  pantoprazole    Tablet 40 milliGRAM(s) Oral daily  potassium chloride   Powder 40 milliEquivalent(s) Oral daily  sodium chloride 1 Gram(s) Oral three times a day  torsemide 20 milliGRAM(s) Oral daily    MEDICATIONS  (PRN):  acetaminophen   Tablet .. 650 milliGRAM(s) Oral every 6 hours PRN Mild Pain (1 - 3)  oxyCODONE    IR 5 milliGRAM(s) Oral every 4 hours PRN Moderate Pain (4 - 6)  oxyCODONE    IR 10 milliGRAM(s) Oral every 4 hours PRN Severe Pain (7 - 10)  polyethylene glycol 3350 17 Gram(s) Oral daily PRN Constipation  senna 2 Tablet(s) Oral at bedtime PRN Constipation                            9.4    10.12 )-----------( 423      ( 29 Jan 2021 07:04 )             30.9     01-29    136  |  98  |  35<H>  ----------------------------<  168<H>  4.1   |  28  |  1.18    Ca    8.6      29 Jan 2021 07:04  Mg     2.4     01-28    TPro  6.5  /  Alb  2.6<L>  /  TBili  4.2<H>  /  DBili  x   /  AST  56<H>  /  ALT  84<H>  /  AlkPhos  261<H>  01-29        CAPILLARY BLOOD GLUCOSE      POCT Blood Glucose.: 290 mg/dL (29 Jan 2021 12:08)  POCT Blood Glucose.: 185 mg/dL (29 Jan 2021 07:43)  POCT Blood Glucose.: 192 mg/dL (28 Jan 2021 22:28)  POCT Blood Glucose.: 207 mg/dL (28 Jan 2021 17:10)      Vital Signs Last 24 Hrs  T(C): 36.7 (29 Jan 2021 09:28), Max: 36.7 (28 Jan 2021 19:46)  T(F): 98 (29 Jan 2021 09:28), Max: 98 (28 Jan 2021 19:46)  HR: 89 (29 Jan 2021 09:28) (72 - 89)  BP: 129/72 (29 Jan 2021 09:28) (108/64 - 129/78)  BP(mean): --  RR: 16 (29 Jan 2021 09:28) (16 - 20)  SpO2: 98% (29 Jan 2021 09:28) (94% - 100%)        On exam- Comfortable no CO SOB  Lungs Clear   Abd Soft NT good BS  ext 2+ edema BLES ,+ Hematoma Right Thigh  FROM with MS 5/5 all ext    Labs Reviewed  valium Hs for anxiety  Na Cl tabs Dcd  Nasal O2 PRN  to continue intensive Rehab program

## 2021-01-28 NOTE — H&P ADULT - NSHPPHYSICALEXAM_GEN_ALL_CORE
Vital Signs  T(C): 36.3 (01-28-21 @ 10:25), Max: 36.8 (01-27-21 @ 15:15)  HR: 73 (01-28-21 @ 10:25) (67 - 73)  BP: 111/73 (01-28-21 @ 10:25) (96/55 - 127/74)  RR: 18 (01-28-21 @ 10:25) (17 - 18)  SpO2: 96% (01-28-21 @ 10:25) (96% - 100%)    Gen - NAD, Comfortable  HEENT - NCAT, EOMI, MMM  Neck - Supple, No limited ROM  Pulm - CTAB, No wheeze, No rhonchi, No crackles  Cardiovascular - RRR, S1S2, No m/r/g  Abdomen - Soft, NT/ND, +BS  Extremities - No C/C/E, No calf tenderness  Neuro-     Cognitive - AAOx3     Communication - Fluent, No dysarthria     Attention: Intact      Judgement: Good evidence of judgement     Memory: Recall 3 objects immediate and 3 min later         Cranial Nerves - CN 2-12 intact     Motor -                    LEFT    UE - ShAB 5/5, EF 5/5, EE 5/5, WE 5/5,  5/5                    RIGHT UE - ShAB 5/5, EF 5/5, EE 5/5, WE 5/5,  5/5                    LEFT    LE - HF 5/5, KE 5/5, DF 5/5, PF 5/5                    RIGHT LE - HF 5/5, KE 5/5, DF 5/5, PF 5/5        Sensory - Intact to LT     Reflexes - DTR Intact, No primitive reflex     Coordination - FTN intact     Tone - normal  Psychiatric - Mood stable, Affect WNL  Skin: Intact  Wounds: None Present Vital Signs Last 24 Hrs  T(C): 36.7 (01-28-21 @ 20:48), Max: 37.2 (01-28-21 @ 15:45)  T(F): 98 (01-28-21 @ 20:48), Max: 98.9 (01-28-21 @ 15:45)  HR: 82 (01-28-21 @ 20:48) (67 - 82)  BP: 129/73 (01-28-21 @ 20:48) (96/55 - 129/73)  RR: 17 (01-28-21 @ 20:48) (17 - 20)  SpO2: 96% (01-28-21 @ 20:48) (94% - 97%)      Gen - NAD, Comfortable  HEENT - NCAT, EOMI, MMM  Neck - Supple, No limited ROM  Pulm - Bilateral crackles from mid to lower lungs  Cardiovascular - irregular rhythm  Abdomen - Soft, NT/ND, +BS  Extremities - Bilateral LE edema  Neuro-     Cognitive - AAOx3     Communication - Fluent, No dysarthria     Cranial Nerves - CN 2-12 intact     Motor -                    LEFT    UE - ShAB 5/5, EF 5/5, EE 5/5, WE 5/5,  5/5                    RIGHT UE - ShAB 5/5, EF 5/5, EE 5/5, WE 5/5,  5/5                    LEFT    LE - HF 5/5, KE 4/5, DF 5/5, PF 5/5                    RIGHT LE - HF 5/5, KE 5/5, DF 5/5, PF 5/5        Sensory - Intact to LT     Reflexes - No primitive reflex     Coordination - FTN intact     Tone - normal  Psychiatric - Mood stable, Affect WNL  Skin: Two adjacent hematomas on right distal anterior thigh, just above the knee that are TTP; 4x4cm and [id number] in size. Large area of superficial bruising on right hip.   Wounds: Sacral lesion with erythema and blanchable skin, TTP, but no open wound noted. Mid-sternal incision with staples removed; appears c/d/i. Along anterior chest wall, there are 4 small drain sites noted, each about 2cm in length that appear well-healing; appear clean and dry.

## 2021-01-28 NOTE — H&P ADULT - ASSESSMENT
Assessment/Plan:  DONALD SAUNDERS is a 75y with PMHx of paroxysmal Afib (on Xarelto), PAD (s/p Y-csbjdze-eofoziutw bypass, R-femoral angioplasty with stenting, (on Plavix), HTN, HLD, type 2 diabetes (HA1c on 7.8 on Metformin and Tresiba as an outpatient), and recent trauma to right thigh with stable hematoma, presented to [facility] 1/12/21 with SOB, found to have NSTEMI. 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 S/P C3/DUANE Clip/MV Repair and DUANE thrombectomy with Dr. Orellana on 1/19/21. Postop course complicated by hypoxic respiratory failure and KRISTIE now resolved, and hypokalemia on daily repletion while on Torsemide.       Comprehensive Multidisciplinary Rehab Program:  - Start comprehensive rehab program, PT/OT 3 hours a day, 5 days a week.  - Precautions: falls, sternal    #CAD S/P CABG  - S/P C3/DUANE Clip/MV Repair and DUANE thrombectomy with Dr. Orellana on 1/19  - Continue ASA, Lipitor  - Continue Megace for appetite stimulation  - Continue Torsemide 20mg daily  - Continue Tylenol and Oxycodone PRN and Gabapentin for pain management  - F/u with Dr. Orellana upon discharge  - Daily weights    #Moderate MVR  - S/P mitral valve repair    #DM  - HbA1c 7.8 on Metformin and Tresiba as outpatient  - Continue Lantus, premeal insulin, and ISS. Decreased Lantus on admission to 12U due to episodes of hypoglycemia at [facility] with higher dose.  - F/u outpatient endoDr Soriano    #Afib  - Continue Eliquis 2.5mg BID (reduced dose due to large hematoma in right thigh from trauma prior to surgery)  - Continue Amiodarone and Lopressor    #HTN  - Continue Lopressor and Torsemide    #Hyponatremia  - Continue NaCl 1g TID  - 1L Fluid restriction diet  - Monitor BMP    #TOYIN  - Continue BiPAP overnight    #Mood  - Continue Xanax 0.25mg QHS  - Neuropsychology consult    Sleep:   - Maintain quiet hours and low stim environment.  - Melatonin to maximize participation in therapy during the day.   - Monitor sleep logs    GI/Bowel:  - At risk for constipation due to neurologic diagnosis, immobility and/or medication use  - Senna QHS, Miralax PRN Daily  - GI ppx: Protonix    /Bladder:   - At risk for incontinence and retention due to neurologic diagnosis and limited mobility  - Currently patient voids independently  - Catheter/bladder nursing protocol with bladder scans on admission with straight cath for >400cc.  - Encourage timed voids every 4 hours while awake for independence and to promote continence during therapy.    Skin/Pressure Injury:   - At risk for pressure injury due to neurologic diagnosis and relative immobility.  - Skin assessment on admission: ***  - Monitor Incisions: ***  - Turn every 2 hours while in bed, air mattress  - Soft heel protectors  - Skin barrier cream as needed  - Nursing to monitor skin Qshift    DVT ppx:  - Eliquis  - SCDs  ---------------  Outpatient Follow-up (Specialty/Name of physician):  Mary Man)  Internal Medicine  [address] [place] [id number]  Phone: [phone]  Fax: [phone]  Follow Up Time:     Doug Orellana)  Surgery; Thoracic and Cardiac Surgery  [address] [place] [address]  Phone: [phone]  Fax: [phone]  Follow Up Time:     Maryam Vazquez)  EndocrinologyMetabDiabetes; Internal Medicine  [address] [place] [address]  Phone: [phone]  Fax: [phone]  --------------  Goals: Safe discharge to ***  Estimated Length of Stay: 10-14 days  Rehab Potential: Good  Medical Prognosis: Good  Estimated Disposition: Home with Home Care  ---------------    PRESCREEN COMPARISON:  I have reviewed the prescreen information and I have found no relevant changes between the preadmission screening and my post admission evaluation.    RATIONALE FOR INPATIENT ADMISSION: Patient demonstrates the following:  [X] Medically appropriate for rehabilitation admission  [X] Has attainable rehab goals with an appropriate initial discharge plan  [X]Has rehabilitation potential (expected to make a significant improvement within a reasonable period of time)  [X] Requires close medical management by a rehab physician, rehab nursing care, Hospitalist and comprehensive interdisciplinary team (including PT, OT and/or SLP, Prosthetics and Orthotics)       Assessment/Plan:  DONALD SAUNDERS is a 75y with PMHx of paroxysmal Afib (on Xarelto), PAD (s/p P-flpyzio-tpvtqufrc bypass, R-femoral angioplasty with stenting, (on Plavix), HTN, HLD, type 2 diabetes (HA1c on 7.8 on Metformin and Tresiba as an outpatient), and recent trauma to right thigh with stable hematoma, presented to [facility] 1/12/21 with SOB, found to have NSTEMI. 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 S/P C3/DUANE Clip/MV Repair and DUANE thrombectomy with Dr. Orellana on 1/19/21. Postop course complicated by hypoxic respiratory failure and KRISTIE now resolved, and hypokalemia on daily repletion while on Torsemide.       Comprehensive Multidisciplinary Rehab Program:  - Start comprehensive rehab program, PT/OT 3 hours a day, 5 days a week.  - Precautions: falls, sternal    #CAD S/P CABG  - S/P C3/DUANE Clip/MV Repair and DUANE thrombectomy with Dr. Orellana on 1/19  - Continue ASA, Lipitor  - Continue Megace for appetite stimulation  - Continue Torsemide 20mg daily  - Continue Tylenol and Oxycodone PRN and Gabapentin for pain management  - F/u with Dr. Orellana upon discharge  - Daily weights    #Moderate MVR  - S/P mitral valve repair    #DM  - HbA1c 7.8 on Metformin and Tresiba as outpatient  - Continue Lantus, premeal insulin, and ISS. Decreased Lantus on admission to 12U due to episodes of hypoglycemia at [facility] with higher dose.  - F/u outpatient endoDr Soriano    #Afib  - Continue Eliquis 2.5mg BID (reduced dose due to large hematoma in right thigh from trauma prior to surgery)  - Continue Amiodarone and Lopressor    #HTN  - Continue Lopressor and Torsemide    #Hyponatremia  - Continue NaCl 1g TID  - 1L Fluid restriction diet  - Monitor BMP    #Postop hypoxia  - Continue BiPAP overnight    #Mood  - Continue Xanax 0.25mg QHS  - Neuropsychology consult    Sleep:   - Maintain quiet hours and low stim environment.  - Melatonin to maximize participation in therapy during the day.   - Monitor sleep logs    GI/Bowel:  - At risk for constipation due to neurologic diagnosis, immobility and/or medication use  - Senna QHS, Miralax PRN Daily  - GI ppx: Protonix    /Bladder:   - At risk for incontinence and retention due to neurologic diagnosis and limited mobility  - Currently patient voids independently  - Catheter/bladder nursing protocol with bladder scans on admission with straight cath for >400cc.  - Encourage timed voids every 4 hours while awake for independence and to promote continence during therapy.    Skin/Pressure Injury:   - At risk for pressure injury due to neurologic diagnosis and relative immobility.  - Skin assessment on admission: ***  - Monitor Incisions: ***  - Turn every 2 hours while in bed, air mattress  - Soft heel protectors  - Skin barrier cream as needed  - Nursing to monitor skin Qshift    DVT ppx:  - Eliquis  - SCDs  ---------------  Outpatient Follow-up (Specialty/Name of physician):  Mary Man)  Internal Medicine  [address] [place] [id number]  Phone: [phone]  Fax: [phone]  Follow Up Time:     Doug Orellana)  Surgery; Thoracic and Cardiac Surgery  [address] [place] [address]  Phone: [phone]  Fax: [phone]  Follow Up Time:     Maryam Vazquez)  EndocrinologyMetabDiabetes; Internal Medicine  [address] [place] [address]  Phone: [phone]  Fax: [phone]  --------------  Goals: Safe discharge to home  Estimated Length of Stay: 10-14 days  Rehab Potential: Good  Medical Prognosis: Good  Estimated Disposition: Home with Home Care  ---------------    PRESCREEN COMPARISON:  I have reviewed the prescreen information and I have found no relevant changes between the preadmission screening and my post admission evaluation.    RATIONALE FOR INPATIENT ADMISSION: Patient demonstrates the following:  [X] Medically appropriate for rehabilitation admission  [X] Has attainable rehab goals with an appropriate initial discharge plan  [X]Has rehabilitation potential (expected to make a significant improvement within a reasonable period of time)  [X] Requires close medical management by a rehab physician, rehab nursing care, Hospitalist and comprehensive interdisciplinary team (including PT, OT and/or SLP, Prosthetics and Orthotics)       Assessment/Plan:  DONALD SAUNDERS is a 75y with PMHx of paroxysmal Afib (on Xarelto), PAD (s/p S-fnqturd-tufreqztr bypass, R-femoral angioplasty with stenting, (on Plavix), HTN, HLD, type 2 diabetes (HA1c on 7.8 on Metformin and Tresiba as an outpatient), and recent trauma to right thigh with stable hematoma, presented to [facility] 1/12/21 with SOB, found to have NSTEMI. 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 S/P C3/DUANE Clip/MV Repair and DUANE thrombectomy with Dr. Orellana on 1/19/21. Postop course complicated by hypoxic respiratory failure and KRISTIE now resolved, and hypokalemia on daily repletion while on Torsemide.       Comprehensive Multidisciplinary Rehab Program:  - Start comprehensive rehab program, PT/OT 3 hours a day, 5 days a week.  - Precautions: falls, sternal    #CAD S/P CABG  - S/P C3/DUANE Clip/MV Repair and DUANE thrombectomy with Dr. Orellana on 1/19  - Continue ASA, Lipitor  - Continue Megace for appetite stimulation  - Continue Torsemide 20mg daily  - Continue Tylenol and Oxycodone PRN and Gabapentin for pain management  - F/u with Dr. Orellana upon discharge  - Daily weights    #Moderate MVR  - S/P mitral valve repair    #DM  - HbA1c 7.8 on Metformin and Tresiba as outpatient  - Continue Lantus, premeal insulin, and ISS. Decreased Lantus on admission to 12U due to episodes of hypoglycemia at [facility] with higher dose.  - F/u outpatient endoDr Soriano    #Afib  - Continue Eliquis 2.5mg BID (reduced dose due to large hematoma in right thigh from trauma prior to surgery)  - Continue Amiodarone and Lopressor    #HTN  - Continue Lopressor and Torsemide    #Hyponatremia  - Continue NaCl 1g TID  - 1L Fluid restriction diet  - Monitor BMP    #Postop hypoxia  - Continue BiPAP overnight    #Mood  - Continue Xanax 0.25mg QHS  - Neuropsychology consult    Sleep:   - Maintain quiet hours and low stim environment.  - Melatonin to maximize participation in therapy during the day.   - Monitor sleep logs    GI/Bowel:  - At risk for constipation due to neurologic diagnosis, immobility and/or medication use  - Senna QHS, Miralax PRN Daily  - GI ppx: Protonix    /Bladder:   - At risk for incontinence and retention due to neurologic diagnosis and limited mobility  - Currently patient voids independently  - Catheter/bladder nursing protocol with bladder scans on admission with straight cath for >400cc.  - Encourage timed voids every 4 hours while awake for independence and to promote continence during therapy.    Skin/Pressure Injury:   - At risk for pressure injury due to neurologic diagnosis and relative immobility.  - Skin assessment on admission:  no decubiti  - Monitor Incisions: sternal incision intact  - Turn every 2 hours while in bed, air mattress  - Soft heel protectors  - Skin barrier cream as needed  - Nursing to monitor skin Qshift    DVT ppx:  - Eliquis  - SCDs  ---------------  Outpatient Follow-up (Specialty/Name of physician):  Mary Man)  Internal Medicine  [address] [place] [id number]  Phone: [phone]  Fax: [phone]  Follow Up Time:     Doug Orellana)  Surgery; Thoracic and Cardiac Surgery  [address] [place] [address]  Phone: [phone]  Fax: [phone]  Follow Up Time:     Maryam Vazquez)  EndocrinologyMetabDiabetes; Internal Medicine  [address] [place] [address]  Phone: [phone]  Fax: [phone]  --------------  Goals: Safe discharge to home  Estimated Length of Stay: 10-14 days  Rehab Potential: Good  Medical Prognosis: Good  Estimated Disposition: Home with Home Care  ---------------    PRESCREEN COMPARISON:  I have reviewed the prescreen information and I have found no relevant changes between the preadmission screening and my post admission evaluation.    RATIONALE FOR INPATIENT ADMISSION: Patient demonstrates the following:  [X] Medically appropriate for rehabilitation admission  [X] Has attainable rehab goals with an appropriate initial discharge plan  [X]Has rehabilitation potential (expected to make a significant improvement within a reasonable period of time)  [X] Requires close medical management by a rehab physician, rehab nursing care, Hospitalist and comprehensive interdisciplinary team (including PT, OT and/or SLP, Prosthetics and Orthotics)

## 2021-01-28 NOTE — PROGRESS NOTE ADULT - SUBJECTIVE AND OBJECTIVE BOX
Patient states she is feeling better.  Hopes she does go to [place] today.     REVIEW OF SYSTEMS  Constitutional - No fever,  +fatigue  HEENT - No vertigo, No neck pain  Neurological - No headaches, No memory loss, No loss of strength, No numbness, No tremors  Musculoskeletal - No joint pain, +joint swelling, +muscle pain  Psychiatric - +depression, No anxiety    FUNCTIONAL PROGRESS    Bed Mobility  Bed Mobility Training Bridging: minimum assist (75% patient effort);  1 person assist;  bed rails;  verbal cues  Bed Mobility Training Sit-to-Supine: minimum assist (75% patient effort);  1 person assist;  verbal cues;  bed rails  Bed Mobility Training Supine-to-Sit: minimum assist (75% patient effort);  1 person assist;  verbal cues;  bed rails    Sit-Stand Transfer Training  Transfer Training Sit-to-Stand Transfer: minimum assist (75% patient effort);  1 person assist;  verbal cues;  rolling walker  Transfer Training Stand-to-Sit Transfer: minimum assist (75% patient effort);  1 person assist;  verbal cues;  vira walker    Gait Training  Gait Training Symptoms Noted During/After Treatment: increased pain;  Right thigh pain   Gait Training: minimum assist (75% patient effort);  1 person assist;  rolling walker;  20 feet  Gait Analysis: 3-point gait   decreased hip/knee flexion;  decreased step length;  impaired balance;  decreased strength;  20 feet;  rolling walker  Gait Number of Times:: x 2  Type of Rest Type of Rest: sitting;  shower      VITALS  T(C): 36.4 (21 @ 05:08), Max: 36.8 (21 @ 15:15)  HR: 73 (21 @ 05:08) (67 - 96)  BP: 107/55 (21 @ 05:08) (96/55 - 127/74)  RR: 17 (21 @ 05:08) (17 - 18)  SpO2: 97% (21 @ 05:08) (95% - 100%)  Wt(kg): --    MEDICATIONS   acetaminophen   Tablet .. 650 milliGRAM(s) every 6 hours PRN  ALPRAZolam 0.25 milliGRAM(s) at bedtime  aMIOdarone    Tablet 200 milliGRAM(s) daily  apixaban 2.5 milliGRAM(s) every 12 hours  ascorbic acid 500 milliGRAM(s) daily  aspirin enteric coated 81 milliGRAM(s) daily  atorvastatin 40 milliGRAM(s) at bedtime  dextrose 50% Injectable 50 milliLiter(s) every 15 minutes  dextrose 50% Injectable 25 milliLiter(s) every 15 minutes  gabapentin 300 milliGRAM(s) three times a day  insulin glargine Injectable (LANTUS) 25 Unit(s) at bedtime  insulin lispro (ADMELOG) corrective regimen sliding scale   Before meals and at bedtime  insulin lispro Injectable (ADMELOG) 7 Unit(s) three times a day before meals  lidocaine   Patch 1 Patch daily  megestrol 400 milliGRAM(s) daily  melatonin 5 milliGRAM(s) at bedtime  metoprolol tartrate 12.5 milliGRAM(s) two times a day  multivitamin/minerals 1 Tablet(s) daily  oxyCODONE    IR 5 milliGRAM(s) every 4 hours PRN  oxyCODONE    IR 10 milliGRAM(s) every 4 hours PRN  pantoprazole    Tablet 40 milliGRAM(s) daily  polyethylene glycol 3350 17 Gram(s) daily  potassium chloride   Powder 40 milliEquivalent(s) daily  senna 2 Tablet(s) at bedtime  sodium chloride 1 Gram(s) three times a day  sodium chloride 0.9% lock flush 3 milliLiter(s) every 8 hours  torsemide 40 milliGRAM(s) daily      RECENT LABS/IMAGING                          9.4    12. )-----------( 396      ( 2021 06:21 )             29.7         x   |  x   |  x   ----------------------------<  x   3.7   |  x   |  x     Ca    8.4<L>      2021 06:21  Mg     2.6             Urinalysis Basic - ( 2021 16:31 )    Color: Yellow / Appearance: Clear / S.005 / pH: x  Gluc: x / Ketone: Negative  / Bili: Small / Urobili: 8 mg/dL   Blood: x / Protein: 30 mg/dL / Nitrite: Negative   Leuk Esterase: Negative / RBC: 0-2 /HPF / WBC 3-5   Sq Epi: x / Non Sq Epi: Few / Bacteria: Occasional                GERALDINE  - Summary:   1. Moderately decreased global left ventricular systolic function. Left ventricular ejection fraction, by visual estimation, is 35 to 40%.   2. Normal right ventricular size and function.   3. Mild to moderately enlarged left atrium.   4. Mildly enlarged right atrium.   5. Moderate mitral valve regurgitation.   6. Mild-moderate tricuspid regurgitation.   7. Mild aortic valve stenosis. The non-coronary cusp is calcified and immobile, but the right and left coronary cusps open normally. The aortic valve area is 1.6 cm2 by planimetry.   8. No left atrial appendage thrombus. There is spontaneous echo contrast seen in the left atrial appendage, but there is no filling defect seen with the administration of Definity.    CXR  - Single frontal view of the chest demonstrates mild CHF, unchanged. Line and tubes are unchanged. Mediastinal sternotomy wires. The cardiomediastinal silhouette is enlarged. No acute osseous abnormalities. Overlying EKG leads and wires are noted    CXR  - Single frontal view of the chest demonstrates mild congestive changes. Line and tubes are unchanged. The cardiomediastinal silhouette is enlarged. No acute osseous abnormalities. Overlying EKG leads and wires are noted    CXR  -  HEART:  Enlarged. Mitral valve replacement. Left atrial appendage clip LUNGS: No consolidation or effusion. Mild congestive changes. No pneumothorax. BONES: Sternal wires are seen.    CXR  - Status post cardiac surgery. No evidence of active chest pathology.    CXR  - Status post cardiac surgery. Suspect mild  bilateral pleural effusion. Confirmatory upright lateral radiograph recommended.    TTE  - Summary:   1. Technically difficult study.   2. Moderately enlarged left atrium.   3. Left ventricular ejection fraction, by visual estimation, is 45 to 50%. Grade III diastolic dysfunction.   4. Elevated mean left atrial pressure. (LAP >30 mm Hg)   5. Moderately enlarged right atrium.   6. The right ventricular size is normal. RV systolic function is mildly reduced.   7. Status-post mitral annular ring insertion. No mitral stenosis. No mitral regurgitation.   8. Mild aortic valve stenosis.   9. Moderate tricuspid regurgitation.  10. Estimated pulmonary artery systolic pressure is 41 mmHg - mild pulmonary hypertension.  11. Trivial pericardial effusion.  ----------------------------------------------------------------------------------------  PHYSICAL EXAM  Constitutional - NAD, Comfortable  Extremities - Large right thigh hematoma - appears stable  Neurologic Exam -                    Motor - No focal deficits     Sensory - Intact to LT     Balance - WNL Static  Psychiatric - Mood depressed, anxious  ----------------------------------------------------------------------------------------  ASSESSMENT/PLAN  75yFemale with functional deficits after SOB/severe CAD  C3/DUANE Clip/MV Repair and DUANE thrombectomy - ASA, Lipitor  AFIB - Eliquis, Amiodarone, Demadex  HTN - Lopressor  HypoNa+ - 1L fluid restriction, NaCl  DM2 - Lantus, Lispro  Pulm - BiPAP HS  Sleep - Melatonin  Mood - Xanax HS  Pain - Tylenol, Oxycodone, Neurontin, Lidoderm  DVT PPX - SCDs, Lovenox  Rehab - Continue to recommend ACUTE inpatient rehabilitation for the functional deficits consisting of 3 hours of therapy/day & 24 hour RN/daily PMR physician for comorbid medical management. Will continue to follow for ongoing rehab needs and recommendations. Patient will be able to tolerate 3 hours a day.    Continue bedside therapy as well as OOB throughout the day with mobilization throughout the day with staff to maintain cardiopulmonary function and prevention of secondary complications related to debility.     Discussed with rehab clinical team.

## 2021-01-28 NOTE — PROGRESS NOTE ADULT - PROBLEM SELECTOR PROBLEM 2
Paroxysmal atrial fibrillation

## 2021-01-28 NOTE — H&P ADULT - NSHPLABSRESULTS_GEN_ALL_CORE
RECENT LABS                        10.3   12.08 )-----------( 461      ( 2021 08:31 )             32.6         133<L>  |  92<L>  |  39.0<H>  ----------------------------<  155<H>  3.6   |  27.0  |  1.01    Ca    8.6      2021 08:31  Mg     2.4             Urinalysis Basic - ( 2021 16:31 )    Color: Yellow / Appearance: Clear / S.005 / pH: x  Gluc: x / Ketone: Negative  / Bili: Small / Urobili: 8 mg/dL   Blood: x / Protein: 30 mg/dL / Nitrite: Negative   Leuk Esterase: Negative / RBC: 0-2 /HPF / WBC 3-5   Sq Epi: x / Non Sq Epi: Few / Bacteria: Occasional      CAPILLARY BLOOD GLUCOSE      POCT Blood Glucose.: 161 mg/dL (2021 12:06)  POCT Blood Glucose.: 149 mg/dL (2021 08:00)  POCT Blood Glucose.: 230 mg/dL (2021 22:03)  POCT Blood Glucose.: 186 mg/dL (2021 16:47)      IMAGING    < from: CT Angio Abd Aorta w/run-off w/ IV Cont (01.10.21 @ 03:04) >    IMPRESSION:  Right mid to distal thigh hematoma. No active extravasation. Surrounding thigh edema/contusion.    Right femoropopliteal bypass and left femoral artery stent, patent.    Atherosclerotic vascular disease. Occluded bilateral anterior tibial arteries and probable dorsalis pedis arteries. Plantar arteries are patent.    < end of copied text >    < from: US Duplex Carotid Arteries Complete, Bilateral (21 @ 19:05) >    IMPRESSION:    No evidence of hemodynamically significant stenosis by velocity measurements.    Measurement of carotid stenosis is based on velocity parameters that  correlate the residual internal carotid diameter with that of the more  distal vessel in accordance with a method such as the [place] American  Symptomatic Carotid Endarterectomy Trial (NASCET).    Thyroid nodules may be further evaluated with ultrasound on a nonemergent basis.    < end of copied text >    < from: Xray Chest 1 View- PORTABLE-Routine (Xray Chest 1 View- PORTABLE-Routine in AM.) (21 @ 05:52) >    IMPRESSION:  Status post cardiac surgery.  No evidence of active chest pathology.    < end of copied text >    GERALDINE  - Summary:   1. Moderately decreased global left ventricular systolic function. Left ventricular ejection fraction, by visual estimation, is 35 to 40%.   2. Normal right ventricular size and function.   3. Mild to moderately enlarged left atrium.   4. Mildly enlarged right atrium.   5. Moderate mitral valve regurgitation.   6. Mild-moderate tricuspid regurgitation.   7. Mild aortic valve stenosis. The non-coronary cusp is calcified and immobile, but the right and left coronary cusps open normally. The aortic valve area is 1.6 cm2 by planimetry.   8. No left atrial appendage thrombus. There is spontaneous echo contrast seen in the left atrial appendage, but there is no filling defect seen with the administration of Definity.    TTE  - Summary:   1. Technically difficult study.   2. Moderately enlarged left atrium.   3. Left ventricular ejection fraction, by visual estimation, is 45 to 50%. Grade III diastolic dysfunction.   4. Elevated mean left atrial pressure. (LAP >30 mm Hg)   5. Moderately enlarged right atrium.   6. The right ventricular size is normal. RV systolic function is mildly reduced.   7. Status-post mitral annular ring insertion. No mitral stenosis. No mitral regurgitation.   8. Mild aortic valve stenosis.   9. Moderate tricuspid regurgitation.  10. Estimated pulmonary artery systolic pressure is 41 mmHg - mild pulmonary hypertension.  11. Trivial pericardial effusion.

## 2021-01-28 NOTE — PROGRESS NOTE ADULT - ASSESSMENT
75 year old Female with a medical history of paroxysmal Afib (on Xarelto), PAD (s/p X-qqmfzob-ntgffemuq bypass, R-femoral angioplasty with stenting, maintained on Plavix), HTN, HLD, type 2 diabetes (HA1c on 7.8 on Metformin and Tresiba as an outpatient), and recent trauma to Right thigh with stable hematoma, presented with SOB, acute on chronic combined diastolic and systolic heart failure (requiring IV diuresis), and NSTEMI on 1/11/20. On 1/13 she underwent a LHC via Right radial artery and was found to have Multivessel CAD (right ostial, left main and LAD) with left femoral IABP placed. GERALDINE 1/14 showed Moderate MR and Mild AS. IABP removed successfully 1/15. Preoperative course significant for afib with RVR (requiring PO amio load, increased Lopressor dosing, and initiation of Cardizem).     On 1/19/2021 pt underwent C3/DUANE Clip/MV Repair and DUANE thrombectomy with Dr. Orellana.  Post op issues involved hypoxic respiratory failure (now resolved), and KRISTIE, now resolved.    1/26 Add torsemide  replete potassium, Acute rehab when bed available

## 2021-01-28 NOTE — PROGRESS NOTE ADULT - SUBJECTIVE AND OBJECTIVE BOX
Subjective: Patient lying in bed, no acute distress noted, denies chest pressure, pain, dizziness, headache, shortness of     VITAL SIGNS  Vital Signs Last 24 Hrs  T(C): 36.7 (21 @ 20:44), Max: 36.8 (21 @ 15:15)  T(F): 98 (21 @ 20:44), Max: 98.2 (21 @ 15:15)  HR: 67 (21 @ 00:00) (67 - 96)  BP: 96/55 (21 @ 00:00) (96/55 - 127/74)  RR: 17 (21 @ 00:00) (17 - 20)  SpO2: 96% (21 @ 00:00) (95% - 100%)  on (O2)              Telemetry/Alarms:    LVEF:     MEDICATIONS  acetaminophen   Tablet .. 650 milliGRAM(s) Oral every 6 hours PRN  ALPRAZolam 0.25 milliGRAM(s) Oral at bedtime  aMIOdarone    Tablet 200 milliGRAM(s) Oral daily  apixaban 2.5 milliGRAM(s) Oral every 12 hours  ascorbic acid 500 milliGRAM(s) Oral daily  aspirin enteric coated 81 milliGRAM(s) Oral daily  atorvastatin 40 milliGRAM(s) Oral at bedtime  dextrose 50% Injectable 50 milliLiter(s) IV Push every 15 minutes  dextrose 50% Injectable 25 milliLiter(s) IV Push every 15 minutes  gabapentin 300 milliGRAM(s) Oral three times a day  insulin glargine Injectable (LANTUS) 25 Unit(s) SubCutaneous at bedtime  insulin lispro (ADMELOG) corrective regimen sliding scale   SubCutaneous Before meals and at bedtime  insulin lispro Injectable (ADMELOG) 7 Unit(s) SubCutaneous three times a day before meals  lidocaine   Patch 1 Patch Transdermal daily  megestrol 400 milliGRAM(s) Oral daily  melatonin 5 milliGRAM(s) Oral at bedtime  metoprolol tartrate 12.5 milliGRAM(s) Oral two times a day  multivitamin/minerals 1 Tablet(s) Oral daily  oxyCODONE    IR 5 milliGRAM(s) Oral every 4 hours PRN  oxyCODONE    IR 10 milliGRAM(s) Oral every 4 hours PRN  pantoprazole    Tablet 40 milliGRAM(s) Oral daily  polyethylene glycol 3350 17 Gram(s) Oral daily  potassium chloride   Powder 40 milliEquivalent(s) Oral daily  senna 2 Tablet(s) Oral at bedtime  sodium chloride 1 Gram(s) Oral three times a day  sodium chloride 0.9% lock flush 3 milliLiter(s) IV Push every 8 hours  torsemide 40 milliGRAM(s) Oral daily      PHYSICAL EXAM  General: well nourished, well developed, no acute distress  Neurology: alert and oriented x 3, nonfocal, no gross deficits  Respiratory: clear to auscultation bilaterally  CV: regular rate and rhythm, normal S1, S2  Abdomen: soft, nontender, nondistended, positive bowel sounds, last bowel movement   Extremities: warm, well perfused. no edema. + DP pulses  Incisions: midline sternal incision, + mepilex, c/d/i. sternum stable.  Chest tubes:   Epicardial Wires:    > EPM (settings) / isolated       @ 07:01  -   @ 07:00  --------------------------------------------------------  IN: 870 mL / OUT: 300 mL / NET: 570 mL        Weights:  Daily     Daily Weight in k.5 (2021 05:32)  Admit Wt: Drug Dosing Weight  Height (cm): 177.8 (2021 06:50)  Weight (kg): 86 (2021 06:50)  BMI (kg/m2): 27.2 (2021 06:50)  BSA (m2): 2.04 (2021 06:50)    All laboratory results, radiology and medications reviewed.    LABS      x   |  x   |  x   ----------------------------<  x   3.7   |  x   |  x     Ca    8.4<L>      2021 06:21  Mg     2.6                                        9.4    12.26 )-----------( 396      ( 2021 06:21 )             29.7              CAPILLARY BLOOD GLUCOSE      POCT Blood Glucose.: 230 mg/dL (2021 22:03)  POCT Blood Glucose.: 186 mg/dL (2021 16:47)  POCT Blood Glucose.: 208 mg/dL (2021 11:59)  POCT Blood Glucose.: 159 mg/dL (2021 08:22)           Today's CXR:    Today's EKG:    PAST MEDICAL & SURGICAL HISTORY:  PAD (peripheral artery disease)    Paroxysmal atrial fibrillation    Hypercholesterolemia    Diabetes    Hypertension    S/P peripheral artery angioplasty with stent placement    S/P femoral-popliteal bypass surgery    H/O hernia repair    H/O abdominal hysterectomy        Subjective: Patient lying in bed, no acute distress noted, denies chest pressure, pain, dizziness, headache, shortness of breath, abdominal pain, N/V/D.    VITAL SIGNS  Vital Signs Last 24 Hrs  T(C): 36.7 (21 @ 20:44), Max: 36.8 (21 @ 15:15)  T(F): 98 (21 @ 20:44), Max: 98.2 (21 @ 15:15)  HR: 67 (21 @ 00:00) (67 - 96)  BP: 96/55 (21 @ 00:00) (96/55 - 127/74)  RR: 17 (21 @ 00:00) (17 - 20)  SpO2: 96% (21 @ 00:00) (95% - 100%)  on room air        Telemetry/Alarms:  Afib  LVEF: 40%    MEDICATIONS  acetaminophen   Tablet .. 650 milliGRAM(s) Oral every 6 hours PRN  ALPRAZolam 0.25 milliGRAM(s) Oral at bedtime  aMIOdarone    Tablet 200 milliGRAM(s) Oral daily  apixaban 2.5 milliGRAM(s) Oral every 12 hours  ascorbic acid 500 milliGRAM(s) Oral daily  aspirin enteric coated 81 milliGRAM(s) Oral daily  atorvastatin 40 milliGRAM(s) Oral at bedtime  gabapentin 300 milliGRAM(s) Oral three times a day  insulin glargine Injectable (LANTUS) 25 Unit(s) SubCutaneous at bedtime  insulin lispro (ADMELOG) corrective regimen sliding scale   SubCutaneous Before meals and at bedtime  insulin lispro Injectable (ADMELOG) 7 Unit(s) SubCutaneous three times a day before meals  lidocaine   Patch 1 Patch Transdermal daily  megestrol 400 milliGRAM(s) Oral daily  melatonin 5 milliGRAM(s) Oral at bedtime  metoprolol tartrate 12.5 milliGRAM(s) Oral two times a day  multivitamin/minerals 1 Tablet(s) Oral daily  oxyCODONE    IR 5 milliGRAM(s) Oral every 4 hours PRN  oxyCODONE    IR 10 milliGRAM(s) Oral every 4 hours PRN  pantoprazole    Tablet 40 milliGRAM(s) Oral daily  polyethylene glycol 3350 17 Gram(s) Oral daily  potassium chloride   Powder 40 milliEquivalent(s) Oral daily  senna 2 Tablet(s) Oral at bedtime  sodium chloride 1 Gram(s) Oral three times a day  sodium chloride 0.9% lock flush 3 milliLiter(s) IV Push every 8 hours  torsemide 40 milliGRAM(s) Oral daily      PHYSICAL EXAM  General: well nourished, well developed, no acute distress  Neurology: alert and oriented x 3, nonfocal, no gross deficits  Respiratory: clear to auscultation bilaterally  CV: Irregularly irregular, normal S1, S2  Abdomen: soft, nontender, nondistended, positive bowel sounds, last bowel movement   Extremities: warm, well perfused. +no edema. + DP pulses bilaterally. Left lateral thigh hematoma remains stable  Incisions: midline sternal incision, clean dry and intact, sternum stable. Right LE harvest site C/D/I  Psych: Appropriate mood and affect       @ 07:01  -   @ 07:00  --------------------------------------------------------  IN: 870 mL / OUT: 300 mL / NET: 570 mL        Weights:  Daily     Daily Weight in k.5 (2021 05:32)  Admit Wt: Drug Dosing Weight  Height (cm): 177.8 (2021 06:50)  Weight (kg): 86 (2021 06:50)  BMI (kg/m2): 27.2 (2021 06:50)  BSA (m2): 2.04 (2021 06:50)    All laboratory results, radiology and medications reviewed.    LABS      x   |  x   |  x   ----------------------------<  x   3.7   |  x   |  x     Ca    8.4<L>      2021 06:21  Mg     2.6                                        9.4    12.26 )-----------( 396      ( 2021 06:21 )             29.7              CAPILLARY BLOOD GLUCOSE      POCT Blood Glucose.: 230 mg/dL (2021 22:03)  POCT Blood Glucose.: 186 mg/dL (2021 16:47)  POCT Blood Glucose.: 208 mg/dL (2021 11:59)  POCT Blood Glucose.: 159 mg/dL (2021 08:22)         < from: Xray Chest 1 View- PORTABLE-Routine (Xray Chest 1 View- PORTABLE-Routine in AM.) (21 @ 06:02) >  IMPRESSION:  Status post cardiac surgery.  No radiographic evidence of active cardiopulmonary chest disease.    < end of copied text >      < from: TTE Echo Complete w/ Contrast w/ Doppler (21 @ 11:08) >    Summary:   1. Technically difficult study.   2. Moderately enlarged left atrium.   3. Left ventricular ejection fraction, by visual estimation, is 45 to 50%. Grade III diastolic dysfunction.   4. Elevated mean left atrial pressure. (LAP >30 mm Hg)   5. Moderately enlarged right atrium.   6. The right ventricular size is normal. RV systolic function is mildly reduced.   7. Status-post mitral annular ring insertion. No mitral stenosis. No mitral regurgitation.   8. Mild aortic valve stenosis.   9. Moderate tricuspid regurgitation.  10. Estimated pulmonary artery systolic pressure is 41 mmHg - mild pulmonary hypertension.  11. Trivial pericardial effusion.    < end of copied text >    PAST MEDICAL & SURGICAL HISTORY:  PAD (peripheral artery disease)    Paroxysmal atrial fibrillation    Hypercholesterolemia    Diabetes    Hypertension    S/P peripheral artery angioplasty with stent placement    S/P femoral-popliteal bypass surgery    H/O hernia repair    H/O abdominal hysterectomy

## 2021-01-28 NOTE — PROGRESS NOTE ADULT - NUTRITIONAL ASSESSMENT
This patient has been assessed with a concern for Malnutrition and has been determined to have a diagnosis/diagnoses of Moderate protein-calorie malnutrition.    This patient is being managed with:   Diet Regular-  Consistent Carbohydrate {No Snacks} (CSTCHO)  DASH/TLC {Sodium & Cholesterol Restricted} (DASH)  1000mL Fluid Restriction ([id number])  Supplement Feeding Modality:  Oral  Glucerna Shake Cans or Servings Per Day:  3       Frequency:  Three Times a day  Entered: Jan 25 2021  9:40AM    
This patient has been assessed with a concern for Malnutrition and has been determined to have a diagnosis/diagnoses of Moderate protein-calorie malnutrition.    This patient is being managed with:   Diet Regular-  Consistent Carbohydrate {No Snacks} (CSTCHO)  DASH/TLC {Sodium & Cholesterol Restricted} (DASH)  Supplement Feeding Modality:  Oral  Glucerna Shake Cans or Servings Per Day:  3       Frequency:  Three Times a day  Entered: Jan 21 2021  9:56AM    
This patient has been assessed with a concern for Malnutrition and has been determined to have a diagnosis/diagnoses of Moderate protein-calorie malnutrition.    This patient is being managed with:   Diet Regular-  Consistent Carbohydrate {No Snacks} (CSTCHO)  DASH/TLC {Sodium & Cholesterol Restricted} (DASH)  1000mL Fluid Restriction ([id number])  Supplement Feeding Modality:  Oral  Glucerna Shake Cans or Servings Per Day:  3       Frequency:  Three Times a day  Entered: Jan 25 2021  9:40AM    
This patient has been assessed with a concern for Malnutrition and has been determined to have a diagnosis/diagnoses of Moderate protein-calorie malnutrition.    This patient is being managed with:   Diet Regular-  Consistent Carbohydrate {No Snacks} (CSTCHO)  DASH/TLC {Sodium & Cholesterol Restricted} (DASH)  1000mL Fluid Restriction ([id number])  Supplement Feeding Modality:  Oral  Glucerna Shake Cans or Servings Per Day:  3       Frequency:  Three Times a day  Entered: Jan 25 2021  9:40AM    
This patient has been assessed with a concern for Malnutrition and has been determined to have a diagnosis/diagnoses of Moderate protein-calorie malnutrition.    This patient is being managed with:   Diet Regular-  Consistent Carbohydrate {No Snacks} (CSTCHO)  DASH/TLC {Sodium & Cholesterol Restricted} (DASH)  1000mL Fluid Restriction ([id number])  Supplement Feeding Modality:  Oral  Glucerna Shake Cans or Servings Per Day:  3       Frequency:  Three Times a day  Entered: Jan 25 2021  9:40AM

## 2021-01-28 NOTE — PROGRESS NOTE ADULT - PROBLEM SELECTOR PROBLEM 1
Coronary artery disease of native artery of native heart with stable angina pectoris

## 2021-01-28 NOTE — H&P ADULT - NSHPREVIEWOFSYSTEMS_GEN_ALL_CORE
REVIEW OF SYSTEMS  Constitutional: No fever, No Chills, No fatigue  HEENT: No eye pain, No visual disturbances, No difficulty hearing, No Dysphagia   Pulm: No cough,  No shortness of breath  Cardio: No chest pain, No palpitations  GI:  No abdominal pain, No nausea, No vomiting, No diarrhea, No constipation, No incontinence, Last Bowel Movement on   : No dysuria, No frequency, No hematuria, No incontinence  Neuro: No headaches, No memory loss, +loss of strength, No numbness, No tremors  Skin: No itching, No rashes, No lesions   Endo: No temperature intolerance  MSK: No joint pain, No joint swelling, No muscle pain, No Neck or back pain  Psych:  No depression, No anxiety Constitutional: No fever, No Chills, No fatigue  HEENT: No eye pain, No visual disturbances, No difficulty hearing, No Dysphagia   Pulm: No cough,  No shortness of breath  Cardio: No chest pain, No palpitations  GI:  No abdominal pain, No nausea, No vomiting, No diarrhea, No constipation, No incontinence, Last Bowel Movement this morning  : No dysuria, No frequency, No hematuria, No incontinence  Neuro: No headaches, No memory loss, +loss of strength, No numbness, No tremors  Skin: Bruising on right thigh and right hip  MSK: Right thigh pain, at site of hematoma

## 2021-01-28 NOTE — PROGRESS NOTE ADULT - PROBLEM SELECTOR PLAN 1
IABP placed preoperatively 1/13 and removed on 1/15 without complication.  now s/p C3/DUANE Clip/MV Repair and DUANE thrombectomy with Dr. Orellana on 1/19  Extubated, encourage IS hourly while awake  Continue Dobutamine off primacor maintain CI > 2.2  Pressor support off  Continue Bumex gtt for aggressive diuresis  Continue ASA for graft patency  Continue Lipitor for anti inflammatory properties  OOB to chair later this am
IABP placed in Cath Lab pre CABG, removed on 1/15 without complication.  Plan for OR 1/19 for CABG with Dr. Orellana  Continue Lopressor 25 mg BID  Continue Lipitor daily  Encourage IS hourly while awake  Echo completed, Carotid US completed
IABP placed preoperatively 1/13 and removed on 1/15 without complication.  now s/p C3/DUANE Clip/MV Repair and DUANE thrombectomy with Dr. Orellana on 1/19  Encourage IS hourly while awake  *maintain bipap at night*  Pressor support off  Continue ASA for graft patency  Continue Lipitor for anti inflammatory properties  OOB to chair later this am  Ambulate with PT assist daily  Megace started for appetite encouragement  D/W Dr Orellana in AM rounds
s/p C3/DUANE Clip/MV Repair and DUANE thrombectomy with Dr. Orellana on 1/19  Preop/intraop IABP  *maintain bipap at night*  Continue ASA for graft patency  Continue Lipitor for anti inflammatory properties  Betablocker CAD  Ambulate with PT assist daily  Megace started for appetite encouragement  Acute rehab when bed available  torsemide fluid overload
s/p C3/DUANE Clip/MV Repair and DUANE thrombectomy with Dr. Orellana on 1/19  Preop/intraop IABP  *maintain bipap at night*  Continue ASA for graft patency  Continue Lipitor for anti inflammatory properties  Continue Lopressor  Megace for appetite encouragement  Acute rehab when bed available  Continue Torsemide fluid overload  Replace electrolytes as needed, goal K>4.2, Mag >2.1  Chest PT, CDBE, Incentive Spirometry   OOB to chair, ambulate as tolerated  Ambulate with PT assist daily
IABP placed preoperatively 1/13 and removed on 1/15 without complication.  now s/p C3/DUANE Clip/MV Repair and DUANE thrombectomy with Dr. Orellana on 1/19  Encourage IS hourly while awake  Now on RA, d/c Bipap at night  Continue Dobutamine wean to 1 mcg/kg/min later this am and off tonight  Pressor support off  Continue ASA for graft patency  Continue Lipitor for anti inflammatory properties  OOB to chair later this am  Ambulate with PT assist daily  Megace started for appetite encouragement
IABP placed preoperatively 1/13 and removed on 1/15 without complication.  Preoperative workup completed.   Plan for OR 1/19 for CABG with Dr. Orellana.  Continue Lopressor 50 mg TID, amio, and Cardizem QID for afib with RVR.   Continue Lipitor daily.  Encourage incentive spirometry hourly while awake.
IABP placed in Cath Lab pre CABG, plan to d/c later today  Heparin off midnight last pm  Plan for OR week of 1/19 for CABG with Dr. Orellana  Continue Lopressor 25 mg BID  Continue Lipitor daily  Encourage IS hourly while awake  Echo completed, Carotid US completed
IABP placed preoperatively 1/13 and removed on 1/15 without complication.  now s/p C3/DUANE Clip/MV Repair and DUANE thrombectomy with Dr. Orellana on 1/19  Extubated, encourage IS hourly while awake  Continue Dobutamine and Primcaor, maintain CI > 2.2  Pressor support off  Continue Bumex gtt for aggressive diuresis  Continue ASA for graft patency  Continue Lipitor for anti inflammatory properties  OOB to chair later this am
IABP placed preoperatively 1/13 and removed on 1/15 without complication.  now s/p C3/DUANE Clip/MV Repair and DUANE thrombectomy with Dr. Orellana on 1/19  Extubated, encourage IS hourly while awake  Continue Dobutamine off primacor maintain CI > 2.2  Pressor support off  Continue Bumex gtt for aggressive diuresis  Continue ASA for graft patency  Continue Lipitor for anti inflammatory properties  OOB to chair later this am
s/p C3/DUANE Clip/MV Repair and DUANE thrombectomy with Dr. Orellana on 1/19  Preop/intraop IABP  *maintain bipap at night*  Continue ASA for graft patency  Continue Lipitor for anti inflammatory properties  Betablocker CAD  Megace for appetite encouragement  Acute rehab when bed available  Continue Torsemide fluid overload  Chest PT, CDBE, Incentive Spirometry   OOB to chair, ambulate as tolerated  Ambulate with PT assist daily
IABP placed in Cath Lab pre CABG  Plan for OR 1/15 for CABG with Dr. Orellana  Maintain Heparin gtt, currently @ 1300 uts/hr, maintain PTT 50-60  Continue Lopressor 25 mg BID  Continue Lipitor daily  Encourage IS hourly while awake  Echo completed, Carotid US completed  P2Y12 pending this am
IABP placed preoperatively  and removed on 1/15 without complication.  now s/p C3/DUANE Clip/MV Repair and DUANE thrombectomy with Dr. Orellana on   Extubated, encourage IS hourly while awake  Continue Dobutamine and Primcaor, wean  to 2.5, maintain CI > 2.2  Pressor support off  Continue ASA for graft patency  Continue Lipitor for anti inflammatory properties  OOB to chair later this am

## 2021-01-28 NOTE — PROGRESS NOTE ADULT - PROBLEM SELECTOR PLAN 7
Pantoprazole for GI prophylaxis.  No full dose AC do to leg hematoma > Eliquis changed to 2.5 mg
Pantoprazole for GI prophylaxis.  No full dose AC do to leg hematoma > Eliquis changed to 2.5 mg
Pantoprazole for GI prophylaxis.  No full dose AC due to leg hematoma > Eliquis changed to 2.5 mg    Dispo: Rehab medicine recommending Acute rehab, awaiting bed placement.   Plan to be discussed with CTS team in AM
Pantoprazole for GI prophylaxis.  No full dose AC due to leg hematoma > Eliquis changed to 2.5 mg    Dispo: Rehab medicine recommending Acute rehab, awaiting bed placement.   Plan to be discussed with CTS team in AM
Pantoprazole for GI prophylaxis.  No full dose AC do to leg hematoma

## 2021-01-28 NOTE — PATIENT PROFILE ADULT - ARRIVAL FROM
Home Pt came from [facility]

## 2021-01-28 NOTE — PROGRESS NOTE ADULT - REASON FOR ADMISSION
Afib with RVR with hypotension, elevated troponin
Preop CABG
1/19 MV Repair  C3LIMA DUANE clip
Afib with RVR with hypotension, elevated troponin

## 2021-01-28 NOTE — H&P ADULT - HISTORY OF PRESENT ILLNESS
75 year old F with PMHx of paroxysmal Afib (on Xarelto), PAD (s/p X-hoxnaaq-iornecesp bypass, R-femoral angioplasty with stenting, (on Plavix), HTN, HLD, type 2 diabetes (HA1c on 7.8 on Metformin and Tresiba as an outpatient), and recent trauma to right thigh with stable hematoma, presented to [facility] 1/12/21 with SOB, acute on chronic combined diastolic and systolic heart failure (requiring IV diuresis), and NSTEMI on 1/11/20. On 1/13 she underwent a LHC via Right radial artery and was found to have Multivessel CAD (right ostial, left main and LAD) with left femoral IABP placed. GERALDINE 1/14 showed Moderate MR and Mild AS. IABP removed successfully 1/15. Preoperative course significant for afib with RVR (requiring PO amio load, increased Lopressor dosing, and initiation of Cardizem).     Patient underwent C3/DUANE Clip/MV Repair and DUANE thrombectomy with Dr. Orellana 1/19/21. Post op issues involved hypoxic respiratory failure (now resolved) and KRISTIE, which is now improved. Torsemide was added 1/26/21 due to fluid overload.     Patient was medically stable for discharge to [place] for acute rehab 1/28/21.  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 On 1/13 she underwent a LHC via Right radial artery and was found to have Multivessel CAD (right ostial, left main and LAD) with left femoral IABP placed. GERALDINE 1/14 showed Moderate MR and Mild AS. IABP removed successfully 1/15. Preoperative course significant for afib with RVR (requiring PO amio load, increased Lopressor dosing, and initiation of Cardizem).     Patient underwent C3/DUANE Clip/MV Repair and DUANE thrombectomy with Dr. Orellana 1/19/21. Post op issues involved hypoxic respiratory failure (now resolved) and KRISTIE, which is now improved. Torsemide was added 1/26/21 due to fluid overload.     Patient was medically stable for discharge to [place] for acute rehab 1/28/21. Patient was seen and examined at the bedside, upon arrival.

## 2021-01-29 ENCOUNTER — TRANSCRIPTION ENCOUNTER (OUTPATIENT)
Age: 76
End: 2021-01-29

## 2021-01-29 LAB
ALBUMIN SERPL ELPH-MCNC: 2.6 G/DL — LOW (ref 3.3–5)
ALP SERPL-CCNC: 261 U/L — HIGH (ref 40–120)
ALT FLD-CCNC: 84 U/L — HIGH (ref 10–45)
ANION GAP SERPL CALC-SCNC: 10 MMOL/L — SIGNIFICANT CHANGE UP (ref 5–17)
AST SERPL-CCNC: 56 U/L — HIGH (ref 10–40)
BILIRUB SERPL-MCNC: 4.2 MG/DL — HIGH (ref 0.2–1.2)
BUN SERPL-MCNC: 35 MG/DL — HIGH (ref 7–23)
CALCIUM SERPL-MCNC: 8.6 MG/DL — SIGNIFICANT CHANGE UP (ref 8.4–10.5)
CHLORIDE SERPL-SCNC: 98 MMOL/L — SIGNIFICANT CHANGE UP (ref 96–108)
CO2 SERPL-SCNC: 28 MMOL/L — SIGNIFICANT CHANGE UP (ref 22–31)
CREAT SERPL-MCNC: 1.18 MG/DL — SIGNIFICANT CHANGE UP (ref 0.5–1.3)
GLUCOSE BLDC GLUCOMTR-MCNC: 185 MG/DL — HIGH (ref 70–99)
GLUCOSE BLDC GLUCOMTR-MCNC: 192 MG/DL — HIGH (ref 70–99)
GLUCOSE BLDC GLUCOMTR-MCNC: 278 MG/DL — HIGH (ref 70–99)
GLUCOSE BLDC GLUCOMTR-MCNC: 290 MG/DL — HIGH (ref 70–99)
GLUCOSE BLDC GLUCOMTR-MCNC: 359 MG/DL — HIGH (ref 70–99)
GLUCOSE SERPL-MCNC: 168 MG/DL — HIGH (ref 70–99)
HCT VFR BLD CALC: 30.9 % — LOW (ref 34.5–45)
HGB BLD-MCNC: 9.4 G/DL — LOW (ref 11.5–15.5)
MCHC RBC-ENTMCNC: 26.8 PG — LOW (ref 27–34)
MCHC RBC-ENTMCNC: 30.4 GM/DL — LOW (ref 32–36)
MCV RBC AUTO: 88 FL — SIGNIFICANT CHANGE UP (ref 80–100)
NRBC # BLD: 0 /100 WBCS — SIGNIFICANT CHANGE UP (ref 0–0)
PLATELET # BLD AUTO: 423 K/UL — HIGH (ref 150–400)
POTASSIUM SERPL-MCNC: 4.1 MMOL/L — SIGNIFICANT CHANGE UP (ref 3.5–5.3)
POTASSIUM SERPL-SCNC: 4.1 MMOL/L — SIGNIFICANT CHANGE UP (ref 3.5–5.3)
PROT SERPL-MCNC: 6.5 G/DL — SIGNIFICANT CHANGE UP (ref 6–8.3)
RBC # BLD: 3.51 M/UL — LOW (ref 3.8–5.2)
RBC # FLD: 17.5 % — HIGH (ref 10.3–14.5)
SARS-COV-2 RNA SPEC QL NAA+PROBE: SIGNIFICANT CHANGE UP
SODIUM SERPL-SCNC: 136 MMOL/L — SIGNIFICANT CHANGE UP (ref 135–145)
WBC # BLD: 10.12 K/UL — SIGNIFICANT CHANGE UP (ref 3.8–10.5)
WBC # FLD AUTO: 10.12 K/UL — SIGNIFICANT CHANGE UP (ref 3.8–10.5)

## 2021-01-29 PROCEDURE — 99223 1ST HOSP IP/OBS HIGH 75: CPT

## 2021-01-29 PROCEDURE — 71045 X-RAY EXAM CHEST 1 VIEW: CPT | Mod: 26

## 2021-01-29 PROCEDURE — 99223 1ST HOSP IP/OBS HIGH 75: CPT | Mod: GC

## 2021-01-29 PROCEDURE — 96116 NUBHVL XM PHYS/QHP 1ST HR: CPT

## 2021-01-29 RX ORDER — SENNA PLUS 8.6 MG/1
2 TABLET ORAL AT BEDTIME
Refills: 0 | Status: DISCONTINUED | OUTPATIENT
Start: 2021-01-29 | End: 2021-02-01

## 2021-01-29 RX ORDER — INSULIN GLARGINE 100 [IU]/ML
16 INJECTION, SOLUTION SUBCUTANEOUS AT BEDTIME
Refills: 0 | Status: DISCONTINUED | OUTPATIENT
Start: 2021-01-29 | End: 2021-01-30

## 2021-01-29 RX ORDER — INSULIN LISPRO 100/ML
7 VIAL (ML) SUBCUTANEOUS
Refills: 0 | Status: DISCONTINUED | OUTPATIENT
Start: 2021-01-29 | End: 2021-01-30

## 2021-01-29 RX ORDER — DIAZEPAM 5 MG
2 TABLET ORAL AT BEDTIME
Refills: 0 | Status: DISCONTINUED | OUTPATIENT
Start: 2021-01-29 | End: 2021-02-01

## 2021-01-29 RX ORDER — POLYETHYLENE GLYCOL 3350 17 G/17G
17 POWDER, FOR SOLUTION ORAL DAILY
Refills: 0 | Status: DISCONTINUED | OUTPATIENT
Start: 2021-01-29 | End: 2021-02-01

## 2021-01-29 RX ADMIN — GABAPENTIN 300 MILLIGRAM(S): 400 CAPSULE ORAL at 05:01

## 2021-01-29 RX ADMIN — SODIUM CHLORIDE 1 GRAM(S): 9 INJECTION INTRAMUSCULAR; INTRAVENOUS; SUBCUTANEOUS at 05:02

## 2021-01-29 RX ADMIN — Medication 6 MILLIGRAM(S): at 22:16

## 2021-01-29 RX ADMIN — Medication 6: at 17:08

## 2021-01-29 RX ADMIN — Medication 20 MILLIGRAM(S): at 05:01

## 2021-01-29 RX ADMIN — PANTOPRAZOLE SODIUM 40 MILLIGRAM(S): 20 TABLET, DELAYED RELEASE ORAL at 12:12

## 2021-01-29 RX ADMIN — LIDOCAINE 1 PATCH: 4 CREAM TOPICAL at 12:13

## 2021-01-29 RX ADMIN — ATORVASTATIN CALCIUM 40 MILLIGRAM(S): 80 TABLET, FILM COATED ORAL at 22:15

## 2021-01-29 RX ADMIN — Medication 12.5 MILLIGRAM(S): at 05:01

## 2021-01-29 RX ADMIN — Medication 7 UNIT(S): at 17:11

## 2021-01-29 RX ADMIN — Medication 2 MILLIGRAM(S): at 22:15

## 2021-01-29 RX ADMIN — LIDOCAINE 1 PATCH: 4 CREAM TOPICAL at 19:30

## 2021-01-29 RX ADMIN — AMIODARONE HYDROCHLORIDE 200 MILLIGRAM(S): 400 TABLET ORAL at 05:02

## 2021-01-29 RX ADMIN — Medication 5 UNIT(S): at 12:12

## 2021-01-29 RX ADMIN — Medication 2: at 08:16

## 2021-01-29 RX ADMIN — APIXABAN 2.5 MILLIGRAM(S): 2.5 TABLET, FILM COATED ORAL at 05:01

## 2021-01-29 RX ADMIN — SODIUM CHLORIDE 1 GRAM(S): 9 INJECTION INTRAMUSCULAR; INTRAVENOUS; SUBCUTANEOUS at 14:25

## 2021-01-29 RX ADMIN — Medication 81 MILLIGRAM(S): at 12:12

## 2021-01-29 RX ADMIN — Medication 40 MILLIEQUIVALENT(S): at 12:13

## 2021-01-29 RX ADMIN — GABAPENTIN 300 MILLIGRAM(S): 400 CAPSULE ORAL at 00:35

## 2021-01-29 RX ADMIN — APIXABAN 2.5 MILLIGRAM(S): 2.5 TABLET, FILM COATED ORAL at 17:08

## 2021-01-29 RX ADMIN — Medication 6: at 12:12

## 2021-01-29 RX ADMIN — GABAPENTIN 300 MILLIGRAM(S): 400 CAPSULE ORAL at 22:15

## 2021-01-29 RX ADMIN — Medication 12.5 MILLIGRAM(S): at 17:08

## 2021-01-29 RX ADMIN — Medication 500 MILLIGRAM(S): at 12:12

## 2021-01-29 RX ADMIN — GABAPENTIN 300 MILLIGRAM(S): 400 CAPSULE ORAL at 14:25

## 2021-01-29 RX ADMIN — INSULIN GLARGINE 16 UNIT(S): 100 INJECTION, SOLUTION SUBCUTANEOUS at 22:16

## 2021-01-29 RX ADMIN — Medication 6: at 22:16

## 2021-01-29 RX ADMIN — Medication 5 UNIT(S): at 08:16

## 2021-01-29 RX ADMIN — SODIUM CHLORIDE 1 GRAM(S): 9 INJECTION INTRAMUSCULAR; INTRAVENOUS; SUBCUTANEOUS at 22:17

## 2021-01-29 NOTE — CONSULT NOTE ADULT - ASSESSMENT
75y with PMHx of paroxysmal Afib (on Xarelto), PAD (s/p Q-cbydkgz-uzzrjztng bypass, R-femoral angioplasty with stenting, (on Plavix), HTN, HLD, type 2 diabetes (HA1c on 7.8 on Metformin and Tresiba as an outpatient), and recent trauma to right thigh with stable hematoma, presented to [facility] 1/12/21 with SOB, found to have NSTEMI. 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 S/P C3/DUANE Clip/MV Repair and DUANE thrombectomy with Dr. Orellana on 1/19/21. Postop course complicated by hypoxic respiratory failure and KRISTIE now resolved, and hypokalemia on daily repletion while on Torsemide, admitted for rehab- pt/ot/dvt ppx    #CAD S/P CABG , MV Repair and DUANE thrombectomy- ASA, Lipitor    # poor appetite - Continue Megace     # HFrEF, improved-  c/w Torsemide, consider ACEI if BP allows, will monitor    #DM2  - HbA1c 7.8 on Metformin and Tresiba as outpatient  - Continue Lantus, pre meal insulin, and ISS, reassess in am, can decrease pre meal if needed      #Afib  - Continue Eliquis 2.5mg BID (reduced dose due to large hematoma in right thigh from trauma prior to surgery)  - Continue Amiodarone and Lopressor    #HTN  - Continue Lopressor and Torsemide    #Hyponatremia- resolved  - d/c  NaCl 1g TID  - 1L Fluid restriction diet  - Monitor BMP    #Postop hypoxia- refusing BiPAP overnight, she was not on bipap at home.  monitor of bipap for now,   check o2 sats on ambulation and rest  check cxr     #Mood- requesting valium at hs, ok to change to valium  - Can d/c Xanax 0.25mg QHS    # GI ppx: Protonix    # DVT ppx- Eliquis    will follow  d/w dr. sinha rehab

## 2021-01-29 NOTE — CONSULT NOTE ADULT - SUBJECTIVE AND OBJECTIVE BOX
Pt is a 76 y/o right-handed female with PMHx of paroxysmal Afib (on Xarelto), PAD (s/p N-culilwx-mrrmhkcob bypass, R-femoral angioplasty with stenting, (on Plavix), HTN, HLD, type 2 diabetes (HA1c on 7.8 on Metformin and Tresiba as an outpatient), and recent trauma to right thigh with stable hematoma, presented to [facility] 1/12/21 with SOB, acute on chronic combined diastolic and systolic heart failure (requiring IV diuresis), and NSTEMI on 1/11/20. On 1/13 she underwent a LHC via Right radial artery and was found to have Multivessel CAD (right ostial, left main and LAD) with left femoral IABP placed. GERALDINE 1/14 showed Moderate MR and Mild AS. IABP removed successfully 1/15. Preoperative course significant for afib with RVR (requiring PO amio load, increased Lopressor dosing, and initiation of Cardizem). Pt underwent C3/DUANE Clip/MV Repair and DUANE thrombectomy with Dr. Orellana 1/19/21. Post op issues involved hypoxic respiratory failure (now resolved) and KRISTIE, which is now improved. Torsemide was added 1/26/21 due to fluid overload. Pt was medically stable for discharge to [place] for acute rehab 1/28/21. Patient was seen and examined at the bedside, upon arrival. PMHx:  As noted above. Current meds: Please see list in Pt’s chart. Social Hx: Pt is  and has two adult sons. 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 She lacks hx of mental illness and substance abuse. Pt is Religion. She enjoys doing woodwork with her .  Findings: Pt was seen for an initial assessment of her cognitive and emotional functioning. MMSE was administered; Pt’s results (24/30) were in the Mildly Impaired range.Her scores in mood measures suggested minimal to mild levels of depression (GDS = 3/15). Pt was alert, closely Ox3 (disoriented to day of the week/month), and cooperative. Attn/Conc: Simple auditory attention - intact.  Concentration - intact. Working memory: Pt declined to perform calculations; reversed spelling – mildly impaired (3/5). Language: Pt’s speech was of normal volume and pitch, with slightly variable pace due to occasional SOB. Naming - intact. Sentence repetition - intact. Auditory Comprehension - intact. Reading - intact. Writing - intact. Memory: Encoding of 3 words was intact (3/3); short- and long-delayed verbal recall – mildly impaired (2/3, improving to 3/3 with cueing). LTM was intact for US presidents (4/4). Visual memory – intact. Visuospatial: Visuomotor integration – impaired for copy of a 2D figure, micrographia and sloppiness were noted. Executive Functions: Motor Planning - intact. Organizational skills - closely intact. Abstract reasoning - intact. Verbal problem solving – intact. Emotional functioning: Affect - constricted, anxious. Mood - sad; Pt reported experiencing sad mood, anxiety, poor sleep, low energy and fatigue. On mood measures she additionally reported dysphoric mood, helplessness, and low energy.  Thought processes were goal-directed.  No abnormal thought contents were observed.  Pt denied any AH/VH. Pt also denied SI/HI/I/P. Insight - WFL. Judgment - WFL.

## 2021-01-29 NOTE — CONSULT NOTE ADULT - SUBJECTIVE AND OBJECTIVE BOX
75 year old F with PMHx of paroxysmal Afib (on Xarelto), PAD (s/p S-bbgugcr-dlrbmqawr bypass, R-femoral angioplasty with stenting, (on Plavix), HTN, HLD, type 2 diabetes (HA1c on 7.8 on Metformin and Tresiba as an outpatient), and recent trauma to right thigh with stable hematoma, presented to [facility] 1/12/21 with SOB, acute on chronic combined diastolic and systolic heart failure (requiring IV diuresis), and NSTEMI on 1/11/20. On 1/13 she underwent a LHC via Right radial artery and was found to have Multivessel CAD (right ostial, left main and LAD) with left femoral IABP placed. GERALDINE 1/14 showed Moderate MR and Mild AS. IABP removed successfully 1/15. Preoperative course significant for afib with RVR (requiring PO amio load, increased Lopressor dosing, and initiation of Cardizem).     Patient underwent MV Repair and DUANE thrombectomy with Dr. Orellana 1/19/21. Post op issues involved hypoxic respiratory failure (now resolved) and KRISTIE, which is now improved. Torsemide was added 1/26/21 due to fluid overload.     Patient was medically stable for discharge to [place] for acute rehab 1/28/21. Patient was seen and examined at the bedside, upon arrival.    seen at the bedside, c/o fatigue, not being able to sleep well last night, refuses cpap was not using cpap at home, requesting valium at  for sleep. no n/v, no sob, no chest pain, no dysuria.     Review of Systems: per hpi    Allergies and Intolerances:        Allergies:  	warfarin: Drug, Rash       Intolerances:  	OHS: Food, Unknown, OHS    Home Medications:   * Incomplete Medication History as of 28-Jan-2021 14:21 documented in Structured Notes  · 	insulin lispro 100 units/mL injectable solution:  injectable   · 	sodium chloride 1 g oral tablet: 1 tab(s) orally 3 times a day  · 	amiodarone 200 mg oral tablet: 1 tab(s) orally once a day  · 	melatonin 5 mg oral tablet: 1 tab(s) orally once a day (at bedtime)  · 	ALPRAZolam 0.25 mg oral tablet: 1 tab(s) orally once a day (at bedtime)  · 	aspirin 81 mg oral delayed release tablet: 1 tab(s) orally once a day  · 	atorvastatin 40 mg oral tablet: 1 tab(s) orally once a day (at bedtime)  · 	insulin glargine: 25 unit(s) subcutaneous once a day (at bedtime)  · 	metoprolol: 12.5 milligram(s) orally every 12 hours  · 	lidocaine 5% topical film: 1 patch transdermally once a day, apply to back.  Patchmay remain in place for up to 12 hours in any 24 hour period  · 	torsemide 20 mg oral tablet: 1 tab(s) orally once a day  · 	polyethylene glycol 3350 oral powder for reconstitution: 17 gram(s) orally once a day  · 	senna oral tablet: 2 tab(s) orally once a day (at bedtime)  · 	potassium chloride 20 mEq oral powder for reconstitution: 2 packet(s) orally once a day  · 	pantoprazole 40 mg oral delayed release tablet: 1 tab(s) orally once a day  · 	megestrol 40 mg oral tablet: 10 tab(s) orally once a day  · 	Multiple Vitamins with Minerals oral tablet: 1 tab(s) orally once a day  · 	ascorbic acid 500 mg oral tablet: 1 tab(s) orally once a day  · 	gabapentin 300 mg oral capsule: 1 cap(s) orally 3 times a day  · 	apixaban 2.5 mg oral tablet: 1 tab(s) orally every 12 hours  · 	oxyCODONE 10 mg oral tablet: 1 tab(s) orally every 4 hours, As needed, Severe Pain (7 - 10)  · 	oxyCODONE 5 mg oral tablet: 1 tab(s) orally every 4 hours, As needed, Moderate Pain (4 - 6)  · 	acetaminophen 325 mg oral tablet: 2 tab(s) orally every 6 hours, As needed, Mild Pain (1 - 3)    .    Patient History:    Past Medical, Past Surgical, and Family History:  PAST MEDICAL HISTORY:  Diabetes     Hypercholesterolemia     Hypertension     PAD (peripheral artery disease)     Paroxysmal atrial fibrillation.     PAST SURGICAL HISTORY:  H/O abdominal hysterectomy     H/O hernia repair     S/P femoral-popliteal bypass surgery     S/P peripheral artery angioplasty with stent placement.     FAMILY HISTORY:  Father  Still living? No  Family history of abdominal aortic aneurysm, Age at diagnosis: 51-60  Family history of thoracic aortic aneurysm, Age at diagnosis: 61-70.     Social History:  Tobacco - denies  EtOH - denies  Illicit drugs - denies    Vital Signs Last 24 Hrs  T(C): 36.7 (29 Jan 2021 09:28), Max: 37.2 (28 Jan 2021 15:45)  T(F): 98 (29 Jan 2021 09:28), Max: 98.9 (28 Jan 2021 15:45)  HR: 89 (29 Jan 2021 09:28) (71 - 89)  BP: 129/72 (29 Jan 2021 09:28) (108/64 - 129/78)  BP(mean): --  RR: 16 (29 Jan 2021 09:28) (16 - 20)  SpO2: 98% (29 Jan 2021 09:28) (94% - 100%)    GENERAL- NAD  EAR/NOSE/MOUTH/THROAT - no pharyngeal exudates, no oral leisions,  MMM  EYES- YOSELYN, conjunctiva and Sclera clear  NECK- supple  RESPIRATORY-  clear to auscultation bilaterally  CARDIOVASCULAR - SIS2, IRIR  GI - soft NT BS present  EXTREMITIES- b/l LE  edema  NEUROLOGY- no gross focal deficits  SKIN- chest and leg incision clean  PSYCHIATRY- AAO X 3  MUSCULOSKELETAL- ROM normal                    9.4                  136  | 28   | 35           10.12 >-----------< 423     ------------------------< 168                   30.9                 4.1  | 98   | 1.18                                         Ca 8.6   Mg x     Ph x          CAPILLARY BLOOD GLUCOSE      POCT Blood Glucose.: 185 mg/dL (29 Jan 2021 07:43)  POCT Blood Glucose.: 192 mg/dL (28 Jan 2021 22:28)  POCT Blood Glucose.: 207 mg/dL (28 Jan 2021 17:10)  POCT Blood Glucose.: 161 mg/dL (28 Jan 2021 12:06)    Labs reviewed:     A1C with Estimated Average Glucose Result: 7.8 % (01.12.21 @ 09:22)        CXR personally reviewed:< from: Xray Chest 1 View- PORTABLE-Routine (Xray Chest 1 View- PORTABLE-Routine in AM.) (01.28.21 @ 05:52) >     EXAM:  XR CHEST PORTABLE ROUTINE 1V                          PROCEDURE DATE:  01/28/2021          INTERPRETATION:  Portable chest radiograph    CLINICAL INFORMATION:  Postoperative  Cardiac surgery.    TECHNIQUE:  Portable  AP view of the chest was obtained.    FINDINGS:   1/27/2021 chest available for review.    The lungs are clear of gross airspace consolidations or effusions. No pneumothorax.        The heart and mediastinum are within normal limits following cardiac  surgery.    Visualizedosseous structures are intact.    < end of copied text >        ECG reviewed and interpreted: < from: 12 Lead ECG (01.27.21 @ 07:23) >    Ventricular Rate 66 BPM    Atrial Rate 113 BPM    QRS Duration 90 ms    Q-T Interval 450 ms    QTC Calculation(Bazett) 471 ms    R Axis 54 degrees    T Axis 81 degrees    Diagnosis Line Atrial fibrillation  Cannot rule out Anterior infarct , age undetermined  Abnormal ECG    < end of copied text >    < from: TTE Echo Complete w/ Contrast w/ Doppler (01.27.21 @ 11:08) >  Summary:   1. Technically difficult study.   2. Moderately enlarged left atrium.   3. Left ventricular ejection fraction, by visual estimation, is 45 to 50%. Grade III diastolic dysfunction.   4. Elevated mean left atrial pressure. (LAP >30 mm Hg)   5. Moderately enlarged right atrium.   6. The right ventricular size is normal. RV systolic function is mildly reduced.   7. Status-post mitral annular ring insertion. No mitral stenosis. No mitral regurgitation.   8. Mild aortic valve stenosis.   9. Moderate tricuspid regurgitation.  10. Estimated pulmonary artery systolic pressure is 41 mmHg - mild pulmonary hypertension.  11. Trivial pericardial effusion.    < end of copied text >      MEDICATIONS  (STANDING):  ALPRAZolam 0.25 milliGRAM(s) Oral at bedtime  aMIOdarone    Tablet 200 milliGRAM(s) Oral daily  apixaban 2.5 milliGRAM(s) Oral every 12 hours  ascorbic acid 500 milliGRAM(s) Oral daily  aspirin enteric coated 81 milliGRAM(s) Oral daily  atorvastatin 40 milliGRAM(s) Oral at bedtime  dextrose 40% Gel 15 Gram(s) Oral once  dextrose 5%. 1000 milliLiter(s) (50 mL/Hr) IV Continuous <Continuous>  dextrose 5%. 1000 milliLiter(s) (100 mL/Hr) IV Continuous <Continuous>  dextrose 50% Injectable 25 Gram(s) IV Push once  dextrose 50% Injectable 12.5 Gram(s) IV Push once  dextrose 50% Injectable 25 Gram(s) IV Push once  gabapentin 300 milliGRAM(s) Oral three times a day  glucagon  Injectable 1 milliGRAM(s) IntraMuscular once  insulin glargine Injectable (LANTUS) 12 Unit(s) SubCutaneous at bedtime  insulin lispro (ADMELOG) corrective regimen sliding scale   SubCutaneous three times a day before meals  insulin lispro (ADMELOG) corrective regimen sliding scale   SubCutaneous at bedtime  insulin lispro Injectable (ADMELOG) 5 Unit(s) SubCutaneous three times a day before meals  lidocaine   Patch 1 Patch Transdermal daily  megestrol Suspension 400 milliGRAM(s) Oral daily  melatonin 6 milliGRAM(s) Oral at bedtime  metoprolol tartrate 12.5 milliGRAM(s) Oral two times a day  pantoprazole    Tablet 40 milliGRAM(s) Oral daily  potassium chloride   Powder 40 milliEquivalent(s) Oral daily  sodium chloride 1 Gram(s) Oral three times a day  torsemide 20 milliGRAM(s) Oral daily    MEDICATIONS  (PRN):  acetaminophen   Tablet .. 650 milliGRAM(s) Oral every 6 hours PRN Mild Pain (1 - 3)  oxyCODONE    IR 5 milliGRAM(s) Oral every 4 hours PRN Moderate Pain (4 - 6)  oxyCODONE    IR 10 milliGRAM(s) Oral every 4 hours PRN Severe Pain (7 - 10)  polyethylene glycol 3350 17 Gram(s) Oral daily PRN Constipation  senna 2 Tablet(s) Oral at bedtime PRN Constipation

## 2021-01-29 NOTE — CONSULT NOTE ADULT - ASSESSMENT
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 She exhibited difficulties in working memory (for both calculations and reversed spelling), short-term memory for verbal information, and visuospatial skills (i.e., visuomotor integration). Higher level cognitive functions appear to be relatively intact. Pt's affect is anxious, and she reports a few adjustment symptoms in response to her current medical condition and separation from home and family.  FIM scores: Social Interaction 5; Problem Solving 7; Memory 6.  Plan: Individual supportive psychotherapy to monitor cognition, affect/mood, and behavior. Consider adding antidepressant medication if Pt's anxiety does not hair a week after admission. Pt might benefit from speech & language pathology evaluation to assess suitability for  cognitive remediation. Participation in recreation/art therapy in order to have pleasure and mastery experiences and regain/reestablish a sense of routine.

## 2021-01-30 LAB
GLUCOSE BLDC GLUCOMTR-MCNC: 191 MG/DL — HIGH (ref 70–99)
GLUCOSE BLDC GLUCOMTR-MCNC: 224 MG/DL — HIGH (ref 70–99)
GLUCOSE BLDC GLUCOMTR-MCNC: 254 MG/DL — HIGH (ref 70–99)
GLUCOSE BLDC GLUCOMTR-MCNC: 279 MG/DL — HIGH (ref 70–99)

## 2021-01-30 PROCEDURE — 99232 SBSQ HOSP IP/OBS MODERATE 35: CPT

## 2021-01-30 PROCEDURE — 99233 SBSQ HOSP IP/OBS HIGH 50: CPT

## 2021-01-30 RX ORDER — INSULIN GLARGINE 100 [IU]/ML
22 INJECTION, SOLUTION SUBCUTANEOUS AT BEDTIME
Refills: 0 | Status: DISCONTINUED | OUTPATIENT
Start: 2021-01-30 | End: 2021-01-31

## 2021-01-30 RX ORDER — FUROSEMIDE 40 MG
60 TABLET ORAL ONCE
Refills: 0 | Status: COMPLETED | OUTPATIENT
Start: 2021-01-30 | End: 2021-01-30

## 2021-01-30 RX ORDER — INSULIN LISPRO 100/ML
9 VIAL (ML) SUBCUTANEOUS
Refills: 0 | Status: DISCONTINUED | OUTPATIENT
Start: 2021-01-30 | End: 2021-02-01

## 2021-01-30 RX ORDER — ALPRAZOLAM 0.25 MG
0.25 TABLET ORAL ONCE
Refills: 0 | Status: DISCONTINUED | OUTPATIENT
Start: 2021-01-30 | End: 2021-01-30

## 2021-01-30 RX ADMIN — Medication 6 MILLIGRAM(S): at 22:57

## 2021-01-30 RX ADMIN — SODIUM CHLORIDE 1 GRAM(S): 9 INJECTION INTRAMUSCULAR; INTRAVENOUS; SUBCUTANEOUS at 12:44

## 2021-01-30 RX ADMIN — Medication 60 MILLIGRAM(S): at 12:02

## 2021-01-30 RX ADMIN — Medication 40 MILLIEQUIVALENT(S): at 12:43

## 2021-01-30 RX ADMIN — Medication 500 MILLIGRAM(S): at 12:43

## 2021-01-30 RX ADMIN — SODIUM CHLORIDE 1 GRAM(S): 9 INJECTION INTRAMUSCULAR; INTRAVENOUS; SUBCUTANEOUS at 22:56

## 2021-01-30 RX ADMIN — Medication 7 UNIT(S): at 08:03

## 2021-01-30 RX ADMIN — MEGESTROL ACETATE 400 MILLIGRAM(S): 40 SUSPENSION ORAL at 12:43

## 2021-01-30 RX ADMIN — LIDOCAINE 1 PATCH: 4 CREAM TOPICAL at 12:43

## 2021-01-30 RX ADMIN — Medication 0.25 MILLIGRAM(S): at 15:30

## 2021-01-30 RX ADMIN — APIXABAN 2.5 MILLIGRAM(S): 2.5 TABLET, FILM COATED ORAL at 18:30

## 2021-01-30 RX ADMIN — INSULIN GLARGINE 22 UNIT(S): 100 INJECTION, SOLUTION SUBCUTANEOUS at 22:57

## 2021-01-30 RX ADMIN — AMIODARONE HYDROCHLORIDE 200 MILLIGRAM(S): 400 TABLET ORAL at 06:12

## 2021-01-30 RX ADMIN — Medication 81 MILLIGRAM(S): at 12:45

## 2021-01-30 RX ADMIN — GABAPENTIN 300 MILLIGRAM(S): 400 CAPSULE ORAL at 15:30

## 2021-01-30 RX ADMIN — GABAPENTIN 300 MILLIGRAM(S): 400 CAPSULE ORAL at 22:56

## 2021-01-30 RX ADMIN — ATORVASTATIN CALCIUM 40 MILLIGRAM(S): 80 TABLET, FILM COATED ORAL at 22:56

## 2021-01-30 RX ADMIN — Medication 6: at 12:54

## 2021-01-30 RX ADMIN — GABAPENTIN 300 MILLIGRAM(S): 400 CAPSULE ORAL at 06:12

## 2021-01-30 RX ADMIN — Medication 9 UNIT(S): at 12:55

## 2021-01-30 RX ADMIN — PANTOPRAZOLE SODIUM 40 MILLIGRAM(S): 20 TABLET, DELAYED RELEASE ORAL at 12:43

## 2021-01-30 RX ADMIN — LIDOCAINE 1 PATCH: 4 CREAM TOPICAL at 01:10

## 2021-01-30 RX ADMIN — Medication 20 MILLIGRAM(S): at 06:12

## 2021-01-30 RX ADMIN — OXYCODONE HYDROCHLORIDE 10 MILLIGRAM(S): 5 TABLET ORAL at 15:39

## 2021-01-30 RX ADMIN — Medication 100 MILLIGRAM(S): at 07:44

## 2021-01-30 RX ADMIN — Medication 12.5 MILLIGRAM(S): at 06:12

## 2021-01-30 RX ADMIN — APIXABAN 2.5 MILLIGRAM(S): 2.5 TABLET, FILM COATED ORAL at 06:12

## 2021-01-30 RX ADMIN — Medication 12.5 MILLIGRAM(S): at 18:30

## 2021-01-30 RX ADMIN — Medication 2 MILLIGRAM(S): at 22:56

## 2021-01-30 RX ADMIN — LIDOCAINE 1 PATCH: 4 CREAM TOPICAL at 19:22

## 2021-01-30 RX ADMIN — Medication 100 MILLIGRAM(S): at 12:48

## 2021-01-30 RX ADMIN — Medication 6: at 08:01

## 2021-01-30 RX ADMIN — SODIUM CHLORIDE 1 GRAM(S): 9 INJECTION INTRAMUSCULAR; INTRAVENOUS; SUBCUTANEOUS at 06:12

## 2021-01-30 NOTE — PROGRESS NOTE ADULT - SUBJECTIVE AND OBJECTIVE BOX
Hospitalist: Tania Lambert DO    CHIEF COMPLAINT: Patient is a 75y old  female who presents with a chief complaint of S/P CABG (29 Jan 2021 11:37)    SUBJECTIVE / OVERNIGHT EVENTS: Patient seen and examined. No acute events overnight. Pain well controlled and patient without any complaints.    MEDICATIONS  (STANDING):  aMIOdarone    Tablet 200 milliGRAM(s) Oral daily  apixaban 2.5 milliGRAM(s) Oral every 12 hours  ascorbic acid 500 milliGRAM(s) Oral daily  aspirin enteric coated 81 milliGRAM(s) Oral daily  atorvastatin 40 milliGRAM(s) Oral at bedtime  dextrose 40% Gel 15 Gram(s) Oral once  dextrose 5%. 1000 milliLiter(s) (50 mL/Hr) IV Continuous <Continuous>  dextrose 5%. 1000 milliLiter(s) (100 mL/Hr) IV Continuous <Continuous>  dextrose 50% Injectable 25 Gram(s) IV Push once  dextrose 50% Injectable 12.5 Gram(s) IV Push once  dextrose 50% Injectable 25 Gram(s) IV Push once  diazepam    Tablet 2 milliGRAM(s) Oral at bedtime  gabapentin 300 milliGRAM(s) Oral three times a day  glucagon  Injectable 1 milliGRAM(s) IntraMuscular once  insulin glargine Injectable (LANTUS) 22 Unit(s) SubCutaneous at bedtime  insulin lispro (ADMELOG) corrective regimen sliding scale   SubCutaneous three times a day before meals  insulin lispro (ADMELOG) corrective regimen sliding scale   SubCutaneous at bedtime  insulin lispro Injectable (ADMELOG) 9 Unit(s) SubCutaneous three times a day before meals  lidocaine   Patch 1 Patch Transdermal daily  megestrol Suspension 400 milliGRAM(s) Oral daily  melatonin 6 milliGRAM(s) Oral at bedtime  metoprolol tartrate 12.5 milliGRAM(s) Oral two times a day  pantoprazole    Tablet 40 milliGRAM(s) Oral daily  potassium chloride   Powder 40 milliEquivalent(s) Oral daily  sodium chloride 1 Gram(s) Oral three times a day  torsemide 20 milliGRAM(s) Oral daily    MEDICATIONS  (PRN):  acetaminophen   Tablet .. 650 milliGRAM(s) Oral every 6 hours PRN Mild Pain (1 - 3)  guaiFENesin   Syrup  (Sugar-Free) 100 milliGRAM(s) Oral every 6 hours PRN Cough  oxyCODONE    IR 5 milliGRAM(s) Oral every 4 hours PRN Moderate Pain (4 - 6)  oxyCODONE    IR 10 milliGRAM(s) Oral every 4 hours PRN Severe Pain (7 - 10)  polyethylene glycol 3350 17 Gram(s) Oral daily PRN Constipation  senna 2 Tablet(s) Oral at bedtime PRN Constipation      VITALS:  T(F): 97.3 (01-30-21 @ 06:12), Max: 97.3 (01-30-21 @ 06:12)  HR: 76 (01-30-21 @ 06:12) (76 - 79)  BP: 123/57 (01-30-21 @ 06:12) (123/57 - 126/77)  RR: 16 (01-30-21 @ 06:12) (16 - 16)  SpO2: 93% (01-30-21 @ 06:12)      REVIEW OF SYSTEMS:  For ROV please refer back to H&P     PHYSICAL EXAM:  GENERAL- NAD  EAR/NOSE/MOUTH/THROAT - no pharyngeal exudates, no oral leisions,  MMM  EYES- YOSELYN, conjunctiva and Sclera clear  NECK- supple  RESPIRATORY-  clear to auscultation bilaterally  CARDIOVASCULAR - SIS2, IRIR  GI - soft NT BS present  EXTREMITIES- b/l LE  edema  NEUROLOGY- no gross focal deficits  SKIN- chest and leg incision clean  PSYCHIATRY- AAO X 3  MUSCULOSKELETAL- ROM normal    LABS:              9.4                  136  | 28   | 35           10.12 >-----------< 423     ------------------------< 168                   30.9                 4.1  | 98   | 1.18                                         Ca 8.6   Mg x     Ph x           TPro  6.5  /  Alb  2.6      TBili  4.2  /  DBili  x         AST  56  /  ALT  84            AlkPhos  261                   CAPILLARY BLOOD GLUCOSE      POCT Blood Glucose.: 254 mg/dL (30 Jan 2021 12:32)  POCT Blood Glucose.: 279 mg/dL (30 Jan 2021 07:43)  POCT Blood Glucose.: 359 mg/dL (29 Jan 2021 22:05)  POCT Blood Glucose.: 278 mg/dL (29 Jan 2021 16:57)        MICROBIOLOGY:          RADIOLOGY & ADDITIONAL TESTS:    Imaging Personally Reviewed:    [X] Consultant(s) Notes Reviewed:  [X] Care Discussed with Consultants/Other Providers:

## 2021-01-30 NOTE — DIETITIAN INITIAL EVALUATION ADULT. - ORAL INTAKE PTA/DIET HISTORY
PTA pt consumed a regular diet, tried to consume foods low in saturated fats, add fish, controlled in CHO.

## 2021-01-30 NOTE — PROGRESS NOTE ADULT - SUBJECTIVE AND OBJECTIVE BOX
Patient reports intermittent cough and is anxious.  Gal ordered and RN to provide.     REVIEW OF SYSTEMS  Constitutional - No fever,  +fatigue  Musculoskeletal - +joint pain, No joint swelling, No muscle pain    VITALS  T(C): 36.3 (01-30-21 @ 06:12), Max: 36.7 (01-29-21 @ 09:28)  HR: 76 (01-30-21 @ 06:12) (76 - 89)  BP: 123/57 (01-30-21 @ 06:12) (123/57 - 129/72)  RR: 16 (01-30-21 @ 06:12) (16 - 16)  SpO2: 93% (01-30-21 @ 06:12) (93% - 98%)  Wt(kg): --     MEDICATIONS   acetaminophen   Tablet .. 650 milliGRAM(s) every 6 hours PRN  aMIOdarone    Tablet 200 milliGRAM(s) daily  apixaban 2.5 milliGRAM(s) every 12 hours  ascorbic acid 500 milliGRAM(s) daily  aspirin enteric coated 81 milliGRAM(s) daily  atorvastatin 40 milliGRAM(s) at bedtime  dextrose 40% Gel 15 Gram(s) once  dextrose 5%. 1000 milliLiter(s) <Continuous>  dextrose 5%. 1000 milliLiter(s) <Continuous>  dextrose 50% Injectable 25 Gram(s) once  dextrose 50% Injectable 12.5 Gram(s) once  dextrose 50% Injectable 25 Gram(s) once  diazepam    Tablet 2 milliGRAM(s) at bedtime  furosemide   Injectable 60 milliGRAM(s) once  gabapentin 300 milliGRAM(s) three times a day  glucagon  Injectable 1 milliGRAM(s) once  guaiFENesin   Syrup  (Sugar-Free) 100 milliGRAM(s) every 6 hours PRN  insulin glargine Injectable (LANTUS) 22 Unit(s) at bedtime  insulin lispro (ADMELOG) corrective regimen sliding scale   three times a day before meals  insulin lispro (ADMELOG) corrective regimen sliding scale   at bedtime  insulin lispro Injectable (ADMELOG) 7 Unit(s) three times a day before meals  lidocaine   Patch 1 Patch daily  megestrol Suspension 400 milliGRAM(s) daily  melatonin 6 milliGRAM(s) at bedtime  metoprolol tartrate 12.5 milliGRAM(s) two times a day  oxyCODONE    IR 5 milliGRAM(s) every 4 hours PRN  oxyCODONE    IR 10 milliGRAM(s) every 4 hours PRN  pantoprazole    Tablet 40 milliGRAM(s) daily  polyethylene glycol 3350 17 Gram(s) daily PRN  potassium chloride   Powder 40 milliEquivalent(s) daily  senna 2 Tablet(s) at bedtime PRN  sodium chloride 1 Gram(s) three times a day  torsemide 20 milliGRAM(s) daily      RECENT LABS/IMAGING                        9.4    10.12 )-----------( 423      ( 29 Jan 2021 07:04 )             30.9     01-29    136  |  98  |  35<H>  ----------------------------<  168<H>  4.1   |  28  |  1.18    Ca    8.6      29 Jan 2021 07:04    TPro  6.5  /  Alb  2.6<L>  /  TBili  4.2<H>  /  DBili  x   /  AST  56<H>  /  ALT  84<H>  /  AlkPhos  261<H>  01-29              POCT Blood Glucose.: 279 mg/dL (01-30-21 @ 07:43)  POCT Blood Glucose.: 359 mg/dL (01-29-21 @ 22:05)  POCT Blood Glucose.: 278 mg/dL (01-29-21 @ 16:57)  POCT Blood Glucose.: 290 mg/dL (01-29-21 @ 12:08)    ---------  PHYSICAL EXAM  Constitutional - NAD, Comfortable  Pulm - Breathing comfortably, No wheezing  Abd - Nondistended  Extremities - No calf tenderness  Neurologic Exam - Awake, Alert            Motor - Exam unchanged  Psychiatric - Mood WNL     ASSESSMENT/PLAN  75y Female with impairments in mobility and ADLs   - Continue 3hrs a day of comprehensive rehab program  - Continue current medications, medically stable  - DVT prophylaxis - SCD, Eliquis  - Skin - OOB and mobilization daily   - FS - Lispro 9Units TID AC meals (increased from 7Units)

## 2021-01-30 NOTE — DIETITIAN INITIAL EVALUATION ADULT. - PERTINENT LABORATORY DATA
21: H.4 L, Hct: 30.9 L, Na:  136, K: 4.1, BUN:  35 H, Creat:  1.18, Glucose:  168 H,  Alk Phosp 264 H, 21: HgA1C 7.8 H,  POCT BG  : 185-359.

## 2021-01-30 NOTE — PROGRESS NOTE ADULT - ASSESSMENT
75y with PMHx of paroxysmal Afib (on Xarelto), PAD (s/p X-bisfgba-brfczjnsz bypass, R-femoral angioplasty with stenting, (on Plavix), HTN, HLD, type 2 diabetes (HA1c on 7.8 on Metformin and Tresiba as an outpatient), and recent trauma to right thigh with stable hematoma, presented to [facility] 1/12/21 with SOB, found to have NSTEMI. 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 S/P C3/DUANE Clip/MV Repair and DUANE thrombectomy with Dr. Orellana on 1/19/21. Postop course complicated by hypoxic respiratory failure and KRISTIE now resolved, and hypokalemia on daily repletion while on Torsemide, admitted for rehab- pt/ot/dvt ppx    #CAD S/P CABG , MV Repair and DUANE thrombectomy- ASA, Lipitor 40 mg PO QHS     # poor appetite - Continue Megace     # HFrEF, improved-  c/w Torsemide, consider ACEI if BP allows  - Worsening of pleuarl effusion on Xray and SOB; Will give an additional Lasix 60 mg IVP today   - F/p BNP, renal and Mg in AM     #DM2  - HbA1c 7.8 on Metformin and Tresiba as outpatient  - Continue Lantus, pre meal insulin, and ISS, reassess in am, can decrease pre meal if needed      #Afib  - Continue Eliquis 2.5mg BID (reduced dose due to large hematoma in right thigh from trauma prior to surgery)  - Continue Amiodarone and Lopressor    #HTN  - Continue Lopressor and Torsemide    #Hyponatremia- resolved  - d/c  NaCl 1g TID  - 1L Fluid restriction diet  - Monitor BMP    #Postop hypoxia- refusing BiPAP overnight, she was not on bipap at home.  monitor of bipap for now,   check o2 sats on ambulation and rest    #Mood- requesting valium at hs, ok to change to valium  - Can d/c Xanax 0.25mg QHS    # GI ppx: Protonix    # DVT ppx- Eliquis 75y with PMHx of paroxysmal Afib (on Xarelto), PAD (s/p G-uezshmx-cnawwjuou bypass, R-femoral angioplasty with stenting, (on Plavix), HTN, HLD, type 2 diabetes (HA1c on 7.8 on Metformin and Tresiba as an outpatient), and recent trauma to right thigh with stable hematoma, presented to [facility] 1/12/21 with SOB, found to have NSTEMI. 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 S/P C3/DUANE Clip/MV Repair and DUANE thrombectomy with Dr. Orellana on 1/19/21. Postop course complicated by hypoxic respiratory failure and KRISTIE now resolved, and hypokalemia on daily repletion while on Torsemide, admitted for rehab- pt/ot/dvt ppx    #CAD S/P CABG , MV Repair and DUANE thrombectomy- ASA, Lipitor 40 mg PO QHS     # poor appetite - Continue Megace     # HFrEF, improved-  c/w Torsemide, consider ACEI if BP allows  - Worsening of pleuarl effusion on Xray and SOB; Will give an additional Lasix 60 mg IVP today   - F/p BNP, renal and Mg in AM   - Consult Card    #DM2  - HbA1c 7.8 on Metformin and Tresiba as outpatient  - Continue Lantus, pre meal insulin, and ISS, reassess in am, can decrease pre meal if needed      #Afib  - Continue Eliquis 2.5mg BID (reduced dose due to large hematoma in right thigh from trauma prior to surgery)  - Continue Amiodarone and Lopressor    #HTN  - Continue Lopressor and Torsemide    #Hyponatremia- resolved  - d/c  NaCl 1g TID  - 1L Fluid restriction diet  - Monitor BMP    #Postop hypoxia- refusing BiPAP overnight, she was not on bipap at home.  monitor of bipap for now,   check o2 sats on ambulation and rest    #Mood- requesting valium at hs, ok to change to valium  - Can d/c Xanax 0.25mg QHS    # GI ppx: Protonix    # DVT ppx- Eliquis

## 2021-01-30 NOTE — DIETITIAN INITIAL EVALUATION ADULT. - OTHER INFO
H&P: 75y with PMHx of paroxysmal Afib (on Xarelto), PAD (s/p Z-skolzvj-icbzstbsg bypass, R-femoral angioplasty with stenting, (on Plavix), HTN, HLD, type 2 diabetes, presented to [facility] 1/12/21 with SOB, found to have NSTEMI. 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 S/P C3/DUANE Clip/MV Repair and DUANE thrombectomy on 1/19/21. Postop course complicated by hypoxic respiratory failure and KRISTIE now resolved, and hypokalemia on daily repletion.   Pt reports fair appetite. PO intake 50%. Pt had 2 bottles of Glucerna at the bed side table. Pt stays she does not like them. Pt feed self, reports no chewing/swallowing difficulties. NKFA. Denies N/V/C/D. Last BM: 1/30. UBW: 180 Lbs. Current wt 207.6 Lbs. Edema: none reported. Skin WDL except sx incision in left lower leg, sx incision in chest mid sternal, and skin lesion in right lower leg. Weekly menu provided w/ instructions to receive food preference. H&P: 75y with PMHx of paroxysmal Afib (on Xarelto), PAD (s/p Z-xsphbbq-kxbmlniob bypass, R-femoral angioplasty with stenting, (on Plavix), HTN, HLD, type 2 diabetes, presented to [facility] 1/12/21 with SOB, found to have NSTEMI. 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 S/P C3/DUANE Clip/MV Repair and DUANE thrombectomy on 1/19/21. Postop course complicated by hypoxic respiratory failure and KRISTIE now resolved, and hypokalemia on daily repletion.   Pt reports fair appetite. PO intake 50%. Pt had 2 bottles of Glucerna at the bed side table. Pt stays she does not like them. On 1000mL Fluids restriction secondary to KRISTIE. Pt feed self, reports no chewing/swallowing difficulties. NKFA. Denies N/V/C/D. Last BM: 1/30. UBW: 180 Lbs. Current wt 207.6 Lbs. Edema: none reported. Skin WDL except sx incision in left lower leg, sx incision in chest mid sternal, and skin lesion in right lower leg. Weekly menu provided w/ instructions to receive food preference.

## 2021-01-31 LAB
ALBUMIN SERPL ELPH-MCNC: 2.5 G/DL — LOW (ref 3.3–5)
ALBUMIN SERPL ELPH-MCNC: 2.5 G/DL — LOW (ref 3.3–5)
ALP SERPL-CCNC: 179 U/L — HIGH (ref 40–120)
ALP SERPL-CCNC: 188 U/L — HIGH (ref 40–120)
ALT FLD-CCNC: 56 U/L — HIGH (ref 10–45)
ALT FLD-CCNC: 60 U/L — HIGH (ref 10–45)
ANION GAP SERPL CALC-SCNC: 9 MMOL/L — SIGNIFICANT CHANGE UP (ref 5–17)
AST SERPL-CCNC: 36 U/L — SIGNIFICANT CHANGE UP (ref 10–40)
AST SERPL-CCNC: 38 U/L — SIGNIFICANT CHANGE UP (ref 10–40)
BILIRUB DIRECT SERPL-MCNC: 2.1 MG/DL — HIGH (ref 0–0.2)
BILIRUB INDIRECT FLD-MCNC: 1.3 MG/DL — HIGH (ref 0.2–1)
BILIRUB SERPL-MCNC: 3.4 MG/DL — HIGH (ref 0.2–1.2)
BILIRUB SERPL-MCNC: 3.6 MG/DL — HIGH (ref 0.2–1.2)
BUN SERPL-MCNC: 29 MG/DL — HIGH (ref 7–23)
CALCIUM SERPL-MCNC: 8.6 MG/DL — SIGNIFICANT CHANGE UP (ref 8.4–10.5)
CHLORIDE SERPL-SCNC: 101 MMOL/L — SIGNIFICANT CHANGE UP (ref 96–108)
CO2 SERPL-SCNC: 28 MMOL/L — SIGNIFICANT CHANGE UP (ref 22–31)
CREAT SERPL-MCNC: 1.08 MG/DL — SIGNIFICANT CHANGE UP (ref 0.5–1.3)
CREAT SERPL-MCNC: 1.19 MG/DL — SIGNIFICANT CHANGE UP (ref 0.5–1.3)
GLUCOSE BLDC GLUCOMTR-MCNC: 116 MG/DL — HIGH (ref 70–99)
GLUCOSE BLDC GLUCOMTR-MCNC: 124 MG/DL — HIGH (ref 70–99)
GLUCOSE BLDC GLUCOMTR-MCNC: 172 MG/DL — HIGH (ref 70–99)
GLUCOSE BLDC GLUCOMTR-MCNC: 201 MG/DL — HIGH (ref 70–99)
GLUCOSE SERPL-MCNC: 197 MG/DL — HIGH (ref 70–99)
MAGNESIUM SERPL-MCNC: 1.9 MG/DL — SIGNIFICANT CHANGE UP (ref 1.6–2.6)
NT-PROBNP SERPL-SCNC: 7583 PG/ML — HIGH (ref 0–300)
POTASSIUM SERPL-MCNC: 3.9 MMOL/L — SIGNIFICANT CHANGE UP (ref 3.5–5.3)
POTASSIUM SERPL-SCNC: 3.9 MMOL/L — SIGNIFICANT CHANGE UP (ref 3.5–5.3)
PROT SERPL-MCNC: 6.4 G/DL — SIGNIFICANT CHANGE UP (ref 6–8.3)
PROT SERPL-MCNC: 6.5 G/DL — SIGNIFICANT CHANGE UP (ref 6–8.3)
SARS-COV-2 RNA SPEC QL NAA+PROBE: DETECTED
SODIUM SERPL-SCNC: 138 MMOL/L — SIGNIFICANT CHANGE UP (ref 135–145)

## 2021-01-31 PROCEDURE — 99233 SBSQ HOSP IP/OBS HIGH 50: CPT

## 2021-01-31 PROCEDURE — 99232 SBSQ HOSP IP/OBS MODERATE 35: CPT

## 2021-01-31 RX ORDER — REMDESIVIR 5 MG/ML
200 INJECTION INTRAVENOUS EVERY 24 HOURS
Refills: 0 | Status: COMPLETED | OUTPATIENT
Start: 2021-01-31 | End: 2021-01-31

## 2021-01-31 RX ORDER — REMDESIVIR 5 MG/ML
INJECTION INTRAVENOUS
Refills: 0 | Status: DISCONTINUED | OUTPATIENT
Start: 2021-01-31 | End: 2021-02-01

## 2021-01-31 RX ORDER — INSULIN GLARGINE 100 [IU]/ML
26 INJECTION, SOLUTION SUBCUTANEOUS AT BEDTIME
Refills: 0 | Status: DISCONTINUED | OUTPATIENT
Start: 2021-01-31 | End: 2021-02-01

## 2021-01-31 RX ORDER — REMDESIVIR 5 MG/ML
100 INJECTION INTRAVENOUS EVERY 24 HOURS
Refills: 0 | Status: DISCONTINUED | OUTPATIENT
Start: 2021-02-01 | End: 2021-02-01

## 2021-01-31 RX ADMIN — Medication 6 MILLIGRAM(S): at 21:13

## 2021-01-31 RX ADMIN — Medication 100 MILLIGRAM(S): at 12:20

## 2021-01-31 RX ADMIN — AMIODARONE HYDROCHLORIDE 200 MILLIGRAM(S): 400 TABLET ORAL at 06:53

## 2021-01-31 RX ADMIN — Medication 12.5 MILLIGRAM(S): at 17:32

## 2021-01-31 RX ADMIN — SODIUM CHLORIDE 1 GRAM(S): 9 INJECTION INTRAMUSCULAR; INTRAVENOUS; SUBCUTANEOUS at 06:53

## 2021-01-31 RX ADMIN — Medication 2: at 12:18

## 2021-01-31 RX ADMIN — APIXABAN 2.5 MILLIGRAM(S): 2.5 TABLET, FILM COATED ORAL at 06:53

## 2021-01-31 RX ADMIN — OXYCODONE HYDROCHLORIDE 5 MILLIGRAM(S): 5 TABLET ORAL at 14:05

## 2021-01-31 RX ADMIN — Medication 4: at 08:28

## 2021-01-31 RX ADMIN — GABAPENTIN 300 MILLIGRAM(S): 400 CAPSULE ORAL at 06:54

## 2021-01-31 RX ADMIN — Medication 9 UNIT(S): at 17:32

## 2021-01-31 RX ADMIN — MEGESTROL ACETATE 400 MILLIGRAM(S): 40 SUSPENSION ORAL at 12:21

## 2021-01-31 RX ADMIN — Medication 12.5 MILLIGRAM(S): at 06:54

## 2021-01-31 RX ADMIN — SODIUM CHLORIDE 1 GRAM(S): 9 INJECTION INTRAMUSCULAR; INTRAVENOUS; SUBCUTANEOUS at 21:13

## 2021-01-31 RX ADMIN — Medication 9 UNIT(S): at 08:29

## 2021-01-31 RX ADMIN — Medication 81 MILLIGRAM(S): at 12:19

## 2021-01-31 RX ADMIN — Medication 9 UNIT(S): at 12:18

## 2021-01-31 RX ADMIN — REMDESIVIR 500 MILLIGRAM(S): 5 INJECTION INTRAVENOUS at 11:37

## 2021-01-31 RX ADMIN — Medication 100 MILLIGRAM(S): at 06:54

## 2021-01-31 RX ADMIN — ATORVASTATIN CALCIUM 40 MILLIGRAM(S): 80 TABLET, FILM COATED ORAL at 21:14

## 2021-01-31 RX ADMIN — Medication 100 MILLIGRAM(S): at 21:15

## 2021-01-31 RX ADMIN — Medication 650 MILLIGRAM(S): at 08:51

## 2021-01-31 RX ADMIN — GABAPENTIN 300 MILLIGRAM(S): 400 CAPSULE ORAL at 12:19

## 2021-01-31 RX ADMIN — INSULIN GLARGINE 26 UNIT(S): 100 INJECTION, SOLUTION SUBCUTANEOUS at 21:23

## 2021-01-31 RX ADMIN — PANTOPRAZOLE SODIUM 40 MILLIGRAM(S): 20 TABLET, DELAYED RELEASE ORAL at 12:20

## 2021-01-31 RX ADMIN — APIXABAN 2.5 MILLIGRAM(S): 2.5 TABLET, FILM COATED ORAL at 17:32

## 2021-01-31 RX ADMIN — Medication 500 MILLIGRAM(S): at 12:20

## 2021-01-31 RX ADMIN — GABAPENTIN 300 MILLIGRAM(S): 400 CAPSULE ORAL at 21:13

## 2021-01-31 RX ADMIN — Medication 20 MILLIGRAM(S): at 06:53

## 2021-01-31 RX ADMIN — SODIUM CHLORIDE 1 GRAM(S): 9 INJECTION INTRAMUSCULAR; INTRAVENOUS; SUBCUTANEOUS at 12:19

## 2021-01-31 RX ADMIN — Medication 2 MILLIGRAM(S): at 21:13

## 2021-01-31 RX ADMIN — Medication 40 MILLIEQUIVALENT(S): at 12:19

## 2021-01-31 NOTE — PROGRESS NOTE ADULT - SUBJECTIVE AND OBJECTIVE BOX
Hospitalist: Tania Lambert DO    CHIEF COMPLAINT: Patient is a 75y old  female who presents with a chief complaint of S/P CABG (30 Jan 2021 16:21)    SUBJECTIVE / OVERNIGHT EVENTS: Patient seen and examined. No acute events overnight. Pain well controlled and patient without any complaints.    MEDICATIONS  (STANDING):  aMIOdarone    Tablet 200 milliGRAM(s) Oral daily  apixaban 2.5 milliGRAM(s) Oral every 12 hours  ascorbic acid 500 milliGRAM(s) Oral daily  aspirin enteric coated 81 milliGRAM(s) Oral daily  atorvastatin 40 milliGRAM(s) Oral at bedtime  dextrose 40% Gel 15 Gram(s) Oral once  dextrose 5%. 1000 milliLiter(s) (50 mL/Hr) IV Continuous <Continuous>  dextrose 5%. 1000 milliLiter(s) (100 mL/Hr) IV Continuous <Continuous>  dextrose 50% Injectable 12.5 Gram(s) IV Push once  dextrose 50% Injectable 25 Gram(s) IV Push once  dextrose 50% Injectable 25 Gram(s) IV Push once  diazepam    Tablet 2 milliGRAM(s) Oral at bedtime  gabapentin 300 milliGRAM(s) Oral three times a day  glucagon  Injectable 1 milliGRAM(s) IntraMuscular once  insulin glargine Injectable (LANTUS) 26 Unit(s) SubCutaneous at bedtime  insulin lispro (ADMELOG) corrective regimen sliding scale   SubCutaneous three times a day before meals  insulin lispro (ADMELOG) corrective regimen sliding scale   SubCutaneous at bedtime  insulin lispro Injectable (ADMELOG) 9 Unit(s) SubCutaneous three times a day before meals  lidocaine   Patch 1 Patch Transdermal daily  megestrol Suspension 400 milliGRAM(s) Oral daily  melatonin 6 milliGRAM(s) Oral at bedtime  metoprolol tartrate 12.5 milliGRAM(s) Oral two times a day  pantoprazole    Tablet 40 milliGRAM(s) Oral daily  potassium chloride   Powder 40 milliEquivalent(s) Oral daily  sodium chloride 1 Gram(s) Oral three times a day  torsemide 20 milliGRAM(s) Oral daily    MEDICATIONS  (PRN):  acetaminophen   Tablet .. 650 milliGRAM(s) Oral every 6 hours PRN Mild Pain (1 - 3)  guaiFENesin   Syrup  (Sugar-Free) 100 milliGRAM(s) Oral every 6 hours PRN Cough  oxyCODONE    IR 5 milliGRAM(s) Oral every 4 hours PRN Moderate Pain (4 - 6)  oxyCODONE    IR 10 milliGRAM(s) Oral every 4 hours PRN Severe Pain (7 - 10)  polyethylene glycol 3350 17 Gram(s) Oral daily PRN Constipation  senna 2 Tablet(s) Oral at bedtime PRN Constipation      VITALS:  T(F): 98.4 (01-31-21 @ 08:48), Max: 98.7 (01-30-21 @ 18:32)  HR: 84 (01-31-21 @ 08:48) (84 - 88)  BP: 107/67 (01-31-21 @ 08:48) (107/67 - 120/69)  RR: 14 (01-31-21 @ 08:48) (14 - 17)  SpO2: 98% (01-31-21 @ 08:48)      REVIEW OF SYSTEMS:  For ROV please refer back to H&P     PHYSICAL EXAM:  GENERAL- NAD  EAR/NOSE/MOUTH/THROAT - no pharyngeal exudates, no oral leisions,  MMM  EYES- YOSELYN, conjunctiva and Sclera clear  NECK- supple  RESPIRATORY-  clear to auscultation bilaterally  CARDIOVASCULAR - SIS2, IRIR  GI - soft NT BS present  EXTREMITIES- b/l LE  edema  NEUROLOGY- no gross focal deficits  SKIN- chest and leg incision clean  PSYCHIATRY- AAO X 3  MUSCULOSKELETAL- ROM normal      LABS:              x                    138  | 28   | 29           x     >-----------< x       ------------------------< 197                   x                    3.9  | 101  | 1.19                                         Ca 8.6   Mg 1.9   Ph x           TPro  6.5  /  Alb  2.5      TBili  3.6  /  DBili  x         AST  36  /  ALT  60            AlkPhos  188       CAPILLARY BLOOD GLUCOSE      POCT Blood Glucose.: 201 mg/dL (31 Jan 2021 07:56)  POCT Blood Glucose.: 224 mg/dL (30 Jan 2021 22:51)  POCT Blood Glucose.: 191 mg/dL (30 Jan 2021 17:28)  POCT Blood Glucose.: 254 mg/dL (30 Jan 2021 12:32)        MICROBIOLOGY:    COVID-19 PCR: Detected: You can help in the fight against COVID-19. [facility] may contact  you to see if you are interested in voluntarily participating in one of  our clinical trials. (01.31.21 @ 06:50)    RADIOLOGY & ADDITIONAL TESTS:    Imaging Personally Reviewed:    [X] Consultant(s) Notes Reviewed:  [X] Care Discussed with Consultants/Other Providers:

## 2021-01-31 NOTE — PROGRESS NOTE ADULT - ASSESSMENT
75y with PMHx of paroxysmal Afib (on Xarelto), PAD (s/p E-scwcapu-kuriokbme bypass, R-femoral angioplasty with stenting, (on Plavix), HTN, HLD, type 2 diabetes (HA1c on 7.8 on Metformin and Tresiba as an outpatient), and recent trauma to right thigh with stable hematoma, presented to [facility] 1/12/21 with SOB, found to have NSTEMI. 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 S/P C3/DUANE Clip/MV Repair and DUANE thrombectomy with Dr. Orellana on 1/19/21. Postop course complicated by hypoxic respiratory failure and KRISTIE now resolved, and hypokalemia on daily repletion while on Torsemide, admitted to  for rehab and was found with COVID 19.     Functional and gait instability:  - C/w PT/OT per primary team     Pulm: New COVID 19 with dyspnea and Cough  - Satting 98 % on RA  - cough  - Will start on Remdisivir   - Mild symptoms so far; should reassess and consider Dexa (avoid for now given she just had CABG)      CVS: CAD S/P CABG , MV Repair and DUANE thrombectomy, HFrEF, improved, Afib   - c/w Torsemide   - ASA, Lipitor 40 mg PO QHS   - S/P addtional Lasix 60 mg IVP 01/30  - Renal stable   - Continue Eliquis 2.5mg BID (reduced dose due to large hematoma in right thigh from trauma prior to surgery)  - Continue Amiodarone and Lopressor    Endo: DM2  - HbA1c 7.8 on Metformin and Tresiba as outpatient  - Continue Lantus, pre meal insulin, and ISS, reassess in am, can decrease pre meal if needed  - Lantus and Premeal both increased     #Mood- requesting valium at hs, ok to change to valium  - Can d/c Xanax 0.25mg QHS    # GI ppx: Protonix    # DVT ppx- Eliquis 75y with PMHx of paroxysmal Afib (on Xarelto), PAD (s/p R-sqvrvpm-gdgggvsti bypass, R-femoral angioplasty with stenting, (on Plavix), HTN, HLD, type 2 diabetes (HA1c on 7.8 on Metformin and Tresiba as an outpatient), and recent trauma to right thigh with stable hematoma, presented to [facility] 1/12/21 with SOB, found to have NSTEMI. 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 S/P C3/DUANE Clip/MV Repair and DUANE thrombectomy with Dr. Orellana on 1/19/21. Postop course complicated by hypoxic respiratory failure and KRISTIE now resolved, and hypokalemia on daily repletion while on Torsemide, admitted to  for rehab and was found with COVID 19.     Functional and gait instability:  - C/w PT/OT per primary team     Pulm: New COVID 19 with dyspnea and Cough  - Satting 98 % on RA  - cough  - Will start on Remdisivir   - Mild symptoms so far; should reassess and consider Dexa (Consider risk Vs benefit given s/p CABG)      CVS: CAD S/P CABG , MV Repair and DUANE thrombectomy, HFrEF, improved, Afib   - c/w Torsemide   - ASA, Lipitor 40 mg PO QHS   - S/P addtional Lasix 60 mg IVP 01/30  - Renal stable   - Continue Eliquis 2.5mg BID (reduced dose due to large hematoma in right thigh from trauma prior to surgery)  - Continue Amiodarone and Lopressor    Endo: DM2  - HbA1c 7.8 on Metformin and Tresiba as outpatient  - Continue Lantus, pre meal insulin, and ISS, reassess in am, can decrease pre meal if needed  - Lantus and Premeal both increased     #Mood- requesting valium at hs, ok to change to valium  - Can d/c Xanax 0.25mg QHS    # GI ppx: Protonix    # DVT ppx- Eliquis

## 2021-01-31 NOTE — PROGRESS NOTE ADULT - SUBJECTIVE AND OBJECTIVE BOX
No overnight events.  Slept poorly.  Anxious and cannot use BiPAP/CPAP.  Cough is improved.  Reports pain is controlled.  Pain controlled, no chest pain, no N/V, no Fevers/Chills. No other new ROS.  Has been tolerating rehabilitation program.    VITALS  T(C): 36.9 (01-31-21 @ 08:48), Max: 37.1 (01-30-21 @ 18:32)  HR: 84 (01-31-21 @ 08:48) (84 - 88)  BP: 107/67 (01-31-21 @ 08:48) (107/67 - 120/69)  RR: 14 (01-31-21 @ 08:48) (14 - 17)  SpO2: 98% (01-31-21 @ 08:48) (94% - 98%)  Wt(kg): --     MEDICATIONS   acetaminophen   Tablet .. 650 milliGRAM(s) every 6 hours PRN  aMIOdarone    Tablet 200 milliGRAM(s) daily  apixaban 2.5 milliGRAM(s) every 12 hours  ascorbic acid 500 milliGRAM(s) daily  aspirin enteric coated 81 milliGRAM(s) daily  atorvastatin 40 milliGRAM(s) at bedtime  dextrose 40% Gel 15 Gram(s) once  dextrose 5%. 1000 milliLiter(s) <Continuous>  dextrose 5%. 1000 milliLiter(s) <Continuous>  dextrose 50% Injectable 12.5 Gram(s) once  dextrose 50% Injectable 25 Gram(s) once  dextrose 50% Injectable 25 Gram(s) once  diazepam    Tablet 2 milliGRAM(s) at bedtime  gabapentin 300 milliGRAM(s) three times a day  glucagon  Injectable 1 milliGRAM(s) once  guaiFENesin   Syrup  (Sugar-Free) 100 milliGRAM(s) every 6 hours PRN  insulin glargine Injectable (LANTUS) 22 Unit(s) at bedtime  insulin lispro (ADMELOG) corrective regimen sliding scale   three times a day before meals  insulin lispro (ADMELOG) corrective regimen sliding scale   at bedtime  insulin lispro Injectable (ADMELOG) 9 Unit(s) three times a day before meals  lidocaine   Patch 1 Patch daily  megestrol Suspension 400 milliGRAM(s) daily  melatonin 6 milliGRAM(s) at bedtime  metoprolol tartrate 12.5 milliGRAM(s) two times a day  oxyCODONE    IR 5 milliGRAM(s) every 4 hours PRN  oxyCODONE    IR 10 milliGRAM(s) every 4 hours PRN  pantoprazole    Tablet 40 milliGRAM(s) daily  polyethylene glycol 3350 17 Gram(s) daily PRN  potassium chloride   Powder 40 milliEquivalent(s) daily  senna 2 Tablet(s) at bedtime PRN  sodium chloride 1 Gram(s) three times a day  torsemide 20 milliGRAM(s) daily      RECENT LABS/IMAGING    01-31    138  |  101  |  29<H>  ----------------------------<  197<H>  3.9   |  28  |  1.19    Ca    8.6      31 Jan 2021 05:10  Mg     1.9     01-31    TPro  6.5  /  Alb  2.5<L>  /  TBili  3.6<H>  /  DBili  x   /  AST  36  /  ALT  60<H>  /  AlkPhos  188<H>  01-31              POCT Blood Glucose.: 201 mg/dL (01-31-21 @ 07:56)  POCT Blood Glucose.: 224 mg/dL (01-30-21 @ 22:51)  POCT Blood Glucose.: 191 mg/dL (01-30-21 @ 17:28)  POCT Blood Glucose.: 254 mg/dL (01-30-21 @ 12:32)        ---------  PHYSICAL EXAM  Constitutional - NAD, Comfortable  Pulm - Breathing comfortably, No wheezing  Abd - Nondistended  Extremities - No calf tenderness  Neurologic Exam - Awake, Alert            Motor - Exam unchanged  Psychiatric - Mood WNL     ASSESSMENT/PLAN  75y Female with impairments in mobility and ADLs   - Continue 3hrs a day of comprehensive rehab program  - Continue current medications, medically stable  - DVT prophylaxis - SCD, Eliquis  - Skin - OOB and mobilization daily   - FS - Lispro 9Units TID AC meals (increased from 7Units)  - Pulm - O2 via NC PRN intolerance to overnight CPAP

## 2021-02-01 ENCOUNTER — TRANSCRIPTION ENCOUNTER (OUTPATIENT)
Age: 76
End: 2021-02-01

## 2021-02-01 ENCOUNTER — INPATIENT (INPATIENT)
Facility: HOSPITAL | Age: 76
LOS: 20 days | Discharge: HOME HEALTH | DRG: 177 | End: 2021-02-22
Attending: HOSPITALIST | Admitting: INTERNAL MEDICINE
Payer: MEDICARE

## 2021-02-01 VITALS
OXYGEN SATURATION: 93 % | TEMPERATURE: 97 F | DIASTOLIC BLOOD PRESSURE: 65 MMHG | RESPIRATION RATE: 94 BRPM | SYSTOLIC BLOOD PRESSURE: 130 MMHG | HEART RATE: 102 BPM

## 2021-02-01 VITALS
HEART RATE: 100 BPM | TEMPERATURE: 98 F | RESPIRATION RATE: 16 BRPM | DIASTOLIC BLOOD PRESSURE: 72 MMHG | SYSTOLIC BLOOD PRESSURE: 121 MMHG | OXYGEN SATURATION: 98 %

## 2021-02-01 DIAGNOSIS — Z98.89 OTHER SPECIFIED POSTPROCEDURAL STATES: Chronic | ICD-10-CM

## 2021-02-01 DIAGNOSIS — Z95.820 PERIPHERAL VASCULAR ANGIOPLASTY STATUS WITH IMPLANTS AND GRAFTS: Chronic | ICD-10-CM

## 2021-02-01 DIAGNOSIS — U07.1 COVID-19: ICD-10-CM

## 2021-02-01 DIAGNOSIS — Z90.710 ACQUIRED ABSENCE OF BOTH CERVIX AND UTERUS: Chronic | ICD-10-CM

## 2021-02-01 DIAGNOSIS — Z95.828 PRESENCE OF OTHER VASCULAR IMPLANTS AND GRAFTS: Chronic | ICD-10-CM

## 2021-02-01 LAB
ALBUMIN SERPL ELPH-MCNC: 2.4 G/DL — LOW (ref 3.3–5)
ALBUMIN SERPL ELPH-MCNC: 2.5 G/DL — LOW (ref 3.3–5)
ALP SERPL-CCNC: 175 U/L — HIGH (ref 40–120)
ALP SERPL-CCNC: 176 U/L — HIGH (ref 40–120)
ALT FLD-CCNC: 49 U/L — HIGH (ref 10–45)
ALT FLD-CCNC: 49 U/L — HIGH (ref 10–45)
ANION GAP SERPL CALC-SCNC: 9 MMOL/L — SIGNIFICANT CHANGE UP (ref 5–17)
AST SERPL-CCNC: 38 U/L — SIGNIFICANT CHANGE UP (ref 10–40)
AST SERPL-CCNC: 38 U/L — SIGNIFICANT CHANGE UP (ref 10–40)
BILIRUB DIRECT SERPL-MCNC: 2 MG/DL — HIGH (ref 0–0.2)
BILIRUB INDIRECT FLD-MCNC: 1.5 MG/DL — HIGH (ref 0.2–1)
BILIRUB SERPL-MCNC: 3.5 MG/DL — HIGH (ref 0.2–1.2)
BILIRUB SERPL-MCNC: 3.6 MG/DL — HIGH (ref 0.2–1.2)
BUN SERPL-MCNC: 24 MG/DL — HIGH (ref 7–23)
CALCIUM SERPL-MCNC: 8.4 MG/DL — SIGNIFICANT CHANGE UP (ref 8.4–10.5)
CHLORIDE SERPL-SCNC: 101 MMOL/L — SIGNIFICANT CHANGE UP (ref 96–108)
CO2 SERPL-SCNC: 28 MMOL/L — SIGNIFICANT CHANGE UP (ref 22–31)
CREAT SERPL-MCNC: 1 MG/DL — SIGNIFICANT CHANGE UP (ref 0.5–1.3)
CREAT SERPL-MCNC: 1.06 MG/DL — SIGNIFICANT CHANGE UP (ref 0.5–1.3)
CRP SERPL-MCNC: 10.53 MG/DL — HIGH (ref 0–0.4)
FERRITIN SERPL-MCNC: 397 NG/ML — HIGH (ref 15–150)
GLUCOSE BLDC GLUCOMTR-MCNC: 110 MG/DL — HIGH (ref 70–99)
GLUCOSE BLDC GLUCOMTR-MCNC: 111 MG/DL — HIGH (ref 70–99)
GLUCOSE BLDC GLUCOMTR-MCNC: 152 MG/DL — HIGH (ref 70–99)
GLUCOSE BLDC GLUCOMTR-MCNC: 154 MG/DL — HIGH (ref 70–99)
GLUCOSE SERPL-MCNC: 153 MG/DL — HIGH (ref 70–99)
HCT VFR BLD CALC: 30.5 % — LOW (ref 34.5–45)
HGB BLD-MCNC: 9.2 G/DL — LOW (ref 11.5–15.5)
LDH SERPL L TO P-CCNC: 392 U/L — HIGH (ref 50–242)
MCHC RBC-ENTMCNC: 26.5 PG — LOW (ref 27–34)
MCHC RBC-ENTMCNC: 30.2 GM/DL — LOW (ref 32–36)
MCV RBC AUTO: 87.9 FL — SIGNIFICANT CHANGE UP (ref 80–100)
NRBC # BLD: 0 /100 WBCS — SIGNIFICANT CHANGE UP (ref 0–0)
PLATELET # BLD AUTO: 388 K/UL — SIGNIFICANT CHANGE UP (ref 150–400)
POTASSIUM SERPL-MCNC: 3.9 MMOL/L — SIGNIFICANT CHANGE UP (ref 3.5–5.3)
POTASSIUM SERPL-SCNC: 3.9 MMOL/L — SIGNIFICANT CHANGE UP (ref 3.5–5.3)
PROT SERPL-MCNC: 6.5 G/DL — SIGNIFICANT CHANGE UP (ref 6–8.3)
PROT SERPL-MCNC: 6.5 G/DL — SIGNIFICANT CHANGE UP (ref 6–8.3)
RBC # BLD: 3.47 M/UL — LOW (ref 3.8–5.2)
RBC # FLD: 17.9 % — HIGH (ref 10.3–14.5)
SARS-COV-2 RNA SPEC QL NAA+PROBE: DETECTED
SODIUM SERPL-SCNC: 138 MMOL/L — SIGNIFICANT CHANGE UP (ref 135–145)
WBC # BLD: 8.64 K/UL — SIGNIFICANT CHANGE UP (ref 3.8–10.5)
WBC # FLD AUTO: 8.64 K/UL — SIGNIFICANT CHANGE UP (ref 3.8–10.5)

## 2021-02-01 PROCEDURE — 99238 HOSP IP/OBS DSCHRG MGMT 30/<: CPT | Mod: GC

## 2021-02-01 PROCEDURE — 71045 X-RAY EXAM CHEST 1 VIEW: CPT | Mod: 26

## 2021-02-01 PROCEDURE — 99223 1ST HOSP IP/OBS HIGH 75: CPT

## 2021-02-01 RX ORDER — INSULIN LISPRO 100/ML
0 VIAL (ML) SUBCUTANEOUS
Qty: 0 | Refills: 0 | DISCHARGE
Start: 2021-02-01

## 2021-02-01 RX ORDER — POTASSIUM CHLORIDE 20 MEQ
2 PACKET (EA) ORAL
Qty: 0 | Refills: 0 | DISCHARGE
Start: 2021-02-01

## 2021-02-01 RX ORDER — SODIUM CHLORIDE 9 MG/ML
1 INJECTION INTRAMUSCULAR; INTRAVENOUS; SUBCUTANEOUS
Qty: 0 | Refills: 0 | DISCHARGE
Start: 2021-02-01

## 2021-02-01 RX ORDER — ASPIRIN/CALCIUM CARB/MAGNESIUM 324 MG
1 TABLET ORAL
Qty: 0 | Refills: 0 | DISCHARGE
Start: 2021-02-01

## 2021-02-01 RX ORDER — AMIODARONE HYDROCHLORIDE 400 MG/1
1 TABLET ORAL
Qty: 0 | Refills: 0 | DISCHARGE
Start: 2021-02-01

## 2021-02-01 RX ORDER — PANTOPRAZOLE SODIUM 20 MG/1
1 TABLET, DELAYED RELEASE ORAL
Qty: 0 | Refills: 0 | DISCHARGE
Start: 2021-02-01

## 2021-02-01 RX ORDER — REMDESIVIR 5 MG/ML
0 INJECTION INTRAVENOUS
Qty: 0 | Refills: 0 | DISCHARGE
Start: 2021-02-01

## 2021-02-01 RX ORDER — INSULIN GLARGINE 100 [IU]/ML
0 INJECTION, SOLUTION SUBCUTANEOUS
Qty: 0 | Refills: 0 | DISCHARGE
Start: 2021-02-01

## 2021-02-01 RX ORDER — ASCORBIC ACID 60 MG
1 TABLET,CHEWABLE ORAL
Qty: 0 | Refills: 0 | DISCHARGE
Start: 2021-02-01

## 2021-02-01 RX ORDER — SENNA PLUS 8.6 MG/1
2 TABLET ORAL
Qty: 0 | Refills: 0 | DISCHARGE
Start: 2021-02-01

## 2021-02-01 RX ORDER — OXYCODONE HYDROCHLORIDE 5 MG/1
1 TABLET ORAL
Qty: 0 | Refills: 0 | DISCHARGE
Start: 2021-02-01

## 2021-02-01 RX ORDER — APIXABAN 2.5 MG/1
1 TABLET, FILM COATED ORAL
Qty: 0 | Refills: 0 | DISCHARGE
Start: 2021-02-01

## 2021-02-01 RX ORDER — LIDOCAINE 4 G/100G
0 CREAM TOPICAL
Qty: 0 | Refills: 0 | DISCHARGE
Start: 2021-02-01

## 2021-02-01 RX ORDER — LANOLIN ALCOHOL/MO/W.PET/CERES
2 CREAM (GRAM) TOPICAL
Qty: 0 | Refills: 0 | DISCHARGE
Start: 2021-02-01

## 2021-02-01 RX ORDER — DIAZEPAM 5 MG
1 TABLET ORAL
Qty: 0 | Refills: 0 | DISCHARGE
Start: 2021-02-01

## 2021-02-01 RX ORDER — FUROSEMIDE 40 MG
20 TABLET ORAL ONCE
Refills: 0 | Status: COMPLETED | OUTPATIENT
Start: 2021-02-01 | End: 2021-02-01

## 2021-02-01 RX ORDER — MEGESTROL ACETATE 40 MG/ML
10 SUSPENSION ORAL
Qty: 0 | Refills: 0 | DISCHARGE
Start: 2021-02-01

## 2021-02-01 RX ORDER — ATORVASTATIN CALCIUM 80 MG/1
1 TABLET, FILM COATED ORAL
Qty: 0 | Refills: 0 | DISCHARGE
Start: 2021-02-01

## 2021-02-01 RX ORDER — POLYETHYLENE GLYCOL 3350 17 G/17G
17 POWDER, FOR SOLUTION ORAL
Qty: 0 | Refills: 0 | DISCHARGE
Start: 2021-02-01

## 2021-02-01 RX ORDER — FUROSEMIDE 40 MG
40 TABLET ORAL DAILY
Refills: 0 | Status: DISCONTINUED | OUTPATIENT
Start: 2021-02-02 | End: 2021-02-01

## 2021-02-01 RX ORDER — GABAPENTIN 400 MG/1
1 CAPSULE ORAL
Qty: 0 | Refills: 0 | DISCHARGE
Start: 2021-02-01

## 2021-02-01 RX ORDER — ACETAMINOPHEN 500 MG
2 TABLET ORAL
Qty: 0 | Refills: 0 | DISCHARGE
Start: 2021-02-01

## 2021-02-01 RX ADMIN — Medication 12.5 MILLIGRAM(S): at 16:18

## 2021-02-01 RX ADMIN — Medication 2: at 08:02

## 2021-02-01 RX ADMIN — Medication 20 MILLIGRAM(S): at 05:48

## 2021-02-01 RX ADMIN — SODIUM CHLORIDE 1 GRAM(S): 9 INJECTION INTRAMUSCULAR; INTRAVENOUS; SUBCUTANEOUS at 14:27

## 2021-02-01 RX ADMIN — SODIUM CHLORIDE 1 GRAM(S): 9 INJECTION INTRAMUSCULAR; INTRAVENOUS; SUBCUTANEOUS at 21:24

## 2021-02-01 RX ADMIN — ATORVASTATIN CALCIUM 40 MILLIGRAM(S): 80 TABLET, FILM COATED ORAL at 21:24

## 2021-02-01 RX ADMIN — Medication 81 MILLIGRAM(S): at 11:44

## 2021-02-01 RX ADMIN — AMIODARONE HYDROCHLORIDE 200 MILLIGRAM(S): 400 TABLET ORAL at 05:48

## 2021-02-01 RX ADMIN — MEGESTROL ACETATE 400 MILLIGRAM(S): 40 SUSPENSION ORAL at 11:44

## 2021-02-01 RX ADMIN — INSULIN GLARGINE 26 UNIT(S): 100 INJECTION, SOLUTION SUBCUTANEOUS at 21:24

## 2021-02-01 RX ADMIN — Medication 12.5 MILLIGRAM(S): at 05:48

## 2021-02-01 RX ADMIN — Medication 40 MILLIEQUIVALENT(S): at 11:44

## 2021-02-01 RX ADMIN — Medication 500 MILLIGRAM(S): at 11:44

## 2021-02-01 RX ADMIN — Medication 6 MILLIGRAM(S): at 21:24

## 2021-02-01 RX ADMIN — Medication 20 MILLIGRAM(S): at 01:09

## 2021-02-01 RX ADMIN — Medication 2: at 17:04

## 2021-02-01 RX ADMIN — REMDESIVIR 500 MILLIGRAM(S): 5 INJECTION INTRAVENOUS at 12:53

## 2021-02-01 RX ADMIN — Medication 9 UNIT(S): at 11:48

## 2021-02-01 RX ADMIN — Medication 9 UNIT(S): at 08:02

## 2021-02-01 RX ADMIN — PANTOPRAZOLE SODIUM 40 MILLIGRAM(S): 20 TABLET, DELAYED RELEASE ORAL at 11:44

## 2021-02-01 RX ADMIN — LIDOCAINE 1 PATCH: 4 CREAM TOPICAL at 23:00

## 2021-02-01 RX ADMIN — Medication 0.25 MILLIGRAM(S): at 01:08

## 2021-02-01 RX ADMIN — APIXABAN 2.5 MILLIGRAM(S): 2.5 TABLET, FILM COATED ORAL at 05:48

## 2021-02-01 RX ADMIN — GABAPENTIN 300 MILLIGRAM(S): 400 CAPSULE ORAL at 05:48

## 2021-02-01 RX ADMIN — Medication 100 MILLIGRAM(S): at 16:18

## 2021-02-01 RX ADMIN — LIDOCAINE 1 PATCH: 4 CREAM TOPICAL at 19:59

## 2021-02-01 RX ADMIN — GABAPENTIN 300 MILLIGRAM(S): 400 CAPSULE ORAL at 14:26

## 2021-02-01 RX ADMIN — Medication 2 MILLIGRAM(S): at 21:24

## 2021-02-01 RX ADMIN — Medication 9 UNIT(S): at 17:05

## 2021-02-01 RX ADMIN — SODIUM CHLORIDE 1 GRAM(S): 9 INJECTION INTRAMUSCULAR; INTRAVENOUS; SUBCUTANEOUS at 05:48

## 2021-02-01 RX ADMIN — GABAPENTIN 300 MILLIGRAM(S): 400 CAPSULE ORAL at 21:24

## 2021-02-01 RX ADMIN — LIDOCAINE 1 PATCH: 4 CREAM TOPICAL at 11:46

## 2021-02-01 RX ADMIN — APIXABAN 2.5 MILLIGRAM(S): 2.5 TABLET, FILM COATED ORAL at 16:18

## 2021-02-01 NOTE — PROGRESS NOTE ADULT - ASSESSMENT
75y with PMHx of paroxysmal Afib (on Xarelto), PAD (s/p X-ypcnxms-zxigyvdzn bypass, R-femoral angioplasty with stenting, (on Plavix), HTN, HLD, type 2 diabetes (HA1c on 7.8 on Metformin and Tresiba as an outpatient), and recent trauma to right thigh with stable hematoma, presented to [facility] 1/12/21 with SOB, found to have NSTEMI. 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 S/P C3/DUANE Clip/MV Repair and DUANE thrombectomy with Dr. Orellana on 1/19/21. Postop course complicated by hypoxic respiratory failure and KRISTIE now resolved, and hypokalemia on daily repletion while on Torsemide, admitted to  for rehab and was found with COVID 19.     Functional and gait instability:  - C/w PT/OT per primary team     Dyspnea due to COVID 19 and acute on chronic HFrEF  - SpO2 worsening, currently 96% at rest on RA, 93% on RA with minimal exertion (sitting)    - CXR with worsening bilateral infiltrate  - cont Remdisivir   - will consider Decadron  - IV lasix 40mg      CVS: CAD S/P CABG , MV Repair and DUANE thrombectomy 1/19/21  Afib   - ASA, Lipitor 40 mg PO QHS   - Continue Eliquis 2.5mg BID (reduced dose due to large hematoma in right thigh from trauma prior to surgery)  - Continue Amiodarone and Lopressor    DM2  - HbA1c 7.8 on Metformin and Tresiba as outpatient  - Continue Lantus, pre meal insulin, and ISS,    #Mood- requesting valium at hs, ok to change to valium    # GI ppx: Protonix    # DVT ppx- Eliquis 75y with PMHx of paroxysmal Afib (on Xarelto), PAD (s/p M-ccytpjz-qjcdgxcwb bypass, R-femoral angioplasty with stenting, (on Plavix), HTN, HLD, type 2 diabetes (HA1c on 7.8 on Metformin and Tresiba as an outpatient), and recent trauma to right thigh with stable hematoma, presented to [facility] 1/12/21 with SOB, found to have NSTEMI. 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 S/P C3/DUANE Clip/MV Repair and DUANE thrombectomy with Dr. Orellana on 1/19/21. Postop course complicated by hypoxic respiratory failure and KRISTIE now resolved, and hypokalemia on daily repletion while on Torsemide, admitted to  for rehab and was found with COVID 19.     Functional and gait instability:  - C/w PT/OT per primary team     Dyspnea due to COVID 19 and possible acute on chronic HFrEF  - SpO2 worsening, currently 96% at rest on RA, 93% on RA with minimal exertion (sitting)    - CXR with worsening bilateral infiltrate  - pro- BNP in 7000s  - cont Remdisivir   - start Decadron if pt requires oxygen  - check inflammatory markers  - DC torsemide and start IV lasix 40mg daily    CVS: CAD S/P CABG , MV Repair and DUANE thrombectomy 1/19/21  Afib   - ASA, Lipitor 40 mg PO QHS   - Continue Eliquis 2.5mg BID (reduced dose due to large hematoma in right thigh from trauma prior to surgery)  - Continue Amiodarone and Lopressor    DM2  - HbA1c 7.8 on Metformin and Tresiba as outpatient  - Continue Lantus, pre meal insulin, and ISS,    #Mood- requesting valium at hs, ok to change to valium    # GI ppx: Protonix    # DVT ppx- Eliquis 75y with PMHx of paroxysmal Afib (on Xarelto), PAD (s/p L-azzksid-abvijfcti bypass, R-femoral angioplasty with stenting, (on Plavix), HTN, HLD, type 2 diabetes (HA1c on 7.8 on Metformin and Tresiba as an outpatient), and recent trauma to right thigh with stable hematoma, presented to [facility] 1/12/21 with SOB, found to have NSTEMI. 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 S/P C3/DUANE Clip/MV Repair and DUANE thrombectomy with Dr. Orellana on 1/19/21. Postop course complicated by hypoxic respiratory failure and KRISTIE now resolved, and hypokalemia on daily repletion while on Torsemide, admitted to  for rehab and was found with COVID 19.     Functional and gait instability:  - C/w PT/OT per primary team     Dyspnea due to COVID 19 and possible acute on chronic HFrEF  - SpO2 worsening, currently 96% at rest on RA, 93% on RA with minimal exertion (sitting)    - CXR with worsening central infiltrate 1/29. repeat CXR today unchanged  - pro- BNP in 7000s  - cont Remdisivir   - start Decadron if pt requires oxygen  - check inflammatory markers  - DC torsemide and start IV lasix 40mg daily    CVS: CAD S/P CABG , MV Repair and DUANE thrombectomy 1/19/21  Afib   - ASA, Lipitor 40 mg PO QHS   - Continue Eliquis 2.5mg BID (reduced dose due to large hematoma in right thigh from trauma prior to surgery)  - Continue Amiodarone and Lopressor    Transaminitis   -mildly downtrending in general, but persistently elevated  -Total bilirubin: 3.5, direct bili 2.0  -pt denies abd pain, n/v  -check US RUQ    DM2  - HbA1c 7.8 on Metformin and Tresiba as outpatient  - Continue Lantus, pre meal insulin, and ISS,    #Mood- requesting valium at hs, ok to change to valium    # GI ppx: Protonix    # DVT ppx- Eliquis

## 2021-02-01 NOTE — PROGRESS NOTE ADULT - SUBJECTIVE AND OBJECTIVE BOX
Patient is a 75y old  Female who presents with a chief complaint of S/P CABG  09 (01 Feb 2021 10:58)      Patient seen and examined at bedside. pt reports worsening dyspnea and feeling fatigue. SpO2 dropped to 93% on RA when pt sitting.    ALLERGIES:  OHS (Unknown)  warfarin (Rash)    MEDICATIONS  (STANDING):  aMIOdarone    Tablet 200 milliGRAM(s) Oral daily  apixaban 2.5 milliGRAM(s) Oral every 12 hours  ascorbic acid 500 milliGRAM(s) Oral daily  aspirin enteric coated 81 milliGRAM(s) Oral daily  atorvastatin 40 milliGRAM(s) Oral at bedtime  dextrose 40% Gel 15 Gram(s) Oral once  dextrose 5%. 1000 milliLiter(s) (50 mL/Hr) IV Continuous <Continuous>  dextrose 5%. 1000 milliLiter(s) (100 mL/Hr) IV Continuous <Continuous>  dextrose 50% Injectable 25 Gram(s) IV Push once  dextrose 50% Injectable 12.5 Gram(s) IV Push once  dextrose 50% Injectable 25 Gram(s) IV Push once  diazepam    Tablet 2 milliGRAM(s) Oral at bedtime  gabapentin 300 milliGRAM(s) Oral three times a day  glucagon  Injectable 1 milliGRAM(s) IntraMuscular once  insulin glargine Injectable (LANTUS) 26 Unit(s) SubCutaneous at bedtime  insulin lispro (ADMELOG) corrective regimen sliding scale   SubCutaneous three times a day before meals  insulin lispro (ADMELOG) corrective regimen sliding scale   SubCutaneous at bedtime  insulin lispro Injectable (ADMELOG) 9 Unit(s) SubCutaneous three times a day before meals  lidocaine   Patch 1 Patch Transdermal daily  megestrol Suspension 400 milliGRAM(s) Oral daily  melatonin 6 milliGRAM(s) Oral at bedtime  metoprolol tartrate 12.5 milliGRAM(s) Oral two times a day  pantoprazole    Tablet 40 milliGRAM(s) Oral daily  potassium chloride   Powder 40 milliEquivalent(s) Oral daily  remdesivir  IVPB   IV Intermittent   remdesivir  IVPB 100 milliGRAM(s) IV Intermittent every 24 hours  sodium chloride 1 Gram(s) Oral three times a day  torsemide 20 milliGRAM(s) Oral daily    MEDICATIONS  (PRN):  acetaminophen   Tablet .. 650 milliGRAM(s) Oral every 6 hours PRN Mild Pain (1 - 3)  guaiFENesin   Syrup  (Sugar-Free) 100 milliGRAM(s) Oral every 6 hours PRN Cough  oxyCODONE    IR 5 milliGRAM(s) Oral every 4 hours PRN Moderate Pain (4 - 6)  oxyCODONE    IR 10 milliGRAM(s) Oral every 4 hours PRN Severe Pain (7 - 10)  polyethylene glycol 3350 17 Gram(s) Oral daily PRN Constipation  senna 2 Tablet(s) Oral at bedtime PRN Constipation    Vital Signs Last 24 Hrs  T(F): 98.9 (01 Feb 2021 07:56), Max: 98.9 (01 Feb 2021 07:56)  HR: 93 (01 Feb 2021 07:56) (89 - 96)  BP: 112/57 (01 Feb 2021 07:56) (112/57 - 121/70)  RR: 16 (01 Feb 2021 07:56) (14 - 16)  SpO2: 96% (01 Feb 2021 07:56) (94% - 96%)  I&O's Summary    31 Jan 2021 07:01  -  01 Feb 2021 07:00  --------------------------------------------------------  IN: 400 mL / OUT: 400 mL / NET: 0 mL      PHYSICAL EXAM:  General: NAD, answering questions appropriately. appears very tired  ENT: MMM  Neck: Supple, No JVD  Lungs: bibasilar crackles  Cardio: RRR, S1/S2, No murmurs  Abdomen: Soft, Nontender, Nondistended; Bowel sounds present  Extremities: No calf tenderness, No pitting edema    LABS:                        9.2    8.64  )-----------( 388      ( 01 Feb 2021 09:07 )             30.5     02-01    138  |  101  |  24  ----------------------------<  153  3.9   |  28  |  1.00    Ca    8.4      01 Feb 2021 09:07  Mg     1.9     01-31    TPro  6.5  /  Alb  2.5  /  TBili  3.5  /  DBili  2.0  /  AST  38  /  ALT  49  /  AlkPhos  175  02-01    eGFR if Non African American: 56 mL/min/1.73M2 (02-01-21 @ 09:07)  eGFR if : 65 mL/min/1.73M2 (02-01-21 @ 09:07)                  POCT Blood Glucose.: 111 mg/dL (01 Feb 2021 11:43)  POCT Blood Glucose.: 154 mg/dL (01 Feb 2021 08:01)  POCT Blood Glucose.: 124 mg/dL (31 Jan 2021 21:07)  POCT Blood Glucose.: 116 mg/dL (31 Jan 2021 17:27)            RADIOLOGY & ADDITIONAL TESTS:  < from: Xray Chest 1 View- PORTABLE-Urgent (Xray Chest 1 View- PORTABLE-Urgent .) (02.01.21 @ 12:34) >  IMPRESSION: Unchanged chest as above.    < end of copied text >      Care Discussed with Consultants/Other Providers: Dr. Camacho   Patient is a 75y old  Female who presents with a chief complaint of S/P CABG  09 (01 Feb 2021 10:58)      Patient seen and examined at bedside. pt reports worsening dyspnea and feeling fatigue. SpO2 dropped to 93% on RA when pt sitting.    ALLERGIES:  OHS (Unknown)  warfarin (Rash)    MEDICATIONS  (STANDING):  aMIOdarone    Tablet 200 milliGRAM(s) Oral daily  apixaban 2.5 milliGRAM(s) Oral every 12 hours  ascorbic acid 500 milliGRAM(s) Oral daily  aspirin enteric coated 81 milliGRAM(s) Oral daily  atorvastatin 40 milliGRAM(s) Oral at bedtime  dextrose 40% Gel 15 Gram(s) Oral once  dextrose 5%. 1000 milliLiter(s) (50 mL/Hr) IV Continuous <Continuous>  dextrose 5%. 1000 milliLiter(s) (100 mL/Hr) IV Continuous <Continuous>  dextrose 50% Injectable 25 Gram(s) IV Push once  dextrose 50% Injectable 12.5 Gram(s) IV Push once  dextrose 50% Injectable 25 Gram(s) IV Push once  diazepam    Tablet 2 milliGRAM(s) Oral at bedtime  gabapentin 300 milliGRAM(s) Oral three times a day  glucagon  Injectable 1 milliGRAM(s) IntraMuscular once  insulin glargine Injectable (LANTUS) 26 Unit(s) SubCutaneous at bedtime  insulin lispro (ADMELOG) corrective regimen sliding scale   SubCutaneous three times a day before meals  insulin lispro (ADMELOG) corrective regimen sliding scale   SubCutaneous at bedtime  insulin lispro Injectable (ADMELOG) 9 Unit(s) SubCutaneous three times a day before meals  lidocaine   Patch 1 Patch Transdermal daily  megestrol Suspension 400 milliGRAM(s) Oral daily  melatonin 6 milliGRAM(s) Oral at bedtime  metoprolol tartrate 12.5 milliGRAM(s) Oral two times a day  pantoprazole    Tablet 40 milliGRAM(s) Oral daily  potassium chloride   Powder 40 milliEquivalent(s) Oral daily  remdesivir  IVPB   IV Intermittent   remdesivir  IVPB 100 milliGRAM(s) IV Intermittent every 24 hours  sodium chloride 1 Gram(s) Oral three times a day  torsemide 20 milliGRAM(s) Oral daily    MEDICATIONS  (PRN):  acetaminophen   Tablet .. 650 milliGRAM(s) Oral every 6 hours PRN Mild Pain (1 - 3)  guaiFENesin   Syrup  (Sugar-Free) 100 milliGRAM(s) Oral every 6 hours PRN Cough  oxyCODONE    IR 5 milliGRAM(s) Oral every 4 hours PRN Moderate Pain (4 - 6)  oxyCODONE    IR 10 milliGRAM(s) Oral every 4 hours PRN Severe Pain (7 - 10)  polyethylene glycol 3350 17 Gram(s) Oral daily PRN Constipation  senna 2 Tablet(s) Oral at bedtime PRN Constipation    Vital Signs Last 24 Hrs  T(F): 98.9 (01 Feb 2021 07:56), Max: 98.9 (01 Feb 2021 07:56)  HR: 93 (01 Feb 2021 07:56) (89 - 96)  BP: 112/57 (01 Feb 2021 07:56) (112/57 - 121/70)  RR: 16 (01 Feb 2021 07:56) (14 - 16)  SpO2: 96% (01 Feb 2021 07:56) (94% - 96%)  I&O's Summary    31 Jan 2021 07:01  -  01 Feb 2021 07:00  --------------------------------------------------------  IN: 400 mL / OUT: 400 mL / NET: 0 mL      PHYSICAL EXAM:  General: NAD, answering questions appropriately. appears very tired  ENT: MMM  Neck: Supple, No JVD  Lungs: bibasilar crackles  Cardio: RRR, S1/S2, No murmurs  Abdomen: Soft, Nontender, Nondistended; Bowel sounds present  Extremities: No calf tenderness, No pitting edema    LABS:                        9.2    8.64  )-----------( 388      ( 01 Feb 2021 09:07 )             30.5     02-01    138  |  101  |  24  ----------------------------<  153  3.9   |  28  |  1.00    Ca    8.4      01 Feb 2021 09:07  Mg     1.9     01-31    TPro  6.5  /  Alb  2.5  /  TBili  3.5  /  DBili  2.0  /  AST  38  /  ALT  49  /  AlkPhos  175  02-01    eGFR if Non African American: 56 mL/min/1.73M2 (02-01-21 @ 09:07)  eGFR if : 65 mL/min/1.73M2 (02-01-21 @ 09:07)        POCT Blood Glucose.: 111 mg/dL (01 Feb 2021 11:43)  POCT Blood Glucose.: 154 mg/dL (01 Feb 2021 08:01)  POCT Blood Glucose.: 124 mg/dL (31 Jan 2021 21:07)  POCT Blood Glucose.: 116 mg/dL (31 Jan 2021 17:27)        RADIOLOGY & ADDITIONAL TESTS:  < from: Xray Chest 1 View- PORTABLE-Urgent (Xray Chest 1 View- PORTABLE-Urgent .) (02.01.21 @ 12:34) >  IMPRESSION: Unchanged chest as above.    < end of copied text >      Care Discussed with Consultants/Other Providers: Dr. Camacho

## 2021-02-01 NOTE — DISCHARGE NOTE PROVIDER - HOSPITAL COURSE
HPI:  75 year old F with PMHx of paroxysmal Afib (on Xarelto), PAD (s/p E-tkwsciy-aitreecpp bypass, R-femoral angioplasty with stenting, (on Plavix), HTN, HLD, type 2 diabetes (HA1c on 7.8 on Metformin and Tresiba as an outpatient), and recent trauma to right thigh with stable hematoma, presented to [facility] 1/12/21 with SOB, acute on chronic combined diastolic and systolic heart failure (requiring IV diuresis), and NSTEMI on 1/11/20. On 1/13 she underwent a LHC via Right radial artery and was found to have Multivessel CAD (right ostial, left main and LAD) with left femoral IABP placed. GERALDINE 1/14 showed Moderate MR and Mild AS. IABP removed successfully 1/15. Preoperative course significant for afib with RVR (requiring PO amio load, increased Lopressor dosing, and initiation of Cardizem).     Patient underwent C3/DUANE Clip/MV Repair and DUANE thrombectomy with Dr. Orellana 1/19/21. Post op issues involved hypoxic respiratory failure (now resolved) and KRISTIE, which is now improved. Torsemide was added 1/26/21 due to fluid overload.     Patient was medically stable for discharge to [place] for acute rehab 1/28/21. Patient was seen and examined at the bedside, upon arrival. (28 Jan 2021 14:43)    Rehab course significant for continued shortness of breath, especially with activity. She continued to have crackles in B/L lung fields since admission to rehab. She refused to continue using BiPAP. BiPAP was discontinued with oxygen via nasal cannula PRN. CXR 1/29/21 mild congestive picture increased from previous. Patient tested positive for COVID 1/31/21 x2 and was moved to the COVID unit in rehab. She was started on Remdesivir. During therapy 2/1/21, she had O2 saturation 93% on RA. Due to current Remdesivir and decrease in O2 saturation, patient will be transferred to medicine floor for closer monitoring of vitals and monitoring during Remdesivir.     Patient tolerated course of inpatient PT/OT/SLP rehab. Patient will be discharged to medicine 2/1/21. HPI:  75 year old F with PMHx of paroxysmal Afib (on Xarelto), PAD (s/p F-klgdziu-tkgiepmcf bypass, R-femoral angioplasty with stenting, (on Plavix), HTN, HLD, type 2 diabetes (HA1c on 7.8 on Metformin and Tresiba as an outpatient), and recent trauma to right thigh with stable hematoma, presented to [facility] 1/12/21 with SOB, acute on chronic combined diastolic and systolic heart failure (requiring IV diuresis), and NSTEMI on 1/11/20. On 1/13 she underwent a LHC via Right radial artery and was found to have Multivessel CAD (right ostial, left main and LAD) with left femoral IABP placed. GERALDINE 1/14 showed Moderate MR and Mild AS. IABP removed successfully 1/15. Preoperative course significant for afib with RVR (requiring PO amio load, increased Lopressor dosing, and initiation of Cardizem).     Patient underwent C3/DUANE Clip/MV Repair and DUANE thrombectomy with Dr. Orellana 1/19/21. Post op issues involved hypoxic respiratory failure (now resolved) and KRISTIE, which is now improved. Torsemide was added 1/26/21 due to fluid overload.     Patient was medically stable for discharge to [place] for acute rehab 1/28/21. Patient was seen and examined at the bedside, upon arrival. (28 Jan 2021 14:43)    Rehab course significant for continued shortness of breath, especially with activity. She continued to have crackles in B/L lung fields since admission to rehab. She refused to continue using BiPAP. BiPAP was discontinued with oxygen via nasal cannula PRN. CXR 1/29/21 mild congestive picture increased from previous. Patient tested positive for COVID 1/31/21 x2 and was moved to the COVID unit in rehab. She was started on Remdesivir. During therapy 2/1/21, she had O2 saturation 93% on RA and continues to report dyspnea. Evaluated overnight by PA for dyspnea.  Due to current Remdesivir and decrease in O2 saturation, elevated LFTs ( noted on prior labs )  patient will be transferred to medicine floor for closer monitoring of vitals and monitoring during Remdesivir.    notified by resident.

## 2021-02-01 NOTE — DISCHARGE NOTE PROVIDER - NSDCCPCAREPLAN_GEN_ALL_CORE_FT
PRINCIPAL DISCHARGE DIAGNOSIS  Diagnosis: S/P CABG (coronary artery bypass graft)  Assessment and Plan of Treatment: You were admitted to rehab after CABG. Continue Aspirin, Lipitor, and Torsemide. Continue Megace for appetite stimulation. Continue Tylenol, Gabapentin, and Oxycodone for pain management. Follow up with your surgeon, Dr. Orellana upon discharge.      SECONDARY DISCHARGE DIAGNOSES  Diagnosis: Hypertension  Assessment and Plan of Treatment: Continue your medications as prescribed for high blood pressure and follow up with your primary care physician.    Diagnosis: Afib  Assessment and Plan of Treatment: Continue taking Eliquis 2.5mg twice a day. Your dose was decreased because of significant bleeding and hematoma in your right leg. Follow up with your cardiologist regarding when to restart your home dose of 5mg twice a day. Continue taking Amiodarone and Lopressor.    Diagnosis: Diabetes  Assessment and Plan of Treatment: Continue taking Lantus (long-acting insulin) and lispro (short acting insulin). Follow up with your endocrinologist, Dr. Soriano, upon discharge for possibly restarting your oral diabetic medications.     PRINCIPAL DISCHARGE DIAGNOSIS  Diagnosis: S/P CABG (coronary artery bypass graft)  Assessment and Plan of Treatment: You were admitted to rehab after CABG. Continue Aspirin, Lipitor, and Torsemide. Continue Megace for appetite stimulation. Continue Tylenol, Gabapentin, and Oxycodone for pain management. Follow up with your surgeon, Dr. Orellana upon discharge.      SECONDARY DISCHARGE DIAGNOSES  Diagnosis: Hyponatremia  Assessment and Plan of Treatment: You were started on a fluid restriction diet and salt tabs for low sodium. You will be more closely monitored on the medicine floor.    Diagnosis: COVID-19  Assessment and Plan of Treatment: You were started on Remdesivir for COVID. You will be transferred to the medicine floor for close monitoring of your vitals.    Diagnosis: Hypertension  Assessment and Plan of Treatment: Continue your medications as prescribed for high blood pressure and follow up with your primary care physician.    Diagnosis: Afib  Assessment and Plan of Treatment: Continue taking Eliquis 2.5mg twice a day. Your dose was decreased because of significant bleeding and hematoma in your right leg. Follow up with your cardiologist regarding when to restart your home dose of 5mg twice a day. Continue taking Amiodarone and Lopressor.    Diagnosis: Diabetes  Assessment and Plan of Treatment: Continue taking Lantus (long-acting insulin) and lispro (short acting insulin). Follow up with your endocrinologist, Dr. Soriano, upon discharge for possibly restarting your oral diabetic medications.

## 2021-02-01 NOTE — CHART NOTE - NSCHARTNOTEFT_GEN_A_CORE
Interval events: Called by RN to evaluate pt for "not feeling well," pt also found to be tachy around 106 on vitals. Chart reviewed, pt evaluated at bedside.   Pt sitting in bed, appears to be anxious. Pt c/o feeling discomfort upon inspiration, as if shes unable to fully inspire. Denies chest pain, palpitations, headache, dizziness, nausea, vomiting.   Vitals at time of evaluation: Temp 97.8F, HR 89, /77, SpO2 on RA 95%, RR 16.     Review of Systems:  Constitutional: No fever, chills, fatigue  Neuro: No headache, numbness, weakness  Resp: No cough, wheezing, shortness of breath  CVS: No chest pain, palpitations, +leg swelling  GI: No abdominal pain, nausea, vomiting  : No dysuria, frequency, incontinence    T(F): 97.8 (01-31-21 @ 21:11), Max: 98.4 (01-31-21 @ 08:48)  HR: 89 (01-31-21 @ 21:11) (82 - 89)  BP: 120/77 (01-31-21 @ 21:11) (107/67 - 120/77)  RR: 16 (01-31-21 @ 21:11) (14 - 16)  SpO2: 95% (01-31-21 @ 21:11) (94% - 98%)  Wt(kg): --    POCT Blood Glucose.: 124 mg/dL (31 Jan 2021 21:07)    I&O's Summary  31 Jan 2021 07:01  -  01 Feb 2021 02:09  --------------------------------------------------------  IN: 400 mL / OUT: 400 mL / NET: 0 mL    Physical Exam:   Gen: appears to be anxious, A&Ox3  HEENT: MMM  Resp: nonlabored breathing, rales heard b/l   CVS: S1S2+  Abd: nontender abdomen, soft  Ext: edematous b/l lower extremities    Meds:  remdesivir  IVPB IV Intermittent  remdesivir  IVPB IV Intermittent  aMIOdarone    Tablet Oral  metoprolol tartrate Oral  torsemide Oral  atorvastatin Oral  dextrose 40% Gel Oral  dextrose 50% Injectable IV Push  glucagon  Injectable IntraMuscular  insulin glargine Injectable (LANTUS) SubCutaneous  insulin lispro (ADMELOG) corrective regimen sliding scale SubCutaneous  insulin lispro Injectable (ADMELOG) SubCutaneous  megestrol Suspension Oral  guaiFENesin   Syrup  (Sugar-Free) Oral PRN  acetaminophen   Tablet .. Oral PRN  diazepam    Tablet Oral  gabapentin Oral  melatonin Oral  oxyCODONE    IR Oral PRN  apixaban Oral  aspirin enteric coated Oral  pantoprazole    Tablet Oral  polyethylene glycol 3350 Oral PRN  senna Oral PRN  ascorbic acid Oral  dextrose 5%. IV Continuous  potassium chloride   Powder Oral  sodium chloride Oral  lidocaine   Patch Transdermal    01-31  x   |  x   |  x   ----------------------------<  x   x    |  x   |  1.08    Ca    8.6      31 Jan 2021 05:10  Mg     1.9     01-31    TPro  6.4  /  Alb  2.5<L>  /  TBili  3.4<H>  /  DBili  2.1<H>  /  AST  38  /  ALT  56<H>  /  AlkPhos  179<H>  01-31    Radiology:  < from: Xray Chest 1 View- PORTABLE-Routine (Xray Chest 1 View- PORTABLE-Routine .) (01.29.21 @ 14:50) >  IMPRESSION: Mild congestive picture somewhat increased from prior.  < end of copied text >    < from: Xray Chest 1 View- PORTABLE-Routine (Xray Chest 1 View- PORTABLE-Routine in AM.) (01.28.21 @ 05:52) >  IMPRESSION:  Status post cardiac surgery.  No evidence of active chest pathology.  < end of copied text >    A&P: 75 year old female with PMHx of paroxysmal Afib (on Xarelto), PAD (s/p H-dkiwsdb-cmsyvjqtd bypass, R-femoral angioplasty with stenting, (on Plavix), HTN, HLD, type 2 diabetes (HA1c on 7.8 on Metformin and Tresiba as an outpatient), and recent trauma to right thigh with stable hematoma, presented to [facility] 1/12/21 with SOB, found to have NSTEMI. 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 S/P C3/DUANE Clip/MV Repair and DUANE thrombectomy with Dr. Orellana on 1/19/21. Postop course complicated by hypoxic respiratory failure and KRISTIE now resolved, and hypokalemia on daily repletion while on Torsemide, admitted to  for rehab and was found with COVID 19.    Acute combined systolic and diastolic heart failure   -will give lasix 20mg iv push now  -cont torsemide 20mg daily  -echo showing Left ventricular ejection fraction 45 to 50%. Grade III diastolic dysfunction on 1/27/21    Anxiety  -ativan 0.25mg iv push once  -cont valium qhs    Vitals per routine  Rest of care per primary team Interval events: Called by RN to evaluate pt for "not feeling well," pt also found to be tachy around 106 on vitals. Chart reviewed, pt evaluated at bedside.   Pt sitting in bed, appears to be anxious. Pt c/o feeling discomfort upon inspiration, as if shes unable to fully inspire. Denies chest pain, palpitations, headache, dizziness, nausea, vomiting.   Vitals at time of evaluation: HR 94, /77, SpO2 on RA 94%, RR 16.     Review of Systems:  Constitutional: No fever, chills, fatigue  Neuro: No headache, numbness, weakness  Resp: No cough, wheezing, shortness of breath  CVS: No chest pain, palpitations, +leg swelling  GI: No abdominal pain, nausea, vomiting  : No dysuria, frequency, incontinence    T(F): 97.8 (01-31-21 @ 21:11), Max: 98.4 (01-31-21 @ 08:48)  HR: 89 (01-31-21 @ 21:11) (82 - 89)  BP: 120/77 (01-31-21 @ 21:11) (107/67 - 120/77)  RR: 16 (01-31-21 @ 21:11) (14 - 16)  SpO2: 95% (01-31-21 @ 21:11) (94% - 98%)  Wt(kg): --    POCT Blood Glucose.: 124 mg/dL (31 Jan 2021 21:07)    I&O's Summary  31 Jan 2021 07:01  -  01 Feb 2021 02:09  --------------------------------------------------------  IN: 400 mL / OUT: 400 mL / NET: 0 mL    Physical Exam:   Gen: appears to be anxious, A&Ox3  HEENT: MMM  Resp: nonlabored breathing, rales heard b/l   CVS: S1S2+  Abd: nontender abdomen, soft  Ext: edematous b/l lower extremities    Meds:  remdesivir  IVPB IV Intermittent  remdesivir  IVPB IV Intermittent  aMIOdarone    Tablet Oral  metoprolol tartrate Oral  torsemide Oral  atorvastatin Oral  dextrose 40% Gel Oral  dextrose 50% Injectable IV Push  glucagon  Injectable IntraMuscular  insulin glargine Injectable (LANTUS) SubCutaneous  insulin lispro (ADMELOG) corrective regimen sliding scale SubCutaneous  insulin lispro Injectable (ADMELOG) SubCutaneous  megestrol Suspension Oral  guaiFENesin   Syrup  (Sugar-Free) Oral PRN  acetaminophen   Tablet .. Oral PRN  diazepam    Tablet Oral  gabapentin Oral  melatonin Oral  oxyCODONE    IR Oral PRN  apixaban Oral  aspirin enteric coated Oral  pantoprazole    Tablet Oral  polyethylene glycol 3350 Oral PRN  senna Oral PRN  ascorbic acid Oral  dextrose 5%. IV Continuous  potassium chloride   Powder Oral  sodium chloride Oral  lidocaine   Patch Transdermal    01-31  x   |  x   |  x   ----------------------------<  x   x    |  x   |  1.08    Ca    8.6      31 Jan 2021 05:10  Mg     1.9     01-31    TPro  6.4  /  Alb  2.5<L>  /  TBili  3.4<H>  /  DBili  2.1<H>  /  AST  38  /  ALT  56<H>  /  AlkPhos  179<H>  01-31    Radiology:  < from: Xray Chest 1 View- PORTABLE-Routine (Xray Chest 1 View- PORTABLE-Routine .) (01.29.21 @ 14:50) >  IMPRESSION: Mild congestive picture somewhat increased from prior.  < end of copied text >    < from: Xray Chest 1 View- PORTABLE-Routine (Xray Chest 1 View- PORTABLE-Routine in AM.) (01.28.21 @ 05:52) >  IMPRESSION:  Status post cardiac surgery.  No evidence of active chest pathology.  < end of copied text >    A&P: 75 year old female with PMHx of paroxysmal Afib (on Xarelto), PAD (s/p Q-weafzsc-qfaiackpt bypass, R-femoral angioplasty with stenting, (on Plavix), HTN, HLD, type 2 diabetes (HA1c on 7.8 on Metformin and Tresiba as an outpatient), and recent trauma to right thigh with stable hematoma, presented to [facility] 1/12/21 with SOB, found to have NSTEMI. 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 S/P C3/DUANE Clip/MV Repair and DUANE thrombectomy with Dr. Orellana on 1/19/21. Postop course complicated by hypoxic respiratory failure and KRISTIE now resolved, and hypokalemia on daily repletion while on Torsemide, admitted to [facility] for rehab and was found with COVID 19.    Acute combined systolic and diastolic heart failure   -will give lasix 20mg iv push now  -cont torsemide 20mg daily  -echo showing Left ventricular ejection fraction 45 to 50%. Grade III diastolic dysfunction on 1/27/21    Anxiety  -ativan 0.25mg iv push once  -cont valium qhs    Vitals per routine  Rest of care per primary team Interval events: Called by RN to evaluate pt for "not feeling well," pt also found to be tachy around 106 on vitals. Chart reviewed, pt evaluated at bedside.   Pt sitting in bed, appears to be anxious. Pt c/o feeling discomfort upon inspiration, as if shes unable to fully inspire. Denies chest pain, palpitations, headache, dizziness, nausea, vomiting.   Vitals at time of evaluation: HR 94, /77, SpO2 on RA 94%, RR 16.     Review of Systems:  Constitutional: No fever, chills, fatigue  Neuro: No headache, numbness, weakness  Resp: No cough, wheezing, shortness of breath  CVS: No chest pain, palpitations, +leg swelling  GI: No abdominal pain, nausea, vomiting  : No dysuria, frequency, incontinence    T(F): 97.8 (01-31-21 @ 21:11), Max: 98.4 (01-31-21 @ 08:48)  HR: 89 (01-31-21 @ 21:11) (82 - 89)  BP: 120/77 (01-31-21 @ 21:11) (107/67 - 120/77)  RR: 16 (01-31-21 @ 21:11) (14 - 16)  SpO2: 95% (01-31-21 @ 21:11) (94% - 98%)  Wt(kg): --    POCT Blood Glucose.: 124 mg/dL (31 Jan 2021 21:07)    I&O's Summary  31 Jan 2021 07:01  -  01 Feb 2021 02:09  --------------------------------------------------------  IN: 400 mL / OUT: 400 mL / NET: 0 mL    Physical Exam:   Gen: appears to be anxious, A&Ox3  HEENT: MMM  Resp: nonlabored breathing, rales heard b/l   CVS: S1S2+  Abd: nontender abdomen, soft  Ext: edematous b/l lower extremities    Meds:  remdesivir  IVPB IV Intermittent  remdesivir  IVPB IV Intermittent  aMIOdarone    Tablet Oral  metoprolol tartrate Oral  torsemide Oral  atorvastatin Oral  dextrose 40% Gel Oral  dextrose 50% Injectable IV Push  glucagon  Injectable IntraMuscular  insulin glargine Injectable (LANTUS) SubCutaneous  insulin lispro (ADMELOG) corrective regimen sliding scale SubCutaneous  insulin lispro Injectable (ADMELOG) SubCutaneous  megestrol Suspension Oral  guaiFENesin   Syrup  (Sugar-Free) Oral PRN  acetaminophen   Tablet .. Oral PRN  diazepam    Tablet Oral  gabapentin Oral  melatonin Oral  oxyCODONE    IR Oral PRN  apixaban Oral  aspirin enteric coated Oral  pantoprazole    Tablet Oral  polyethylene glycol 3350 Oral PRN  senna Oral PRN  ascorbic acid Oral  dextrose 5%. IV Continuous  potassium chloride   Powder Oral  sodium chloride Oral  lidocaine   Patch Transdermal    01-31  x   |  x   |  x   ----------------------------<  x   x    |  x   |  1.08    Ca    8.6      31 Jan 2021 05:10  Mg     1.9     01-31    TPro  6.4  /  Alb  2.5<L>  /  TBili  3.4<H>  /  DBili  2.1<H>  /  AST  38  /  ALT  56<H>  /  AlkPhos  179<H>  01-31    Radiology:  < from: Xray Chest 1 View- PORTABLE-Routine (Xray Chest 1 View- PORTABLE-Routine .) (01.29.21 @ 14:50) >  IMPRESSION: Mild congestive picture somewhat increased from prior.  < end of copied text >    < from: Xray Chest 1 View- PORTABLE-Routine (Xray Chest 1 View- PORTABLE-Routine in AM.) (01.28.21 @ 05:52) >  IMPRESSION:  Status post cardiac surgery.  No evidence of active chest pathology.  < end of copied text >    A&P: 75 year old female with PMHx of paroxysmal Afib (on Xarelto), PAD (s/p M-nbdakqh-leoaupeiz bypass, R-femoral angioplasty with stenting, (on Plavix), HTN, HLD, type 2 diabetes (HA1c on 7.8 on Metformin and Tresiba as an outpatient), and recent trauma to right thigh with stable hematoma, presented to [facility] 1/12/21 with SOB, found to have NSTEMI. 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 S/P C3/DUANE Clip/MV Repair and DUANE thrombectomy with Dr. Orellana on 1/19/21. Postop course complicated by hypoxic respiratory failure and KRISTIE now resolved, and hypokalemia on daily repletion while on Torsemide, admitted to [facility] for rehab and was found with COVID 19.    Acute combined systolic and diastolic heart failure   -will give lasix 20mg iv push now  -cont torsemide 20mg daily  -echo showing Left ventricular ejection fraction 45 to 50%. Grade III diastolic dysfunction on 1/27/21  -monitor I/O's  -monitor Cr    Anxiety  -ativan 0.25mg iv push once  -cont valium qhs    Vitals per routine  Rest of care per primary team

## 2021-02-01 NOTE — DISCHARGE NOTE PROVIDER - NSDCMRMEDTOKEN_GEN_ALL_CORE_FT
acetaminophen 325 mg oral tablet: 2 tab(s) orally every 6 hours, As needed, Mild Pain (1 - 3)  Admelog 100 units/mL injectable solution:  injectable   amiodarone 200 mg oral tablet: 1 tab(s) orally once a day  apixaban 2.5 mg oral tablet: 1 tab(s) orally every 12 hours  ascorbic acid 500 mg oral tablet: 1 tab(s) orally once a day  aspirin 81 mg oral delayed release tablet: 1 tab(s) orally once a day  atorvastatin 40 mg oral tablet: 1 tab(s) orally once a day (at bedtime)  diazePAM 2 mg oral tablet: 1 tab(s) orally once a day (at bedtime)  gabapentin 300 mg oral capsule: 1 cap(s) orally 3 times a day  guaiFENesin 100 mg/5 mL oral liquid: 5 milliliter(s) orally every 6 hours, As needed, Cough  insulin glargine:   lidocaine 5% topical film:  topically   megestrol 40 mg/mL oral suspension: 10 milliliter(s) orally once a day  melatonin 3 mg oral tablet: 2 tab(s) orally once a day (at bedtime)  metoprolol: 12.5 milligram(s) orally every 12 hours  Multiple Vitamins with Minerals oral tablet: 1 tab(s) orally once a day  oxyCODONE 10 mg oral tablet: 1 tab(s) orally every 4 hours, As needed, Severe Pain (7 - 10)  oxyCODONE 5 mg oral tablet: 1 tab(s) orally every 4 hours, As needed, Moderate Pain (4 - 6)  pantoprazole 40 mg oral delayed release tablet: 1 tab(s) orally once a day  polyethylene glycol 3350 oral powder for reconstitution: 17 gram(s) orally once a day, As needed, Constipation  potassium chloride 20 mEq oral powder for reconstitution: 2 packet(s) orally once a day  remdesivir 5 mg/mL intravenous solution:  intravenous   senna oral tablet: 2 tab(s) orally once a day (at bedtime), As needed, Constipation  sodium chloride 1 g oral tablet: 1 tab(s) orally 3 times a day  torsemide 20 mg oral tablet: 1 tab(s) orally once a day

## 2021-02-01 NOTE — DISCHARGE NOTE PROVIDER - CARE PROVIDER_API CALL
Doug Orellana)  Surgery; Thoracic and Cardiac Surgery  [address] [place] [address]  Phone: [phone]  Fax: [phone]  Follow Up Time: 2 weeks

## 2021-02-02 LAB
ALBUMIN SERPL ELPH-MCNC: 2.5 G/DL — LOW (ref 3.3–5)
ALBUMIN SERPL ELPH-MCNC: 2.6 G/DL — LOW (ref 3.3–5)
ALBUMIN SERPL ELPH-MCNC: 2.6 G/DL — LOW (ref 3.3–5)
ALP SERPL-CCNC: 157 U/L — HIGH (ref 40–120)
ALP SERPL-CCNC: 160 U/L — HIGH (ref 40–120)
ALP SERPL-CCNC: 168 U/L — HIGH (ref 40–120)
ALT FLD-CCNC: 44 U/L — SIGNIFICANT CHANGE UP (ref 10–45)
ANION GAP SERPL CALC-SCNC: 11 MMOL/L — SIGNIFICANT CHANGE UP (ref 5–17)
AST SERPL-CCNC: 41 U/L — HIGH (ref 10–40)
AST SERPL-CCNC: 44 U/L — HIGH (ref 10–40)
AST SERPL-CCNC: 51 U/L — HIGH (ref 10–40)
BASOPHILS # BLD AUTO: 0.03 K/UL — SIGNIFICANT CHANGE UP (ref 0–0.2)
BASOPHILS NFR BLD AUTO: 0.4 % — SIGNIFICANT CHANGE UP (ref 0–2)
BILIRUB DIRECT SERPL-MCNC: 1.8 MG/DL — HIGH (ref 0–0.2)
BILIRUB DIRECT SERPL-MCNC: 2 MG/DL — HIGH (ref 0–0.2)
BILIRUB INDIRECT FLD-MCNC: 1.4 MG/DL — HIGH (ref 0.2–1)
BILIRUB INDIRECT FLD-MCNC: 1.8 MG/DL — HIGH (ref 0.2–1)
BILIRUB SERPL-MCNC: 3.4 MG/DL — HIGH (ref 0.2–1.2)
BILIRUB SERPL-MCNC: 3.5 MG/DL — HIGH (ref 0.2–1.2)
BILIRUB SERPL-MCNC: 3.6 MG/DL — HIGH (ref 0.2–1.2)
BUN SERPL-MCNC: 22 MG/DL — SIGNIFICANT CHANGE UP (ref 7–23)
CALCIUM SERPL-MCNC: 8.8 MG/DL — SIGNIFICANT CHANGE UP (ref 8.4–10.5)
CHLORIDE SERPL-SCNC: 103 MMOL/L — SIGNIFICANT CHANGE UP (ref 96–108)
CO2 SERPL-SCNC: 25 MMOL/L — SIGNIFICANT CHANGE UP (ref 22–31)
CREAT SERPL-MCNC: 0.91 MG/DL — SIGNIFICANT CHANGE UP (ref 0.5–1.3)
CREAT SERPL-MCNC: 0.94 MG/DL — SIGNIFICANT CHANGE UP (ref 0.5–1.3)
CREAT SERPL-MCNC: 0.98 MG/DL — SIGNIFICANT CHANGE UP (ref 0.5–1.3)
EOSINOPHIL # BLD AUTO: 0.33 K/UL — SIGNIFICANT CHANGE UP (ref 0–0.5)
EOSINOPHIL NFR BLD AUTO: 4.4 % — SIGNIFICANT CHANGE UP (ref 0–6)
GLUCOSE BLDC GLUCOMTR-MCNC: 105 MG/DL — HIGH (ref 70–99)
GLUCOSE BLDC GLUCOMTR-MCNC: 157 MG/DL — HIGH (ref 70–99)
GLUCOSE BLDC GLUCOMTR-MCNC: 165 MG/DL — HIGH (ref 70–99)
GLUCOSE BLDC GLUCOMTR-MCNC: 237 MG/DL — HIGH (ref 70–99)
GLUCOSE SERPL-MCNC: 107 MG/DL — HIGH (ref 70–99)
HCT VFR BLD CALC: 31.8 % — LOW (ref 34.5–45)
HGB BLD-MCNC: 9.9 G/DL — LOW (ref 11.5–15.5)
IMM GRANULOCYTES NFR BLD AUTO: 0.8 % — SIGNIFICANT CHANGE UP (ref 0–1.5)
LYMPHOCYTES # BLD AUTO: 0.94 K/UL — LOW (ref 1–3.3)
LYMPHOCYTES # BLD AUTO: 12.6 % — LOW (ref 13–44)
MAGNESIUM SERPL-MCNC: 1.9 MG/DL — SIGNIFICANT CHANGE UP (ref 1.6–2.6)
MCHC RBC-ENTMCNC: 27.3 PG — SIGNIFICANT CHANGE UP (ref 27–34)
MCHC RBC-ENTMCNC: 31.1 GM/DL — LOW (ref 32–36)
MCV RBC AUTO: 87.6 FL — SIGNIFICANT CHANGE UP (ref 80–100)
MONOCYTES # BLD AUTO: 0.65 K/UL — SIGNIFICANT CHANGE UP (ref 0–0.9)
MONOCYTES NFR BLD AUTO: 8.7 % — SIGNIFICANT CHANGE UP (ref 2–14)
NEUTROPHILS # BLD AUTO: 5.47 K/UL — SIGNIFICANT CHANGE UP (ref 1.8–7.4)
NEUTROPHILS NFR BLD AUTO: 73.1 % — SIGNIFICANT CHANGE UP (ref 43–77)
NRBC # BLD: 0 /100 WBCS — SIGNIFICANT CHANGE UP (ref 0–0)
NT-PROBNP SERPL-SCNC: 8137 PG/ML — HIGH (ref 0–300)
PLATELET # BLD AUTO: 370 K/UL — SIGNIFICANT CHANGE UP (ref 150–400)
POTASSIUM SERPL-MCNC: 3.6 MMOL/L — SIGNIFICANT CHANGE UP (ref 3.5–5.3)
POTASSIUM SERPL-SCNC: 3.6 MMOL/L — SIGNIFICANT CHANGE UP (ref 3.5–5.3)
PROT SERPL-MCNC: 6.5 G/DL — SIGNIFICANT CHANGE UP (ref 6–8.3)
PROT SERPL-MCNC: 6.6 G/DL — SIGNIFICANT CHANGE UP (ref 6–8.3)
PROT SERPL-MCNC: 6.7 G/DL — SIGNIFICANT CHANGE UP (ref 6–8.3)
RBC # BLD: 3.63 M/UL — LOW (ref 3.8–5.2)
RBC # FLD: 17.7 % — HIGH (ref 10.3–14.5)
SARS-COV-2 IGG SERPL QL IA: NEGATIVE — SIGNIFICANT CHANGE UP
SARS-COV-2 IGM SERPL IA-ACNC: <0.1 INDEX — SIGNIFICANT CHANGE UP
SODIUM SERPL-SCNC: 139 MMOL/L — SIGNIFICANT CHANGE UP (ref 135–145)
WBC # BLD: 7.48 K/UL — SIGNIFICANT CHANGE UP (ref 3.8–10.5)
WBC # FLD AUTO: 7.48 K/UL — SIGNIFICANT CHANGE UP (ref 3.8–10.5)

## 2021-02-02 PROCEDURE — 86140 C-REACTIVE PROTEIN: CPT

## 2021-02-02 PROCEDURE — U0003: CPT

## 2021-02-02 PROCEDURE — 80076 HEPATIC FUNCTION PANEL: CPT

## 2021-02-02 PROCEDURE — 85027 COMPLETE CBC AUTOMATED: CPT

## 2021-02-02 PROCEDURE — 97110 THERAPEUTIC EXERCISES: CPT

## 2021-02-02 PROCEDURE — 82728 ASSAY OF FERRITIN: CPT

## 2021-02-02 PROCEDURE — 97535 SELF CARE MNGMENT TRAINING: CPT

## 2021-02-02 PROCEDURE — 83615 LACTATE (LD) (LDH) ENZYME: CPT

## 2021-02-02 PROCEDURE — 94660 CPAP INITIATION&MGMT: CPT

## 2021-02-02 PROCEDURE — 82962 GLUCOSE BLOOD TEST: CPT

## 2021-02-02 PROCEDURE — 99233 SBSQ HOSP IP/OBS HIGH 50: CPT

## 2021-02-02 PROCEDURE — 83880 ASSAY OF NATRIURETIC PEPTIDE: CPT

## 2021-02-02 PROCEDURE — 36415 COLL VENOUS BLD VENIPUNCTURE: CPT

## 2021-02-02 PROCEDURE — 83735 ASSAY OF MAGNESIUM: CPT

## 2021-02-02 PROCEDURE — U0005: CPT

## 2021-02-02 PROCEDURE — 80053 COMPREHEN METABOLIC PANEL: CPT

## 2021-02-02 PROCEDURE — 99232 SBSQ HOSP IP/OBS MODERATE 35: CPT

## 2021-02-02 PROCEDURE — 71045 X-RAY EXAM CHEST 1 VIEW: CPT

## 2021-02-02 PROCEDURE — 97163 PT EVAL HIGH COMPLEX 45 MIN: CPT

## 2021-02-02 PROCEDURE — 82565 ASSAY OF CREATININE: CPT

## 2021-02-02 PROCEDURE — 97530 THERAPEUTIC ACTIVITIES: CPT

## 2021-02-02 PROCEDURE — 97116 GAIT TRAINING THERAPY: CPT

## 2021-02-02 PROCEDURE — 97167 OT EVAL HIGH COMPLEX 60 MIN: CPT

## 2021-02-02 RX ORDER — FUROSEMIDE 40 MG
40 TABLET ORAL DAILY
Refills: 0 | Status: DISCONTINUED | OUTPATIENT
Start: 2021-02-02 | End: 2021-02-03

## 2021-02-02 RX ORDER — SENNA PLUS 8.6 MG/1
2 TABLET ORAL AT BEDTIME
Refills: 0 | Status: DISCONTINUED | OUTPATIENT
Start: 2021-02-02 | End: 2021-02-22

## 2021-02-02 RX ORDER — OXYCODONE HYDROCHLORIDE 5 MG/1
10 TABLET ORAL EVERY 4 HOURS
Refills: 0 | Status: DISCONTINUED | OUTPATIENT
Start: 2021-02-02 | End: 2021-02-02

## 2021-02-02 RX ORDER — POTASSIUM CHLORIDE 20 MEQ
40 PACKET (EA) ORAL DAILY
Refills: 0 | Status: DISCONTINUED | OUTPATIENT
Start: 2021-02-02 | End: 2021-02-03

## 2021-02-02 RX ORDER — APIXABAN 2.5 MG/1
2.5 TABLET, FILM COATED ORAL EVERY 12 HOURS
Refills: 0 | Status: DISCONTINUED | OUTPATIENT
Start: 2021-02-02 | End: 2021-02-22

## 2021-02-02 RX ORDER — MULTIVIT-MIN/FERROUS GLUCONATE 9 MG/15 ML
1 LIQUID (ML) ORAL DAILY
Refills: 0 | Status: DISCONTINUED | OUTPATIENT
Start: 2021-02-02 | End: 2021-02-02

## 2021-02-02 RX ORDER — INSULIN LISPRO 100/ML
9 VIAL (ML) SUBCUTANEOUS
Refills: 0 | Status: DISCONTINUED | OUTPATIENT
Start: 2021-02-02 | End: 2021-02-03

## 2021-02-02 RX ORDER — AMIODARONE HYDROCHLORIDE 400 MG/1
200 TABLET ORAL DAILY
Refills: 0 | Status: DISCONTINUED | OUTPATIENT
Start: 2021-02-02 | End: 2021-02-22

## 2021-02-02 RX ORDER — DEXTROSE 50 % IN WATER 50 %
25 SYRINGE (ML) INTRAVENOUS ONCE
Refills: 0 | Status: DISCONTINUED | OUTPATIENT
Start: 2021-02-02 | End: 2021-02-22

## 2021-02-02 RX ORDER — INSULIN LISPRO 100/ML
VIAL (ML) SUBCUTANEOUS
Refills: 0 | Status: DISCONTINUED | OUTPATIENT
Start: 2021-02-02 | End: 2021-02-05

## 2021-02-02 RX ORDER — SODIUM CHLORIDE 9 MG/ML
1000 INJECTION, SOLUTION INTRAVENOUS
Refills: 0 | Status: DISCONTINUED | OUTPATIENT
Start: 2021-02-02 | End: 2021-02-22

## 2021-02-02 RX ORDER — POLYETHYLENE GLYCOL 3350 17 G/17G
17 POWDER, FOR SOLUTION ORAL DAILY
Refills: 0 | Status: DISCONTINUED | OUTPATIENT
Start: 2021-02-02 | End: 2021-02-22

## 2021-02-02 RX ORDER — ASCORBIC ACID 60 MG
500 TABLET,CHEWABLE ORAL DAILY
Refills: 0 | Status: DISCONTINUED | OUTPATIENT
Start: 2021-02-02 | End: 2021-02-22

## 2021-02-02 RX ORDER — LANOLIN ALCOHOL/MO/W.PET/CERES
6 CREAM (GRAM) TOPICAL AT BEDTIME
Refills: 0 | Status: DISCONTINUED | OUTPATIENT
Start: 2021-02-02 | End: 2021-02-22

## 2021-02-02 RX ORDER — DEXTROSE 50 % IN WATER 50 %
15 SYRINGE (ML) INTRAVENOUS ONCE
Refills: 0 | Status: DISCONTINUED | OUTPATIENT
Start: 2021-02-02 | End: 2021-02-22

## 2021-02-02 RX ORDER — ASPIRIN/CALCIUM CARB/MAGNESIUM 324 MG
81 TABLET ORAL DAILY
Refills: 0 | Status: DISCONTINUED | OUTPATIENT
Start: 2021-02-02 | End: 2021-02-22

## 2021-02-02 RX ORDER — INSULIN GLARGINE 100 [IU]/ML
26 INJECTION, SOLUTION SUBCUTANEOUS AT BEDTIME
Refills: 0 | Status: DISCONTINUED | OUTPATIENT
Start: 2021-02-02 | End: 2021-02-06

## 2021-02-02 RX ORDER — METOPROLOL TARTRATE 50 MG
12.5 TABLET ORAL
Refills: 0 | Status: DISCONTINUED | OUTPATIENT
Start: 2021-02-02 | End: 2021-02-03

## 2021-02-02 RX ORDER — OXYCODONE HYDROCHLORIDE 5 MG/1
5 TABLET ORAL EVERY 4 HOURS
Refills: 0 | Status: DISCONTINUED | OUTPATIENT
Start: 2021-02-02 | End: 2021-02-02

## 2021-02-02 RX ORDER — GABAPENTIN 400 MG/1
300 CAPSULE ORAL THREE TIMES A DAY
Refills: 0 | Status: DISCONTINUED | OUTPATIENT
Start: 2021-02-02 | End: 2021-02-03

## 2021-02-02 RX ORDER — INFLUENZA VIRUS VACCINE 15; 15; 15; 15 UG/.5ML; UG/.5ML; UG/.5ML; UG/.5ML
0.5 SUSPENSION INTRAMUSCULAR ONCE
Refills: 0 | Status: DISCONTINUED | OUTPATIENT
Start: 2021-02-02 | End: 2021-02-22

## 2021-02-02 RX ORDER — PANTOPRAZOLE SODIUM 20 MG/1
40 TABLET, DELAYED RELEASE ORAL
Refills: 0 | Status: DISCONTINUED | OUTPATIENT
Start: 2021-02-02 | End: 2021-02-22

## 2021-02-02 RX ORDER — REMDESIVIR 5 MG/ML
100 INJECTION INTRAVENOUS EVERY 24 HOURS
Refills: 0 | Status: COMPLETED | OUTPATIENT
Start: 2021-02-02 | End: 2021-02-04

## 2021-02-02 RX ORDER — MEGESTROL ACETATE 40 MG/ML
400 SUSPENSION ORAL DAILY
Refills: 0 | Status: DISCONTINUED | OUTPATIENT
Start: 2021-02-02 | End: 2021-02-02

## 2021-02-02 RX ORDER — DIAZEPAM 5 MG
2 TABLET ORAL AT BEDTIME
Refills: 0 | Status: DISCONTINUED | OUTPATIENT
Start: 2021-02-02 | End: 2021-02-03

## 2021-02-02 RX ORDER — SODIUM CHLORIDE 9 MG/ML
1 INJECTION INTRAMUSCULAR; INTRAVENOUS; SUBCUTANEOUS THREE TIMES A DAY
Refills: 0 | Status: DISCONTINUED | OUTPATIENT
Start: 2021-02-02 | End: 2021-02-02

## 2021-02-02 RX ORDER — ATORVASTATIN CALCIUM 80 MG/1
40 TABLET, FILM COATED ORAL AT BEDTIME
Refills: 0 | Status: DISCONTINUED | OUTPATIENT
Start: 2021-02-02 | End: 2021-02-22

## 2021-02-02 RX ORDER — ACETAMINOPHEN 500 MG
650 TABLET ORAL EVERY 8 HOURS
Refills: 0 | Status: DISCONTINUED | OUTPATIENT
Start: 2021-02-02 | End: 2021-02-22

## 2021-02-02 RX ORDER — INSULIN LISPRO 100/ML
VIAL (ML) SUBCUTANEOUS AT BEDTIME
Refills: 0 | Status: DISCONTINUED | OUTPATIENT
Start: 2021-02-02 | End: 2021-02-05

## 2021-02-02 RX ORDER — DEXTROSE 50 % IN WATER 50 %
12.5 SYRINGE (ML) INTRAVENOUS ONCE
Refills: 0 | Status: DISCONTINUED | OUTPATIENT
Start: 2021-02-02 | End: 2021-02-22

## 2021-02-02 RX ORDER — GLUCAGON INJECTION, SOLUTION 0.5 MG/.1ML
1 INJECTION, SOLUTION SUBCUTANEOUS ONCE
Refills: 0 | Status: DISCONTINUED | OUTPATIENT
Start: 2021-02-02 | End: 2021-02-22

## 2021-02-02 RX ORDER — REMDESIVIR 5 MG/ML
100 INJECTION INTRAVENOUS EVERY 24 HOURS
Refills: 0 | Status: DISCONTINUED | OUTPATIENT
Start: 2021-02-02 | End: 2021-02-02

## 2021-02-02 RX ADMIN — APIXABAN 2.5 MILLIGRAM(S): 2.5 TABLET, FILM COATED ORAL at 17:32

## 2021-02-02 RX ADMIN — GABAPENTIN 300 MILLIGRAM(S): 400 CAPSULE ORAL at 13:30

## 2021-02-02 RX ADMIN — INSULIN GLARGINE 26 UNIT(S): 100 INJECTION, SOLUTION SUBCUTANEOUS at 22:39

## 2021-02-02 RX ADMIN — GABAPENTIN 300 MILLIGRAM(S): 400 CAPSULE ORAL at 06:57

## 2021-02-02 RX ADMIN — APIXABAN 2.5 MILLIGRAM(S): 2.5 TABLET, FILM COATED ORAL at 06:57

## 2021-02-02 RX ADMIN — AMIODARONE HYDROCHLORIDE 200 MILLIGRAM(S): 400 TABLET ORAL at 06:57

## 2021-02-02 RX ADMIN — REMDESIVIR 500 MILLIGRAM(S): 5 INJECTION INTRAVENOUS at 22:38

## 2021-02-02 RX ADMIN — Medication 2 MILLIGRAM(S): at 22:38

## 2021-02-02 RX ADMIN — Medication 81 MILLIGRAM(S): at 12:22

## 2021-02-02 RX ADMIN — OXYCODONE HYDROCHLORIDE 10 MILLIGRAM(S): 5 TABLET ORAL at 22:38

## 2021-02-02 RX ADMIN — Medication 500 MILLIGRAM(S): at 12:22

## 2021-02-02 RX ADMIN — Medication 1 TABLET(S): at 13:31

## 2021-02-02 RX ADMIN — Medication 40 MILLIGRAM(S): at 06:57

## 2021-02-02 RX ADMIN — Medication 12.5 MILLIGRAM(S): at 06:58

## 2021-02-02 RX ADMIN — Medication 6 MILLIGRAM(S): at 22:37

## 2021-02-02 RX ADMIN — MEGESTROL ACETATE 400 MILLIGRAM(S): 40 SUSPENSION ORAL at 12:21

## 2021-02-02 RX ADMIN — Medication 2: at 13:30

## 2021-02-02 RX ADMIN — ATORVASTATIN CALCIUM 40 MILLIGRAM(S): 80 TABLET, FILM COATED ORAL at 22:39

## 2021-02-02 RX ADMIN — Medication 12.5 MILLIGRAM(S): at 17:32

## 2021-02-02 RX ADMIN — PANTOPRAZOLE SODIUM 40 MILLIGRAM(S): 20 TABLET, DELAYED RELEASE ORAL at 06:58

## 2021-02-02 RX ADMIN — Medication 40 MILLIEQUIVALENT(S): at 12:21

## 2021-02-02 NOTE — PROGRESS NOTE ADULT - ATTENDING COMMENTS
sob  multifactorial due to an impaired ventricle and covid pneumonia. would continue to treat medical illness. try to avoid overdiuresis. sob acute on chronic systolic and diastolic heart failure  ACE ARB ANRI and spironolactone are discretionary in this setting given EF > 40%. sob is multifactorial due to an impaired ventricle and covid pneumonia. would continue to treat medical illness. try to avoid overdiuresis.

## 2021-02-02 NOTE — H&P ADULT - ASSESSMENT
75y with PMHx of paroxysmal Afib (on Xarelto), PAD (s/p M-kohdsnr-eewodquni bypass, R-femoral angioplasty with stenting, (on Plavix), HTN, HLD, T2IRDM s/p recent trauma to right thigh with stable hematoma, presented to [facility] 1/12/21 with SOB, found to have NSTEMI. 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 S/P C3/DUANE Clip/MV Repair and DUANE thrombectomy with Dr. Orellana on 1/19/21. Postop course complicated by hypoxic respiratory failure and KRISTIE now resolved, and hyponatremia, hypokalemia on daily repletion while on Torsemide, admitted to  for rehab and now with COVID 19 and tx to medical service for worsening dyspnea.       #COVID 19 +/- acute on chronic CHF component  cont continuous pulse ox monitoring  cont IV Remdesivir  add on Decadron if desats  cont IV Lasix and KCL supplementation.     #CAD s/p CABG, MV repair and DUANE thrombectomy, afib  cont asa BB, statin and ELiquis and amiodarone.     #Elevated LFTs  downtrending.   monitor closely while on remdesivir   check abd sono     #T2IRDM  cont lantus and lispro    #DVT/GI proph  Eliquis/PPI   75y with PMHx of paroxysmal Afib (on Xarelto), PAD (s/p F-pjzmyhp-zxhbtcpbk bypass, R-femoral angioplasty with stenting, (on Plavix), HTN, HLD, T2IRDM s/p recent trauma to right thigh with stable hematoma, presented to [facility] 1/12/21 with SOB, found to have NSTEMI. 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 S/P C3/DUANE Clip/MV Repair and DUANE thrombectomy with Dr. Orellana on 1/19/21. Postop course complicated by hypoxic respiratory failure and KRISTIE now resolved, and hyponatremia, hypokalemia on daily repletion while on Torsemide, admitted to  for rehab and now with COVID 19 and tx to medical service for worsening dyspnea.       #COVID 19 +/- acute on chronic CHF component  cont continuous pulse ox monitoring  cont IV Remdesivir  add on Decadron if desats  cont IV Lasix and KCL supplementation.     #CAD s/p CABG, MV repair and DUANE thrombectomy, afib  cont asa BB, statin and ELiquis and amiodarone.     #Elevated LFTs  downtrending.   monitor closely while on remdesivir   consider abd katelynno     #T2IRDM  cont lantus and lispro    #DVT/GI proph  Eliquis/PPI   75y with PMHx of paroxysmal Afib (on Xarelto), PAD (s/p B-mjushux-xsgtnovlu bypass, R-femoral angioplasty with stenting, (on Plavix), HTN, HLD, T2IRDM s/p recent trauma to right thigh with stable hematoma, presented to [facility] 1/12/21 with SOB, found to have NSTEMI. 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 S/P C3/DUANE Clip/MV Repair and DUANE thrombectomy with Dr. Orellana on 1/19/21. Postop course complicated by hypoxic respiratory failure and KRISTIE now resolved, and hyponatremia, hypokalemia on daily repletion while on Torsemide, admitted to  for rehab and now with COVID 19 and tx to medical service for worsening dyspnea.       #COVID 19 +/- acute on chronic CHF component  cont continuous pulse ox monitoring  cont IV Remdesivir  add on Decadron if desats  cont IV Lasix and KCL supplementation. D/C salt tabs - hyponatremia resolved.     #CAD s/p CABG, MV repair and DUANE thrombectomy, afib  cont asa BB, statin and ELiquis and amiodarone.     #Elevated LFTs  downtrending.   monitor closely while on remdesivir   consider abd sono     #T2IRDM  cont lantus and lispro    #DVT/GI proph  Eliquis/PPI

## 2021-02-02 NOTE — PROGRESS NOTE ADULT - SUBJECTIVE AND OBJECTIVE BOX
Follow up for chf  SUBJ:    dyspneic at rest    PMH  PAD (peripheral artery disease)    Paroxysmal atrial fibrillation    Hypercholesterolemia    Diabetes    Hypertension        MEDICATIONS  (STANDING):  aMIOdarone    Tablet 200 milliGRAM(s) Oral daily  apixaban 2.5 milliGRAM(s) Oral every 12 hours  ascorbic acid 500 milliGRAM(s) Oral daily  aspirin enteric coated 81 milliGRAM(s) Oral daily  atorvastatin 40 milliGRAM(s) Oral at bedtime  dextrose 40% Gel 15 Gram(s) Oral once  dextrose 5%. 1000 milliLiter(s) (50 mL/Hr) IV Continuous <Continuous>  dextrose 5%. 1000 milliLiter(s) (100 mL/Hr) IV Continuous <Continuous>  dextrose 50% Injectable 25 Gram(s) IV Push once  dextrose 50% Injectable 12.5 Gram(s) IV Push once  dextrose 50% Injectable 25 Gram(s) IV Push once  diazepam    Tablet 2 milliGRAM(s) Oral at bedtime  furosemide   Injectable 40 milliGRAM(s) IV Push daily  gabapentin 300 milliGRAM(s) Oral three times a day  glucagon  Injectable 1 milliGRAM(s) IntraMuscular once  influenza   Vaccine 0.5 milliLiter(s) IntraMuscular once  insulin glargine Injectable (LANTUS) 26 Unit(s) SubCutaneous at bedtime  insulin lispro (ADMELOG) corrective regimen sliding scale   SubCutaneous three times a day before meals  insulin lispro (ADMELOG) corrective regimen sliding scale   SubCutaneous at bedtime  insulin lispro Injectable (ADMELOG) 9 Unit(s) SubCutaneous before breakfast  insulin lispro Injectable (ADMELOG) 9 Unit(s) SubCutaneous before lunch  insulin lispro Injectable (ADMELOG) 9 Unit(s) SubCutaneous before dinner  melatonin 6 milliGRAM(s) Oral at bedtime  metoprolol tartrate 12.5 milliGRAM(s) Oral two times a day  multivitamin 1 Tablet(s) Oral daily  pantoprazole    Tablet 40 milliGRAM(s) Oral before breakfast  potassium chloride   Powder 40 milliEquivalent(s) Oral daily  remdesivir  IVPB 100 milliGRAM(s) IV Intermittent every 24 hours    MEDICATIONS  (PRN):  acetaminophen   Tablet .. 650 milliGRAM(s) Oral every 8 hours PRN Temp greater or equal to 38C (100.4F), Mild Pain (1 - 3)  guaiFENesin   Syrup  (Sugar-Free) 100 milliGRAM(s) Oral every 6 hours PRN Cough  oxyCODONE    IR 10 milliGRAM(s) Oral every 4 hours PRN Severe Pain (7 - 10)  oxyCODONE    IR 5 milliGRAM(s) Oral every 4 hours PRN Moderate Pain (4 - 6)  polyethylene glycol 3350 17 Gram(s) Oral daily PRN Constipation  senna 2 Tablet(s) Oral at bedtime PRN Constipation        PHYSICAL EXAM:  Vital Signs Last 24 Hrs  T(C): 36.2 (02 Feb 2021 06:45), Max: 36.6 (01 Feb 2021 21:22)  T(F): 97.1 (02 Feb 2021 06:45), Max: 97.9 (01 Feb 2021 21:22)  HR: 97 (02 Feb 2021 06:45) (94 - 102)  BP: 120/65 (02 Feb 2021 06:45) (120/65 - 130/65)  BP(mean): --  RR: 18 (02 Feb 2021 06:45) (16 - 18)  SpO2: 98% (02 Feb 2021 06:45) (93% - 98%)    GENERAL: NAD, well-groomed, well-developed  HEAD:  Atraumatic, Normocephalic  EYES: EOMI, PERRLA, conjunctiva and sclera clear  ENT: Moist mucous membranes,  NECK: Supple, No JVD, no bruits  CHEST/LUNG: Clear to percussion bilaterally; No rales, rhonchi, wheezing, or rubs  HEART: Regular rate and rhythm; No murmurs, rubs, or gallops PMI non displaced.  ABDOMEN: Soft, Nontender, Nondistended; Bowel sounds present  EXTREMITIES:  2+ Peripheral Pulses, No clubbing, cyanosis, or edema  SKIN: No rashes or lesions  NERVOUS SYSTEM:  Cranial Nerves II-XII intact      TELEMETRY:        ECG:    < from: 12 Lead ECG (01.27.21 @ 07:23) >  Diagnosis Line Atrial fibrillation  Cannot rule out Anterior infarct , age undetermined  Abnormal ECG    Confirmed by RUKHSANA HENAO ([id number]) on 1/27/2021 9:06:03 AM    < end of copied text >    ECHO:    < from: TTE Echo Complete w/ Contrast w/ Doppler (01.27.21 @ 11:08) >  Summary:   1. Technically difficult study.   2. Moderately enlarged left atrium.   3. Left ventricular ejection fraction, by visual estimation, is 45 to 50%. Grade III diastolic dysfunction.   4. Elevated mean left atrial pressure. (LAP >30 mm Hg)   5. Moderately enlarged right atrium.   6. The right ventricular size is normal. RV systolic function is mildly reduced.   7. Status-post mitral annular ring insertion. No mitral stenosis. No mitral regurgitation.   8. Mild aortic valve stenosis.   9. Moderate tricuspid regurgitation.  10. Estimated pulmonary artery systolic pressure is 41 mmHg - mild pulmonary hypertension.  11. Trivial pericardial effusion.    MD Lori, RPVI Electronically signed on 1/27/2021 at 2:08:10 PM      *** Final ***        ROHINI CANELA M.D., ATTENDING CARDIOLOGY  This document has been electronically signed. Jan 27 2021 11:08AM    < end of copied text >      LABS:                        9.9    7.48  )-----------( 370      ( 02 Feb 2021 05:40 )             31.8     02-02    x   |  x   |  x   ----------------------------<  x   x    |  x   |  0.98    Ca    8.8      02 Feb 2021 05:40  Mg     1.9     02-02    TPro  6.5  /  Alb  2.6<L>  /  TBili  3.4<H>  /  DBili  2.0<H>  /  AST  41<H>  /  ALT  44  /  AlkPhos  157<H>  02-02            I&O's Summary    01 Feb 2021 07:01  -  02 Feb 2021 07:00  --------------------------------------------------------  IN: 0 mL / OUT: 150 mL / NET: -150 mL      BNPSerum Pro-Brain Natriuretic Peptide: 8137 pg/mL (02-02 @ 05:40)      RADIOLOGY & ADDITIONAL STUDIES:    ECHO:

## 2021-02-02 NOTE — H&P ADULT - HISTORY OF PRESENT ILLNESS
75y with PMHx of paroxysmal Afib (on Xarelto), PAD (s/p I-kqiflam-ayybvhyvq bypass, R-femoral angioplasty with stenting, (on Plavix), HTN, HLD, T2IRDM s/p recent trauma to right thigh with stable hematoma, presented to [facility] 1/12/21 with SOB, found to have NSTEMI. 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 S/P C3/DUANE Clip/MV Repair and DUANE thrombectomy with Dr. Orellana on 1/19/21. Postop course complicated by hypoxic respiratory failure and KRISTIE now resolved, and hyponatremia, hypokalemia on daily repletion while on Torsemide, admitted to  for rehab and now with COVID 19 and tx to medical service for worsening dyspnea. Sat 94% on RA. started on Remdesivir already. Pt comfortable at the moment with occ SOB. +cough. Denied NVD or loss of taste or smell. denied CP.

## 2021-02-02 NOTE — H&P ADULT - NSHPPHYSICALEXAM_GEN_ALL_CORE
Vital Signs Last 24 Hrs  T(C): 36.3 (01 Feb 2021 23:42), Max: 37.2 (01 Feb 2021 07:56)  T(F): 97.3 (01 Feb 2021 23:42), Max: 98.9 (01 Feb 2021 07:56)  HR: 102 (01 Feb 2021 23:42) (93 - 102)  BP: 130/65 (01 Feb 2021 23:42) (112/57 - 130/65)  BP(mean): --  RR: 94 (01 Feb 2021 23:42) (16 - 94)  SpO2: 98% (01 Feb 2021 21:22) (96% - 98%)  Daily     Daily   CAPILLARY BLOOD GLUCOSE      POCT Blood Glucose.: 110 mg/dL (01 Feb 2021 21:21)    I&O's Summary      GENERAL: NAD  HEAD:  Normocephalic  EYES: EOMI, PERRLA, conjunctiva and sclera clear  ENMT: No tonsillar erythema, exudates, or enlargement; Moist mucous membranes, No lesions  NECK: Supple, No JVD, no bruit, normal thyroid  NERVOUS SYSTEM:  Alert & Oriented X3, Good concentration; grossly  Motor Strength 5/5 B/L upper and lower extremities; DTRs 2+ intact and symmetric  CHEST/LUNG: bl  rales at bases.  rhonchi, wheezing, or rubs  HEART: Regular rate and rhythm; No murmurs, rubs, or gallops  ABDOMEN: Soft, Nontender, Nondistended; Bowel sounds present  EXTREMITIES:  2+ Peripheral Pulses, No clubbing, cyanosis, + 1+ edema. L medial distal knee with some erythema but nontender at the vein harvest site.   LYMPH: No lymphadenopathy noted  SKIN: No rashes or lesions

## 2021-02-02 NOTE — H&P ADULT - NSHPLABSRESULTS_GEN_ALL_CORE
9.2    8.64  )-----------( 388      ( 01 Feb 2021 09:07 )             30.5       02-01    138  |  101  |  24<H>  ----------------------------<  153<H>  3.9   |  28  |  1.00    Ca    8.4      01 Feb 2021 09:07  Mg     1.9     01-31    TPro  6.5  /  Alb  2.5<L>  /  TBili  3.5<H>  /  DBili  2.0<H>  /  AST  38  /  ALT  49<H>  /  AlkPhos  175<H>  02-01         LIVER FUNCTIONS - ( 01 Feb 2021 09:07 )  Alb: 2.5 g/dL / Pro: 6.5 g/dL / ALK PHOS: 175 U/L / ALT: 49 U/L / AST: 38 U/L / GGT: x                       Serum Pro-Brain Natriuretic Peptide: 7583 pg/mL (01-31-21 @ 05:10)        CAPILLARY BLOOD GLUCOSE      POCT Blood Glucose.: 110 mg/dL (01 Feb 2021 21:21)  POCT Blood Glucose.: 152 mg/dL (01 Feb 2021 17:03)  POCT Blood Glucose.: 111 mg/dL (01 Feb 2021 11:43)  POCT Blood Glucose.: 154 mg/dL (01 Feb 2021 08:01)        rad< from: Xray Chest 1 View- PORTABLE-Urgent (Xray Chest 1 View- PORTABLE-Urgent .) (02.01.21 @ 12:34) >      INTERPRETATION:  AP chest on February 1, 2021 at 11:47 AM. Patient is short of breath.    Heart enlargement, sternotomy, prosthetic heart valve, and left atrial appendage clipping device again noted.    Bilateral congestive like infiltrates right greater than left again noted.    Chest is similar to January 29.    IMPRESSION: Unchanged chest as above.    < end of copied text >

## 2021-02-02 NOTE — H&P ADULT - NSHPREVIEWOFSYSTEMS_GEN_ALL_CORE
CONSTITUTIONAL: No fever, weight loss, or fatigue  EYES: No eye pain, visual disturbances, or discharge  ENMT:  No difficulty hearing, tinnitus, vertigo; No sinus or throat pain  NECK: No pain or stiffness  RESPIRATORY: + cough,  no wheezing, chills or hemoptysis; +shortness of breath  CARDIOVASCULAR: No chest pain, palpitations, dizziness, + leg swelling  GASTROINTESTINAL: No abdominal or epigastric pain. No nausea, vomiting, or hematemesis; No diarrhea or constipation. No melena or hematochezia.  GENITOURINARY: No dysuria, frequency, hematuria, or incontinence  NEUROLOGICAL: No headaches, memory loss, loss of strength, numbness, or tremors  SKIN: No itching, burning, rashes, or lesions   LYMPH NODES: No enlarged glands  ENDOCRINE: No heat or cold intolerance; No hair loss  MUSCULOSKELETAL: No joint pain or swelling; No muscle, back, or extremity pain  PSYCHIATRIC: No depression, anxiety, mood swings, or difficulty sleeping  HEME/LYMPH: No easy bruising, or bleeding gums  ALLERY AND IMMUNOLOGIC: No hives or eczema

## 2021-02-02 NOTE — PROGRESS NOTE ADULT - ASSESSMENT
75y with PMHx of paroxysmal Afib (on Xarelto), PAD (s/p Y-lsquvjn-xfjvmsjnl bypass, R-femoral angioplasty with stenting, (on Plavix), HTN, HLD, type 2 diabetes (HA1c on 7.8 on Metformin and Tresiba as an outpatient), and recent trauma to right thigh with stable hematoma, presented to [facility] 1/12/21 with SOB, found to have NSTEMI. 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 S/P C3/DUANE Clip/MV Repair and DUANE thrombectomy with Dr. Orellana on 1/19/21. Postop course complicated by hypoxic respiratory failure and KRISTIE now resolved, and hypokalemia on daily repletion while on Torsemide, admitted to  for rehab on jan 28 and was found with COVID 19. transferred to medicine on 2/1 for worsening SOB/SHF/PNA    > Acute on chronic combined systolic and Diastolic CHF  >Dyspnea due to COVID 19 PNA and acute on chronic CHF  - SpO2 currently 94% at rest on RA, 93% on RA with minimal exertion (sitting) . placed on O2 for patients comfort  - CXR with worsening central infiltrate 1/29. repeat CXR 2/1 unchanged  - pro- BNP in 7000s  - cont Remdisivir day 3 [ started at [facility] on Jan 31]  - start Decadron if pt requires oxygen  - monitor inflammatory markers  - off torsemide and on IV lasix 40mg daily  - I and Os  - daily margot  - cardiology eval.  requesting MD to contact patient's primary CT surgeon at [facility]. informed /Johnny    # Recent NSTEMI S/P CABG, MV Repair and DUANE thrombectomy 1/19/21  # Paroxysmal Afib   - ASA, Lipitor 40 mg PO QHS   - Continue Eliquis 2.5mg BID (reduced dose due to large hematoma in right thigh from trauma prior to surgery)  - Continue Amiodarone and Lopressor  - cardiology on board    Transaminitis   - likely due to Covid  -Total bilirubin: 3.5, direct bili 2.0  -pt denies abd pain, n/v  -check US RUQ if not improving    DM2  - HbA1c 7.8 on Metformin and Tresiba as outpatient  - Continue Lantus, pre meal insulin, and ISS,    #Mood/anxiety  - seen by neuropsych in rehab  - xanax PRN    Functional and gait instability:  - OT/PT once feeling better       # GI ppx: Protonix    # DVT ppx- Eliquis    # Dispo: pending clinical course    Updated  Derrick Roblero and cardiology 75y with PMHx of paroxysmal Afib (on Xarelto), PAD (s/p B-pycjiyf-unvokkycr bypass, R-femoral angioplasty with stenting, (on Plavix), HTN, HLD, type 2 diabetes (HA1c on 7.8 on Metformin and Tresiba as an outpatient), and recent trauma to right thigh with stable hematoma, presented to [facility] 1/12/21 with SOB, found to have NSTEMI. 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 S/P C3/DUANE Clip/MV Repair and DUANE thrombectomy with Dr. Orellana on 1/19/21. Postop course complicated by hypoxic respiratory failure and KRISTIE now resolved, and hypokalemia on daily repletion while on Torsemide, admitted to  for rehab on jan 28 and was found with COVID 19. transferred to medicine on 2/1 for worsening SOB/SHF/PNA    > Acute on chronic combined systolic and Diastolic CHF  >Dyspnea due to COVID 19 PNA and acute on chronic CHF  - SpO2 currently 94% at rest on RA, 93% on RA with minimal exertion (sitting) . placed on O2 for patients comfort  - CXR with worsening central infiltrate 1/29. repeat CXR 2/1 unchanged  - pro- BNP in 7000s  - cont Remdisivir day 3 [ started at [facility] on Jan 31]  - start Decadron if pt requires oxygen  - monitor inflammatory markers  - off torsemide and on IV lasix 40mg daily with KCL  - I and Os  - daily margot  - cardiology eval.  requesting MD to contact patient's primary CT surgeon at [facility]. informed Pam    # Recent NSTEMI S/P CABG, MV Repair and DUANE thrombectomy 1/19/21  # Chronic Afib   # Mild to mod AS  - ASA, Lipitor 40 mg PO QHS   - Continue Eliquis 2.5mg BID (reduced dose due to large hematoma in right thigh from trauma prior to surgery)  - Continue Amiodarone and Lopressor  - cardiology on board    Transaminitis   - likely due to Covid  -Total bilirubin: 3.5, direct bili 2.0  -pt denies abd pain, n/v  -check US RUQ if not improving    DM2  - HbA1c 7.8 on Metformin and Tresiba as outpatient  - Continue Lantus, pre meal insulin, and ISS,    #Mood/anxiety  - seen by neuropsych in rehab  - xanax PRN    Functional and gait instability:  - OT/PT once feeling better       # GI ppx: Protonix    # DVT ppx- Eliquis    # Dispo: pending clinical course    Updated  Derrick Roblero and cardiology 75y with PMHx of paroxysmal Afib (on Xarelto), PAD (s/p O-mhvactf-xknnvjuhq bypass, R-femoral angioplasty with stenting, (on Plavix), HTN, HLD, type 2 diabetes (HA1c on 7.8 on Metformin and Tresiba as an outpatient), and recent trauma to right thigh with stable hematoma, presented to [facility] 1/12/21 with SOB, found to have NSTEMI. 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 S/P C3/DUANE Clip/MV Repair and DUANE thrombectomy with Dr. Orellana on 1/19/21. Postop course complicated by hypoxic respiratory failure and KRISTIE now resolved, and hypokalemia on daily repletion while on Torsemide, admitted to  for rehab on jan 28 and was found with COVID 19. transferred to medicine on 2/1 for worsening SOB/SHF/PNA    > Acute on chronic combined systolic and Diastolic CHF  >Dyspnea due to COVID 19 PNA and acute on chronic CHF  - SpO2 currently 94% at rest on RA, 93% on RA with minimal exertion (sitting) . placed on O2 for patients comfort  - CXR with worsening central infiltrate 1/29. repeat CXR 2/1 unchanged  - pro- BNP in 7000s  - cont Remdisivir day 3 [ started at [facility] on Jan 31]  - start Decadron if pt requires oxygen  - monitor inflammatory markers  - off torsemide and on IV lasix 40mg daily with KCL  - I and Os  - daily margot  - cardiology eval.  requesting MD to contact patient's primary CT surgeon at [facility]. informed Pam    # Recent NSTEMI S/P CABG, MV Repair and DUANE thrombectomy 1/19/21  # Chronic Afib   # Mild to mod AS  - ASA, Lipitor 40 mg PO QHS   - Continue Eliquis 2.5mg BID (reduced dose due to large hematoma in right thigh from trauma prior to surgery)  - Continue Amiodarone and Lopressor  - cardiology on board    Transaminitis   - likely due to Covid  -Total bilirubin: 3.5, direct bili 2.0  -pt denies abd pain, n/v  -check US RUQ if not improving    DM2  - HbA1c 7.8 on Metformin and Tresiba as outpatient  - Continue Lantus, pre meal insulin, and ISS,    #Mood/anxiety  - seen by neuropsych in rehab  - xanax PRN    # Hematoma Right thigh  - monitor  - resume Eliquis to full dose once resolved/discuss with cardiology    Functional and gait instability:  - OT/PT once feeling better       # GI ppx: Protonix    # DVT ppx- Eliquis    # Dispo: pending clinical course    Updated  Derrick Roblero and cardiology 75y with PMHx of paroxysmal Afib (on Xarelto), PAD (s/p V-otvyfzo-pdxkuyhar bypass, R-femoral angioplasty with stenting, (on Plavix), HTN, HLD, type 2 diabetes (HA1c on 7.8 on Metformin and Tresiba as an outpatient), and recent trauma to right thigh with stable hematoma, presented to [facility] 1/12/21 with SOB, found to have NSTEMI. 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 S/P C3/DUANE Clip/MV Repair and DUANE thrombectomy with Dr. Orellana on 1/19/21. Postop course complicated by hypoxic respiratory failure and KRISTIE now resolved, and hypokalemia on daily repletion while on Torsemide, admitted to  for rehab on jan 28 and was found with COVID 19. transferred to medicine on 2/1 for worsening SOB/SHF/PNA    > Acute on chronic combined systolic and Diastolic CHF  >Dyspnea due to COVID 19 PNA and acute on chronic CHF  - SpO2 currently 94% at rest on RA, 93% on RA with minimal exertion (sitting) . placed on O2 for patients comfort  - CXR with worsening central infiltrate 1/29. repeat CXR 2/1 unchanged  - pro- BNP in 7000s  - cont Remdisivir day 3 [ started at [facility] on Jan 31]  - start Decadron if pt requires oxygen  - monitor inflammatory markers  - off torsemide and on IV lasix 40mg daily with KCL  - I and Os  - daily margot  - cardiology eval.  requesting MD to contact patient's primary CT surgeon at [facility]. informed Pam    # Recent NSTEMI S/P CABG, MV Repair and DUANE thrombectomy 1/19/21  # Chronic Afib   # Mild to mod AS  - ASA, Lipitor 40 mg PO QHS   - Continue Eliquis 2.5mg BID (reduced dose due to large hematoma in right thigh from trauma prior to surgery)  - Continue Amiodarone and Lopressor  - cardiology on board    Transaminitis   - likely due to Covid  -Total bilirubin: 3.5, direct bili 2.0  -pt denies abd pain, n/v  -check US RUQ if not improving    DM2  - HbA1c 7.8 on Metformin and Tresiba as outpatient  - Continue Lantus, pre meal insulin, and ISS,    Anemia  - monitor    #Mood/anxiety  - seen by neuropsych in rehab  - xanax PRN    # Hematoma Right thigh  - monitor  - resume Eliquis to full dose once resolved/discuss with cardiology    Functional and gait instability:  - OT/PT once feeling better       # GI ppx: Protonix    # DVT ppx- Eliquis    # Dispo: pending clinical course    Updated  Derrick Roblero and cardiology 75y with PMHx of paroxysmal Afib (on Xarelto), PAD (s/p P-mkcfdkp-xjpevdxcg bypass, R-femoral angioplasty with stenting, (on Plavix), HTN, HLD, type 2 diabetes (HA1c on 7.8 on Metformin and Tresiba as an outpatient), and recent trauma to right thigh with stable hematoma, presented to [facility] 1/12/21 with SOB, found to have NSTEMI. 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 S/P C3/DUANE Clip/MV Repair and DUANE thrombectomy with Dr. Orellana on 1/19/21. Postop course complicated by hypoxic respiratory failure and KRISTIE now resolved, and hypokalemia on daily repletion while on Torsemide, admitted to  for rehab on jan 28 and was found with COVID 19. transferred to medicine on 2/1 for worsening SOB/SHF/PNA    > Acute on chronic combined systolic and Diastolic CHF  >Dyspnea due to COVID 19 PNA and acute on chronic CHF  - SpO2 currently 94% at rest on RA, 93% on RA with minimal exertion (sitting) . placed on O2 for patients comfort  - CXR with worsening central infiltrate 1/29. repeat CXR 2/1 unchanged  - pro- BNP in 7000s  - cont Remdisivir day 3 [ started at [facility] on Jan 31]  - start Decadron if pt requires oxygen  - monitor inflammatory markers  - off torsemide and on IV lasix 40mg daily with KCL  - I and Os  - daily margot  - cardiology eval.  requesting MD to contact patient's primary CT surgeon at [facility]. informed Pam    # Recent NSTEMI S/P CABG, MV Repair and DUANE thrombectomy 1/19/21  # Chronic Afib   # Mild to mod AS  - ASA, Lipitor 40 mg PO QHS   - Continue Eliquis 2.5mg BID (reduced dose due to large hematoma in right thigh from trauma prior to surgery)  - Continue Amiodarone and Lopressor  - cardiology on board    Transaminitis   - likely due to Covid  -Total bilirubin: 3.5, direct bili 2.0  -pt denies abd pain, n/v  -check US RUQ if not improving    DM2  - HbA1c 7.8 on Metformin and Tresiba as outpatient  - Continue Lantus, pre meal insulin, and ISS,    Anemia  - monitor    #Mood/anxiety  - seen by neuropsych in rehab  - on valium at [facility] as per patient request    # Hematoma Right thigh  - monitor  - resume Eliquis to full dose once resolved/discuss with cardiology    Functional and gait instability:  - OT/PT once feeling better       # GI ppx: Protonix    # DVT ppx- Eliquis    # Dispo: pending clinical course    Updated  Derrick Roblero and cardiology

## 2021-02-02 NOTE — PROGRESS NOTE ADULT - SUBJECTIVE AND OBJECTIVE BOX
Medicine Progress Note    Patient is a 75y old  Female who presents with a chief complaint of COVID 19 pna (02 Feb 2021 01:11)      SUBJECTIVE / OVERNIGHT EVENTS:    ADDITIONAL REVIEW OF SYSTEMS:    MEDICATIONS  (STANDING):  aMIOdarone    Tablet 200 milliGRAM(s) Oral daily  apixaban 2.5 milliGRAM(s) Oral every 12 hours  ascorbic acid 500 milliGRAM(s) Oral daily  aspirin enteric coated 81 milliGRAM(s) Oral daily  atorvastatin 40 milliGRAM(s) Oral at bedtime  dextrose 40% Gel 15 Gram(s) Oral once  dextrose 5%. 1000 milliLiter(s) (50 mL/Hr) IV Continuous <Continuous>  dextrose 5%. 1000 milliLiter(s) (100 mL/Hr) IV Continuous <Continuous>  dextrose 50% Injectable 25 Gram(s) IV Push once  dextrose 50% Injectable 12.5 Gram(s) IV Push once  dextrose 50% Injectable 25 Gram(s) IV Push once  diazepam    Tablet 2 milliGRAM(s) Oral at bedtime  furosemide   Injectable 40 milliGRAM(s) IV Push daily  gabapentin 300 milliGRAM(s) Oral three times a day  glucagon  Injectable 1 milliGRAM(s) IntraMuscular once  influenza   Vaccine 0.5 milliLiter(s) IntraMuscular once  insulin glargine Injectable (LANTUS) 26 Unit(s) SubCutaneous at bedtime  insulin lispro (ADMELOG) corrective regimen sliding scale   SubCutaneous three times a day before meals  insulin lispro (ADMELOG) corrective regimen sliding scale   SubCutaneous at bedtime  insulin lispro Injectable (ADMELOG) 9 Unit(s) SubCutaneous before breakfast  insulin lispro Injectable (ADMELOG) 9 Unit(s) SubCutaneous before lunch  insulin lispro Injectable (ADMELOG) 9 Unit(s) SubCutaneous before dinner  melatonin 6 milliGRAM(s) Oral at bedtime  metoprolol tartrate 12.5 milliGRAM(s) Oral two times a day  multivitamin 1 Tablet(s) Oral daily  pantoprazole    Tablet 40 milliGRAM(s) Oral before breakfast  potassium chloride   Powder 40 milliEquivalent(s) Oral daily  remdesivir  IVPB 100 milliGRAM(s) IV Intermittent every 24 hours    MEDICATIONS  (PRN):  acetaminophen   Tablet .. 650 milliGRAM(s) Oral every 8 hours PRN Temp greater or equal to 38C (100.4F), Mild Pain (1 - 3)  guaiFENesin   Syrup  (Sugar-Free) 100 milliGRAM(s) Oral every 6 hours PRN Cough  oxyCODONE    IR 10 milliGRAM(s) Oral every 4 hours PRN Severe Pain (7 - 10)  oxyCODONE    IR 5 milliGRAM(s) Oral every 4 hours PRN Moderate Pain (4 - 6)  polyethylene glycol 3350 17 Gram(s) Oral daily PRN Constipation  senna 2 Tablet(s) Oral at bedtime PRN Constipation    CAPILLARY BLOOD GLUCOSE      POCT Blood Glucose.: 237 mg/dL (02 Feb 2021 13:27)  POCT Blood Glucose.: 105 mg/dL (02 Feb 2021 07:58)  POCT Blood Glucose.: 110 mg/dL (01 Feb 2021 21:21)  POCT Blood Glucose.: 152 mg/dL (01 Feb 2021 17:03)    I&O's Summary    01 Feb 2021 07:01  -  02 Feb 2021 07:00  --------------------------------------------------------  IN: 0 mL / OUT: 150 mL / NET: -150 mL        PHYSICAL EXAM:  Vital Signs Last 24 Hrs  T(C): 36.2 (02 Feb 2021 06:45), Max: 36.6 (01 Feb 2021 21:22)  T(F): 97.1 (02 Feb 2021 06:45), Max: 97.9 (01 Feb 2021 21:22)  HR: 97 (02 Feb 2021 06:45) (94 - 102)  BP: 120/65 (02 Feb 2021 06:45) (120/65 - 130/65)  BP(mean): --  RR: 18 (02 Feb 2021 06:45) (16 - 18)  SpO2: 98% (02 Feb 2021 06:45) (93% - 98%)  CONSTITUTIONAL: NAD, well-developed, well-groomed  ENMT: Moist oral mucosa, no pharyngeal injection or exudates; normal dentition  RESPIRATORY: Normal respiratory effort; lungs are clear to auscultation bilaterally  CARDIOVASCULAR: Regular rate and rhythm, normal S1 and S2, no murmur/rub/gallop; No lower extremity edema; Peripheral pulses are 2+ bilaterally  ABDOMEN: Nontender to palpation, normoactive bowel sounds, no rebound/guarding; No hepatosplenomegaly  PSYCH: A+O to person, place, and time; affect appropriate  NEUROLOGY: CN 2-12 are intact and symmetric; no gross sensory deficits   SKIN: No rashes; no palpable lesions    LABS:                        9.9    7.48  )-----------( 370      ( 02 Feb 2021 05:40 )             31.8     02-02    x   |  x   |  x   ----------------------------<  x   x    |  x   |  0.98    Ca    8.8      02 Feb 2021 05:40  Mg     1.9     02-02    TPro  6.5  /  Alb  2.6<L>  /  TBili  3.4<H>  /  DBili  2.0<H>  /  AST  41<H>  /  ALT  44  /  AlkPhos  157<H>  02-02              COVID-19 PCR: Detected (31 Jan 2021 17:00)  COVID-19 PCR: Detected (31 Jan 2021 06:50)  COVID-19 PCR: NotDetec (28 Jan 2021 22:55)  COVID-19 PCR: NotDetec (27 Jan 2021 10:54)  COVID-19 PCR: NotDetec (18 Jan 2021 11:33)  SARS-CoV-2: NotDetec (12 Jan 2021 00:43)      RADIOLOGY & ADDITIONAL TESTS:  Imaging from Last 24 Hours:    Electrocardiogram/QTc Interval:    COORDINATION OF CARE:  Care Discussed with Consultants/Other Providers:   Medicine Progress Note    Patient is a 75y old  Female who presents with a chief complaint of COVID 19 pna (02 Feb 2021 01:11)      SUBJECTIVE / OVERNIGHT EVENTS:  seen and examined  Chart reviewed  no overnight events    SOB, anxious  no other complaints    ADDITIONAL REVIEW OF SYSTEMS:  no fever/chills/CP/palpitation/dizziness/ abd pain/nausea/vomiting/diarrhea/constipation/headaches/leg or calf pain. all other ROS neg    MEDICATIONS  (STANDING):  aMIOdarone    Tablet 200 milliGRAM(s) Oral daily  apixaban 2.5 milliGRAM(s) Oral every 12 hours  ascorbic acid 500 milliGRAM(s) Oral daily  aspirin enteric coated 81 milliGRAM(s) Oral daily  atorvastatin 40 milliGRAM(s) Oral at bedtime  dextrose 40% Gel 15 Gram(s) Oral once  dextrose 5%. 1000 milliLiter(s) (50 mL/Hr) IV Continuous <Continuous>  dextrose 5%. 1000 milliLiter(s) (100 mL/Hr) IV Continuous <Continuous>  dextrose 50% Injectable 25 Gram(s) IV Push once  dextrose 50% Injectable 12.5 Gram(s) IV Push once  dextrose 50% Injectable 25 Gram(s) IV Push once  diazepam    Tablet 2 milliGRAM(s) Oral at bedtime  furosemide   Injectable 40 milliGRAM(s) IV Push daily  gabapentin 300 milliGRAM(s) Oral three times a day  glucagon  Injectable 1 milliGRAM(s) IntraMuscular once  influenza   Vaccine 0.5 milliLiter(s) IntraMuscular once  insulin glargine Injectable (LANTUS) 26 Unit(s) SubCutaneous at bedtime  insulin lispro (ADMELOG) corrective regimen sliding scale   SubCutaneous three times a day before meals  insulin lispro (ADMELOG) corrective regimen sliding scale   SubCutaneous at bedtime  insulin lispro Injectable (ADMELOG) 9 Unit(s) SubCutaneous before breakfast  insulin lispro Injectable (ADMELOG) 9 Unit(s) SubCutaneous before lunch  insulin lispro Injectable (ADMELOG) 9 Unit(s) SubCutaneous before dinner  melatonin 6 milliGRAM(s) Oral at bedtime  metoprolol tartrate 12.5 milliGRAM(s) Oral two times a day  multivitamin 1 Tablet(s) Oral daily  pantoprazole    Tablet 40 milliGRAM(s) Oral before breakfast  potassium chloride   Powder 40 milliEquivalent(s) Oral daily  remdesivir  IVPB 100 milliGRAM(s) IV Intermittent every 24 hours    MEDICATIONS  (PRN):  acetaminophen   Tablet .. 650 milliGRAM(s) Oral every 8 hours PRN Temp greater or equal to 38C (100.4F), Mild Pain (1 - 3)  guaiFENesin   Syrup  (Sugar-Free) 100 milliGRAM(s) Oral every 6 hours PRN Cough  oxyCODONE    IR 10 milliGRAM(s) Oral every 4 hours PRN Severe Pain (7 - 10)  oxyCODONE    IR 5 milliGRAM(s) Oral every 4 hours PRN Moderate Pain (4 - 6)  polyethylene glycol 3350 17 Gram(s) Oral daily PRN Constipation  senna 2 Tablet(s) Oral at bedtime PRN Constipation    CAPILLARY BLOOD GLUCOSE      POCT Blood Glucose.: 237 mg/dL (02 Feb 2021 13:27)  POCT Blood Glucose.: 105 mg/dL (02 Feb 2021 07:58)  POCT Blood Glucose.: 110 mg/dL (01 Feb 2021 21:21)  POCT Blood Glucose.: 152 mg/dL (01 Feb 2021 17:03)    I&O's Summary    01 Feb 2021 07:01  -  02 Feb 2021 07:00  --------------------------------------------------------  IN: 0 mL / OUT: 150 mL / NET: -150 mL        PHYSICAL EXAM:  Vital Signs Last 24 Hrs  T(C): 36.2 (02 Feb 2021 06:45), Max: 36.6 (01 Feb 2021 21:22)  T(F): 97.1 (02 Feb 2021 06:45), Max: 97.9 (01 Feb 2021 21:22)  HR: 97 (02 Feb 2021 06:45) (94 - 102)  BP: 120/65 (02 Feb 2021 06:45) (120/65 - 130/65)  BP(mean): --  RR: 18 (02 Feb 2021 06:45) (16 - 18)  SpO2: 98% (02 Feb 2021 06:45) (93% - 98%)    CONSTITUTIONAL: not in distress  ENMT: Moist oral mucosa,   RESPIRATORY: Normal respiratory effort; b/l rales and coarse breath sound.   CARDIOVASCULAR: Regular rate and rhythm, normal S1 and S2, no murmur/rub/gallop;   ABDOMEN: Nontender to palpation, normoactive bowel sounds, no rebound/guarding;  Ext: b/l lower extremity edema; no leg or calf TP  PSYCH: A+O to person, place, and time; affect appropriate  NEUROLOGY: grossly intact  SKIN: No rashes;     LABS:                        9.9    7.48  )-----------( 370      ( 02 Feb 2021 05:40 )             31.8     02-02    x   |  x   |  x   ----------------------------<  x   x    |  x   |  0.98    Ca    8.8      02 Feb 2021 05:40  Mg     1.9     02-02    TPro  6.5  /  Alb  2.6<L>  /  TBili  3.4<H>  /  DBili  2.0<H>  /  AST  41<H>  /  ALT  44  /  AlkPhos  157<H>  02-02              COVID-19 PCR: Detected (31 Jan 2021 17:00)  COVID-19 PCR: Detected (31 Jan 2021 06:50)  COVID-19 PCR: NotDetec (28 Jan 2021 22:55)  COVID-19 PCR: NotDetec (27 Jan 2021 10:54)  COVID-19 PCR: NotDetec (18 Jan 2021 11:33)  SARS-CoV-2: NotDetec (12 Jan 2021 00:43)      RADIOLOGY & ADDITIONAL TESTS:  Imaging from Last 24 Hours:    Electrocardiogram/QTc Interval:    COORDINATION OF CARE:  Care Discussed with Consultants/Other Providers:

## 2021-02-03 LAB
ALBUMIN SERPL ELPH-MCNC: 2.6 G/DL — LOW (ref 3.3–5)
ALBUMIN SERPL ELPH-MCNC: 2.6 G/DL — LOW (ref 3.3–5)
ALP SERPL-CCNC: 149 U/L — HIGH (ref 40–120)
ALP SERPL-CCNC: 152 U/L — HIGH (ref 40–120)
ALT FLD-CCNC: 41 U/L — SIGNIFICANT CHANGE UP (ref 10–45)
ALT FLD-CCNC: 42 U/L — SIGNIFICANT CHANGE UP (ref 10–45)
ANION GAP SERPL CALC-SCNC: 10 MMOL/L — SIGNIFICANT CHANGE UP (ref 5–17)
ANION GAP SERPL CALC-SCNC: 13 MMOL/L — SIGNIFICANT CHANGE UP (ref 5–17)
AST SERPL-CCNC: 41 U/L — HIGH (ref 10–40)
AST SERPL-CCNC: 43 U/L — HIGH (ref 10–40)
BILIRUB DIRECT SERPL-MCNC: 1.8 MG/DL — HIGH (ref 0–0.2)
BILIRUB INDIRECT FLD-MCNC: 1.6 MG/DL — HIGH (ref 0.2–1)
BILIRUB SERPL-MCNC: 3.4 MG/DL — HIGH (ref 0.2–1.2)
BILIRUB SERPL-MCNC: 3.4 MG/DL — HIGH (ref 0.2–1.2)
BUN SERPL-MCNC: 15 MG/DL — SIGNIFICANT CHANGE UP (ref 7–23)
BUN SERPL-MCNC: 16 MG/DL — SIGNIFICANT CHANGE UP (ref 7–23)
CALCIUM SERPL-MCNC: 8.5 MG/DL — SIGNIFICANT CHANGE UP (ref 8.4–10.5)
CALCIUM SERPL-MCNC: 8.7 MG/DL — SIGNIFICANT CHANGE UP (ref 8.4–10.5)
CHLORIDE SERPL-SCNC: 101 MMOL/L — SIGNIFICANT CHANGE UP (ref 96–108)
CHLORIDE SERPL-SCNC: 101 MMOL/L — SIGNIFICANT CHANGE UP (ref 96–108)
CK MB BLD-MCNC: 3.5 % — SIGNIFICANT CHANGE UP (ref 0–3.5)
CK MB CFR SERPL CALC: 1.9 NG/ML — SIGNIFICANT CHANGE UP (ref 0.5–10)
CK SERPL-CCNC: 55 U/L — SIGNIFICANT CHANGE UP (ref 25–170)
CO2 SERPL-SCNC: 25 MMOL/L — SIGNIFICANT CHANGE UP (ref 22–31)
CO2 SERPL-SCNC: 28 MMOL/L — SIGNIFICANT CHANGE UP (ref 22–31)
CREAT SERPL-MCNC: 0.84 MG/DL — SIGNIFICANT CHANGE UP (ref 0.5–1.3)
CREAT SERPL-MCNC: 0.87 MG/DL — SIGNIFICANT CHANGE UP (ref 0.5–1.3)
CREAT SERPL-MCNC: 0.89 MG/DL — SIGNIFICANT CHANGE UP (ref 0.5–1.3)
GLUCOSE BLDC GLUCOMTR-MCNC: 143 MG/DL — HIGH (ref 70–99)
GLUCOSE BLDC GLUCOMTR-MCNC: 148 MG/DL — HIGH (ref 70–99)
GLUCOSE BLDC GLUCOMTR-MCNC: 155 MG/DL — HIGH (ref 70–99)
GLUCOSE BLDC GLUCOMTR-MCNC: 180 MG/DL — HIGH (ref 70–99)
GLUCOSE SERPL-MCNC: 153 MG/DL — HIGH (ref 70–99)
GLUCOSE SERPL-MCNC: 158 MG/DL — HIGH (ref 70–99)
MAGNESIUM SERPL-MCNC: 1.7 MG/DL — SIGNIFICANT CHANGE UP (ref 1.6–2.6)
NT-PROBNP SERPL-SCNC: 6216 PG/ML — HIGH (ref 0–300)
POTASSIUM SERPL-MCNC: 3.6 MMOL/L — SIGNIFICANT CHANGE UP (ref 3.5–5.3)
POTASSIUM SERPL-MCNC: 3.9 MMOL/L — SIGNIFICANT CHANGE UP (ref 3.5–5.3)
POTASSIUM SERPL-SCNC: 3.6 MMOL/L — SIGNIFICANT CHANGE UP (ref 3.5–5.3)
POTASSIUM SERPL-SCNC: 3.9 MMOL/L — SIGNIFICANT CHANGE UP (ref 3.5–5.3)
PROT SERPL-MCNC: 6.7 G/DL — SIGNIFICANT CHANGE UP (ref 6–8.3)
PROT SERPL-MCNC: 6.8 G/DL — SIGNIFICANT CHANGE UP (ref 6–8.3)
SODIUM SERPL-SCNC: 139 MMOL/L — SIGNIFICANT CHANGE UP (ref 135–145)
SODIUM SERPL-SCNC: 139 MMOL/L — SIGNIFICANT CHANGE UP (ref 135–145)
TROPONIN I SERPL-MCNC: 0.15 NG/ML — HIGH (ref 0.02–0.06)

## 2021-02-03 PROCEDURE — 93010 ELECTROCARDIOGRAM REPORT: CPT

## 2021-02-03 PROCEDURE — 99233 SBSQ HOSP IP/OBS HIGH 50: CPT

## 2021-02-03 RX ORDER — ALPRAZOLAM 0.25 MG
0.5 TABLET ORAL THREE TIMES A DAY
Refills: 0 | Status: DISCONTINUED | OUTPATIENT
Start: 2021-02-03 | End: 2021-02-05

## 2021-02-03 RX ORDER — METOPROLOL TARTRATE 50 MG
25 TABLET ORAL EVERY 8 HOURS
Refills: 0 | Status: DISCONTINUED | OUTPATIENT
Start: 2021-02-03 | End: 2021-02-07

## 2021-02-03 RX ORDER — ALPRAZOLAM 0.25 MG
0.25 TABLET ORAL ONCE
Refills: 0 | Status: DISCONTINUED | OUTPATIENT
Start: 2021-02-03 | End: 2021-02-03

## 2021-02-03 RX ORDER — POTASSIUM CHLORIDE 20 MEQ
40 PACKET (EA) ORAL DAILY
Refills: 0 | Status: DISCONTINUED | OUTPATIENT
Start: 2021-02-04 | End: 2021-02-05

## 2021-02-03 RX ORDER — FUROSEMIDE 40 MG
40 TABLET ORAL
Refills: 0 | Status: DISCONTINUED | OUTPATIENT
Start: 2021-02-03 | End: 2021-02-06

## 2021-02-03 RX ORDER — MAGNESIUM SULFATE 500 MG/ML
1 VIAL (ML) INJECTION ONCE
Refills: 0 | Status: COMPLETED | OUTPATIENT
Start: 2021-02-03 | End: 2021-02-03

## 2021-02-03 RX ADMIN — Medication 25 MILLIGRAM(S): at 23:07

## 2021-02-03 RX ADMIN — Medication 0.25 MILLIGRAM(S): at 10:04

## 2021-02-03 RX ADMIN — PANTOPRAZOLE SODIUM 40 MILLIGRAM(S): 20 TABLET, DELAYED RELEASE ORAL at 06:35

## 2021-02-03 RX ADMIN — Medication 500 MILLIGRAM(S): at 12:38

## 2021-02-03 RX ADMIN — Medication 81 MILLIGRAM(S): at 12:39

## 2021-02-03 RX ADMIN — APIXABAN 2.5 MILLIGRAM(S): 2.5 TABLET, FILM COATED ORAL at 06:35

## 2021-02-03 RX ADMIN — APIXABAN 2.5 MILLIGRAM(S): 2.5 TABLET, FILM COATED ORAL at 17:33

## 2021-02-03 RX ADMIN — Medication 100 GRAM(S): at 18:20

## 2021-02-03 RX ADMIN — Medication 6 MILLIGRAM(S): at 23:08

## 2021-02-03 RX ADMIN — Medication 0.5 MILLIGRAM(S): at 17:33

## 2021-02-03 RX ADMIN — Medication 40 MILLIGRAM(S): at 20:45

## 2021-02-03 RX ADMIN — Medication 1 TABLET(S): at 12:38

## 2021-02-03 RX ADMIN — INSULIN GLARGINE 26 UNIT(S): 100 INJECTION, SOLUTION SUBCUTANEOUS at 23:07

## 2021-02-03 RX ADMIN — Medication 40 MILLIGRAM(S): at 06:35

## 2021-02-03 RX ADMIN — REMDESIVIR 500 MILLIGRAM(S): 5 INJECTION INTRAVENOUS at 23:08

## 2021-02-03 RX ADMIN — Medication 12.5 MILLIGRAM(S): at 06:35

## 2021-02-03 RX ADMIN — ATORVASTATIN CALCIUM 40 MILLIGRAM(S): 80 TABLET, FILM COATED ORAL at 23:06

## 2021-02-03 RX ADMIN — Medication 40 MILLIEQUIVALENT(S): at 12:38

## 2021-02-03 RX ADMIN — AMIODARONE HYDROCHLORIDE 200 MILLIGRAM(S): 400 TABLET ORAL at 06:35

## 2021-02-03 RX ADMIN — Medication 1: at 08:17

## 2021-02-03 NOTE — PROGRESS NOTE ADULT - SUBJECTIVE AND OBJECTIVE BOX
SUBJ:  Patient is a 75y old  Female who presents with a chief complaint of COVID 19 pna (03 Feb 2021 11:10)  asked by Medicine to re evaluate patient with dyspnea  chart is reviewed, patient examined  case discussed with Dr. Turner  she is in bed complaining of being short of breath , but is not tachypneic         PAST MEDICAL & SURGICAL HISTORY:  PAD (peripheral artery disease)    Paroxysmal atrial fibrillation    Hypercholesterolemia    Diabetes    Hypertension    S/P peripheral artery angioplasty with stent placement    S/P femoral-popliteal bypass surgery    H/O hernia repair    H/O abdominal hysterectomy        MEDICATIONS  (STANDING):  aMIOdarone    Tablet 200 milliGRAM(s) Oral daily  apixaban 2.5 milliGRAM(s) Oral every 12 hours  ascorbic acid 500 milliGRAM(s) Oral daily  aspirin enteric coated 81 milliGRAM(s) Oral daily  atorvastatin 40 milliGRAM(s) Oral at bedtime  dextrose 40% Gel 15 Gram(s) Oral once  dextrose 5%. 1000 milliLiter(s) (50 mL/Hr) IV Continuous <Continuous>  dextrose 5%. 1000 milliLiter(s) (100 mL/Hr) IV Continuous <Continuous>  dextrose 50% Injectable 25 Gram(s) IV Push once  dextrose 50% Injectable 12.5 Gram(s) IV Push once  dextrose 50% Injectable 25 Gram(s) IV Push once  furosemide   Injectable 40 milliGRAM(s) IV Push daily  glucagon  Injectable 1 milliGRAM(s) IntraMuscular once  influenza   Vaccine 0.5 milliLiter(s) IntraMuscular once  insulin glargine Injectable (LANTUS) 26 Unit(s) SubCutaneous at bedtime  insulin lispro (ADMELOG) corrective regimen sliding scale   SubCutaneous three times a day before meals  insulin lispro (ADMELOG) corrective regimen sliding scale   SubCutaneous at bedtime  melatonin 6 milliGRAM(s) Oral at bedtime  metoprolol tartrate 12.5 milliGRAM(s) Oral two times a day  multivitamin 1 Tablet(s) Oral daily  pantoprazole    Tablet 40 milliGRAM(s) Oral before breakfast  remdesivir  IVPB 100 milliGRAM(s) IV Intermittent every 24 hours    MEDICATIONS  (PRN):  acetaminophen   Tablet .. 650 milliGRAM(s) Oral every 8 hours PRN Temp greater or equal to 38C (100.4F), Mild Pain (1 - 3)  ALPRAZolam 0.5 milliGRAM(s) Oral three times a day PRN anxiety  guaiFENesin   Syrup  (Sugar-Free) 100 milliGRAM(s) Oral every 6 hours PRN Cough  oxyCODONE    IR 10 milliGRAM(s) Oral every 4 hours PRN Severe Pain (7 - 10)  oxyCODONE    IR 5 milliGRAM(s) Oral every 4 hours PRN Moderate Pain (4 - 6)  polyethylene glycol 3350 17 Gram(s) Oral daily PRN Constipation  senna 2 Tablet(s) Oral at bedtime PRN Constipation          Vital Signs Last 24 Hrs  T(C): 36.3 (03 Feb 2021 15:45), Max: 36.9 (02 Feb 2021 22:01)  T(F): 97.4 (03 Feb 2021 15:45), Max: 98.4 (02 Feb 2021 22:01)  HR: 94 (03 Feb 2021 15:45) (90 - 94)  BP: 105/65 (03 Feb 2021 15:45) (105/65 - 139/79)  BP(mean): --  RR: 18 (03 Feb 2021 15:45) (16 - 18)  SpO2: 100% (03 Feb 2021 15:45) (97% - 100%)    REVIEW OF SYSTEMS:  CONSTITUTIONAL: fatigue  RESPIRATORY: No cough, wheezing, chills or hemoptysis  CARDIOVASCULAR: No chest pain or chest pressure.  No palpitations, dizziness, light headedness, syncope or near syncope.  No edema, no orthopnea.   NEUROLOGICAL: No headaches, memory loss, loss of strength, numbness, or tremors      PHYSICAL EXAM  Constitutional:  WDWN. No acute distress  HEENT: normocephalic, atraumatic.  PERRLA. EOMI  Neck : No JVD. no carotid bruits  Lungs:  decreased breath sounds bilaterally   Heart:  S1 and S2. No S3, S4. II/VI systolic murmur.  Abdomen:  soft, non tender.  Extremities: 2+ edema   Nuerologic:  A+O x 3. No focal deficits  Skin:  no rashes        LABS:                        9.9    7.48  )-----------( 370      ( 02 Feb 2021 05:40 )             31.8     02-03    139  |  101  |  16  ----------------------------<  158<H>  3.6   |  28  |  0.87    Ca    8.7      03 Feb 2021 07:20  Mg     1.9     02-02    TPro  6.7  /  Alb  2.6<L>  /  TBili  3.4<H>  /  DBili  1.8<H>  /  AST  41<H>  /  ALT  41  /  AlkPhos  149<H>  02-03    CARDIAC MARKERS ( 03 Feb 2021 16:00 )  x     / x     / 55 U/L / x     / 1.9 ng/mL      CARDIAC MARKERS ( 03 Feb 2021 16:00 )  x     / x     / 55 U/L / x     / 1.9 ng/mL    I&O's Summary    02 Feb 2021 07:01  -  03 Feb 2021 07:00  --------------------------------------------------------  IN: 720 mL / OUT: 0 mL / NET: 720 mL      BNPSerum Pro-Brain Natriuretic Peptide: 6216 pg/mL (02-03 @ 16:00)    < from: Xray Chest 1 View- PORTABLE-Urgent (Xray Chest 1 View- PORTABLE-Urgent .) (02.01.21 @ 12:34) >  Heart enlargement, sternotomy, prosthetic heart valve, and left atrial appendage clipping device again noted.    Bilateral congestive like infiltrates right greater than left again noted.    Chest is similar to January 29.    IMPRESSION: Unchanged chest as above.    < end of copied text >    `< from: TTE Echo Complete w/ Contrast w/ Doppler (01.27.21 @ 11:08) >   1. Technically difficult study.   2. Moderately enlarged left atrium.   3. Left ventricular ejection fraction, by visual estimation, is 45 to 50%. Grade III diastolic dysfunction.   4. Elevated mean left atrial pressure. (LAP >30 mm Hg)   5. Moderately enlarged right atrium.   6. The right ventricular size is normal. RV systolic function is mildly reduced.   7. Status-post mitral annular ring insertion. No mitral stenosis. No mitral regurgitation.   8. Mild aortic valve stenosis.   9. Moderate tricuspid regurgitation.  10. Estimated pulmonary artery systolic pressure is 41 mmHg - mild pulmonary hypertension.  11. Trivial pericardial effusion.    < end of copied text >    EKG... personally reviewed... atrial fibrillation with a rapid ventricular response, ST depression in the anterolateral leads

## 2021-02-03 NOTE — PROGRESS NOTE ADULT - ASSESSMENT
75 year old with dyspnea, transferred from acute rehab with recent diagnosis of COVID pneumonia.  Known CHF, CAD, s/p CABG/ MV repair and DUANE clip on Jan 12, 2021. AF on anticoagulation.   Mild LV dysfunction on echo   Current dyspnea likely related to combined acute on chronic systolic/diastolic heart failure and COVID pneumonia... she appears to be fluid overloaded   Right thigh hematoma. PAD     Plan  - telemetry   - give extra dose Lasix   - oxygen   - increase metoprolol 25 mg q 8 with parameters for more optimal rate control   - trend cardiac troponin   - check serial EKGs  - continue ASA and apixaban  - continue statin   - COVID pneumonia  rx as per Medicine team     discussed with patient and with Hospitalist

## 2021-02-03 NOTE — PROGRESS NOTE ADULT - ASSESSMENT
75y with PMHx of paroxysmal Afib (on Xarelto), PAD (s/p T-avgndrn-rlgeisvyw bypass, R-femoral angioplasty with stenting, (on Plavix), HTN, HLD, type 2 diabetes (HA1c on 7.8 on Metformin and Tresiba as an outpatient), and recent trauma to right thigh with stable hematoma, presented to [facility] 1/12/21 with SOB, found to have NSTEMI. 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 S/P C3/DUANE Clip/MV Repair and DUANE thrombectomy with Dr. Orellana on 1/19/21. Postop course complicated by hypoxic respiratory failure and KRISTIE now resolved, and hypokalemia on daily repletion while on Torsemide, admitted to  for rehab on jan 28 and was found with COVID 19. transferred to medicine on 2/1 for worsening SOB/SHF/PNA    > Acute on chronic combined systolic and Diastolic CHF  >Dyspnea due to COVID 19 PNA and acute on chronic CHF  - SpO2 currently 97% at rest on RA, 93% on RA with minimal exertion (sitting) . placed on O2 for patients comfort  - CXR with worsening central infiltrate 1/29. repeat CXR 2/1 unchanged  - cont Remdisivir day 4 [ started at [facility] on Jan 31]  - start Decadron if pt requires oxygen  - monitor inflammatory markers  - off torsemide and on IV lasix 40mg daily with KCL  - I and Os  - daily margot  - cardiology eval noted.  requesting MD to contact patient's primary CT surgeon at [facility]. informed Pam    # Recent NSTEMI S/P CABG, MV Repair and DUANE thrombectomy 1/19/21  # Chronic Afib   # Mild to mod AS  - ASA, Lipitor 40 mg PO QHS   - Continue Eliquis 2.5mg BID (reduced dose due to large hematoma in right thigh from trauma prior to surgery)  - Continue Amiodarone and Lopressor  - cardiology on board    Transaminitis   - likely due to Covid  -Total bilirubin: 3.5, direct bili 2.0  -pt denies abd pain, n/v    DM2  - HbA1c 7.8 on Metformin and Tresiba as outpatient  - Continue Lantus, hold pre meal insulin, and ISS,  - DM NP manage BS  - patient has been refusing pre-meals as not eating and drinking well    Anemia  - monitor    #Mood/anxiety  - seen by neuropsych in rehab  - on valium at HS as per patient request  - xanax PRN    # Hematoma Right thigh  - monitor  - resume Eliquis to full dose once resolved/discuss with cardiology    Functional and gait instability:  - OT/PT once feeling better  refused today      # GI ppx: Protonix    # DVT ppx- Eliquis    # Cod: Full    # Dispo: pending clinical course    Updated  Derrick Annika

## 2021-02-03 NOTE — PROGRESS NOTE ADULT - SUBJECTIVE AND OBJECTIVE BOX
Medicine Progress Note    Patient is a 75y old  Female who presents with a chief complaint of COVID 19 pna (02 Feb 2021 01:11)      SUBJECTIVE / OVERNIGHT EVENTS:  seen and examined  Chart reviewed  no overnight events    SOB, anxious.  wants me speak to her pulmonologist Dr. bustamante  no new complaints  moved bowel yesterday    ADDITIONAL REVIEW OF SYSTEMS:  no fever/chills/CP/palpitation/dizziness/ abd pain/nausea/vomiting/diarrhea/constipation/headaches/leg or calf pain. all other ROS neg    potassium chloride   Powder 40 milliEquivalent(s) Oral daily  remdesivir  IVPB 100 milliGRAM(s) IV Intermittent every 24 hours    MEDICATIONS  (STANDING):  aMIOdarone    Tablet 200 milliGRAM(s) Oral daily  apixaban 2.5 milliGRAM(s) Oral every 12 hours  ascorbic acid 500 milliGRAM(s) Oral daily  aspirin enteric coated 81 milliGRAM(s) Oral daily  atorvastatin 40 milliGRAM(s) Oral at bedtime  dextrose 40% Gel 15 Gram(s) Oral once  dextrose 5%. 1000 milliLiter(s) (50 mL/Hr) IV Continuous <Continuous>  dextrose 5%. 1000 milliLiter(s) (100 mL/Hr) IV Continuous <Continuous>  dextrose 50% Injectable 25 Gram(s) IV Push once  dextrose 50% Injectable 12.5 Gram(s) IV Push once  dextrose 50% Injectable 25 Gram(s) IV Push once  diazepam    Tablet 2 milliGRAM(s) Oral at bedtime  furosemide   Injectable 40 milliGRAM(s) IV Push daily  gabapentin 300 milliGRAM(s) Oral three times a day  glucagon  Injectable 1 milliGRAM(s) IntraMuscular once  influenza   Vaccine 0.5 milliLiter(s) IntraMuscular once  insulin glargine Injectable (LANTUS) 26 Unit(s) SubCutaneous at bedtime  insulin lispro (ADMELOG) corrective regimen sliding scale   SubCutaneous three times a day before meals  insulin lispro (ADMELOG) corrective regimen sliding scale   SubCutaneous at bedtime  insulin lispro Injectable (ADMELOG) 9 Unit(s) SubCutaneous before breakfast  insulin lispro Injectable (ADMELOG) 9 Unit(s) SubCutaneous before lunch  insulin lispro Injectable (ADMELOG) 9 Unit(s) SubCutaneous before dinner  melatonin 6 milliGRAM(s) Oral at bedtime  metoprolol tartrate 12.5 milliGRAM(s) Oral two times a day  multivitamin 1 Tablet(s) Oral daily  pantoprazole    Tablet 40 milliGRAM(s) Oral before breakfast  potassium chloride   Powder 40 milliEquivalent(s) Oral daily  remdesivir  IVPB 100 milliGRAM(s) IV Intermittent every 24 hours    MEDICATIONS  (PRN):  acetaminophen   Tablet .. 650 milliGRAM(s) Oral every 8 hours PRN Temp greater or equal to 38C (100.4F), Mild Pain (1 - 3)  guaiFENesin   Syrup  (Sugar-Free) 100 milliGRAM(s) Oral every 6 hours PRN Cough  oxyCODONE    IR 10 milliGRAM(s) Oral every 4 hours PRN Severe Pain (7 - 10)  oxyCODONE    IR 5 milliGRAM(s) Oral every 4 hours PRN Moderate Pain (4 - 6)  polyethylene glycol 3350 17 Gram(s) Oral daily PRN Constipation  senna 2 Tablet(s) Oral at bedtime PRN Constipation          PHYSICAL EXAM:    Vital Signs Last 24 Hrs  T(C): 36.4 (03 Feb 2021 06:30), Max: 36.9 (02 Feb 2021 22:01)  T(F): 97.6 (03 Feb 2021 06:30), Max: 98.4 (02 Feb 2021 22:01)  HR: 91 (03 Feb 2021 06:30) (90 - 107)  BP: 136/71 (03 Feb 2021 06:30) (122/76 - 139/79)  BP(mean): --  RR: 18 (03 Feb 2021 06:30) (16 - 18)  SpO2: 97% (03 Feb 2021 06:30) (97% - 98%)    I&O's Detail    02 Feb 2021 07:01  -  03 Feb 2021 07:00  --------------------------------------------------------  IN:    Oral Fluid: 720 mL  Total IN: 720 mL    OUT:  Total OUT: 0 mL    Total NET: 720 mL      CAPILLARY BLOOD GLUCOSE      POCT Blood Glucose.: 155 mg/dL (03 Feb 2021 08:13)  POCT Blood Glucose.: 165 mg/dL (02 Feb 2021 22:44)  POCT Blood Glucose.: 157 mg/dL (02 Feb 2021 17:30)  POCT Blood Glucose.: 237 mg/dL (02 Feb 2021 13:27    CONSTITUTIONAL: not in distress  ENMT: Moist oral mucosa,   RESPIRATORY: Normal respiratory effort; b/l rales and coarse breath sound.   CARDIOVASCULAR: Regular rate and rhythm, normal S1 and S2, no murmur/rub/gallop;   ABDOMEN: Nontender to palpation, normoactive bowel sounds, no rebound/guarding;  Ext: b/l lower extremity edema; no leg or calf TP  PSYCH: A+O to person, place, and time; affect appropriate  NEUROLOGY: grossly intact  SKIN: No rashes;     LABS:                          9.9    7.48  )-----------( 370      ( 02 Feb 2021 05:40 )             31.8       02-03    139  |  101  |  16  ----------------------------<  158<H>  3.6   |  28  |  0.87    Ca    8.7      03 Feb 2021 07:20  Mg     1.9     02-02    TPro  6.7  /  Alb  2.6<L>  /  TBili  3.4<H>  /  DBili  1.8<H>  /  AST  41<H>  /  ALT  41  /  AlkPhos  149<H>  02-03      COVID-19 PCR: Detected (31 Jan 2021 17:00)  COVID-19 PCR: Detected (31 Jan 2021 06:50)  COVID-19 PCR: NotDetec (28 Jan 2021 22:55)  COVID-19 PCR: NotDetec (27 Jan 2021 10:54)  COVID-19 PCR: NotDetec (18 Jan 2021 11:33)  SARS-CoV-2: NotDetec (12 Jan 2021 00:43)      RADIOLOGY & ADDITIONAL TESTS:  Imaging from Last 24 Hours:    Electrocardiogram/QTc Interval:    COORDINATION OF CARE:  Care Discussed with Consultants/Other Providers:

## 2021-02-04 LAB
ALBUMIN SERPL ELPH-MCNC: 2.7 G/DL — LOW (ref 3.3–5)
ALBUMIN SERPL ELPH-MCNC: 2.7 G/DL — LOW (ref 3.3–5)
ALP SERPL-CCNC: 151 U/L — HIGH (ref 40–120)
ALP SERPL-CCNC: 151 U/L — HIGH (ref 40–120)
ALT FLD-CCNC: 41 U/L — SIGNIFICANT CHANGE UP (ref 10–45)
ALT FLD-CCNC: 41 U/L — SIGNIFICANT CHANGE UP (ref 10–45)
ANION GAP SERPL CALC-SCNC: 12 MMOL/L — SIGNIFICANT CHANGE UP (ref 5–17)
AST SERPL-CCNC: 48 U/L — HIGH (ref 10–40)
AST SERPL-CCNC: 57 U/L — HIGH (ref 10–40)
BILIRUB DIRECT SERPL-MCNC: 1.9 MG/DL — HIGH (ref 0–0.2)
BILIRUB INDIRECT FLD-MCNC: 1.9 MG/DL — HIGH (ref 0.2–1)
BILIRUB SERPL-MCNC: 3.8 MG/DL — HIGH (ref 0.2–1.2)
BILIRUB SERPL-MCNC: 4 MG/DL — HIGH (ref 0.2–1.2)
BUN SERPL-MCNC: 12 MG/DL — SIGNIFICANT CHANGE UP (ref 7–23)
CALCIUM SERPL-MCNC: 8.6 MG/DL — SIGNIFICANT CHANGE UP (ref 8.4–10.5)
CHLORIDE SERPL-SCNC: 100 MMOL/L — SIGNIFICANT CHANGE UP (ref 96–108)
CO2 SERPL-SCNC: 26 MMOL/L — SIGNIFICANT CHANGE UP (ref 22–31)
CREAT SERPL-MCNC: 0.77 MG/DL — SIGNIFICANT CHANGE UP (ref 0.5–1.3)
CREAT SERPL-MCNC: 0.78 MG/DL — SIGNIFICANT CHANGE UP (ref 0.5–1.3)
GLUCOSE BLDC GLUCOMTR-MCNC: 121 MG/DL — HIGH (ref 70–99)
GLUCOSE BLDC GLUCOMTR-MCNC: 175 MG/DL — HIGH (ref 70–99)
GLUCOSE SERPL-MCNC: 151 MG/DL — HIGH (ref 70–99)
HCT VFR BLD CALC: 33.7 % — LOW (ref 34.5–45)
HGB BLD-MCNC: 10.1 G/DL — LOW (ref 11.5–15.5)
MAGNESIUM SERPL-MCNC: 1.9 MG/DL — SIGNIFICANT CHANGE UP (ref 1.6–2.6)
MCHC RBC-ENTMCNC: 26.1 PG — LOW (ref 27–34)
MCHC RBC-ENTMCNC: 30 GM/DL — LOW (ref 32–36)
MCV RBC AUTO: 87.1 FL — SIGNIFICANT CHANGE UP (ref 80–100)
NRBC # BLD: 0 /100 WBCS — SIGNIFICANT CHANGE UP (ref 0–0)
NT-PROBNP SERPL-SCNC: 6464 PG/ML — HIGH (ref 0–300)
PLATELET # BLD AUTO: 368 K/UL — SIGNIFICANT CHANGE UP (ref 150–400)
POTASSIUM SERPL-MCNC: 3.4 MMOL/L — LOW (ref 3.5–5.3)
POTASSIUM SERPL-SCNC: 3.4 MMOL/L — LOW (ref 3.5–5.3)
PROT SERPL-MCNC: 6.9 G/DL — SIGNIFICANT CHANGE UP (ref 6–8.3)
PROT SERPL-MCNC: 6.9 G/DL — SIGNIFICANT CHANGE UP (ref 6–8.3)
RBC # BLD: 3.87 M/UL — SIGNIFICANT CHANGE UP (ref 3.8–5.2)
RBC # FLD: 17.4 % — HIGH (ref 10.3–14.5)
SODIUM SERPL-SCNC: 138 MMOL/L — SIGNIFICANT CHANGE UP (ref 135–145)
TROPONIN I SERPL-MCNC: 0.13 NG/ML — HIGH (ref 0.02–0.06)
WBC # BLD: 8.27 K/UL — SIGNIFICANT CHANGE UP (ref 3.8–10.5)
WBC # FLD AUTO: 8.27 K/UL — SIGNIFICANT CHANGE UP (ref 3.8–10.5)

## 2021-02-04 PROCEDURE — 93308 TTE F-UP OR LMTD: CPT | Mod: 26

## 2021-02-04 PROCEDURE — 99233 SBSQ HOSP IP/OBS HIGH 50: CPT

## 2021-02-04 PROCEDURE — 99232 SBSQ HOSP IP/OBS MODERATE 35: CPT

## 2021-02-04 RX ADMIN — Medication 40 MILLIGRAM(S): at 06:18

## 2021-02-04 RX ADMIN — APIXABAN 2.5 MILLIGRAM(S): 2.5 TABLET, FILM COATED ORAL at 16:34

## 2021-02-04 RX ADMIN — Medication 81 MILLIGRAM(S): at 11:24

## 2021-02-04 RX ADMIN — INSULIN GLARGINE 26 UNIT(S): 100 INJECTION, SOLUTION SUBCUTANEOUS at 22:33

## 2021-02-04 RX ADMIN — REMDESIVIR 500 MILLIGRAM(S): 5 INJECTION INTRAVENOUS at 22:00

## 2021-02-04 RX ADMIN — Medication 25 MILLIGRAM(S): at 22:56

## 2021-02-04 RX ADMIN — Medication 25 MILLIGRAM(S): at 13:44

## 2021-02-04 RX ADMIN — Medication 1 TABLET(S): at 11:23

## 2021-02-04 RX ADMIN — Medication 40 MILLIEQUIVALENT(S): at 11:24

## 2021-02-04 RX ADMIN — Medication 500 MILLIGRAM(S): at 11:24

## 2021-02-04 RX ADMIN — ATORVASTATIN CALCIUM 40 MILLIGRAM(S): 80 TABLET, FILM COATED ORAL at 22:32

## 2021-02-04 RX ADMIN — APIXABAN 2.5 MILLIGRAM(S): 2.5 TABLET, FILM COATED ORAL at 07:17

## 2021-02-04 RX ADMIN — Medication 6 MILLIGRAM(S): at 22:56

## 2021-02-04 RX ADMIN — Medication 0.5 MILLIGRAM(S): at 07:58

## 2021-02-04 RX ADMIN — Medication 1: at 11:41

## 2021-02-04 RX ADMIN — Medication 25 MILLIGRAM(S): at 07:18

## 2021-02-04 RX ADMIN — PANTOPRAZOLE SODIUM 40 MILLIGRAM(S): 20 TABLET, DELAYED RELEASE ORAL at 07:18

## 2021-02-04 RX ADMIN — Medication 40 MILLIGRAM(S): at 16:34

## 2021-02-04 RX ADMIN — Medication 1: at 07:48

## 2021-02-04 RX ADMIN — AMIODARONE HYDROCHLORIDE 200 MILLIGRAM(S): 400 TABLET ORAL at 07:17

## 2021-02-04 NOTE — PROGRESS NOTE ADULT - ATTENDING COMMENTS
sob  most likely multifactorial michelle continue periodic diuretic administration . patient seems to have responded nicely . daily weight repeat xray .     elevated biomarkers flat cure without a rise and fall.   most likely represent a demand ischemia in the setting of medical illness

## 2021-02-04 NOTE — PROGRESS NOTE ADULT - SUBJECTIVE AND OBJECTIVE BOX
Patient is a 75y old  Female who presents with a chief complaint of COVID 19 pna (03 Feb 2021 17:03)      SUBJECTIVE / OVERNIGHT EVENTS:  Pt seen and examined at bedside. No acute events overnight. Complaints of sob  Pt denies cp, palpitations, abd pain, N/V, fever, chills.    ROS:  All other review of systems negative    Allergies    warfarin (Rash)    Intolerances    OHS (Unknown)      MEDICATIONS  (STANDING):  aMIOdarone    Tablet 200 milliGRAM(s) Oral daily  apixaban 2.5 milliGRAM(s) Oral every 12 hours  ascorbic acid 500 milliGRAM(s) Oral daily  aspirin enteric coated 81 milliGRAM(s) Oral daily  atorvastatin 40 milliGRAM(s) Oral at bedtime  dextrose 40% Gel 15 Gram(s) Oral once  dextrose 5%. 1000 milliLiter(s) (50 mL/Hr) IV Continuous <Continuous>  dextrose 5%. 1000 milliLiter(s) (100 mL/Hr) IV Continuous <Continuous>  dextrose 50% Injectable 25 Gram(s) IV Push once  dextrose 50% Injectable 12.5 Gram(s) IV Push once  dextrose 50% Injectable 25 Gram(s) IV Push once  furosemide   Injectable 40 milliGRAM(s) IV Push two times a day  glucagon  Injectable 1 milliGRAM(s) IntraMuscular once  influenza   Vaccine 0.5 milliLiter(s) IntraMuscular once  insulin glargine Injectable (LANTUS) 26 Unit(s) SubCutaneous at bedtime  insulin lispro (ADMELOG) corrective regimen sliding scale   SubCutaneous three times a day before meals  insulin lispro (ADMELOG) corrective regimen sliding scale   SubCutaneous at bedtime  melatonin 6 milliGRAM(s) Oral at bedtime  metoprolol tartrate 25 milliGRAM(s) Oral every 8 hours  multivitamin 1 Tablet(s) Oral daily  pantoprazole    Tablet 40 milliGRAM(s) Oral before breakfast  potassium chloride    Tablet ER 40 milliEquivalent(s) Oral daily  remdesivir  IVPB 100 milliGRAM(s) IV Intermittent every 24 hours    MEDICATIONS  (PRN):  acetaminophen   Tablet .. 650 milliGRAM(s) Oral every 8 hours PRN Temp greater or equal to 38C (100.4F), Mild Pain (1 - 3)  ALPRAZolam 0.5 milliGRAM(s) Oral three times a day PRN anxiety  guaiFENesin   Syrup  (Sugar-Free) 100 milliGRAM(s) Oral every 6 hours PRN Cough  oxyCODONE    IR 10 milliGRAM(s) Oral every 4 hours PRN Severe Pain (7 - 10)  oxyCODONE    IR 5 milliGRAM(s) Oral every 4 hours PRN Moderate Pain (4 - 6)  polyethylene glycol 3350 17 Gram(s) Oral daily PRN Constipation  senna 2 Tablet(s) Oral at bedtime PRN Constipation      Vital Signs Last 24 Hrs  T(C): 36.3 (04 Feb 2021 06:50), Max: 36.3 (03 Feb 2021 15:45)  T(F): 97.3 (04 Feb 2021 06:50), Max: 97.4 (03 Feb 2021 15:45)  HR: 98 (04 Feb 2021 06:50) (94 - 106)  BP: 125/71 (04 Feb 2021 06:50) (105/65 - 130/73)  BP(mean): --  RR: 18 (04 Feb 2021 06:50) (18 - 18)  SpO2: 98% (04 Feb 2021 06:50) (98% - 100%)  CAPILLARY BLOOD GLUCOSE      POCT Blood Glucose.: 175 mg/dL (04 Feb 2021 07:47)  POCT Blood Glucose.: 180 mg/dL (03 Feb 2021 23:05)  POCT Blood Glucose.: 143 mg/dL (03 Feb 2021 17:31)  POCT Blood Glucose.: 148 mg/dL (03 Feb 2021 12:35)    I&O's Summary    03 Feb 2021 07:01  -  04 Feb 2021 07:00  --------------------------------------------------------  IN: 590 mL / OUT: 0 mL / NET: 590 mL        PHYSICAL EXAM:  GENERAL: NAD, elderly female  CHEST/LUNG: +Crackles b/l; No wheeze  HEART: Regular rate and rhythm; No murmurs, rubs, or gallops  ABDOMEN: Soft, Nontender, Nondistended; Bowel sounds present  EXTREMITIES:  2+ Peripheral Pulses, No clubbing, cyanosis. 2+ b/l le pitting edema  NEUROLOGY: AAOx3, non-focal  PSYCH: calm    LABS:                        10.1   8.27  )-----------( 368      ( 04 Feb 2021 06:10 )             33.7     02-04    138  |  100  |  12  ----------------------------<  151<H>  3.4<L>   |  26  |  0.78    Ca    8.6      04 Feb 2021 06:10  Mg     1.9     02-04    TPro  6.9  /  Alb  2.7<L>  /  TBili  4.0<H>  /  DBili  1.9<H>  /  AST  57<H>  /  ALT  41  /  AlkPhos  151<H>  02-04      CARDIAC MARKERS ( 04 Feb 2021 06:10 )  .134 ng/mL / x     / x     / x     / x      CARDIAC MARKERS ( 03 Feb 2021 16:00 )  .150 ng/mL / x     / 55 U/L / x     / 1.9 ng/mL          RADIOLOGY & ADDITIONAL TESTS:  Results Reviewed:   Imaging Personally Reviewed:  Electrocardiogram Personally Reviewed:    COORDINATION OF CARE:  Care Discussed with Consultants/Other Providers [Y/N]:  Prior or Outpatient Records Reviewed [Y/N]:

## 2021-02-04 NOTE — PROGRESS NOTE ADULT - SUBJECTIVE AND OBJECTIVE BOX
Follow up for chf  SUBJ:    comfortable looks much improved    PMH  PAD (peripheral artery disease)    Paroxysmal atrial fibrillation    Hypercholesterolemia    Diabetes    Hypertension        MEDICATIONS  (STANDING):  aMIOdarone    Tablet 200 milliGRAM(s) Oral daily  apixaban 2.5 milliGRAM(s) Oral every 12 hours  ascorbic acid 500 milliGRAM(s) Oral daily  aspirin enteric coated 81 milliGRAM(s) Oral daily  atorvastatin 40 milliGRAM(s) Oral at bedtime  dextrose 40% Gel 15 Gram(s) Oral once  dextrose 5%. 1000 milliLiter(s) (50 mL/Hr) IV Continuous <Continuous>  dextrose 5%. 1000 milliLiter(s) (100 mL/Hr) IV Continuous <Continuous>  dextrose 50% Injectable 25 Gram(s) IV Push once  dextrose 50% Injectable 12.5 Gram(s) IV Push once  dextrose 50% Injectable 25 Gram(s) IV Push once  furosemide   Injectable 40 milliGRAM(s) IV Push two times a day  glucagon  Injectable 1 milliGRAM(s) IntraMuscular once  influenza   Vaccine 0.5 milliLiter(s) IntraMuscular once  insulin glargine Injectable (LANTUS) 26 Unit(s) SubCutaneous at bedtime  insulin lispro (ADMELOG) corrective regimen sliding scale   SubCutaneous three times a day before meals  insulin lispro (ADMELOG) corrective regimen sliding scale   SubCutaneous at bedtime  melatonin 6 milliGRAM(s) Oral at bedtime  metoprolol tartrate 25 milliGRAM(s) Oral every 8 hours  multivitamin 1 Tablet(s) Oral daily  pantoprazole    Tablet 40 milliGRAM(s) Oral before breakfast  potassium chloride    Tablet ER 40 milliEquivalent(s) Oral daily  remdesivir  IVPB 100 milliGRAM(s) IV Intermittent every 24 hours    MEDICATIONS  (PRN):  acetaminophen   Tablet .. 650 milliGRAM(s) Oral every 8 hours PRN Temp greater or equal to 38C (100.4F), Mild Pain (1 - 3)  ALPRAZolam 0.5 milliGRAM(s) Oral three times a day PRN anxiety  guaiFENesin   Syrup  (Sugar-Free) 100 milliGRAM(s) Oral every 6 hours PRN Cough  oxyCODONE    IR 10 milliGRAM(s) Oral every 4 hours PRN Severe Pain (7 - 10)  oxyCODONE    IR 5 milliGRAM(s) Oral every 4 hours PRN Moderate Pain (4 - 6)  polyethylene glycol 3350 17 Gram(s) Oral daily PRN Constipation  senna 2 Tablet(s) Oral at bedtime PRN Constipation        PHYSICAL EXAM:  Vital Signs Last 24 Hrs  T(C): 36.3 (04 Feb 2021 06:50), Max: 36.3 (04 Feb 2021 06:50)  T(F): 97.3 (04 Feb 2021 06:50), Max: 97.3 (04 Feb 2021 06:50)  HR: 93 (04 Feb 2021 13:43) (93 - 106)  BP: 124/70 (04 Feb 2021 13:43) (124/70 - 130/73)  BP(mean): --  RR: 18 (04 Feb 2021 06:50) (18 - 18)  SpO2: 98% (04 Feb 2021 06:50) (98% - 98%)    GENERAL: NAD, well-groomed, well-developed  HEAD:  Atraumatic, Normocephalic  EYES: EOMI, PERRLA, conjunctiva and sclera clear  ENT: Moist mucous membranes,  NECK: Supple, No JVD, no bruits  CHEST/LUNG: Clear to percussion bilaterally; No rales, rhonchi, wheezing, or rubs  HEART: Regular rate and rhythm; No murmurs, rubs, or gallops PMI non displaced.  ABDOMEN: Soft, Nontender, Nondistended; Bowel sounds present  EXTREMITIES:  2+ Peripheral Pulses, No clubbing, cyanosis, or edema  SKIN: No rashes or lesions  NERVOUS SYSTEM:  Cranial Nerves II-XII intact      TELEMETRY:    ECG:    < from: 12 Lead ECG (02.03.21 @ 15:59) >  Diagnosis Line Atrial fibrillation with premature ventricular or aberrantly conducted complexes  Poor R wave progression  Anterior infarct , age undetermined  ST and T wave abnormality, consider inferolateral ischemia  Abnormal ECG  No previous ECGs available    Confirmed by MOE QUINONES MD ([id number]) on 2/4/2021 9:01:57 AM    < end of copied text >    ECHO:    < from: TTE Echo Limited or F/U (02.04.21 @ 10:49) >  Summary:  1. Left ventricular ejection fraction, by visual estimation, is 45 to 50%.   2. Mildly decreased global left ventricular systolic function.   3. Abnormal septal motion consistent with post-operative status.   4. Mildly increased LV wall thickness.   5. Normal left ventricular internal cavity size.   6. There is mild concentric left ventricular hypertrophy.   7. Status-post mitral annular ring insertion.   8. Trace mitral valve regurgitation.   9. Estimated pulmonary artery systolic pressure is 42.2 mmHg assuming a right atrial pressure of 10 mmHg, which is consistent with mild pulmonary hypertension.  10. When compared with an echo 1/27/21 there are no new wall motion abnormalities.  11. Mitral valve mean gradient is 2.5 mmHg consistent with mild mitral stenosis.    [id number] Moe Quinones , Electronically signed on 2/4/2021 at 11:36:44 AM            *** Final ***          MOE QUINONES   This document has been electronically signed. Feb 4 2021 10:49AM    < end of copied text >      LABS:                        10.1   8.27  )-----------( 368      ( 04 Feb 2021 06:10 )             33.7     02-04    138  |  100  |  12  ----------------------------<  151<H>  3.4<L>   |  26  |  0.78    Ca    8.6      04 Feb 2021 06:10  Mg     1.9     02-04    TPro  6.9  /  Alb  2.7<L>  /  TBili  4.0<H>  /  DBili  1.9<H>  /  AST  57<H>  /  ALT  41  /  AlkPhos  151<H>  02-04    CARDIAC MARKERS ( 04 Feb 2021 06:10 )  .134 ng/mL / x     / x     / x     / x      CARDIAC MARKERS ( 03 Feb 2021 16:00 )  .150 ng/mL / x     / 55 U/L / x     / 1.9 ng/mL      I&O's Summary    03 Feb 2021 07:01  -  04 Feb 2021 07:00  --------------------------------------------------------  IN: 590 mL / OUT: 0 mL / NET: 590 mL      BNPSerum Pro-Brain Natriuretic Peptide: 6464 pg/mL (02-04 @ 06:10)      RADIOLOGY & ADDITIONAL STUDIES:    < from: Xray Chest 1 View- PORTABLE-Urgent (Xray Chest 1 View- PORTABLE-Urgent .) (02.01.21 @ 12:34) >    IMPRESSION: Unchanged chest as above.        DENNIS LEHMAN M.D., ATTENDING RADIOLOGIST  This document has been electronically signed. Feb 1 2021 12:42PM    < end of copied text >

## 2021-02-04 NOTE — PROGRESS NOTE ADULT - ASSESSMENT
75y with PMHx of paroxysmal Afib (on Xarelto), PAD (s/p P-jifxcdn-ilhlvebss bypass, R-femoral angioplasty with stenting, (on Plavix), HTN, HLD, type 2 diabetes (HA1c on 7.8 on Metformin and Tresiba as an outpatient), and recent trauma to right thigh with stable hematoma, presented to [facility] 1/12/21 with SOB, found to have NSTEMI. 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 S/P C3/DUANE Clip/MV Repair and DUANE thrombectomy with Dr. Orellana on 1/19/21. Postop course complicated by hypoxic respiratory failure and KRISTIE now resolved, and hypokalemia on daily repletion while on Torsemide, admitted to  for rehab on jan 28 and was found with COVID 19. transferred to medicine on 2/1 for worsening SOB/SHF/PNA    #Acute on chronic combined systolic and Diastolic CHF  #COVID-19 Infection  -SpO2 currently 97% at rest on RA  -Complete 5 days of Remdisivir today  -Continue Lasix IV BID   -I/O's and daily weights  -Cardiology recommendations noted     #Recent NSTEMI S/P CABG, MV Repair and DUANE thrombectomy 1/19/21  #Chronic Afib   #Mild to mod AS  -ASA, Lipitor 40 mg PO QHS   -Continue Eliquis 2.5mg BID (reduced dose due to large hematoma in right thigh from trauma prior to surgery)  -Continue Amiodarone and Lopressor  -Cardiology on board    #Transaminitis   -Secondary to COVID-19  -Bilirubin downtrending    #DM2  -HbA1c 7.8 on Metformin and Tresiba as outpatient  -Continue Lantus + ISS,    #Anemia  -Stable  -Monitor    #Hematoma Right thigh  -monitor  -resume Eliquis to full dose once resolved/discuss with cardiology    # DVT ppx- Eliquis    # Code: Full  Dispo: PT/OT consult for possible dispo back to acute rehab     Updated  Derrick Roblero.  will like updates to be provided to pt's pulmonologist, Dr. Gaitan, [phone]

## 2021-02-05 LAB
ALBUMIN SERPL ELPH-MCNC: 2.6 G/DL — LOW (ref 3.3–5)
ALP SERPL-CCNC: 134 U/L — HIGH (ref 40–120)
ALT FLD-CCNC: 37 U/L — SIGNIFICANT CHANGE UP (ref 10–45)
ANION GAP SERPL CALC-SCNC: 11 MMOL/L — SIGNIFICANT CHANGE UP (ref 5–17)
AST SERPL-CCNC: 41 U/L — HIGH (ref 10–40)
BILIRUB DIRECT SERPL-MCNC: 1.7 MG/DL — HIGH (ref 0–0.2)
BILIRUB INDIRECT FLD-MCNC: 1.5 MG/DL — HIGH (ref 0.2–1)
BILIRUB SERPL-MCNC: 3.2 MG/DL — HIGH (ref 0.2–1.2)
BUN SERPL-MCNC: 14 MG/DL — SIGNIFICANT CHANGE UP (ref 7–23)
CALCIUM SERPL-MCNC: 8.7 MG/DL — SIGNIFICANT CHANGE UP (ref 8.4–10.5)
CHLORIDE SERPL-SCNC: 103 MMOL/L — SIGNIFICANT CHANGE UP (ref 96–108)
CO2 SERPL-SCNC: 29 MMOL/L — SIGNIFICANT CHANGE UP (ref 22–31)
CREAT SERPL-MCNC: 0.81 MG/DL — SIGNIFICANT CHANGE UP (ref 0.5–1.3)
CREAT SERPL-MCNC: 0.9 MG/DL — SIGNIFICANT CHANGE UP (ref 0.5–1.3)
GLUCOSE BLDC GLUCOMTR-MCNC: 114 MG/DL — HIGH (ref 70–99)
GLUCOSE BLDC GLUCOMTR-MCNC: 122 MG/DL — HIGH (ref 70–99)
GLUCOSE BLDC GLUCOMTR-MCNC: 98 MG/DL — SIGNIFICANT CHANGE UP (ref 70–99)
GLUCOSE SERPL-MCNC: 110 MG/DL — HIGH (ref 70–99)
POTASSIUM SERPL-MCNC: 3 MMOL/L — LOW (ref 3.5–5.3)
POTASSIUM SERPL-SCNC: 3 MMOL/L — LOW (ref 3.5–5.3)
PROT SERPL-MCNC: 6.6 G/DL — SIGNIFICANT CHANGE UP (ref 6–8.3)
SODIUM SERPL-SCNC: 143 MMOL/L — SIGNIFICANT CHANGE UP (ref 135–145)

## 2021-02-05 PROCEDURE — 99232 SBSQ HOSP IP/OBS MODERATE 35: CPT

## 2021-02-05 PROCEDURE — 99233 SBSQ HOSP IP/OBS HIGH 50: CPT

## 2021-02-05 RX ORDER — INSULIN LISPRO 100/ML
VIAL (ML) SUBCUTANEOUS AT BEDTIME
Refills: 0 | Status: DISCONTINUED | OUTPATIENT
Start: 2021-02-05 | End: 2021-02-22

## 2021-02-05 RX ORDER — POTASSIUM CHLORIDE 20 MEQ
40 PACKET (EA) ORAL
Refills: 0 | Status: DISCONTINUED | OUTPATIENT
Start: 2021-02-05 | End: 2021-02-22

## 2021-02-05 RX ORDER — INSULIN LISPRO 100/ML
VIAL (ML) SUBCUTANEOUS
Refills: 0 | Status: DISCONTINUED | OUTPATIENT
Start: 2021-02-05 | End: 2021-02-22

## 2021-02-05 RX ADMIN — Medication 0.5 MILLIGRAM(S): at 21:25

## 2021-02-05 RX ADMIN — Medication 1 TABLET(S): at 11:27

## 2021-02-05 RX ADMIN — AMIODARONE HYDROCHLORIDE 200 MILLIGRAM(S): 400 TABLET ORAL at 07:00

## 2021-02-05 RX ADMIN — PANTOPRAZOLE SODIUM 40 MILLIGRAM(S): 20 TABLET, DELAYED RELEASE ORAL at 07:01

## 2021-02-05 RX ADMIN — Medication 25 MILLIGRAM(S): at 17:25

## 2021-02-05 RX ADMIN — Medication 81 MILLIGRAM(S): at 10:41

## 2021-02-05 RX ADMIN — Medication 40 MILLIEQUIVALENT(S): at 17:25

## 2021-02-05 RX ADMIN — Medication 25 MILLIGRAM(S): at 21:25

## 2021-02-05 RX ADMIN — Medication 6 MILLIGRAM(S): at 21:25

## 2021-02-05 RX ADMIN — Medication 25 MILLIGRAM(S): at 07:01

## 2021-02-05 RX ADMIN — Medication 40 MILLIGRAM(S): at 17:25

## 2021-02-05 RX ADMIN — ATORVASTATIN CALCIUM 40 MILLIGRAM(S): 80 TABLET, FILM COATED ORAL at 21:25

## 2021-02-05 RX ADMIN — INSULIN GLARGINE 26 UNIT(S): 100 INJECTION, SOLUTION SUBCUTANEOUS at 21:24

## 2021-02-05 RX ADMIN — APIXABAN 2.5 MILLIGRAM(S): 2.5 TABLET, FILM COATED ORAL at 17:25

## 2021-02-05 RX ADMIN — APIXABAN 2.5 MILLIGRAM(S): 2.5 TABLET, FILM COATED ORAL at 07:00

## 2021-02-05 RX ADMIN — Medication 500 MILLIGRAM(S): at 11:27

## 2021-02-05 NOTE — OCCUPATIONAL THERAPY INITIAL EVALUATION ADULT - GENERAL OBSERVATIONS, REHAB EVAL
Pt received A&Ox4 lying supine in bed, +tele monitors, +right IV, +bed alarm with no c/o pain. Pt tolerated OT initial evaluation and left lying supine all lines intact, NAD, VSS, and call bell/phone in reach.

## 2021-02-05 NOTE — OCCUPATIONAL THERAPY INITIAL EVALUATION ADULT - PERTINENT HX OF CURRENT PROBLEM, REHAB EVAL
Pt is 74 yo female presented to [facility] 1/12/21 with SOB, found to have NSTEMI. 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 S/P C3/DUANE Clip/MV Repair and DUANE thrombectomy with Dr. Orellana on 1/19/21. Postop course complicated by hypoxic respiratory failure and KRISTIE now resolved, and hypokalemia on daily repletion while on Torsemide, admitted to  for rehab on jan 28 and was found with COVID 19. transferred to medicine on 2/1 for worsening SOB/SHF/PNA.

## 2021-02-05 NOTE — PROGRESS NOTE ADULT - SUBJECTIVE AND OBJECTIVE BOX
Patient is a 75y old  Female who presents with a chief complaint of COVID 19 pna (04 Feb 2021 16:36)      SUBJECTIVE / OVERNIGHT EVENTS:  Pt seen and examined at bedside. No acute events overnight.  Pt denies cp, palpitations, sob, abd pain, N/V, fever, chills.    ROS:  All other review of systems negative    Allergies    warfarin (Rash)    Intolerances    OHS (Unknown)      MEDICATIONS  (STANDING):  aMIOdarone    Tablet 200 milliGRAM(s) Oral daily  apixaban 2.5 milliGRAM(s) Oral every 12 hours  ascorbic acid 500 milliGRAM(s) Oral daily  aspirin enteric coated 81 milliGRAM(s) Oral daily  atorvastatin 40 milliGRAM(s) Oral at bedtime  dextrose 40% Gel 15 Gram(s) Oral once  dextrose 5%. 1000 milliLiter(s) (50 mL/Hr) IV Continuous <Continuous>  dextrose 5%. 1000 milliLiter(s) (100 mL/Hr) IV Continuous <Continuous>  dextrose 50% Injectable 25 Gram(s) IV Push once  dextrose 50% Injectable 12.5 Gram(s) IV Push once  dextrose 50% Injectable 25 Gram(s) IV Push once  furosemide   Injectable 40 milliGRAM(s) IV Push two times a day  glucagon  Injectable 1 milliGRAM(s) IntraMuscular once  influenza   Vaccine 0.5 milliLiter(s) IntraMuscular once  insulin glargine Injectable (LANTUS) 26 Unit(s) SubCutaneous at bedtime  insulin lispro (ADMELOG) corrective regimen sliding scale   SubCutaneous three times a day before meals  insulin lispro (ADMELOG) corrective regimen sliding scale   SubCutaneous at bedtime  melatonin 6 milliGRAM(s) Oral at bedtime  metoprolol tartrate 25 milliGRAM(s) Oral every 8 hours  multivitamin 1 Tablet(s) Oral daily  pantoprazole    Tablet 40 milliGRAM(s) Oral before breakfast  potassium chloride    Tablet ER 40 milliEquivalent(s) Oral two times a day    MEDICATIONS  (PRN):  acetaminophen   Tablet .. 650 milliGRAM(s) Oral every 8 hours PRN Temp greater or equal to 38C (100.4F), Mild Pain (1 - 3)  ALPRAZolam 0.5 milliGRAM(s) Oral three times a day PRN anxiety  guaiFENesin   Syrup  (Sugar-Free) 100 milliGRAM(s) Oral every 6 hours PRN Cough  oxyCODONE    IR 10 milliGRAM(s) Oral every 4 hours PRN Severe Pain (7 - 10)  oxyCODONE    IR 5 milliGRAM(s) Oral every 4 hours PRN Moderate Pain (4 - 6)  polyethylene glycol 3350 17 Gram(s) Oral daily PRN Constipation  senna 2 Tablet(s) Oral at bedtime PRN Constipation      Vital Signs Last 24 Hrs  T(C): 36.4 (05 Feb 2021 05:47), Max: 36.6 (04 Feb 2021 17:58)  T(F): 97.6 (05 Feb 2021 05:47), Max: 97.8 (04 Feb 2021 17:58)  HR: 106 (05 Feb 2021 07:15) (93 - 106)  BP: 146/82 (05 Feb 2021 07:15) (109/74 - 146/82)  BP(mean): --  RR: 24 (05 Feb 2021 07:15) (17 - 24)  SpO2: 95% (05 Feb 2021 07:15) (95% - 98%)  CAPILLARY BLOOD GLUCOSE      POCT Blood Glucose.: 122 mg/dL (05 Feb 2021 07:42)  POCT Blood Glucose.: 121 mg/dL (04 Feb 2021 16:23)    I&O's Summary    04 Feb 2021 07:01  -  05 Feb 2021 07:00  --------------------------------------------------------  IN: 610 mL / OUT: 0 mL / NET: 610 mL        PHYSICAL EXAM:  GENERAL: NAD, elderly female  CHEST/LUNG: +Crackles b/l; No wheeze  HEART: Regular rate and rhythm; No murmurs, rubs, or gallops  ABDOMEN: Soft, Nontender, Nondistended; Bowel sounds present  EXTREMITIES:  2+ Peripheral Pulses, No clubbing, cyanosis. 2+ b/l le pitting edema  NEUROLOGY: AAOx3, non-focal  PSYCH: calm    LABS:                        10.1   8.27  )-----------( 368      ( 04 Feb 2021 06:10 )             33.7     02-05    143  |  103  |  14  ----------------------------<  110<H>  3.0<L>   |  29  |  0.90    Ca    8.7      05 Feb 2021 06:44  Mg     1.9     02-04    TPro  6.6  /  Alb  2.6<L>  /  TBili  3.2<H>  /  DBili  1.7<H>  /  AST  41<H>  /  ALT  37  /  AlkPhos  134<H>  02-05      CARDIAC MARKERS ( 04 Feb 2021 06:10 )  .134 ng/mL / x     / x     / x     / x      CARDIAC MARKERS ( 03 Feb 2021 16:00 )  .150 ng/mL / x     / 55 U/L / x     / 1.9 ng/mL          RADIOLOGY & ADDITIONAL TESTS:  Results Reviewed:   Imaging Personally Reviewed:  Electrocardiogram Personally Reviewed:    COORDINATION OF CARE:  Care Discussed with Consultants/Other Providers [Y/N]:  Prior or Outpatient Records Reviewed [Y/N]:

## 2021-02-05 NOTE — PROGRESS NOTE ADULT - ASSESSMENT
75y with PMHx of paroxysmal Afib (on Xarelto), PAD (s/p Q-fadasbl-xvohtknup bypass, R-femoral angioplasty with stenting, (on Plavix), HTN, HLD, type 2 diabetes (HA1c on 7.8 on Metformin and Tresiba as an outpatient), and recent trauma to right thigh with stable hematoma, presented to [facility] 1/12/21 with SOB, found to have NSTEMI. 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 S/P C3/DUANE Clip/MV Repair and DUANE thrombectomy with Dr. Orellana on 1/19/21. Postop course complicated by hypoxic respiratory failure and KRISTIE now resolved, and hypokalemia on daily repletion while on Torsemide, admitted to  for rehab on jan 28 and was found with COVID 19. transferred to medicine on 2/1 for worsening SOB/SHF/PNA  Patient evaluated for return to rehab     COVID-19 Infection: not on o2 , -Complete 5 days of Remdisivir 2/5     Patient with impairments with ADLs, mobility, gait due to recent medical and surgical course and due to COVID 19 infection.   Patient also with acute on chronic combined systolic and diastolic HF- on IV diuretics. Daily weights  Transaminitis  Right thigh hematoma    Patient would benefit from acute inpatient rehab : PT/OT total of 3 hrs/day 5 days/week. Needs daily physician oversight for medical comorbidities, 24 hr nursing.   May be transferred to acute rehab when medically stable and bed available. Prefer patient to be switched to PO diuretics if feasible, prior to rehab transfer.   Continue bedside therapy as tolerated   d/w Dr. Brar

## 2021-02-05 NOTE — OCCUPATIONAL THERAPY INITIAL EVALUATION ADULT - ADDITIONAL COMMENTS
Pt lives in private ranch home with  with 3STE +2 rails to enter and 4 steps inside with 1 rail. House obtains +tub with curtain +grab bar. As per pt, PTA pt was independent with all ADLs and functional transfers/mobility.

## 2021-02-05 NOTE — PROGRESS NOTE ADULT - ASSESSMENT
75y with PMHx of paroxysmal Afib (on Xarelto), PAD (s/p Y-yfyozfc-lddtmuktb bypass, R-femoral angioplasty with stenting, (on Plavix), HTN, HLD, type 2 diabetes (HA1c on 7.8 on Metformin and Tresiba as an outpatient), and recent trauma to right thigh with stable hematoma, presented to [facility] 1/12/21 with SOB, found to have NSTEMI. 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 S/P C3/DUANE Clip/MV Repair and DUANE thrombectomy with Dr. Orellana on 1/19/21. Postop course complicated by hypoxic respiratory failure and KRISTIE now resolved, and hypokalemia on daily repletion while on Torsemide, admitted to  for rehab on jan 28 and was found with COVID 19. transferred to medicine on 2/1 for worsening SOB/SHF/PNA    #Acute on chronic combined systolic and Diastolic CHF  #COVID-19 Infection  -SpO2 currently 97% at rest on RA  -Completed 5 days of Remdisivir  -Continue Lasix IV BID   -I/O's and daily weights  -Cardiology recommendations noted     #Recent NSTEMI S/P CABG, MV Repair and DUANE thrombectomy 1/19/21  #Chronic Afib   #Mild to mod AS  -ASA, Lipitor 40 mg PO QHS   -Continue Eliquis 2.5mg BID (reduced dose due to large hematoma in right thigh from trauma prior to surgery)  -Continue Amiodarone and Lopressor  -Cardiology on board    #Transaminitis   -Secondary to COVID-19  -Bilirubin downtrending    #DM2  -HbA1c 7.8 on Metformin and Tresiba as outpatient  -Continue Lantus + ISS,    #Anemia  -Stable  -Monitor    #Hematoma Right thigh  -monitor    OT recommendations Rehab  PT pending    #DVT ppx- Eliquis    #Code: Full  Dispo: Possible dispo back to acute rehab     Updated  Derrick Roblero

## 2021-02-05 NOTE — PROGRESS NOTE ADULT - SUBJECTIVE AND OBJECTIVE BOX
HPI:  75y with PMHx of paroxysmal Afib (on Xarelto), PAD (s/p N-pjkfkdy-qmcbqcehi bypass, R-femoral angioplasty with stenting, (on Plavix), HTN, HLD, T2IRDM s/p recent trauma to right thigh with stable hematoma, presented to [facility] 1/12/21 with SOB, found to have NSTEMI. 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 S/P C3/DUANE Clip/MV Repair and DUANE thrombectomy with Dr. Orellana on 1/19/21. Postop course complicated by hypoxic respiratory failure and KRISTIE now resolved, and hyponatremia, hypokalemia on daily repletion while on Torsemide, admitted to  for rehab and now with COVID 19 and tx to medical service for worsening dyspnea. and further treatment Started on Remdesivir while in rehab    Patient s/p 5 days of remdesivir  Also evaluated by cardiology and on IV diuresis     Past  medical history: as above    Social/Functional:  Pt lives in private ranch home with  with 3STE +2 rails to enter and 4 steps inside with 1 rail. House obtains +tub with curtain +grab bar. As per pt, PTA pt was independent with all ADLs and functional transfers/mobility.       TODAY'S SUBJECTIVE & REVIEW OF SYMPTOMS  Patient states that she feels ok, Still with poor sleep - ? reason  Poor appetite  Denies HA/CP/palpitations/abdominal pain   On droplet and airborne precautions- not on o2         PHYSICAL EXAM    Vital Signs Last 24 Hrs  T(C): 36.6 (05 Feb 2021 10:38), Max: 36.6 (04 Feb 2021 17:58)  T(F): 97.8 (05 Feb 2021 10:38), Max: 97.8 (04 Feb 2021 17:58)  HR: 101 (05 Feb 2021 10:38) (97 - 106)  BP: 136/76 (05 Feb 2021 10:38) (109/74 - 146/82)  BP(mean): --  RR: 18 (05 Feb 2021 10:38) (17 - 24)  SpO2: 98% (05 Feb 2021 10:38) (95% - 98%)    Constitutional - NAD, Comfortable, aaox3, appears less jaundiced when compared to earlier this week   Chest - Breathing comfortably   Cardiovascular - radial pulse well felt   Abdomen - soft, NT   Extremities - Bilateral LE edema , no calf tenderness.   Motor UE 5/5, LE HF 4/5, KE 4/5 ankle 5/5  Psychiatric - Mood stable, Affect WNL  Skin: Sternal incision clean, drain sites with some scabs, right thigh and knee - with ecchymosis - hematoma      Function with OT:  Bed Mobility: Supine to Sit:     · Level of [place]	minimum assist (75% patients effort)  · Physical Assist/Nonphysical Assist	1 person assist  · Assistive Device	bed rails    Transfer: Toilet Transfer:     · Level of [place]	moderate assist (50% patients effort)  · Physical Assist/Nonphysical Assist	1 person assist  · Assistive Device	commode; rolling walker    Upper Body Dressing Training:     · Level of [place]	minimum assist (75% patients effort)  · Physical Assist/Nonphysical Assist	1 person assist    Lower Body Dressing Training:     · Level of [place]	moderate assist (50% patients effort)  · Physical Assist/Nonphysical Assist	1 person assist  · Assistive Device	reacher; due to sternal precautions            MEDICATIONS  (STANDING):  aMIOdarone    Tablet 200 milliGRAM(s) Oral daily  apixaban 2.5 milliGRAM(s) Oral every 12 hours  ascorbic acid 500 milliGRAM(s) Oral daily  aspirin enteric coated 81 milliGRAM(s) Oral daily  atorvastatin 40 milliGRAM(s) Oral at bedtime  dextrose 40% Gel 15 Gram(s) Oral once  dextrose 5%. 1000 milliLiter(s) (50 mL/Hr) IV Continuous <Continuous>  dextrose 5%. 1000 milliLiter(s) (100 mL/Hr) IV Continuous <Continuous>  dextrose 50% Injectable 25 Gram(s) IV Push once  dextrose 50% Injectable 12.5 Gram(s) IV Push once  dextrose 50% Injectable 25 Gram(s) IV Push once  furosemide   Injectable 40 milliGRAM(s) IV Push two times a day  glucagon  Injectable 1 milliGRAM(s) IntraMuscular once  influenza   Vaccine 0.5 milliLiter(s) IntraMuscular once  insulin glargine Injectable (LANTUS) 26 Unit(s) SubCutaneous at bedtime  insulin lispro (ADMELOG) corrective regimen sliding scale   SubCutaneous three times a day before meals  insulin lispro (ADMELOG) corrective regimen sliding scale   SubCutaneous at bedtime  melatonin 6 milliGRAM(s) Oral at bedtime  metoprolol tartrate 25 milliGRAM(s) Oral every 8 hours  multivitamin 1 Tablet(s) Oral daily  pantoprazole    Tablet 40 milliGRAM(s) Oral before breakfast  potassium chloride    Tablet ER 40 milliEquivalent(s) Oral two times a day    MEDICATIONS  (PRN):  acetaminophen   Tablet .. 650 milliGRAM(s) Oral every 8 hours PRN Temp greater or equal to 38C (100.4F), Mild Pain (1 - 3)  ALPRAZolam 0.5 milliGRAM(s) Oral three times a day PRN anxiety  guaiFENesin   Syrup  (Sugar-Free) 100 milliGRAM(s) Oral every 6 hours PRN Cough  oxyCODONE    IR 10 milliGRAM(s) Oral every 4 hours PRN Severe Pain (7 - 10)  oxyCODONE    IR 5 milliGRAM(s) Oral every 4 hours PRN Moderate Pain (4 - 6)  polyethylene glycol 3350 17 Gram(s) Oral daily PRN Constipation  senna 2 Tablet(s) Oral at bedtime PRN Constipation      RECENT LABS/IMAGING                        10.1   8.27  )-----------( 368      ( 04 Feb 2021 06:10 )             33.7     02-05    143  |  103  |  14  ----------------------------<  110<H>  3.0<L>   |  29  |  0.90    Ca    8.7      05 Feb 2021 06:44  Mg     1.9     02-04    TPro  6.6  /  Alb  2.6<L>  /  TBili  3.2<H>  /  DBili  1.7<H>  /  AST  41<H>  /  ALT  37  /  AlkPhos  134<H>  02-05    CAPILLARY BLOOD GLUCOSE      POCT Blood Glucose.: 98 mg/dL (05 Feb 2021 11:25)  POCT Blood Glucose.: 122 mg/dL (05 Feb 2021 07:42)  POCT Blood Glucose.: 121 mg/dL (04 Feb 2021 16:23)

## 2021-02-06 LAB
ANION GAP SERPL CALC-SCNC: 11 MMOL/L — SIGNIFICANT CHANGE UP (ref 5–17)
BUN SERPL-MCNC: 14 MG/DL — SIGNIFICANT CHANGE UP (ref 7–23)
CALCIUM SERPL-MCNC: 8.7 MG/DL — SIGNIFICANT CHANGE UP (ref 8.4–10.5)
CHLORIDE SERPL-SCNC: 105 MMOL/L — SIGNIFICANT CHANGE UP (ref 96–108)
CO2 SERPL-SCNC: 28 MMOL/L — SIGNIFICANT CHANGE UP (ref 22–31)
CREAT SERPL-MCNC: 0.88 MG/DL — SIGNIFICANT CHANGE UP (ref 0.5–1.3)
GLUCOSE BLDC GLUCOMTR-MCNC: 118 MG/DL — HIGH (ref 70–99)
GLUCOSE BLDC GLUCOMTR-MCNC: 122 MG/DL — HIGH (ref 70–99)
GLUCOSE BLDC GLUCOMTR-MCNC: 127 MG/DL — HIGH (ref 70–99)
GLUCOSE BLDC GLUCOMTR-MCNC: 80 MG/DL — SIGNIFICANT CHANGE UP (ref 70–99)
GLUCOSE SERPL-MCNC: 90 MG/DL — SIGNIFICANT CHANGE UP (ref 70–99)
POTASSIUM SERPL-MCNC: 3.2 MMOL/L — LOW (ref 3.5–5.3)
POTASSIUM SERPL-SCNC: 3.2 MMOL/L — LOW (ref 3.5–5.3)
SODIUM SERPL-SCNC: 144 MMOL/L — SIGNIFICANT CHANGE UP (ref 135–145)

## 2021-02-06 PROCEDURE — 71045 X-RAY EXAM CHEST 1 VIEW: CPT | Mod: 26

## 2021-02-06 PROCEDURE — 99233 SBSQ HOSP IP/OBS HIGH 50: CPT

## 2021-02-06 RX ORDER — INSULIN GLARGINE 100 [IU]/ML
20 INJECTION, SOLUTION SUBCUTANEOUS AT BEDTIME
Refills: 0 | Status: DISCONTINUED | OUTPATIENT
Start: 2021-02-06 | End: 2021-02-22

## 2021-02-06 RX ORDER — FUROSEMIDE 40 MG
40 TABLET ORAL THREE TIMES A DAY
Refills: 0 | Status: DISCONTINUED | OUTPATIENT
Start: 2021-02-06 | End: 2021-02-08

## 2021-02-06 RX ADMIN — Medication 25 MILLIGRAM(S): at 21:01

## 2021-02-06 RX ADMIN — Medication 1 TABLET(S): at 12:08

## 2021-02-06 RX ADMIN — APIXABAN 2.5 MILLIGRAM(S): 2.5 TABLET, FILM COATED ORAL at 16:37

## 2021-02-06 RX ADMIN — Medication 40 MILLIGRAM(S): at 12:08

## 2021-02-06 RX ADMIN — INSULIN GLARGINE 20 UNIT(S): 100 INJECTION, SOLUTION SUBCUTANEOUS at 21:02

## 2021-02-06 RX ADMIN — PANTOPRAZOLE SODIUM 40 MILLIGRAM(S): 20 TABLET, DELAYED RELEASE ORAL at 06:04

## 2021-02-06 RX ADMIN — ATORVASTATIN CALCIUM 40 MILLIGRAM(S): 80 TABLET, FILM COATED ORAL at 21:00

## 2021-02-06 RX ADMIN — Medication 500 MILLIGRAM(S): at 12:08

## 2021-02-06 RX ADMIN — Medication 25 MILLIGRAM(S): at 06:04

## 2021-02-06 RX ADMIN — Medication 40 MILLIGRAM(S): at 06:04

## 2021-02-06 RX ADMIN — Medication 81 MILLIGRAM(S): at 12:08

## 2021-02-06 RX ADMIN — Medication 40 MILLIEQUIVALENT(S): at 06:05

## 2021-02-06 RX ADMIN — AMIODARONE HYDROCHLORIDE 200 MILLIGRAM(S): 400 TABLET ORAL at 06:04

## 2021-02-06 RX ADMIN — Medication 25 MILLIGRAM(S): at 12:08

## 2021-02-06 RX ADMIN — Medication 40 MILLIGRAM(S): at 21:01

## 2021-02-06 RX ADMIN — Medication 40 MILLIEQUIVALENT(S): at 16:37

## 2021-02-06 RX ADMIN — Medication 6 MILLIGRAM(S): at 21:01

## 2021-02-06 RX ADMIN — APIXABAN 2.5 MILLIGRAM(S): 2.5 TABLET, FILM COATED ORAL at 06:04

## 2021-02-06 NOTE — PHYSICAL THERAPY INITIAL EVALUATION ADULT - PRECAUTIONS/LIMITATIONS, REHAB EVAL
2 liters via nc/cardiac precautions/fall precautions/isolation precautions/oxygen therapy device and L/min

## 2021-02-06 NOTE — PROGRESS NOTE ADULT - SUBJECTIVE AND OBJECTIVE BOX
Patient is a 75y old  Female who presents with a chief complaint of COVID 19 pna (05 Feb 2021 14:53)      SUBJECTIVE / OVERNIGHT EVENTS:  Pt seen and examined at bedside. No acute events overnight.  Pt denies cp, palpitations, sob, abd pain, N/V, fever, chills.    ROS:  All other review of systems negative    Allergies    warfarin (Rash)    Intolerances    OHS (Unknown)      MEDICATIONS  (STANDING):  aMIOdarone    Tablet 200 milliGRAM(s) Oral daily  apixaban 2.5 milliGRAM(s) Oral every 12 hours  ascorbic acid 500 milliGRAM(s) Oral daily  aspirin enteric coated 81 milliGRAM(s) Oral daily  atorvastatin 40 milliGRAM(s) Oral at bedtime  dextrose 40% Gel 15 Gram(s) Oral once  dextrose 5%. 1000 milliLiter(s) (50 mL/Hr) IV Continuous <Continuous>  dextrose 5%. 1000 milliLiter(s) (100 mL/Hr) IV Continuous <Continuous>  dextrose 50% Injectable 25 Gram(s) IV Push once  dextrose 50% Injectable 12.5 Gram(s) IV Push once  dextrose 50% Injectable 25 Gram(s) IV Push once  furosemide   Injectable 40 milliGRAM(s) IV Push three times a day  glucagon  Injectable 1 milliGRAM(s) IntraMuscular once  influenza   Vaccine 0.5 milliLiter(s) IntraMuscular once  insulin glargine Injectable (LANTUS) 20 Unit(s) SubCutaneous at bedtime  insulin lispro (ADMELOG) corrective regimen sliding scale   SubCutaneous three times a day before meals  insulin lispro (ADMELOG) corrective regimen sliding scale   SubCutaneous at bedtime  melatonin 6 milliGRAM(s) Oral at bedtime  metoprolol tartrate 25 milliGRAM(s) Oral every 8 hours  multivitamin 1 Tablet(s) Oral daily  pantoprazole    Tablet 40 milliGRAM(s) Oral before breakfast  potassium chloride    Tablet ER 40 milliEquivalent(s) Oral two times a day    MEDICATIONS  (PRN):  acetaminophen   Tablet .. 650 milliGRAM(s) Oral every 8 hours PRN Temp greater or equal to 38C (100.4F), Mild Pain (1 - 3)  ALPRAZolam 0.5 milliGRAM(s) Oral three times a day PRN anxiety  guaiFENesin   Syrup  (Sugar-Free) 100 milliGRAM(s) Oral every 6 hours PRN Cough  oxyCODONE    IR 10 milliGRAM(s) Oral every 4 hours PRN Severe Pain (7 - 10)  oxyCODONE    IR 5 milliGRAM(s) Oral every 4 hours PRN Moderate Pain (4 - 6)  polyethylene glycol 3350 17 Gram(s) Oral daily PRN Constipation  senna 2 Tablet(s) Oral at bedtime PRN Constipation      Vital Signs Last 24 Hrs  T(C): 36.4 (06 Feb 2021 05:00), Max: 37.2 (05 Feb 2021 21:15)  T(F): 97.6 (06 Feb 2021 05:00), Max: 98.9 (05 Feb 2021 21:15)  HR: 92 (06 Feb 2021 05:00) (92 - 117)  BP: 137/78 (06 Feb 2021 05:00) (124/76 - 147/78)  BP(mean): --  RR: 18 (06 Feb 2021 05:00) (18 - 18)  SpO2: 100% (06 Feb 2021 05:00) (98% - 100%)  CAPILLARY BLOOD GLUCOSE      POCT Blood Glucose.: 80 mg/dL (06 Feb 2021 07:47)  POCT Blood Glucose.: 114 mg/dL (05 Feb 2021 20:34)  POCT Blood Glucose.: 98 mg/dL (05 Feb 2021 11:25)    I&O's Summary      PHYSICAL EXAM:  GENERAL: NAD, elderly female  CHEST/LUNG: +Crackles b/l; No wheeze  HEART: Regular rate and rhythm; No murmurs, rubs, or gallops  ABDOMEN: Soft, Nontender, Nondistended; Bowel sounds present  EXTREMITIES:  2+ Peripheral Pulses, No clubbing, cyanosis. 2+ b/l le pitting edema  NEUROLOGY: AAOx3, non-focal  PSYCH: calm    LABS:    02-06    144  |  105  |  14  ----------------------------<  90  3.2<L>   |  28  |  0.88    Ca    8.7      06 Feb 2021 07:59    TPro  6.6  /  Alb  2.6<L>  /  TBili  3.2<H>  /  DBili  1.7<H>  /  AST  41<H>  /  ALT  37  /  AlkPhos  134<H>  02-05              RADIOLOGY & ADDITIONAL TESTS:  Results Reviewed:   Imaging Personally Reviewed:  Electrocardiogram Personally Reviewed:    COORDINATION OF CARE:  Care Discussed with Consultants/Other Providers [Y/N]:  Prior or Outpatient Records Reviewed [Y/N]:

## 2021-02-06 NOTE — DIETITIAN INITIAL EVALUATION ADULT. - ORAL INTAKE PTA/DIET HISTORY
Pt reports normal appetite PTA, familiar with consistent carbohydrate recommendations. NKFA. Denies chewing/swallowing difficulty.

## 2021-02-06 NOTE — PHYSICAL THERAPY INITIAL EVALUATION ADULT - PERTINENT HX OF CURRENT PROBLEM, REHAB EVAL
Patient is a 76 y/o s/p NSTEMI and multivessel CAD s/p CAB x 3and DUANE thrombectomy on 1/19, patient sent to rehab, now d/c to medical foor with COVID +,

## 2021-02-06 NOTE — PHYSICAL THERAPY INITIAL EVALUATION ADULT - ADDITIONAL COMMENTS
patient lives with spouse 3 steps to enter with rails. PTA patient was indep with all ADL's and amb without AD

## 2021-02-06 NOTE — DIETITIAN INITIAL EVALUATION ADULT. - OTHER INFO
75y with PMHx of paroxysmal Afib (on Xarelto), PAD, HTN, HLD, type 2 diabetes (HA1c on 7.8 on Metformin and Tresiba as an outpatient), and recent trauma to right thigh with stable hematoma, presented to [facility] 1/12/21 with SOB, found to have NSTEMI. 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 Admitted to  for rehab on jan 28 and was found with COVID 19.   Pt reporting poor appetite, states that she has no desire to eat. Reports consuming <75% of meals > 7 days during rehab/hospital course. Believes she has lost a significant amount of weight. Dislikes Glucerna Shakes, but willing to take yogurt parfaits at all meals for added protein/calories. Additional food preferences obtained. Encourage optimization of protein intake at meals + increased oral hydration. Denies GI distress.

## 2021-02-06 NOTE — PROGRESS NOTE ADULT - ASSESSMENT
75y with PMHx of paroxysmal Afib (on Xarelto), PAD (s/p A-egvdval-agvhtqwpa bypass, R-femoral angioplasty with stenting, (on Plavix), HTN, HLD, type 2 diabetes (HA1c on 7.8 on Metformin and Tresiba as an outpatient), and recent trauma to right thigh with stable hematoma, presented to [facility] 1/12/21 with SOB, found to have NSTEMI. 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 S/P C3/DUANE Clip/MV Repair and DUANE thrombectomy with Dr. Orellana on 1/19/21. Postop course complicated by hypoxic respiratory failure and KRISTIE now resolved, and hypokalemia on daily repletion while on Torsemide, admitted to  for rehab on jan 28 and was found with COVID 19. transferred to medicine on 2/1 for worsening SOB/SHF/PNA    #Acute on chronic combined systolic and Diastolic CHF  #COVID-19 Infection  -SpO2 currently 97% at rest on RA  -Completed 5 days of Remdisivir  -Increase frequency of Lasix IV  to TID  -I/O's and daily weights  -Follow up with CXR    #Recent NSTEMI S/P CABG, MV Repair and DUANE thrombectomy 1/19/21  #Chronic Afib   #Mild to mod AS  -ASA, Lipitor 40 mg PO QHS   -Continue Eliquis 2.5mg BID (reduced dose due to large hematoma in right thigh from trauma prior to surgery)  -Continue Amiodarone and Lopressor  -Cardiology on board    #Transaminitis   -Secondary to COVID-19  -Bilirubin downtrending    #DM2  -HbA1c 7.8 on Metformin and Tresiba as outpatient  -Continue Lantus + ISS,    #Anemia  -Stable  -Monitor    #Hematoma Right thigh  -monitor    Physiatry recommendations noted; Acute rehab once medically stable     #DVT ppx- Eliquis    #Code: Full  Dispo: Back to acute rehab     Updated  Derrick Roblero

## 2021-02-07 LAB
ALBUMIN SERPL ELPH-MCNC: 2.8 G/DL — LOW (ref 3.3–5)
ALP SERPL-CCNC: 128 U/L — HIGH (ref 40–120)
ALT FLD-CCNC: 41 U/L — SIGNIFICANT CHANGE UP (ref 10–45)
ANION GAP SERPL CALC-SCNC: 10 MMOL/L — SIGNIFICANT CHANGE UP (ref 5–17)
AST SERPL-CCNC: 47 U/L — HIGH (ref 10–40)
BILIRUB SERPL-MCNC: 3.2 MG/DL — HIGH (ref 0.2–1.2)
BUN SERPL-MCNC: 13 MG/DL — SIGNIFICANT CHANGE UP (ref 7–23)
CALCIUM SERPL-MCNC: 9 MG/DL — SIGNIFICANT CHANGE UP (ref 8.4–10.5)
CHLORIDE SERPL-SCNC: 103 MMOL/L — SIGNIFICANT CHANGE UP (ref 96–108)
CO2 SERPL-SCNC: 31 MMOL/L — SIGNIFICANT CHANGE UP (ref 22–31)
CREAT SERPL-MCNC: 1.07 MG/DL — SIGNIFICANT CHANGE UP (ref 0.5–1.3)
GLUCOSE BLDC GLUCOMTR-MCNC: 111 MG/DL — HIGH (ref 70–99)
GLUCOSE BLDC GLUCOMTR-MCNC: 123 MG/DL — HIGH (ref 70–99)
GLUCOSE BLDC GLUCOMTR-MCNC: 131 MG/DL — HIGH (ref 70–99)
GLUCOSE BLDC GLUCOMTR-MCNC: 135 MG/DL — HIGH (ref 70–99)
GLUCOSE SERPL-MCNC: 117 MG/DL — HIGH (ref 70–99)
POTASSIUM SERPL-MCNC: 3.4 MMOL/L — LOW (ref 3.5–5.3)
POTASSIUM SERPL-SCNC: 3.4 MMOL/L — LOW (ref 3.5–5.3)
PROT SERPL-MCNC: 6.9 G/DL — SIGNIFICANT CHANGE UP (ref 6–8.3)
SODIUM SERPL-SCNC: 144 MMOL/L — SIGNIFICANT CHANGE UP (ref 135–145)

## 2021-02-07 PROCEDURE — 99233 SBSQ HOSP IP/OBS HIGH 50: CPT

## 2021-02-07 RX ORDER — METOPROLOL TARTRATE 50 MG
25 TABLET ORAL ONCE
Refills: 0 | Status: COMPLETED | OUTPATIENT
Start: 2021-02-07 | End: 2021-02-07

## 2021-02-07 RX ORDER — METOPROLOL TARTRATE 50 MG
50 TABLET ORAL EVERY 8 HOURS
Refills: 0 | Status: DISCONTINUED | OUTPATIENT
Start: 2021-02-07 | End: 2021-02-13

## 2021-02-07 RX ADMIN — PANTOPRAZOLE SODIUM 40 MILLIGRAM(S): 20 TABLET, DELAYED RELEASE ORAL at 05:41

## 2021-02-07 RX ADMIN — Medication 1 TABLET(S): at 12:01

## 2021-02-07 RX ADMIN — Medication 25 MILLIGRAM(S): at 05:41

## 2021-02-07 RX ADMIN — Medication 50 MILLIGRAM(S): at 21:09

## 2021-02-07 RX ADMIN — Medication 100 MILLIGRAM(S): at 21:08

## 2021-02-07 RX ADMIN — ATORVASTATIN CALCIUM 40 MILLIGRAM(S): 80 TABLET, FILM COATED ORAL at 21:08

## 2021-02-07 RX ADMIN — APIXABAN 2.5 MILLIGRAM(S): 2.5 TABLET, FILM COATED ORAL at 16:54

## 2021-02-07 RX ADMIN — Medication 50 MILLIGRAM(S): at 14:17

## 2021-02-07 RX ADMIN — Medication 40 MILLIGRAM(S): at 14:16

## 2021-02-07 RX ADMIN — Medication 40 MILLIGRAM(S): at 05:41

## 2021-02-07 RX ADMIN — AMIODARONE HYDROCHLORIDE 200 MILLIGRAM(S): 400 TABLET ORAL at 05:41

## 2021-02-07 RX ADMIN — Medication 25 MILLIGRAM(S): at 10:18

## 2021-02-07 RX ADMIN — Medication 81 MILLIGRAM(S): at 12:01

## 2021-02-07 RX ADMIN — Medication 40 MILLIGRAM(S): at 21:09

## 2021-02-07 RX ADMIN — Medication 40 MILLIEQUIVALENT(S): at 16:54

## 2021-02-07 RX ADMIN — INSULIN GLARGINE 20 UNIT(S): 100 INJECTION, SOLUTION SUBCUTANEOUS at 21:07

## 2021-02-07 RX ADMIN — APIXABAN 2.5 MILLIGRAM(S): 2.5 TABLET, FILM COATED ORAL at 05:41

## 2021-02-07 RX ADMIN — Medication 40 MILLIEQUIVALENT(S): at 05:41

## 2021-02-07 RX ADMIN — Medication 6 MILLIGRAM(S): at 21:08

## 2021-02-07 RX ADMIN — Medication 500 MILLIGRAM(S): at 12:01

## 2021-02-07 NOTE — PROGRESS NOTE ADULT - SUBJECTIVE AND OBJECTIVE BOX
Patient is a 75y old  Female who presents with a chief complaint of COVID 19 pna (06 Feb 2021 10:27)      SUBJECTIVE / OVERNIGHT EVENTS:  Pt seen and examined at bedside. No acute events overnight.  Pt denies cp, palpitations, sob, abd pain, N/V, fever, chills.    ROS:  All other review of systems negative    Allergies    warfarin (Rash)    Intolerances    OHS (Unknown)      MEDICATIONS  (STANDING):  aMIOdarone    Tablet 200 milliGRAM(s) Oral daily  apixaban 2.5 milliGRAM(s) Oral every 12 hours  ascorbic acid 500 milliGRAM(s) Oral daily  aspirin enteric coated 81 milliGRAM(s) Oral daily  atorvastatin 40 milliGRAM(s) Oral at bedtime  dextrose 40% Gel 15 Gram(s) Oral once  dextrose 5%. 1000 milliLiter(s) (50 mL/Hr) IV Continuous <Continuous>  dextrose 5%. 1000 milliLiter(s) (100 mL/Hr) IV Continuous <Continuous>  dextrose 50% Injectable 25 Gram(s) IV Push once  dextrose 50% Injectable 12.5 Gram(s) IV Push once  dextrose 50% Injectable 25 Gram(s) IV Push once  furosemide   Injectable 40 milliGRAM(s) IV Push three times a day  glucagon  Injectable 1 milliGRAM(s) IntraMuscular once  influenza   Vaccine 0.5 milliLiter(s) IntraMuscular once  insulin glargine Injectable (LANTUS) 20 Unit(s) SubCutaneous at bedtime  insulin lispro (ADMELOG) corrective regimen sliding scale   SubCutaneous three times a day before meals  insulin lispro (ADMELOG) corrective regimen sliding scale   SubCutaneous at bedtime  melatonin 6 milliGRAM(s) Oral at bedtime  metoprolol tartrate 25 milliGRAM(s) Oral every 8 hours  multivitamin 1 Tablet(s) Oral daily  pantoprazole    Tablet 40 milliGRAM(s) Oral before breakfast  potassium chloride    Tablet ER 40 milliEquivalent(s) Oral two times a day    MEDICATIONS  (PRN):  acetaminophen   Tablet .. 650 milliGRAM(s) Oral every 8 hours PRN Temp greater or equal to 38C (100.4F), Mild Pain (1 - 3)  ALPRAZolam 0.5 milliGRAM(s) Oral three times a day PRN anxiety  guaiFENesin   Syrup  (Sugar-Free) 100 milliGRAM(s) Oral every 6 hours PRN Cough  oxyCODONE    IR 10 milliGRAM(s) Oral every 4 hours PRN Severe Pain (7 - 10)  oxyCODONE    IR 5 milliGRAM(s) Oral every 4 hours PRN Moderate Pain (4 - 6)  polyethylene glycol 3350 17 Gram(s) Oral daily PRN Constipation  senna 2 Tablet(s) Oral at bedtime PRN Constipation      Vital Signs Last 24 Hrs  T(C): 37 (07 Feb 2021 08:04), Max: 37.2 (07 Feb 2021 05:39)  T(F): 98.6 (07 Feb 2021 08:04), Max: 98.9 (07 Feb 2021 05:39)  HR: 124 (07 Feb 2021 08:04) (107 - 124)  BP: 111/68 (07 Feb 2021 08:04) (111/68 - 128/79)  BP(mean): --  RR: 16 (07 Feb 2021 08:04) (16 - 18)  SpO2: 94% (07 Feb 2021 08:04) (94% - 99%)  CAPILLARY BLOOD GLUCOSE      POCT Blood Glucose.: 111 mg/dL (07 Feb 2021 07:51)  POCT Blood Glucose.: 127 mg/dL (06 Feb 2021 20:49)  POCT Blood Glucose.: 122 mg/dL (06 Feb 2021 16:42)  POCT Blood Glucose.: 118 mg/dL (06 Feb 2021 12:14)    I&O's Summary      PHYSICAL EXAM:  GENERAL: NAD, elderly female  CHEST/LUNG: +Crackles b/l; No wheeze  HEART: Regular rate and rhythm; No murmurs, rubs, or gallops  ABDOMEN: Soft, Nontender, Nondistended; Bowel sounds present  EXTREMITIES:  2+ Peripheral Pulses, No clubbing, cyanosis. 1+ b/l le pitting edema  NEUROLOGY: AAOx3, non-focal  PSYCH: calm      LABS:    02-06    144  |  105  |  14  ----------------------------<  90  3.2<L>   |  28  |  0.88    Ca    8.7      06 Feb 2021 07:59                RADIOLOGY & ADDITIONAL TESTS:  Results Reviewed:   Imaging Personally Reviewed:  Electrocardiogram Personally Reviewed:    COORDINATION OF CARE:  Care Discussed with Consultants/Other Providers [Y/N]:  Prior or Outpatient Records Reviewed [Y/N]:

## 2021-02-07 NOTE — PROGRESS NOTE ADULT - ASSESSMENT
75y with PMHx of paroxysmal Afib (on Xarelto), PAD (s/p M-jjxeblk-tbpkdmxtm bypass, R-femoral angioplasty with stenting, (on Plavix), HTN, HLD, type 2 diabetes (HA1c on 7.8 on Metformin and Tresiba as an outpatient), and recent trauma to right thigh with stable hematoma, presented to [facility] 1/12/21 with SOB, found to have NSTEMI. 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 S/P C3/DUANE Clip/MV Repair and DUANE thrombectomy with Dr. Orellana on 1/19/21. Postop course complicated by hypoxic respiratory failure and KRISTIE now resolved, and hypokalemia on daily repletion while on Torsemide, admitted to  for rehab on jan 28 and was found with COVID 19. transferred to medicine on 2/1 for worsening SOB/SHF/PNA    #Acute on chronic combined systolic and Diastolic CHF  #COVID-19 Infection  -SpO2 currently 97% at rest on RA  -Completed 5 days of Remdisivir  -Improvement in volume status. Will continue Lasix IV TID for one more day and likely transition to Lasix 60mg PO BID tomorrow  -I/O's and daily weights    #Recent NSTEMI S/P CABG, MV Repair and DUANE thrombectomy 1/19/21  #Chronic Afib   #Mild to mod AS  -ASA, Lipitor 40 mg PO QHS   -Continue Eliquis 2.5mg BID (reduced dose due to large hematoma in right thigh from trauma prior to surgery)  -Continue Amiodarone and Lopressor  -Cardiology on board    #Transaminitis   -Secondary to COVID-19  -Bilirubin downtrending    #DM2  -HbA1c 7.8 on Metformin and Tresiba as outpatient  -Continue Lantus + ISS,    #Anemia  -Stable  -Monitor    #Hematoma Right thigh  -monitor    Physiatry recommendations noted; Acute rehab once medically stable     #DVT ppx- Eliquis    #Code: Full  Dispo: Back to acute rehab     Updated  Derrick Roblero 75y with PMHx of paroxysmal Afib (on Xarelto), PAD (s/p V-hfivmkm-dypowzhfc bypass, R-femoral angioplasty with stenting, (on Plavix), HTN, HLD, type 2 diabetes (HA1c on 7.8 on Metformin and Tresiba as an outpatient), and recent trauma to right thigh with stable hematoma, presented to [facility] 1/12/21 with SOB, found to have NSTEMI. 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 S/P C3/DUANE Clip/MV Repair and DUANE thrombectomy with Dr. Orellana on 1/19/21. Postop course complicated by hypoxic respiratory failure and KRISTIE now resolved, and hypokalemia on daily repletion while on Torsemide, admitted to  for rehab on jan 28 and was found with COVID 19. transferred to medicine on 2/1 for worsening SOB/SHF/PNA    #Acute on chronic combined systolic and Diastolic CHF  #COVID-19 Infection  -SpO2 currently 97% at rest on RA  -Completed 5 days of Remdisivir  -Improvement in volume status. Will continue Lasix IV TID for one more day and likely transition to Lasix 60mg PO BID tomorrow  -I/O's and daily weights    #Recent NSTEMI S/P CABG, MV Repair and DUANE thrombectomy 1/19/21  #Chronic Afib   #Mild to mod AS  -ASA, Lipitor 40 mg PO QHS   -Continue Eliquis 2.5mg BID (reduced dose due to large hematoma in right thigh from trauma prior to surgery)  -Increase Metoprolol to 50mg TID  -Continue Amiodarone  -Cardiology on board    #Transaminitis   -Secondary to COVID-19  -Bilirubin downtrending    #DM2  -HbA1c 7.8 on Metformin and Tresiba as outpatient  -Continue Lantus + ISS,    #Anemia  -Stable  -Monitor    #Hematoma Right thigh  -monitor    Physiatry recommendations noted; Acute rehab once medically stable. Anticipate early next week    #DVT ppx- Eliquis    #Code: Full  Dispo: Back to acute rehab     Updated  Derrick Roblero

## 2021-02-08 LAB
GLUCOSE BLDC GLUCOMTR-MCNC: 128 MG/DL — HIGH (ref 70–99)
GLUCOSE BLDC GLUCOMTR-MCNC: 143 MG/DL — HIGH (ref 70–99)
GLUCOSE BLDC GLUCOMTR-MCNC: 153 MG/DL — HIGH (ref 70–99)
GLUCOSE BLDC GLUCOMTR-MCNC: 194 MG/DL — HIGH (ref 70–99)
SARS-COV-2 RNA SPEC QL NAA+PROBE: DETECTED

## 2021-02-08 PROCEDURE — 99233 SBSQ HOSP IP/OBS HIGH 50: CPT

## 2021-02-08 RX ORDER — FUROSEMIDE 40 MG
60 TABLET ORAL
Refills: 0 | Status: DISCONTINUED | OUTPATIENT
Start: 2021-02-08 | End: 2021-02-17

## 2021-02-08 RX ADMIN — Medication 2: at 12:50

## 2021-02-08 RX ADMIN — Medication 40 MILLIEQUIVALENT(S): at 05:35

## 2021-02-08 RX ADMIN — Medication 50 MILLIGRAM(S): at 20:36

## 2021-02-08 RX ADMIN — APIXABAN 2.5 MILLIGRAM(S): 2.5 TABLET, FILM COATED ORAL at 16:49

## 2021-02-08 RX ADMIN — Medication 1 TABLET(S): at 13:11

## 2021-02-08 RX ADMIN — Medication 40 MILLIEQUIVALENT(S): at 16:50

## 2021-02-08 RX ADMIN — Medication 6 MILLIGRAM(S): at 20:36

## 2021-02-08 RX ADMIN — Medication 60 MILLIGRAM(S): at 16:50

## 2021-02-08 RX ADMIN — Medication 50 MILLIGRAM(S): at 13:11

## 2021-02-08 RX ADMIN — Medication 100 MILLIGRAM(S): at 04:42

## 2021-02-08 RX ADMIN — Medication 100 MILLIGRAM(S): at 20:36

## 2021-02-08 RX ADMIN — Medication 40 MILLIGRAM(S): at 05:36

## 2021-02-08 RX ADMIN — PANTOPRAZOLE SODIUM 40 MILLIGRAM(S): 20 TABLET, DELAYED RELEASE ORAL at 05:35

## 2021-02-08 RX ADMIN — AMIODARONE HYDROCHLORIDE 200 MILLIGRAM(S): 400 TABLET ORAL at 05:35

## 2021-02-08 RX ADMIN — Medication 81 MILLIGRAM(S): at 13:10

## 2021-02-08 RX ADMIN — Medication 50 MILLIGRAM(S): at 05:36

## 2021-02-08 RX ADMIN — Medication 500 MILLIGRAM(S): at 13:11

## 2021-02-08 RX ADMIN — ATORVASTATIN CALCIUM 40 MILLIGRAM(S): 80 TABLET, FILM COATED ORAL at 20:36

## 2021-02-08 RX ADMIN — INSULIN GLARGINE 20 UNIT(S): 100 INJECTION, SOLUTION SUBCUTANEOUS at 20:35

## 2021-02-08 RX ADMIN — APIXABAN 2.5 MILLIGRAM(S): 2.5 TABLET, FILM COATED ORAL at 05:36

## 2021-02-08 NOTE — PROGRESS NOTE ADULT - ASSESSMENT
75y with PMHx of paroxysmal Afib (on Xarelto), PAD (s/p X-xrvqtqs-jmeqorvyc bypass, R-femoral angioplasty with stenting, (on Plavix), HTN, HLD, type 2 diabetes (HA1c on 7.8 on Metformin and Tresiba as an outpatient), and recent trauma to right thigh with stable hematoma, presented to [facility] 1/12/21 with SOB, found to have NSTEMI. 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 S/P C3/DUANE Clip/MV Repair and DUANE thrombectomy with Dr. Orellana on 1/19/21. Postop course complicated by hypoxic respiratory failure and KRISTIE now resolved, and hypokalemia on daily repletion while on Torsemide, admitted to  for rehab on jan 28 and was found with COVID 19. transferred to medicine on 2/1 for worsening SOB/SHF/PNA    #Acute on chronic combined systolic and Diastolic CHF, improved  #COVID-19 Infection  -SpO2 currently 97% at rest on RA  -Completed 5 days of Remdisivir  -Improvement in volume status. Will continue Lasix 60mg po BID at this time   -I/O's and daily weights, fluid restriction    #Recent NSTEMI S/P CABG, MV Repair and DUANE thrombectomy 1/19/21  #Chronic Afib   #Mild to mod AS  -ASA, Lipitor 40 mg PO QHS   -Continue Eliquis 2.5mg BID (reduced dose due to large hematoma in right thigh from trauma prior to surgery)  -Increase Metoprolol to 50mg TID  -Continue Amiodarone  -Cardiology on board    #Hypokalemia  - c/w K 40meq po BID    #Transaminitis   -Secondary to COVID-19  -Bilirubin downtrending    #Thrombocytopenia  -Will monitor at this time    #DM2  -HbA1c 7.8 on Metformin and Tresiba as outpatient  -Continue Lantus + ISS,    #Anemia  -Stable  -Monitor    #Hematoma Right thigh  -monitor    Physiatry recommendations noted; Acute rehab upon discharge  Pt stable for discharge at this time  CM aware, will re-swab for COVID for placement    #DVT ppx- Eliquis  #Code: Full  Dispo: Back to acute rehab     Updated  Derrick Roblero

## 2021-02-08 NOTE — PROGRESS NOTE ADULT - SUBJECTIVE AND OBJECTIVE BOX
Patient is a 75y old  Female who presents with a chief complaint of COVID 19 pna (07 Feb 2021 08:38)    No events overnight, pt reports feeling well  Urinating frequently  Denies chest pain, SOB  Tolerating diet      Patient seen and examined at bedside.    ALLERGIES:  OHS (Unknown)  warfarin (Rash)    MEDICATIONS  (STANDING):  aMIOdarone    Tablet 200 milliGRAM(s) Oral daily  apixaban 2.5 milliGRAM(s) Oral every 12 hours  ascorbic acid 500 milliGRAM(s) Oral daily  aspirin enteric coated 81 milliGRAM(s) Oral daily  atorvastatin 40 milliGRAM(s) Oral at bedtime  dextrose 40% Gel 15 Gram(s) Oral once  dextrose 5%. 1000 milliLiter(s) (50 mL/Hr) IV Continuous <Continuous>  dextrose 5%. 1000 milliLiter(s) (100 mL/Hr) IV Continuous <Continuous>  dextrose 50% Injectable 25 Gram(s) IV Push once  dextrose 50% Injectable 12.5 Gram(s) IV Push once  dextrose 50% Injectable 25 Gram(s) IV Push once  furosemide    Tablet 60 milliGRAM(s) Oral two times a day  glucagon  Injectable 1 milliGRAM(s) IntraMuscular once  influenza   Vaccine 0.5 milliLiter(s) IntraMuscular once  insulin glargine Injectable (LANTUS) 20 Unit(s) SubCutaneous at bedtime  insulin lispro (ADMELOG) corrective regimen sliding scale   SubCutaneous three times a day before meals  insulin lispro (ADMELOG) corrective regimen sliding scale   SubCutaneous at bedtime  melatonin 6 milliGRAM(s) Oral at bedtime  metoprolol tartrate 50 milliGRAM(s) Oral every 8 hours  multivitamin 1 Tablet(s) Oral daily  pantoprazole    Tablet 40 milliGRAM(s) Oral before breakfast  potassium chloride    Tablet ER 40 milliEquivalent(s) Oral two times a day    MEDICATIONS  (PRN):  acetaminophen   Tablet .. 650 milliGRAM(s) Oral every 8 hours PRN Temp greater or equal to 38C (100.4F), Mild Pain (1 - 3)  ALPRAZolam 0.5 milliGRAM(s) Oral three times a day PRN anxiety  guaiFENesin   Syrup  (Sugar-Free) 100 milliGRAM(s) Oral every 6 hours PRN Cough  oxyCODONE    IR 10 milliGRAM(s) Oral every 4 hours PRN Severe Pain (7 - 10)  oxyCODONE    IR 5 milliGRAM(s) Oral every 4 hours PRN Moderate Pain (4 - 6)  polyethylene glycol 3350 17 Gram(s) Oral daily PRN Constipation  senna 2 Tablet(s) Oral at bedtime PRN Constipation    Vital Signs Last 24 Hrs  T(F): 99 (08 Feb 2021 05:32), Max: 99.4 (07 Feb 2021 21:00)  HR: 123 (08 Feb 2021 11:31) (109 - 128)  BP: 112/69 (08 Feb 2021 05:32) (103/58 - 115/72)  RR: 17 (08 Feb 2021 05:32) (15 - 17)  SpO2: 95% (08 Feb 2021 11:31) (89% - 95%)  I&O's Summary    BMI (kg/m2): 29.4 (02-04-21 @ 11:20)  PHYSICAL EXAM:  General: NAD, A/O x 3, speaking in full sentences  ENT: MMM, no scleral icterus  Neck: Supple, No JVD, no thyroidomegaly  Lungs: Clear to auscultation bilaterally, no wheezes, no rales, no rhonchi, good inspiratory effort  Cardio: RRR, S1/S2, No murmurs  Abdomen: Soft, Nontender, Nondistended; Bowel sounds present  Extremities: No calf tenderness,  pitting edema noted, however improved, no skin changes; noted echymosis and bruising over right upper thigh, tender to touch, no worsening from previous    LABS:      02-07    144  |  103  |  13  ----------------------------<  117  3.4   |  31  |  1.07    Ca    9.0      07 Feb 2021 07:30    TPro  6.9  /  Alb  2.8  /  TBili  3.2  /  DBili  x   /  AST  47  /  ALT  41  /  AlkPhos  128  02-07     eGFR if Non African American: 51 mL/min/1.73M2 (02-07-21 @ 07:30)  eGFR if : 59 mL/min/1.73M2 (02-07-21 @ 07:30)       POCT Blood Glucose.: 153 mg/dL (08 Feb 2021 12:25)  POCT Blood Glucose.: 128 mg/dL (08 Feb 2021 08:08)  POCT Blood Glucose.: 135 mg/dL (07 Feb 2021 20:54)  POCT Blood Glucose.: 131 mg/dL (07 Feb 2021 16:59)      COVID-19 PCR: Detected (01-31-21 @ 17:00)  COVID-19 PCR: Detected (01-31-21 @ 06:50)  COVID-19 PCR: NotDetec (01-28-21 @ 22:55)  COVID-19 PCR: NotDetec (01-27-21 @ 10:54)  COVID-19 PCR: NotDetec (01-18-21 @ 11:33)      RADIOLOGY & ADDITIONAL TESTS:  Xray Chest 1 View AP/PA (02.06.21 @ 11:12) >  e lungs show residual bibasilar airspace consolidations and/or effusions. Upper lobes clear.. No pneumothorax.    The  heart is enlarged in transverse diameter. No hilar mass.  Status post cardiac valve replacement, atrial clipping procedure.   Visualized osseous structures are intact.

## 2021-02-09 LAB
ANION GAP SERPL CALC-SCNC: 10 MMOL/L — SIGNIFICANT CHANGE UP (ref 5–17)
BUN SERPL-MCNC: 19 MG/DL — SIGNIFICANT CHANGE UP (ref 7–23)
CALCIUM SERPL-MCNC: 8.9 MG/DL — SIGNIFICANT CHANGE UP (ref 8.4–10.5)
CHLORIDE SERPL-SCNC: 102 MMOL/L — SIGNIFICANT CHANGE UP (ref 96–108)
CO2 SERPL-SCNC: 31 MMOL/L — SIGNIFICANT CHANGE UP (ref 22–31)
CREAT SERPL-MCNC: 1.09 MG/DL — SIGNIFICANT CHANGE UP (ref 0.5–1.3)
GLUCOSE BLDC GLUCOMTR-MCNC: 163 MG/DL — HIGH (ref 70–99)
GLUCOSE BLDC GLUCOMTR-MCNC: 173 MG/DL — HIGH (ref 70–99)
GLUCOSE BLDC GLUCOMTR-MCNC: 192 MG/DL — HIGH (ref 70–99)
GLUCOSE BLDC GLUCOMTR-MCNC: 226 MG/DL — HIGH (ref 70–99)
GLUCOSE SERPL-MCNC: 158 MG/DL — HIGH (ref 70–99)
HCT VFR BLD CALC: 35.6 % — SIGNIFICANT CHANGE UP (ref 34.5–45)
HGB BLD-MCNC: 10.9 G/DL — LOW (ref 11.5–15.5)
MAGNESIUM SERPL-MCNC: 1.9 MG/DL — SIGNIFICANT CHANGE UP (ref 1.6–2.6)
MCHC RBC-ENTMCNC: 26.8 PG — LOW (ref 27–34)
MCHC RBC-ENTMCNC: 30.6 GM/DL — LOW (ref 32–36)
MCV RBC AUTO: 87.5 FL — SIGNIFICANT CHANGE UP (ref 80–100)
NRBC # BLD: 0 /100 WBCS — SIGNIFICANT CHANGE UP (ref 0–0)
PLATELET # BLD AUTO: 418 K/UL — HIGH (ref 150–400)
POTASSIUM SERPL-MCNC: 3.6 MMOL/L — SIGNIFICANT CHANGE UP (ref 3.5–5.3)
POTASSIUM SERPL-SCNC: 3.6 MMOL/L — SIGNIFICANT CHANGE UP (ref 3.5–5.3)
RBC # BLD: 4.07 M/UL — SIGNIFICANT CHANGE UP (ref 3.8–5.2)
RBC # FLD: 17.2 % — HIGH (ref 10.3–14.5)
SODIUM SERPL-SCNC: 143 MMOL/L — SIGNIFICANT CHANGE UP (ref 135–145)
WBC # BLD: 5.63 K/UL — SIGNIFICANT CHANGE UP (ref 3.8–10.5)
WBC # FLD AUTO: 5.63 K/UL — SIGNIFICANT CHANGE UP (ref 3.8–10.5)

## 2021-02-09 PROCEDURE — 99232 SBSQ HOSP IP/OBS MODERATE 35: CPT

## 2021-02-09 RX ADMIN — Medication 50 MILLIGRAM(S): at 22:01

## 2021-02-09 RX ADMIN — Medication 1 TABLET(S): at 13:00

## 2021-02-09 RX ADMIN — Medication 4: at 16:52

## 2021-02-09 RX ADMIN — Medication 50 MILLIGRAM(S): at 06:26

## 2021-02-09 RX ADMIN — Medication 2: at 08:19

## 2021-02-09 RX ADMIN — Medication 2: at 13:08

## 2021-02-09 RX ADMIN — AMIODARONE HYDROCHLORIDE 200 MILLIGRAM(S): 400 TABLET ORAL at 06:27

## 2021-02-09 RX ADMIN — APIXABAN 2.5 MILLIGRAM(S): 2.5 TABLET, FILM COATED ORAL at 06:26

## 2021-02-09 RX ADMIN — Medication 40 MILLIEQUIVALENT(S): at 06:27

## 2021-02-09 RX ADMIN — Medication 81 MILLIGRAM(S): at 12:58

## 2021-02-09 RX ADMIN — Medication 60 MILLIGRAM(S): at 06:26

## 2021-02-09 RX ADMIN — Medication 100 MILLIGRAM(S): at 04:27

## 2021-02-09 RX ADMIN — Medication 100 MILLIGRAM(S): at 12:58

## 2021-02-09 RX ADMIN — Medication 50 MILLIGRAM(S): at 13:07

## 2021-02-09 RX ADMIN — PANTOPRAZOLE SODIUM 40 MILLIGRAM(S): 20 TABLET, DELAYED RELEASE ORAL at 06:26

## 2021-02-09 RX ADMIN — APIXABAN 2.5 MILLIGRAM(S): 2.5 TABLET, FILM COATED ORAL at 18:17

## 2021-02-09 RX ADMIN — Medication 500 MILLIGRAM(S): at 12:58

## 2021-02-09 RX ADMIN — Medication 60 MILLIGRAM(S): at 18:17

## 2021-02-09 RX ADMIN — Medication 40 MILLIEQUIVALENT(S): at 18:17

## 2021-02-09 RX ADMIN — INSULIN GLARGINE 20 UNIT(S): 100 INJECTION, SOLUTION SUBCUTANEOUS at 22:01

## 2021-02-09 RX ADMIN — ATORVASTATIN CALCIUM 40 MILLIGRAM(S): 80 TABLET, FILM COATED ORAL at 22:01

## 2021-02-09 NOTE — PROGRESS NOTE ADULT - SUBJECTIVE AND OBJECTIVE BOX
Patient is a 75y old  Female who presents with a chief complaint of COVID 19 pna (08 Feb 2021 13:05)    PT reports feeling sad and wants to go home  Denies chest pain, SOB  AMbulatory, had BM this am, tolerating diet    Patient seen and examined at bedside.    ALLERGIES:  OHS (Unknown)  warfarin (Rash)    MEDICATIONS  (STANDING):  aMIOdarone    Tablet 200 milliGRAM(s) Oral daily  apixaban 2.5 milliGRAM(s) Oral every 12 hours  ascorbic acid 500 milliGRAM(s) Oral daily  aspirin enteric coated 81 milliGRAM(s) Oral daily  atorvastatin 40 milliGRAM(s) Oral at bedtime  dextrose 40% Gel 15 Gram(s) Oral once  dextrose 5%. 1000 milliLiter(s) (50 mL/Hr) IV Continuous <Continuous>  dextrose 5%. 1000 milliLiter(s) (100 mL/Hr) IV Continuous <Continuous>  dextrose 50% Injectable 25 Gram(s) IV Push once  dextrose 50% Injectable 12.5 Gram(s) IV Push once  dextrose 50% Injectable 25 Gram(s) IV Push once  furosemide    Tablet 60 milliGRAM(s) Oral two times a day  glucagon  Injectable 1 milliGRAM(s) IntraMuscular once  influenza   Vaccine 0.5 milliLiter(s) IntraMuscular once  insulin glargine Injectable (LANTUS) 20 Unit(s) SubCutaneous at bedtime  insulin lispro (ADMELOG) corrective regimen sliding scale   SubCutaneous three times a day before meals  insulin lispro (ADMELOG) corrective regimen sliding scale   SubCutaneous at bedtime  melatonin 6 milliGRAM(s) Oral at bedtime  metoprolol tartrate 50 milliGRAM(s) Oral every 8 hours  multivitamin 1 Tablet(s) Oral daily  pantoprazole    Tablet 40 milliGRAM(s) Oral before breakfast  potassium chloride    Tablet ER 40 milliEquivalent(s) Oral two times a day    MEDICATIONS  (PRN):  acetaminophen   Tablet .. 650 milliGRAM(s) Oral every 8 hours PRN Temp greater or equal to 38C (100.4F), Mild Pain (1 - 3)  ALPRAZolam 0.5 milliGRAM(s) Oral three times a day PRN anxiety  guaiFENesin   Syrup  (Sugar-Free) 100 milliGRAM(s) Oral every 6 hours PRN Cough  oxyCODONE    IR 10 milliGRAM(s) Oral every 4 hours PRN Severe Pain (7 - 10)  oxyCODONE    IR 5 milliGRAM(s) Oral every 4 hours PRN Moderate Pain (4 - 6)  polyethylene glycol 3350 17 Gram(s) Oral daily PRN Constipation  senna 2 Tablet(s) Oral at bedtime PRN Constipation    Vital Signs Last 24 Hrs  T(F): 97.4 (09 Feb 2021 08:20), Max: 98.3 (09 Feb 2021 05:23)  HR: 131 (09 Feb 2021 13:10) (78 - 131)  BP: 102/71 (09 Feb 2021 13:10) (99/69 - 117/55)  RR: 16 (09 Feb 2021 08:20) (16 - 16)  SpO2: 93% (09 Feb 2021 11:31) (93% - 100%)  I&O's Summary      PHYSICAL EXAM:  General: NAD, A/O x 3, sitting up in chair  ENT: MMM, no scleral icterus  Neck: Supple, No JVD, no thyroidomegaly  Lungs: Clear to auscultation bilaterally, no wheezes, no rales, no rhonchi, good inspiratory effort  Cardio: RRR, S1/S2, No murmurs  Abdomen: Soft, Nontender, Nondistended; Bowel sounds present  Extremities: No calf tenderness, No pitting edema, no skin changes    LABS:                        10.9   5.63  )-----------( 418      ( 09 Feb 2021 05:30 )             35.6       02-09    143  |  102  |  19  ----------------------------<  158  3.6   |  31  |  1.09    Ca    8.9      09 Feb 2021 05:30  Mg     1.9     02-09    TPro  6.9  /  Alb  2.8  /  TBili  3.2  /  DBili  x   /  AST  47  /  ALT  41  /  AlkPhos  128  02-07     eGFR if Non African American: 50 mL/min/1.73M2 (02-09-21 @ 05:30)  eGFR if African American: 58 mL/min/1.73M2 (02-09-21 @ 05:30)       POCT Blood Glucose.: 192 mg/dL (09 Feb 2021 12:09)  POCT Blood Glucose.: 173 mg/dL (09 Feb 2021 07:47)  POCT Blood Glucose.: 194 mg/dL (08 Feb 2021 20:27)  POCT Blood Glucose.: 143 mg/dL (08 Feb 2021 17:05)          COVID-19 PCR: Detected (02-08-21 @ 14:02)  COVID-19 PCR: Detected (01-31-21 @ 17:00)  COVID-19 PCR: Detected (01-31-21 @ 06:50)  COVID-19 PCR: NotDetec (01-28-21 @ 22:55)  COVID-19 PCR: NotDetec (01-27-21 @ 10:54)  COVID-19 PCR: NotDetec (01-18-21 @ 11:33)      RADIOLOGY & ADDITIONAL TESTS:  No new imaging

## 2021-02-09 NOTE — PROGRESS NOTE ADULT - ASSESSMENT
75y with PMHx of paroxysmal Afib (on Xarelto), PAD (s/p R-[id number]-msbhuvpsa bypass, R-femoral angioplasty with stenting, (on Plavix), HTN, HLD, type 2 diabetes (HA1c on 7.8 on Metformin and Tresiba as an outpatient), and recent trauma to right thigh with stable hematoma, presented to [facility] 1/12/21 with SOB, found to have NSTEMI. 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 S/P C3/DUANE Clip/MV Repair and DUANE thrombectomy with Dr. Orellana on 1/19/21. Postop course complicated by hypoxic respiratory failure and KRISTIE now resolved, and hypokalemia on daily repletion while on Torsemide, admitted to  for rehab on jan 28 and was found with COVID 19. transferred to medicine on 2/1 for worsening SOB/SHF/PNA    #Acute on chronic combined systolic and Diastolic CHF, improved  #COVID-19 Infection, still + 2/8  -SpO2 currently 97% at rest on RA  -Completed 5 days of Remdisivir  -Still has cough, will add on tessalon pearls prn  -Improvement in volume status. Will continue Lasix 60mg po BID at this time   -I/O's and daily weights, fluid restriction    #Recent NSTEMI S/P CABG, MV Repair and DUANE thrombectomy 1/19/21  #Chronic Afib   #Mild to mod AS  -ASA, Lipitor 40 mg PO QHS   -Continue Eliquis 2.5mg BID (reduced dose due to large hematoma in right thigh from trauma prior to surgery)  -c/w Metoprolol to 50mg TID  -Continue Amiodarone  -Cardiology on board    #Hypokalemia  - c/w K 40meq po BID    #Transaminitis   -Secondary to COVID-19  -Bilirubin downtrending    #Thrombocytopenia  -Will monitor at this time    #DM2  -HbA1c 7.8 on Metformin and Tresiba as outpatient  -Continue Lantus + ISS,    #Anemia  -Stable  -Monitor    #Hematoma Right thigh  -monitor    Physiatry recommendations noted; Acute rehab upon discharge  Pt stable for discharge at this time  CM aware, re-swabbed for COVID 2/8; still positive    #DVT ppx- Eliquis  #Code: Full  Dispo: Back to acute rehab   D/c 45 min    Updated  Derrick Roblero   75y with PMHx of paroxysmal Afib (on Xarelto), PAD (s/p V-[id number]-ztapcecbl bypass, R-femoral angioplasty with stenting, (on Plavix), HTN, HLD, type 2 diabetes (HA1c on 7.8 on Metformin and Tresiba as an outpatient), and recent trauma to right thigh with stable hematoma, presented to [facility] 1/12/21 with SOB, found to have NSTEMI. 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 S/P C3/DUANE Clip/MV Repair and DUANE thrombectomy with Dr. Orellana on 1/19/21. Postop course complicated by hypoxic respiratory failure and KRISTIE now resolved, and hypokalemia on daily repletion while on Torsemide, admitted to  for rehab on jan 28 and was found with COVID 19. transferred to medicine on 2/1 for worsening SOB/SHF/PNA    #Acute on chronic combined systolic and Diastolic CHF, improved  #COVID-19 Infection, still + 2/8  -SpO2 currently 97% at rest on RA  -Completed 5 days of Remdisivir  -Still has cough, will add on tessalon pearls prn  -Improvement in volume status. Will continue Lasix 60mg po BID at this time   -I/O's and daily weights, fluid restriction    #Recent NSTEMI S/P CABG, MV Repair and DUANE thrombectomy 1/19/21  #Chronic Afib   #Mild to mod AS  -ASA, Lipitor 40 mg PO QHS   -Continue Eliquis 2.5mg BID (reduced dose due to large hematoma in right thigh from trauma prior to surgery)  -c/w Metoprolol to 50mg TID  -Continue Amiodarone  -Cardiology on board    #Hypokalemia  - c/w K 40meq po BID    #Transaminitis   -Secondary to COVID-19  -Bilirubin downtrending    #Thrombocytopenia  -Will monitor at this time    #DM2  -HbA1c 7.8 on Metformin and Tresiba as outpatient  -Continue Lantus + ISS,    #Anemia  -Stable  -Monitor    #Hematoma Right thigh  -monitor    Physiatry recommendations noted; Acute rehab upon discharge  Pt stable for discharge at this time  CM aware, re-swabbed for COVID 2/8; still positive    #DVT ppx- Eliquis  #Code: Full  Dispo: Back to acute rehab   D/c 45 min    Updated  Derrick Roblero, [id number]  All questions answered, in agreement with above results  IN agreement with acute rehab discharge

## 2021-02-10 ENCOUNTER — TRANSCRIPTION ENCOUNTER (OUTPATIENT)
Age: 76
End: 2021-02-10

## 2021-02-10 LAB
GLUCOSE BLDC GLUCOMTR-MCNC: 133 MG/DL — HIGH (ref 70–99)
GLUCOSE BLDC GLUCOMTR-MCNC: 138 MG/DL — HIGH (ref 70–99)
GLUCOSE BLDC GLUCOMTR-MCNC: 142 MG/DL — HIGH (ref 70–99)
GLUCOSE BLDC GLUCOMTR-MCNC: 156 MG/DL — HIGH (ref 70–99)

## 2021-02-10 PROCEDURE — 99232 SBSQ HOSP IP/OBS MODERATE 35: CPT

## 2021-02-10 RX ORDER — ALPRAZOLAM 0.25 MG
1 TABLET ORAL
Qty: 0 | Refills: 0 | DISCHARGE
Start: 2021-02-10

## 2021-02-10 RX ORDER — FUROSEMIDE 40 MG
3 TABLET ORAL
Qty: 0 | Refills: 0 | DISCHARGE
Start: 2021-02-10

## 2021-02-10 RX ADMIN — Medication 60 MILLIGRAM(S): at 05:59

## 2021-02-10 RX ADMIN — Medication 100 MILLIGRAM(S): at 08:46

## 2021-02-10 RX ADMIN — Medication 50 MILLIGRAM(S): at 12:34

## 2021-02-10 RX ADMIN — Medication 60 MILLIGRAM(S): at 17:23

## 2021-02-10 RX ADMIN — Medication 1 TABLET(S): at 12:34

## 2021-02-10 RX ADMIN — Medication 50 MILLIGRAM(S): at 08:46

## 2021-02-10 RX ADMIN — Medication 500 MILLIGRAM(S): at 12:34

## 2021-02-10 RX ADMIN — Medication 81 MILLIGRAM(S): at 12:34

## 2021-02-10 RX ADMIN — AMIODARONE HYDROCHLORIDE 200 MILLIGRAM(S): 400 TABLET ORAL at 05:58

## 2021-02-10 RX ADMIN — Medication 40 MILLIEQUIVALENT(S): at 17:23

## 2021-02-10 RX ADMIN — APIXABAN 2.5 MILLIGRAM(S): 2.5 TABLET, FILM COATED ORAL at 17:25

## 2021-02-10 RX ADMIN — Medication 50 MILLIGRAM(S): at 20:19

## 2021-02-10 RX ADMIN — PANTOPRAZOLE SODIUM 40 MILLIGRAM(S): 20 TABLET, DELAYED RELEASE ORAL at 05:58

## 2021-02-10 RX ADMIN — ATORVASTATIN CALCIUM 40 MILLIGRAM(S): 80 TABLET, FILM COATED ORAL at 20:19

## 2021-02-10 RX ADMIN — Medication 6 MILLIGRAM(S): at 20:19

## 2021-02-10 RX ADMIN — APIXABAN 2.5 MILLIGRAM(S): 2.5 TABLET, FILM COATED ORAL at 05:58

## 2021-02-10 RX ADMIN — INSULIN GLARGINE 20 UNIT(S): 100 INJECTION, SOLUTION SUBCUTANEOUS at 20:18

## 2021-02-10 NOTE — DISCHARGE NOTE PROVIDER - NSDCHHBASESERVICE_GEN_ALL_CORE
Years: 15.00     Pack years: 7.50     Types: Cigarettes     Start date: 2/5/1964    Smokeless tobacco: Never Used   Substance Use Topics    Alcohol use: Yes     Alcohol/week: 1.0 standard drinks     Types: 1 Cans of beer per week     Comment: seldom    Drug use: No       Current Outpatient Medications   Medication Sig Dispense Refill    atorvastatin (LIPITOR) 20 MG tablet Take 1 tablet by mouth nightly 90 tablet 1    metoprolol succinate (TOPROL XL) 25 MG extended release tablet Take 1 tablet by mouth daily 90 tablet 1    cyanocobalamin 1000 MCG tablet Take 1 tablet by mouth daily 90 tablet 3    furosemide (LASIX) 20 MG tablet Take 20 mg by mouth 2 times daily      acetaminophen (TYLENOL) 325 MG tablet Take 650 mg by mouth every 4 hours as needed for Pain      donepezil (ARICEPT) 10 MG tablet Take 10 mg by mouth nightly      linaclotide (LINZESS) 145 MCG capsule Take 145 mcg by mouth every morning (before breakfast)      memantine (NAMENDA) 10 MG tablet Take 10 mg by mouth 2 times daily      aspirin 81 MG EC tablet Take 81 mg by mouth daily.  trihexyphenidyl (ARTANE) 2 MG tablet Take 2 mg by mouth 2 times daily. No current facility-administered medications for this visit. OBJECTIVE:    There were no vitals taken for this visit. Physical Exam  Constitutional:       General: He is not in acute distress. Neurological:      Mental Status: Mental status is at baseline. Psychiatric:         Mood and Affect: Mood normal.         ASSESSMENT:  1. Essential hypertension    2. Hyperlipidemia, unspecified hyperlipidemia type    3.  Bumps on skin        PLAN:  Orders Placed This Encounter   Medications    atorvastatin (LIPITOR) 20 MG tablet     Sig: Take 1 tablet by mouth nightly     Dispense:  90 tablet     Refill:  1    metoprolol succinate (TOPROL XL) 25 MG extended release tablet     Sig: Take 1 tablet by mouth daily     Dispense:  90 tablet     Refill:  1   Obtain lab  Labs Physical therapy

## 2021-02-10 NOTE — PROGRESS NOTE ADULT - ASSESSMENT
75y with PMHx of paroxysmal Afib (on Xarelto), PAD (s/p I-htzbrvx-zfzllgguv bypass, R-femoral angioplasty with stenting, (on Plavix), HTN, HLD, type 2 diabetes (HA1c on 7.8 on Metformin and Tresiba as an outpatient), and recent trauma to right thigh with stable hematoma, presented to [facility] 1/12/21 with SOB, found to have NSTEMI. 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 S/P C3/DUANE Clip/MV Repair and DUANE thrombectomy with Dr. Orellana on 1/19/21. Postop course complicated by hypoxic respiratory failure and RKISTIE now resolved, and hypokalemia on daily repletion while on Torsemide, admitted to  for rehab on jan 28 and was found with COVID 19. transferred to medicine on 2/1 for worsening SOB/SHF/PNA    #Acute on chronic combined systolic and Diastolic CHF, improved  #COVID-19 Infection, still + 2/8  -SpO2 currently 97% at rest on RA  -Completed 5 days of Remdisivir  -Still has cough, c/w tessalon pearls prn  -Improvement in volume status. Will continue Lasix 60mg po BID at this time   -I/O's and daily weights, fluid restriction    #Recent NSTEMI S/P CABG, MV Repair and DUANE thrombectomy 1/19/21  #Chronic Afib   #Mild to mod AS  -ASA, Lipitor 40 mg PO QHS   -Continue Eliquis 2.5mg BID (reduced dose due to large hematoma in right thigh from trauma prior to surgery)  -c/w Metoprolol to 50mg TID  -Continue Amiodarone  -Cardiology on board    #Hypokalemia  - c/w K 40meq po BID    #Transaminitis   -Secondary to COVID-19  -Bilirubin downtrending    #Thrombocytopenia  -Will monitor at this time    #DM2  -HbA1c 7.8 on Metformin and Tresiba as outpatient  -Continue Lantus + ISS,    #Anemia  -Stable  -Monitor    #Hematoma Right thigh  -monitor    Physiatry recommendations noted; Acute rehab upon discharge  Pt stable for discharge at this time  CM aware, re-swabbed for COVID 2/8; still positive    #DVT ppx- Eliquis  #Code: Full  Dispo: Back to acute rehab today  D/c 45 min    Updated  Derrick Roblero, [id number]  All questions answered, in agreement with above results  IN agreement with acute rehab discharge

## 2021-02-10 NOTE — DISCHARGE NOTE PROVIDER - NSDCCPCAREPLAN_GEN_ALL_CORE_FT
PRINCIPAL DISCHARGE DIAGNOSIS  Diagnosis: Pneumonia due to COVID-19 virus  Assessment and Plan of Treatment: You were admitted for COVID PNA.  You completed the course with remdesivir and decadron and have done very well.      SECONDARY DISCHARGE DIAGNOSES  Diagnosis: Acute on chronic heart failure  Assessment and Plan of Treatment: You had fluid in your lungs from your heart failure.  Conitinue with the lasix, monitor your intake of fluids.  You cannot have more than 1.5L of fluids in 24hrs.     PRINCIPAL DISCHARGE DIAGNOSIS  Diagnosis: Pneumonia due to COVID-19 virus  Assessment and Plan of Treatment: You were admitted for COVID PNA.  You completed the course with remdesivir and decadron and have done very well.      SECONDARY DISCHARGE DIAGNOSES  Diagnosis: Hypokalemia  Assessment and Plan of Treatment: contniue with potassium supplements    Diagnosis: Atrial fibrillation with RVR  Assessment and Plan of Treatment: continue with metoprolol and amiodarone    Diagnosis: Acute on chronic heart failure  Assessment and Plan of Treatment: You had fluid in your lungs from your heart failure.  Conitinue with the lasix, monitor your intake of fluids.  You cannot have more than 1.5L of fluids in 24hrs.     PRINCIPAL DISCHARGE DIAGNOSIS  Diagnosis: Pneumonia due to COVID-19 virus  Assessment and Plan of Treatment: You were admitted for COVID PNA.  You completed the course with remdesivir and decadron and have done very well. Follow up with your primary care provider in the next 1 week. Continue to further strengthen at home with physical therapy.      SECONDARY DISCHARGE DIAGNOSES  Diagnosis: Hypokalemia  Assessment and Plan of Treatment: contniue with potassium supplements    Diagnosis: Atrial fibrillation with RVR  Assessment and Plan of Treatment: Your heart rate has been controlled with metoprolol, amiodarone and Digoxin    Diagnosis: Acute on chronic heart failure  Assessment and Plan of Treatment: You had fluid in your lungs from your heart failure.  Conitinue with the lasix, monitor your intake of fluids.  You cannot have more than 1.5L of fluids in 24hrs.

## 2021-02-10 NOTE — DISCHARGE NOTE PROVIDER - HOSPITAL COURSE
PT was admitted for COVID PNA from [facility] on 1/29.  SHe was discharged to rehab but started to have SOB with crackles on imaging.  SHe tested + for COVID PNA and also had an acute exacerbation of her heart failure.    SHe was started on remdesivir and decadron.  SHe was given Lasix IV with good diuresis.  Her respiratory symtoms improved tremendously.  SHe completed her remdesivir course and her decadron.  SHe is still testing + for COVID.    We switched her lasix to oral.  She has been seeing PT while here.    SHe did very during the hospital course.  She is stable for discharge back to acute rehab.   PT was admitted for COVID PNA from [facility] on 1/29.  She was discharged to rehab but started to have SOB with crackles on imaging.  She tested + for COVID PNA and also had an acute exacerbation of her heart failure.    She was started on remdesivir and decadron.  SHe was given Lasix IV with good diuresis.  Her respiratory symtoms improved tremendously.  She completed her remdesivir course and her decadron.  She has tested negative for COVID on 2/15    We switched her lasix to oral. Metoprolol was changed to 100mg q 12hrs. Acute rehab suggest for patient to go home with stair training from PT.      She is stable for discharge back home   PT was admitted for COVID PNA from [place] [facility] on 1/29.  She was discharged to rehab but started to have SOB with crackles on imaging.  She tested + for COVID PNA and also had an acute exacerbation of her heart failure.    She was started on remdesivir and decadron.  SHe was given Lasix IV with good diuresis.  Her respiratory symtoms improved tremendously.  She completed her remdesivir course and her decadron.  She has tested negative for COVID on 2/15    We switched her lasix to oral. Metoprolol was changed to 100mg q 12hrs. Acute rehab suggested for the patient to go home with stair training from PT since she was doing well.       She is stable for discharge back home with follow up with her PCP.    Notified, PCP, Ling Deal -[phone]    COVID Pneumonia  A. fib in RVR on eliquis PT was [facility].  She was discharged from rehab due to SOB with crackles.  She tested + for COVID PNA and also had an acute exacerbation of her heart failure.     She was started on remdesivir and decadron.  She was given Lasix IV with good diuresis.  Her respiratory symtoms improved tremendously.  She completed her remdesivir course and her decadron.  She has tested negative for COVID on 2/15    We switched her lasix to oral. Metoprolol was changed to 100mg q 12hrs. PT saw patient and suggest OLGA as patient has continued weakness.       She is stable for discharge to [facility].     Notified, PCP, Ling Deal [phone]    COVID Pneumonia  A. fib in RVR on eliquis PT was [facility]  She was discharged from rehab due to SOB with crackles.  She tested + for COVID PNA and also had an acute exacerbation of her heart failure.     She was started on remdesivir and decadron.  She was given Lasix IV with good diuresis.  Her respiratory symtoms improved tremendously.  She completed her remdesivir course and her decadron.  She has tested negative for COVID on 2/15. She has a large hematoma on the lateral aspect of her right thigh that's being monitored.   We switched her lasix to oral. Metoprolol was changed to 100mg q 12hrs. PT saw patient and suggest OLGA as patient has continued weakness.       She is stable for discharge to [facility].     Notified, PCP, Ling Deal [phone]    COVID Pneumonia  A. moses in RVR on eliquis PT was admitted for COVID PNA from [place] [facility] on 1/29.  She was discharged from rehab due to SOB with crackles.  She tested + for COVID PNA and also had an acute exacerbation of her heart failure.     She was started on remdesivir and decadron.  She was given Lasix IV with good diuresis.  Her respiratory symtoms improved tremendously.  She completed her remdesivir course and her decadron.  She has tested negative for COVID on 2/15. She has a large hematoma on the lateral aspect of her right thigh that's being monitored.   We switched her lasix to oral. Metoprolol was changed to 100mg q 12hrs. Patients ambulatory status has increased and today was able to walk to PT up stairs. Patient and family prefer patient to go home with home PT.       - Continue Eliquis 2.5mg BID (reduced dose due to large hematoma in right thigh from trauma prior to surgery)  - Continue aspirin, Lasix 60mg BID with hold parameters, Metoprolol  75 mg q8H, and Amiodarone 200mg qd  - Rate largely improving, intermittent tachycardia this am, a fib with RVR -120s, now improved to 90s, continue PO digoxin   - Cardiology following  -PT able to walk patient upstairs     #Acute hypoxic respiratory failure in need of supplemental oxygenation, currently improved on room air, likely due to acute on chronic combined systolic and Diastolic CHF (improved) and COVID-19 Infection (resolved, tested negative on 2/15)  - Tolerating room air  - Completed 5 days of Remdesivir  - Continue Lasix    #Transaminitis   - resolved  -Secondary to COVID-19  - Monitor    #T2DM  - HbA1c 7.8   - On Metformin and Tresiba as outpatient  - Continue Lantus + ISS    #Normocytic Anemia  - Stable  - Monitor CBC    #Hematoma right thigh  - Continue to monitor    #Moderate protein-calorie malnutrition  - Monitor, nutrition appreciated    #DVT ppx  - Eliquis    #GI ppx   - PPI    Dispo: Plan for DC home with home PT. Case management aware.     2/21: Dr. King updated patient's  Derrick Roblero, [phone]     Dr. Mary Man - office [phone] - cards   Dr. Ling Evangelista is PCP            Notified, PCP, Ling Deal [phone]    COVID Pneumonia  A. fib in RVR on eliquis PT was admitted for COVID PNA from [place] [facility] on 1/29.  She was discharged from rehab due to SOB with crackles.  She tested + for COVID PNA and also had an acute exacerbation of her heart failure.     She was started on remdesivir and decadron.  She was given Lasix IV with good diuresis.  Her respiratory symtoms improved tremendously.  She completed her remdesivir course and her decadron.  She has tested negative for COVID on 2/15. She had a large hematoma on the lateral aspect of her right thigh that's being monitored. Metoprolol was changed to 75mg q 8hrs. Continue Eliquis 2.5mg BID (reduced dose due to large hematoma in right thigh from trauma prior to surgery). Continue aspirin, Lasix 60mg BID with hold parameters, and Amiodarone 200mg qd. AFib not previously controlled with BB or calcium channel blockers, although currently controlled on PO digoxin. Continue other home medications. Patients ambulatory status has increased and today was able to walk up stairs. Offered acute rehab. Patient and family prefer patient to go home with home PT. [place] home.       Dr. Ling Evangelista is PCP      Previously notified, PCP, Ling Deal -[phone]. Unable to get in touch with PCP today     Discharging Provider: Nick Royal [phone] PT was admitted for COVID PNA from [place] [facility] on 1/29.  She was discharged from rehab due to SOB with crackles.  She tested + for COVID PNA and also had an acute exacerbation of her heart failure.     She was started on remdesivir and decadron.  She was given Lasix IV with good diuresis.  Her respiratory symtoms improved tremendously.  She completed her remdesivir course and her decadron.  She has tested negative for COVID on 2/15. She had a large hematoma on the lateral aspect of her right thigh that's being monitored. Continue Eliquis 2.5mg BID (reduced dose due to large hematoma in right thigh from trauma prior to surgery). Metoprolol was changed to 75mg q 8hrs.  Continue aspirin, Lasix 60mg BID with hold parameters, and Amiodarone 200mg qd. AFib not previously controlled and was unable to titrate metoprolol higher due to blood pressure concerns. Digoxin was therefor added.  Continue other home medications. Patients ambulatory status has increased and today was able to walk up stairs. Offered acute rehab. Patient and family prefer patient to go home with home PT. DC home. 21 Day total hospital course.       Dr. Ling Evangelista is PCP      Previously notified, PCP, Ling Dael [phone]. Unable to get in touch with PCP today     Discharging Provider: Nick Royal [phone]

## 2021-02-10 NOTE — DISCHARGE NOTE PROVIDER - DETAILS OF MALNUTRITION DIAGNOSIS/DIAGNOSES
This patient has been assessed with a concern for Malnutrition and was treated during this hospitalization for the following Nutrition diagnosis/diagnoses:     -  02/06/2021: Moderate protein-calorie malnutrition   This patient has been assessed with a concern for Malnutrition and was treated during this hospitalization for the following Nutrition diagnosis/diagnoses:     -  02/06/2021: Moderate protein-calorie malnutrition    This patient has been assessed with a concern for Malnutrition and was treated during this hospitalization for the following Nutrition diagnosis/diagnoses:     -  02/06/2021: Moderate protein-calorie malnutrition   This patient has been assessed with a concern for Malnutrition and was treated during this hospitalization for the following Nutrition diagnosis/diagnoses:     -  02/06/2021: Moderate protein-calorie malnutrition    This patient has been assessed with a concern for Malnutrition and was treated during this hospitalization for the following Nutrition diagnosis/diagnoses:     -  02/06/2021: Moderate protein-calorie malnutrition    This patient has been assessed with a concern for Malnutrition and was treated during this hospitalization for the following Nutrition diagnosis/diagnoses:     -  02/06/2021: Moderate protein-calorie malnutrition   This patient has been assessed with a concern for Malnutrition and was treated during this hospitalization for the following Nutrition diagnosis/diagnoses:     -  02/06/2021: Moderate protein-calorie malnutrition    This patient has been assessed with a concern for Malnutrition and was treated during this hospitalization for the following Nutrition diagnosis/diagnoses:     -  02/06/2021: Moderate protein-calorie malnutrition    This patient has been assessed with a concern for Malnutrition and was treated during this hospitalization for the following Nutrition diagnosis/diagnoses:     -  02/06/2021: Moderate protein-calorie malnutrition    This patient has been assessed with a concern for Malnutrition and was treated during this hospitalization for the following Nutrition diagnosis/diagnoses:     -  02/06/2021: Moderate protein-calorie malnutrition   This patient has been assessed with a concern for Malnutrition and was treated during this hospitalization for the following Nutrition diagnosis/diagnoses:     -  02/06/2021: Moderate protein-calorie malnutrition    This patient has been assessed with a concern for Malnutrition and was treated during this hospitalization for the following Nutrition diagnosis/diagnoses:     -  02/06/2021: Moderate protein-calorie malnutrition    This patient has been assessed with a concern for Malnutrition and was treated during this hospitalization for the following Nutrition diagnosis/diagnoses:     -  02/06/2021: Moderate protein-calorie malnutrition    This patient has been assessed with a concern for Malnutrition and was treated during this hospitalization for the following Nutrition diagnosis/diagnoses:     -  02/06/2021: Moderate protein-calorie malnutrition    This patient has been assessed with a concern for Malnutrition and was treated during this hospitalization for the following Nutrition diagnosis/diagnoses:     -  02/06/2021: Moderate protein-calorie malnutrition   This patient has been assessed with a concern for Malnutrition and was treated during this hospitalization for the following Nutrition diagnosis/diagnoses:     -  02/06/2021: Moderate protein-calorie malnutrition    This patient has been assessed with a concern for Malnutrition and was treated during this hospitalization for the following Nutrition diagnosis/diagnoses:     -  02/06/2021: Moderate protein-calorie malnutrition    This patient has been assessed with a concern for Malnutrition and was treated during this hospitalization for the following Nutrition diagnosis/diagnoses:     -  02/06/2021: Moderate protein-calorie malnutrition    This patient has been assessed with a concern for Malnutrition and was treated during this hospitalization for the following Nutrition diagnosis/diagnoses:     -  02/06/2021: Moderate protein-calorie malnutrition    This patient has been assessed with a concern for Malnutrition and was treated during this hospitalization for the following Nutrition diagnosis/diagnoses:     -  02/06/2021: Moderate protein-calorie malnutrition    This patient has been assessed with a concern for Malnutrition and was treated during this hospitalization for the following Nutrition diagnosis/diagnoses:     -  02/06/2021: Moderate protein-calorie malnutrition   This patient has been assessed with a concern for Malnutrition and was treated during this hospitalization for the following Nutrition diagnosis/diagnoses:     -  02/06/2021: Moderate protein-calorie malnutrition    This patient has been assessed with a concern for Malnutrition and was treated during this hospitalization for the following Nutrition diagnosis/diagnoses:     -  02/06/2021: Moderate protein-calorie malnutrition    This patient has been assessed with a concern for Malnutrition and was treated during this hospitalization for the following Nutrition diagnosis/diagnoses:     -  02/06/2021: Moderate protein-calorie malnutrition    This patient has been assessed with a concern for Malnutrition and was treated during this hospitalization for the following Nutrition diagnosis/diagnoses:     -  02/06/2021: Moderate protein-calorie malnutrition    This patient has been assessed with a concern for Malnutrition and was treated during this hospitalization for the following Nutrition diagnosis/diagnoses:     -  02/06/2021: Moderate protein-calorie malnutrition    This patient has been assessed with a concern for Malnutrition and was treated during this hospitalization for the following Nutrition diagnosis/diagnoses:     -  02/06/2021: Moderate protein-calorie malnutrition    This patient has been assessed with a concern for Malnutrition and was treated during this hospitalization for the following Nutrition diagnosis/diagnoses:     -  02/21/2021: Severe protein-calorie malnutrition   -  02/06/2021: Moderate protein-calorie malnutrition   This patient has been assessed with a concern for Malnutrition and was treated during this hospitalization for the following Nutrition diagnosis/diagnoses:     -  02/06/2021: Moderate protein-calorie malnutrition    This patient has been assessed with a concern for Malnutrition and was treated during this hospitalization for the following Nutrition diagnosis/diagnoses:     -  02/06/2021: Moderate protein-calorie malnutrition    This patient has been assessed with a concern for Malnutrition and was treated during this hospitalization for the following Nutrition diagnosis/diagnoses:     -  02/06/2021: Moderate protein-calorie malnutrition    This patient has been assessed with a concern for Malnutrition and was treated during this hospitalization for the following Nutrition diagnosis/diagnoses:     -  02/06/2021: Moderate protein-calorie malnutrition    This patient has been assessed with a concern for Malnutrition and was treated during this hospitalization for the following Nutrition diagnosis/diagnoses:     -  02/06/2021: Moderate protein-calorie malnutrition    This patient has been assessed with a concern for Malnutrition and was treated during this hospitalization for the following Nutrition diagnosis/diagnoses:     -  02/06/2021: Moderate protein-calorie malnutrition    This patient has been assessed with a concern for Malnutrition and was treated during this hospitalization for the following Nutrition diagnosis/diagnoses:     -  02/21/2021: Severe protein-calorie malnutrition   -  02/06/2021: Moderate protein-calorie malnutrition    This patient has been assessed with a concern for Malnutrition and was treated during this hospitalization for the following Nutrition diagnosis/diagnoses:     -  02/21/2021: Severe protein-calorie malnutrition   -  02/06/2021: Moderate protein-calorie malnutrition   This patient has been assessed with a concern for Malnutrition and was treated during this hospitalization for the following Nutrition diagnosis/diagnoses:     -  02/06/2021: Moderate protein-calorie malnutrition    This patient has been assessed with a concern for Malnutrition and was treated during this hospitalization for the following Nutrition diagnosis/diagnoses:     -  02/06/2021: Moderate protein-calorie malnutrition    This patient has been assessed with a concern for Malnutrition and was treated during this hospitalization for the following Nutrition diagnosis/diagnoses:     -  02/06/2021: Moderate protein-calorie malnutrition    This patient has been assessed with a concern for Malnutrition and was treated during this hospitalization for the following Nutrition diagnosis/diagnoses:     -  02/06/2021: Moderate protein-calorie malnutrition    This patient has been assessed with a concern for Malnutrition and was treated during this hospitalization for the following Nutrition diagnosis/diagnoses:     -  02/06/2021: Moderate protein-calorie malnutrition    This patient has been assessed with a concern for Malnutrition and was treated during this hospitalization for the following Nutrition diagnosis/diagnoses:     -  02/06/2021: Moderate protein-calorie malnutrition    This patient has been assessed with a concern for Malnutrition and was treated during this hospitalization for the following Nutrition diagnosis/diagnoses:     -  02/21/2021: Severe protein-calorie malnutrition   -  02/06/2021: Moderate protein-calorie malnutrition    This patient has been assessed with a concern for Malnutrition and was treated during this hospitalization for the following Nutrition diagnosis/diagnoses:     -  02/21/2021: Severe protein-calorie malnutrition   -  02/06/2021: Moderate protein-calorie malnutrition    This patient has been assessed with a concern for Malnutrition and was treated during this hospitalization for the following Nutrition diagnosis/diagnoses:     -  02/21/2021: Severe protein-calorie malnutrition   -  02/06/2021: Moderate protein-calorie malnutrition   This patient has been assessed with a concern for Malnutrition and was treated during this hospitalization for the following Nutrition diagnosis/diagnoses:     -  02/06/2021: Moderate protein-calorie malnutrition    This patient has been assessed with a concern for Malnutrition and was treated during this hospitalization for the following Nutrition diagnosis/diagnoses:     -  02/06/2021: Moderate protein-calorie malnutrition    This patient has been assessed with a concern for Malnutrition and was treated during this hospitalization for the following Nutrition diagnosis/diagnoses:     -  02/06/2021: Moderate protein-calorie malnutrition    This patient has been assessed with a concern for Malnutrition and was treated during this hospitalization for the following Nutrition diagnosis/diagnoses:     -  02/06/2021: Moderate protein-calorie malnutrition    This patient has been assessed with a concern for Malnutrition and was treated during this hospitalization for the following Nutrition diagnosis/diagnoses:     -  02/06/2021: Moderate protein-calorie malnutrition    This patient has been assessed with a concern for Malnutrition and was treated during this hospitalization for the following Nutrition diagnosis/diagnoses:     -  02/06/2021: Moderate protein-calorie malnutrition    This patient has been assessed with a concern for Malnutrition and was treated during this hospitalization for the following Nutrition diagnosis/diagnoses:     -  02/21/2021: Severe protein-calorie malnutrition   -  02/06/2021: Moderate protein-calorie malnutrition    This patient has been assessed with a concern for Malnutrition and was treated during this hospitalization for the following Nutrition diagnosis/diagnoses:     -  02/21/2021: Severe protein-calorie malnutrition   -  02/06/2021: Moderate protein-calorie malnutrition    This patient has been assessed with a concern for Malnutrition and was treated during this hospitalization for the following Nutrition diagnosis/diagnoses:     -  02/21/2021: Severe protein-calorie malnutrition   -  02/06/2021: Moderate protein-calorie malnutrition    This patient has been assessed with a concern for Malnutrition and was treated during this hospitalization for the following Nutrition diagnosis/diagnoses:     -  02/21/2021: Severe protein-calorie malnutrition   -  02/06/2021: Moderate protein-calorie malnutrition   This patient has been assessed with a concern for Malnutrition and was treated during this hospitalization for the following Nutrition diagnosis/diagnoses:     -  02/06/2021: Moderate protein-calorie malnutrition    This patient has been assessed with a concern for Malnutrition and was treated during this hospitalization for the following Nutrition diagnosis/diagnoses:     -  02/06/2021: Moderate protein-calorie malnutrition    This patient has been assessed with a concern for Malnutrition and was treated during this hospitalization for the following Nutrition diagnosis/diagnoses:     -  02/06/2021: Moderate protein-calorie malnutrition    This patient has been assessed with a concern for Malnutrition and was treated during this hospitalization for the following Nutrition diagnosis/diagnoses:     -  02/06/2021: Moderate protein-calorie malnutrition    This patient has been assessed with a concern for Malnutrition and was treated during this hospitalization for the following Nutrition diagnosis/diagnoses:     -  02/06/2021: Moderate protein-calorie malnutrition    This patient has been assessed with a concern for Malnutrition and was treated during this hospitalization for the following Nutrition diagnosis/diagnoses:     -  02/06/2021: Moderate protein-calorie malnutrition    This patient has been assessed with a concern for Malnutrition and was treated during this hospitalization for the following Nutrition diagnosis/diagnoses:     -  02/21/2021: Severe protein-calorie malnutrition   -  02/06/2021: Moderate protein-calorie malnutrition    This patient has been assessed with a concern for Malnutrition and was treated during this hospitalization for the following Nutrition diagnosis/diagnoses:     -  02/21/2021: Severe protein-calorie malnutrition   -  02/06/2021: Moderate protein-calorie malnutrition    This patient has been assessed with a concern for Malnutrition and was treated during this hospitalization for the following Nutrition diagnosis/diagnoses:     -  02/21/2021: Severe protein-calorie malnutrition   -  02/06/2021: Moderate protein-calorie malnutrition    This patient has been assessed with a concern for Malnutrition and was treated during this hospitalization for the following Nutrition diagnosis/diagnoses:     -  02/21/2021: Severe protein-calorie malnutrition   -  02/06/2021: Moderate protein-calorie malnutrition    This patient has been assessed with a concern for Malnutrition and was treated during this hospitalization for the following Nutrition diagnosis/diagnoses:     -  02/21/2021: Severe protein-calorie malnutrition   -  02/06/2021: Moderate protein-calorie malnutrition

## 2021-02-10 NOTE — DISCHARGE NOTE PROVIDER - PROVIDER TOKENS
PROVIDER:[TOKEN:[[id number]]] PROVIDER:[TOKEN:[[id number]]],FREE:[LAST:[Evangelista],FIRST:[Ling],PHONE:[(   )    -],FAX:[(   )    -]]

## 2021-02-10 NOTE — DISCHARGE NOTE PROVIDER - CARE PROVIDER_API CALL
Doug Orellana)  Surgery; Thoracic and Cardiac Surgery  [address] [place] [address]  Phone: [phone]  Fax: [phone]  Follow Up Time:    Doug Orellana)  Surgery; Thoracic and Cardiac Surgery  [address] [place] [address]  Phone: [phone]  Fax: [phone]  Follow Up Time:     Ling Evangelista  Phone: (   )    -  Fax: (   )    -  Follow Up Time:

## 2021-02-10 NOTE — DISCHARGE NOTE PROVIDER - NSDCCPGOAL_GEN_ALL_CORE_FT
To get better and follow your care plan as instructed. To get better and follow your care plan as instructed. Return to the emergency department for shortness of breath or chest pain.

## 2021-02-10 NOTE — DISCHARGE NOTE PROVIDER - NSDCMRMEDTOKEN_GEN_ALL_CORE_FT
acetaminophen 325 mg oral tablet: 2 tab(s) orally every 6 hours, As needed, Mild Pain (1 - 3)  Admelog 100 units/mL injectable solution:  injectable   ALPRAZolam 0.5 mg oral tablet: 1 tab(s) orally 3 times a day, As needed, anxiety  amiodarone 200 mg oral tablet: 1 tab(s) orally once a day  apixaban 2.5 mg oral tablet: 1 tab(s) orally every 12 hours  ascorbic acid 500 mg oral tablet: 1 tab(s) orally once a day  aspirin 81 mg oral delayed release tablet: 1 tab(s) orally once a day  atorvastatin 40 mg oral tablet: 1 tab(s) orally once a day (at bedtime)  benzonatate 100 mg oral capsule: 1 cap(s) orally every 8 hours, As needed, Cough  furosemide 20 mg oral tablet: 3 tab(s) orally 2 times a day  guaiFENesin 100 mg/5 mL oral liquid: 5 milliliter(s) orally every 6 hours, As needed, Cough  insulin glargine:   melatonin 3 mg oral tablet: 2 tab(s) orally once a day (at bedtime)  metoprolol: 12.5 milligram(s) orally every 12 hours  Multiple Vitamins oral tablet: 1 tab(s) orally once a day  oxyCODONE 10 mg oral tablet: 1 tab(s) orally every 4 hours, As needed, Severe Pain (7 - 10)  oxyCODONE 5 mg oral tablet: 1 tab(s) orally every 4 hours, As needed, Moderate Pain (4 - 6)  pantoprazole 40 mg oral delayed release tablet: 1 tab(s) orally once a day  polyethylene glycol 3350 oral powder for reconstitution: 17 gram(s) orally once a day, As needed, Constipation  potassium chloride 20 mEq oral powder for reconstitution: 2 packet(s) orally once a day  senna oral tablet: 2 tab(s) orally once a day (at bedtime), As needed, Constipation   benzonatate 100 mg oral capsule: 1 cap(s) orally every 8 hours, As needed, Cough   amiodarone 200 mg oral tablet: 1 tab(s) orally once a day  apixaban 2.5 mg oral tablet: 1 tab(s) orally every 12 hours  ascorbic acid 500 mg oral tablet: 1 tab(s) orally once a day  aspirin 81 mg oral delayed release tablet: 1 tab(s) orally once a day  atorvastatin 40 mg oral tablet: 1 tab(s) orally once a day (at bedtime)  benzonatate 100 mg oral capsule: 1 cap(s) orally every 8 hours, As needed, Cough  furosemide 20 mg oral tablet: 3 tab(s) orally 2 times a day  guaiFENesin 100 mg/5 mL oral liquid: 5 milliliter(s) orally every 6 hours, As needed, Cough  insulin glargine 100 units/mL subcutaneous solution: 20 unit(s) subcutaneous once a day (at bedtime)   metoprolol tartrate 100 mg oral tablet: 1 tab(s) orally every 12 hours  Multiple Vitamins oral tablet: 1 tab(s) orally once a day  pantoprazole 40 mg oral delayed release tablet: 1 tab(s) orally once a day  polyethylene glycol 3350 oral powder for reconstitution: 17 gram(s) orally once a day, As needed, Constipation  potassium chloride 20 mEq oral tablet, extended release: 2 tab(s) orally 2 times a day   amiodarone 200 mg oral tablet: 1 tab(s) orally once a day  apixaban 2.5 mg oral tablet: 1 tab(s) orally every 12 hours  ascorbic acid 500 mg oral tablet: 1 tab(s) orally once a day  aspirin 81 mg oral delayed release tablet: 1 tab(s) orally once a day  atorvastatin 40 mg oral tablet: 1 tab(s) orally once a day (at bedtime)  digoxin 125 mcg (0.125 mg) oral tablet: 1 tab(s) orally once a day  guaiFENesin 100 mg/5 mL oral liquid: 5 milliliter(s) orally every 6 hours, As needed, Cough  insulin glargine 100 units/mL subcutaneous solution: 20 unit(s) subcutaneous once a day (at bedtime)   Lasix 20 mg oral tablet: 3 tab(s) orally 2 times a day  metoprolol tartrate 75 mg oral tablet: 1 tab(s) orally every 8 hours  Multiple Vitamins oral tablet: 1 tab(s) orally once a day  pantoprazole 40 mg oral delayed release tablet: 1 tab(s) orally once a day  polyethylene glycol 3350 oral powder for reconstitution: 17 gram(s) orally once a day, As needed, Constipation  potassium chloride 20 mEq oral tablet, extended release: 2 tab(s) orally 2 times a day

## 2021-02-10 NOTE — PROGRESS NOTE ADULT - SUBJECTIVE AND OBJECTIVE BOX
Patient is a 75y old  Female who presents with a chief complaint of COVID 19 pna (10 Feb 2021 10:43)    Feels very nervous and wants to go home  Denies chest pain, SOB  Patient seen and examined at bedside.    ALLERGIES:  OHS (Unknown)  warfarin (Rash)    MEDICATIONS  (STANDING):  aMIOdarone    Tablet 200 milliGRAM(s) Oral daily  apixaban 2.5 milliGRAM(s) Oral every 12 hours  ascorbic acid 500 milliGRAM(s) Oral daily  aspirin enteric coated 81 milliGRAM(s) Oral daily  atorvastatin 40 milliGRAM(s) Oral at bedtime  dextrose 40% Gel 15 Gram(s) Oral once  dextrose 5%. 1000 milliLiter(s) (50 mL/Hr) IV Continuous <Continuous>  dextrose 5%. 1000 milliLiter(s) (100 mL/Hr) IV Continuous <Continuous>  dextrose 50% Injectable 25 Gram(s) IV Push once  dextrose 50% Injectable 12.5 Gram(s) IV Push once  dextrose 50% Injectable 25 Gram(s) IV Push once  furosemide    Tablet 60 milliGRAM(s) Oral two times a day  glucagon  Injectable 1 milliGRAM(s) IntraMuscular once  influenza   Vaccine 0.5 milliLiter(s) IntraMuscular once  insulin glargine Injectable (LANTUS) 20 Unit(s) SubCutaneous at bedtime  insulin lispro (ADMELOG) corrective regimen sliding scale   SubCutaneous three times a day before meals  insulin lispro (ADMELOG) corrective regimen sliding scale   SubCutaneous at bedtime  melatonin 6 milliGRAM(s) Oral at bedtime  metoprolol tartrate 50 milliGRAM(s) Oral every 8 hours  multivitamin 1 Tablet(s) Oral daily  pantoprazole    Tablet 40 milliGRAM(s) Oral before breakfast  potassium chloride    Tablet ER 40 milliEquivalent(s) Oral two times a day    MEDICATIONS  (PRN):  acetaminophen   Tablet .. 650 milliGRAM(s) Oral every 8 hours PRN Temp greater or equal to 38C (100.4F), Mild Pain (1 - 3)  ALPRAZolam 0.5 milliGRAM(s) Oral three times a day PRN anxiety  benzonatate 100 milliGRAM(s) Oral every 8 hours PRN Cough  guaiFENesin   Syrup  (Sugar-Free) 100 milliGRAM(s) Oral every 6 hours PRN Cough  polyethylene glycol 3350 17 Gram(s) Oral daily PRN Constipation  senna 2 Tablet(s) Oral at bedtime PRN Constipation    Vital Signs Last 24 Hrs  T(F): 98.4 (10 Feb 2021 08:05), Max: 99 (09 Feb 2021 15:46)  HR: 125 (10 Feb 2021 08:05) (99 - 131)  BP: 119/68 (10 Feb 2021 08:05) (102/71 - 119/79)  RR: 16 (10 Feb 2021 08:05) (16 - 16)  SpO2: 97% (10 Feb 2021 08:05) (93% - 98%)  I&O's Summary      PHYSICAL EXAM:  General: NAD, A/O x 3  ENT: MMM, no scleral icterus  Neck: Supple, No JVD, no thyroidomegaly  Lungs: Clear to auscultation bilaterally, no wheezes, no rales, no rhonchi, good inspiratory effort  Cardio: RRR, S1/S2, No murmurs  Abdomen: Soft, Nontender, Nondistended; Bowel sounds present  Extremities: Right this hematoma noted, slightly tender however improved.  Left leg surgical scar C/D/I    LABS:                        10.9   5.63  )-----------( 418      ( 09 Feb 2021 05:30 )             35.6       02-09    143  |  102  |  19  ----------------------------<  158  3.6   |  31  |  1.09    Ca    8.9      09 Feb 2021 05:30  Mg     1.9     02-09       eGFR if Non African American: 50 mL/min/1.73M2 (02-09-21 @ 05:30)  eGFR if African American: 58 mL/min/1.73M2 (02-09-21 @ 05:30)         POCT Blood Glucose.: 138 mg/dL (10 Feb 2021 07:59)  POCT Blood Glucose.: 163 mg/dL (09 Feb 2021 21:59)  POCT Blood Glucose.: 226 mg/dL (09 Feb 2021 16:51)  POCT Blood Glucose.: 192 mg/dL (09 Feb 2021 12:09)    COVID-19 PCR: Detected (02-08-21 @ 14:02)  COVID-19 PCR: Detected (01-31-21 @ 17:00)  COVID-19 PCR: Detected (01-31-21 @ 06:50)  COVID-19 PCR: NotDetec (01-28-21 @ 22:55)  COVID-19 PCR: NotDetec (01-27-21 @ 10:54)  COVID-19 PCR: NotDetec (01-18-21 @ 11:33)      RADIOLOGY & ADDITIONAL TESTS:  No new imaging noted

## 2021-02-10 NOTE — DISCHARGE NOTE PROVIDER - NSDCFUADDINST_GEN_ALL_CORE_FT
Have PCP follow up with potassium level while on oral supplements  See PCP within 1 week after discharge Have PCP follow up with potassium level while on oral supplements  Monitor kidney function while on lasix  See PCP within 1 week after discharge

## 2021-02-11 ENCOUNTER — TRANSCRIPTION ENCOUNTER (OUTPATIENT)
Age: 76
End: 2021-02-11

## 2021-02-11 LAB
GLUCOSE BLDC GLUCOMTR-MCNC: 139 MG/DL — HIGH (ref 70–99)
GLUCOSE BLDC GLUCOMTR-MCNC: 147 MG/DL — HIGH (ref 70–99)
GLUCOSE BLDC GLUCOMTR-MCNC: 185 MG/DL — HIGH (ref 70–99)
GLUCOSE BLDC GLUCOMTR-MCNC: 195 MG/DL — HIGH (ref 70–99)
SARS-COV-2 RNA SPEC QL NAA+PROBE: DETECTED

## 2021-02-11 PROCEDURE — 99232 SBSQ HOSP IP/OBS MODERATE 35: CPT

## 2021-02-11 RX ADMIN — Medication 100 MILLIGRAM(S): at 05:45

## 2021-02-11 RX ADMIN — Medication 6 MILLIGRAM(S): at 21:06

## 2021-02-11 RX ADMIN — AMIODARONE HYDROCHLORIDE 200 MILLIGRAM(S): 400 TABLET ORAL at 05:44

## 2021-02-11 RX ADMIN — APIXABAN 2.5 MILLIGRAM(S): 2.5 TABLET, FILM COATED ORAL at 05:44

## 2021-02-11 RX ADMIN — INSULIN GLARGINE 20 UNIT(S): 100 INJECTION, SOLUTION SUBCUTANEOUS at 21:06

## 2021-02-11 RX ADMIN — Medication 60 MILLIGRAM(S): at 05:45

## 2021-02-11 RX ADMIN — PANTOPRAZOLE SODIUM 40 MILLIGRAM(S): 20 TABLET, DELAYED RELEASE ORAL at 05:44

## 2021-02-11 RX ADMIN — Medication 50 MILLIGRAM(S): at 05:44

## 2021-02-11 RX ADMIN — Medication 81 MILLIGRAM(S): at 12:01

## 2021-02-11 RX ADMIN — Medication 40 MILLIEQUIVALENT(S): at 16:35

## 2021-02-11 RX ADMIN — ATORVASTATIN CALCIUM 40 MILLIGRAM(S): 80 TABLET, FILM COATED ORAL at 21:06

## 2021-02-11 RX ADMIN — APIXABAN 2.5 MILLIGRAM(S): 2.5 TABLET, FILM COATED ORAL at 16:35

## 2021-02-11 RX ADMIN — Medication 40 MILLIEQUIVALENT(S): at 05:44

## 2021-02-11 RX ADMIN — Medication 500 MILLIGRAM(S): at 12:02

## 2021-02-11 RX ADMIN — Medication 60 MILLIGRAM(S): at 16:35

## 2021-02-11 RX ADMIN — Medication 100 MILLIGRAM(S): at 12:02

## 2021-02-11 RX ADMIN — Medication 1 TABLET(S): at 12:01

## 2021-02-11 RX ADMIN — Medication 2: at 17:07

## 2021-02-11 RX ADMIN — Medication 50 MILLIGRAM(S): at 21:06

## 2021-02-11 RX ADMIN — Medication 50 MILLIGRAM(S): at 12:01

## 2021-02-11 NOTE — PROGRESS NOTE ADULT - SUBJECTIVE AND OBJECTIVE BOX
Patient is a 75y old  Female who presents with a chief complaint of COVID 19 pna (10 Feb 2021 10:59)    PT is in better spirits today  Would like to be discharged to acute rehab for further care  Denies chest pain, SOB  Urinating without difficulty    Patient seen and examined at bedside.    ALLERGIES:  OHS (Unknown)  warfarin (Rash)    MEDICATIONS  (STANDING):  aMIOdarone    Tablet 200 milliGRAM(s) Oral daily  apixaban 2.5 milliGRAM(s) Oral every 12 hours  ascorbic acid 500 milliGRAM(s) Oral daily  aspirin enteric coated 81 milliGRAM(s) Oral daily  atorvastatin 40 milliGRAM(s) Oral at bedtime  dextrose 40% Gel 15 Gram(s) Oral once  dextrose 5%. 1000 milliLiter(s) (50 mL/Hr) IV Continuous <Continuous>  dextrose 5%. 1000 milliLiter(s) (100 mL/Hr) IV Continuous <Continuous>  dextrose 50% Injectable 25 Gram(s) IV Push once  dextrose 50% Injectable 12.5 Gram(s) IV Push once  dextrose 50% Injectable 25 Gram(s) IV Push once  furosemide    Tablet 60 milliGRAM(s) Oral two times a day  glucagon  Injectable 1 milliGRAM(s) IntraMuscular once  influenza   Vaccine 0.5 milliLiter(s) IntraMuscular once  insulin glargine Injectable (LANTUS) 20 Unit(s) SubCutaneous at bedtime  insulin lispro (ADMELOG) corrective regimen sliding scale   SubCutaneous three times a day before meals  insulin lispro (ADMELOG) corrective regimen sliding scale   SubCutaneous at bedtime  melatonin 6 milliGRAM(s) Oral at bedtime  metoprolol tartrate 50 milliGRAM(s) Oral every 8 hours  multivitamin 1 Tablet(s) Oral daily  pantoprazole    Tablet 40 milliGRAM(s) Oral before breakfast  potassium chloride    Tablet ER 40 milliEquivalent(s) Oral two times a day    MEDICATIONS  (PRN):  acetaminophen   Tablet .. 650 milliGRAM(s) Oral every 8 hours PRN Temp greater or equal to 38C (100.4F), Mild Pain (1 - 3)  benzonatate 100 milliGRAM(s) Oral every 8 hours PRN Cough  guaiFENesin   Syrup  (Sugar-Free) 100 milliGRAM(s) Oral every 6 hours PRN Cough  polyethylene glycol 3350 17 Gram(s) Oral daily PRN Constipation  senna 2 Tablet(s) Oral at bedtime PRN Constipation    Vital Signs Last 24 Hrs  T(F): 97.7 (11 Feb 2021 05:36), Max: 97.8 (10 Feb 2021 20:15)  HR: 114 (11 Feb 2021 05:36) (113 - 121)  BP: 111/79 (11 Feb 2021 05:36) (108/54 - 117/46)  RR: 17 (11 Feb 2021 05:36) (16 - 17)  SpO2: 97% (11 Feb 2021 05:36) (95% - 97%)  I&O's Summary    10 Feb 2021 07:01  -  11 Feb 2021 07:00  --------------------------------------------------------  IN: 360 mL / OUT: 0 mL / NET: 360 mL    11 Feb 2021 07:01  -  11 Feb 2021 11:28  --------------------------------------------------------  IN: 240 mL / OUT: 0 mL / NET: 240 mL        PHYSICAL EXAM:  General: NAD, A/O x 3, lying in bed  ENT: MMM, no scleral icterus  Neck: Supple, No JVD, no thyroidomegaly  Lungs: Clear to auscultation bilaterally, no wheezes, no rales, no rhonchi, good inspiratory effort  Cardio: Surgical scar midline chest clean,dry, intact;  RRR, S1/S2, No murmurs  Abdomen: Soft, Nontender, Nondistended; Bowel sounds present  Extremities: Right thigh hematoma noted to be improved, slightly tender, left surgical scar clean, dry, edema in bilateral LE greatly improved    LABS:                        10.9   5.63  )-----------( 418      ( 09 Feb 2021 05:30 )             35.6       02-09    143  |  102  |  19  ----------------------------<  158  3.6   |  31  |  1.09    Ca    8.9      09 Feb 2021 05:30  Mg     1.9     02-09       eGFR if Non African American: 50 mL/min/1.73M2 (02-09-21 @ 05:30)  eGFR if African American: 58 mL/min/1.73M2 (02-09-21 @ 05:30)       POCT Blood Glucose.: 147 mg/dL (11 Feb 2021 08:09)  POCT Blood Glucose.: 156 mg/dL (10 Feb 2021 20:15)  POCT Blood Glucose.: 142 mg/dL (10 Feb 2021 17:39)  POCT Blood Glucose.: 133 mg/dL (10 Feb 2021 12:29)    COVID-19 PCR: Detected (02-08-21 @ 14:02)  COVID-19 PCR: Detected (01-31-21 @ 17:00)  COVID-19 PCR: Detected (01-31-21 @ 06:50)  COVID-19 PCR: NotDetec (01-28-21 @ 22:55)  COVID-19 PCR: NotDetec (01-27-21 @ 10:54)  COVID-19 PCR: NotDetec (01-18-21 @ 11:33)      RADIOLOGY & ADDITIONAL TESTS:  No new imaging noted

## 2021-02-11 NOTE — DISCHARGE NOTE NURSING/CASE MANAGEMENT/SOCIAL WORK - NSDCFUADDAPPT_GEN_ALL_CORE_FT
PCP, cardiology We made you a TELE-Health appointment with Dr. Coto [phone] on 2/17 at 12:45pm, please call the office prior to appointment.      cardiology We made you an appointment with Dr. Evangelista [phone] on 2/26/21 at 3pm     We made you an appointment with Dr. Shana Olivares [phone] on 2/26/21 at 3pm

## 2021-02-11 NOTE — DISCHARGE NOTE NURSING/CASE MANAGEMENT/SOCIAL WORK - PATIENT PORTAL LINK FT
You can access the FollowMyHealth Patient Portal offered by [facility] by registering at the following website: [email or web address]. By joining [email or web address]’s FollowMyHealth portal, you will also be able to view your health information using other applications (apps) compatible with our system.

## 2021-02-11 NOTE — PROGRESS NOTE ADULT - ASSESSMENT
· Assessment	  75y with PMHx of paroxysmal Afib (on Xarelto), PAD (s/p N-mivgltv-qyzwoeonq bypass, R-femoral angioplasty with stenting, (on Plavix), HTN, HLD, type 2 diabetes (HA1c on 7.8 on Metformin and Tresiba as an outpatient), and recent trauma to right thigh with stable hematoma, presented to [facility] 1/12/21 with SOB, found to have NSTEMI. 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 S/P C3/DUANE Clip/MV Repair and DUANE thrombectomy with Dr. Orellana on 1/19/21. Postop course complicated by hypoxic respiratory failure and KRISTIE now resolved, and hypokalemia on daily repletion while on Torsemide, admitted to  for rehab on jan 28 and was found with COVID 19. transferred to medicine on 2/1 for worsening SOB/SHF/PNA    #Acute on chronic combined systolic and Diastolic CHF, improved  #COVID-19 Infection, still + 2/8, will re-swab today  -SpO2 currently 97% at rest on RA  -Completed 5 days of Remdisivir  -cough improved, c/w tessalon pearls prn  -Improvement in volume status. Will continue Lasix 60mg po BID at this time   -I/O's and daily weights, fluid restriction    #Recent NSTEMI S/P CABG, MV Repair and DUANE thrombectomy 1/19/21  #Chronic Afib   #Mild to mod AS  -ASA, Lipitor 40 mg PO QHS   -Continue Eliquis 2.5mg BID (reduced dose due to large hematoma in right thigh from trauma prior to surgery)  -c/w Metoprolol to 50mg TID  -Continue Amiodarone  -Cardiology on board    #Hypokalemia, improved  - c/w K 40meq po BID    #Transaminitis   -Secondary to COVID-19  -Bilirubin downtrending    #Thrombocytopenia  -Will monitor at this time    #DM2  -HbA1c 7.8 on Metformin and Tresiba as outpatient  -Continue Lantus + ISS,    #Anemia  -Stable  -Monitor    #Hematoma Right thigh  -monitor    Physiatry recommendations noted; Acute rehab upon discharge  Pt stable for discharge at this time  CM aware, re-swabbed for COVID 2/8; still positive    #DVT ppx- Eliquis  #Code: Full  Dispo: Back to acute rehab once bed available  Will re-swab for COVID Today to see if we can find another facility for her????  CM working on case  D/c 45 min    Updated  Derrick Roblero, [phone]  All questions answered, in agreement with above results  IN agreement with acute rehab discharge

## 2021-02-12 LAB
GLUCOSE BLDC GLUCOMTR-MCNC: 113 MG/DL — HIGH (ref 70–99)
GLUCOSE BLDC GLUCOMTR-MCNC: 135 MG/DL — HIGH (ref 70–99)
GLUCOSE BLDC GLUCOMTR-MCNC: 155 MG/DL — HIGH (ref 70–99)
GLUCOSE BLDC GLUCOMTR-MCNC: 166 MG/DL — HIGH (ref 70–99)

## 2021-02-12 RX ADMIN — Medication 6 MILLIGRAM(S): at 21:21

## 2021-02-12 RX ADMIN — PANTOPRAZOLE SODIUM 40 MILLIGRAM(S): 20 TABLET, DELAYED RELEASE ORAL at 05:11

## 2021-02-12 RX ADMIN — Medication 60 MILLIGRAM(S): at 05:11

## 2021-02-12 RX ADMIN — Medication 40 MILLIEQUIVALENT(S): at 16:46

## 2021-02-12 RX ADMIN — Medication 50 MILLIGRAM(S): at 12:24

## 2021-02-12 RX ADMIN — Medication 500 MILLIGRAM(S): at 12:24

## 2021-02-12 RX ADMIN — Medication 100 MILLIGRAM(S): at 21:18

## 2021-02-12 RX ADMIN — AMIODARONE HYDROCHLORIDE 200 MILLIGRAM(S): 400 TABLET ORAL at 05:11

## 2021-02-12 RX ADMIN — Medication 60 MILLIGRAM(S): at 16:46

## 2021-02-12 RX ADMIN — APIXABAN 2.5 MILLIGRAM(S): 2.5 TABLET, FILM COATED ORAL at 16:46

## 2021-02-12 RX ADMIN — Medication 1 TABLET(S): at 12:24

## 2021-02-12 RX ADMIN — INSULIN GLARGINE 20 UNIT(S): 100 INJECTION, SOLUTION SUBCUTANEOUS at 21:18

## 2021-02-12 RX ADMIN — ATORVASTATIN CALCIUM 40 MILLIGRAM(S): 80 TABLET, FILM COATED ORAL at 21:18

## 2021-02-12 RX ADMIN — Medication 0: at 21:18

## 2021-02-12 RX ADMIN — APIXABAN 2.5 MILLIGRAM(S): 2.5 TABLET, FILM COATED ORAL at 05:11

## 2021-02-12 RX ADMIN — Medication 81 MILLIGRAM(S): at 12:24

## 2021-02-12 RX ADMIN — Medication 50 MILLIGRAM(S): at 21:18

## 2021-02-12 RX ADMIN — Medication 2: at 12:40

## 2021-02-12 RX ADMIN — Medication 650 MILLIGRAM(S): at 05:11

## 2021-02-12 RX ADMIN — Medication 50 MILLIGRAM(S): at 05:11

## 2021-02-12 NOTE — PROGRESS NOTE ADULT - SUBJECTIVE AND OBJECTIVE BOX
HPI:  75y with PMHx of paroxysmal Afib (on Xarelto), PAD (s/p S-rztlrjl-izanrniek bypass, R-femoral angioplasty with stenting, (on Plavix), HTN, HLD, T2IRDM s/p recent trauma to right thigh with stable hematoma, presented to [facility] 1/12/21 with SOB, found to have NSTEMI. 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 S/P C3/DUANE Clip/MV Repair and DUANE thrombectomy with Dr. Orellana on 1/19/21. Postop course complicated by hypoxic respiratory failure and KRISTIE now resolved, and hyponatremia, hypokalemia on daily repletion while on Torsemide, admitted to  for rehab and now with COVID 19 and tx to medical service for worsening dyspnea. and further treatment started on Remdesivir while in rehab    Patient s/p 5 days of remdesivir  Now on oral diuretics   Episode of afib with RVR this am while ambulating to bathroom   Has been tolerating bed side therapy     Seen in follow up for possible admission to rehab .        TODAY'S SUBJECTIVE & REVIEW OF SYMPTOMS  Patient seen at bedside  Seated in chair  States that she feels cold    ROS:  Denies HA, CP/palpitations/abdominal pain or nausea    PHYSICAL EXAM    Vital Signs Last 24 Hrs  T(C): 36.4 (12 Feb 2021 08:31), Max: 36.9 (11 Feb 2021 18:45)  T(F): 97.6 (12 Feb 2021 08:31), Max: 98.5 (11 Feb 2021 18:45)  HR: 109 (12 Feb 2021 09:55) (102 - 120)  BP: 101/67 (12 Feb 2021 09:55) (96/63 - 126/72)  BP(mean): --  RR: 16 (12 Feb 2021 08:31) (16 - 18)  SpO2: 97% (12 Feb 2021 09:55) (96% - 100%)    Constitutional - NAD, Comfortable, aaox3 ( not to date ) ,   Chest - Breathing comfortably   Cardiovascular - radial pulse well felt   Abdomen - soft, NT   Extremities - Bilateral LE edema appears improved , no calf tenderness.   Motor UE 5/5, LE HF 4/5, KE 4/5 ankle 5/5  Psychiatric - Mood stable, Affect WNL  Skin: Sternal incision clean, drain sites with some scabs, right thigh and knee - with ecchymosis - hematoma appears less tense    MEDICATIONS  (STANDING):  aMIOdarone    Tablet 200 milliGRAM(s) Oral daily  apixaban 2.5 milliGRAM(s) Oral every 12 hours  ascorbic acid 500 milliGRAM(s) Oral daily  aspirin enteric coated 81 milliGRAM(s) Oral daily  atorvastatin 40 milliGRAM(s) Oral at bedtime  dextrose 40% Gel 15 Gram(s) Oral once  dextrose 5%. 1000 milliLiter(s) (50 mL/Hr) IV Continuous <Continuous>  dextrose 5%. 1000 milliLiter(s) (100 mL/Hr) IV Continuous <Continuous>  dextrose 50% Injectable 25 Gram(s) IV Push once  dextrose 50% Injectable 25 Gram(s) IV Push once  dextrose 50% Injectable 12.5 Gram(s) IV Push once  furosemide    Tablet 60 milliGRAM(s) Oral two times a day  glucagon  Injectable 1 milliGRAM(s) IntraMuscular once  influenza   Vaccine 0.5 milliLiter(s) IntraMuscular once  insulin glargine Injectable (LANTUS) 20 Unit(s) SubCutaneous at bedtime  insulin lispro (ADMELOG) corrective regimen sliding scale   SubCutaneous at bedtime  insulin lispro (ADMELOG) corrective regimen sliding scale   SubCutaneous three times a day before meals  melatonin 6 milliGRAM(s) Oral at bedtime  metoprolol tartrate 50 milliGRAM(s) Oral every 8 hours  multivitamin 1 Tablet(s) Oral daily  pantoprazole    Tablet 40 milliGRAM(s) Oral before breakfast  potassium chloride    Tablet ER 40 milliEquivalent(s) Oral two times a day    MEDICATIONS  (PRN):  acetaminophen   Tablet .. 650 milliGRAM(s) Oral every 8 hours PRN Temp greater or equal to 38C (100.4F), Mild Pain (1 - 3)  benzonatate 100 milliGRAM(s) Oral every 8 hours PRN Cough  guaiFENesin   Syrup  (Sugar-Free) 100 milliGRAM(s) Oral every 6 hours PRN Cough  polyethylene glycol 3350 17 Gram(s) Oral daily PRN Constipation  senna 2 Tablet(s) Oral at bedtime PRN Constipation    CAPILLARY BLOOD GLUCOSE      POCT Blood Glucose.: 155 mg/dL (12 Feb 2021 12:38)  POCT Blood Glucose.: 113 mg/dL (12 Feb 2021 08:08)  POCT Blood Glucose.: 195 mg/dL (11 Feb 2021 20:15)  POCT Blood Glucose.: 185 mg/dL (11 Feb 2021 16:43)                          11.4   6.08  )-----------( 448      ( 12 Feb 2021 10:10 )             37.4     02-12    144  |  103  |  15  ----------------------------<  175<H>  3.8   |  31  |  1.08    Ca    8.8      12 Feb 2021 10:10    TPro  7.3  /  Alb  2.8<L>  /  TBili  3.0<H>  /  DBili  x   /  AST  64<H>  /  ALT  45  /  AlkPhos  110  02-12

## 2021-02-12 NOTE — PROGRESS NOTE ADULT - SUBJECTIVE AND OBJECTIVE BOX
Patient is a 75y old  Female who presents with a chief complaint of COVID 19 pna (11 Feb 2021 11:28)    THis AM, nursing noted pt went to bathroom, had a BM and had afib with RVR  PT was asymptomatic at time  Denies chest pain, SOB  Tolerating diet  Wants to go to acute rehab      Patient seen and examined at bedside.    ALLERGIES:  OHS (Unknown)  warfarin (Rash)    MEDICATIONS  (STANDING):  aMIOdarone    Tablet 200 milliGRAM(s) Oral daily  apixaban 2.5 milliGRAM(s) Oral every 12 hours  ascorbic acid 500 milliGRAM(s) Oral daily  aspirin enteric coated 81 milliGRAM(s) Oral daily  atorvastatin 40 milliGRAM(s) Oral at bedtime  dextrose 40% Gel 15 Gram(s) Oral once  dextrose 5%. 1000 milliLiter(s) (50 mL/Hr) IV Continuous <Continuous>  dextrose 5%. 1000 milliLiter(s) (100 mL/Hr) IV Continuous <Continuous>  dextrose 50% Injectable 25 Gram(s) IV Push once  dextrose 50% Injectable 12.5 Gram(s) IV Push once  dextrose 50% Injectable 25 Gram(s) IV Push once  furosemide    Tablet 60 milliGRAM(s) Oral two times a day  glucagon  Injectable 1 milliGRAM(s) IntraMuscular once  influenza   Vaccine 0.5 milliLiter(s) IntraMuscular once  insulin glargine Injectable (LANTUS) 20 Unit(s) SubCutaneous at bedtime  insulin lispro (ADMELOG) corrective regimen sliding scale   SubCutaneous three times a day before meals  insulin lispro (ADMELOG) corrective regimen sliding scale   SubCutaneous at bedtime  melatonin 6 milliGRAM(s) Oral at bedtime  metoprolol tartrate 50 milliGRAM(s) Oral every 8 hours  multivitamin 1 Tablet(s) Oral daily  pantoprazole    Tablet 40 milliGRAM(s) Oral before breakfast  potassium chloride    Tablet ER 40 milliEquivalent(s) Oral two times a day    MEDICATIONS  (PRN):  acetaminophen   Tablet .. 650 milliGRAM(s) Oral every 8 hours PRN Temp greater or equal to 38C (100.4F), Mild Pain (1 - 3)  benzonatate 100 milliGRAM(s) Oral every 8 hours PRN Cough  guaiFENesin   Syrup  (Sugar-Free) 100 milliGRAM(s) Oral every 6 hours PRN Cough  polyethylene glycol 3350 17 Gram(s) Oral daily PRN Constipation  senna 2 Tablet(s) Oral at bedtime PRN Constipation    Vital Signs Last 24 Hrs  T(F): 97.6 (12 Feb 2021 08:31), Max: 98.5 (11 Feb 2021 18:45)  HR: 109 (12 Feb 2021 09:55) (102 - 120)  BP: 101/67 (12 Feb 2021 09:55) (96/63 - 126/72)  RR: 16 (12 Feb 2021 08:31) (16 - 19)  SpO2: 97% (12 Feb 2021 09:55) (96% - 100%)  I&O's Summary    11 Feb 2021 07:01  -  12 Feb 2021 07:00  --------------------------------------------------------  IN: 480 mL / OUT: 0 mL / NET: 480 mL        PHYSICAL EXAM:  General: NAD, A/O x 3, speaking in full sentences  ENT: MMM, no scleral icterus  Neck: Supple, No JVD, no thyroidomegaly  Lungs: Clear to auscultation bilaterally, no wheezes, no rales, no rhonchi, good inspiratory effort  Cardio: Surgical scar noted C/D/I; RRR, S1/S2, No murmurs  Abdomen: Soft, Nontender, Nondistended; Bowel sounds present  Extremities: Right thigh hematoma much improved, less tender, echymosis unchanged; edema improved in bilateral LE    LABS:                        11.4   6.08  )-----------( 448      ( 12 Feb 2021 10:10 )             37.4       02-12    144  |  103  |  15  ----------------------------<  175  3.8   |  31  |  1.08    Ca    8.8      12 Feb 2021 10:10    TPro  7.3  /  Alb  2.8  /  TBili  3.0  /  DBili  x   /  AST  64  /  ALT  45  /  AlkPhos  110  02-12     eGFR if Non African American: 51 mL/min/1.73M2 (02-12-21 @ 10:10)  eGFR if : 59 mL/min/1.73M2 (02-12-21 @ 10:10)     POCT Blood Glucose.: 113 mg/dL (12 Feb 2021 08:08)  POCT Blood Glucose.: 195 mg/dL (11 Feb 2021 20:15)  POCT Blood Glucose.: 185 mg/dL (11 Feb 2021 16:43)  POCT Blood Glucose.: 139 mg/dL (11 Feb 2021 12:09)    COVID-19 PCR: Detected (02-11-21 @ 12:30)  COVID-19 PCR: Detected (02-08-21 @ 14:02)  COVID-19 PCR: Detected (01-31-21 @ 17:00)  COVID-19 PCR: Detected (01-31-21 @ 06:50)  COVID-19 PCR: NotDetec (01-28-21 @ 22:55)  COVID-19 PCR: NotDetec (01-27-21 @ 10:54)  COVID-19 PCR: NotDetec (01-18-21 @ 11:33)      RADIOLOGY & ADDITIONAL TESTS:  No new imaging noted.

## 2021-02-12 NOTE — PROGRESS NOTE ADULT - ASSESSMENT
75y with PMHx of paroxysmal Afib (on Xarelto), PAD (s/p T-cmcszvr-csmsvshyr bypass, R-femoral angioplasty with stenting, (on Plavix), HTN, HLD, type 2 diabetes (HA1c on 7.8 on Metformin and Tresiba as an outpatient), and recent trauma to right thigh with stable hematoma, presented to [facility] 1/12/21 with SOB, found to have NSTEMI. 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 S/P C3/DUANE Clip/MV Repair and DUANE thrombectomy with Dr. Orellana on 1/19/21. Postop course complicated by hypoxic respiratory failure and KRISTIE now resolved, and hypokalemia on daily repletion while on Torsemide, admitted to  for rehab on jan 28 and was found with COVID 19. transferred to medicine on 2/1 for worsening SOB/SHF/PNA    #Acute on chronic combined systolic and Diastolic CHF, improved  #COVID-19 Infection, still + 2/8 and 2/11  -SpO2 currently 97% at rest on RA  -Completed 5 days of Remdisivir  -cough improved, c/w tessalon pearls prn  -Improvement in volume status. Will continue Lasix 60mg po BID at this time   -I/O's and daily weights, fluid restriction    #Recent NSTEMI S/P CABG, MV Repair and DUANE thrombectomy 1/19/21  #Chronic Afib   #Mild to mod AS  -ASA, Lipitor 40 mg PO QHS   -Continue Eliquis 2.5mg BID (reduced dose due to large hematoma in right thigh from trauma prior to surgery)  -c/w Metoprolol to 50mg TID  -Continue Amiodarone  -Cardiology on board    #Hypokalemia, improved  - c/w K 40meq po BID    #Transaminitis   -Secondary to COVID-19  -Bilirubin downtrending    #Thrombocytopenia  -Will monitor at this time    #DM2  -HbA1c 7.8 on Metformin and Tresiba as outpatient  -Continue Lantus + ISS,    #Anemia  -Stable  -Monitor    #Hematoma Right thigh  -monitor    Physiatry recommendations noted; Acute rehab upon discharge  Pt stable for discharge at this time  CM aware, re-swabbed for COVID 2/8 and 2/11; still positive    #DVT ppx- Eliquis  #Code: Full  Dispo: Back to acute rehab once bed available  CM working on case  D/c 45 min    Updated  Derrick Roblero, [id number]  All questions answered, in agreement with above results  IN agreement with acute rehab discharge     75y with PMHx of paroxysmal Afib (on Xarelto), PAD (s/p V-mkstbwq-cnjsflrkb bypass, R-femoral angioplasty with stenting, (on Plavix), HTN, HLD, type 2 diabetes (HA1c on 7.8 on Metformin and Tresiba as an outpatient), and recent trauma to right thigh with stable hematoma, presented to [facility] 1/12/21 with SOB, found to have NSTEMI. 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 S/P C3/DUANE Clip/MV Repair and DUANE thrombectomy with Dr. Orellana on 1/19/21. Postop course complicated by hypoxic respiratory failure and KRISTIE now resolved, and hypokalemia on daily repletion while on Torsemide, admitted to  for rehab on jan 28 and was found with COVID 19. transferred to medicine on 2/1 for worsening SOB/SHF/PNA    #Acute hypoxic respiratory failure in need of supplemental oxygenation, currently improved on room air, likely due to :  a)Acute on chronic combined systolic and Diastolic CHF, improved  b)COVID-19 Infection, still + 2/8 and 2/11, stable  -SpO2 currently 97% at rest on RA  -Completed 5 days of Remdisivir  -cough improved, c/w tessalon pearls prn  -Improvement in volume status. Will continue Lasix 60mg po BID at this time   -I/O's and daily weights, fluid restriction    #Recent NSTEMI S/P CABG, MV Repair and DUANE thrombectomy 1/19/21  #Chronic Afib, rate currently controlled  #Mild to mod AS  -ASA, Lipitor 40 mg PO QHS   -Continue Eliquis 2.5mg BID (reduced dose due to large hematoma in right thigh from trauma prior to surgery)  -c/w Metoprolol to 50mg q 8hrs, monitor HR on telemetry; titrate if needed to control heart rate; will c/w same dose for now  -Continue Amiodarone  -Cardiology on board    #Hypokalemia, improved  - c/w K 40meq po BID    #Transaminitis   -Secondary to COVID-19  -Bilirubin downtrending    #Thrombocytopenia  -Will monitor at this time    #DM2  -HbA1c 7.8 on Metformin and Tresiba as outpatient  -Continue Lantus + ISS,    #Anemia  -Stable  -Monitor    #Hematoma Right thigh  -monitor    #Moderate protein-calorie malnutrition  -Monitor    Physiatry recommendations noted; Acute rehab upon discharge  Pt stable for discharge at this time  CM aware, re-swabbed for COVID 2/8 and 2/11; still positive    #DVT ppx- Eliquis  #Code: Full  Dispo: Back to acute rehab once bed available  CM working on case  D/c 45 min    Updated  Derrick Roblero, [id number]  All questions answered, in agreement with above results  IN agreement with acute rehab discharge

## 2021-02-12 NOTE — PROGRESS NOTE ADULT - ASSESSMENT
75y with PMHx of paroxysmal Afib (on Xarelto), PAD (s/p B-qvdfytk-ehciahcpb bypass, R-femoral angioplasty with stenting, (on Plavix), HTN, HLD, type 2 diabetes admitted to rehab after recent NSTEMI.   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 S/P C3/DUANE Clip/MV Repair and DUANE thrombectomy with Dr. Orellana on 1/19/21.  In rehab patient with COVID 19 PCR + symptoms and transferred to medicine for further management    Therapy notes reviewed.  Based on current functional status, patient does not need acute inpatient rehab   D/W therapy- to practice stairs ( or step ups).   Recommend discharge home with walker and home care PT.   Discussed with Dr. Seth

## 2021-02-13 LAB
GLUCOSE BLDC GLUCOMTR-MCNC: 132 MG/DL — HIGH (ref 70–99)
GLUCOSE BLDC GLUCOMTR-MCNC: 138 MG/DL — HIGH (ref 70–99)
GLUCOSE BLDC GLUCOMTR-MCNC: 152 MG/DL — HIGH (ref 70–99)
GLUCOSE BLDC GLUCOMTR-MCNC: 153 MG/DL — HIGH (ref 70–99)
SARS-COV-2 RNA SPEC QL NAA+PROBE: DETECTED

## 2021-02-13 RX ORDER — METOPROLOL TARTRATE 50 MG
50 TABLET ORAL EVERY 6 HOURS
Refills: 0 | Status: DISCONTINUED | OUTPATIENT
Start: 2021-02-13 | End: 2021-02-15

## 2021-02-13 RX ADMIN — Medication 500 MILLIGRAM(S): at 11:16

## 2021-02-13 RX ADMIN — ATORVASTATIN CALCIUM 40 MILLIGRAM(S): 80 TABLET, FILM COATED ORAL at 21:07

## 2021-02-13 RX ADMIN — Medication 1 TABLET(S): at 11:16

## 2021-02-13 RX ADMIN — PANTOPRAZOLE SODIUM 40 MILLIGRAM(S): 20 TABLET, DELAYED RELEASE ORAL at 05:18

## 2021-02-13 RX ADMIN — APIXABAN 2.5 MILLIGRAM(S): 2.5 TABLET, FILM COATED ORAL at 16:49

## 2021-02-13 RX ADMIN — Medication 2: at 07:50

## 2021-02-13 RX ADMIN — Medication 50 MILLIGRAM(S): at 05:18

## 2021-02-13 RX ADMIN — Medication 81 MILLIGRAM(S): at 11:16

## 2021-02-13 RX ADMIN — Medication 40 MILLIEQUIVALENT(S): at 05:19

## 2021-02-13 RX ADMIN — Medication 6 MILLIGRAM(S): at 21:07

## 2021-02-13 RX ADMIN — Medication 50 MILLIGRAM(S): at 11:16

## 2021-02-13 RX ADMIN — AMIODARONE HYDROCHLORIDE 200 MILLIGRAM(S): 400 TABLET ORAL at 05:18

## 2021-02-13 RX ADMIN — Medication 50 MILLIGRAM(S): at 18:00

## 2021-02-13 RX ADMIN — INSULIN GLARGINE 20 UNIT(S): 100 INJECTION, SOLUTION SUBCUTANEOUS at 21:08

## 2021-02-13 RX ADMIN — APIXABAN 2.5 MILLIGRAM(S): 2.5 TABLET, FILM COATED ORAL at 05:18

## 2021-02-13 RX ADMIN — Medication 40 MILLIEQUIVALENT(S): at 18:00

## 2021-02-13 RX ADMIN — Medication 60 MILLIGRAM(S): at 16:49

## 2021-02-13 RX ADMIN — Medication 60 MILLIGRAM(S): at 05:18

## 2021-02-13 NOTE — PROGRESS NOTE ADULT - SUBJECTIVE AND OBJECTIVE BOX
Patient is a 75y old  Female who presents with a chief complaint of COVID 19 pna (12 Feb 2021 14:03)      Patient seen and examined at bedside.  No overnight events  No complaints this morning  HR noted to be elevated  change metoprolol to 50 q 6; if tolerates will change to metoprolol 200mg daily    ALLERGIES:  OHS (Unknown)  warfarin (Rash)    MEDICATIONS  (STANDING):  aMIOdarone    Tablet 200 milliGRAM(s) Oral daily  apixaban 2.5 milliGRAM(s) Oral every 12 hours  ascorbic acid 500 milliGRAM(s) Oral daily  aspirin enteric coated 81 milliGRAM(s) Oral daily  atorvastatin 40 milliGRAM(s) Oral at bedtime  dextrose 40% Gel 15 Gram(s) Oral once  dextrose 5%. 1000 milliLiter(s) (50 mL/Hr) IV Continuous <Continuous>  dextrose 5%. 1000 milliLiter(s) (100 mL/Hr) IV Continuous <Continuous>  dextrose 50% Injectable 25 Gram(s) IV Push once  dextrose 50% Injectable 12.5 Gram(s) IV Push once  dextrose 50% Injectable 25 Gram(s) IV Push once  furosemide    Tablet 60 milliGRAM(s) Oral two times a day  glucagon  Injectable 1 milliGRAM(s) IntraMuscular once  influenza   Vaccine 0.5 milliLiter(s) IntraMuscular once  insulin glargine Injectable (LANTUS) 20 Unit(s) SubCutaneous at bedtime  insulin lispro (ADMELOG) corrective regimen sliding scale   SubCutaneous three times a day before meals  insulin lispro (ADMELOG) corrective regimen sliding scale   SubCutaneous at bedtime  melatonin 6 milliGRAM(s) Oral at bedtime  metoprolol tartrate 50 milliGRAM(s) Oral every 6 hours  multivitamin 1 Tablet(s) Oral daily  pantoprazole    Tablet 40 milliGRAM(s) Oral before breakfast  potassium chloride    Tablet ER 40 milliEquivalent(s) Oral two times a day    MEDICATIONS  (PRN):  acetaminophen   Tablet .. 650 milliGRAM(s) Oral every 8 hours PRN Temp greater or equal to 38C (100.4F), Mild Pain (1 - 3)  benzonatate 100 milliGRAM(s) Oral every 8 hours PRN Cough  guaiFENesin   Syrup  (Sugar-Free) 100 milliGRAM(s) Oral every 6 hours PRN Cough  polyethylene glycol 3350 17 Gram(s) Oral daily PRN Constipation  senna 2 Tablet(s) Oral at bedtime PRN Constipation    Vital Signs Last 24 Hrs  T(F): 98.2 (13 Feb 2021 05:15), Max: 98.2 (13 Feb 2021 05:15)  HR: 110 (13 Feb 2021 05:15) (102 - 138)  BP: 119/88 (13 Feb 2021 05:15) (91/50 - 122/79)  RR: 18 (13 Feb 2021 05:15) (17 - 18)  SpO2: 95% (13 Feb 2021 05:15) (95% - 97%)  I&O's Summary    12 Feb 2021 07:01  -  13 Feb 2021 07:00  --------------------------------------------------------  IN: 240 mL / OUT: 0 mL / NET: 240 mL        PHYSICAL EXAM:  GENERAL: NAD  HENT:  Atraumatic, Normocephalic; No tonsillar erythema, exudates, or enlargement; Moist mucous membranes;   EYES: EOMI, PERRLA, conjunctiva and sclera clear, no lid-lag  NECK: Supple, No JVD, Normal thyroid  CHEST/LUNG: Clear to percussion bilaterally; No rales, rhonchi, wheezing, or rubs; normal respiratory effort, no intercostal retractions  HEART: Irregular rate and irregular rhythm; No murmurs, rubs, or gallops  ABDOMEN: Soft, Nontender, Nondistended; Bowel sounds present; No HSM  MUSCULOSKELETAL/EXTREMITIES:  2+ Peripheral Pulses, right thigh hematoma; No clubbing, cyanosis, or peripheral edema; No digital cyanosis  SKIN: No rashes or lesions; normal texture and temperature  PSYCH: Appropriate affect, Alert & Oriented x 3    LABS:                        11.4   6.08  )-----------( 448      ( 12 Feb 2021 10:10 )             37.4       02-12    144  |  103  |  15  ----------------------------<  175  3.8   |  31  |  1.08    Ca    8.8      12 Feb 2021 10:10    TPro  7.3  /  Alb  2.8  /  TBili  3.0  /  DBili  x   /  AST  64  /  ALT  45  /  AlkPhos  110  02-12     eGFR if Non African American: 51 mL/min/1.73M2 (02-12-21 @ 10:10)  eGFR if : 59 mL/min/1.73M2 (02-12-21 @ 10:10)    POCT Blood Glucose.: 153 mg/dL (13 Feb 2021 07:47)  POCT Blood Glucose.: 166 mg/dL (12 Feb 2021 21:11)  POCT Blood Glucose.: 135 mg/dL (12 Feb 2021 16:51)  POCT Blood Glucose.: 155 mg/dL (12 Feb 2021 12:38)      Care Discussed with Consultants/Other Providers: Yes   Patient is a 75y old  Female who presents with a chief complaint of COVID 19 pna (12 Feb 2021 14:03)      Patient seen and examined at bedside.  No overnight events  No complaints this morning  HR noted to be elevated  changed metoprolol to 50 q 6; if tolerates will change to metoprolol 200mg daily  AR vs OLGA    ALLERGIES:  OHS (Unknown)  warfarin (Rash)    MEDICATIONS  (STANDING):  aMIOdarone    Tablet 200 milliGRAM(s) Oral daily  apixaban 2.5 milliGRAM(s) Oral every 12 hours  ascorbic acid 500 milliGRAM(s) Oral daily  aspirin enteric coated 81 milliGRAM(s) Oral daily  atorvastatin 40 milliGRAM(s) Oral at bedtime  dextrose 40% Gel 15 Gram(s) Oral once  dextrose 5%. 1000 milliLiter(s) (50 mL/Hr) IV Continuous <Continuous>  dextrose 5%. 1000 milliLiter(s) (100 mL/Hr) IV Continuous <Continuous>  dextrose 50% Injectable 25 Gram(s) IV Push once  dextrose 50% Injectable 12.5 Gram(s) IV Push once  dextrose 50% Injectable 25 Gram(s) IV Push once  furosemide    Tablet 60 milliGRAM(s) Oral two times a day  glucagon  Injectable 1 milliGRAM(s) IntraMuscular once  influenza   Vaccine 0.5 milliLiter(s) IntraMuscular once  insulin glargine Injectable (LANTUS) 20 Unit(s) SubCutaneous at bedtime  insulin lispro (ADMELOG) corrective regimen sliding scale   SubCutaneous three times a day before meals  insulin lispro (ADMELOG) corrective regimen sliding scale   SubCutaneous at bedtime  melatonin 6 milliGRAM(s) Oral at bedtime  metoprolol tartrate 50 milliGRAM(s) Oral every 6 hours  multivitamin 1 Tablet(s) Oral daily  pantoprazole    Tablet 40 milliGRAM(s) Oral before breakfast  potassium chloride    Tablet ER 40 milliEquivalent(s) Oral two times a day    MEDICATIONS  (PRN):  acetaminophen   Tablet .. 650 milliGRAM(s) Oral every 8 hours PRN Temp greater or equal to 38C (100.4F), Mild Pain (1 - 3)  benzonatate 100 milliGRAM(s) Oral every 8 hours PRN Cough  guaiFENesin   Syrup  (Sugar-Free) 100 milliGRAM(s) Oral every 6 hours PRN Cough  polyethylene glycol 3350 17 Gram(s) Oral daily PRN Constipation  senna 2 Tablet(s) Oral at bedtime PRN Constipation    Vital Signs Last 24 Hrs  T(F): 98.2 (13 Feb 2021 05:15), Max: 98.2 (13 Feb 2021 05:15)  HR: 110 (13 Feb 2021 05:15) (102 - 138)  BP: 119/88 (13 Feb 2021 05:15) (91/50 - 122/79)  RR: 18 (13 Feb 2021 05:15) (17 - 18)  SpO2: 95% (13 Feb 2021 05:15) (95% - 97%)  I&O's Summary    12 Feb 2021 07:01  -  13 Feb 2021 07:00  --------------------------------------------------------  IN: 240 mL / OUT: 0 mL / NET: 240 mL        PHYSICAL EXAM:  GENERAL: NAD  HENT:  Atraumatic, Normocephalic; No tonsillar erythema, exudates, or enlargement; Moist mucous membranes;   EYES: EOMI, PERRLA, conjunctiva and sclera clear, no lid-lag  NECK: Supple, No JVD, Normal thyroid  CHEST/LUNG: Clear to percussion bilaterally; No rales, rhonchi, wheezing, or rubs; normal respiratory effort, no intercostal retractions  HEART: Irregular rate and irregular rhythm; No murmurs, rubs, or gallops  ABDOMEN: Soft, Nontender, Nondistended; Bowel sounds present; No HSM  MUSCULOSKELETAL/EXTREMITIES:  2+ Peripheral Pulses, right thigh hematoma; No clubbing, cyanosis, or peripheral edema; No digital cyanosis  SKIN: No rashes or lesions; normal texture and temperature  PSYCH: Appropriate affect, Alert & Oriented x 3    LABS:                        11.4   6.08  )-----------( 448      ( 12 Feb 2021 10:10 )             37.4       02-12    144  |  103  |  15  ----------------------------<  175  3.8   |  31  |  1.08    Ca    8.8      12 Feb 2021 10:10    TPro  7.3  /  Alb  2.8  /  TBili  3.0  /  DBili  x   /  AST  64  /  ALT  45  /  AlkPhos  110  02-12     eGFR if Non African American: 51 mL/min/1.73M2 (02-12-21 @ 10:10)  eGFR if : 59 mL/min/1.73M2 (02-12-21 @ 10:10)    POCT Blood Glucose.: 153 mg/dL (13 Feb 2021 07:47)  POCT Blood Glucose.: 166 mg/dL (12 Feb 2021 21:11)  POCT Blood Glucose.: 135 mg/dL (12 Feb 2021 16:51)  POCT Blood Glucose.: 155 mg/dL (12 Feb 2021 12:38)      Care Discussed with Consultants/Other Providers: Yes

## 2021-02-13 NOTE — PROGRESS NOTE ADULT - ASSESSMENT
75y with PMHx of paroxysmal Afib (on Xarelto), PAD (s/p V-bdlygkj-vjrbebfop bypass, R-femoral angioplasty with stenting, (on Plavix), HTN, HLD, type 2 diabetes (HA1c on 7.8 on Metformin and Tresiba as an outpatient), and recent trauma to right thigh with stable hematoma, presented to [facility] 1/12/21 with SOB, found to have NSTEMI. 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 S/P C3/DUANE Clip/MV Repair and DUANE thrombectomy with Dr. Orellana on 1/19/21. Postop course complicated by hypoxic respiratory failure and KRISTIE now resolved, and hypokalemia on daily repletion while on Torsemide, admitted to  for rehab on jan 28 and was found with COVID 19. transferred to medicine on 2/1 for worsening SOB/SHF/PNA    #Acute hypoxic respiratory failure in need of supplemental oxygenation, currently improved on room air, likely due to :  a)Acute on chronic combined systolic and Diastolic CHF, improved  b)COVID-19 Infection, still + 2/8 and 2/11, stable  -SpO2 currently 97% at rest on RA  -Completed 5 days of Remdisivir  -cough improved, c/w tessalon pearls prn  -Improvement in volume status. Will continue Lasix 60mg po BID at this time   -I/O's and daily weights, fluid restriction  - COVID swab today  - possible to D/C home. PT to do stairs teaching    #Recent NSTEMI S/P CABG, MV Repair and DUANE thrombectomy 1/19/21  #Chronic Afib, rate currently controlled  #Mild to mod AS  -ASA, Lipitor 40 mg PO QHS   -Continue Eliquis 2.5mg BID (reduced dose due to large hematoma in right thigh from trauma prior to surgery)  -Metoprolol to 50mg q 6hrs, monitor HR on telemetry; titrate if needed to control heart rate;  -Continue Amiodarone  -Cardiology on board    #Hypokalemia, improved  - c/w K 40meq po BID  - continue to monitor    #Transaminitis   -Secondary to COVID-19  -Bilirubin downtrending    #Thrombocytopenia - resolved  -continue monitor    #DM2  -HbA1c 7.8 on Metformin and Tresiba as outpatient  -Continue Lantus + ISS,    #Anemia  -Stable  -Monitor    #Hematoma Right thigh  -monitor    #Moderate protein-calorie malnutrition  -Monitor    Physiatry recommendations noted; Acute rehab but patient has progress and suggest home with home PT and walker  Pt stable for discharge at this time. Patient is fine with going home if possible  CM aware, re-swabbed for COVID 2/8 and 2/11; still positive. Sending swab today    #DVT ppx- Eliquis  #Code: Full  Dispo: Home  CM working on case      2/13 Will updated  Derrick Roblero, [phone]   75y with PMHx of paroxysmal Afib (on Xarelto), PAD (s/p R-dkptlvr-yrcmtmdho bypass, R-femoral angioplasty with stenting, (on Plavix), HTN, HLD, type 2 diabetes (HA1c on 7.8 on Metformin and Tresiba as an outpatient), and recent trauma to right thigh with stable hematoma, presented to [facility] 1/12/21 with SOB, found to have NSTEMI. 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 S/P C3/DUANE Clip/MV Repair and DUANE thrombectomy with Dr. Orellana on 1/19/21. Postop course complicated by hypoxic respiratory failure and KRISTIE now resolved, and hypokalemia on daily repletion while on Torsemide, admitted to  for rehab on jan 28 and was found with COVID 19. transferred to medicine on 2/1 for worsening SOB/SHF/PNA    #Acute hypoxic respiratory failure in need of supplemental oxygenation, currently improved on room air, likely due to :  a)Acute on chronic combined systolic and Diastolic CHF, improved  b)COVID-19 Infection, still + 2/8 and 2/11, stable  -SpO2 currently 97% at rest on RA  -Completed 5 days of Remdisivir  -cough improved, c/w tessalon pearls prn  -Improvement in volume status. Will continue Lasix 60mg po BID at this time   -I/O's and daily weights, fluid restriction  - COVID swab today  - possible to D/C home. PT to do stairs teaching    #Recent NSTEMI S/P CABG, MV Repair and DUANE thrombectomy 1/19/21  #Chronic Afib, rate currently controlled  #Mild to mod AS  -ASA, Lipitor 40 mg PO QHS   -Continue Eliquis 2.5mg BID (reduced dose due to large hematoma in right thigh from trauma prior to surgery)  -Metoprolol to 50mg q 6hrs, monitor HR on telemetry; titrate if needed to control heart rate;  -Continue Amiodarone  -Cardiology on board    #Hypokalemia, improved  - c/w K 40meq po BID  - continue to monitor    #Transaminitis   -Secondary to COVID-19  -Bilirubin downtrending    #Thrombocytopenia - resolved  -continue monitor    #DM2  -HbA1c 7.8 on Metformin and Tresiba as outpatient  -Continue Lantus + ISS,    #Anemia  -Stable  -Monitor    #Hematoma Right thigh  -monitor    #Moderate protein-calorie malnutrition  -Monitor    Physiatry recommendations noted; Acute rehab but patient has progress and suggest home with home PT and walker  Pt stable for discharge at this time. Patient is fine with going home if possible  CM aware, re-swabbed for COVID 2/8 and 2/11; still positive. Sending swab today    #DVT ppx- Eliquis  #Code: Full  Dispo: Home  CM working on case      2/13 Updated  Derrick Roblero, [phone]

## 2021-02-14 LAB
ANION GAP SERPL CALC-SCNC: 10 MMOL/L — SIGNIFICANT CHANGE UP (ref 5–17)
BUN SERPL-MCNC: 16 MG/DL — SIGNIFICANT CHANGE UP (ref 7–23)
CALCIUM SERPL-MCNC: 9.3 MG/DL — SIGNIFICANT CHANGE UP (ref 8.4–10.5)
CHLORIDE SERPL-SCNC: 102 MMOL/L — SIGNIFICANT CHANGE UP (ref 96–108)
CO2 SERPL-SCNC: 29 MMOL/L — SIGNIFICANT CHANGE UP (ref 22–31)
CREAT SERPL-MCNC: 1.17 MG/DL — SIGNIFICANT CHANGE UP (ref 0.5–1.3)
GLUCOSE BLDC GLUCOMTR-MCNC: 132 MG/DL — HIGH (ref 70–99)
GLUCOSE BLDC GLUCOMTR-MCNC: 134 MG/DL — HIGH (ref 70–99)
GLUCOSE BLDC GLUCOMTR-MCNC: 151 MG/DL — HIGH (ref 70–99)
GLUCOSE BLDC GLUCOMTR-MCNC: 191 MG/DL — HIGH (ref 70–99)
GLUCOSE SERPL-MCNC: 166 MG/DL — HIGH (ref 70–99)
HCT VFR BLD CALC: 34 % — LOW (ref 34.5–45)
HGB BLD-MCNC: 11.7 G/DL — SIGNIFICANT CHANGE UP (ref 11.5–15.5)
MCHC RBC-ENTMCNC: 31 PG — SIGNIFICANT CHANGE UP (ref 27–34)
MCHC RBC-ENTMCNC: 34.4 GM/DL — SIGNIFICANT CHANGE UP (ref 32–36)
MCV RBC AUTO: 89.9 FL — SIGNIFICANT CHANGE UP (ref 80–100)
NRBC # BLD: 0 /100 WBCS — SIGNIFICANT CHANGE UP (ref 0–0)
PLATELET # BLD AUTO: 480 K/UL — HIGH (ref 150–400)
POTASSIUM SERPL-MCNC: 3.8 MMOL/L — SIGNIFICANT CHANGE UP (ref 3.5–5.3)
POTASSIUM SERPL-SCNC: 3.8 MMOL/L — SIGNIFICANT CHANGE UP (ref 3.5–5.3)
RBC # BLD: 3.78 M/UL — LOW (ref 3.8–5.2)
RBC # FLD: 19.9 % — HIGH (ref 10.3–14.5)
SODIUM SERPL-SCNC: 141 MMOL/L — SIGNIFICANT CHANGE UP (ref 135–145)
WBC # BLD: 7.79 K/UL — SIGNIFICANT CHANGE UP (ref 3.8–10.5)
WBC # FLD AUTO: 7.79 K/UL — SIGNIFICANT CHANGE UP (ref 3.8–10.5)

## 2021-02-14 RX ORDER — METOPROLOL TARTRATE 50 MG
50 TABLET ORAL ONCE
Refills: 0 | Status: COMPLETED | OUTPATIENT
Start: 2021-02-14 | End: 2021-02-14

## 2021-02-14 RX ADMIN — Medication 50 MILLIGRAM(S): at 11:23

## 2021-02-14 RX ADMIN — Medication 50 MILLIGRAM(S): at 22:39

## 2021-02-14 RX ADMIN — Medication 50 MILLIGRAM(S): at 04:51

## 2021-02-14 RX ADMIN — ATORVASTATIN CALCIUM 40 MILLIGRAM(S): 80 TABLET, FILM COATED ORAL at 22:39

## 2021-02-14 RX ADMIN — AMIODARONE HYDROCHLORIDE 200 MILLIGRAM(S): 400 TABLET ORAL at 04:52

## 2021-02-14 RX ADMIN — Medication 60 MILLIGRAM(S): at 22:39

## 2021-02-14 RX ADMIN — APIXABAN 2.5 MILLIGRAM(S): 2.5 TABLET, FILM COATED ORAL at 16:49

## 2021-02-14 RX ADMIN — Medication 60 MILLIGRAM(S): at 08:49

## 2021-02-14 RX ADMIN — Medication 40 MILLIEQUIVALENT(S): at 04:51

## 2021-02-14 RX ADMIN — APIXABAN 2.5 MILLIGRAM(S): 2.5 TABLET, FILM COATED ORAL at 04:51

## 2021-02-14 RX ADMIN — Medication 2: at 11:49

## 2021-02-14 RX ADMIN — Medication 6 MILLIGRAM(S): at 22:39

## 2021-02-14 RX ADMIN — PANTOPRAZOLE SODIUM 40 MILLIGRAM(S): 20 TABLET, DELAYED RELEASE ORAL at 05:03

## 2021-02-14 RX ADMIN — INSULIN GLARGINE 20 UNIT(S): 100 INJECTION, SOLUTION SUBCUTANEOUS at 22:39

## 2021-02-14 RX ADMIN — Medication 1 TABLET(S): at 11:22

## 2021-02-14 RX ADMIN — Medication 50 MILLIGRAM(S): at 02:36

## 2021-02-14 RX ADMIN — Medication 40 MILLIEQUIVALENT(S): at 16:49

## 2021-02-14 RX ADMIN — Medication 2: at 17:13

## 2021-02-14 RX ADMIN — Medication 50 MILLIGRAM(S): at 16:49

## 2021-02-14 RX ADMIN — Medication 81 MILLIGRAM(S): at 11:23

## 2021-02-14 RX ADMIN — Medication 500 MILLIGRAM(S): at 11:23

## 2021-02-14 NOTE — PROGRESS NOTE ADULT - SUBJECTIVE AND OBJECTIVE BOX
Patient is a 75y old  Female who presents with a chief complaint of COVID 19 pna (13 Feb 2021 08:57)      Patient seen and examined at bedside.  No overnight events  Patient seems confused his AM  Send labs and UA    ALLERGIES:  OHS (Unknown)  warfarin (Rash)    MEDICATIONS  (STANDING):  aMIOdarone    Tablet 200 milliGRAM(s) Oral daily  apixaban 2.5 milliGRAM(s) Oral every 12 hours  ascorbic acid 500 milliGRAM(s) Oral daily  aspirin enteric coated 81 milliGRAM(s) Oral daily  atorvastatin 40 milliGRAM(s) Oral at bedtime  dextrose 40% Gel 15 Gram(s) Oral once  dextrose 5%. 1000 milliLiter(s) (50 mL/Hr) IV Continuous <Continuous>  dextrose 5%. 1000 milliLiter(s) (100 mL/Hr) IV Continuous <Continuous>  dextrose 50% Injectable 12.5 Gram(s) IV Push once  dextrose 50% Injectable 25 Gram(s) IV Push once  dextrose 50% Injectable 25 Gram(s) IV Push once  furosemide    Tablet 60 milliGRAM(s) Oral two times a day  glucagon  Injectable 1 milliGRAM(s) IntraMuscular once  influenza   Vaccine 0.5 milliLiter(s) IntraMuscular once  insulin glargine Injectable (LANTUS) 20 Unit(s) SubCutaneous at bedtime  insulin lispro (ADMELOG) corrective regimen sliding scale   SubCutaneous three times a day before meals  insulin lispro (ADMELOG) corrective regimen sliding scale   SubCutaneous at bedtime  melatonin 6 milliGRAM(s) Oral at bedtime  metoprolol tartrate 50 milliGRAM(s) Oral every 6 hours  multivitamin 1 Tablet(s) Oral daily  pantoprazole    Tablet 40 milliGRAM(s) Oral before breakfast  potassium chloride    Tablet ER 40 milliEquivalent(s) Oral two times a day    MEDICATIONS  (PRN):  acetaminophen   Tablet .. 650 milliGRAM(s) Oral every 8 hours PRN Temp greater or equal to 38C (100.4F), Mild Pain (1 - 3)  benzonatate 100 milliGRAM(s) Oral every 8 hours PRN Cough  guaiFENesin   Syrup  (Sugar-Free) 100 milliGRAM(s) Oral every 6 hours PRN Cough  polyethylene glycol 3350 17 Gram(s) Oral daily PRN Constipation  senna 2 Tablet(s) Oral at bedtime PRN Constipation    Vital Signs Last 24 Hrs  T(F): 98.1 (13 Feb 2021 21:05), Max: 98.1 (13 Feb 2021 21:05)  HR: 120 (14 Feb 2021 04:50) (114 - 131)  BP: 112/82 (14 Feb 2021 04:50) (99/71 - 122/73)  RR: 19 (14 Feb 2021 04:50) (18 - 20)  SpO2: 94% (14 Feb 2021 04:50) (94% - 96%)  I&O's Summary    13 Feb 2021 07:01  -  14 Feb 2021 07:00  --------------------------------------------------------  IN: 240 mL / OUT: 0 mL / NET: 240 mL        PHYSICAL EXAM:  GENERAL: NAD  HENT:  Atraumatic, Normocephalic; No tonsillar erythema, exudates, or enlargement; Moist mucous membranes;   EYES: EOMI, PERRLA, conjunctiva and sclera clear, no lid-lag  NECK: Supple, No JVD, Normal thyroid  CHEST/LUNG: Clear to percussion bilaterally; No rales, rhonchi, wheezing, or rubs; normal respiratory effort, no intercostal retractions  HEART: Regular rate and rhythm; No murmurs, rubs, or gallops  ABDOMEN: Soft, Nontender, Nondistended; Bowel sounds present; No HSM  MUSCULOSKELETAL/EXTREMITIES:  2+ Peripheral Pulses, No clubbing, cyanosis, or peripheral edema; No digital cyanosis  SKIN: No rashes or lesions; normal texture and temperature  PSYCH: Appropriate affect, Alert & Awake x 2    LABS:                        11.4   6.08  )-----------( 448      ( 12 Feb 2021 10:10 )             37.4       02-12    144  |  103  |  15  ----------------------------<  175  3.8   |  31  |  1.08    Ca    8.8      12 Feb 2021 10:10    TPro  7.3  /  Alb  2.8  /  TBili  3.0  /  DBili  x   /  AST  64  /  ALT  45  /  AlkPhos  110  02-12     eGFR if Non African American: 51 mL/min/1.73M2 (02-12-21 @ 10:10)  eGFR if : 59 mL/min/1.73M2 (02-12-21 @ 10:10)       POCT Blood Glucose.: 132 mg/dL (14 Feb 2021 08:17)  POCT Blood Glucose.: 152 mg/dL (13 Feb 2021 20:43)  POCT Blood Glucose.: 138 mg/dL (13 Feb 2021 16:52)  POCT Blood Glucose.: 132 mg/dL (13 Feb 2021 12:02)      Care Discussed with Consultants/Other Providers: Yes

## 2021-02-14 NOTE — PROGRESS NOTE ADULT - ASSESSMENT
75y with PMHx of paroxysmal Afib (on Xarelto), PAD (s/p Q-uwmiwok-iwsxuarcf bypass, R-femoral angioplasty with stenting, (on Plavix), HTN, HLD, type 2 diabetes (HA1c on 7.8 on Metformin and Tresiba as an outpatient), and recent trauma to right thigh with stable hematoma, presented to [facility] 1/12/21 with SOB, found to have NSTEMI. 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 S/P C3/DUANE Clip/MV Repair and DUANE thrombectomy with Dr. Orellana on 1/19/21. Postop course complicated by hypoxic respiratory failure and KRISTIE now resolved, and hypokalemia on daily repletion while on Torsemide, admitted to  for rehab on jan 28 and was found with COVID 19. transferred to medicine on 2/1 for worsening SOB/SHF/PNA    #Acute hypoxic respiratory failure in need of supplemental oxygenation, currently improved on room air, likely due to :  a)Acute on chronic combined systolic and Diastolic CHF, improved  b)COVID-19 Infection, still + 2/8 and 2/11, stable  -SpO2 currently 97% at rest on RA  -Completed 5 days of Remdisivir  -cough improved, c/w tessalon pearls prn  -Improvement in volume status. Will continue Lasix 60mg po BID at this time   -I/O's and daily weights, fluid restriction  -last COVID swab (pos 2/13)  - possible to D/C home. PT to do stairs teaching    #Confusion  - labs and UA sent    #Recent NSTEMI S/P CABG, MV Repair and DUANE thrombectomy 1/19/21  #Chronic Afib, rate currently controlled  #Mild to mod AS  -ASA, Lipitor 40 mg PO QHS   -Continue Eliquis 2.5mg BID (reduced dose due to large hematoma in right thigh from trauma prior to surgery)  -Metoprolol to 50mg q 6hrs, monitor HR on telemetry; titrate if needed to control heart rate;  -Continue Amiodarone  -Cardiology on board    #Hypokalemia, improved  - c/w K 40meq po BID  - continue to monitor    #Transaminitis   -Secondary to COVID-19  -Bilirubin downtrending    #Thrombocytopenia - resolved  -continue monitor    #DM2  -HbA1c 7.8 on Metformin and Tresiba as outpatient  -Continue Lantus + ISS,    #Anemia  -Stable  -Monitor    #Hematoma Right thigh  -monitor    #Moderate protein-calorie malnutrition  -Monitor    Physiatry recommendations noted; Acute rehab but patient has progress and suggest home with home PT and walker  Pt stable for discharge at this time. Patient is fine with going home if possible  CM aware, re-swabbed for COVID 2/8 and 2/13; still positive    #DVT ppx- Eliquis  #Code: Full  Dispo: Home  CM working on case for dispo      2/14 Updated  Derrick Roblero, [id number] 75y with PMHx of paroxysmal Afib (on Xarelto), PAD (s/p S-loiflkx-mgqglpuxu bypass, R-femoral angioplasty with stenting, (on Plavix), HTN, HLD, type 2 diabetes (HA1c on 7.8 on Metformin and Tresiba as an outpatient), and recent trauma to right thigh with stable hematoma, presented to [facility] 1/12/21 with SOB, found to have NSTEMI. 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 S/P C3/DUANE Clip/MV Repair and DUANE thrombectomy with Dr. Orellana on 1/19/21. Postop course complicated by hypoxic respiratory failure and KRISTIE now resolved, and hypokalemia on daily repletion while on Torsemide, admitted to  for rehab on jan 28 and was found with COVID 19. transferred to medicine on 2/1 for worsening SOB/SHF/PNA    #Acute hypoxic respiratory failure in need of supplemental oxygenation, currently improved on room air, likely due to :  a)Acute on chronic combined systolic and Diastolic CHF, improved  b)COVID-19 Infection, still + 2/8 and 2/11, stable  -SpO2 currently 97% at rest on RA  -Completed 5 days of Remdisivir  -cough improved, c/w tessalon pearls prn  -Improvement in volume status. Will continue Lasix 60mg po BID at this time   -I/O's and daily weights, fluid restriction  -last COVID swab (pos 2/13)  - possible to D/C home. PT to do stairs teaching    #Confusion  - labs and UA sent    #Recent NSTEMI S/P CABG, MV Repair and DUANE thrombectomy 1/19/21  #Chronic Afib, rate currently controlled  #Mild to mod AS  -ASA, Lipitor 40 mg PO QHS   -Continue Eliquis 2.5mg BID (reduced dose due to large hematoma in right thigh from trauma prior to surgery)  -Metoprolol to 50mg q 6hrs, monitor HR on telemetry; titrate if needed to control heart rate;  -Continue Amiodarone  -Cardiology on board    #Hypokalemia, improved  - c/w K 40meq po BID  - continue to monitor    #Transaminitis   -Secondary to COVID-19  -Bilirubin downtrending    #Thrombocytopenia - resolved  -continue monitor    #DM2  -HbA1c 7.8 on Metformin and Tresiba as outpatient  -Continue Lantus + ISS,    #Anemia  -Stable  -Monitor    #Hematoma Right thigh  -monitor    #Moderate protein-calorie malnutrition  -Monitor    Physiatry recommendations noted; Acute rehab but patient has progress and suggest home with home PT and walker  Pt stable for discharge at this time. Patient is fine with going home if possible but will address confusion first  CM aware, re-swabbed for COVID 2/8 and 2/13; still positive    #DVT ppx- Eliquis  #Code: Full  Dispo: Home  CM working on case for dispo      2/14 Updated  Derrick Roblero, [phone]

## 2021-02-15 LAB
ALBUMIN SERPL ELPH-MCNC: 2.9 G/DL — LOW (ref 3.3–5)
ALP SERPL-CCNC: 107 U/L — SIGNIFICANT CHANGE UP (ref 40–120)
ALT FLD-CCNC: 39 U/L — SIGNIFICANT CHANGE UP (ref 10–45)
ANION GAP SERPL CALC-SCNC: 12 MMOL/L — SIGNIFICANT CHANGE UP (ref 5–17)
APPEARANCE UR: CLEAR — SIGNIFICANT CHANGE UP
AST SERPL-CCNC: 45 U/L — HIGH (ref 10–40)
BACTERIA # UR AUTO: ABNORMAL /HPF
BASOPHILS # BLD AUTO: 0.05 K/UL — SIGNIFICANT CHANGE UP (ref 0–0.2)
BASOPHILS NFR BLD AUTO: 0.7 % — SIGNIFICANT CHANGE UP (ref 0–2)
BILIRUB SERPL-MCNC: 2.6 MG/DL — HIGH (ref 0.2–1.2)
BILIRUB UR-MCNC: NEGATIVE — SIGNIFICANT CHANGE UP
BUN SERPL-MCNC: 17 MG/DL — SIGNIFICANT CHANGE UP (ref 7–23)
CALCIUM SERPL-MCNC: 9 MG/DL — SIGNIFICANT CHANGE UP (ref 8.4–10.5)
CHLORIDE SERPL-SCNC: 102 MMOL/L — SIGNIFICANT CHANGE UP (ref 96–108)
CO2 SERPL-SCNC: 30 MMOL/L — SIGNIFICANT CHANGE UP (ref 22–31)
COLOR SPEC: YELLOW — SIGNIFICANT CHANGE UP
COMMENT - URINE: SIGNIFICANT CHANGE UP
CREAT SERPL-MCNC: 1.18 MG/DL — SIGNIFICANT CHANGE UP (ref 0.5–1.3)
DIFF PNL FLD: NEGATIVE — SIGNIFICANT CHANGE UP
EOSINOPHIL # BLD AUTO: 0.17 K/UL — SIGNIFICANT CHANGE UP (ref 0–0.5)
EOSINOPHIL NFR BLD AUTO: 2.4 % — SIGNIFICANT CHANGE UP (ref 0–6)
EPI CELLS # UR: SIGNIFICANT CHANGE UP
GLUCOSE BLDC GLUCOMTR-MCNC: 133 MG/DL — HIGH (ref 70–99)
GLUCOSE BLDC GLUCOMTR-MCNC: 136 MG/DL — HIGH (ref 70–99)
GLUCOSE BLDC GLUCOMTR-MCNC: 139 MG/DL — HIGH (ref 70–99)
GLUCOSE BLDC GLUCOMTR-MCNC: 166 MG/DL — HIGH (ref 70–99)
GLUCOSE SERPL-MCNC: 143 MG/DL — HIGH (ref 70–99)
GLUCOSE UR QL: NEGATIVE — SIGNIFICANT CHANGE UP
HCT VFR BLD CALC: 37.1 % — SIGNIFICANT CHANGE UP (ref 34.5–45)
HGB BLD-MCNC: 11.7 G/DL — SIGNIFICANT CHANGE UP (ref 11.5–15.5)
HYALINE CASTS # UR AUTO: ABNORMAL (ref 0–7)
IMM GRANULOCYTES NFR BLD AUTO: 0.4 % — SIGNIFICANT CHANGE UP (ref 0–1.5)
KETONES UR-MCNC: ABNORMAL
LEUKOCYTE ESTERASE UR-ACNC: ABNORMAL
LYMPHOCYTES # BLD AUTO: 0.97 K/UL — LOW (ref 1–3.3)
LYMPHOCYTES # BLD AUTO: 13.4 % — SIGNIFICANT CHANGE UP (ref 13–44)
MCHC RBC-ENTMCNC: 27.3 PG — SIGNIFICANT CHANGE UP (ref 27–34)
MCHC RBC-ENTMCNC: 31.5 GM/DL — LOW (ref 32–36)
MCV RBC AUTO: 86.7 FL — SIGNIFICANT CHANGE UP (ref 80–100)
MONOCYTES # BLD AUTO: 0.69 K/UL — SIGNIFICANT CHANGE UP (ref 0–0.9)
MONOCYTES NFR BLD AUTO: 9.6 % — SIGNIFICANT CHANGE UP (ref 2–14)
NEUTROPHILS # BLD AUTO: 5.31 K/UL — SIGNIFICANT CHANGE UP (ref 1.8–7.4)
NEUTROPHILS NFR BLD AUTO: 73.5 % — SIGNIFICANT CHANGE UP (ref 43–77)
NITRITE UR-MCNC: NEGATIVE — SIGNIFICANT CHANGE UP
NRBC # BLD: 0 /100 WBCS — SIGNIFICANT CHANGE UP (ref 0–0)
PH UR: 5 — SIGNIFICANT CHANGE UP (ref 5–8)
PLATELET # BLD AUTO: 441 K/UL — HIGH (ref 150–400)
POTASSIUM SERPL-MCNC: 3.5 MMOL/L — SIGNIFICANT CHANGE UP (ref 3.5–5.3)
POTASSIUM SERPL-SCNC: 3.5 MMOL/L — SIGNIFICANT CHANGE UP (ref 3.5–5.3)
PROT SERPL-MCNC: 7.2 G/DL — SIGNIFICANT CHANGE UP (ref 6–8.3)
PROT UR-MCNC: 30 MG/DL
RBC # BLD: 4.28 M/UL — SIGNIFICANT CHANGE UP (ref 3.8–5.2)
RBC # FLD: 17.5 % — HIGH (ref 10.3–14.5)
RBC CASTS # UR COMP ASSIST: NEGATIVE /HPF — SIGNIFICANT CHANGE UP (ref 0–4)
SARS-COV-2 RNA SPEC QL NAA+PROBE: SIGNIFICANT CHANGE UP
SODIUM SERPL-SCNC: 144 MMOL/L — SIGNIFICANT CHANGE UP (ref 135–145)
SP GR SPEC: 1.01 — SIGNIFICANT CHANGE UP (ref 1.01–1.02)
UROBILINOGEN FLD QL: 1
WBC # BLD: 7.22 K/UL — SIGNIFICANT CHANGE UP (ref 3.8–10.5)
WBC # FLD AUTO: 7.22 K/UL — SIGNIFICANT CHANGE UP (ref 3.8–10.5)
WBC UR QL: ABNORMAL /HPF (ref 0–5)

## 2021-02-15 RX ORDER — METOPROLOL TARTRATE 50 MG
100 TABLET ORAL EVERY 12 HOURS
Refills: 0 | Status: DISCONTINUED | OUTPATIENT
Start: 2021-02-15 | End: 2021-02-15

## 2021-02-15 RX ORDER — METOPROLOL TARTRATE 50 MG
100 TABLET ORAL EVERY 12 HOURS
Refills: 0 | Status: DISCONTINUED | OUTPATIENT
Start: 2021-02-15 | End: 2021-02-17

## 2021-02-15 RX ADMIN — Medication 6 MILLIGRAM(S): at 22:55

## 2021-02-15 RX ADMIN — Medication 100 MILLIGRAM(S): at 11:00

## 2021-02-15 RX ADMIN — AMIODARONE HYDROCHLORIDE 200 MILLIGRAM(S): 400 TABLET ORAL at 06:15

## 2021-02-15 RX ADMIN — PANTOPRAZOLE SODIUM 40 MILLIGRAM(S): 20 TABLET, DELAYED RELEASE ORAL at 06:15

## 2021-02-15 RX ADMIN — Medication 1 TABLET(S): at 11:00

## 2021-02-15 RX ADMIN — ATORVASTATIN CALCIUM 40 MILLIGRAM(S): 80 TABLET, FILM COATED ORAL at 22:55

## 2021-02-15 RX ADMIN — INSULIN GLARGINE 20 UNIT(S): 100 INJECTION, SOLUTION SUBCUTANEOUS at 22:55

## 2021-02-15 RX ADMIN — Medication 60 MILLIGRAM(S): at 06:15

## 2021-02-15 RX ADMIN — Medication 40 MILLIEQUIVALENT(S): at 16:20

## 2021-02-15 RX ADMIN — APIXABAN 2.5 MILLIGRAM(S): 2.5 TABLET, FILM COATED ORAL at 16:20

## 2021-02-15 RX ADMIN — Medication 500 MILLIGRAM(S): at 11:00

## 2021-02-15 RX ADMIN — Medication 50 MILLIGRAM(S): at 06:16

## 2021-02-15 RX ADMIN — APIXABAN 2.5 MILLIGRAM(S): 2.5 TABLET, FILM COATED ORAL at 06:15

## 2021-02-15 RX ADMIN — Medication 40 MILLIEQUIVALENT(S): at 06:15

## 2021-02-15 RX ADMIN — Medication 81 MILLIGRAM(S): at 11:00

## 2021-02-15 RX ADMIN — Medication 60 MILLIGRAM(S): at 16:20

## 2021-02-15 RX ADMIN — Medication 100 MILLIGRAM(S): at 16:36

## 2021-02-15 NOTE — PROGRESS NOTE ADULT - ASSESSMENT
75y with PMHx of paroxysmal Afib (on Xarelto), PAD (s/p P-gsbevtw-qzyjoyysf bypass, R-femoral angioplasty with stenting, (on Plavix), HTN, HLD, type 2 diabetes (HA1c on 7.8 on Metformin and Tresiba as an outpatient), and recent trauma to right thigh with stable hematoma, presented to [facility] 1/12/21 with SOB, found to have NSTEMI. 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 S/P C3/DUANE Clip/MV Repair and DUANE thrombectomy with Dr. Orellana on 1/19/21. Postop course complicated by hypoxic respiratory failure and KRISTIE now resolved, and hypokalemia on daily repletion while on Torsemide, admitted to  for rehab on jan 28 and was found with COVID 19. transferred to medicine on 2/1 for worsening SOB/SHF/PNA    #Acute hypoxic respiratory failure in need of supplemental oxygenation, currently improved on room air, likely due to :  a)Acute on chronic combined systolic and Diastolic CHF, improved  b)COVID-19 Infection, still + 2/8 and 2/11, stable  -SpO2 currently 97% at rest on RA  -Completed 5 days of Remdisivir  -cough improved, c/w tessalon pearls prn  -Improvement in volume status. Will continue Lasix 60mg po BID at this time   -I/O's and daily weights, fluid restriction  -last COVID swab (pos 2/13)  - possible to D/C home. PT to do stairs teaching    #Confusion  - labs and UA sent    #Recent NSTEMI S/P CABG, MV Repair and DUANE thrombectomy 1/19/21  #Chronic Afib, rate currently controlled  #Mild to mod AS  -ASA, Lipitor 40 mg PO QHS   -Continue Eliquis 2.5mg BID (reduced dose due to large hematoma in right thigh from trauma prior to surgery)  -Metoprolol to 50mg q 6hrs, monitor HR on telemetry; titrate if needed to control heart rate;  - changing metoprolol to 100mg q 12hrs  -Continue Amiodarone  -Cardiology on board    #Hypokalemia, improved  - c/w K 40meq po BID  - continue to monitor    #Transaminitis   -Secondary to COVID-19  -Bilirubin downtrending    #Thrombocytopenia - resolved  -continue monitor    #DM2  -HbA1c 7.8 on Metformin and Tresiba as outpatient  -Continue Lantus + ISS,    #Anemia  -Stable  -Monitor    #Hematoma Right thigh  -monitor    #Moderate protein-calorie malnutrition  -Monitor    Physiatry recommendations noted; Acute rehab but patient has progress and suggest home with home PT and walker  Pt stable for discharge at this time. Patient is fine with going home if possible but will address confusion first  CM aware, re-swabbed for COVID 2/8 and 2/13; still positive    #DVT ppx- Eliquis  #Code: Full  Dispo: Home  CM working on case for dispo      2/15 Will update  Derrick Roblero, [id number] 75y with PMHx of paroxysmal Afib (on Xarelto), PAD (s/p A-ydlolby-cmernipzw bypass, R-femoral angioplasty with stenting, (on Plavix), HTN, HLD, type 2 diabetes (HA1c on 7.8 on Metformin and Tresiba as an outpatient), and recent trauma to right thigh with stable hematoma, presented to [facility] 1/12/21 with SOB, found to have NSTEMI. 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 S/P C3/DUANE Clip/MV Repair and DUANE thrombectomy with Dr. Orellana on 1/19/21. Postop course complicated by hypoxic respiratory failure and KRISTIE now resolved, and hypokalemia on daily repletion while on Torsemide, admitted to  for rehab on jan 28 and was found with COVID 19. transferred to medicine on 2/1 for worsening SOB/SHF/PNA    #Acute hypoxic respiratory failure in need of supplemental oxygenation, currently improved on room air, likely due to :  a)Acute on chronic combined systolic and Diastolic CHF, improved  b)COVID-19 Infection, still + 2/8 and 2/11, stable  -SpO2 currently 97% at rest on RA  -Completed 5 days of Remdisivir  -cough improved, c/w tessalon pearls prn  -Improvement in volume status. Will continue Lasix 60mg po BID at this time   -I/O's and daily weights, fluid restriction  -last COVID swab (pos 2/13)  - possible to D/C home. PT to do stairs teaching    #Confusion - resolved  - labs and UA sent    #Recent NSTEMI S/P CABG, MV Repair and DUAEN thrombectomy 1/19/21  #Chronic Afib, rate currently controlled  #Mild to mod AS  -ASA, Lipitor 40 mg PO QHS   -Continue Eliquis 2.5mg BID (reduced dose due to large hematoma in right thigh from trauma prior to surgery)  -Metoprolol to 50mg q 6hrs, monitor HR on telemetry; titrate if needed to control heart rate;  - changing metoprolol to 100mg q 12hrs  -Continue Amiodarone  -Cardiology on board    #Hypokalemia, improved  - c/w K 40meq po BID  - continue to monitor    #Transaminitis   -Secondary to COVID-19  -Bilirubin downtrending    #Thrombocytopenia - resolved  -continue monitor    #DM2  -HbA1c 7.8 on Metformin and Tresiba as outpatient  -Continue Lantus + ISS,    #Anemia  -Stable  -Monitor    #Hematoma Right thigh  -monitor    #Moderate protein-calorie malnutrition  -Monitor    Physiatry recommendations noted; Acute rehab but patient has progress and suggest home with home PT and walker  Pt stable for discharge at this time. Patient is fine with going home if possible but will address confusion first  CM aware, re-swabbed for COVID 2/8 and 2/13; still positive    #DVT ppx- Eliquis  #Code: Full  Dispo: Home  CM working on case for dispo      2/15 Updated  Derrick Roblero, [id number]  Dr. Ling Evangelista is PCP

## 2021-02-15 NOTE — PROGRESS NOTE ADULT - SUBJECTIVE AND OBJECTIVE BOX
Patient is a 75y old  Female who presents with a chief complaint of COVID 19 pna (2021 08:30)      Patient seen and examined at bedside.  No overnight events  No complaints this morning  HR better today; mentation is better today as well  Tele - HR in low 100s  UA neg    ALLERGIES:  OHS (Unknown)  warfarin (Rash)    MEDICATIONS  (STANDING):  aMIOdarone    Tablet 200 milliGRAM(s) Oral daily  apixaban 2.5 milliGRAM(s) Oral every 12 hours  ascorbic acid 500 milliGRAM(s) Oral daily  aspirin enteric coated 81 milliGRAM(s) Oral daily  atorvastatin 40 milliGRAM(s) Oral at bedtime  dextrose 40% Gel 15 Gram(s) Oral once  dextrose 5%. 1000 milliLiter(s) (50 mL/Hr) IV Continuous <Continuous>  dextrose 5%. 1000 milliLiter(s) (100 mL/Hr) IV Continuous <Continuous>  dextrose 50% Injectable 25 Gram(s) IV Push once  dextrose 50% Injectable 12.5 Gram(s) IV Push once  dextrose 50% Injectable 25 Gram(s) IV Push once  furosemide    Tablet 60 milliGRAM(s) Oral two times a day  glucagon  Injectable 1 milliGRAM(s) IntraMuscular once  influenza   Vaccine 0.5 milliLiter(s) IntraMuscular once  insulin glargine Injectable (LANTUS) 20 Unit(s) SubCutaneous at bedtime  insulin lispro (ADMELOG) corrective regimen sliding scale   SubCutaneous three times a day before meals  insulin lispro (ADMELOG) corrective regimen sliding scale   SubCutaneous at bedtime  melatonin 6 milliGRAM(s) Oral at bedtime  metoprolol tartrate 50 milliGRAM(s) Oral every 6 hours  multivitamin 1 Tablet(s) Oral daily  pantoprazole    Tablet 40 milliGRAM(s) Oral before breakfast  potassium chloride    Tablet ER 40 milliEquivalent(s) Oral two times a day    MEDICATIONS  (PRN):  acetaminophen   Tablet .. 650 milliGRAM(s) Oral every 8 hours PRN Temp greater or equal to 38C (100.4F), Mild Pain (1 - 3)  benzonatate 100 milliGRAM(s) Oral every 8 hours PRN Cough  guaiFENesin   Syrup  (Sugar-Free) 100 milliGRAM(s) Oral every 6 hours PRN Cough  polyethylene glycol 3350 17 Gram(s) Oral daily PRN Constipation  senna 2 Tablet(s) Oral at bedtime PRN Constipation    Vital Signs Last 24 Hrs  T(F): 97.3 (15 Feb 2021 05:21), Max: 98 (2021 08:38)  HR: 101 (15 Feb 2021 05:21) (101 - 110)  BP: 100/56 (15 Feb 2021 05:21) (100/56 - 110/66)  RR: 16 (15 Feb 2021 05:21) (16 - 18)  SpO2: 96% (15 Feb 2021 05:21) (95% - 98%)  I&O's Summary    2021 07:01  -  15 Feb 2021 07:00  --------------------------------------------------------  IN: 240 mL / OUT: 0 mL / NET: 240 mL          PHYSICAL EXAM:  GENERAL: NAD  HENT:  Atraumatic, Normocephalic; No tonsillar erythema, exudates, or enlargement; Moist mucous membranes;   EYES: EOMI, PERRLA, conjunctiva and sclera clear, no lid-lag  NECK: Supple, No JVD, Normal thyroid  CHEST/LUNG: Clear to percussion bilaterally; No rales, rhonchi, wheezing, or rubs; normal respiratory effort, no intercostal retractions  HEART: Regular rate and rhythm; No murmurs, rubs, or gallops  ABDOMEN: Soft, Nontender, Nondistended; Bowel sounds present; No HSM  MUSCULOSKELETAL/EXTREMITIES:  2+ Peripheral Pulses, No clubbing, cyanosis, or peripheral edema; No digital cyanosis  SKIN: No rashes or lesions; normal texture and temperature  PSYCH: Appropriate affect, Alert & Awake    LABS:                        11.7   7.22  )-----------( 441      ( 15 Feb 2021 07:37 )             37.1       02-14    141  |  102  |  16  ----------------------------<  166  3.8   |  29  |  1.17    Ca    9.3      2021 10:25    TPro  7.3  /  Alb  2.8  /  TBili  3.0  /  DBili  x   /  AST  64  /  ALT  45  /  AlkPhos  110       eGFR if Non African American: 46 mL/min/1.73M2 (21 @ 10:25)  eGFR if African American: 53 mL/min/1.73M2 (21 @ 10:25)    POCT Blood Glucose.: 133 mg/dL (15 Feb 2021 07:29)  POCT Blood Glucose.: 134 mg/dL (2021 21:14)  POCT Blood Glucose.: 191 mg/dL (2021 17:05)  POCT Blood Glucose.: 151 mg/dL (2021 11:32)      Urinalysis Basic - ( 2021 08:31 )    Color: Yellow / Appearance: Clear / S.015 / pH: x  Gluc: x / Ketone: Trace  / Bili: Negative / Urobili: 1   Blood: x / Protein: 30 mg/dL / Nitrite: Negative   Leuk Esterase: Small / RBC: Negative /HPF / WBC 6-10 /HPF   Sq Epi: x / Non Sq Epi: Neg.-Few / Bacteria: Few /HPF      Care Discussed with Consultants/Other Providers: Yes

## 2021-02-16 LAB
GLUCOSE BLDC GLUCOMTR-MCNC: 121 MG/DL — HIGH (ref 70–99)
GLUCOSE BLDC GLUCOMTR-MCNC: 129 MG/DL — HIGH (ref 70–99)
GLUCOSE BLDC GLUCOMTR-MCNC: 178 MG/DL — HIGH (ref 70–99)
GLUCOSE BLDC GLUCOMTR-MCNC: 184 MG/DL — HIGH (ref 70–99)

## 2021-02-16 PROCEDURE — 93010 ELECTROCARDIOGRAM REPORT: CPT

## 2021-02-16 RX ORDER — INSULIN GLARGINE 100 [IU]/ML
20 INJECTION, SOLUTION SUBCUTANEOUS
Qty: 6 | Refills: 0
Start: 2021-02-16 | End: 2021-03-17

## 2021-02-16 RX ORDER — APIXABAN 2.5 MG/1
1 TABLET, FILM COATED ORAL
Qty: 60 | Refills: 0
Start: 2021-02-16 | End: 2021-03-17

## 2021-02-16 RX ORDER — ATORVASTATIN CALCIUM 80 MG/1
1 TABLET, FILM COATED ORAL
Qty: 30 | Refills: 0
Start: 2021-02-16 | End: 2021-03-17

## 2021-02-16 RX ORDER — ASCORBIC ACID 60 MG
1 TABLET,CHEWABLE ORAL
Qty: 30 | Refills: 0
Start: 2021-02-16 | End: 2021-03-17

## 2021-02-16 RX ORDER — POLYETHYLENE GLYCOL 3350 17 G/17G
17 POWDER, FOR SOLUTION ORAL
Qty: 85 | Refills: 0
Start: 2021-02-16 | End: 2021-02-20

## 2021-02-16 RX ORDER — PANTOPRAZOLE SODIUM 20 MG/1
1 TABLET, DELAYED RELEASE ORAL
Qty: 30 | Refills: 0
Start: 2021-02-16 | End: 2021-03-17

## 2021-02-16 RX ORDER — POTASSIUM CHLORIDE 20 MEQ
2 PACKET (EA) ORAL
Qty: 40 | Refills: 0
Start: 2021-02-16 | End: 2021-02-25

## 2021-02-16 RX ORDER — AMIODARONE HYDROCHLORIDE 400 MG/1
1 TABLET ORAL
Qty: 30 | Refills: 0
Start: 2021-02-16 | End: 2021-03-17

## 2021-02-16 RX ORDER — METOPROLOL TARTRATE 50 MG
1 TABLET ORAL
Qty: 60 | Refills: 0
Start: 2021-02-16 | End: 2021-03-17

## 2021-02-16 RX ORDER — FUROSEMIDE 40 MG
3 TABLET ORAL
Qty: 180 | Refills: 0
Start: 2021-02-16 | End: 2021-03-17

## 2021-02-16 RX ORDER — ASPIRIN/CALCIUM CARB/MAGNESIUM 324 MG
1 TABLET ORAL
Qty: 30 | Refills: 0
Start: 2021-02-16 | End: 2021-03-17

## 2021-02-16 RX ADMIN — INSULIN GLARGINE 20 UNIT(S): 100 INJECTION, SOLUTION SUBCUTANEOUS at 22:42

## 2021-02-16 RX ADMIN — APIXABAN 2.5 MILLIGRAM(S): 2.5 TABLET, FILM COATED ORAL at 18:10

## 2021-02-16 RX ADMIN — Medication 2: at 12:00

## 2021-02-16 RX ADMIN — AMIODARONE HYDROCHLORIDE 200 MILLIGRAM(S): 400 TABLET ORAL at 06:18

## 2021-02-16 RX ADMIN — Medication 1 TABLET(S): at 11:09

## 2021-02-16 RX ADMIN — Medication 60 MILLIGRAM(S): at 18:10

## 2021-02-16 RX ADMIN — ATORVASTATIN CALCIUM 40 MILLIGRAM(S): 80 TABLET, FILM COATED ORAL at 22:42

## 2021-02-16 RX ADMIN — PANTOPRAZOLE SODIUM 40 MILLIGRAM(S): 20 TABLET, DELAYED RELEASE ORAL at 06:18

## 2021-02-16 RX ADMIN — Medication 100 MILLIGRAM(S): at 18:10

## 2021-02-16 RX ADMIN — Medication 40 MILLIEQUIVALENT(S): at 22:42

## 2021-02-16 RX ADMIN — Medication 500 MILLIGRAM(S): at 11:09

## 2021-02-16 RX ADMIN — Medication 100 MILLIGRAM(S): at 06:18

## 2021-02-16 RX ADMIN — APIXABAN 2.5 MILLIGRAM(S): 2.5 TABLET, FILM COATED ORAL at 06:18

## 2021-02-16 RX ADMIN — Medication 81 MILLIGRAM(S): at 11:09

## 2021-02-16 RX ADMIN — Medication 6 MILLIGRAM(S): at 22:42

## 2021-02-16 RX ADMIN — Medication 40 MILLIEQUIVALENT(S): at 06:18

## 2021-02-16 RX ADMIN — Medication 60 MILLIGRAM(S): at 06:18

## 2021-02-16 NOTE — PROGRESS NOTE ADULT - SUBJECTIVE AND OBJECTIVE BOX
Patient is a 75y old  Female who presents with a chief complaint of COVID 19 pna (15 Feb 2021 08:17)      Patient seen and examined at bedside.  No overnight events  No complaints this morning  HR is better today  COVID swab neg today  D/C planning.    ALLERGIES:  OHS (Unknown)  warfarin (Rash)    MEDICATIONS  (STANDING):  aMIOdarone    Tablet 200 milliGRAM(s) Oral daily  apixaban 2.5 milliGRAM(s) Oral every 12 hours  ascorbic acid 500 milliGRAM(s) Oral daily  aspirin enteric coated 81 milliGRAM(s) Oral daily  atorvastatin 40 milliGRAM(s) Oral at bedtime  dextrose 40% Gel 15 Gram(s) Oral once  dextrose 5%. 1000 milliLiter(s) (100 mL/Hr) IV Continuous <Continuous>  dextrose 5%. 1000 milliLiter(s) (50 mL/Hr) IV Continuous <Continuous>  dextrose 50% Injectable 25 Gram(s) IV Push once  dextrose 50% Injectable 12.5 Gram(s) IV Push once  dextrose 50% Injectable 25 Gram(s) IV Push once  furosemide    Tablet 60 milliGRAM(s) Oral two times a day  glucagon  Injectable 1 milliGRAM(s) IntraMuscular once  influenza   Vaccine 0.5 milliLiter(s) IntraMuscular once  insulin glargine Injectable (LANTUS) 20 Unit(s) SubCutaneous at bedtime  insulin lispro (ADMELOG) corrective regimen sliding scale   SubCutaneous three times a day before meals  insulin lispro (ADMELOG) corrective regimen sliding scale   SubCutaneous at bedtime  melatonin 6 milliGRAM(s) Oral at bedtime  metoprolol tartrate 100 milliGRAM(s) Oral every 12 hours  multivitamin 1 Tablet(s) Oral daily  pantoprazole    Tablet 40 milliGRAM(s) Oral before breakfast  potassium chloride    Tablet ER 40 milliEquivalent(s) Oral two times a day    MEDICATIONS  (PRN):  acetaminophen   Tablet .. 650 milliGRAM(s) Oral every 8 hours PRN Temp greater or equal to 38C (100.4F), Mild Pain (1 - 3)  benzonatate 100 milliGRAM(s) Oral every 8 hours PRN Cough  guaiFENesin   Syrup  (Sugar-Free) 100 milliGRAM(s) Oral every 6 hours PRN Cough  polyethylene glycol 3350 17 Gram(s) Oral daily PRN Constipation  senna 2 Tablet(s) Oral at bedtime PRN Constipation    Vital Signs Last 24 Hrs  T(F): 97.7 (2021 05:37), Max: 98.2 (15 Feb 2021 14:53)  HR: 100 (2021 05:37) (95 - 105)  BP: 108/52 (2021 05:37) (102/67 - 140/80)  RR: 15 (2021 05:37) (15 - 18)  SpO2: 100% (2021 05:37) (95% - 100%)  I&O's Summary    15 Feb 2021 07:01  -  2021 07:00  --------------------------------------------------------  IN: 120 mL / OUT: 0 mL / NET: 120 mL          PHYSICAL EXAM:  GENERAL: NAD  HENT:  Atraumatic, Normocephalic; No tonsillar erythema, exudates, or enlargement; Moist mucous membranes;   EYES: EOMI, PERRLA, conjunctiva and sclera clear, no lid-lag  NECK: Supple, No JVD, Normal thyroid  CHEST/LUNG: Clear to percussion bilaterally; No rales, rhonchi, wheezing, or rubs; normal respiratory effort, no intercostal retractions  HEART: Irregular rate and irregular rhythm; No murmurs, rubs, or gallops  ABDOMEN: Soft, Nontender, Nondistended; Bowel sounds present; No HSM  MUSCULOSKELETAL/EXTREMITIES:  2+ Peripheral Pulses, No clubbing, cyanosis, or peripheral edema; No digital cyanosis  SKIN: No rashes or lesions; normal texture and temperature  PSYCH: Appropriate affect, Alert & Oriented x 3    LABS:                        11.7   7.22  )-----------( 441      ( 15 Feb 2021 07:37 )             37.1       02-15    144  |  102  |  17  ----------------------------<  143  3.5   |  30  |  1.18    Ca    9.0      15 Feb 2021 07:37    TPro  7.2  /  Alb  2.9  /  TBili  2.6  /  DBili  x   /  AST  45  /  ALT  39  /  AlkPhos  107  02-15     eGFR if Non African American: 46 mL/min/1.73M2 (02-15-21 @ 07:37)  eGFR if African American: 53 mL/min/1.73M2 (02-15-21 @ 07:37)       POCT Blood Glucose.: 129 mg/dL (2021 07:54)  POCT Blood Glucose.: 166 mg/dL (15 Feb 2021 22:53)  POCT Blood Glucose.: 136 mg/dL (15 Feb 2021 16:40)  POCT Blood Glucose.: 139 mg/dL (15 Feb 2021 11:12)      Urinalysis Basic - ( 2021 08:31 )    Color: Yellow / Appearance: Clear / S.015 / pH: x  Gluc: x / Ketone: Trace  / Bili: Negative / Urobili: 1   Blood: x / Protein: 30 mg/dL / Nitrite: Negative   Leuk Esterase: Small / RBC: Negative /HPF / WBC 6-10 /HPF   Sq Epi: x / Non Sq Epi: Neg.-Few / Bacteria: Few /HPF    Care Discussed with Consultants/Other Providers: Yes   Patient is a 75y old  Female who presents with a chief complaint of COVID 19 pna (15 Feb 2021 08:17)      Patient seen and examined at bedside.  No overnight events  No complaints this morning  HR is better today  COVID swab neg today  D/C planning to [facility] as patient feels weak and needs more PT    ALLERGIES:  OHS (Unknown)  warfarin (Rash)    MEDICATIONS  (STANDING):  aMIOdarone    Tablet 200 milliGRAM(s) Oral daily  apixaban 2.5 milliGRAM(s) Oral every 12 hours  ascorbic acid 500 milliGRAM(s) Oral daily  aspirin enteric coated 81 milliGRAM(s) Oral daily  atorvastatin 40 milliGRAM(s) Oral at bedtime  dextrose 40% Gel 15 Gram(s) Oral once  dextrose 5%. 1000 milliLiter(s) (100 mL/Hr) IV Continuous <Continuous>  dextrose 5%. 1000 milliLiter(s) (50 mL/Hr) IV Continuous <Continuous>  dextrose 50% Injectable 25 Gram(s) IV Push once  dextrose 50% Injectable 12.5 Gram(s) IV Push once  dextrose 50% Injectable 25 Gram(s) IV Push once  furosemide    Tablet 60 milliGRAM(s) Oral two times a day  glucagon  Injectable 1 milliGRAM(s) IntraMuscular once  influenza   Vaccine 0.5 milliLiter(s) IntraMuscular once  insulin glargine Injectable (LANTUS) 20 Unit(s) SubCutaneous at bedtime  insulin lispro (ADMELOG) corrective regimen sliding scale   SubCutaneous three times a day before meals  insulin lispro (ADMELOG) corrective regimen sliding scale   SubCutaneous at bedtime  melatonin 6 milliGRAM(s) Oral at bedtime  metoprolol tartrate 100 milliGRAM(s) Oral every 12 hours  multivitamin 1 Tablet(s) Oral daily  pantoprazole    Tablet 40 milliGRAM(s) Oral before breakfast  potassium chloride    Tablet ER 40 milliEquivalent(s) Oral two times a day    MEDICATIONS  (PRN):  acetaminophen   Tablet .. 650 milliGRAM(s) Oral every 8 hours PRN Temp greater or equal to 38C (100.4F), Mild Pain (1 - 3)  benzonatate 100 milliGRAM(s) Oral every 8 hours PRN Cough  guaiFENesin   Syrup  (Sugar-Free) 100 milliGRAM(s) Oral every 6 hours PRN Cough  polyethylene glycol 3350 17 Gram(s) Oral daily PRN Constipation  senna 2 Tablet(s) Oral at bedtime PRN Constipation    Vital Signs Last 24 Hrs  T(F): 97.7 (2021 05:37), Max: 98.2 (15 Feb 2021 14:53)  HR: 100 (2021 05:37) (95 - 105)  BP: 108/52 (2021 05:37) (102/67 - 140/80)  RR: 15 (2021 05:37) (15 - 18)  SpO2: 100% (2021 05:37) (95% - 100%)  I&O's Summary    15 Feb 2021 07:01  -  2021 07:00  --------------------------------------------------------  IN: 120 mL / OUT: 0 mL / NET: 120 mL          PHYSICAL EXAM:  GENERAL: NAD  HENT:  Atraumatic, Normocephalic; No tonsillar erythema, exudates, or enlargement; Moist mucous membranes;   EYES: EOMI, PERRLA, conjunctiva and sclera clear, no lid-lag  NECK: Supple, No JVD, Normal thyroid  CHEST/LUNG: Clear to percussion bilaterally; No rales, rhonchi, wheezing, or rubs; normal respiratory effort, no intercostal retractions  HEART: Irregular rate and irregular rhythm; No murmurs, rubs, or gallops  ABDOMEN: Soft, Nontender, Nondistended; Bowel sounds present; No HSM  MUSCULOSKELETAL/EXTREMITIES:  2+ Peripheral Pulses, No clubbing, cyanosis, or peripheral edema; No digital cyanosis  SKIN: No rashes or lesions; normal texture and temperature  PSYCH: Appropriate affect, Alert & Oriented x 3    LABS:                        11.7   7.22  )-----------( 441      ( 15 Feb 2021 07:37 )             37.1       02-15    144  |  102  |  17  ----------------------------<  143  3.5   |  30  |  1.18    Ca    9.0      15 Feb 2021 07:37    TPro  7.2  /  Alb  2.9  /  TBili  2.6  /  DBili  x   /  AST  45  /  ALT  39  /  AlkPhos  107  02-15     eGFR if Non African American: 46 mL/min/1.73M2 (02-15-21 @ 07:37)  eGFR if African American: 53 mL/min/1.73M2 (02-15-21 @ 07:37)       POCT Blood Glucose.: 129 mg/dL (2021 07:54)  POCT Blood Glucose.: 166 mg/dL (15 Feb 2021 22:53)  POCT Blood Glucose.: 136 mg/dL (15 Feb 2021 16:40)  POCT Blood Glucose.: 139 mg/dL (15 Feb 2021 11:12)      Urinalysis Basic - ( 2021 08:31 )    Color: Yellow / Appearance: Clear / S.015 / pH: x  Gluc: x / Ketone: Trace  / Bili: Negative / Urobili: 1   Blood: x / Protein: 30 mg/dL / Nitrite: Negative   Leuk Esterase: Small / RBC: Negative /HPF / WBC 6-10 /HPF   Sq Epi: x / Non Sq Epi: Neg.-Few / Bacteria: Few /HPF    Care Discussed with Consultants/Other Providers: Yes

## 2021-02-16 NOTE — PROGRESS NOTE ADULT - ASSESSMENT
h75y with PMHx of paroxysmal Afib (on Xarelto), PAD (s/p Y-xpvmhja-offcwjvjm bypass, R-femoral angioplasty with stenting, (on Plavix), HTN, HLD, type 2 diabetes (HA1c on 7.8 on Metformin and Tresiba as an outpatient), and recent trauma to right thigh with stable hematoma, presented to [facility] 1/12/21 with SOB, found to have NSTEMI. 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 S/P C3/DUANE Clip/MV Repair and DUANE thrombectomy with Dr. Orellana on 1/19/21. Postop course complicated by hypoxic respiratory failure and KRISTIE now resolved, and hypokalemia on daily repletion while on Torsemide, admitted to  for rehab on jan 28 and was found with COVID 19. transferred to medicine on 2/1 for worsening SOB/SHF/PNA    #Acute hypoxic respiratory failure in need of supplemental oxygenation, currently improved on room air, likely due to :  a)Acute on chronic combined systolic and Diastolic CHF, improved  b)COVID-19 Infection, still + 2/8 and 2/11, stable  -SpO2 currently 97% at rest on RA  -Completed 5 days of Remdisivir  -cough improved, c/w tessalon pearls prn  -Improvement in volume status. Will continue Lasix 60mg po BID at this time   -I/O's and daily weights, fluid restriction  -last COVID swab was neg.  - possible to D/C home. PT to do stairs teaching    #Confusion - resolved  - labs and UA sent    #Recent NSTEMI S/P CABG, MV Repair and DUANE thrombectomy 1/19/21  #Chronic Afib, rate currently controlled  #Mild to mod AS  -ASA, Lipitor 40 mg PO QHS   -Continue Eliquis 2.5mg BID (reduced dose due to large hematoma in right thigh from trauma prior to surgery)  - metoprolol to 100mg q 12hrs  -Continue Amiodarone  -Cardiology on board    #Hypokalemia, improved  - c/w K 40meq po BID  - continue to monitor    #Transaminitis   -Secondary to COVID-19  -Bilirubin downtrending    #Thrombocytopenia - resolved  -continue monitor    #DM2  -HbA1c 7.8 on Metformin and Tresiba as outpatient  -Continue Lantus + ISS,    #Anemia  -Stable  -Monitor    #Hematoma Right thigh  -monitor    #Moderate protein-calorie malnutrition  -Monitor    Physiatry recommendations noted; Acute rehab but patient has progress and suggest home with home PT and walker  Pt stable for discharge at this time. Patient is fine with going home if possible. Patient at baseline mentally    #DVT ppx- Eliquis  #Code: Full  Dispo: Home  CM working on case for dispo      2/15 Updated  Derrick Roblero, [id number]  Dr. Ling Evangelista is PCP  Patient is medically stable for discharge  D/C Planning  Time spent on D/C - 34 mins h75y with PMHx of paroxysmal Afib (on Xarelto), PAD (s/p Y-pcaculo-xeqkausgb bypass, R-femoral angioplasty with stenting, (on Plavix), HTN, HLD, type 2 diabetes (HA1c on 7.8 on Metformin and Tresiba as an outpatient), and recent trauma to right thigh with stable hematoma, presented to [facility] 1/12/21 with SOB, found to have NSTEMI. 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 S/P C3/DUANE Clip/MV Repair and DUANE thrombectomy with Dr. Orellana on 1/19/21. Postop course complicated by hypoxic respiratory failure and KRISTIE now resolved, and hypokalemia on daily repletion while on Torsemide, admitted to  for rehab on jan 28 and was found with COVID 19. transferred to medicine on 2/1 for worsening SOB/SHF/PNA    #Acute hypoxic respiratory failure in need of supplemental oxygenation, currently improved on room air, likely due to :  a)Acute on chronic combined systolic and Diastolic CHF, improved  b)COVID-19 Infection, still + 2/8 and 2/11, stable  -SpO2 currently 97% at rest on RA  -Completed 5 days of Remdisivir  -cough improved, c/w tessalon pearls prn  -Improvement in volume status. Will continue Lasix 60mg po BID at this time   -I/O's and daily weights, fluid restriction  -last COVID swab was neg.  - possible to D/C home. PT to do stairs teaching    #Confusion - resolved  - labs and UA sent    #Recent NSTEMI S/P CABG, MV Repair and DUANE thrombectomy 1/19/21  #Chronic Afib, rate currently controlled  #Mild to mod AS  -ASA, Lipitor 40 mg PO QHS   -Continue Eliquis 2.5mg BID (reduced dose due to large hematoma in right thigh from trauma prior to surgery)  - metoprolol to 100mg q 12hrs  -Continue Amiodarone  -Cardiology on board    #Hypokalemia, improved  - c/w K 40meq po BID  - continue to monitor    #Transaminitis   -Secondary to COVID-19  -Bilirubin downtrending    #Thrombocytopenia - resolved  -continue monitor    #DM2  -HbA1c 7.8 on Metformin and Tresiba as outpatient  -Continue Lantus + ISS,    #Anemia  -Stable  -Monitor    #Hematoma Right thigh  -monitor    #Moderate protein-calorie malnutrition  -Monitor    Physiatry recommendations noted; Acute rehab but patient has progress and suggest home with home PT and walker  Pt stable for discharge at this time. Patient is fine with going home if possible. Patient at baseline mentally    #DVT ppx- Eliquis  #Code: Full  Dispo: Home  CM working on case for dispo      2/16 Updated  Derrick Roblero, [id number]  Dr. Ling Evangelista is PCP  Patient is medically stable for discharge  D/C Planning  Time spent on D/C - 34 mins h75y with PMHx of paroxysmal Afib (on Xarelto), PAD (s/p C-aguiqsq-ewwrwcpze bypass, R-femoral angioplasty with stenting, (on Plavix), HTN, HLD, type 2 diabetes (HA1c on 7.8 on Metformin and Tresiba as an outpatient), and recent trauma to right thigh with stable hematoma, presented to [facility] 1/12/21 with SOB, found to have NSTEMI. 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 S/P C3/DUANE Clip/MV Repair and DUANE thrombectomy with Dr. Orellana on 1/19/21. Postop course complicated by hypoxic respiratory failure and KRISTIE now resolved, and hypokalemia on daily repletion while on Torsemide, admitted to  for rehab on jan 28 and was found with COVID 19. transferred to medicine on 2/1 for worsening SOB/SHF/PNA    #Acute hypoxic respiratory failure in need of supplemental oxygenation, currently improved on room air, likely due to :  a)Acute on chronic combined systolic and Diastolic CHF, improved  b)COVID-19 Infection, still + 2/8 and 2/11, stable  -SpO2 currently 97% at rest on RA  -Completed 5 days of Remdisivir  -cough improved, c/w tessalon pearls prn  -Improvement in volume status. Will continue Lasix 60mg po BID at this time   -I/O's and daily weights, fluid restriction  -last COVID swab was neg.  -PT to do stairs teaching but patient very weak for [facility]    #Confusion - resolved  - labs and UA sent    #Recent NSTEMI S/P CABG, MV Repair and DUANE thrombectomy 1/19/21  #Chronic Afib, rate currently controlled  #Mild to mod AS  -ASA, Lipitor 40 mg PO QHS   -Continue Eliquis 2.5mg BID (reduced dose due to large hematoma in right thigh from trauma prior to surgery)  - metoprolol to 100mg q 12hrs  -Continue Amiodarone  -Cardiology on board    #Hypokalemia, improved  - c/w K 40meq po BID  - continue to monitor    #Transaminitis   -Secondary to COVID-19  -Bilirubin downtrending    #Thrombocytopenia - resolved  -continue monitor    #DM2  -HbA1c 7.8 on Metformin and Tresiba as outpatient  -Continue Lantus + ISS,    #Anemia  -Stable  -Monitor    #Hematoma Right thigh  -monitor    #Moderate protein-calorie malnutrition  -Monitor    Physiatry recommendations noted; Acute rehab but patient has progress and suggest home with home PT and walker  Pt stable for discharge at this time. Patient is fine with going home if possible. Patient at baseline mentally    #DVT ppx- Eliquis  #Code: Full  Dispo: [facility] working on case for dispo      2/16 Updated  Derrick Roblero, [phone]  Dr. Ling Evangelista is PCP  Patient is medically stable for discharge  D/C Planning to [facility]

## 2021-02-17 LAB
ALBUMIN SERPL ELPH-MCNC: 2.8 G/DL — LOW (ref 3.3–5)
ALP SERPL-CCNC: 108 U/L — SIGNIFICANT CHANGE UP (ref 40–120)
ALT FLD-CCNC: 42 U/L — SIGNIFICANT CHANGE UP (ref 10–45)
ANION GAP SERPL CALC-SCNC: 13 MMOL/L — SIGNIFICANT CHANGE UP (ref 5–17)
AST SERPL-CCNC: 50 U/L — HIGH (ref 10–40)
BILIRUB SERPL-MCNC: 3 MG/DL — HIGH (ref 0.2–1.2)
BUN SERPL-MCNC: 20 MG/DL — SIGNIFICANT CHANGE UP (ref 7–23)
CALCIUM SERPL-MCNC: 8.8 MG/DL — SIGNIFICANT CHANGE UP (ref 8.4–10.5)
CHLORIDE SERPL-SCNC: 101 MMOL/L — SIGNIFICANT CHANGE UP (ref 96–108)
CO2 SERPL-SCNC: 28 MMOL/L — SIGNIFICANT CHANGE UP (ref 22–31)
CREAT SERPL-MCNC: 1.05 MG/DL — SIGNIFICANT CHANGE UP (ref 0.5–1.3)
GLUCOSE BLDC GLUCOMTR-MCNC: 131 MG/DL — HIGH (ref 70–99)
GLUCOSE BLDC GLUCOMTR-MCNC: 134 MG/DL — HIGH (ref 70–99)
GLUCOSE BLDC GLUCOMTR-MCNC: 155 MG/DL — HIGH (ref 70–99)
GLUCOSE BLDC GLUCOMTR-MCNC: 186 MG/DL — HIGH (ref 70–99)
GLUCOSE BLDC GLUCOMTR-MCNC: 201 MG/DL — HIGH (ref 70–99)
GLUCOSE SERPL-MCNC: 165 MG/DL — HIGH (ref 70–99)
HCT VFR BLD CALC: 36.6 % — SIGNIFICANT CHANGE UP (ref 34.5–45)
HGB BLD-MCNC: 11.7 G/DL — SIGNIFICANT CHANGE UP (ref 11.5–15.5)
MAGNESIUM SERPL-MCNC: 1.8 MG/DL — SIGNIFICANT CHANGE UP (ref 1.6–2.6)
MCHC RBC-ENTMCNC: 27.6 PG — SIGNIFICANT CHANGE UP (ref 27–34)
MCHC RBC-ENTMCNC: 32 GM/DL — SIGNIFICANT CHANGE UP (ref 32–36)
MCV RBC AUTO: 86.3 FL — SIGNIFICANT CHANGE UP (ref 80–100)
NRBC # BLD: 0 /100 WBCS — SIGNIFICANT CHANGE UP (ref 0–0)
PHOSPHATE SERPL-MCNC: 3.2 MG/DL — SIGNIFICANT CHANGE UP (ref 2.5–4.5)
PLATELET # BLD AUTO: 496 K/UL — HIGH (ref 150–400)
POTASSIUM SERPL-MCNC: 3.3 MMOL/L — LOW (ref 3.5–5.3)
POTASSIUM SERPL-SCNC: 3.3 MMOL/L — LOW (ref 3.5–5.3)
PROT SERPL-MCNC: 7.2 G/DL — SIGNIFICANT CHANGE UP (ref 6–8.3)
RBC # BLD: 4.24 M/UL — SIGNIFICANT CHANGE UP (ref 3.8–5.2)
RBC # FLD: 17.8 % — HIGH (ref 10.3–14.5)
SODIUM SERPL-SCNC: 142 MMOL/L — SIGNIFICANT CHANGE UP (ref 135–145)
WBC # BLD: 8.44 K/UL — SIGNIFICANT CHANGE UP (ref 3.8–10.5)
WBC # FLD AUTO: 8.44 K/UL — SIGNIFICANT CHANGE UP (ref 3.8–10.5)

## 2021-02-17 PROCEDURE — 93010 ELECTROCARDIOGRAM REPORT: CPT

## 2021-02-17 RX ORDER — METOPROLOL TARTRATE 50 MG
2 TABLET ORAL ONCE
Refills: 0 | Status: DISCONTINUED | OUTPATIENT
Start: 2021-02-17 | End: 2021-02-17

## 2021-02-17 RX ORDER — DIGOXIN 250 MCG
0.25 TABLET ORAL ONCE
Refills: 0 | Status: DISCONTINUED | OUTPATIENT
Start: 2021-02-17 | End: 2021-02-17

## 2021-02-17 RX ORDER — MAGNESIUM OXIDE 400 MG ORAL TABLET 241.3 MG
400 TABLET ORAL
Refills: 0 | Status: COMPLETED | OUTPATIENT
Start: 2021-02-17 | End: 2021-02-17

## 2021-02-17 RX ORDER — SODIUM CHLORIDE 9 MG/ML
1000 INJECTION INTRAMUSCULAR; INTRAVENOUS; SUBCUTANEOUS ONCE
Refills: 0 | Status: DISCONTINUED | OUTPATIENT
Start: 2021-02-17 | End: 2021-02-17

## 2021-02-17 RX ORDER — METOPROLOL TARTRATE 50 MG
125 TABLET ORAL EVERY 12 HOURS
Refills: 0 | Status: DISCONTINUED | OUTPATIENT
Start: 2021-02-17 | End: 2021-02-19

## 2021-02-17 RX ORDER — METOPROLOL TARTRATE 50 MG
5 TABLET ORAL ONCE
Refills: 0 | Status: COMPLETED | OUTPATIENT
Start: 2021-02-17 | End: 2021-02-17

## 2021-02-17 RX ORDER — FUROSEMIDE 40 MG
60 TABLET ORAL
Refills: 0 | Status: DISCONTINUED | OUTPATIENT
Start: 2021-02-18 | End: 2021-02-22

## 2021-02-17 RX ORDER — SODIUM CHLORIDE 9 MG/ML
500 INJECTION INTRAMUSCULAR; INTRAVENOUS; SUBCUTANEOUS ONCE
Refills: 0 | Status: COMPLETED | OUTPATIENT
Start: 2021-02-17 | End: 2021-02-17

## 2021-02-17 RX ORDER — METOPROLOL TARTRATE 50 MG
5 TABLET ORAL ONCE
Refills: 0 | Status: DISCONTINUED | OUTPATIENT
Start: 2021-02-17 | End: 2021-02-17

## 2021-02-17 RX ORDER — FUROSEMIDE 40 MG
40 TABLET ORAL ONCE
Refills: 0 | Status: COMPLETED | OUTPATIENT
Start: 2021-02-17 | End: 2021-02-17

## 2021-02-17 RX ORDER — DIGOXIN 250 MCG
0.12 TABLET ORAL ONCE
Refills: 0 | Status: COMPLETED | OUTPATIENT
Start: 2021-02-17 | End: 2021-02-17

## 2021-02-17 RX ORDER — METOPROLOL TARTRATE 50 MG
25 TABLET ORAL DAILY
Refills: 0 | Status: DISCONTINUED | OUTPATIENT
Start: 2021-02-17 | End: 2021-02-17

## 2021-02-17 RX ORDER — METOPROLOL TARTRATE 50 MG
25 TABLET ORAL ONCE
Refills: 0 | Status: COMPLETED | OUTPATIENT
Start: 2021-02-17 | End: 2021-02-17

## 2021-02-17 RX ORDER — POTASSIUM CHLORIDE 20 MEQ
20 PACKET (EA) ORAL ONCE
Refills: 0 | Status: COMPLETED | OUTPATIENT
Start: 2021-02-17 | End: 2021-02-17

## 2021-02-17 RX ADMIN — Medication 25 MILLIGRAM(S): at 12:36

## 2021-02-17 RX ADMIN — MAGNESIUM OXIDE 400 MG ORAL TABLET 400 MILLIGRAM(S): 241.3 TABLET ORAL at 11:55

## 2021-02-17 RX ADMIN — Medication 1 TABLET(S): at 11:55

## 2021-02-17 RX ADMIN — ATORVASTATIN CALCIUM 40 MILLIGRAM(S): 80 TABLET, FILM COATED ORAL at 22:47

## 2021-02-17 RX ADMIN — Medication 40 MILLIEQUIVALENT(S): at 04:58

## 2021-02-17 RX ADMIN — Medication 5 MILLIGRAM(S): at 02:52

## 2021-02-17 RX ADMIN — SODIUM CHLORIDE 500 MILLILITER(S): 9 INJECTION INTRAMUSCULAR; INTRAVENOUS; SUBCUTANEOUS at 16:26

## 2021-02-17 RX ADMIN — Medication 60 MILLIGRAM(S): at 08:12

## 2021-02-17 RX ADMIN — Medication 20 MILLIEQUIVALENT(S): at 09:05

## 2021-02-17 RX ADMIN — Medication 125 MILLIGRAM(S): at 18:23

## 2021-02-17 RX ADMIN — APIXABAN 2.5 MILLIGRAM(S): 2.5 TABLET, FILM COATED ORAL at 04:57

## 2021-02-17 RX ADMIN — Medication 2: at 09:05

## 2021-02-17 RX ADMIN — Medication 4: at 11:55

## 2021-02-17 RX ADMIN — Medication 81 MILLIGRAM(S): at 11:55

## 2021-02-17 RX ADMIN — PANTOPRAZOLE SODIUM 40 MILLIGRAM(S): 20 TABLET, DELAYED RELEASE ORAL at 04:59

## 2021-02-17 RX ADMIN — APIXABAN 2.5 MILLIGRAM(S): 2.5 TABLET, FILM COATED ORAL at 18:24

## 2021-02-17 RX ADMIN — Medication 500 MILLIGRAM(S): at 11:55

## 2021-02-17 RX ADMIN — INSULIN GLARGINE 20 UNIT(S): 100 INJECTION, SOLUTION SUBCUTANEOUS at 22:51

## 2021-02-17 RX ADMIN — Medication 100 MILLIGRAM(S): at 04:58

## 2021-02-17 RX ADMIN — MAGNESIUM OXIDE 400 MG ORAL TABLET 400 MILLIGRAM(S): 241.3 TABLET ORAL at 18:24

## 2021-02-17 RX ADMIN — Medication 650 MILLIGRAM(S): at 22:49

## 2021-02-17 RX ADMIN — Medication 0.12 MILLIGRAM(S): at 18:54

## 2021-02-17 RX ADMIN — AMIODARONE HYDROCHLORIDE 200 MILLIGRAM(S): 400 TABLET ORAL at 04:57

## 2021-02-17 RX ADMIN — Medication 40 MILLIEQUIVALENT(S): at 18:23

## 2021-02-17 RX ADMIN — Medication 40 MILLIGRAM(S): at 18:24

## 2021-02-17 NOTE — PROVIDER CONTACT NOTE (MEDICATION) - ASSESSMENT
Please vital signs in flowsheet.  Pt asymptomatic, awake and calm.  lying in bed. Please see vital signs in flowsheet.  Pt asymptomatic, awake and calm.  lying in bed.

## 2021-02-17 NOTE — PROGRESS NOTE ADULT - SUBJECTIVE AND OBJECTIVE BOX
Patient is a 75y old  Female who presents with a chief complaint of COVID 19 pna (16 Feb 2021 09:02)      Patient seen and examined at bedside.  Patient in rapid a.fib overnight  No complaints this morning  Wanting to go to rehab  D/C planning for [facility]    ALLERGIES:  OHS (Unknown)  warfarin (Rash)    MEDICATIONS  (STANDING):  aMIOdarone    Tablet 200 milliGRAM(s) Oral daily  apixaban 2.5 milliGRAM(s) Oral every 12 hours  ascorbic acid 500 milliGRAM(s) Oral daily  aspirin enteric coated 81 milliGRAM(s) Oral daily  atorvastatin 40 milliGRAM(s) Oral at bedtime  dextrose 40% Gel 15 Gram(s) Oral once  dextrose 5%. 1000 milliLiter(s) (50 mL/Hr) IV Continuous <Continuous>  dextrose 5%. 1000 milliLiter(s) (100 mL/Hr) IV Continuous <Continuous>  dextrose 50% Injectable 25 Gram(s) IV Push once  dextrose 50% Injectable 12.5 Gram(s) IV Push once  dextrose 50% Injectable 25 Gram(s) IV Push once  furosemide    Tablet 60 milliGRAM(s) Oral two times a day  glucagon  Injectable 1 milliGRAM(s) IntraMuscular once  influenza   Vaccine 0.5 milliLiter(s) IntraMuscular once  insulin glargine Injectable (LANTUS) 20 Unit(s) SubCutaneous at bedtime  insulin lispro (ADMELOG) corrective regimen sliding scale   SubCutaneous three times a day before meals  insulin lispro (ADMELOG) corrective regimen sliding scale   SubCutaneous at bedtime  magnesium oxide 400 milliGRAM(s) Oral three times a day with meals  melatonin 6 milliGRAM(s) Oral at bedtime  metoprolol tartrate 100 milliGRAM(s) Oral every 12 hours  multivitamin 1 Tablet(s) Oral daily  pantoprazole    Tablet 40 milliGRAM(s) Oral before breakfast  potassium chloride    Tablet ER 20 milliEquivalent(s) Oral once  potassium chloride    Tablet ER 40 milliEquivalent(s) Oral two times a day    MEDICATIONS  (PRN):  acetaminophen   Tablet .. 650 milliGRAM(s) Oral every 8 hours PRN Temp greater or equal to 38C (100.4F), Mild Pain (1 - 3)  benzonatate 100 milliGRAM(s) Oral every 8 hours PRN Cough  guaiFENesin   Syrup  (Sugar-Free) 100 milliGRAM(s) Oral every 6 hours PRN Cough  polyethylene glycol 3350 17 Gram(s) Oral daily PRN Constipation  senna 2 Tablet(s) Oral at bedtime PRN Constipation    Vital Signs Last 24 Hrs  T(F): 99 (17 Feb 2021 05:24), Max: 99 (17 Feb 2021 05:24)  HR: 106 (17 Feb 2021 05:24) (73 - 132)  BP: 111/71 (17 Feb 2021 05:24) (100/56 - 127/95)  RR: 17 (17 Feb 2021 05:24) (16 - 17)  SpO2: 97% (17 Feb 2021 05:24) (95% - 97%)  I&O's Summary        PHYSICAL EXAM:  GENERAL: NAD  HENT:  Atraumatic, Normocephalic; No tonsillar erythema, exudates, or enlargement; Moist mucous membranes;   EYES: EOMI, PERRLA, conjunctiva and sclera clear, no lid-lag  NECK: Supple, No JVD, Normal thyroid  CHEST/LUNG: Clear to percussion bilaterally; No rales, rhonchi, wheezing, or rubs; normal respiratory effort, no intercostal retractions  HEART: irregular rate and irregular rhythm; No murmurs, rubs, or gallops  ABDOMEN: Soft, Nontender, Nondistended; Bowel sounds present; No HSM  MUSCULOSKELETAL/EXTREMITIES:  2+ Peripheral Pulses, No clubbing, cyanosis, or peripheral edema; No digital cyanosis  SKIN: No rashes or lesions; normal texture and temperature  PSYCH: Appropriate affect, Alert & Oriented x 2-3    LABS:                        11.7   8.44  )-----------( 496      ( 17 Feb 2021 05:23 )             36.6       02-17    142  |  101  |  20  ----------------------------<  165  3.3   |  28  |  1.05    Ca    8.8      17 Feb 2021 05:23  Phos  3.2     02-17  Mg     1.8     02-17    TPro  7.2  /  Alb  2.8  /  TBili  3.0  /  DBili  x   /  AST  50  /  ALT  42  /  AlkPhos  108  02-17     eGFR if Non African American: 53 mL/min/1.73M2 (02-17-21 @ 05:23)  eGFR if : 61 mL/min/1.73M2 (02-17-21 @ 05:23)         POCT Blood Glucose.: 155 mg/dL (17 Feb 2021 08:20)  POCT Blood Glucose.: 178 mg/dL (16 Feb 2021 22:39)  POCT Blood Glucose.: 186 mg/dL (16 Feb 2021 22:34)  POCT Blood Glucose.: 121 mg/dL (16 Feb 2021 16:26)  POCT Blood Glucose.: 184 mg/dL (16 Feb 2021 11:35)    Care Discussed with Consultants/Other Providers: Yes   Patient is a 75y old  Female who presents with a chief complaint of COVID 19 pna (16 Feb 2021 09:02)      Patient seen and examined at bedside.  Patient in rapid a.fib overnight  No complaints this morning  Wanting to go to rehab  Giving additional meds for a. fib today. PO and IV metoprolol  D/C planning for [facility]    ALLERGIES:  OHS (Unknown)  warfarin (Rash)    MEDICATIONS  (STANDING):  aMIOdarone    Tablet 200 milliGRAM(s) Oral daily  apixaban 2.5 milliGRAM(s) Oral every 12 hours  ascorbic acid 500 milliGRAM(s) Oral daily  aspirin enteric coated 81 milliGRAM(s) Oral daily  atorvastatin 40 milliGRAM(s) Oral at bedtime  dextrose 40% Gel 15 Gram(s) Oral once  dextrose 5%. 1000 milliLiter(s) (50 mL/Hr) IV Continuous <Continuous>  dextrose 5%. 1000 milliLiter(s) (100 mL/Hr) IV Continuous <Continuous>  dextrose 50% Injectable 25 Gram(s) IV Push once  dextrose 50% Injectable 12.5 Gram(s) IV Push once  dextrose 50% Injectable 25 Gram(s) IV Push once  furosemide    Tablet 60 milliGRAM(s) Oral two times a day  glucagon  Injectable 1 milliGRAM(s) IntraMuscular once  influenza   Vaccine 0.5 milliLiter(s) IntraMuscular once  insulin glargine Injectable (LANTUS) 20 Unit(s) SubCutaneous at bedtime  insulin lispro (ADMELOG) corrective regimen sliding scale   SubCutaneous three times a day before meals  insulin lispro (ADMELOG) corrective regimen sliding scale   SubCutaneous at bedtime  magnesium oxide 400 milliGRAM(s) Oral three times a day with meals  melatonin 6 milliGRAM(s) Oral at bedtime  metoprolol tartrate 100 milliGRAM(s) Oral every 12 hours  multivitamin 1 Tablet(s) Oral daily  pantoprazole    Tablet 40 milliGRAM(s) Oral before breakfast  potassium chloride    Tablet ER 20 milliEquivalent(s) Oral once  potassium chloride    Tablet ER 40 milliEquivalent(s) Oral two times a day    MEDICATIONS  (PRN):  acetaminophen   Tablet .. 650 milliGRAM(s) Oral every 8 hours PRN Temp greater or equal to 38C (100.4F), Mild Pain (1 - 3)  benzonatate 100 milliGRAM(s) Oral every 8 hours PRN Cough  guaiFENesin   Syrup  (Sugar-Free) 100 milliGRAM(s) Oral every 6 hours PRN Cough  polyethylene glycol 3350 17 Gram(s) Oral daily PRN Constipation  senna 2 Tablet(s) Oral at bedtime PRN Constipation    Vital Signs Last 24 Hrs  T(F): 99 (17 Feb 2021 05:24), Max: 99 (17 Feb 2021 05:24)  HR: 106 (17 Feb 2021 05:24) (73 - 132)  BP: 111/71 (17 Feb 2021 05:24) (100/56 - 127/95)  RR: 17 (17 Feb 2021 05:24) (16 - 17)  SpO2: 97% (17 Feb 2021 05:24) (95% - 97%)  I&O's Summary        PHYSICAL EXAM:  GENERAL: NAD  HENT:  Atraumatic, Normocephalic; No tonsillar erythema, exudates, or enlargement; Moist mucous membranes;   EYES: EOMI, PERRLA, conjunctiva and sclera clear, no lid-lag  NECK: Supple, No JVD, Normal thyroid  CHEST/LUNG: Clear to percussion bilaterally; No rales, rhonchi, wheezing, or rubs; normal respiratory effort, no intercostal retractions  HEART: irregular rate and irregular rhythm; No murmurs, rubs, or gallops  ABDOMEN: Soft, Nontender, Nondistended; Bowel sounds present; No HSM  MUSCULOSKELETAL/EXTREMITIES:  2+ Peripheral Pulses, No clubbing, cyanosis, or peripheral edema; No digital cyanosis  SKIN: No rashes or lesions; normal texture and temperature  PSYCH: Appropriate affect, Alert & Oriented x 2-3    LABS:                        11.7   8.44  )-----------( 496      ( 17 Feb 2021 05:23 )             36.6       02-17    142  |  101  |  20  ----------------------------<  165  3.3   |  28  |  1.05    Ca    8.8      17 Feb 2021 05:23  Phos  3.2     02-17  Mg     1.8     02-17    TPro  7.2  /  Alb  2.8  /  TBili  3.0  /  DBili  x   /  AST  50  /  ALT  42  /  AlkPhos  108  02-17     eGFR if Non African American: 53 mL/min/1.73M2 (02-17-21 @ 05:23)  eGFR if : 61 mL/min/1.73M2 (02-17-21 @ 05:23)         POCT Blood Glucose.: 155 mg/dL (17 Feb 2021 08:20)  POCT Blood Glucose.: 178 mg/dL (16 Feb 2021 22:39)  POCT Blood Glucose.: 186 mg/dL (16 Feb 2021 22:34)  POCT Blood Glucose.: 121 mg/dL (16 Feb 2021 16:26)  POCT Blood Glucose.: 184 mg/dL (16 Feb 2021 11:35)    Care Discussed with Consultants/Other Providers: Yes   Patient is a 75y old  Female who presents with a chief complaint of COVID 19 pna (16 Feb 2021 09:02)      Patient seen and examined at bedside.  Patient in rapid a.fib overnight  No complaints this morning  Wanting to go to rehab  Giving additional meds for a. fib today. PO and IV metoprolol  D/C planning for [facility]  Suggesting PO supplement followed by IV if continues to be in rapid a. fib    ALLERGIES:  OHS (Unknown)  warfarin (Rash)    MEDICATIONS  (STANDING):  aMIOdarone    Tablet 200 milliGRAM(s) Oral daily  apixaban 2.5 milliGRAM(s) Oral every 12 hours  ascorbic acid 500 milliGRAM(s) Oral daily  aspirin enteric coated 81 milliGRAM(s) Oral daily  atorvastatin 40 milliGRAM(s) Oral at bedtime  dextrose 40% Gel 15 Gram(s) Oral once  dextrose 5%. 1000 milliLiter(s) (50 mL/Hr) IV Continuous <Continuous>  dextrose 5%. 1000 milliLiter(s) (100 mL/Hr) IV Continuous <Continuous>  dextrose 50% Injectable 25 Gram(s) IV Push once  dextrose 50% Injectable 12.5 Gram(s) IV Push once  dextrose 50% Injectable 25 Gram(s) IV Push once  furosemide    Tablet 60 milliGRAM(s) Oral two times a day  glucagon  Injectable 1 milliGRAM(s) IntraMuscular once  influenza   Vaccine 0.5 milliLiter(s) IntraMuscular once  insulin glargine Injectable (LANTUS) 20 Unit(s) SubCutaneous at bedtime  insulin lispro (ADMELOG) corrective regimen sliding scale   SubCutaneous three times a day before meals  insulin lispro (ADMELOG) corrective regimen sliding scale   SubCutaneous at bedtime  magnesium oxide 400 milliGRAM(s) Oral three times a day with meals  melatonin 6 milliGRAM(s) Oral at bedtime  metoprolol tartrate 100 milliGRAM(s) Oral every 12 hours  multivitamin 1 Tablet(s) Oral daily  pantoprazole    Tablet 40 milliGRAM(s) Oral before breakfast  potassium chloride    Tablet ER 20 milliEquivalent(s) Oral once  potassium chloride    Tablet ER 40 milliEquivalent(s) Oral two times a day    MEDICATIONS  (PRN):  acetaminophen   Tablet .. 650 milliGRAM(s) Oral every 8 hours PRN Temp greater or equal to 38C (100.4F), Mild Pain (1 - 3)  benzonatate 100 milliGRAM(s) Oral every 8 hours PRN Cough  guaiFENesin   Syrup  (Sugar-Free) 100 milliGRAM(s) Oral every 6 hours PRN Cough  polyethylene glycol 3350 17 Gram(s) Oral daily PRN Constipation  senna 2 Tablet(s) Oral at bedtime PRN Constipation    Vital Signs Last 24 Hrs  T(F): 99 (17 Feb 2021 05:24), Max: 99 (17 Feb 2021 05:24)  HR: 106 (17 Feb 2021 05:24) (73 - 132)  BP: 111/71 (17 Feb 2021 05:24) (100/56 - 127/95)  RR: 17 (17 Feb 2021 05:24) (16 - 17)  SpO2: 97% (17 Feb 2021 05:24) (95% - 97%)  I&O's Summary        PHYSICAL EXAM:  GENERAL: NAD  HENT:  Atraumatic, Normocephalic; No tonsillar erythema, exudates, or enlargement; Moist mucous membranes;   EYES: EOMI, PERRLA, conjunctiva and sclera clear, no lid-lag  NECK: Supple, No JVD, Normal thyroid  CHEST/LUNG: Clear to percussion bilaterally; No rales, rhonchi, wheezing, or rubs; normal respiratory effort, no intercostal retractions  HEART: irregular rate and irregular rhythm; No murmurs, rubs, or gallops  ABDOMEN: Soft, Nontender, Nondistended; Bowel sounds present; No HSM  MUSCULOSKELETAL/EXTREMITIES:  2+ Peripheral Pulses, No clubbing, cyanosis, or peripheral edema; No digital cyanosis  SKIN: No rashes or lesions; normal texture and temperature  PSYCH: Appropriate affect, Alert & Oriented x 2-3    LABS:                        11.7   8.44  )-----------( 496      ( 17 Feb 2021 05:23 )             36.6       02-17    142  |  101  |  20  ----------------------------<  165  3.3   |  28  |  1.05    Ca    8.8      17 Feb 2021 05:23  Phos  3.2     02-17  Mg     1.8     02-17    TPro  7.2  /  Alb  2.8  /  TBili  3.0  /  DBili  x   /  AST  50  /  ALT  42  /  AlkPhos  108  02-17     eGFR if Non African American: 53 mL/min/1.73M2 (02-17-21 @ 05:23)  eGFR if : 61 mL/min/1.73M2 (02-17-21 @ 05:23)         POCT Blood Glucose.: 155 mg/dL (17 Feb 2021 08:20)  POCT Blood Glucose.: 178 mg/dL (16 Feb 2021 22:39)  POCT Blood Glucose.: 186 mg/dL (16 Feb 2021 22:34)  POCT Blood Glucose.: 121 mg/dL (16 Feb 2021 16:26)  POCT Blood Glucose.: 184 mg/dL (16 Feb 2021 11:35)    Care Discussed with Consultants/Other Providers: Yes

## 2021-02-17 NOTE — PROGRESS NOTE ADULT - ASSESSMENT
75y with PMHx of paroxysmal Afib (on Xarelto), PAD (s/p K-gqfgsph-eorepylfs bypass, R-femoral angioplasty with stenting, (on Plavix), HTN, HLD, type 2 diabetes (HA1c on 7.8 on Metformin and Tresiba as an outpatient), and recent trauma to right thigh with stable hematoma, presented to [facility] 1/12/21 with SOB, found to have NSTEMI. 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 S/P C3/DUANE Clip/MV Repair and DUANE thrombectomy with Dr. Orellana on 1/19/21. Postop course complicated by hypoxic respiratory failure and KRISTIE now resolved, and hypokalemia on daily repletion while on Torsemide, admitted to  for rehab on jan 28 and was found with COVID 19. transferred to medicine on 2/1 for worsening SOB/SHF/PNA    #Acute hypoxic respiratory failure in need of supplemental oxygenation, currently improved on room air, likely due to :  a)Acute on chronic combined systolic and Diastolic CHF, improved  b)COVID-19 Infection, still + 2/8 and 2/11, stable  -SpO2 currently 97% at rest on RA  -Completed 5 days of Remdisivir  -cough improved, c/w tessalon pearls prn  -Improvement in volume status. Will continue Lasix 60mg po BID at this time   -I/O's and daily weights, fluid restriction  -last COVID swab was neg.  -PT to do stairs teaching but patient very weak for [facility]    #Confusion - resolved  - labs and UA sent    #Recent NSTEMI S/P CABG, MV Repair and DUANE thrombectomy 1/19/21  #Chronic Afib, rate currently controlled  #Mild to mod AS  -ASA, Lipitor 40 mg PO QHS   -Continue Eliquis 2.5mg BID (reduced dose due to large hematoma in right thigh from trauma prior to surgery)  - metoprolol 100mg q 12hrs  -Continue Amiodarone  -Cardiology on board    #Hypokalemia  - c/w K 40meq po BID  - additional potassium 20meq x 1  - continue to monitor    #Transaminitis   -Secondary to COVID-19  -Bilirubin downtrending    #Thrombocytopenia - resolved  -continue monitor    #DM2  -HbA1c 7.8 on Metformin and Tresiba as outpatient  -Continue Lantus + ISS,    #Anemia  -Stable  -Monitor    #Hematoma Right thigh  -continue to monitor    #Moderate protein-calorie malnutrition  -Monitor    Physiatry recommendations noted; Acute rehab but patient has progress and suggest home with home PT and walker  Pt stable for discharge at this time. Patient reassessed by PT and recommend she needs more therapy.   Patient to go to [facility]    #DVT ppx- Eliquis  #Code: Full  Dispo: [facility]  SHELL working on case for dispo      2/17 Will update  Derrick Roblero, [phone]  Dr. Ling Evangelista is PCP  Patient is medically stable for discharge  D/C Planning to [facility] 75y with PMHx of paroxysmal Afib (on Xarelto), PAD (s/p J-sizysvp-qxxuwvwcm bypass, R-femoral angioplasty with stenting, (on Plavix), HTN, HLD, type 2 diabetes (HA1c on 7.8 on Metformin and Tresiba as an outpatient), and recent trauma to right thigh with stable hematoma, presented to [facility] 1/12/21 with SOB, found to have NSTEMI. 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 S/P C3/DUANE Clip/MV Repair and DUANE thrombectomy with Dr. Orellana on 1/19/21. Postop course complicated by hypoxic respiratory failure and KRISTIE now resolved, and hypokalemia on daily repletion while on Torsemide, admitted to  for rehab on jan 28 and was found with COVID 19. transferred to medicine on 2/1 for worsening SOB/SHF/PNA    #Acute hypoxic respiratory failure in need of supplemental oxygenation, currently improved on room air, likely due to :  a)Acute on chronic combined systolic and Diastolic CHF, improved  b)COVID-19 Infection, still + 2/8 and 2/11, stable  -SpO2 currently 97% at rest on RA  -Completed 5 days of Remdisivir  -cough improved, c/w tessalon pearls prn  -Improvement in volume status. Will continue Lasix 60mg po BID at this time   -I/O's and daily weights, fluid restriction  -last COVID swab was neg.  -PT to do stairs teaching but patient very weak for [facility]    #Confusion - resolved  - labs and UA sent    #Recent NSTEMI S/P CABG, MV Repair and DUANE thrombectomy 1/19/21  #Chronic Afib, rate currently controlled  #Mild to mod AS  -ASA, Lipitor 40 mg PO QHS   -Continue Eliquis 2.5mg BID (reduced dose due to large hematoma in right thigh from trauma prior to surgery)  - metoprolol 100mg q 12hrs  -Continue Amiodarone  -Cardiology on board    #Hypokalemia  - c/w K 40meq po BID  - additional potassium 20meq x 1  - continue to monitor    #Transaminitis   -Secondary to COVID-19  -Bilirubin downtrending    #Thrombocytopenia - resolved  -continue monitor    #DM2  -HbA1c 7.8 on Metformin and Tresiba as outpatient  -Continue Lantus + ISS,    #Anemia  -Stable  -Monitor    #Hematoma Right thigh  -continue to monitor    #Moderate protein-calorie malnutrition  -Monitor    Physiatry recommendations noted; Acute rehab but patient has progress and suggest home with home PT and walker  Pt stable for discharge at this time. Patient reassessed by PT and recommend she needs more therapy.   Patient to go to [facility]    #DVT ppx- Eliquis  #Code: Full  Dispo: [facility]  SHELL working on case for dispo      2/17 Update  Derrick Roblero, [phone]  Dr. Ling Evangelista is PCP  Patient is medically stable for discharge  D/C Planning to [facility] 75y with PMHx of paroxysmal Afib (on Xarelto), PAD (s/p K-gkwhzkt-pwxzxfzxq bypass, R-femoral angioplasty with stenting, (on Plavix), HTN, HLD, type 2 diabetes (HA1c on 7.8 on Metformin and Tresiba as an outpatient), and recent trauma to right thigh with stable hematoma, presented to [facility] 1/12/21 with SOB, found to have NSTEMI. 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 S/P C3/DUANE Clip/MV Repair and DUANE thrombectomy with Dr. Orellana on 1/19/21. Postop course complicated by hypoxic respiratory failure and KRISTIE now resolved, and hypokalemia on daily repletion while on Torsemide, admitted to  for rehab on jan 28 and was found with COVID 19. transferred to medicine on 2/1 for worsening SOB/SHF/PNA    #Acute hypoxic respiratory failure in need of supplemental oxygenation, currently improved on room air, likely due to :  a)Acute on chronic combined systolic and Diastolic CHF, improved  b)COVID-19 Infection, still + 2/8 and 2/11, stable  -SpO2 currently 97% at rest on RA  -Completed 5 days of Remdisivir  -cough improved, c/w tessalon pearls prn  -Improvement in volume status. Will continue Lasix 60mg po BID at this time   -I/O's and daily weights, fluid restriction  -last COVID swab was neg.  -PT to do stairs teaching but patient very weak for [facility]    #Confusion - resolved  - labs and UA sent    #Recent NSTEMI S/P CABG, MV Repair and DUANE thrombectomy 1/19/21  #Chronic Afib, rate currently controlled  #Mild to mod AS  -ASA, Lipitor 40 mg PO QHS   -Continue Eliquis 2.5mg BID (reduced dose due to large hematoma in right thigh from trauma prior to surgery)  - metoprolol 100mg q 12hrs; considering increasing to 125mg q 12hrs  -Continue Amiodarone  -Cardiology on board    #Hypokalemia  - c/w K 40meq po BID  - additional potassium 20meq x 1  - continue to monitor    #Transaminitis   -Secondary to COVID-19  -Bilirubin downtrending    #Thrombocytopenia - resolved  -continue monitor    #DM2  -HbA1c 7.8 on Metformin and Tresiba as outpatient  -Continue Lantus + ISS,    #Anemia  -Stable  -Monitor    #Hematoma Right thigh  -continue to monitor    #Moderate protein-calorie malnutrition  -Monitor    Physiatry recommendations noted; Acute rehab but patient has progress and suggest home with home PT and walker  Pt stable for discharge at this time. Patient reassessed by PT and recommend she needs more therapy.   Patient to go to [facility]    #DVT ppx- Eliquis  #Code: Full  Dispo: [facility] working on case for dispo      2/17 Update  Derrick Roblero, [phone]  Dr. Ling Evangelista is PCP  Patient is medically stable for discharge  D/C Planning to [facility]

## 2021-02-18 LAB
ALBUMIN SERPL ELPH-MCNC: 3.3 G/DL — SIGNIFICANT CHANGE UP (ref 3.3–5)
ALP SERPL-CCNC: 99 U/L — SIGNIFICANT CHANGE UP (ref 40–120)
ALT FLD-CCNC: 39 U/L — SIGNIFICANT CHANGE UP (ref 10–45)
ANION GAP SERPL CALC-SCNC: 11 MMOL/L — SIGNIFICANT CHANGE UP (ref 5–17)
AST SERPL-CCNC: 52 U/L — HIGH (ref 10–40)
BILIRUB DIRECT SERPL-MCNC: 0.8 MG/DL — HIGH (ref 0–0.2)
BILIRUB INDIRECT FLD-MCNC: 2 MG/DL — HIGH (ref 0.2–1)
BILIRUB SERPL-MCNC: 2.8 MG/DL — HIGH (ref 0.2–1.2)
BUN SERPL-MCNC: 18 MG/DL — SIGNIFICANT CHANGE UP (ref 7–23)
CALCIUM SERPL-MCNC: 9.1 MG/DL — SIGNIFICANT CHANGE UP (ref 8.4–10.5)
CHLORIDE SERPL-SCNC: 106 MMOL/L — SIGNIFICANT CHANGE UP (ref 96–108)
CO2 SERPL-SCNC: 28 MMOL/L — SIGNIFICANT CHANGE UP (ref 22–31)
CREAT SERPL-MCNC: 1.02 MG/DL — SIGNIFICANT CHANGE UP (ref 0.5–1.3)
GLUCOSE BLDC GLUCOMTR-MCNC: 125 MG/DL — HIGH (ref 70–99)
GLUCOSE BLDC GLUCOMTR-MCNC: 130 MG/DL — HIGH (ref 70–99)
GLUCOSE BLDC GLUCOMTR-MCNC: 139 MG/DL — HIGH (ref 70–99)
GLUCOSE BLDC GLUCOMTR-MCNC: 99 MG/DL — SIGNIFICANT CHANGE UP (ref 70–99)
GLUCOSE SERPL-MCNC: 103 MG/DL — HIGH (ref 70–99)
HCT VFR BLD CALC: 34.2 % — LOW (ref 34.5–45)
HGB BLD-MCNC: 10.8 G/DL — LOW (ref 11.5–15.5)
MAGNESIUM SERPL-MCNC: 2 MG/DL — SIGNIFICANT CHANGE UP (ref 1.6–2.6)
MCHC RBC-ENTMCNC: 27.3 PG — SIGNIFICANT CHANGE UP (ref 27–34)
MCHC RBC-ENTMCNC: 31.6 GM/DL — LOW (ref 32–36)
MCV RBC AUTO: 86.4 FL — SIGNIFICANT CHANGE UP (ref 80–100)
NRBC # BLD: 0 /100 WBCS — SIGNIFICANT CHANGE UP (ref 0–0)
PHOSPHATE SERPL-MCNC: 3.1 MG/DL — SIGNIFICANT CHANGE UP (ref 2.5–4.5)
PLATELET # BLD AUTO: 426 K/UL — HIGH (ref 150–400)
POTASSIUM SERPL-MCNC: 3.8 MMOL/L — SIGNIFICANT CHANGE UP (ref 3.5–5.3)
POTASSIUM SERPL-SCNC: 3.8 MMOL/L — SIGNIFICANT CHANGE UP (ref 3.5–5.3)
PROT SERPL-MCNC: 6.9 G/DL — SIGNIFICANT CHANGE UP (ref 6–8.3)
RBC # BLD: 3.96 M/UL — SIGNIFICANT CHANGE UP (ref 3.8–5.2)
RBC # FLD: 17.6 % — HIGH (ref 10.3–14.5)
SODIUM SERPL-SCNC: 145 MMOL/L — SIGNIFICANT CHANGE UP (ref 135–145)
WBC # BLD: 6.76 K/UL — SIGNIFICANT CHANGE UP (ref 3.8–10.5)
WBC # FLD AUTO: 6.76 K/UL — SIGNIFICANT CHANGE UP (ref 3.8–10.5)

## 2021-02-18 RX ORDER — ALBUMIN HUMAN 25 %
100 VIAL (ML) INTRAVENOUS EVERY 4 HOURS
Refills: 0 | Status: COMPLETED | OUTPATIENT
Start: 2021-02-18 | End: 2021-02-18

## 2021-02-18 RX ORDER — MIDODRINE HYDROCHLORIDE 2.5 MG/1
10 TABLET ORAL ONCE
Refills: 0 | Status: COMPLETED | OUTPATIENT
Start: 2021-02-18 | End: 2021-02-18

## 2021-02-18 RX ORDER — SODIUM CHLORIDE 9 MG/ML
250 INJECTION, SOLUTION INTRAVENOUS ONCE
Refills: 0 | Status: COMPLETED | OUTPATIENT
Start: 2021-02-18 | End: 2021-02-18

## 2021-02-18 RX ADMIN — MIDODRINE HYDROCHLORIDE 10 MILLIGRAM(S): 2.5 TABLET ORAL at 01:26

## 2021-02-18 RX ADMIN — Medication 500 MILLIGRAM(S): at 09:42

## 2021-02-18 RX ADMIN — Medication 125 MILLIGRAM(S): at 22:20

## 2021-02-18 RX ADMIN — Medication 6 MILLIGRAM(S): at 22:20

## 2021-02-18 RX ADMIN — Medication 125 MILLIGRAM(S): at 09:43

## 2021-02-18 RX ADMIN — Medication 40 MILLIEQUIVALENT(S): at 16:50

## 2021-02-18 RX ADMIN — Medication 60 MILLIGRAM(S): at 05:27

## 2021-02-18 RX ADMIN — APIXABAN 2.5 MILLIGRAM(S): 2.5 TABLET, FILM COATED ORAL at 05:27

## 2021-02-18 RX ADMIN — PANTOPRAZOLE SODIUM 40 MILLIGRAM(S): 20 TABLET, DELAYED RELEASE ORAL at 08:20

## 2021-02-18 RX ADMIN — Medication 50 MILLILITER(S): at 07:00

## 2021-02-18 RX ADMIN — APIXABAN 2.5 MILLIGRAM(S): 2.5 TABLET, FILM COATED ORAL at 16:50

## 2021-02-18 RX ADMIN — Medication 1 TABLET(S): at 09:42

## 2021-02-18 RX ADMIN — ATORVASTATIN CALCIUM 40 MILLIGRAM(S): 80 TABLET, FILM COATED ORAL at 22:20

## 2021-02-18 RX ADMIN — Medication 40 MILLIEQUIVALENT(S): at 05:27

## 2021-02-18 RX ADMIN — INSULIN GLARGINE 20 UNIT(S): 100 INJECTION, SOLUTION SUBCUTANEOUS at 22:20

## 2021-02-18 RX ADMIN — Medication 81 MILLIGRAM(S): at 11:14

## 2021-02-18 RX ADMIN — AMIODARONE HYDROCHLORIDE 200 MILLIGRAM(S): 400 TABLET ORAL at 05:27

## 2021-02-18 RX ADMIN — SODIUM CHLORIDE 250 MILLILITER(S): 9 INJECTION, SOLUTION INTRAVENOUS at 01:00

## 2021-02-18 RX ADMIN — Medication 50 MILLILITER(S): at 01:39

## 2021-02-18 NOTE — PROGRESS NOTE ADULT - SUBJECTIVE AND OBJECTIVE BOX
Patient is a 75y old  Female who presents with a chief complaint of COVID 19 pna (17 Feb 2021 08:37)      Patient seen and examined at bedside. noted with hypotension overnight, likely 2/2 afib meds given throughout day. received LR 250cc bolus, albumin x2, midodrine 10mgx1 with improvement this AM. denies headache, fever, chills, cp, sob, n/v, abd pain.     ALLERGIES:  OHS (Unknown)  warfarin (Rash)    MEDICATIONS  (STANDING):  aMIOdarone    Tablet 200 milliGRAM(s) Oral daily  apixaban 2.5 milliGRAM(s) Oral every 12 hours  ascorbic acid 500 milliGRAM(s) Oral daily  aspirin enteric coated 81 milliGRAM(s) Oral daily  atorvastatin 40 milliGRAM(s) Oral at bedtime  dextrose 40% Gel 15 Gram(s) Oral once  dextrose 5%. 1000 milliLiter(s) (50 mL/Hr) IV Continuous <Continuous>  dextrose 5%. 1000 milliLiter(s) (100 mL/Hr) IV Continuous <Continuous>  dextrose 50% Injectable 25 Gram(s) IV Push once  dextrose 50% Injectable 25 Gram(s) IV Push once  dextrose 50% Injectable 12.5 Gram(s) IV Push once  furosemide    Tablet 60 milliGRAM(s) Oral two times a day  glucagon  Injectable 1 milliGRAM(s) IntraMuscular once  influenza   Vaccine 0.5 milliLiter(s) IntraMuscular once  insulin glargine Injectable (LANTUS) 20 Unit(s) SubCutaneous at bedtime  insulin lispro (ADMELOG) corrective regimen sliding scale   SubCutaneous three times a day before meals  insulin lispro (ADMELOG) corrective regimen sliding scale   SubCutaneous at bedtime  melatonin 6 milliGRAM(s) Oral at bedtime  metoprolol tartrate 125 milliGRAM(s) Oral every 12 hours  multivitamin 1 Tablet(s) Oral daily  pantoprazole    Tablet 40 milliGRAM(s) Oral before breakfast  potassium chloride    Tablet ER 40 milliEquivalent(s) Oral two times a day    MEDICATIONS  (PRN):  acetaminophen   Tablet .. 650 milliGRAM(s) Oral every 8 hours PRN Temp greater or equal to 38C (100.4F), Mild Pain (1 - 3)  benzonatate 100 milliGRAM(s) Oral every 8 hours PRN Cough  guaiFENesin   Syrup  (Sugar-Free) 100 milliGRAM(s) Oral every 6 hours PRN Cough  polyethylene glycol 3350 17 Gram(s) Oral daily PRN Constipation  senna 2 Tablet(s) Oral at bedtime PRN Constipation    Vital Signs Last 24 Hrs  T(F): 97.7 (18 Feb 2021 09:33), Max: 98.6 (18 Feb 2021 00:45)  HR: 110 (18 Feb 2021 09:41) (98 - 123)  BP: 104/63 (18 Feb 2021 09:41) (77/62 - 121/105)  RR: 17 (18 Feb 2021 09:33) (16 - 18)  SpO2: 95% (18 Feb 2021 09:33) (95% - 99%)  I&O's Summary    17 Feb 2021 07:01  -  18 Feb 2021 07:00  --------------------------------------------------------  IN: 100 mL / OUT: 0 mL / NET: 100 mL      PHYSICAL EXAM:  GENERAL: NAD  HEENT:  NCAT. EOMI, MMM   NECK: Supple  CHEST/LUNG: Respirations unlabored. CTA b/l  HEART: irregular rate and irregular rhythm; No murmurs, rubs, or gallops  ABDOMEN: Soft, Nontender, Nondistended; Bowel sounds present; No HSM  MUSCULOSKELETAL/EXTREMITIES:  2+ Peripheral Pulses, No clubbing, cyanosis, or peripheral edema; No digital cyanosis  SKIN: No rashes or lesions; normal texture and temperature  PSYCH: Appropriate affect, Alert & Oriented x 2-3    LABS:                        10.8   6.76  )-----------( 426      ( 18 Feb 2021 06:35 )             34.2       02-18    145  |  106  |  18  ----------------------------<  103  3.8   |  28  |  1.02    Ca    9.1      18 Feb 2021 06:35  Phos  3.1     02-18  Mg     2.0     02-18    TPro  6.9  /  Alb  3.3  /  TBili  2.8  /  DBili  0.8  /  AST  52  /  ALT  39  /  AlkPhos  99  02-18     eGFR if : 63 mL/min/1.73M2 (02-18-21 @ 06:35)  eGFR if Non African American: 55 mL/min/1.73M2 (02-18-21 @ 06:35)    POCT Blood Glucose.: 125 mg/dL (18 Feb 2021 11:13)  POCT Blood Glucose.: 99 mg/dL (18 Feb 2021 08:21)  POCT Blood Glucose.: 131 mg/dL (17 Feb 2021 22:32)  POCT Blood Glucose.: 134 mg/dL (17 Feb 2021 16:31)    RADIOLOGY & ADDITIONAL TESTS: reviewed    Care Discussed with Consultants/Other Providers: yes

## 2021-02-18 NOTE — PROGRESS NOTE ADULT - ASSESSMENT
74 y/o F PMHx of paroxysmal Afib (on Xarelto), PAD (s/p P-yadvfgg-nmfyulvlr bypass, R-femoral angioplasty with stenting, (on Plavix), HTN, HLD, type 2 diabetes (HA1c on 7.8 on Metformin and Tresiba as an outpatient), and recent trauma to right thigh with stable hematoma, presented to [facility] 1/12/21 with SOB, found to have NSTEMI. 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 S/P C3/DUANE Clip/MV Repair and DUANE thrombectomy with Dr. Orellana on 1/19/21. Postop course complicated by hypoxic respiratory failure and KRISTIE now resolved, and hypokalemia on daily repletion while on Torsemide, admitted to  for rehab on jan 28 and was found with COVID 19. Transferred to medicine on 2/1 for worsening SOB/SHF/PNA, and to telemetry 2/17 for rapid afib.    #Recent NSTEMI S/P CABG, MV Repair and DUANE thrombectomy 1/19/21  #Chronic Afib, now with Afib with RVR - HR 110s today  #Mild to mod AS  -S/p LR 250cc bolus, Albumin 25% 100ml q4h x2, Midodrine 10mg x1 for hypotension 2/18. Goal MAP >65  -Eliquis 2.5mg BID (reduced dose due to large hematoma in right thigh from trauma prior to surgery)  -Lasix 60mg BID  -Metoprolol 125mg q12h  -Amiodarone 200mg qd  -Cardiology following    #Acute hypoxic respiratory failure in need of supplemental oxygenation, currently improved on room air, likely due to :  a)Acute on chronic combined systolic and Diastolic CHF, improved  b)COVID-19 Infection, still + 2/8 and 2/11, stable  -SpO2 currently 97% at rest on RA  -Completed 5 days of Remdisivir  -Cough improved, c/w tessalon pearls prn  -Improvement in volume status. Will continue Lasix 60mg po BID at this time   -I/O's and daily weights, fluid restriction  -last COVID swab was neg.  -PT to do stairs teaching but patient very weak for OLGA    #Transaminitis   -Secondary to COVID-19  -Bilirubin downtrending    #DM2  -HbA1c 7.8 on Metformin and Tresiba as outpatient  -Continue Lantus + ISS,    #Anemia  -Stable  -Monitor    #Hematoma Right thigh  -continue to monitor    #Moderate protein-calorie malnutrition  -Monitor, nutrition appreciated    Physiatry recommendations noted; Acute rehab but patient has progress and suggest home with home PT and walker  Pt stable for discharge at this time. Patient reassessed by PT and recommend she needs more therapy.   Patient to go to [facility]    #DVT ppx- Eliquis  #GI ppx - PPI    Dispo: [facility] when medically optimized     Derrick Roblero, [phone] updated 2/18   Dr. Ling Evangelista is PCP

## 2021-02-19 LAB
ANION GAP SERPL CALC-SCNC: 11 MMOL/L — SIGNIFICANT CHANGE UP (ref 5–17)
BUN SERPL-MCNC: 18 MG/DL — SIGNIFICANT CHANGE UP (ref 7–23)
CALCIUM SERPL-MCNC: 9.3 MG/DL — SIGNIFICANT CHANGE UP (ref 8.4–10.5)
CHLORIDE SERPL-SCNC: 106 MMOL/L — SIGNIFICANT CHANGE UP (ref 96–108)
CO2 SERPL-SCNC: 28 MMOL/L — SIGNIFICANT CHANGE UP (ref 22–31)
CREAT SERPL-MCNC: 1.23 MG/DL — SIGNIFICANT CHANGE UP (ref 0.5–1.3)
GLUCOSE BLDC GLUCOMTR-MCNC: 148 MG/DL — HIGH (ref 70–99)
GLUCOSE BLDC GLUCOMTR-MCNC: 175 MG/DL — HIGH (ref 70–99)
GLUCOSE BLDC GLUCOMTR-MCNC: 179 MG/DL — HIGH (ref 70–99)
GLUCOSE BLDC GLUCOMTR-MCNC: 227 MG/DL — HIGH (ref 70–99)
GLUCOSE SERPL-MCNC: 143 MG/DL — HIGH (ref 70–99)
HCT VFR BLD CALC: 37.4 % — SIGNIFICANT CHANGE UP (ref 34.5–45)
HGB BLD-MCNC: 11.3 G/DL — LOW (ref 11.5–15.5)
MCHC RBC-ENTMCNC: 26 PG — LOW (ref 27–34)
MCHC RBC-ENTMCNC: 30.2 GM/DL — LOW (ref 32–36)
MCV RBC AUTO: 86 FL — SIGNIFICANT CHANGE UP (ref 80–100)
NRBC # BLD: 0 /100 WBCS — SIGNIFICANT CHANGE UP (ref 0–0)
NT-PROBNP SERPL-SCNC: 8467 PG/ML — HIGH (ref 0–300)
PLATELET # BLD AUTO: 468 K/UL — HIGH (ref 150–400)
POTASSIUM SERPL-MCNC: 3.8 MMOL/L — SIGNIFICANT CHANGE UP (ref 3.5–5.3)
POTASSIUM SERPL-SCNC: 3.8 MMOL/L — SIGNIFICANT CHANGE UP (ref 3.5–5.3)
RBC # BLD: 4.35 M/UL — SIGNIFICANT CHANGE UP (ref 3.8–5.2)
RBC # FLD: 17.9 % — HIGH (ref 10.3–14.5)
SODIUM SERPL-SCNC: 145 MMOL/L — SIGNIFICANT CHANGE UP (ref 135–145)
WBC # BLD: 9.33 K/UL — SIGNIFICANT CHANGE UP (ref 3.8–10.5)
WBC # FLD AUTO: 9.33 K/UL — SIGNIFICANT CHANGE UP (ref 3.8–10.5)

## 2021-02-19 RX ORDER — METOPROLOL TARTRATE 50 MG
75 TABLET ORAL EVERY 8 HOURS
Refills: 0 | Status: DISCONTINUED | OUTPATIENT
Start: 2021-02-19 | End: 2021-02-22

## 2021-02-19 RX ORDER — DIGOXIN 250 MCG
0.12 TABLET ORAL DAILY
Refills: 0 | Status: DISCONTINUED | OUTPATIENT
Start: 2021-02-19 | End: 2021-02-22

## 2021-02-19 RX ADMIN — Medication 500 MILLIGRAM(S): at 11:43

## 2021-02-19 RX ADMIN — Medication 60 MILLIGRAM(S): at 06:17

## 2021-02-19 RX ADMIN — INSULIN GLARGINE 20 UNIT(S): 100 INJECTION, SOLUTION SUBCUTANEOUS at 21:15

## 2021-02-19 RX ADMIN — Medication 6 MILLIGRAM(S): at 21:18

## 2021-02-19 RX ADMIN — AMIODARONE HYDROCHLORIDE 200 MILLIGRAM(S): 400 TABLET ORAL at 06:17

## 2021-02-19 RX ADMIN — Medication 2: at 17:26

## 2021-02-19 RX ADMIN — Medication 40 MILLIEQUIVALENT(S): at 17:33

## 2021-02-19 RX ADMIN — PANTOPRAZOLE SODIUM 40 MILLIGRAM(S): 20 TABLET, DELAYED RELEASE ORAL at 06:17

## 2021-02-19 RX ADMIN — APIXABAN 2.5 MILLIGRAM(S): 2.5 TABLET, FILM COATED ORAL at 17:33

## 2021-02-19 RX ADMIN — ATORVASTATIN CALCIUM 40 MILLIGRAM(S): 80 TABLET, FILM COATED ORAL at 21:18

## 2021-02-19 RX ADMIN — APIXABAN 2.5 MILLIGRAM(S): 2.5 TABLET, FILM COATED ORAL at 06:18

## 2021-02-19 RX ADMIN — Medication 1 TABLET(S): at 11:43

## 2021-02-19 RX ADMIN — Medication 81 MILLIGRAM(S): at 11:43

## 2021-02-19 RX ADMIN — Medication 40 MILLIEQUIVALENT(S): at 06:18

## 2021-02-19 RX ADMIN — Medication 60 MILLIGRAM(S): at 17:32

## 2021-02-19 RX ADMIN — Medication 125 MILLIGRAM(S): at 06:17

## 2021-02-19 NOTE — PROGRESS NOTE ADULT - SUBJECTIVE AND OBJECTIVE BOX
Patient is a 75y old  Female who presents with a chief complaint of COVID 19 pna (19 Feb 2021 07:46)    Patient seen and examined at bedside. Patient is resting comfortably, no complaints currently.     ALLERGIES:  OHS (Unknown)  warfarin (Rash)    MEDICATIONS  (STANDING):  aMIOdarone    Tablet 200 milliGRAM(s) Oral daily  apixaban 2.5 milliGRAM(s) Oral every 12 hours  ascorbic acid 500 milliGRAM(s) Oral daily  aspirin enteric coated 81 milliGRAM(s) Oral daily  atorvastatin 40 milliGRAM(s) Oral at bedtime  dextrose 40% Gel 15 Gram(s) Oral once  dextrose 5%. 1000 milliLiter(s) (50 mL/Hr) IV Continuous <Continuous>  dextrose 5%. 1000 milliLiter(s) (100 mL/Hr) IV Continuous <Continuous>  dextrose 50% Injectable 12.5 Gram(s) IV Push once  dextrose 50% Injectable 25 Gram(s) IV Push once  dextrose 50% Injectable 25 Gram(s) IV Push once  furosemide    Tablet 60 milliGRAM(s) Oral two times a day  glucagon  Injectable 1 milliGRAM(s) IntraMuscular once  influenza   Vaccine 0.5 milliLiter(s) IntraMuscular once  insulin glargine Injectable (LANTUS) 20 Unit(s) SubCutaneous at bedtime  insulin lispro (ADMELOG) corrective regimen sliding scale   SubCutaneous three times a day before meals  insulin lispro (ADMELOG) corrective regimen sliding scale   SubCutaneous at bedtime  melatonin 6 milliGRAM(s) Oral at bedtime  metoprolol tartrate 125 milliGRAM(s) Oral every 12 hours  multivitamin 1 Tablet(s) Oral daily  pantoprazole    Tablet 40 milliGRAM(s) Oral before breakfast  potassium chloride    Tablet ER 40 milliEquivalent(s) Oral two times a day    MEDICATIONS  (PRN):  acetaminophen   Tablet .. 650 milliGRAM(s) Oral every 8 hours PRN Temp greater or equal to 38C (100.4F), Mild Pain (1 - 3)  benzonatate 100 milliGRAM(s) Oral every 8 hours PRN Cough  guaiFENesin   Syrup  (Sugar-Free) 100 milliGRAM(s) Oral every 6 hours PRN Cough  polyethylene glycol 3350 17 Gram(s) Oral daily PRN Constipation  senna 2 Tablet(s) Oral at bedtime PRN Constipation    Vital Signs Last 24 Hrs  T(F): 97.7 (19 Feb 2021 05:00), Max: 98 (18 Feb 2021 16:01)  HR: 120 (19 Feb 2021 05:00) (95 - 120)  BP: 116/76 (19 Feb 2021 05:00) (100/50 - 116/76)  RR: 17 (19 Feb 2021 05:00) (16 - 17)  SpO2: 100% (19 Feb 2021 05:00) (99% - 100%)    I&O's Summary  18 Feb 2021 07:01  -  19 Feb 2021 07:00  --------------------------------------------------------  IN: 200 mL / OUT: 0 mL / NET: 200 mL    PHYSICAL EXAM:  General: NAD, A/O x 3  ENT: MMM  Neck: Supple, No JVD  Lungs: Clear to auscultation bilaterally  Cardio: irregular, tachycardic, S1/S2, No murmurs  Abdomen: Soft, Nontender, Nondistended; Bowel sounds present  Extremities: No calf tenderness, No pitting edema. Right thigh hematoma appears stable, nontender to palpation     LABS:                        11.3   9.33  )-----------( 468      ( 19 Feb 2021 06:20 )             37.4     02-19    145  |  106  |  18  ----------------------------<  143  3.8   |  28  |  1.23    Ca    9.3      19 Feb 2021 06:20  Phos  3.1     02-18  Mg     2.0     02-18    TPro  6.9  /  Alb  3.3  /  TBili  2.8  /  DBili  0.8  /  AST  52  /  ALT  39  /  AlkPhos  99  02-18    eGFR if Non African American: 43 mL/min/1.73M2 (02-19-21 @ 06:20)  eGFR if African American: 50 mL/min/1.73M2 (02-19-21 @ 06:20)    POCT Blood Glucose.: 148 mg/dL (19 Feb 2021 08:01)  POCT Blood Glucose.: 139 mg/dL (18 Feb 2021 22:17)  POCT Blood Glucose.: 130 mg/dL (18 Feb 2021 16:50)  POCT Blood Glucose.: 125 mg/dL (18 Feb 2021 11:13)    RADIOLOGY & ADDITIONAL TESTS:    Care Discussed with Consultants/Other Providers:

## 2021-02-19 NOTE — PROGRESS NOTE ADULT - ASSESSMENT
76 y/o F PMHx of paroxysmal Afib (on Xarelto), PAD (s/p M-hgsdqto-kemzwyaht bypass, R-femoral angioplasty with stenting, (on Plavix)), HTN, HLD, type 2 diabetes (HA1c on 7.8 on Metformin and Tresiba as an outpatient), and recent trauma to right thigh with stable hematoma, presented to [facility] 1/12/21 with SOB, found to have NSTEMI. 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 S/P C3/DUANE Clip/MV Repair and DUANE thrombectomy with Dr. Orellana on 1/19/21. Postop course complicated by hypoxic respiratory failure and KRISTIE now resolved, and hypokalemia on daily repletion while on Torsemide, admitted to  for rehab on Jan 28 and was found with COVID 19. Transferred to medicine on 2/1 for worsening SOB/SHF/PNA, and to telemetry 2/17 for rapid afib.    #Recent NSTEMI S/P CABG, MV Repair and DUANE thrombectomy 1/19/21  #Chronic Afib, now with Afib with RVR - HR 110s today  #Mild to mod AS  - S/p LR 250cc bolus, Albumin 25% 100ml q4h x2, Midodrine 10mg x1 for hypotension on 2/18. Goal MAP >65  - Continue Eliquis 2.5mg BID (reduced dose due to large hematoma in right thigh from trauma prior to surgery)  - Continue aspirin  - Continue Lasix 60mg BID with hold parameters   - Continue Metoprolol 125mg q12h  - Continue Amiodarone 200mg qd  - Cardiology following    #Acute hypoxic respiratory failure in need of supplemental oxygenation, currently improved on room air, likely due to:  - Acute on chronic combined systolic and Diastolic CHF, improved  - COVID-19 Infection - resolved, tested negative on 2/15  - Tolerating room air  - Completed 5 days of Remdisivir  - Cough improved, c/w tessalon pearls PRN  - Improvement in volume status. Will continue Lasix 60mg po BID at this time   - I/O's and daily weights, fluid restriction    #Transaminitis   - Secondary to COVID-19  - Bilirubin downtrending  - Will repeat CMP in AM    #T2DM  - HbA1c 7.8   - On Metformin and Tresiba as outpatient  - Continue Lantus + ISS,    #Normocytic Anemia  - Stable  - Monitor CBC    #Hematoma right thigh  - Continue to monitor    #Moderate protein-calorie malnutrition  - Monitor, nutrition appreciated    #DVT ppx  - Eliquis    #GI ppx   - PPI    Dispo: Patient came from acute rehab. Acute rehab re-eval on 2/12 advised home with home PT after practicing stairs with PT. PT advised patient UNABLE to do stairs, advised OLGA. Will get acute rehab follow up today. Acute rehab vs. OLGA.     Derrick Roblero,  updated 2/18   Dr. Ling Evangelista is PCP 74 y/o F PMHx of paroxysmal Afib (on Xarelto), PAD (s/p R-lodwwih-jzzzvcjrw bypass, R-femoral angioplasty with stenting, (on Plavix)), HTN, HLD, type 2 diabetes (HA1c on 7.8 on Metformin and Tresiba as an outpatient), and recent trauma to right thigh with stable hematoma, presented to [facility] 1/12/21 with SOB, found to have NSTEMI. 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 S/P C3/DUANE Clip/MV Repair and DUANE thrombectomy with Dr. Orellana on 1/19/21. Postop course complicated by hypoxic respiratory failure and KRISTIE now resolved, and hypokalemia on daily repletion while on Torsemide, admitted to  for rehab on Jan 28 and was found with COVID 19. Transferred to medicine on 2/1 for worsening SOB/SHF/PNA, and to telemetry 2/17 for rapid afib.    #Recent NSTEMI S/P CABG, MV Repair and DUANE thrombectomy 1/19/21  #Chronic Afib, now with Afib with RVR - HR 90s-100s today  #Mild to mod AS  - S/p LR 250cc bolus, Albumin 25% 100ml q4h x2, Midodrine 10mg x1 for hypotension on 2/18. Goal MAP >65  - Continue Eliquis 2.5mg BID (reduced dose due to large hematoma in right thigh from trauma prior to surgery)  - Continue aspirin  - Continue Lasix 60mg BID with hold parameters   - Continue Metoprolol 125mg q12h  - Continue Amiodarone 200mg qd  - Cardiology following    #Acute hypoxic respiratory failure in need of supplemental oxygenation, currently improved on room air, likely due to:  - Acute on chronic combined systolic and Diastolic CHF, improved  - COVID-19 Infection - resolved, tested negative on 2/15  - Tolerating room air  - Completed 5 days of Remdisivir  - Cough improved, c/w tessalon pearls PRN  - Continue Lasix 60mg po BID at this time   - I/O's and daily weights, fluid restriction    #Transaminitis   - Secondary to COVID-19  - Bilirubin downtrending  - Will repeat CMP in AM    #T2DM  - HbA1c 7.8   - On Metformin and Tresiba as outpatient  - Continue Lantus + ISS,    #Normocytic Anemia  - Stable  - Monitor CBC    #Hematoma right thigh  - Continue to monitor    #Moderate protein-calorie malnutrition  - Monitor, nutrition appreciated    #DVT ppx  - Eliquis    #GI ppx   - PPI    Dispo: Patient came from acute rehab. Acute rehab re-eval on 2/12 advised home with home PT after practicing stairs with PT. PT advised patient UNABLE to do stairs, advised OLGA. Will get acute rehab follow up today. Acute rehab vs. OLGA.     Derrick Roblero,  updated 2/18   Dr. Ling Evangelista is PCP 74 y/o F PMHx of paroxysmal Afib (on Xarelto), PAD (s/p T-apdwxcz-vhuopmqaj bypass, R-femoral angioplasty with stenting, (on Plavix)), HTN, HLD, type 2 diabetes (HA1c on 7.8 on Metformin and Tresiba as an outpatient), and recent trauma to right thigh with stable hematoma, presented to [facility] 1/12/21 with SOB, found to have NSTEMI. 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 S/P C3/DUANE Clip/MV Repair and DUANE thrombectomy with Dr. Orellana on 1/19/21. Postop course complicated by hypoxic respiratory failure and KRISTIE now resolved, and hypokalemia on daily repletion while on Torsemide, admitted to  for rehab on Jan 28 and was found with COVID 19. Transferred to medicine on 2/1 for worsening SOB/SHF/PNA, and to telemetry 2/17 for rapid afib.    #Recent NSTEMI S/P CABG, MV Repair and DUANE thrombectomy 1/19/21  #Chronic Afib, now with Afib with RVR - HR 90s-100s today  #Mild to mod AS  - S/p LR 250cc bolus, Albumin 25% 100ml q4h x2, Midodrine 10mg x1 for hypotension on 2/18. Goal MAP >65  - Continue Eliquis 2.5mg BID (reduced dose due to large hematoma in right thigh from trauma prior to surgery)  - Continue aspirin  - Continue Lasix 60mg BID with hold parameters   - Continue Metoprolol, will switch to 75 mg q8H  - Continue Amiodarone 200mg qd  - Will add digoxin 0.125mg daily  - Cardiology following    #Acute hypoxic respiratory failure in need of supplemental oxygenation, currently improved on room air, likely due to:  - Acute on chronic combined systolic and Diastolic CHF, improved  - COVID-19 Infection - resolved, tested negative on 2/15  - Tolerating room air  - Completed 5 days of Remdisivir  - Cough improved, c/w tessalon pearls PRN  - Continue Lasix 60mg po BID at this time   - I/O's and daily weights, fluid restriction    #Transaminitis   - Secondary to COVID-19  - Bilirubin downtrending  - Will repeat CMP in AM    #T2DM  - HbA1c 7.8   - On Metformin and Tresiba as outpatient  - Continue Lantus + ISS,    #Normocytic Anemia  - Stable  - Monitor CBC    #Hematoma right thigh  - Continue to monitor    #Moderate protein-calorie malnutrition  - Monitor, nutrition appreciated    #DVT ppx  - Eliquis    #GI ppx   - PPI    Dispo: Patient came from acute rehab. Acute rehab re-eval on 2/12 advised home with home PT after practicing stairs with PT. PT advised patient UNABLE to do stairs, advised OLGA. Will get acute rehab follow up today. Acute rehab vs. OLGA.     Derrick Roblero, [phone] updated 2/18   Dr. Ling Evangelista is PCP 76 y/o F PMHx of paroxysmal Afib (on Xarelto), PAD (s/p Q-vanrxkv-lmfhdquhf bypass, R-femoral angioplasty with stenting, (on Plavix)), HTN, HLD, type 2 diabetes (HA1c on 7.8 on Metformin and Tresiba as an outpatient), and recent trauma to right thigh with stable hematoma, presented to [facility] 1/12/21 with SOB, found to have NSTEMI. 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 S/P C3/DUANE Clip/MV Repair and DUANE thrombectomy with Dr. Orellana on 1/19/21. Postop course complicated by hypoxic respiratory failure and KRISTIE now resolved, and hypokalemia on daily repletion while on Torsemide, admitted to  for rehab on Jan 28 and was found with COVID 19. Transferred to medicine on 2/1 for worsening SOB/SHF/PNA, and to telemetry 2/17 for rapid afib.    #Recent NSTEMI S/P CABG, MV Repair and DUANE thrombectomy 1/19/21  #Chronic Afib, now with Afib with RVR - HR 90s-100s today  #Mild to mod AS  - S/p LR 250cc bolus, Albumin 25% 100ml q4h x2, Midodrine 10mg x1 for hypotension on 2/18. Goal MAP >65  - Continue Eliquis 2.5mg BID (reduced dose due to large hematoma in right thigh from trauma prior to surgery)  - Continue aspirin  - Continue Lasix 60mg BID with hold parameters   - Continue Metoprolol, will switch to 75 mg q8H  - Continue Amiodarone 200mg qd  - Will add digoxin 0.125mg daily  - Cardiology following    #Acute hypoxic respiratory failure in need of supplemental oxygenation, currently improved on room air, likely due to:  - Acute on chronic combined systolic and Diastolic CHF, improved  - COVID-19 Infection - resolved, tested negative on 2/15  - Tolerating room air  - Completed 5 days of Remdisivir  - Cough improved, c/w tessalon pearls PRN  - Continue Lasix 60mg po BID at this time   - I/O's and daily weights, fluid restriction    #Transaminitis   - Secondary to COVID-19  - Bilirubin downtrending  - Will repeat CMP in AM    #T2DM  - HbA1c 7.8   - On Metformin and Tresiba as outpatient  - Continue Lantus + ISS,    #Normocytic Anemia  - Stable  - Monitor CBC    #Hematoma right thigh  - Continue to monitor    #Moderate protein-calorie malnutrition  - Monitor, nutrition appreciated    #DVT ppx  - Eliquis    #GI ppx   - PPI    Dispo: Patient came from acute rehab. Acute rehab re-eval on 2/12 advised home with home PT after practicing stairs with PT. PT advised patient UNABLE to do stairs, advised OLGA. Will get acute rehab follow up today. Acute rehab vs. OLGA.     2/19: Updated patient's  Derrick Roblero, [phone]  Dr. Ling Evangelista is PCP 76 y/o F PMHx of paroxysmal Afib (on Xarelto), PAD (s/p U-fstdked-mcacjuruu bypass, R-femoral angioplasty with stenting, (on Plavix)), HTN, HLD, type 2 diabetes (HA1c on 7.8 on Metformin and Tresiba as an outpatient), and recent trauma to right thigh with stable hematoma, presented to [facility] 1/12/21 with SOB, found to have NSTEMI. 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 S/P C3/DUANE Clip/MV Repair and DUANE thrombectomy with Dr. Orellana on 1/19/21. Postop course complicated by hypoxic respiratory failure and KRISTIE now resolved, and hypokalemia on daily repletion while on Torsemide, admitted to  for rehab on Jan 28 and was found with COVID 19. Transferred to medicine on 2/1 for worsening SOB/SHF/PNA, and to telemetry 2/17 for rapid afib.    #Recent NSTEMI S/P CABG, MV Repair and DUANE thrombectomy 1/19/21  #Chronic Afib, now with Afib with RVR - HR 90s-100s today  #Mild to mod AS  - S/p LR 250cc bolus, Albumin 25% 100ml q4h x2, Midodrine 10mg x1 for hypotension on 2/18. Goal MAP >65  - Continue Eliquis 2.5mg BID (reduced dose due to large hematoma in right thigh from trauma prior to surgery)  - Continue aspirin  - Continue Lasix 60mg BID with hold parameters   - Continue Metoprolol, will switch to 75 mg q8H  - Continue Amiodarone 200mg qd  - Will add digoxin 0.125mg daily  - Cardiology following    #Acute hypoxic respiratory failure in need of supplemental oxygenation, currently improved on room air, likely due to:  - Acute on chronic combined systolic and Diastolic CHF, improved  - COVID-19 Infection - resolved, tested negative on 2/15  - Tolerating room air  - Completed 5 days of Remdisivir  - Cough improved, c/w tessalon pearls PRN  - Continue Lasix 60mg po BID at this time   - I/O's and daily weights, fluid restriction    #Transaminitis   - Secondary to COVID-19  - Bilirubin downtrending  - Will repeat CMP in AM    #T2DM  - HbA1c 7.8   - On Metformin and Tresiba as outpatient  - Continue Lantus + ISS,    #Normocytic Anemia  - Stable  - Monitor CBC    #Hematoma right thigh  - Continue to monitor    #Moderate protein-calorie malnutrition  - Monitor, nutrition appreciated    #DVT ppx  - Eliquis    #GI ppx   - PPI    Dispo: Patient came from acute rehab. Acute rehab re-eval on 2/12 advised home with home PT after practicing stairs with PT. PT advised patient UNABLE to do stairs, advised OLGA. Will get acute rehab follow up today. Acute rehab vs. OLGA.     2/19: Updated patient's  Derrick Roblero, [phone]  Dr. Mary Man - office [phone]   Dr. Ling Evangelista is PCP

## 2021-02-19 NOTE — PROGRESS NOTE ADULT - ASSESSMENT
af, rvr  s/p ohs  s/p covid infection      suggest  oral a/c  rate control-on amio and b blocker  may increase b blocker if bp permits  repeat ekg  telemetry

## 2021-02-19 NOTE — PROGRESS NOTE ADULT - ATTENDING COMMENTS
I have personally seen and examined patient on the above date.  I discussed the case with USMAN Lu and I agree with findings and plan as detailed per note above, which I have amended where appropriate.      AF with RVR, still with uncontrolled HR, cardiology following, will increase beta blocker dose as needed  PT to work with patient to see if can tolerate stairs  Hopefully, patient can be discharged to home, if not, will need OLGA vs return to acute rehab

## 2021-02-19 NOTE — PROGRESS NOTE ADULT - SUBJECTIVE AND OBJECTIVE BOX
Follow up for   af    SUBJ:   asked to reassess for AF/rvr  patient without palpitations, c/p    PMH  PAD (peripheral artery disease)    Paroxysmal atrial fibrillation    Hypercholesterolemia    Diabetes    Hypertension        MEDICATIONS  (STANDING):  aMIOdarone    Tablet 200 milliGRAM(s) Oral daily  apixaban 2.5 milliGRAM(s) Oral every 12 hours  ascorbic acid 500 milliGRAM(s) Oral daily  aspirin enteric coated 81 milliGRAM(s) Oral daily  atorvastatin 40 milliGRAM(s) Oral at bedtime  dextrose 40% Gel 15 Gram(s) Oral once  dextrose 5%. 1000 milliLiter(s) (50 mL/Hr) IV Continuous <Continuous>  dextrose 5%. 1000 milliLiter(s) (100 mL/Hr) IV Continuous <Continuous>  dextrose 50% Injectable 12.5 Gram(s) IV Push once  dextrose 50% Injectable 25 Gram(s) IV Push once  dextrose 50% Injectable 25 Gram(s) IV Push once  furosemide    Tablet 60 milliGRAM(s) Oral two times a day  glucagon  Injectable 1 milliGRAM(s) IntraMuscular once  influenza   Vaccine 0.5 milliLiter(s) IntraMuscular once  insulin glargine Injectable (LANTUS) 20 Unit(s) SubCutaneous at bedtime  insulin lispro (ADMELOG) corrective regimen sliding scale   SubCutaneous three times a day before meals  insulin lispro (ADMELOG) corrective regimen sliding scale   SubCutaneous at bedtime  melatonin 6 milliGRAM(s) Oral at bedtime  metoprolol tartrate 125 milliGRAM(s) Oral every 12 hours  multivitamin 1 Tablet(s) Oral daily  pantoprazole    Tablet 40 milliGRAM(s) Oral before breakfast  potassium chloride    Tablet ER 40 milliEquivalent(s) Oral two times a day    MEDICATIONS  (PRN):  acetaminophen   Tablet .. 650 milliGRAM(s) Oral every 8 hours PRN Temp greater or equal to 38C (100.4F), Mild Pain (1 - 3)  benzonatate 100 milliGRAM(s) Oral every 8 hours PRN Cough  guaiFENesin   Syrup  (Sugar-Free) 100 milliGRAM(s) Oral every 6 hours PRN Cough  polyethylene glycol 3350 17 Gram(s) Oral daily PRN Constipation  senna 2 Tablet(s) Oral at bedtime PRN Constipation        PHYSICAL EXAM:  Vital Signs Last 24 Hrs  T(C): 36.4 (19 Feb 2021 10:47), Max: 36.7 (18 Feb 2021 16:01)  T(F): 97.6 (19 Feb 2021 10:47), Max: 98 (18 Feb 2021 16:01)  HR: 107 (19 Feb 2021 10:47) (95 - 120)  BP: 102/72 (19 Feb 2021 10:47) (100/50 - 116/76)  BP(mean): --  RR: 16 (19 Feb 2021 10:47) (16 - 17)  SpO2: 100% (19 Feb 2021 10:47) (99% - 100%)    GENERAL: NAD, well-groomed, well-developed  HEAD:  Atraumatic, Normocephalic  EYES:  conjunctiva and sclera clear  ENT: Moist mucous membranes,  NECK: Supple, No JVD, no bruits  CHEST/LUNG: Clear to auscultation bilaterally; No rales, rhonchi, wheezing, or rubs  HEART: Regular rate and rhythm; No murmurs, rubs, or gallops PMI non displaced.  ABDOMEN: Soft, Nontender, Nondistended; Bowel sounds present  EXTREMITIES:   No clubbing, cyanosis, or edema  NERVOUS SYSTEM:  Alert       TELEMETRY:  af 100-120    ECG:    LABS:                        11.3   9.33  )-----------( 468      ( 19 Feb 2021 06:20 )             37.4     02-19    145  |  106  |  18  ----------------------------<  143<H>  3.8   |  28  |  1.23    Ca    9.3      19 Feb 2021 06:20  Phos  3.1     02-18  Mg     2.0     02-18    TPro  6.9  /  Alb  3.3  /  TBili  2.8<H>  /  DBili  0.8<H>  /  AST  52<H>  /  ALT  39  /  AlkPhos  99  02-18            I&O's Summary    18 Feb 2021 07:01  -  19 Feb 2021 07:00  --------------------------------------------------------  IN: 200 mL / OUT: 0 mL / NET: 200 mL          RADIOLOGY & ADDITIONAL STUDIES:< from: Xray Chest 1 View AP/PA (02.06.21 @ 11:12) >    EXAM:  XR CHEST AP OR PA 1V      PROCEDURE DATE:  02/06/2021        INTERPRETATION:  Portable chest radiograph    CLINICAL INFORMATION: Dyspnea, shortness of breath.    TECHNIQUE:  Portable  AP view of the chest was obtained.    COMPARISON: 2/1/2021 chest available for review.    FINDINGS:  The lungs show residual bibasilar airspace consolidations and/or effusions. Upper lobes clear.. No pneumothorax.    The  heart is enlarged in transverse diameter. No hilar mass.  Status post cardiac valve replacement, atrial clipping procedure.   Visualized osseous structures are intact.        IMPRESSION:   No interval change..    < end of copied text >      ECHO:< from: TTE Echo Limited or F/U (02.04.21 @ 10:49) >    EXAM:  ECHO TTE WO [facility] [id number]      PROCEDURE DATE:  02/04/2021        INTERPRETATION:  REPORT:  TRANSTHORACIC ECHOCARDIOGRAM REPORT        Patient Name:   DONALD SAUNDERS Patient Location: [address] Rec #:  [id number]         Accession#:      [id number]  Account #:      [id number]          Height:           68.0 in 172.7 cm  YOB: 1945        Weight:           211.0 lb 95.70 kg  Patient Age:    75 years         BSA:              2.09 m²  Patient Gender: F                BP:               130/73 mmHg      Date of Exam:        2/4/2021 10:49:56 AM  Sonographer:         Toan Lion  Referring Physician: GINGER HOFFMANN    Procedure:     Follow up or Limited Echocardiogram.  Indications:   Dyspnea, unspecified - R06.00  Diagnosis:     Dyspnea, unspecified - R06.00  Study Details: Technically difficult study. Study quality was adversely affected                 due to post-op dressings/bandaging.        2D AND M-MODE MEASUREMENTS (normal ranges within parentheses):  Left Ventricle:                  Normal  IVSd (2D):              1.36 cm (0.7-1.1)  LVPWd (2D):             1.37 cm (0.7-1.1)  LVIDd (2D):             4.36 cm (3.4-5.7)  LVIDs (2D):             3.08 cm  LV FS (2D):             29.5 %   (>25%)  Relative Wall Thickness  0.63    (<0.42)    LV SYSTOLIC FUNCTION BY 2D PLANIMETRY (MOD):  EF-A4C View: 50.3 %    LV DIASTOLIC FUNCTION:  MV Peak E: 1.30 m/s Decel Time: 319 msec    SPECTRAL DOPPLER ANALYSIS (where applicable):  Mitral Valve:  MV Max [place]:   1.35 m/s MV P1/2 Time: 92.53 msec  MV Mean Grad: 2.5 mmHg MV Area, PHT: 2.38 cm²    Tricuspid Valve and PA/RV Systolic Pressure: TR Max Velocity: 2.84 m/s RA Pressure: 10 mmHg RVSP/PASP: 42.2 mmHg      PHYSICIAN INTERPRETATION:  Left Ventricle: The left ventricular internal cavity size is normal. Left ventricular wall thickness is mildly increased. There is mild concentric left ventricular hypertrophy involving the global wall. The LVH involves global walls.  Global LV systolic function was mildlydecreased. Left ventricular ejection fraction, by visual estimation, is 45 to 50%. Abnormal (paradoxical) septal motion consistent with post-operative status. Spectral Doppler shows normal pattern of LV diastolic filling.  Mitral Valve: Status-post mitral annular ring insertion. Peak transmitral mean gradient equals 2.5 mmHg, calculated mitral valve area by pressure half time equals 2.38 cm² consistent with No evidence of mitral stenosis. Mitral valve mean gradient is 2.5 mmHg consistent with mild mitral stenosis. Trace mitral valve regurgitation is seen.  Tricuspid Valve: Estimated pulmonary artery systolic pressure is 42.2 mmHg assuming a right atrial pressure of 10 mmHg, which is consistent with mild pulmonary hypertension.      Summary:  1. Left ventricular ejection fraction, by visual estimation, is 45 to 50%.   2. Mildly decreased global left ventricular systolic function.   3. Abnormal septal motion consistent with post-operative status.   4. Mildly increased LV wall thickness.   5. Normal left ventricular internal cavity size.   6. There is mild concentric left ventricular hypertrophy.   7. Status-post mitral annular ring insertion.   8. Trace mitral valve regurgitation.   9. Estimated pulmonary artery systolic pressure is 42.2 mmHg assuming a right atrial pressure of 10 mmHg, which is consistent with mild pulmonary hypertension.  10. When compared with an echo 1/27/21 there are no new wall motion abnormalities.  11. Mitral valve mean gradient is 2.5 mmHg consistent with mild mitral stenosis.    [id number] Jose Turner , Electronically signed on 2/4/2021 at 11:36:44 AM    < end of copied text >

## 2021-02-20 LAB
ALBUMIN SERPL ELPH-MCNC: 3.6 G/DL — SIGNIFICANT CHANGE UP (ref 3.3–5)
ALP SERPL-CCNC: 108 U/L — SIGNIFICANT CHANGE UP (ref 40–120)
ALT FLD-CCNC: 49 U/L — HIGH (ref 10–45)
ANION GAP SERPL CALC-SCNC: 15 MMOL/L — SIGNIFICANT CHANGE UP (ref 5–17)
AST SERPL-CCNC: 61 U/L — HIGH (ref 10–40)
BILIRUB SERPL-MCNC: 3.5 MG/DL — HIGH (ref 0.2–1.2)
BUN SERPL-MCNC: 17 MG/DL — SIGNIFICANT CHANGE UP (ref 7–23)
CALCIUM SERPL-MCNC: 9.4 MG/DL — SIGNIFICANT CHANGE UP (ref 8.4–10.5)
CHLORIDE SERPL-SCNC: 102 MMOL/L — SIGNIFICANT CHANGE UP (ref 96–108)
CO2 SERPL-SCNC: 25 MMOL/L — SIGNIFICANT CHANGE UP (ref 22–31)
CREAT SERPL-MCNC: 1.11 MG/DL — SIGNIFICANT CHANGE UP (ref 0.5–1.3)
GLUCOSE BLDC GLUCOMTR-MCNC: 160 MG/DL — HIGH (ref 70–99)
GLUCOSE BLDC GLUCOMTR-MCNC: 163 MG/DL — HIGH (ref 70–99)
GLUCOSE BLDC GLUCOMTR-MCNC: 175 MG/DL — HIGH (ref 70–99)
GLUCOSE BLDC GLUCOMTR-MCNC: 224 MG/DL — HIGH (ref 70–99)
GLUCOSE SERPL-MCNC: 192 MG/DL — HIGH (ref 70–99)
HCT VFR BLD CALC: 35.9 % — SIGNIFICANT CHANGE UP (ref 34.5–45)
HGB BLD-MCNC: 11.8 G/DL — SIGNIFICANT CHANGE UP (ref 11.5–15.5)
MCHC RBC-ENTMCNC: 28.4 PG — SIGNIFICANT CHANGE UP (ref 27–34)
MCHC RBC-ENTMCNC: 32.9 GM/DL — SIGNIFICANT CHANGE UP (ref 32–36)
MCV RBC AUTO: 86.5 FL — SIGNIFICANT CHANGE UP (ref 80–100)
NRBC # BLD: 0 /100 WBCS — SIGNIFICANT CHANGE UP (ref 0–0)
NT-PROBNP SERPL-SCNC: 7305 PG/ML — HIGH (ref 0–300)
PLATELET # BLD AUTO: 457 K/UL — HIGH (ref 150–400)
POTASSIUM SERPL-MCNC: 3.9 MMOL/L — SIGNIFICANT CHANGE UP (ref 3.5–5.3)
POTASSIUM SERPL-SCNC: 3.9 MMOL/L — SIGNIFICANT CHANGE UP (ref 3.5–5.3)
PROT SERPL-MCNC: 7.8 G/DL — SIGNIFICANT CHANGE UP (ref 6–8.3)
RBC # BLD: 4.15 M/UL — SIGNIFICANT CHANGE UP (ref 3.8–5.2)
RBC # FLD: 19.1 % — HIGH (ref 10.3–14.5)
SODIUM SERPL-SCNC: 142 MMOL/L — SIGNIFICANT CHANGE UP (ref 135–145)
WBC # BLD: 8.84 K/UL — SIGNIFICANT CHANGE UP (ref 3.8–10.5)
WBC # FLD AUTO: 8.84 K/UL — SIGNIFICANT CHANGE UP (ref 3.8–10.5)

## 2021-02-20 RX ORDER — DIGOXIN 250 MCG
0.25 TABLET ORAL EVERY 6 HOURS
Refills: 0 | Status: COMPLETED | OUTPATIENT
Start: 2021-02-20 | End: 2021-02-21

## 2021-02-20 RX ORDER — DIGOXIN 250 MCG
0.25 TABLET ORAL ONCE
Refills: 0 | Status: COMPLETED | OUTPATIENT
Start: 2021-02-20 | End: 2021-02-20

## 2021-02-20 RX ORDER — ALPRAZOLAM 0.25 MG
0.25 TABLET ORAL
Refills: 0 | Status: DISCONTINUED | OUTPATIENT
Start: 2021-02-20 | End: 2021-02-21

## 2021-02-20 RX ADMIN — Medication 0.25 MILLIGRAM(S): at 14:56

## 2021-02-20 RX ADMIN — Medication 2: at 08:22

## 2021-02-20 RX ADMIN — Medication 0.12 MILLIGRAM(S): at 04:17

## 2021-02-20 RX ADMIN — ATORVASTATIN CALCIUM 40 MILLIGRAM(S): 80 TABLET, FILM COATED ORAL at 21:52

## 2021-02-20 RX ADMIN — Medication 1 TABLET(S): at 12:49

## 2021-02-20 RX ADMIN — Medication 0.25 MILLIGRAM(S): at 09:42

## 2021-02-20 RX ADMIN — Medication 75 MILLIGRAM(S): at 21:54

## 2021-02-20 RX ADMIN — Medication 500 MILLIGRAM(S): at 12:49

## 2021-02-20 RX ADMIN — Medication 4: at 17:23

## 2021-02-20 RX ADMIN — Medication 60 MILLIGRAM(S): at 17:24

## 2021-02-20 RX ADMIN — AMIODARONE HYDROCHLORIDE 200 MILLIGRAM(S): 400 TABLET ORAL at 04:15

## 2021-02-20 RX ADMIN — Medication 75 MILLIGRAM(S): at 14:55

## 2021-02-20 RX ADMIN — PANTOPRAZOLE SODIUM 40 MILLIGRAM(S): 20 TABLET, DELAYED RELEASE ORAL at 04:18

## 2021-02-20 RX ADMIN — Medication 0.25 MILLIGRAM(S): at 10:42

## 2021-02-20 RX ADMIN — Medication 40 MILLIEQUIVALENT(S): at 04:15

## 2021-02-20 RX ADMIN — Medication 60 MILLIGRAM(S): at 04:18

## 2021-02-20 RX ADMIN — Medication 81 MILLIGRAM(S): at 12:51

## 2021-02-20 RX ADMIN — Medication 2: at 12:49

## 2021-02-20 RX ADMIN — APIXABAN 2.5 MILLIGRAM(S): 2.5 TABLET, FILM COATED ORAL at 17:24

## 2021-02-20 RX ADMIN — Medication 40 MILLIEQUIVALENT(S): at 17:24

## 2021-02-20 RX ADMIN — APIXABAN 2.5 MILLIGRAM(S): 2.5 TABLET, FILM COATED ORAL at 04:17

## 2021-02-20 NOTE — PROGRESS NOTE ADULT - ASSESSMENT
76 y/o F PMHx of paroxysmal Afib (on Xarelto), PAD (s/p Q-xjxpcqf-rpirghmao bypass, R-femoral angioplasty with stenting, (on Plavix)), HTN, HLD, type 2 diabetes (HA1c on 7.8 on Metformin and Tresiba as an outpatient), and recent trauma to right thigh with stable hematoma, presented to [facility] 1/12/21 with SOB, found to have NSTEMI. 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 S/P C3/DUANE Clip/MV Repair and DUANE thrombectomy with Dr. Orellana on 1/19/21. Postop course complicated by hypoxic respiratory failure and KRISTIE now resolved, and hypokalemia on daily repletion while on Torsemide, admitted to  for rehab on Jan 28 and was found with COVID 19. Transferred to medicine on 2/1 for worsening SOB/SHF/PNA, and to telemetry 2/17 for rapid afib.    #Recent NSTEMI S/P CABG, MV Repair and DUANE thrombectomy 1/19/21  #Chronic Afib, now with Afib with RVR - HR 90s-100s today  #Mild to mod AS  - Continue Eliquis 2.5mg BID (reduced dose due to large hematoma in right thigh from trauma prior to surgery)  - Continue aspirin  - Continue Lasix 60mg BID with hold parameters   - Continue Metoprolol, will switch to 75 mg q8H  - Continue Amiodarone 200mg qd  - Added digoxin 0.125mg daily yesterday  - Cardiology following    #Acute hypoxic respiratory failure in need of supplemental oxygenation, currently improved on room air, likely due to:  - Acute on chronic combined systolic and Diastolic CHF, improved  - COVID-19 Infection - resolved, tested negative on 2/15  - Tolerating room air  - Completed 5 days of Remdisivir  - Cough improved, c/w tessalon pearls PRN  - Continue Lasix 60mg po BID at this time   - I/O's and daily weights, fluid restriction    #Transaminitis   - Secondary to COVID-19  - Bilirubin downtrending  - Will repeat CMP in AM    #T2DM  - HbA1c 7.8   - On Metformin and Tresiba as outpatient  - Continue Lantus + ISS    #Normocytic Anemia  - Stable  - Monitor CBC    #Hematoma right thigh  - Continue to monitor    #Moderate protein-calorie malnutrition  - Monitor, nutrition appreciated    #DVT ppx  - Eliquis    #GI ppx   - PPI    Dispo: Patient came from acute rehab. Acute rehab re-eval on 2/12 advised home with home PT after practicing stairs with PT. PT advised patient UNABLE to do stairs, advised [facility]. Will get acute rehab follow up today. Acute rehab vs. [facility].     2/19: Updated patient's  Derrick Roblero, [phone]  Dr. Mary Man - office [phone] - cards   Dr. Ling Evangelista is PCP 74 y/o F PMHx of paroxysmal Afib (on Xarelto), PAD (s/p O-kkpycku-gxhnylhsy bypass, R-femoral angioplasty with stenting, (on Plavix)), HTN, HLD, type 2 diabetes (HA1c on 7.8 on Metformin and Tresiba as an outpatient), and recent trauma to right thigh with stable hematoma, presented to [facility] 1/12/21 with SOB, found to have NSTEMI. 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 S/P C3/DUANE Clip/MV Repair and DUANE thrombectomy with Dr. Orellana on 1/19/21. Postop course complicated by hypoxic respiratory failure and KRISTIE now resolved, and hypokalemia on daily repletion while on Torsemide, admitted to  for rehab on Jan 28 and was found with COVID 19. Transferred to medicine on 2/1 for worsening SOB/SHF/PNA, and to telemetry 2/17 for rapid afib.    #Recent NSTEMI S/P CABG, MV Repair and DUANE thrombectomy 1/19/21  #Chronic Afib, now with Afib with RVR - HR 90s-100s today  #Mild to mod AS  - Continue Eliquis 2.5mg BID (reduced dose due to large hematoma in right thigh from trauma prior to surgery)  - Continue aspirin  - Continue Lasix 60mg BID with hold parameters   - Continue Metoprolol, will switch to 75 mg q8H  - Continue Amiodarone 200mg qd  - Added digoxin 0.125mg daily yesterday --> will give an additon 0.25 mg IVP for tachycardia  - Cardiology following    #Acute hypoxic respiratory failure in need of supplemental oxygenation, currently improved on room air, likely due to:  - Acute on chronic combined systolic and Diastolic CHF, improved  - COVID-19 Infection - resolved, tested negative on 2/15  - Tolerating room air  - Completed 5 days of Remdisivir  - Cough improved, c/w tessalon pearls PRN  - Continue Lasix 60mg po BID at this time   - I/O's and daily weights, fluid restriction    #Transaminitis   - Secondary to COVID-19  - Bilirubin downtrending  - Will repeat CMP in AM    #T2DM  - HbA1c 7.8   - On Metformin and Tresiba as outpatient  - Continue Lantus + ISS    #Normocytic Anemia  - Stable  - Monitor CBC    #Hematoma right thigh  - Continue to monitor    #Moderate protein-calorie malnutrition  - Monitor, nutrition appreciated    #DVT ppx  - Eliquis    #GI ppx   - PPI    Dispo: Acute rehab re-evaluation Monday 2/22 2/19: Updated patient's  Derrick Roblero, [phone]  Dr. Mary Man - office [phone] - cards   Dr. Ling Evangelista is PCP 76 y/o F PMHx of paroxysmal Afib (on Xarelto), PAD (s/p O-qizzwhz-ooiyzikjk bypass, R-femoral angioplasty with stenting, (on Plavix)), HTN, HLD, type 2 diabetes (HA1c on 7.8 on Metformin and Tresiba as an outpatient), and recent trauma to right thigh with stable hematoma, presented to [facility] 1/12/21 with SOB, found to have NSTEMI. 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 S/P C3/DUANE Clip/MV Repair and DUANE thrombectomy with Dr. Orellana on 1/19/21. Postop course complicated by hypoxic respiratory failure and KRISTIE now resolved, and hypokalemia on daily repletion while on Torsemide, admitted to  for rehab on Jan 28 and was found with COVID 19. Transferred to medicine on 2/1 for worsening SOB/SHF/PNA, and to telemetry 2/17 for rapid afib.    #Recent NSTEMI S/P CABG, MV Repair and DUANE thrombectomy 1/19/21  #Chronic Afib, now with Afib with RVR   #Mild to mod AS  - Continue Eliquis 2.5mg BID (reduced dose due to large hematoma in right thigh from trauma prior to surgery)  - Continue aspirin, Lasix 60mg BID with hold parameters, Metoprolol  75 mg q8H, and Amiodarone 200mg qd  - Added digoxin 0.125mg PO daily yesterday   - Overnight patient tachycardic. Improved after digoxin 0.25mg IVP. Will give digoxin 0.25mg IVP q6 hrs for three more doses  - Cardiology following    #Acute hypoxic respiratory failure in need of supplemental oxygenation, currently improved on room air, likely due to acute on chronic combined systolic and Diastolic CHF (improved) and COVID-19 Infection (resolved, tested negative on 2/15)  - Tolerating room air  - Completed 5 days of Remdisivir  - Continue Lasix    #Transaminitis   - Secondary to COVID-19  - Monitor    #T2DM  - HbA1c 7.8   - On Metformin and Tresiba as outpatient  - Continue Lantus + ISS    #Normocytic Anemia  - Stable  - Monitor CBC    #Hematoma right thigh  - Continue to monitor    #Moderate protein-calorie malnutrition  - Monitor, nutrition appreciated    #DVT ppx  - Eliquis    #GI ppx   - PPI    Dispo: Acute rehab re-evaluation Monday 2/22 2/19: Updated patient's  Derrick Roblero, [phone]  Dr. Mary Man - office [phone] - cards   Dr. Ling Evangelista is PCP 76 y/o F PMHx of paroxysmal Afib (on Xarelto), PAD (s/p Z-kqysddr-zyebistqf bypass, R-femoral angioplasty with stenting, (on Plavix)), HTN, HLD, type 2 diabetes (HA1c on 7.8 on Metformin and Tresiba as an outpatient), and recent trauma to right thigh with stable hematoma, presented to [facility] 1/12/21 with SOB, found to have NSTEMI. 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 S/P C3/DUANE Clip/MV Repair and DUANE thrombectomy with Dr. Orellana on 1/19/21. Postop course complicated by hypoxic respiratory failure and KRISTIE now resolved, and hypokalemia on daily repletion while on Torsemide, admitted to  for rehab on Jan 28 and was found with COVID 19. Transferred to medicine on 2/1 for worsening SOB/SHF/PNA, and to telemetry 2/17 for rapid afib.    #Recent NSTEMI S/P CABG, MV Repair and DUANE thrombectomy 1/19/21  #Chronic Afib, now with Afib with RVR   #Mild to mod AS  - Continue Eliquis 2.5mg BID (reduced dose due to large hematoma in right thigh from trauma prior to surgery)  - Continue aspirin, Lasix 60mg BID with hold parameters, Metoprolol  75 mg q8H, and Amiodarone 200mg qd  - Added digoxin 0.125mg PO daily yesterday   - Overnight patient tachycardic. Improved after digoxin 0.25mg IVP. Will give digoxin 0.25mg IVP q6 hrs for three more doses  - Cardiology following    #Acute hypoxic respiratory failure in need of supplemental oxygenation, currently improved on room air, likely due to acute on chronic combined systolic and Diastolic CHF (improved) and COVID-19 Infection (resolved, tested negative on 2/15)  - Tolerating room air  - Completed 5 days of Remdisivir  - Continue Lasix    #Transaminitis   - Secondary to COVID-19  - Monitor    #T2DM  - HbA1c 7.8   - On Metformin and Tresiba as outpatient  - Continue Lantus + ISS    #Normocytic Anemia  - Stable  - Monitor CBC    #Hematoma right thigh  - Continue to monitor    #Moderate protein-calorie malnutrition  - Monitor, nutrition appreciated    #DVT ppx  - Eliquis    #GI ppx   - PPI    Dispo: Acute rehab re-evaluation Monday 2/22 2/20: Updated patient's  Derrick Roblero, [phone]     Dr. Mary Man - office [phone] - cards   Dr. Ling Evangelista is PCP

## 2021-02-20 NOTE — PROGRESS NOTE ADULT - SUBJECTIVE AND OBJECTIVE BOX
Patient is a 75y old  Female who presents with a chief complaint of COVID 19 pna (20 Feb 2021 07:21)    Patient seen and examined at bedside. Overnight patient was tachycardic, currently HR 150s. Patient c/o alternating between feeling hot and cold     ALLERGIES:  OHS (Unknown)  warfarin (Rash)    MEDICATIONS  (STANDING):  aMIOdarone    Tablet 200 milliGRAM(s) Oral daily  apixaban 2.5 milliGRAM(s) Oral every 12 hours  ascorbic acid 500 milliGRAM(s) Oral daily  aspirin enteric coated 81 milliGRAM(s) Oral daily  atorvastatin 40 milliGRAM(s) Oral at bedtime  dextrose 40% Gel 15 Gram(s) Oral once  dextrose 5%. 1000 milliLiter(s) (100 mL/Hr) IV Continuous <Continuous>  dextrose 5%. 1000 milliLiter(s) (50 mL/Hr) IV Continuous <Continuous>  dextrose 50% Injectable 25 Gram(s) IV Push once  dextrose 50% Injectable 12.5 Gram(s) IV Push once  dextrose 50% Injectable 25 Gram(s) IV Push once  digoxin     Tablet 0.125 milliGRAM(s) Oral daily  digoxin  Injectable 0.25 milliGRAM(s) IV Push once  furosemide    Tablet 60 milliGRAM(s) Oral two times a day  glucagon  Injectable 1 milliGRAM(s) IntraMuscular once  influenza   Vaccine 0.5 milliLiter(s) IntraMuscular once  insulin glargine Injectable (LANTUS) 20 Unit(s) SubCutaneous at bedtime  insulin lispro (ADMELOG) corrective regimen sliding scale   SubCutaneous three times a day before meals  insulin lispro (ADMELOG) corrective regimen sliding scale   SubCutaneous at bedtime  melatonin 6 milliGRAM(s) Oral at bedtime  metoprolol tartrate 75 milliGRAM(s) Oral every 8 hours  multivitamin 1 Tablet(s) Oral daily  pantoprazole    Tablet 40 milliGRAM(s) Oral before breakfast  potassium chloride    Tablet ER 40 milliEquivalent(s) Oral two times a day    MEDICATIONS  (PRN):  acetaminophen   Tablet .. 650 milliGRAM(s) Oral every 8 hours PRN Temp greater or equal to 38C (100.4F), Mild Pain (1 - 3)  benzonatate 100 milliGRAM(s) Oral every 8 hours PRN Cough  guaiFENesin   Syrup  (Sugar-Free) 100 milliGRAM(s) Oral every 6 hours PRN Cough  polyethylene glycol 3350 17 Gram(s) Oral daily PRN Constipation  senna 2 Tablet(s) Oral at bedtime PRN Constipation    Vital Signs Last 24 Hrs  T(F): 97.4 (20 Feb 2021 09:11), Max: 97.8 (19 Feb 2021 16:13)  HR: 156 (20 Feb 2021 09:11) (107 - 156)  BP: 88/67 (20 Feb 2021 09:11) (88/67 - 114/67)  RR: 20 (20 Feb 2021 09:11) (15 - 20)  SpO2: 97% (20 Feb 2021 09:11) (97% - 100%)    I&O's Summary  19 Feb 2021 07:01  -  20 Feb 2021 07:00  --------------------------------------------------------  IN: 200 mL / OUT: 0 mL / NET: 200 mL    PHYSICAL EXAM:  General: A/O x 3, patient is tearful, feels anxious  ENT: MMM  Neck: Supple, No JVD  Lungs: Clear to auscultation bilaterally  Cardio: tachycardic, irregular S1/S2, No murmurs  Abdomen: Soft, Nontender, Nondistended; Bowel sounds present  Extremities: No calf tenderness, No pitting edema, Right thigh hematoma appears stable, nontender to palpation     LABS:                        11.8   8.84  )-----------( 457      ( 20 Feb 2021 06:45 )             35.9     02-20    142  |  102  |  17  ----------------------------<  192  3.9   |  25  |  1.11    Ca    9.4      20 Feb 2021 06:45  Phos  3.1     02-18  Mg     2.0     02-18    TPro  7.8  /  Alb  3.6  /  TBili  3.5  /  DBili  x   /  AST  61  /  ALT  49  /  AlkPhos  108  02-20    eGFR if : 57 mL/min/1.73M2 (02-20-21 @ 06:45)  eGFR if Non African American: 49 mL/min/1.73M2 (02-20-21 @ 06:45)    POCT Blood Glucose.: 175 mg/dL (20 Feb 2021 08:18)  POCT Blood Glucose.: 227 mg/dL (19 Feb 2021 21:14)  POCT Blood Glucose.: 179 mg/dL (19 Feb 2021 16:43)  POCT Blood Glucose.: 175 mg/dL (19 Feb 2021 11:49)    RADIOLOGY & ADDITIONAL TESTS:    Care Discussed with Consultants/Other Providers:

## 2021-02-20 NOTE — PROGRESS NOTE ADULT - ATTENDING COMMENTS
I have personally seen and examined patient on the above date.  I discussed the case with USMAN Lu and I agree with findings and plan as detailed per note above, which I have amended where appropriate.      AF with RVR, still with uncontrolled HR, cardiology following, will give Digoxin load today, and then continue maintenance digoxin  Could have an anxiety component - start Xanax prn  PT follow-up - recommendation is currently rehab  Monitor for signs of hypervolemia, continue to check daily weights - patient approx 6 kg less than admission weight      I was physically present for the key portions of the evaluation and management (E/M) service provided.  I agree with the above history, physical, and plan which I have reviewed and edited where appropriate.

## 2021-02-20 NOTE — PROGRESS NOTE ADULT - ASSESSMENT
s/p CABG, s/p COVID 19 infection. af with rvr      d/w patient, Dr. King and Ms Marie, USMAN  push digoxin today, telemetry  continue other rate control meds   oral a/c  address emotional issues which may be driving increased rates as well

## 2021-02-20 NOTE — PROGRESS NOTE ADULT - SUBJECTIVE AND OBJECTIVE BOX
Follow up for  AF, s/p COVID 19, S/p CABG    SUBJ:   tearful, upset, depressed. Some racing heart/palpitations. No c/p, sob    PMH  PAD (peripheral artery disease)    Paroxysmal atrial fibrillation    Hypercholesterolemia    Diabetes    Hypertension        MEDICATIONS  (STANDING):  aMIOdarone    Tablet 200 milliGRAM(s) Oral daily  apixaban 2.5 milliGRAM(s) Oral every 12 hours  ascorbic acid 500 milliGRAM(s) Oral daily  aspirin enteric coated 81 milliGRAM(s) Oral daily  atorvastatin 40 milliGRAM(s) Oral at bedtime  dextrose 40% Gel 15 Gram(s) Oral once  dextrose 5%. 1000 milliLiter(s) (100 mL/Hr) IV Continuous <Continuous>  dextrose 5%. 1000 milliLiter(s) (50 mL/Hr) IV Continuous <Continuous>  dextrose 50% Injectable 25 Gram(s) IV Push once  dextrose 50% Injectable 12.5 Gram(s) IV Push once  dextrose 50% Injectable 25 Gram(s) IV Push once  digoxin     Tablet 0.125 milliGRAM(s) Oral daily  digoxin  Injectable 0.25 milliGRAM(s) IV Push every 6 hours  furosemide    Tablet 60 milliGRAM(s) Oral two times a day  glucagon  Injectable 1 milliGRAM(s) IntraMuscular once  influenza   Vaccine 0.5 milliLiter(s) IntraMuscular once  insulin glargine Injectable (LANTUS) 20 Unit(s) SubCutaneous at bedtime  insulin lispro (ADMELOG) corrective regimen sliding scale   SubCutaneous three times a day before meals  insulin lispro (ADMELOG) corrective regimen sliding scale   SubCutaneous at bedtime  melatonin 6 milliGRAM(s) Oral at bedtime  metoprolol tartrate 75 milliGRAM(s) Oral every 8 hours  multivitamin 1 Tablet(s) Oral daily  pantoprazole    Tablet 40 milliGRAM(s) Oral before breakfast  potassium chloride    Tablet ER 40 milliEquivalent(s) Oral two times a day    MEDICATIONS  (PRN):  acetaminophen   Tablet .. 650 milliGRAM(s) Oral every 8 hours PRN Temp greater or equal to 38C (100.4F), Mild Pain (1 - 3)  ALPRAZolam 0.25 milliGRAM(s) Oral two times a day PRN anxiety  benzonatate 100 milliGRAM(s) Oral every 8 hours PRN Cough  guaiFENesin   Syrup  (Sugar-Free) 100 milliGRAM(s) Oral every 6 hours PRN Cough  polyethylene glycol 3350 17 Gram(s) Oral daily PRN Constipation  senna 2 Tablet(s) Oral at bedtime PRN Constipation        PHYSICAL EXAM:  Vital Signs Last 24 Hrs  T(C): 36.3 (20 Feb 2021 09:11), Max: 36.6 (19 Feb 2021 16:13)  T(F): 97.4 (20 Feb 2021 09:11), Max: 97.8 (19 Feb 2021 16:13)  HR: 156 (20 Feb 2021 09:11) (107 - 156)  BP: 88/67 (20 Feb 2021 09:11) (88/67 - 114/67)  BP(mean): --  RR: 20 (20 Feb 2021 09:11) (15 - 20)  SpO2: 97% (20 Feb 2021 09:11) (97% - 99%)    GENERAL: NAD, well-groomed, well-developed  HEAD:  Atraumatic, Normocephalic  EYES:  conjunctiva and sclera clear  NECK:No JVD, no bruits  CHEST/LUNG: Clear to auscultation bilaterally; No rales, rhonchi, wheezing, or rubs  HEART: irregular, tachycardic  ABDOMEN: Soft, Nontender, Nondistended; Bowel sounds present  EXTREMITIES:   No clubbing, cyanosis, or edema  SKIN: No rashes or lesions  NERVOUS SYSTEM:  Alert       TELEMETRY:  af, rapid vr        LABS:                        11.8   8.84  )-----------( 457      ( 20 Feb 2021 06:45 )             35.9     02-20    142  |  102  |  17  ----------------------------<  192<H>  3.9   |  25  |  1.11    Ca    9.4      20 Feb 2021 06:45    TPro  7.8  /  Alb  3.6  /  TBili  3.5<H>  /  DBili  x   /  AST  61<H>  /  ALT  49<H>  /  AlkPhos  108  02-20            I&O's Summary    19 Feb 2021 07:01  -  20 Feb 2021 07:00  --------------------------------------------------------  IN: 200 mL / OUT: 0 mL / NET: 200 mL

## 2021-02-21 LAB
ANION GAP SERPL CALC-SCNC: 10 MMOL/L — SIGNIFICANT CHANGE UP (ref 5–17)
BUN SERPL-MCNC: 14 MG/DL — SIGNIFICANT CHANGE UP (ref 7–23)
CALCIUM SERPL-MCNC: 9.2 MG/DL — SIGNIFICANT CHANGE UP (ref 8.4–10.5)
CHLORIDE SERPL-SCNC: 103 MMOL/L — SIGNIFICANT CHANGE UP (ref 96–108)
CO2 SERPL-SCNC: 28 MMOL/L — SIGNIFICANT CHANGE UP (ref 22–31)
CREAT SERPL-MCNC: 1.17 MG/DL — SIGNIFICANT CHANGE UP (ref 0.5–1.3)
GLUCOSE BLDC GLUCOMTR-MCNC: 182 MG/DL — HIGH (ref 70–99)
GLUCOSE BLDC GLUCOMTR-MCNC: 191 MG/DL — HIGH (ref 70–99)
GLUCOSE BLDC GLUCOMTR-MCNC: 195 MG/DL — HIGH (ref 70–99)
GLUCOSE BLDC GLUCOMTR-MCNC: 222 MG/DL — HIGH (ref 70–99)
GLUCOSE SERPL-MCNC: 186 MG/DL — HIGH (ref 70–99)
HCT VFR BLD CALC: 40.5 % — SIGNIFICANT CHANGE UP (ref 34.5–45)
HGB BLD-MCNC: 12.4 G/DL — SIGNIFICANT CHANGE UP (ref 11.5–15.5)
MAGNESIUM SERPL-MCNC: 2 MG/DL — SIGNIFICANT CHANGE UP (ref 1.6–2.6)
MCHC RBC-ENTMCNC: 25.8 PG — LOW (ref 27–34)
MCHC RBC-ENTMCNC: 30.6 GM/DL — LOW (ref 32–36)
MCV RBC AUTO: 84.4 FL — SIGNIFICANT CHANGE UP (ref 80–100)
NRBC # BLD: 0 /100 WBCS — SIGNIFICANT CHANGE UP (ref 0–0)
PLATELET # BLD AUTO: 410 K/UL — HIGH (ref 150–400)
POTASSIUM SERPL-MCNC: 4 MMOL/L — SIGNIFICANT CHANGE UP (ref 3.5–5.3)
POTASSIUM SERPL-SCNC: 4 MMOL/L — SIGNIFICANT CHANGE UP (ref 3.5–5.3)
RBC # BLD: 4.8 M/UL — SIGNIFICANT CHANGE UP (ref 3.8–5.2)
RBC # FLD: 17.4 % — HIGH (ref 10.3–14.5)
SODIUM SERPL-SCNC: 141 MMOL/L — SIGNIFICANT CHANGE UP (ref 135–145)
WBC # BLD: 9.23 K/UL — SIGNIFICANT CHANGE UP (ref 3.8–10.5)
WBC # FLD AUTO: 9.23 K/UL — SIGNIFICANT CHANGE UP (ref 3.8–10.5)

## 2021-02-21 RX ADMIN — Medication 500 MILLIGRAM(S): at 09:22

## 2021-02-21 RX ADMIN — Medication 4: at 16:12

## 2021-02-21 RX ADMIN — Medication 1 TABLET(S): at 09:22

## 2021-02-21 RX ADMIN — PANTOPRAZOLE SODIUM 40 MILLIGRAM(S): 20 TABLET, DELAYED RELEASE ORAL at 08:21

## 2021-02-21 RX ADMIN — APIXABAN 2.5 MILLIGRAM(S): 2.5 TABLET, FILM COATED ORAL at 06:06

## 2021-02-21 RX ADMIN — Medication 6 MILLIGRAM(S): at 21:07

## 2021-02-21 RX ADMIN — Medication 40 MILLIEQUIVALENT(S): at 16:11

## 2021-02-21 RX ADMIN — Medication 81 MILLIGRAM(S): at 12:00

## 2021-02-21 RX ADMIN — ATORVASTATIN CALCIUM 40 MILLIGRAM(S): 80 TABLET, FILM COATED ORAL at 21:06

## 2021-02-21 RX ADMIN — INSULIN GLARGINE 20 UNIT(S): 100 INJECTION, SOLUTION SUBCUTANEOUS at 21:06

## 2021-02-21 RX ADMIN — Medication 2: at 12:01

## 2021-02-21 RX ADMIN — APIXABAN 2.5 MILLIGRAM(S): 2.5 TABLET, FILM COATED ORAL at 16:11

## 2021-02-21 RX ADMIN — Medication 0.12 MILLIGRAM(S): at 06:06

## 2021-02-21 RX ADMIN — Medication 2: at 08:21

## 2021-02-21 RX ADMIN — Medication 40 MILLIEQUIVALENT(S): at 08:31

## 2021-02-21 RX ADMIN — Medication 75 MILLIGRAM(S): at 21:06

## 2021-02-21 RX ADMIN — Medication 75 MILLIGRAM(S): at 12:00

## 2021-02-21 RX ADMIN — AMIODARONE HYDROCHLORIDE 200 MILLIGRAM(S): 400 TABLET ORAL at 06:07

## 2021-02-21 RX ADMIN — Medication 0.25 MILLIGRAM(S): at 09:22

## 2021-02-21 NOTE — PROGRESS NOTE ADULT - ATTENDING COMMENTS
I have personally seen and examined patient on the above date.  I discussed the case with USMAN Lu and I agree with findings and plan as detailed per note above, which I have amended where appropriate.      AF with RVR, much improved rate s/p Digoxin load, c/w tele monitoring, cardiology following, c/w maintenance digoxin renally dosed, monitor for toxicity   PT follow-up  PMR follow-up tomorrow, may be a candidate to return to acute rehab   Monitor for signs of hypervolemia, patient 77.5 kg today, down from 78.5 kg, renal function stable, c/w lasix      I was physically present for the key portions of the evaluation and management (E/M) service provided.  I agree with the above history, physical, and plan which I have reviewed and edited where appropriate. I have personally seen and examined patient on the above date.  I discussed the case with USMAN Lu and I agree with findings and plan as detailed per note above, which I have amended where appropriate.      AF with RVR, much improved rate s/p Digoxin load, c/w tele monitoring, cardiology following, c/w maintenance digoxin renally dosed, monitor for toxicity   PT follow-up  PMR follow-up tomorrow, may be a candidate to return to acute rehab   Monitor for signs of hypervolemia, patient 77.5 kg today, down from 78.5 kg, renal function stable, c/w lasix    TELE: AF rhythm  Case d/w Dr Carpenter at [facility] - cardio      I was physically present for the key portions of the evaluation and management (E/M) service provided.  I agree with the above history, physical, and plan which I have reviewed and edited where appropriate.

## 2021-02-21 NOTE — CHART NOTE - NSCHARTNOTESELECT_GEN_ALL_CORE
Event Note
Nutrition Services
Event Note

## 2021-02-21 NOTE — PROGRESS NOTE ADULT - SUBJECTIVE AND OBJECTIVE BOX
Follow up for   af, s/p CABG, s/p COVID pneumonia    SUBJ:   less tearful today, no c/p palpitation, sob    PMH  PAD (peripheral artery disease)    Paroxysmal atrial fibrillation    Hypercholesterolemia    Diabetes    Hypertension        MEDICATIONS  (STANDING):  aMIOdarone    Tablet 200 milliGRAM(s) Oral daily  apixaban 2.5 milliGRAM(s) Oral every 12 hours  ascorbic acid 500 milliGRAM(s) Oral daily  aspirin enteric coated 81 milliGRAM(s) Oral daily  atorvastatin 40 milliGRAM(s) Oral at bedtime  dextrose 40% Gel 15 Gram(s) Oral once  dextrose 5%. 1000 milliLiter(s) (50 mL/Hr) IV Continuous <Continuous>  dextrose 5%. 1000 milliLiter(s) (100 mL/Hr) IV Continuous <Continuous>  dextrose 50% Injectable 25 Gram(s) IV Push once  dextrose 50% Injectable 12.5 Gram(s) IV Push once  dextrose 50% Injectable 25 Gram(s) IV Push once  digoxin     Tablet 0.125 milliGRAM(s) Oral daily  furosemide    Tablet 60 milliGRAM(s) Oral two times a day  glucagon  Injectable 1 milliGRAM(s) IntraMuscular once  influenza   Vaccine 0.5 milliLiter(s) IntraMuscular once  insulin glargine Injectable (LANTUS) 20 Unit(s) SubCutaneous at bedtime  insulin lispro (ADMELOG) corrective regimen sliding scale   SubCutaneous three times a day before meals  insulin lispro (ADMELOG) corrective regimen sliding scale   SubCutaneous at bedtime  melatonin 6 milliGRAM(s) Oral at bedtime  metoprolol tartrate 75 milliGRAM(s) Oral every 8 hours  multivitamin 1 Tablet(s) Oral daily  pantoprazole    Tablet 40 milliGRAM(s) Oral before breakfast  potassium chloride    Tablet ER 40 milliEquivalent(s) Oral two times a day    MEDICATIONS  (PRN):  acetaminophen   Tablet .. 650 milliGRAM(s) Oral every 8 hours PRN Temp greater or equal to 38C (100.4F), Mild Pain (1 - 3)  ALPRAZolam 0.25 milliGRAM(s) Oral two times a day PRN anxiety  benzonatate 100 milliGRAM(s) Oral every 8 hours PRN Cough  guaiFENesin   Syrup  (Sugar-Free) 100 milliGRAM(s) Oral every 6 hours PRN Cough  polyethylene glycol 3350 17 Gram(s) Oral daily PRN Constipation  senna 2 Tablet(s) Oral at bedtime PRN Constipation        PHYSICAL EXAM:  Vital Signs Last 24 Hrs  T(C): 36.3 (21 Feb 2021 09:33), Max: 37.1 (21 Feb 2021 05:10)  T(F): 97.4 (21 Feb 2021 09:33), Max: 98.7 (21 Feb 2021 05:10)  HR: 107 (21 Feb 2021 09:33) (106 - 116)  BP: 100/62 (21 Feb 2021 09:33) (87/56 - 108/72)  BP(mean): --  RR: 16 (21 Feb 2021 09:33) (16 - 19)  SpO2: 99% (21 Feb 2021 09:33) (96% - 100%)    GENERAL: NAD, well-groomed, well-developed  HEAD:  Atraumatic, Normocephalic  NECK:  No JVD, no bruits  CHEST/LUNG: Clear to auscultation bilaterally; No rales, rhonchi, wheezing, or rubs  HEART: irregularr rate and rhythm; No murmurs, rubs, or gallops PMI non displaced.  ABDOMEN: Soft, Nontender, Nondistended; Bowel sounds present  EXTREMITIES:  , No clubbing, cyanosis, or edema hematoma right thigh  SKIN: No rashes or lesions  NERVOUS SYSTEM:  Alert  tearful      TELEMETRY: af 100      LABS:                        11.8   8.84  )-----------( 457      ( 20 Feb 2021 06:45 )             35.9     02-20    142  |  102  |  17  ----------------------------<  192<H>  3.9   |  25  |  1.11    Ca    9.4      20 Feb 2021 06:45    TPro  7.8  /  Alb  3.6  /  TBili  3.5<H>  /  DBili  x   /  AST  61<H>  /  ALT  49<H>  /  AlkPhos  108  02-20      I&O's Summary

## 2021-02-21 NOTE — PROGRESS NOTE ADULT - ASSESSMENT
af, rates better  s/p COVID 19 pneumonia  s/p recent CABG  on oral a/c    continue current cardiac rx, telemetry

## 2021-02-21 NOTE — PROGRESS NOTE ADULT - SUBJECTIVE AND OBJECTIVE BOX
Patient is a 75y old  Female who presents with a chief complaint of COVID 19 pna (21 Feb 2021 11:45)    Patient seen and examined at bedside. Patient still feeling anxious.     ALLERGIES:  OHS (Unknown)  warfarin (Rash)    MEDICATIONS  (STANDING):  aMIOdarone    Tablet 200 milliGRAM(s) Oral daily  apixaban 2.5 milliGRAM(s) Oral every 12 hours  ascorbic acid 500 milliGRAM(s) Oral daily  aspirin enteric coated 81 milliGRAM(s) Oral daily  atorvastatin 40 milliGRAM(s) Oral at bedtime  dextrose 40% Gel 15 Gram(s) Oral once  dextrose 5%. 1000 milliLiter(s) (50 mL/Hr) IV Continuous <Continuous>  dextrose 5%. 1000 milliLiter(s) (100 mL/Hr) IV Continuous <Continuous>  dextrose 50% Injectable 25 Gram(s) IV Push once  dextrose 50% Injectable 12.5 Gram(s) IV Push once  dextrose 50% Injectable 25 Gram(s) IV Push once  digoxin     Tablet 0.125 milliGRAM(s) Oral daily  furosemide    Tablet 60 milliGRAM(s) Oral two times a day  glucagon  Injectable 1 milliGRAM(s) IntraMuscular once  influenza   Vaccine 0.5 milliLiter(s) IntraMuscular once  insulin glargine Injectable (LANTUS) 20 Unit(s) SubCutaneous at bedtime  insulin lispro (ADMELOG) corrective regimen sliding scale   SubCutaneous three times a day before meals  insulin lispro (ADMELOG) corrective regimen sliding scale   SubCutaneous at bedtime  melatonin 6 milliGRAM(s) Oral at bedtime  metoprolol tartrate 75 milliGRAM(s) Oral every 8 hours  multivitamin 1 Tablet(s) Oral daily  pantoprazole    Tablet 40 milliGRAM(s) Oral before breakfast  potassium chloride    Tablet ER 40 milliEquivalent(s) Oral two times a day    MEDICATIONS  (PRN):  acetaminophen   Tablet .. 650 milliGRAM(s) Oral every 8 hours PRN Temp greater or equal to 38C (100.4F), Mild Pain (1 - 3)  ALPRAZolam 0.25 milliGRAM(s) Oral two times a day PRN anxiety  benzonatate 100 milliGRAM(s) Oral every 8 hours PRN Cough  guaiFENesin   Syrup  (Sugar-Free) 100 milliGRAM(s) Oral every 6 hours PRN Cough  polyethylene glycol 3350 17 Gram(s) Oral daily PRN Constipation  senna 2 Tablet(s) Oral at bedtime PRN Constipation    Vital Signs Last 24 Hrs  T(F): 97.4 (21 Feb 2021 09:33), Max: 98.7 (21 Feb 2021 05:10)  HR: 95 (21 Feb 2021 12:01) (95 - 116)  BP: 118/77 (21 Feb 2021 12:01) (87/56 - 118/77)  RR: 16 (21 Feb 2021 09:33) (16 - 19)  SpO2: 99% (21 Feb 2021 09:33) (96% - 100%)    PHYSICAL EXAM:  General: A/O x 3, NAD  ENT: MMM  Neck: Supple, No JVD  Lungs: Clear to auscultation bilaterally  Cardio: tachycardic, irregular S1/S2, No murmurs  Abdomen: Soft, Nontender, Nondistended; Bowel sounds present  Extremities: No calf tenderness, No pitting edema, Right thigh hematoma appears stable, nontender to palpation     LABS:                        11.8   8.84  )-----------( 457      ( 20 Feb 2021 06:45 )             35.9     02-20    142  |  102  |  17  ----------------------------<  192  3.9   |  25  |  1.11    Ca    9.4      20 Feb 2021 06:45    TPro  7.8  /  Alb  3.6  /  TBili  3.5  /  DBili  x   /  AST  61  /  ALT  49  /  AlkPhos  108  02-20    eGFR if : 57 mL/min/1.73M2 (02-20-21 @ 06:45)  eGFR if Non African American: 49 mL/min/1.73M2 (02-20-21 @ 06:45)    POCT Blood Glucose.: 195 mg/dL (21 Feb 2021 11:58)  POCT Blood Glucose.: 182 mg/dL (21 Feb 2021 08:05)  POCT Blood Glucose.: 160 mg/dL (20 Feb 2021 21:46)  POCT Blood Glucose.: 224 mg/dL (20 Feb 2021 17:17)  POCT Blood Glucose.: 163 mg/dL (20 Feb 2021 12:34)    RADIOLOGY & ADDITIONAL TESTS:    Care Discussed with Consultants/Other Providers:

## 2021-02-21 NOTE — DIETITIAN NUTRITION RISK NOTIFICATION - TREATMENT: THE FOLLOWING DIET HAS BEEN RECOMMENDED
Diet, Consistent Carbohydrate/No Snacks (02-02-21 @ 01:27) [Active]      
Diet, Dysphagia 2 Mechanical Soft-Thin Liquids:   Consistent Carbohydrate {No Snacks}  Supplement Feeding Modality:  Oral  Glucerna Shake Cans or Servings Per Day:  1       Frequency:  Three Times a day (02-21-21 @ 14:16) [Pending Verification By Attending]  Diet, Consistent Carbohydrate/No Snacks:   Supplement Feeding Modality:  Oral  Glucerna Shake Cans or Servings Per Day:  2       Frequency:  Two Times a day (02-16-21 @ 08:44) [Active]

## 2021-02-21 NOTE — DIETITIAN NUTRITION RISK NOTIFICATION - MALNUTRITION EVALUATION AS DEMONSTRATED BY (ADULTS > 20 YEARS OF AGE)
Weight loss.../Inadequate energy intake...
Inadequate energy intake.../Loss of subcutaneous fat.../Loss of muscle...

## 2021-02-21 NOTE — PROGRESS NOTE ADULT - ASSESSMENT
74 y/o F PMHx of paroxysmal Afib (on Xarelto), PAD (s/p O-nwinnke-ybvqilaja bypass, R-femoral angioplasty with stenting, (on Plavix)), HTN, HLD, type 2 diabetes (HA1c on 7.8 on Metformin and Tresiba as an outpatient), and recent trauma to right thigh with stable hematoma, presented to [facility] 1/12/21 with SOB, found to have NSTEMI. 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 S/P C3/DUANE Clip/MV Repair and DUANE thrombectomy with Dr. Orellana on 1/19/21. Postop course complicated by hypoxic respiratory failure and KRISTIE now resolved, and hypokalemia on daily repletion while on Torsemide, admitted to  for rehab on Jan 28 and was found with COVID 19. Transferred to medicine on 2/1 for worsening SOB/SHF/PNA, and to telemetry 2/17 for rapid afib.    #Recent NSTEMI S/P CABG, MV Repair and DUANE thrombectomy 1/19/21  #Chronic Afib, now with Afib with RVR   #Mild to mod AS  - Continue Eliquis 2.5mg BID (reduced dose due to large hematoma in right thigh from trauma prior to surgery)  - Continue aspirin, Lasix 60mg BID with hold parameters, Metoprolol  75 mg q8H, and Amiodarone 200mg qd  - Still tachycardic yesterday, a fib with RVR HR 150s, now improved to 100s s/p IV digoxin load. Continue PO digoxin   - Cardiology following    #Acute hypoxic respiratory failure in need of supplemental oxygenation, currently improved on room air, likely due to acute on chronic combined systolic and Diastolic CHF (improved) and COVID-19 Infection (resolved, tested negative on 2/15)  - Tolerating room air  - Completed 5 days of Remdisivir  - Continue Lasix    #Transaminitis   - Secondary to COVID-19  - Monitor    #T2DM  - HbA1c 7.8   - On Metformin and Tresiba as outpatient  - Continue Lantus + ISS    #Normocytic Anemia  - Stable  - Monitor CBC    #Hematoma right thigh  - Continue to monitor    #Moderate protein-calorie malnutrition  - Monitor, nutrition appreciated    #DVT ppx  - Eliquis    #GI ppx   - PPI    Dispo: Acute rehab re-evaluation Monday 2/22 2/21: Updated patient's  Derrick Roblero, [phone]     Dr. Mary Man - office [phone] - cards   Dr. Ling Evangelista is PCP

## 2021-02-21 NOTE — CHART NOTE - NSCHARTNOTEFT_GEN_A_CORE
Chart reviewed. Patient seen on 2/19/21 in follow up as requested by Primary team. Patient has not made significant functional progress for discharge home, including stair negotiation  Patient tachycardic at rest   Patient endorses some anxiety.  States she would like to go home.  Discussed with Dr. King  Case also discussed with treating PT  will follow up on 2/22
Interval events: Called by RN to evaluate pt for . called by tele-tech for rapid afib, chart reviewed, pt evaluated at bedside.   Pt sitting up in bed without complaints, however not engaging in conversation. Asking why she is being questioned. Denies palpitations, cp, sob, dizziness, lightheadedness, anxiousness, abd pain, n/v.   At bedside: /64, -160s.     Review of Systems:  Constitutional: No fever, chills, fatigue  Neuro: No headache, numbness, weakness  Resp: No cough, wheezing, shortness of breath  CVS: No chest pain, palpitations, leg swelling  GI: No abdominal pain, nausea, vomiting  : No dysuria, frequency, incontinence    T(F): 97.9 (02-16-21 @ 22:40), Max: 98 (02-16-21 @ 15:12)  HR: 78 (02-16-21 @ 22:40) (73 - 107)  BP: 127/95 (02-16-21 @ 22:40) (100/56 - 127/95)  RR: 17 (02-16-21 @ 22:40) (15 - 17)  SpO2: 95% (02-16-21 @ 22:40) (95% - 100%)  Wt(kg): --    POCT Blood Glucose.: 178 mg/dL (16 Feb 2021 22:39)    I&O's Summary  15 Feb 2021 07:01  -  16 Feb 2021 07:00  --------------------------------------------------------  IN: 120 mL / OUT: 0 mL / NET: 120 mL    Physical Exam:   Gen: sitting up in bed, appearing anxious  Neuro: A&Ox3   HEENT: MMM  Resp: nonlabored breathing, clear to auscultation b/l  CVS: S1S2+, tachy irreg irreg  Abd: soft, nontender  Ext: no calf tenderness b/l    Meds:  aMIOdarone    Tablet Oral  furosemide    Tablet Oral  metoprolol tartrate Oral  atorvastatin Oral  dextrose 40% Gel Oral  dextrose 50% Injectable IV Push  glucagon  Injectable IntraMuscular  insulin glargine Injectable (LANTUS) SubCutaneous  insulin lispro (ADMELOG) corrective regimen sliding scale SubCutaneous  benzonatate Oral PRN  guaiFENesin   Syrup  (Sugar-Free) Oral PRN  acetaminophen   Tablet .. Oral PRN  melatonin Oral  apixaban Oral  aspirin enteric coated Oral  pantoprazole    Tablet Oral  polyethylene glycol 3350 Oral PRN  senna Oral PRN  ascorbic acid Oral  dextrose 5%. IV Continuous  multivitamin Oral  potassium chloride    Tablet ER Oral  influenza   Vaccine IntraMuscular                      11.7   7.22  )-----------( 441      ( 15 Feb 2021 07:37 )             37.1     02-15  144  |  102  |  17  ----------------------------<  143<H>  3.5   |  30  |  1.18    Ca    9.0      15 Feb 2021 07:37    TPro  7.2  /  Alb  2.9<L>  /  TBili  2.6<H>  /  DBili  x   /  AST  45<H>  /  ALT  39  /  AlkPhos  107  02-15    Radiology:  < from: Xray Chest 1 View AP/PA (02.06.21 @ 11:12) >    FINDINGS:  The lungs show residual bibasilar airspace consolidations and/or effusions. Upper lobes clear.. No pneumothorax.  The  heart is enlarged in transverse diameter. No hilar mass.  Status post cardiac valve replacement, atrial clipping procedure.   Visualized osseous structures are intact.  < end of copied text >    < from: Xray Chest 1 View- PORTABLE-Urgent (Xray Chest 1 View- PORTABLE-Urgent .) (02.01.21 @ 12:34) >  INTERPRETATION:  AP chest on February 1, 2021 at 11:47 AM. Patient is short of breath.  Heart enlargement, sternotomy, prosthetic heart valve, and left atrial appendage clipping device again noted.  Bilateral congestive like infiltrates right greater than left again noted.  Chest is similar to january 29.  < end of copied text >    A&P: 75 year old female with PMHx of paroxysmal Afib (on Xarelto), PAD (s/p A-rwbvjko-cujqayerl bypass, R-femoral angioplasty with stenting, (on Plavix), HTN, HLD, type 2 diabetes (HA1c on 7.8 on Metformin and Tresiba as an outpatient), and recent trauma to right thigh with stable hematoma, presented to [facility] 1/12/21 with SOB, found to have NSTEMI. 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 S/P C3/DUANE Clip/MV Repair and DUANE thrombectomy with Dr. Orellana on 1/19/21. Postop course complicated by hypoxic respiratory failure and KRISTIE now resolved, and hypokalemia on daily repletion while on Torsemide, admitted to  for rehab on jan 28 and was found with COVID 19. transferred to medicine on 2/1 for worsening SOB/SHF/PNA    Afib with RVR  -metoprolol 5mg IV push ordered, due to IV leakage, 2mg wasted. Administered metoprolol 3mg with HR improvement to 100-110s.   -cont amiodarone 200mg daily and metoprolol 100mg BID with holding parameters  -cont eliquis 2.5mg BID  -tsh 3.56 on 1/18/2021, labs ordered for am    -cont tele monitoring  -vitals per routine  -rest of care per primary team
NUTRITION FOLLOW UP    SOURCE: Patient [)   Family [ ]     other [X ] chart    DIET: Regular cons. cho    PATIENT REPORT[ ] nausea  [ ] vomiting [ ] diarrhea [ ] constipation  [ ]chewing problems [ ] swallowing issues  [ ] other: no GI distress    PO INTAKE:  < 50% [ X]   50-75%  [ ]   %  [X]  other :    SOURCE: for PO intake [] Patient [ ] family [X ] chart [ ] staff [ ] other    ENTERAL/PARENTERAL NUTRITION: n/a    CURRENT WEIGHT:  2/4 87.5 KG.     PERTINENT LABS:  Date: 15 Feb 2021 07:37  Hemoglobin 11.7   Hematocrit 37.1     Date: 02-15  Sodium 144  Potassium 3.5  Glucose Serum 143<H>  BUN 17      Creatinine    ACCUCHECK  POCT Blood Glucose.: 129 mg/dL (16 Feb 2021 07:54)  POCT Blood Glucose.: 166 mg/dL (15 Feb 2021 22:53)  POCT Blood Glucose.: 136 mg/dL (15 Feb 2021 16:40)  POCT Blood Glucose.: 139 mg/dL (15 Feb 2021 11:12)      SKIN: ecchymotic, edema noted left foot, last bmwas2/12, moderate malnutrition noted    ESTIMATED NEEDS:   [X] no change since previous assessment  [ ] recalculated:     PREVIOUS NUTRITION DIAGNOSIS: malnutrition    NUTRITION DIAGNOSIS is   [X] ongoing    NEW NUTRITION DIAGNOSIS: [X] not applicable    MONITORING AND EVALUATION:   Current diet order is appropriate and is well tolerated, but will monitor for any changes that may be needed    [X] PO intake [X] Tolerance to diet prescription [X] weights [X] follow up per protocol    NUTRITION RECOMMENDATIONS: add glucerna shakes bid    RD remains available RONDA Blackburn RD CDE
NUTRITION FOLLOW UP    SOURCE: Patient [)   Family [ ]     other [X ] chart    DIET: Regular cons. cho    PATIENT REPORT[ ] nausea  [ ] vomiting [ ] diarrhea [ ] constipation  [ ]chewing problems [ ] swallowing issues  [ ] other: no GI distress    PO INTAKE:  < 50% [ ]   50-75%  [X ]   %  []  other :    SOURCE: for PO intake [X] Patient [ ] family [ x] chart [ ] staff [ ] other    ENTERAL/PARENTERAL NUTRITION: n/a    CURRENT WEIGHT: Weight (kg): 87.543 (02-04 @ 11:20)     PERTINENT LABS:  Date: 09 Feb 2021 05:30  Hemoglobin 10.9   Hematocrit 35.6     Date: 02-09  Sodium 143  Potassium 3.6  Glucose Serum 158<H>  BUN 19      Creatinine    ACCUCHECK  POCT Blood Glucose.: 138 mg/dL (10 Feb 2021 07:59)  POCT Blood Glucose.: 163 mg/dL (09 Feb 2021 21:59)  POCT Blood Glucose.: 226 mg/dL (09 Feb 2021 16:51)  POCT Blood Glucose.: 192 mg/dL (09 Feb 2021 12:09)      SKIN: Intact, edema noted b/l ankles, last bm was 2/9, moderate malnutrition noted    ESTIMATED NEEDS:   [X] no change since previous assessment  [ ] recalculated:     PREVIOUS NUTRITION DIAGNOSIS: malnutrition    NUTRITION DIAGNOSIS is   [X] ongoing    NEW NUTRITION DIAGNOSIS: [X] not applicable    MONITORING AND EVALUATION:   Current diet order is appropriate and is well tolerated, but will monitor for any changes that may be needed    [X] PO intake [X] Tolerance to diet prescription [X] weights [X] follow up per protocol    NUTRITION RECOMMENDATIONS:    RD remains available RONDA Blackburn RD CDE
NUTRITION FOLLOW UP    SOURCE: Patient [X)   Family [ ]    Medical Record (X)    DIET: Consistent carbohydrate  SUPPLEMENTS: Glucerna Shake BID    Pt continues with very poor appetite. PO 0-25% at most meals. Was very lethargic and nauseous this morning. Did not consume any breakfast. Feeling better this afternoon, but still lethargic. States that she ate some of lunch. Likes soups, drinking glucerna shakes. Agreeable to downgrade diet to minced and moist to optimize intake at meals with decreased exertion. Will increase Glucerna to TID. Yogurt parfait TID. Malnutrition now severe given <50% intake > 5 days.               CURRENT WEIGHT:    (2/18) 176.1lbs  (2/21) 170.8lbs  weight change: 5.3lbs weight loss in 2 days (3%)  +1 edema left foot    PERTINENT MEDS:   Pertinent Medications: MEDICATIONS  (STANDING):  aMIOdarone    Tablet 200 milliGRAM(s) Oral daily  apixaban 2.5 milliGRAM(s) Oral every 12 hours  ascorbic acid 500 milliGRAM(s) Oral daily  aspirin enteric coated 81 milliGRAM(s) Oral daily  atorvastatin 40 milliGRAM(s) Oral at bedtime  dextrose 40% Gel 15 Gram(s) Oral once  dextrose 5%. 1000 milliLiter(s) (50 mL/Hr) IV Continuous <Continuous>  dextrose 5%. 1000 milliLiter(s) (100 mL/Hr) IV Continuous <Continuous>  dextrose 50% Injectable 25 Gram(s) IV Push once  dextrose 50% Injectable 12.5 Gram(s) IV Push once  dextrose 50% Injectable 25 Gram(s) IV Push once  digoxin     Tablet 0.125 milliGRAM(s) Oral daily  furosemide    Tablet 60 milliGRAM(s) Oral two times a day  glucagon  Injectable 1 milliGRAM(s) IntraMuscular once  influenza   Vaccine 0.5 milliLiter(s) IntraMuscular once  insulin glargine Injectable (LANTUS) 20 Unit(s) SubCutaneous at bedtime  insulin lispro (ADMELOG) corrective regimen sliding scale   SubCutaneous three times a day before meals  insulin lispro (ADMELOG) corrective regimen sliding scale   SubCutaneous at bedtime  melatonin 6 milliGRAM(s) Oral at bedtime  metoprolol tartrate 75 milliGRAM(s) Oral every 8 hours  multivitamin 1 Tablet(s) Oral daily  pantoprazole    Tablet 40 milliGRAM(s) Oral before breakfast  potassium chloride    Tablet ER 40 milliEquivalent(s) Oral two times a day    MEDICATIONS  (PRN):  acetaminophen   Tablet .. 650 milliGRAM(s) Oral every 8 hours PRN Temp greater or equal to 38C (100.4F), Mild Pain (1 - 3)  ALPRAZolam 0.25 milliGRAM(s) Oral two times a day PRN anxiety  benzonatate 100 milliGRAM(s) Oral every 8 hours PRN Cough  guaiFENesin   Syrup  (Sugar-Free) 100 milliGRAM(s) Oral every 6 hours PRN Cough  polyethylene glycol 3350 17 Gram(s) Oral daily PRN Constipation  senna 2 Tablet(s) Oral at bedtime PRN Constipation      PERTINENT LABS:  02-21 Na141 mmol/L Glu 186 mg/dL<H> K+ 4.0 mmol/L Cr  1.17 mg/dL BUN 14 mg/dL 02-18 Phos 3.1 mg/dL 02-20 Alb 3.6 g/dL 02-12 PAB 10 mg/dL<L>    POCT: (2/20) 163-224, (2/21) 182, 195mg/dl    SKIN:  no pressure ulcers  EDEMA: +1 left foot  LAST BM: 2/18    ESTIMATED NEEDS:   [X] no change since previous assessment  [ ] recalculated:     PREVIOUS NUTRITION DIAGNOSIS:    1. Moderate malnutrition - now severe, PO intake 0-25%    NUTRITION DIAGNOSIS is :  (X)  Ongoing        NEW NUTRITION DIAGNOSIS: Severe malnutrition related to poor appetite s/p COVID 19. as evidenced by mild muscle wasting/fat loss per NFPE, 3% wt loss in 1 week, pt meeting <50% EER > 5 days.     NUTRITION RECOMMENDATIONS:   1. Downgrade diet to mechanical soft, cc in setting of severe lethargy   2. Increase Glucerna to TID  3. Feeding assistance with encouragement at all meals     MONITORING AND EVALUATION:   1. Tolerance to diet prescription   2. PO intake  3. Weights  4. Labs  5. Follow Up per protocol     RD to remain available   Nicole Ramirez RDN   Pager #[phone]
Patient is a 75y old  Female who presents with a chief complaint of COVID 19 pna (17 Feb 2021 08:37)      BRIEF HOSPITAL COURSE: 75y with PMHx of paroxysmal Afib (on Xarelto), PAD (s/p J-bpqnmlf-ucnkhdhcg bypass, R-femoral angioplasty with stenting, (on Plavix), HTN, HLD, type 2 diabetes (HA1c on 7.8 on Metformin and Tresiba as an outpatient), and recent trauma to right thigh with stable hematoma admitted to  for rehab on jan 28 and was found with COVID 19, transferred to medicine on 2/1 for worsening HF + Acute hypoxic respiratory failure (currently improved on room air) 2/2 COVID19 and PNA. COVID negative as of 2/15.    Events last 24 hours: Called to bedside by RN as pt noted to be hypotensive. Pt BP on initial assessment noted to be 77/36 (MAP 47), repeat BP 82/54 (MAP 59). Pt had episode of Afib w/ RVR -160s yesterday AM, requiring her PO Metoprolol + Amiodarone and IV Metoprolol, followed by Afib w/ RVR -130s yesterday afternoon/evening treated w/ Digoxin .125mg IVP, Lopressor 125mg PO and received a dose of Lasix 40mg PO as well. Pt became hypotensive s/p Afib medications yesterday to SBP 80s and received a fluid bolus (all while maintaining a MAP >65 after bolus). HR currently controlled . Pt assessed at bedside - denies any CP, SOB/difficulty breathing, abdominal pain, HA/dizziness/lightheadedness.     PAST MEDICAL & SURGICAL HISTORY:  PAD (peripheral artery disease)    Paroxysmal atrial fibrillation    Hypercholesterolemia    Diabetes    Hypertension    S/P peripheral artery angioplasty with stent placement    S/P femoral-popliteal bypass surgery    H/O hernia repair    H/O abdominal hysterectomy        Review of Systems:  CONSTITUTIONAL: No fever, chills, or fatigue  EYES: No eye pain, visual disturbances, or discharge  ENMT:  No difficulty hearing, tinnitus, vertigo; No sinus or throat pain  NECK: No pain or stiffness  RESPIRATORY: No cough, wheezing, chills or hemoptysis; No shortness of breath  CARDIOVASCULAR: No chest pain, palpitations, dizziness, or leg swelling  GASTROINTESTINAL: No abdominal or epigastric pain. No nausea, vomiting, or hematemesis; No diarrhea or constipation. No melena or hematochezia.  GENITOURINARY: No dysuria, frequency, hematuria, or incontinence  NEUROLOGICAL: No headaches, memory loss, loss of strength, numbness, or tremors  SKIN: No itching, burning, rashes, or lesions   MUSCULOSKELETAL: No joint pain or swelling; No muscle, back, or extremity pain  PSYCHIATRIC: No depression, anxiety, mood swings, or difficulty sleeping      Medications:    aMIOdarone    Tablet 200 milliGRAM(s) Oral daily  metoprolol tartrate 125 milliGRAM(s) Oral every 12 hours  midodrine 10 milliGRAM(s) Oral once    benzonatate 100 milliGRAM(s) Oral every 8 hours PRN  guaiFENesin   Syrup  (Sugar-Free) 100 milliGRAM(s) Oral every 6 hours PRN    acetaminophen   Tablet .. 650 milliGRAM(s) Oral every 8 hours PRN  melatonin 6 milliGRAM(s) Oral at bedtime      apixaban 2.5 milliGRAM(s) Oral every 12 hours  aspirin enteric coated 81 milliGRAM(s) Oral daily    pantoprazole    Tablet 40 milliGRAM(s) Oral before breakfast  polyethylene glycol 3350 17 Gram(s) Oral daily PRN  senna 2 Tablet(s) Oral at bedtime PRN      atorvastatin 40 milliGRAM(s) Oral at bedtime  dextrose 40% Gel 15 Gram(s) Oral once  dextrose 50% Injectable 25 Gram(s) IV Push once  dextrose 50% Injectable 12.5 Gram(s) IV Push once  dextrose 50% Injectable 25 Gram(s) IV Push once  glucagon  Injectable 1 milliGRAM(s) IntraMuscular once  insulin glargine Injectable (LANTUS) 20 Unit(s) SubCutaneous at bedtime  insulin lispro (ADMELOG) corrective regimen sliding scale   SubCutaneous three times a day before meals  insulin lispro (ADMELOG) corrective regimen sliding scale   SubCutaneous at bedtime    albumin human 25% IVPB 100 milliLiter(s) IV Intermittent every 4 hours  ascorbic acid 500 milliGRAM(s) Oral daily  dextrose 5%. 1000 milliLiter(s) IV Continuous <Continuous>  dextrose 5%. 1000 milliLiter(s) IV Continuous <Continuous>  lactated ringers Bolus 250 milliLiter(s) IV Bolus once  multivitamin 1 Tablet(s) Oral daily  potassium chloride    Tablet ER 40 milliEquivalent(s) Oral two times a day    influenza   Vaccine 0.5 milliLiter(s) IntraMuscular once              ICU Vital Signs Last 24 Hrs  T(C): 36.3 (17 Feb 2021 19:57), Max: 37.2 (17 Feb 2021 05:24)  T(F): 97.3 (17 Feb 2021 19:57), Max: 99 (17 Feb 2021 05:24)  HR: 106 (17 Feb 2021 19:57) (64 - 132)  BP: 87/63 (17 Feb 2021 22:07) (81/60 - 121/105)  BP(mean): 69 (17 Feb 2021 22:07) (62 - 109)  ABP: --  ABP(mean): --  RR: 16 (17 Feb 2021 19:57) (16 - 22)  SpO2: 98% (17 Feb 2021 19:57) (94% - 99%)          I&O's Detail    17 Feb 2021 07:01  -  18 Feb 2021 01:02  --------------------------------------------------------  IN:    Oral Fluid: 100 mL  Total IN: 100 mL    OUT:  Total OUT: 0 mL    Total NET: 100 mL            LABS:                        11.7   8.44  )-----------( 496      ( 17 Feb 2021 05:23 )             36.6     02-17    142  |  101  |  20  ----------------------------<  165<H>  3.3<L>   |  28  |  1.05    Ca    8.8      17 Feb 2021 05:23  Phos  3.2     02-17  Mg     1.8     02-17    TPro  7.2  /  Alb  2.8<L>  /  TBili  3.0<H>  /  DBili  x   /  AST  50<H>  /  ALT  42  /  AlkPhos  108  02-17          CAPILLARY BLOOD GLUCOSE      POCT Blood Glucose.: 131 mg/dL (17 Feb 2021 22:32)        CULTURES:      Physical Examination:    PULM: Clear to auscultation bilaterally, non-labored breathing.     CVS: s1/s2.     ABD: Soft, nondistended, nontender, normoactive bowel sounds.    EXT: No clubbing, cyanosis, or peripheral edema.    NEURO: awake, alert.       RADIOLOGY:   < from: Xray Chest 1 View AP/PA (02.06.21 @ 11:12) >  EXAM:  XR CHEST AP OR PA 1V    PROCEDURE DATE:  02/06/2021    INTERPRETATION:  Portable chest radiograph  CLINICAL INFORMATION: Dyspnea, shortness of breath.  TECHNIQUE:  Portable  AP view of the chest was obtained.  COMPARISON: 2/1/2021 chest available for review.  FINDINGS  The lungs show residual bibasilar airspace consolidations and/or effusions. Upper lobes clear.. No pneumothorax  The  heart is enlarged in transverse diameter. No hilar mass.  Status post cardiac valve replacement, atrial clipping procedure.   Visualized osseous structures are intact.  IMPRESSION:   No interval change..  DENNIS DISLA MD; Attending Radiologist  This document has been electronically signed. Feb 7 2021  8:19AM      75y with PMHx of paroxysmal Afib (on Xarelto), PAD (s/p U-oaubcoa-pdpnkxbxk bypass, R-femoral angioplasty with stenting, (on Plavix), HTN, HLD, type 2 diabetes (HA1c on 7.8 on Metformin and Tresiba as an outpatient), and recent trauma to right thigh with stable hematoma admitted to  for rehab on jan 28 and was found with COVID 19, transferred to medicine on 2/1 for worsening HF + Acute hypoxic respiratory failure (currently improved on room air) 2/2 COVID19 and PNA.  Assessment:  1. Hypotension  2. Afib   3. HF  Plan:  1. Patient's chart reviewed and examined at bedside. LR 250cc bolus, Albumin 25% 100ml q4h x2, Midodrine 10mg PO ordered for hypotension. Goal MAP >65. Hypotension likely due to accumulation of rate control medications pt received for Afib throughout yesterday along with Lasix. Will continue to monitor and reassess.   2. Continue Amiodarone 200mg PO QD, Lopressor 125mg PO BID if BP permits. Continue Eliquis 2.5mg PO BID. Cardiology following.   3. Continue Lasix 60mg PO BID if BP permits. Strict I/O's and daily weights, fluid restriction.  Will monitor/reassess and determine if any additional management needed. Rest of care as per primary team.     TIME SPENT: 45 minutes  Evaluating/treating patient, reviewing data/labs/imaging, discussing case with multidisciplinary team, discussing plan/goals of care with patient/family. Non-inclusive of procedure time.
seen and examined  Chart reviewed  no overnight events    no complaints except SOB  on RA sat 94% however felt SOB.  comfortable on 2l  exam showed bibasilar crackles, coarse breath sound.   on remdesivir for viral PNA  IV lasix for CHF exacerbation  cardio consult called  continue current rx  will continue to monitor closely on tele

## 2021-02-22 LAB
ALBUMIN SERPL ELPH-MCNC: 3.1 G/DL — LOW (ref 3.3–5)
ALP SERPL-CCNC: 106 U/L — SIGNIFICANT CHANGE UP (ref 40–120)
ALT FLD-CCNC: 45 U/L — SIGNIFICANT CHANGE UP (ref 10–45)
ANION GAP SERPL CALC-SCNC: 13 MMOL/L — SIGNIFICANT CHANGE UP (ref 5–17)
AST SERPL-CCNC: 54 U/L — HIGH (ref 10–40)
BILIRUB DIRECT SERPL-MCNC: 0.8 MG/DL — HIGH (ref 0–0.2)
BILIRUB INDIRECT FLD-MCNC: 2.7 MG/DL — HIGH (ref 0.2–1)
BILIRUB SERPL-MCNC: 3.5 MG/DL — HIGH (ref 0.2–1.2)
BUN SERPL-MCNC: 16 MG/DL — SIGNIFICANT CHANGE UP (ref 7–23)
CALCIUM SERPL-MCNC: 9.4 MG/DL — SIGNIFICANT CHANGE UP (ref 8.4–10.5)
CHLORIDE SERPL-SCNC: 103 MMOL/L — SIGNIFICANT CHANGE UP (ref 96–108)
CO2 SERPL-SCNC: 26 MMOL/L — SIGNIFICANT CHANGE UP (ref 22–31)
CREAT SERPL-MCNC: 1.05 MG/DL — SIGNIFICANT CHANGE UP (ref 0.5–1.3)
GLUCOSE BLDC GLUCOMTR-MCNC: 166 MG/DL — HIGH (ref 70–99)
GLUCOSE BLDC GLUCOMTR-MCNC: 212 MG/DL — HIGH (ref 70–99)
GLUCOSE SERPL-MCNC: 202 MG/DL — HIGH (ref 70–99)
HCT VFR BLD CALC: 39.9 % — SIGNIFICANT CHANGE UP (ref 34.5–45)
HGB BLD-MCNC: 12.3 G/DL — SIGNIFICANT CHANGE UP (ref 11.5–15.5)
MCHC RBC-ENTMCNC: 26.6 PG — LOW (ref 27–34)
MCHC RBC-ENTMCNC: 30.8 GM/DL — LOW (ref 32–36)
MCV RBC AUTO: 86.4 FL — SIGNIFICANT CHANGE UP (ref 80–100)
NRBC # BLD: 0 /100 WBCS — SIGNIFICANT CHANGE UP (ref 0–0)
PLATELET # BLD AUTO: 352 K/UL — SIGNIFICANT CHANGE UP (ref 150–400)
POTASSIUM SERPL-MCNC: 4.2 MMOL/L — SIGNIFICANT CHANGE UP (ref 3.5–5.3)
POTASSIUM SERPL-SCNC: 4.2 MMOL/L — SIGNIFICANT CHANGE UP (ref 3.5–5.3)
PROT SERPL-MCNC: 7.1 G/DL — SIGNIFICANT CHANGE UP (ref 6–8.3)
RBC # BLD: 4.62 M/UL — SIGNIFICANT CHANGE UP (ref 3.8–5.2)
RBC # FLD: 18.2 % — HIGH (ref 10.3–14.5)
SODIUM SERPL-SCNC: 142 MMOL/L — SIGNIFICANT CHANGE UP (ref 135–145)
WBC # BLD: 7.36 K/UL — SIGNIFICANT CHANGE UP (ref 3.8–10.5)
WBC # FLD AUTO: 7.36 K/UL — SIGNIFICANT CHANGE UP (ref 3.8–10.5)

## 2021-02-22 RX ORDER — FUROSEMIDE 40 MG
3 TABLET ORAL
Qty: 180 | Refills: 0
Start: 2021-02-22 | End: 2021-03-23

## 2021-02-22 RX ORDER — DIGOXIN 250 MCG
1 TABLET ORAL
Qty: 0 | Refills: 0 | DISCHARGE
Start: 2021-02-22

## 2021-02-22 RX ORDER — METOPROLOL TARTRATE 50 MG
1 TABLET ORAL
Qty: 90 | Refills: 0
Start: 2021-02-22 | End: 2021-03-23

## 2021-02-22 RX ORDER — DIGOXIN 250 MCG
1 TABLET ORAL
Qty: 30 | Refills: 0
Start: 2021-02-22 | End: 2021-03-23

## 2021-02-22 RX ADMIN — Medication 4: at 08:53

## 2021-02-22 RX ADMIN — APIXABAN 2.5 MILLIGRAM(S): 2.5 TABLET, FILM COATED ORAL at 06:38

## 2021-02-22 RX ADMIN — Medication 75 MILLIGRAM(S): at 12:42

## 2021-02-22 RX ADMIN — Medication 75 MILLIGRAM(S): at 06:39

## 2021-02-22 RX ADMIN — Medication 2: at 12:42

## 2021-02-22 RX ADMIN — Medication 40 MILLIEQUIVALENT(S): at 06:39

## 2021-02-22 RX ADMIN — PANTOPRAZOLE SODIUM 40 MILLIGRAM(S): 20 TABLET, DELAYED RELEASE ORAL at 06:38

## 2021-02-22 RX ADMIN — Medication 81 MILLIGRAM(S): at 12:42

## 2021-02-22 RX ADMIN — AMIODARONE HYDROCHLORIDE 200 MILLIGRAM(S): 400 TABLET ORAL at 06:38

## 2021-02-22 RX ADMIN — Medication 1 TABLET(S): at 12:42

## 2021-02-22 RX ADMIN — Medication 0.12 MILLIGRAM(S): at 06:39

## 2021-02-22 RX ADMIN — Medication 500 MILLIGRAM(S): at 12:42

## 2021-02-22 NOTE — PROGRESS NOTE ADULT - NUTRITIONAL ASSESSMENT
This patient has been assessed with a concern for Malnutrition and has been determined to have a diagnosis/diagnoses of Moderate protein-calorie malnutrition.    This patient is being managed with:   Diet Consistent Carbohydrate/No Snacks-  Supplement Feeding Modality:  Oral  Glucerna Shake Cans or Servings Per Day:  2       Frequency:  Two Times a day  Entered: Feb 16 2021  8:43AM    
This patient has been assessed with a concern for Malnutrition and has been determined to have a diagnosis/diagnoses of Moderate protein-calorie malnutrition.    This patient is being managed with:   Diet Consistent Carbohydrate/No Snacks-  Entered: Feb 2 2021  1:27AM    
This patient has been assessed with a concern for Malnutrition and has been determined to have a diagnosis/diagnoses of Moderate protein-calorie malnutrition.    This patient is being managed with:   Diet Consistent Carbohydrate/No Snacks-  Supplement Feeding Modality:  Oral  Glucerna Shake Cans or Servings Per Day:  2       Frequency:  Two Times a day  Entered: Feb 16 2021  8:43AM    
This patient has been assessed with a concern for Malnutrition and has been determined to have a diagnosis/diagnoses of Moderate protein-calorie malnutrition.    This patient is being managed with:   Diet Consistent Carbohydrate/No Snacks-  Entered: Feb 2 2021  1:27AM    
This patient has been assessed with a concern for Malnutrition and has been determined to have a diagnosis/diagnoses of Moderate protein-calorie malnutrition.    This patient is being managed with:   Diet Consistent Carbohydrate/No Snacks-  Entered: Feb 2 2021  1:27AM    
This patient has been assessed with a concern for Malnutrition and has been determined to have a diagnosis/diagnoses of Moderate protein-calorie malnutrition.    This patient is being managed with:   Diet Consistent Carbohydrate/No Snacks-  Supplement Feeding Modality:  Oral  Glucerna Shake Cans or Servings Per Day:  2       Frequency:  Two Times a day  Entered: Feb 16 2021  8:43AM    
This patient has been assessed with a concern for Malnutrition and has been determined to have a diagnosis/diagnoses of Moderate protein-calorie malnutrition.    This patient is being managed with:   Diet Consistent Carbohydrate/No Snacks-  Supplement Feeding Modality:  Oral  Glucerna Shake Cans or Servings Per Day:  2       Frequency:  Two Times a day  Entered: Feb 16 2021  8:43AM    Diet Consistent Carbohydrate/No Snacks-  Entered: Feb 2 2021  1:27AM    The following pending diet order is being considered for treatment of Moderate protein-calorie malnutrition:null
This patient has been assessed with a concern for Malnutrition and has been determined to have a diagnosis/diagnoses of Moderate protein-calorie malnutrition.    This patient is being managed with:   Diet Consistent Carbohydrate/No Snacks-  Entered: Feb 2 2021  1:27AM    
This patient has been assessed with a concern for Malnutrition and has been determined to have a diagnosis/diagnoses of Moderate protein-calorie malnutrition.    This patient is being managed with:   Diet Consistent Carbohydrate/No Snacks-  Supplement Feeding Modality:  Oral  Glucerna Shake Cans or Servings Per Day:  2       Frequency:  Two Times a day  Entered: Feb 16 2021  8:43AM    
This patient has been assessed with a concern for Malnutrition and has been determined to have a diagnosis/diagnoses of Moderate protein-calorie malnutrition.    This patient is being managed with:   Diet Consistent Carbohydrate/No Snacks-  Supplement Feeding Modality:  Oral  Glucerna Shake Cans or Servings Per Day:  2       Frequency:  Two Times a day  Entered: Feb 16 2021  8:43AM    
This patient has been assessed with a concern for Malnutrition and has been determined to have a diagnosis/diagnoses of Severe protein-calorie malnutrition.    This patient is being managed with:   Diet Dysphagia 2 Mechanical Soft-Thin Liquids-  Consistent Carbohydrate {No Snacks}  Supplement Feeding Modality:  Oral  Glucerna Shake Cans or Servings Per Day:  1       Frequency:  Three Times a day  Entered: Feb 21 2021  2:16PM    
This patient has been assessed with a concern for Malnutrition and has been determined to have a diagnosis/diagnoses of Moderate protein-calorie malnutrition.    This patient is being managed with:   Diet Consistent Carbohydrate/No Snacks-  Entered: Feb 2 2021  1:27AM    

## 2021-02-22 NOTE — PROGRESS NOTE ADULT - ASSESSMENT
74 y/o F PMHx of paroxysmal Afib (on Xarelto), PAD (s/p Z-baojnqs-eybdhvzbz bypass, R-femoral angioplasty with stenting, (on Plavix)), HTN, HLD, type 2 diabetes (HA1c on 7.8 on Metformin and Tresiba as an outpatient), and recent trauma to right thigh with stable hematoma, presented to [facility] 1/12/21 with SOB, found to have NSTEMI. 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 S/P C3/DUANE Clip/MV Repair and DUANE thrombectomy with Dr. Orellana on 1/19/21. Postop course complicated by hypoxic respiratory failure and KRISTIE now resolved, and hypokalemia on daily repletion while on Torsemide, admitted to  for rehab on Jan 28 and was found with COVID 19. Transferred to medicine on 2/1 for worsening SOB/SHF/PNA, and to telemetry 2/17 for rapid afib.    #Recent NSTEMI S/P CABG, MV Repair and DUANE thrombectomy 1/19/21  #Chronic Afib, now with Afib with RVR  #Mild to mod AS  - Continue Eliquis 2.5mg BID (reduced dose due to large hematoma in right thigh from trauma prior to surgery)  - Continue aspirin, Lasix 60mg BID with hold parameters, Metoprolol  75 mg q8H, and Amiodarone 200mg qd  - Rate largely improving, intermittent tachycardia this am, a fib with RVR -120s, now improved to 90s, continue PO digoxin   - Cardiology following  -PT able to walk patient upstairs     #Acute hypoxic respiratory failure in need of supplemental oxygenation, currently improved on room air, likely due to acute on chronic combined systolic and Diastolic CHF (improved) and COVID-19 Infection (resolved, tested negative on 2/15)  - Tolerating room air  - Completed 5 days of Remdesivir  - Continue Lasix    #Transaminitis   - resolved  -Secondary to COVID-19  - Monitor    #T2DM  - HbA1c 7.8   - On Metformin and Tresiba as outpatient  - Continue Lantus + ISS    #Normocytic Anemia  - Stable  - Monitor CBC    #Hematoma right thigh  - Continue to monitor    #Moderate protein-calorie malnutrition  - Monitor, nutrition appreciated    #DVT ppx  - Eliquis    #GI ppx   - PPI    Dispo: Plan for DC home with home PT. Case management aware.     2/21: Dr. King updated patient's  Derrick Roblero, [phone]     Dr. Mary Man - office [phone] - cards   Dr. Ling Evangelista is PCP 76 y/o F PMHx of paroxysmal Afib (on Xarelto), PAD (s/p W-ncdkvsc-zejtdmrfe bypass, R-femoral angioplasty with stenting, (on Plavix)), HTN, HLD, type 2 diabetes (HA1c on 7.8 on Metformin and Tresiba as an outpatient), and recent trauma to right thigh with stable hematoma, presented to [facility] 1/12/21 with SOB, found to have NSTEMI. 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 S/P C3/DUANE Clip/MV Repair and DUANE thrombectomy with Dr. Orellana on 1/19/21. Postop course complicated by hypoxic respiratory failure and KRISTIE now resolved, and hypokalemia on daily repletion while on Torsemide, admitted to  for rehab on Jan 28 and was found with COVID 19. Transferred to medicine on 2/1 for worsening SOB/SHF/PNA, and to telemetry 2/17 for rapid afib.    #Chronic Afib, now with Afib with RVR  #Recent NSTEMI S/P CABG, MV Repair and DUANE thrombectomy 1/19/21  #Mild to mod AS  - Continue Eliquis 2.5mg BID (reduced dose due to large hematoma in right thigh from trauma prior to surgery)  - Continue aspirin, Lasix 60mg BID with hold parameters, Metoprolol  75 mg q8H, and Amiodarone 200mg qd  - Rate largely improving, intermittent tachycardia this am, a fib with RVR -120s, now improved to 90s, continue PO digoxin   - Cardiology following  -PT able to walk patient upstairs     #Acute hypoxic respiratory failure in need of supplemental oxygenation, currently improved on room air, likely due to acute on chronic combined systolic and Diastolic CHF (improved) and COVID-19 Infection (resolved, tested negative on 2/15)  - Tolerating room air  - Completed 5 days of Remdesivir  - Continue Lasix    #Transaminitis   - resolved  -Secondary to COVID-19  - Monitor    #T2DM  - HbA1c 7.8   - On Metformin and Tresiba as outpatient  - Continue Lantus + ISS    #Normocytic Anemia  - Stable  - Monitor CBC    #Hematoma right thigh  - Continue to monitor    #Moderate protein-calorie malnutrition  - Monitor, nutrition appreciated    #DVT ppx  - Eliquis    #GI ppx   - PPI    Dispo: Plan for DC home with home PT. Case management aware.     2/21: Dr. King updated patient's  Derrick Roblero, [phone]     Dr. Mary Man - office [phone] - cards   Dr. Ling Evangelista is PCP

## 2021-02-22 NOTE — PROGRESS NOTE ADULT - ATTENDING COMMENTS
I have personally seen and examined patient on the above date.  I discussed the case with USMAN Paniagua and I agree with findings and plan as detailed per note above, which I have amended where appropriate.      AF with RVR - much improved s/p Digoxin load, now on maintenance dosing  PT follow-up - home with home PT, patient tolerated PT session today, and deemed appropriate for d/c to home  (Home is where patient and patient's  wants her to go)  Working on d/c planning to home, possible in next 24 hours if HR remains stable

## 2021-02-22 NOTE — PROGRESS NOTE ADULT - REASON FOR ADMISSION
COVID 19 pna

## 2021-02-22 NOTE — PROGRESS NOTE ADULT - SUBJECTIVE AND OBJECTIVE BOX
Follow up foraf cad  SUBJ: patient feeling well. says she is going home today. denies cp sob palps  PMH  PAD (peripheral artery disease)    Paroxysmal atrial fibrillation    Hypercholesterolemia    Diabetes    Hypertension        MEDICATIONS  (STANDING):  aMIOdarone    Tablet 200 milliGRAM(s) Oral daily  apixaban 2.5 milliGRAM(s) Oral every 12 hours  ascorbic acid 500 milliGRAM(s) Oral daily  aspirin enteric coated 81 milliGRAM(s) Oral daily  atorvastatin 40 milliGRAM(s) Oral at bedtime  dextrose 40% Gel 15 Gram(s) Oral once  dextrose 5%. 1000 milliLiter(s) (50 mL/Hr) IV Continuous <Continuous>  dextrose 5%. 1000 milliLiter(s) (100 mL/Hr) IV Continuous <Continuous>  dextrose 50% Injectable 25 Gram(s) IV Push once  dextrose 50% Injectable 12.5 Gram(s) IV Push once  dextrose 50% Injectable 25 Gram(s) IV Push once  digoxin     Tablet 0.125 milliGRAM(s) Oral daily  furosemide    Tablet 60 milliGRAM(s) Oral two times a day  glucagon  Injectable 1 milliGRAM(s) IntraMuscular once  influenza   Vaccine 0.5 milliLiter(s) IntraMuscular once  insulin glargine Injectable (LANTUS) 20 Unit(s) SubCutaneous at bedtime  insulin lispro (ADMELOG) corrective regimen sliding scale   SubCutaneous three times a day before meals  insulin lispro (ADMELOG) corrective regimen sliding scale   SubCutaneous at bedtime  melatonin 6 milliGRAM(s) Oral at bedtime  metoprolol tartrate 75 milliGRAM(s) Oral every 8 hours  multivitamin 1 Tablet(s) Oral daily  pantoprazole    Tablet 40 milliGRAM(s) Oral before breakfast  potassium chloride    Tablet ER 40 milliEquivalent(s) Oral two times a day    MEDICATIONS  (PRN):  acetaminophen   Tablet .. 650 milliGRAM(s) Oral every 8 hours PRN Temp greater or equal to 38C (100.4F), Mild Pain (1 - 3)  benzonatate 100 milliGRAM(s) Oral every 8 hours PRN Cough  guaiFENesin   Syrup  (Sugar-Free) 100 milliGRAM(s) Oral every 6 hours PRN Cough  polyethylene glycol 3350 17 Gram(s) Oral daily PRN Constipation  senna 2 Tablet(s) Oral at bedtime PRN Constipation        PHYSICAL EXAM:  Vital Signs Last 24 Hrs  T(C): 36.4 (22 Feb 2021 10:09), Max: 36.7 (21 Feb 2021 16:00)  T(F): 97.6 (22 Feb 2021 10:09), Max: 98.1 (22 Feb 2021 04:46)  HR: 95 (22 Feb 2021 12:40) (77 - 101)  BP: 111/66 (22 Feb 2021 12:40) (104/65 - 122/62)  BP(mean): --  RR: 18 (22 Feb 2021 10:09) (16 - 20)  SpO2: 99% (22 Feb 2021 10:09) (99% - 100%)    GENERAL: NAD, well-groomed, well-developed  HEAD:  Atraumatic, Normocephalic  EYES: EOMI, PERRLA, conjunctiva and sclera clear  ENT: Moist mucous membranes,  NECK: Supple, No JVD, no bruits  CHEST/LUNG: decreased bs right lung  HEART: irregularly irregular  ABDOMEN: Soft, Nontender, Nondistended; Bowel sounds present  EXTREMITIES:  2+ Peripheral Pulses, No clubbing, cyanosis, or edema  SKIN: No rashes or lesions  NERVOUS SYSTEM:  Alert & Oriented X3, Good concentration; Motor Strength 5/5 B/L upper and lower extremities; DTRs 2+ intact and symmetric      TELEMETRY:af with moderate response    ECG:    LABS:                        12.3   7.36  )-----------( 352      ( 22 Feb 2021 07:23 )             39.9     02-22    142  |  103  |  16  ----------------------------<  202<H>  4.2   |  26  |  1.05    Ca    9.4      22 Feb 2021 07:23  Mg     2.0     02-21    TPro  7.1  /  Alb  3.1<L>  /  TBili  3.5<H>  /  DBili  0.8<H>  /  AST  54<H>  /  ALT  45  /  AlkPhos  106  02-22            I&O's Summary    21 Feb 2021 07:01  -  22 Feb 2021 07:00  --------------------------------------------------------  IN: 200 mL / OUT: 0 mL / NET: 200 mL    22 Feb 2021 07:01  -  22 Feb 2021 13:44  --------------------------------------------------------  IN: 200 mL / OUT: 0 mL / NET: 200 mL      BNP    RADIOLOGY & ADDITIONAL STUDIES:    ECHO:

## 2021-02-22 NOTE — PROGRESS NOTE ADULT - SUBJECTIVE AND OBJECTIVE BOX
Patient is a 75y old  Female who presents with a chief complaint of COVID 19 pna     Patient seen and examined at bedside. Pt states they feel well, denies overnight events or current complaints including chest pain, shortness of breath, dizziness, nausea, vomiting, diarrhea, fever or chills.      ALLERGIES:  OHS (Unknown)  warfarin (Rash)    MEDICATIONS  (STANDING):  aMIOdarone    Tablet 200 milliGRAM(s) Oral daily  apixaban 2.5 milliGRAM(s) Oral every 12 hours  ascorbic acid 500 milliGRAM(s) Oral daily  aspirin enteric coated 81 milliGRAM(s) Oral daily  atorvastatin 40 milliGRAM(s) Oral at bedtime  dextrose 40% Gel 15 Gram(s) Oral once  dextrose 5%. 1000 milliLiter(s) (50 mL/Hr) IV Continuous <Continuous>  dextrose 5%. 1000 milliLiter(s) (100 mL/Hr) IV Continuous <Continuous>  dextrose 50% Injectable 25 Gram(s) IV Push once  dextrose 50% Injectable 12.5 Gram(s) IV Push once  dextrose 50% Injectable 25 Gram(s) IV Push once  digoxin     Tablet 0.125 milliGRAM(s) Oral daily  furosemide    Tablet 60 milliGRAM(s) Oral two times a day  glucagon  Injectable 1 milliGRAM(s) IntraMuscular once  influenza   Vaccine 0.5 milliLiter(s) IntraMuscular once  insulin glargine Injectable (LANTUS) 20 Unit(s) SubCutaneous at bedtime  insulin lispro (ADMELOG) corrective regimen sliding scale   SubCutaneous three times a day before meals  insulin lispro (ADMELOG) corrective regimen sliding scale   SubCutaneous at bedtime  melatonin 6 milliGRAM(s) Oral at bedtime  metoprolol tartrate 75 milliGRAM(s) Oral every 8 hours  multivitamin 1 Tablet(s) Oral daily  pantoprazole    Tablet 40 milliGRAM(s) Oral before breakfast  potassium chloride    Tablet ER 40 milliEquivalent(s) Oral two times a day    MEDICATIONS  (PRN):  acetaminophen   Tablet .. 650 milliGRAM(s) Oral every 8 hours PRN Temp greater or equal to 38C (100.4F), Mild Pain (1 - 3)  ALPRAZolam 0.25 milliGRAM(s) Oral two times a day PRN anxiety  benzonatate 100 milliGRAM(s) Oral every 8 hours PRN Cough  guaiFENesin   Syrup  (Sugar-Free) 100 milliGRAM(s) Oral every 6 hours PRN Cough  polyethylene glycol 3350 17 Gram(s) Oral daily PRN Constipation  senna 2 Tablet(s) Oral at bedtime PRN Constipation    Vital Signs Last 24 Hrs  T(F): 97.6 (22 Feb 2021 10:09), Max: 98.1 (22 Feb 2021 04:46)  HR: 101 (22 Feb 2021 10:09) (77 - 101)  BP: 122/62 (22 Feb 2021 10:09) (104/65 - 122/62)  RR: 18 (22 Feb 2021 10:09) (16 - 20)  SpO2: 99% (22 Feb 2021 10:09) (99% - 100%)  I&O's Summary    21 Feb 2021 07:01  -  22 Feb 2021 07:00  --------------------------------------------------------  IN: 200 mL / OUT: 0 mL / NET: 200 mL    22 Feb 2021 07:01  -  22 Feb 2021 12:16  --------------------------------------------------------  IN: 200 mL / OUT: 0 mL / NET: 200 mL      PHYSICAL EXAM:  General: NAD, A/O x 3  ENT: MMM, no oral thrush   Neck: Supple, No JVD  Lungs: Clear to auscultation bilaterally, non labored breathing  Cardio: IRIR, S1/S2, No murmurs  Abdomen: Soft, Nontender, Nondistended; Bowel sounds present  Extremities: No calf tenderness, No pitting edema    LABS:                        12.3   7.36  )-----------( 352      ( 22 Feb 2021 07:23 )             39.9     02-22    142  |  103  |  16  ----------------------------<  202  4.2   |  26  |  1.05    Ca    9.4      22 Feb 2021 07:23  Mg     2.0     02-21    TPro  7.1  /  Alb  3.1  /  TBili  3.5  /  DBili  0.8  /  AST  54  /  ALT  45  /  AlkPhos  106  02-22    eGFR if Non African American: 53 mL/min/1.73M2 (02-22-21 @ 07:23)  eGFR if : 61 mL/min/1.73M2 (02-22-21 @ 07:23)                POCT Blood Glucose.: 212 mg/dL (22 Feb 2021 08:49)  POCT Blood Glucose.: 191 mg/dL (21 Feb 2021 21:05)  POCT Blood Glucose.: 222 mg/dL (21 Feb 2021 16:11)            RADIOLOGY & ADDITIONAL TESTS:    Care Discussed with Consultants/Other Providers:    Patient is a 75y old  Female who presents with a chief complaint of COVID 19 pna     Patient seen and examined at bedside. Pt states they feel well, denies overnight events or current complaints including chest pain, shortness of breath, dizziness, nausea, vomiting, diarrhea, fever or chills.      ALLERGIES:  OHS (Unknown)  warfarin (Rash)    MEDICATIONS  (STANDING):  aMIOdarone    Tablet 200 milliGRAM(s) Oral daily  apixaban 2.5 milliGRAM(s) Oral every 12 hours  ascorbic acid 500 milliGRAM(s) Oral daily  aspirin enteric coated 81 milliGRAM(s) Oral daily  atorvastatin 40 milliGRAM(s) Oral at bedtime  dextrose 40% Gel 15 Gram(s) Oral once  dextrose 5%. 1000 milliLiter(s) (50 mL/Hr) IV Continuous <Continuous>  dextrose 5%. 1000 milliLiter(s) (100 mL/Hr) IV Continuous <Continuous>  dextrose 50% Injectable 25 Gram(s) IV Push once  dextrose 50% Injectable 12.5 Gram(s) IV Push once  dextrose 50% Injectable 25 Gram(s) IV Push once  digoxin     Tablet 0.125 milliGRAM(s) Oral daily  furosemide    Tablet 60 milliGRAM(s) Oral two times a day  glucagon  Injectable 1 milliGRAM(s) IntraMuscular once  influenza   Vaccine 0.5 milliLiter(s) IntraMuscular once  insulin glargine Injectable (LANTUS) 20 Unit(s) SubCutaneous at bedtime  insulin lispro (ADMELOG) corrective regimen sliding scale   SubCutaneous three times a day before meals  insulin lispro (ADMELOG) corrective regimen sliding scale   SubCutaneous at bedtime  melatonin 6 milliGRAM(s) Oral at bedtime  metoprolol tartrate 75 milliGRAM(s) Oral every 8 hours  multivitamin 1 Tablet(s) Oral daily  pantoprazole    Tablet 40 milliGRAM(s) Oral before breakfast  potassium chloride    Tablet ER 40 milliEquivalent(s) Oral two times a day    MEDICATIONS  (PRN):  acetaminophen   Tablet .. 650 milliGRAM(s) Oral every 8 hours PRN Temp greater or equal to 38C (100.4F), Mild Pain (1 - 3)  ALPRAZolam 0.25 milliGRAM(s) Oral two times a day PRN anxiety  benzonatate 100 milliGRAM(s) Oral every 8 hours PRN Cough  guaiFENesin   Syrup  (Sugar-Free) 100 milliGRAM(s) Oral every 6 hours PRN Cough  polyethylene glycol 3350 17 Gram(s) Oral daily PRN Constipation  senna 2 Tablet(s) Oral at bedtime PRN Constipation    Vital Signs Last 24 Hrs  T(F): 97.6 (22 Feb 2021 10:09), Max: 98.1 (22 Feb 2021 04:46)  HR: 101 (22 Feb 2021 10:09) (77 - 101)  BP: 122/62 (22 Feb 2021 10:09) (104/65 - 122/62)  RR: 18 (22 Feb 2021 10:09) (16 - 20)  SpO2: 99% (22 Feb 2021 10:09) (99% - 100%)  I&O's Summary    21 Feb 2021 07:01  -  22 Feb 2021 07:00  --------------------------------------------------------  IN: 200 mL / OUT: 0 mL / NET: 200 mL    22 Feb 2021 07:01  -  22 Feb 2021 12:16  --------------------------------------------------------  IN: 200 mL / OUT: 0 mL / NET: 200 mL      PHYSICAL EXAM:  General: NAD, A/O x 3  ENT: MMM, no oral thrush   Neck: Supple, No JVD  Lungs: Clear to auscultation bilaterally, non labored breathing  Cardio: IRIR, S1/S2, No murmurs  Abdomen: Soft, Nontender, Nondistended; Bowel sounds present  Extremities: No calf tenderness, No pitting edema    LABS:                        12.3   7.36  )-----------( 352      ( 22 Feb 2021 07:23 )             39.9     02-22    142  |  103  |  16  ----------------------------<  202  4.2   |  26  |  1.05    Ca    9.4      22 Feb 2021 07:23  Mg     2.0     02-21    TPro  7.1  /  Alb  3.1  /  TBili  3.5  /  DBili  0.8  /  AST  54  /  ALT  45  /  AlkPhos  106  02-22    eGFR if Non African American: 53 mL/min/1.73M2 (02-22-21 @ 07:23)  eGFR if : 61 mL/min/1.73M2 (02-22-21 @ 07:23)                POCT Blood Glucose.: 212 mg/dL (22 Feb 2021 08:49)  POCT Blood Glucose.: 191 mg/dL (21 Feb 2021 21:05)  POCT Blood Glucose.: 222 mg/dL (21 Feb 2021 16:11)

## 2021-02-23 VITALS
RESPIRATION RATE: 17 BRPM | DIASTOLIC BLOOD PRESSURE: 52 MMHG | SYSTOLIC BLOOD PRESSURE: 117 MMHG | HEART RATE: 77 BPM | OXYGEN SATURATION: 95 % | TEMPERATURE: 98 F

## 2021-02-23 RX ORDER — CLOPIDOGREL BISULFATE 75 MG/1
75 TABLET, FILM COATED ORAL
Refills: 0 | Status: DISCONTINUED | COMMUNITY
End: 2021-02-23

## 2021-02-23 RX ORDER — PHYTONADIONE (VIT K1) 10 MG/ML
10 AMPUL (ML) INJECTION
Refills: 0 | Status: DISCONTINUED | COMMUNITY
End: 2021-02-23

## 2021-02-23 RX ORDER — GLIPIZIDE 10 MG/1
10 TABLET ORAL
Refills: 0 | Status: DISCONTINUED | COMMUNITY
End: 2021-02-23

## 2021-02-23 RX ORDER — LANCETS 33 GAUGE
EACH MISCELLANEOUS
Refills: 0 | Status: ACTIVE | COMMUNITY

## 2021-02-23 RX ORDER — DILTIAZEM HYDROCHLORIDE 60 MG/1
60 TABLET ORAL 3 TIMES DAILY
Refills: 0 | Status: DISCONTINUED | COMMUNITY
End: 2021-02-23

## 2021-02-23 RX ORDER — APIXABAN 2.5 MG/1
2.5 TABLET, FILM COATED ORAL TWICE DAILY
Refills: 0 | Status: ACTIVE | COMMUNITY

## 2021-02-23 RX ORDER — BIOTIN 10 MG
TABLET ORAL
Refills: 0 | Status: DISCONTINUED | COMMUNITY
End: 2021-02-23

## 2021-02-23 RX ORDER — RIVAROXABAN 20 MG/1
20 TABLET, FILM COATED ORAL
Refills: 0 | Status: DISCONTINUED | COMMUNITY
End: 2021-02-23

## 2021-02-23 RX ORDER — LANCETS 33 GAUGE
EACH MISCELLANEOUS
Qty: 90 | Refills: 0 | Status: DISCONTINUED | COMMUNITY
Start: 2021-02-23 | End: 2021-02-23

## 2021-02-24 ENCOUNTER — NON-APPOINTMENT (OUTPATIENT)
Age: 76
End: 2021-02-24

## 2021-03-01 ENCOUNTER — TRANSCRIPTION ENCOUNTER (OUTPATIENT)
Age: 76
End: 2021-03-01

## 2021-03-09 ENCOUNTER — APPOINTMENT (OUTPATIENT)
Dept: PULMONOLOGY | Facility: CLINIC | Age: 76
End: 2021-03-09
Payer: MEDICARE

## 2021-03-09 VITALS
WEIGHT: 165 LBS | SYSTOLIC BLOOD PRESSURE: 120 MMHG | BODY MASS INDEX: 26.23 KG/M2 | DIASTOLIC BLOOD PRESSURE: 74 MMHG | HEART RATE: 82 BPM | OXYGEN SATURATION: 97 %

## 2021-03-09 DIAGNOSIS — Z20.828 CONTACT WITH AND (SUSPECTED) EXPOSURE TO OTHER VIRAL COMMUNICABLE DISEASES: ICD-10-CM

## 2021-03-09 PROCEDURE — 99215 OFFICE O/P EST HI 40 MIN: CPT

## 2021-03-09 PROCEDURE — 99072 ADDL SUPL MATRL&STAF TM PHE: CPT

## 2021-03-09 RX ORDER — PNV NO.95/FERROUS FUM/FOLIC AC 28MG-0.8MG
TABLET ORAL
Refills: 0 | Status: ACTIVE | COMMUNITY

## 2021-03-09 RX ORDER — AMIODARONE HYDROCHLORIDE 200 MG/1
200 TABLET ORAL
Refills: 0 | Status: DISCONTINUED | COMMUNITY
End: 2021-03-09

## 2021-03-09 NOTE — REVIEW OF SYSTEMS
[Fatigue] : fatigue [Dyspnea] : dyspnea [SOB on Exertion] : sob on exertion [Edema] : edema [Diabetes] : diabetes [Negative] : Psychiatric

## 2021-03-09 NOTE — REASON FOR VISIT
[Follow-Up - From Hospitalization] : a follow-up visit after a recent hospitalization [Abnormal CXR/ Chest CT] : an abnormal CXR/ chest CT [Shortness of Breath] : shortness of breath [Spouse] : spouse

## 2021-03-09 NOTE — CONSULT LETTER
[Dear  ___] : Dear  [unfilled], [Courtesy Letter:] : I had the pleasure of seeing your patient, [unfilled], in my office today. [Please see my note below.] : Please see my note below. [Consult Closing:] : Thank you very much for allowing me to participate in the care of this patient.  If you have any questions, please do not hesitate to contact me. [Sincerely,] : Sincerely, [FreeTextEntry3] : Esteban Mercer MD\par  [DrFaviola  ___] : Dr. VORA

## 2021-03-09 NOTE — PHYSICAL EXAM
[No Acute Distress] : no acute distress [Normal Oropharynx] : normal oropharynx [Normal Appearance] : normal appearance [No Neck Mass] : no neck mass [Normal Rate/Rhythm] : normal rate/rhythm [Normal S1, S2] : normal s1, s2 [No Murmurs] : no murmurs [No Resp Distress] : no resp distress [Clear to Auscultation Bilaterally] : clear to auscultation bilaterally [Surgical scars] : surgical scars [Benign] : benign [No Clubbing] : no clubbing [No Cyanosis] : no cyanosis [FROM] : FROM [1+ Pitting] : 1+ pitting [Normal Color/ Pigmentation] : normal color/ pigmentation [No Focal Deficits] : no focal deficits [Oriented x3] : oriented x3 [Normal Affect] : normal affect [TextBox_68] : Decreased breath sounds RLL with dullness to percussion about 1/4-1/3 up [TextBox_99] : Using wheelchair

## 2021-03-11 ENCOUNTER — INPATIENT (INPATIENT)
Facility: HOSPITAL | Age: 76
LOS: 3 days | Discharge: HOME | DRG: 291 | End: 2021-03-15
Attending: HOSPITALIST | Admitting: HOSPITALIST
Payer: MEDICARE

## 2021-03-11 VITALS
RESPIRATION RATE: 18 BRPM | TEMPERATURE: 98 F | HEART RATE: 87 BPM | OXYGEN SATURATION: 98 % | SYSTOLIC BLOOD PRESSURE: 116 MMHG | WEIGHT: 167.99 LBS | HEIGHT: 68 IN | DIASTOLIC BLOOD PRESSURE: 68 MMHG

## 2021-03-11 DIAGNOSIS — Z90.710 ACQUIRED ABSENCE OF BOTH CERVIX AND UTERUS: Chronic | ICD-10-CM

## 2021-03-11 DIAGNOSIS — J90 PLEURAL EFFUSION, NOT ELSEWHERE CLASSIFIED: ICD-10-CM

## 2021-03-11 DIAGNOSIS — Z95.820 PERIPHERAL VASCULAR ANGIOPLASTY STATUS WITH IMPLANTS AND GRAFTS: Chronic | ICD-10-CM

## 2021-03-11 DIAGNOSIS — Z98.89 OTHER SPECIFIED POSTPROCEDURAL STATES: Chronic | ICD-10-CM

## 2021-03-11 DIAGNOSIS — Z95.828 PRESENCE OF OTHER VASCULAR IMPLANTS AND GRAFTS: Chronic | ICD-10-CM

## 2021-03-11 LAB
ALBUMIN SERPL ELPH-MCNC: 3.5 G/DL — SIGNIFICANT CHANGE UP (ref 3.3–5.2)
ALP SERPL-CCNC: 140 U/L — HIGH (ref 40–120)
ALT FLD-CCNC: 21 U/L — SIGNIFICANT CHANGE UP
ANION GAP SERPL CALC-SCNC: 15 MMOL/L — SIGNIFICANT CHANGE UP (ref 5–17)
APTT BLD: 33.5 SEC — SIGNIFICANT CHANGE UP (ref 27.5–35.5)
AST SERPL-CCNC: 34 U/L — HIGH
BASOPHILS # BLD AUTO: 0.05 K/UL — SIGNIFICANT CHANGE UP (ref 0–0.2)
BASOPHILS NFR BLD AUTO: 0.6 % — SIGNIFICANT CHANGE UP (ref 0–2)
BILIRUB SERPL-MCNC: 1.5 MG/DL — SIGNIFICANT CHANGE UP (ref 0.4–2)
BLD GP AB SCN SERPL QL: SIGNIFICANT CHANGE UP
BUN SERPL-MCNC: 10 MG/DL — SIGNIFICANT CHANGE UP (ref 8–20)
CALCIUM SERPL-MCNC: 9 MG/DL — SIGNIFICANT CHANGE UP (ref 8.6–10.2)
CHLORIDE SERPL-SCNC: 97 MMOL/L — LOW (ref 98–107)
CO2 SERPL-SCNC: 27 MMOL/L — SIGNIFICANT CHANGE UP (ref 22–29)
CREAT SERPL-MCNC: 0.59 MG/DL — SIGNIFICANT CHANGE UP (ref 0.5–1.3)
EOSINOPHIL # BLD AUTO: 0.28 K/UL — SIGNIFICANT CHANGE UP (ref 0–0.5)
EOSINOPHIL NFR BLD AUTO: 3.4 % — SIGNIFICANT CHANGE UP (ref 0–6)
GLUCOSE SERPL-MCNC: 179 MG/DL — HIGH (ref 70–99)
HCT VFR BLD CALC: 39.1 % — SIGNIFICANT CHANGE UP (ref 34.5–45)
HGB BLD-MCNC: 12 G/DL — SIGNIFICANT CHANGE UP (ref 11.5–15.5)
IMM GRANULOCYTES NFR BLD AUTO: 0.5 % — SIGNIFICANT CHANGE UP (ref 0–1.5)
INR BLD: 1.88 RATIO — HIGH (ref 0.88–1.16)
LYMPHOCYTES # BLD AUTO: 1.29 K/UL — SIGNIFICANT CHANGE UP (ref 1–3.3)
LYMPHOCYTES # BLD AUTO: 15.8 % — SIGNIFICANT CHANGE UP (ref 13–44)
MCHC RBC-ENTMCNC: 25.2 PG — LOW (ref 27–34)
MCHC RBC-ENTMCNC: 30.7 GM/DL — LOW (ref 32–36)
MCV RBC AUTO: 82.1 FL — SIGNIFICANT CHANGE UP (ref 80–100)
MONOCYTES # BLD AUTO: 0.66 K/UL — SIGNIFICANT CHANGE UP (ref 0–0.9)
MONOCYTES NFR BLD AUTO: 8.1 % — SIGNIFICANT CHANGE UP (ref 2–14)
NEUTROPHILS # BLD AUTO: 5.86 K/UL — SIGNIFICANT CHANGE UP (ref 1.8–7.4)
NEUTROPHILS NFR BLD AUTO: 71.6 % — SIGNIFICANT CHANGE UP (ref 43–77)
NT-PROBNP SERPL-SCNC: 5048 PG/ML — HIGH (ref 0–300)
PLATELET # BLD AUTO: 407 K/UL — HIGH (ref 150–400)
POTASSIUM SERPL-MCNC: 3.5 MMOL/L — SIGNIFICANT CHANGE UP (ref 3.5–5.3)
POTASSIUM SERPL-SCNC: 3.5 MMOL/L — SIGNIFICANT CHANGE UP (ref 3.5–5.3)
PROT SERPL-MCNC: 7.1 G/DL — SIGNIFICANT CHANGE UP (ref 6.6–8.7)
PROTHROM AB SERPL-ACNC: 21.2 SEC — HIGH (ref 10.6–13.6)
RAPID RVP RESULT: SIGNIFICANT CHANGE UP
RBC # BLD: 4.76 M/UL — SIGNIFICANT CHANGE UP (ref 3.8–5.2)
RBC # FLD: 18.2 % — HIGH (ref 10.3–14.5)
SARS-COV-2 RNA SPEC QL NAA+PROBE: SIGNIFICANT CHANGE UP
SODIUM SERPL-SCNC: 139 MMOL/L — SIGNIFICANT CHANGE UP (ref 135–145)
TROPONIN T SERPL-MCNC: 0.05 NG/ML — SIGNIFICANT CHANGE UP (ref 0–0.06)
WBC # BLD: 8.18 K/UL — SIGNIFICANT CHANGE UP (ref 3.8–10.5)
WBC # FLD AUTO: 8.18 K/UL — SIGNIFICANT CHANGE UP (ref 3.8–10.5)

## 2021-03-11 PROCEDURE — 99222 1ST HOSP IP/OBS MODERATE 55: CPT | Mod: 24

## 2021-03-11 PROCEDURE — 99223 1ST HOSP IP/OBS HIGH 75: CPT

## 2021-03-11 PROCEDURE — 71045 X-RAY EXAM CHEST 1 VIEW: CPT | Mod: 26

## 2021-03-11 PROCEDURE — 99285 EMERGENCY DEPT VISIT HI MDM: CPT

## 2021-03-11 RX ORDER — ATORVASTATIN CALCIUM 80 MG/1
40 TABLET, FILM COATED ORAL ONCE
Refills: 0 | Status: COMPLETED | OUTPATIENT
Start: 2021-03-11 | End: 2021-03-11

## 2021-03-11 RX ORDER — FUROSEMIDE 40 MG
60 TABLET ORAL ONCE
Refills: 0 | Status: DISCONTINUED | OUTPATIENT
Start: 2021-03-11 | End: 2021-03-11

## 2021-03-11 RX ORDER — METOPROLOL TARTRATE 50 MG
75 TABLET ORAL ONCE
Refills: 0 | Status: COMPLETED | OUTPATIENT
Start: 2021-03-11 | End: 2021-03-11

## 2021-03-11 RX ORDER — FUROSEMIDE 40 MG
60 TABLET ORAL ONCE
Refills: 0 | Status: COMPLETED | OUTPATIENT
Start: 2021-03-11 | End: 2021-03-11

## 2021-03-11 RX ADMIN — ATORVASTATIN CALCIUM 40 MILLIGRAM(S): 80 TABLET, FILM COATED ORAL at 22:29

## 2021-03-11 RX ADMIN — Medication 60 MILLIGRAM(S): at 22:29

## 2021-03-11 RX ADMIN — Medication 75 MILLIGRAM(S): at 22:29

## 2021-03-11 NOTE — CONSULT NOTE ADULT - PROBLEM SELECTOR RECOMMENDATION 4
S/p CABG, mitral valve repair and left atrial thrombectomy (01/19/2021)  Continue Aspirin and Statin  Continue Metoprolol  Will discuss with CTS team in AM rounds.

## 2021-03-11 NOTE — CONSULT NOTE ADULT - SUBJECTIVE AND OBJECTIVE BOX
HPI: 74 y/o F with PMHX CAD s/p CABG, Mitral valve annuloplasty and left atrial thrombectomy on 01/19/21 (under Dr. Orellana), Afib on Eliquis, HTN, HLD, Chronic CHFrEF (EF 45-50%), Mild Pulm HTN, GERD, presented with progressive worsening of shortness of breath and RENTERIA for past two weeks. Patient was discharged from [facility] two weeks ago at her request . Patient endorsed compliance with her home medications. Patient denies chest pain, pressure, dizziness, headache, abdominal pian, N/V/D. Patient denies recent sick contact or long distance travel.     PAST MEDICAL & SURGICAL HISTORY:  PAD (peripheral artery disease)    Paroxysmal atrial fibrillation    Hypercholesterolemia    Diabetes    Hypertension    S/P peripheral artery angioplasty with stent placement    S/P femoral-popliteal bypass surgery    H/O hernia repair    H/O abdominal hysterectomy        MEDICATIONS  (STANDING):  apixaban 2.5 milliGRAM(s) Oral every 12 hours  ascorbic acid 500 milliGRAM(s) Oral daily  aspirin enteric coated 81 milliGRAM(s) Oral daily  atorvastatin 40 milliGRAM(s) Oral at bedtime  digoxin     Tablet 125 MICROGram(s) Oral daily  furosemide   Injectable 60 milliGRAM(s) IV Push every 12 hours  glucagon  Injectable 1 milliGRAM(s) IntraMuscular once  insulin glargine Injectable (LANTUS) 25 Unit(s) SubCutaneous every morning  insulin glargine Injectable (LANTUS) 30 Unit(s) SubCutaneous at bedtime  insulin lispro (ADMELOG) corrective regimen sliding scale   SubCutaneous Before meals and at bedtime  metoprolol tartrate 75 milliGRAM(s) Oral every 8 hours  multivitamin 1 Tablet(s) Oral daily  pantoprazole    Tablet 40 milliGRAM(s) Oral before breakfast  potassium chloride    Tablet ER 40 milliEquivalent(s) Oral once  sertraline 50 milliGRAM(s) Oral at bedtime    MEDICATIONS  (PRN):  melatonin 3 milliGRAM(s) Oral at bedtime PRN Insomnia  ondansetron Injectable 4 milliGRAM(s) IV Push every 6 hours PRN Nausea      Allergies: OHS (Unknown)  warfarin (Rash)    Social History:  Social History   Former Smoker quit 4 years ago. Denies etoh/drug abuse.    Relevant Family History  FAMILY HISTORY:  Family history of thoracic aortic aneurysm (Father)    Family history of abdominal aortic aneurysm (Father)        Review of Systems  GENERAL:  Fevers[] chills[] sweats[] fatigue[] weight loss[] weight gain []                                      [ x] NEGATIVE  NEURO:  parathesias[] seizures []  syncope []  confusion []                                                                [x ] NEGATIVE                 EYES: glasses[]  blurry vision[]  discharge[] pain[] glaucoma []                                                           [ x] NEGATIVE                 ENMT:  difficulty hearing []  vertigo[]  dysphagia[] epistaxis[] recent dental work []                     [ x] NEGATIVE                 CV:  chest pain[] palpitations[] RENTERIA [x] diaphoresis [] edema[]                                                           [ ] NEGATIVE                                 RESPIRATORY:  wheezing[] SOB[x] cough [] sputum[] hemoptysis[]                                                   [ ] NEGATIVE               GI:  nausea[]  vomiting []  diarrhea[] constipation [] melena []                                                          [x ] NEGATIVE            : hematuria[ ]  dysuria[ ] urgency[] incontinence[]                                                                          [x ] NEGATIVE                    MUSKULOSKELETAL:  arthritis[ ]  joint swelling [ ] muscle weakness [ ]                                            [x ] NEGATIVE                     SKIN/BREAST:  rash[ ] itching [ ]  hair loss[ ] masses[ ]                                                                          [x ] NEGATIVE                     PSYCH:  dementia [ ] depression [ ] anxiety[ ]                                                                                          [x ] NEGATIVE                        HEME/LYMPH:  bruises easily[ ] enlarged lymph nodes[ ] tender lymph nodes[ ]                           [ x] NEGATIVE                      ENDOCRINE:  cold intolerance[ ] heat intolerance[ ] polydipsia[ ]                                                      [x ] NEGATIVE                          PHYSICAL EXAM  General: Well appearing, no acute distress.                                                         Neuro: No cognitive impairment or sensory  deficits.                      Neck: No JVD  Lungs: Diminished breath sounds at the bases bilaterally,                                                                      CV: Regular rate and rhythm, normal S1, S2, No murmur, rubs or gallops   Abdomen:  soft, nontender, nondistended, positive bowel sounds  Extremities:  warm, well perfused, no edema, +DP pulses bilaterally                                                                           Vital Signs Last 24 Hrs  T(C): 36.6 (12 Mar 2021 00:51), Max: 36.8 (11 Mar 2021 23:39)  T(F): 97.9 (12 Mar 2021 00:51), Max: 98.2 (11 Mar 2021 23:39)  HR: 101 (12 Mar 2021 00:51) (87 - 101)  BP: 131/83 (12 Mar 2021 00:51) (116/68 - 131/83)  RR: 18 (12 Mar 2021 00:51) (18 - 19)  SpO2: 95% (12 Mar 2021 00:51)on room air                                                          LABS:                        12.0   8.18  )-----------( 407      ( 11 Mar 2021 18:48 )             39.1     03-11    139  |  97<L>  |  10.0  ----------------------------<  179<H>  3.5   |  27.0  |  0.59    Ca    9.0      11 Mar 2021 18:48    TPro  7.1  /  Alb  3.5  /  TBili  1.5  /  DBili  x   /  AST  34<H>  /  ALT  21  /  AlkPhos  140<H>  03-11    PT/INR - ( 11 Mar 2021 18:48 )   PT: 21.2 sec;   INR: 1.88 ratio         PTT - ( 11 Mar 2021 18:48 )  PTT:33.5 sec    CARDIAC MARKERS ( 11 Mar 2021 18:48 )  x     / 0.05 ng/mL / x     / x     / x                     HPI: 76 y/o F with PMHX CAD s/p CABG, Mitral valve annuloplasty, left atrial appendage thrombectomy, and DUANE clip on 01/19/21 (under Dr. Orellana), Afib on Eliquis, HTN, HLD, Chronic CHFrEF (EF 45-50%), Mild Pulm HTN, GERD, presented with progressive worsening of shortness of breath and RENTERIA for past two weeks. Patient was discharged from [facility] two weeks ago at her request . Patient endorsed compliance with her home medications. Patient denies chest pain, pressure, dizziness, headache, abdominal pian, N/V/D. Patient denies recent sick contact or long distance travel.     PAST MEDICAL & SURGICAL HISTORY:  PAD (peripheral artery disease)    Paroxysmal atrial fibrillation    Hypercholesterolemia    Diabetes    Hypertension    S/P peripheral artery angioplasty with stent placement    S/P femoral-popliteal bypass surgery    H/O hernia repair    H/O abdominal hysterectomy        MEDICATIONS  (STANDING):  apixaban 2.5 milliGRAM(s) Oral every 12 hours  ascorbic acid 500 milliGRAM(s) Oral daily  aspirin enteric coated 81 milliGRAM(s) Oral daily  atorvastatin 40 milliGRAM(s) Oral at bedtime  digoxin     Tablet 125 MICROGram(s) Oral daily  furosemide   Injectable 60 milliGRAM(s) IV Push every 12 hours  glucagon  Injectable 1 milliGRAM(s) IntraMuscular once  insulin glargine Injectable (LANTUS) 25 Unit(s) SubCutaneous every morning  insulin glargine Injectable (LANTUS) 30 Unit(s) SubCutaneous at bedtime  insulin lispro (ADMELOG) corrective regimen sliding scale   SubCutaneous Before meals and at bedtime  metoprolol tartrate 75 milliGRAM(s) Oral every 8 hours  multivitamin 1 Tablet(s) Oral daily  pantoprazole    Tablet 40 milliGRAM(s) Oral before breakfast  potassium chloride    Tablet ER 40 milliEquivalent(s) Oral once  sertraline 50 milliGRAM(s) Oral at bedtime    MEDICATIONS  (PRN):  melatonin 3 milliGRAM(s) Oral at bedtime PRN Insomnia  ondansetron Injectable 4 milliGRAM(s) IV Push every 6 hours PRN Nausea      Allergies: OHS (Unknown)  warfarin (Rash)    Social History:  Social History   Former Smoker quit 4 years ago. Denies etoh/drug abuse.    Relevant Family History  FAMILY HISTORY:  Family history of thoracic aortic aneurysm (Father)    Family history of abdominal aortic aneurysm (Father)        Review of Systems  GENERAL:  Fevers[] chills[] sweats[] fatigue[] weight loss[] weight gain []                                      [ x] NEGATIVE  NEURO:  parathesias[] seizures []  syncope []  confusion []                                                                [x ] NEGATIVE                 EYES: glasses[]  blurry vision[]  discharge[] pain[] glaucoma []                                                           [ x] NEGATIVE                 ENMT:  difficulty hearing []  vertigo[]  dysphagia[] epistaxis[] recent dental work []                     [ x] NEGATIVE                 CV:  chest pain[] palpitations[] RENTERIA [x] diaphoresis [] edema[]                                                           [ ] NEGATIVE                                 RESPIRATORY:  wheezing[] SOB[x] cough [] sputum[] hemoptysis[]                                                   [ ] NEGATIVE               GI:  nausea[]  vomiting []  diarrhea[] constipation [] melena []                                                          [x ] NEGATIVE            : hematuria[ ]  dysuria[ ] urgency[] incontinence[]                                                                          [x ] NEGATIVE                    MUSKULOSKELETAL:  arthritis[ ]  joint swelling [ ] muscle weakness [ ]                                            [x ] NEGATIVE                     SKIN/BREAST:  rash[ ] itching [ ]  hair loss[ ] masses[ ]                                                                          [x ] NEGATIVE                     PSYCH:  dementia [ ] depression [ ] anxiety[ ]                                                                                          [x ] NEGATIVE                        HEME/LYMPH:  bruises easily[ ] enlarged lymph nodes[ ] tender lymph nodes[ ]                           [ x] NEGATIVE                      ENDOCRINE:  cold intolerance[ ] heat intolerance[ ] polydipsia[ ]                                                      [x ] NEGATIVE                          PHYSICAL EXAM  General: Well appearing, no acute distress.                                                         Neuro: No cognitive impairment or sensory  deficits.                      Neck: No JVD  Lungs: Diminished breath sounds at the bases bilaterally,                                                                      CV: Regular rate and rhythm, normal S1, S2, No murmur, rubs or gallops   Abdomen:  soft, nontender, nondistended, positive bowel sounds  Extremities:  warm, well perfused, no edema, +DP pulses bilaterally                                                                           Vital Signs Last 24 Hrs  T(C): 36.6 (12 Mar 2021 00:51), Max: 36.8 (11 Mar 2021 23:39)  T(F): 97.9 (12 Mar 2021 00:51), Max: 98.2 (11 Mar 2021 23:39)  HR: 101 (12 Mar 2021 00:51) (87 - 101)  BP: 131/83 (12 Mar 2021 00:51) (116/68 - 131/83)  RR: 18 (12 Mar 2021 00:51) (18 - 19)  SpO2: 95% (12 Mar 2021 00:51)on room air                                                          LABS:                        12.0   8.18  )-----------( 407      ( 11 Mar 2021 18:48 )             39.1     03-11    139  |  97<L>  |  10.0  ----------------------------<  179<H>  3.5   |  27.0  |  0.59    Ca    9.0      11 Mar 2021 18:48    TPro  7.1  /  Alb  3.5  /  TBili  1.5  /  DBili  x   /  AST  34<H>  /  ALT  21  /  AlkPhos  140<H>  03-11    PT/INR - ( 11 Mar 2021 18:48 )   PT: 21.2 sec;   INR: 1.88 ratio         PTT - ( 11 Mar 2021 18:48 )  PTT:33.5 sec    CARDIAC MARKERS ( 11 Mar 2021 18:48 )  x     / 0.05 ng/mL / x     / x     / x

## 2021-03-11 NOTE — ED PROVIDER NOTE - PHYSICAL EXAMINATION
Gen: Well appearing in moderate distress 2/2 SOB  Head: NC/AT  Neck: trachea midline  Resp:  Tachypneic, decreased BS R base. Otherwise good air movement  CV: RRR  GI: soft, NTND  Ext: no deformities, no calf ttp, no LE edema.   Neuro:  A&O appears non focal  Skin:  Warm and dry as visualized  Psych:  Normal affect and mood

## 2021-03-11 NOTE — ED PROVIDER NOTE - PROGRESS NOTE DETAILS
Damaris: CTS recommends aggressive diuresis, serial CXRs, will place drain effusion if not improving.

## 2021-03-11 NOTE — CONSULT NOTE ADULT - PROBLEM SELECTOR RECOMMENDATION 9
CXR with pulmonary vascular congestion and right side pleural effusion   Patient with mild to moderate distress on exertion, SO2 95% on room air  Patient on Eliquis, last dose today morning, INR 1.88  Will recommend medical optimization of CHS with IV diuretics   Will consider pigtail placement if patient remains symptomatic after IV diuresis  Will repeat CXR in AM  Continue tele with POX monitoring  Will continue to follow closely  Plan discussed with Dr. Crooks CXR with pulmonary vascular congestion and right side pleural effusion   Patient with mild to moderate distress on exertion, SO2 95% on room air  Patient on Eliquis, last dose today morning, INR 1.88  Will recommend medical optimization of CHS with IV diuretics   Will consider pigtail placement if patient remains symptomatic after IV diuresis  Will repeat CXR in AM  Discussed with ED attending Dr. Byers  Continue tele with POX monitoring  Will continue to follow closely  Plan discussed with Dr. Crooks

## 2021-03-11 NOTE — CONSULT NOTE ADULT - ASSESSMENT
74 y/o F with PMHX CAD s/p CABG, Mitral valve annuloplasty and left atrial thrombectomy on 01/19/21 (under Dr. Orellana), Afib on Eliquis, HTN, HLD, Chronic CHFrEF (EF 45-50%), Mild Pulm HTN, GERD, presented with progressive worsening of shortness of breath and RENTERIA for past two weeks, admitted with Acute on chronic CHF exacerbation with pleural effusion.   74 y/o F with PMHX CAD s/p CABG, Mitral valve annuloplasty, left atrial appendage thrombectomy, and DUANE clip on 01/19/21 (under Dr. Orellana), Afib on Eliquis, HTN, HLD, Chronic CHFrEF (EF 45-50%), Mild Pulm HTN, GERD, presented with progressive worsening of shortness of breath and RENTERIA for past two weeks, admitted with Acute on chronic CHF exacerbation with pleural effusion.

## 2021-03-11 NOTE — H&P ADULT - NSHPLABSRESULTS_GEN_ALL_CORE
CXR +R pleural effusion with pulmonary vascular congestion bilaterally    EKG AFIB 92bpm with PVCs, poor R wave progression, nonspecific STTW changes, QTc 440ms

## 2021-03-11 NOTE — ED PROVIDER NOTE - OBJECTIVE STATEMENT
75F h/o afib on xarelto, PAD, HTN, HLD, DM, CAD s/p CABG p/w intermittent SOB since CABG in January. SOB worse for last 2 days, unable to lay down. Went to PMD and had cxr showing a R sided effusion, told to come to the ED. Occasional dry cough. Denies fever, CP, LE edema, leg pain, nausea, vomiting.

## 2021-03-11 NOTE — H&P ADULT - HISTORY OF PRESENT ILLNESS
76 y/o F with PMHX CAD s/p CABG (8 weeks ago), HTN, HLD, Former Tobacco Abuse, CHF, GERD, MDD admitted for acute hypoxic respiratory failure 2/2 Acute on Chronic CHF Exacerbation with Pleural Effusion. Pt c/o SOB/RENTERIA with nonproductive cough which has been present since she left [facility] 2 weeks ago because she did not like it there and had progressively wornsening symptoms particularly in the last 2 days. Her home lasix dose was decreased from 60mg PO BID to 40mg PO BID two days ago. CXR shows R effusion similar to prior CXR in 2/2021. Pt seen/examined. Sx improved with Lasix 60mg IVP x1 in ER. No other complaints. Med List confirmed with patient - numerous changes since her discharge paperwork. Meds orderd. No F/C. No abd pain or n/v/d. No urinary complaints. No other issues. ROS negative unless mentioned above.     Meds in ED: Lasix 60mg IV x1, Metoprolol Tartrate 75mg PO x1, Atorvastatin 40mg PO x1   76 y/o F with PMHX CAD s/p CABG (8 weeks ago), HTN, HLD, Chronic CHFrEF, Mild Mitral Stenosis, Mild Pulm HTN, Former Tobacco Abuse, CHF, GERD, MDD admitted for acute hypoxic respiratory failure 2/2 Acute on Chronic CHF Exacerbation with Pleural Effusion. Pt c/o SOB/RENTERIA with nonproductive cough which has been present since she left [facility] 2 weeks ago because she did not like it there and had progressively worsening symptoms particularly in the last 2 days. Her home lasix dose was decreased from 60mg PO BID to 40mg PO BID two days ago. CXR shows R effusion similar to prior CXR in 2/2021. Pt seen/examined. Sx improved with Lasix 60mg IVP x1 in ER. No other complaints. Med List confirmed with patient - numerous changes since her discharge paperwork. Meds orderd. No F/C. No abd pain or n/v/d. No urinary complaints. No other issues. ROS negative unless mentioned above.     Meds in ED: Lasix 60mg IV x1, Metoprolol Tartrate 75mg PO x1, Atorvastatin 40mg PO x1

## 2021-03-11 NOTE — H&P ADULT - ASSESSMENT
ASSESSMENT:  76 y/o F with PMHX CAD s/p CABG (8 weeks ago), HTN, HLD, Former Tobacco Abuse, CHF, GERD, MDD admitted for acute hypoxic respiratory failure 2/2 Acute on Chronic CHF Exacerbation with Pleural Effusion    PLAN:  Acute Hypoxic Respiratory Failure 2/2 Acute on Chronic CHF Exacerbation with Pleural Effusion, Hx recent CAD s/p CABG, HTN, HLD  -Admit to Medicine  -CXR +R pleural effusion with pulmonary vascular congestion bilaterally  -EKG AFIB 92bpm with PVCs, poor R wave progression, nonspecific STTW changes, QTc 440ms  -s/p Lasix 60mg IV x1 in ER with improvement in SOB/RENTERIA  -recent dose decrease from 60mg PO BID to 40mg PO BID few days ago with worsening symptoms  -Lasix 60mg IV q12 for now. Patient would like medical management prior to surgical drainage. Defer further reccs to CT Surgery.   -VTE PPX   -Fluid Restriction 1L/24 hrs  -Strict I&Os  -Repeat CXR in AM  -trend wts  -Repeat EKG in AM  -Defer need for repeat Echo to Cardiology  -Cont BB/Statin/ASA  -Cardiothoracic Surgery consulted  -Cardiology Consulted    AFIB  -No longer on Amiodarone  -AC with Eliquis 2.5mg PO BID  -Rate Control with Metoprolol Tartrate 75mg PO q8  -Digoxin 125mcg PO q24  -Check Digoxin level  -monitor Telemetry  -replace electrolytes PRN    IDDM2  -Lantus 25 units qAM.   -Lantus 30 units qPM.   -Add ISS. Accuchecks. ADA Diet    MDD  -Sertraline 50mg PO q24    GERD  -Pantoprazole 40mg PO q24    COVID Status  -Recent COVID positive but then had negative tests   -COVID Screen pending  -Consider J&J Vaccine on discharge ASSESSMENT:  76 y/o F with PMHX CAD s/p CABG (8 weeks ago), HTN, HLD, CHFrEF (LVEF 40-45%), Former Tobacco Abuse, CHF, GERD, MDD admitted for acute hypoxic respiratory failure 2/2 Acute on Chronic CHF Exacerbation with Pleural Effusion    PLAN:  Acute Hypoxic Respiratory Failure 2/2 Acute on Chronic CHFrEF Exacerbation with Pleural Effusion, Hx recent CAD s/p CABG, HTN, HLD  -Admit to Medicine  -CXR +R pleural effusion with pulmonary vascular congestion bilaterally  -EKG AFIB 92bpm with PVCs, poor R wave progression, nonspecific STTW changes, QTc 440ms  -s/p Lasix 60mg IV x1 in ER with improvement in SOB/RENTERIA  -recent dose decrease from 60mg PO BID to 40mg PO BID few days ago with worsening symptoms  -Lasix 60mg IV q12 for now. Patient would like medical management prior to surgical drainage. Defer further reccs to CT Surgery.   -VTE PPX   -Fluid Restriction 1L/24 hrs  -Strict I&Os  -Repeat CXR in AM  -trend wts  -Repeat EKG in AM  -Defer need for repeat Echo to Cardiology  -Cont BB/Statin/ASA  -Cardiothoracic Surgery consulted  -Cardiology Consulted    AFIB  -No longer on Amiodarone  -AC with Eliquis 2.5mg PO BID  -Rate Control with Metoprolol Tartrate 75mg PO q8  -Digoxin 125mcg PO q24  -Check Digoxin level  -monitor Telemetry  -replace electrolytes PRN    IDDM2  -Lantus 25 units qAM.   -Lantus 30 units qPM.   -Add ISS. Accuchecks. ADA Diet    MDD  -Sertraline 50mg PO q24    GERD  -Pantoprazole 40mg PO q24    COVID Status  -Recent COVID positive but then had negative tests   -COVID Screen pending  -Consider J&J Vaccine on discharge

## 2021-03-11 NOTE — ED ADULT TRIAGE NOTE - CHIEF COMPLAINT QUOTE
pt reports she had bypass surgery 8 weeks ago and has been having intermittent SOB since. Pt now c/o SOB. Denies chest pains at this time.

## 2021-03-11 NOTE — ED ADULT NURSE NOTE - OBJECTIVE STATEMENT
Pt. sent in by MD for "fluid around my heart that I have to get removed."  Pt. tearful and anxious on receipt; calming techniques implemented and moderately effective.  Pt. s/p cardiac surgery 6 weeks ago; incision wnl; pt. complaining of recent increase in sob over the past few days.  Pt. had f/u with cards today and was told to come to the ED for further evaluation.  Pt. denies chest pain on receipt.

## 2021-03-11 NOTE — CONSULT NOTE ADULT - PROBLEM SELECTOR RECOMMENDATION 3
On Eliquis for anticoagulation  On Digoxin and Metoprolol On Eliquis for anticoagulation, will hold Eliquis for possible chest tube placement in AM  On Digoxin and Metoprolol

## 2021-03-11 NOTE — ED PROVIDER NOTE - CLINICAL SUMMARY MEDICAL DECISION MAKING FREE TEXT BOX
Patient with worsening SOB, likely pleural effusion. plan for labs, ecg, cxr, likelt CT surgery consult.

## 2021-03-11 NOTE — CONSULT NOTE ADULT - PROBLEM SELECTOR RECOMMENDATION 2
Received 60 mg IV Lasix in ED, now 60 mg IV every 12H  Continue Metoprolol  continue Digoxin  Cardiology consult  Now admitted to Medicine service  Continue care as per primary team

## 2021-03-12 DIAGNOSIS — I25.10 ATHEROSCLEROTIC HEART DISEASE OF NATIVE CORONARY ARTERY WITHOUT ANGINA PECTORIS: ICD-10-CM

## 2021-03-12 DIAGNOSIS — I48.0 PAROXYSMAL ATRIAL FIBRILLATION: ICD-10-CM

## 2021-03-12 DIAGNOSIS — I50.23 ACUTE ON CHRONIC SYSTOLIC (CONGESTIVE) HEART FAILURE: ICD-10-CM

## 2021-03-12 DIAGNOSIS — J90 PLEURAL EFFUSION, NOT ELSEWHERE CLASSIFIED: ICD-10-CM

## 2021-03-12 LAB
ALBUMIN SERPL ELPH-MCNC: 3.1 G/DL — LOW (ref 3.3–5.2)
ALP SERPL-CCNC: 124 U/L — HIGH (ref 40–120)
ALT FLD-CCNC: 19 U/L — SIGNIFICANT CHANGE UP
ANION GAP SERPL CALC-SCNC: 17 MMOL/L — SIGNIFICANT CHANGE UP (ref 5–17)
APPEARANCE UR: ABNORMAL
AST SERPL-CCNC: 31 U/L — SIGNIFICANT CHANGE UP
BACTERIA # UR AUTO: ABNORMAL
BASOPHILS # BLD AUTO: 0.04 K/UL — SIGNIFICANT CHANGE UP (ref 0–0.2)
BASOPHILS NFR BLD AUTO: 0.6 % — SIGNIFICANT CHANGE UP (ref 0–2)
BILIRUB SERPL-MCNC: 1.5 MG/DL — SIGNIFICANT CHANGE UP (ref 0.4–2)
BILIRUB UR-MCNC: NEGATIVE — SIGNIFICANT CHANGE UP
BUN SERPL-MCNC: 10 MG/DL — SIGNIFICANT CHANGE UP (ref 8–20)
CALCIUM SERPL-MCNC: 8.5 MG/DL — LOW (ref 8.6–10.2)
CHLORIDE SERPL-SCNC: 98 MMOL/L — SIGNIFICANT CHANGE UP (ref 98–107)
CK SERPL-CCNC: 55 U/L — SIGNIFICANT CHANGE UP (ref 25–170)
CO2 SERPL-SCNC: 23 MMOL/L — SIGNIFICANT CHANGE UP (ref 22–29)
COLOR SPEC: YELLOW — SIGNIFICANT CHANGE UP
CREAT SERPL-MCNC: 0.54 MG/DL — SIGNIFICANT CHANGE UP (ref 0.5–1.3)
DIFF PNL FLD: ABNORMAL
DIGOXIN SERPL-MCNC: 1.3 NG/ML — SIGNIFICANT CHANGE UP (ref 0.8–2)
EOSINOPHIL # BLD AUTO: 0.25 K/UL — SIGNIFICANT CHANGE UP (ref 0–0.5)
EOSINOPHIL NFR BLD AUTO: 3.9 % — SIGNIFICANT CHANGE UP (ref 0–6)
EPI CELLS # UR: SIGNIFICANT CHANGE UP
GLUCOSE BLDC GLUCOMTR-MCNC: 137 MG/DL — HIGH (ref 70–99)
GLUCOSE BLDC GLUCOMTR-MCNC: 152 MG/DL — HIGH (ref 70–99)
GLUCOSE BLDC GLUCOMTR-MCNC: 161 MG/DL — HIGH (ref 70–99)
GLUCOSE BLDC GLUCOMTR-MCNC: 187 MG/DL — HIGH (ref 70–99)
GLUCOSE BLDC GLUCOMTR-MCNC: 277 MG/DL — HIGH (ref 70–99)
GLUCOSE SERPL-MCNC: 174 MG/DL — HIGH (ref 70–99)
GLUCOSE UR QL: NEGATIVE — SIGNIFICANT CHANGE UP
HCT VFR BLD CALC: 36.1 % — SIGNIFICANT CHANGE UP (ref 34.5–45)
HGB BLD-MCNC: 11.2 G/DL — LOW (ref 11.5–15.5)
IMM GRANULOCYTES NFR BLD AUTO: 0.3 % — SIGNIFICANT CHANGE UP (ref 0–1.5)
KETONES UR-MCNC: ABNORMAL
LEUKOCYTE ESTERASE UR-ACNC: ABNORMAL
LYMPHOCYTES # BLD AUTO: 1.01 K/UL — SIGNIFICANT CHANGE UP (ref 1–3.3)
LYMPHOCYTES # BLD AUTO: 15.9 % — SIGNIFICANT CHANGE UP (ref 13–44)
MAGNESIUM SERPL-MCNC: 1.4 MG/DL — LOW (ref 1.6–2.6)
MCHC RBC-ENTMCNC: 25.5 PG — LOW (ref 27–34)
MCHC RBC-ENTMCNC: 31 GM/DL — LOW (ref 32–36)
MCV RBC AUTO: 82.2 FL — SIGNIFICANT CHANGE UP (ref 80–100)
MONOCYTES # BLD AUTO: 0.62 K/UL — SIGNIFICANT CHANGE UP (ref 0–0.9)
MONOCYTES NFR BLD AUTO: 9.8 % — SIGNIFICANT CHANGE UP (ref 2–14)
NEUTROPHILS # BLD AUTO: 4.4 K/UL — SIGNIFICANT CHANGE UP (ref 1.8–7.4)
NEUTROPHILS NFR BLD AUTO: 69.5 % — SIGNIFICANT CHANGE UP (ref 43–77)
NITRITE UR-MCNC: POSITIVE
NT-PROBNP SERPL-SCNC: 4934 PG/ML — HIGH (ref 0–300)
PH UR: 6 — SIGNIFICANT CHANGE UP (ref 5–8)
PHOSPHATE SERPL-MCNC: 3.5 MG/DL — SIGNIFICANT CHANGE UP (ref 2.4–4.7)
PLATELET # BLD AUTO: 362 K/UL — SIGNIFICANT CHANGE UP (ref 150–400)
POTASSIUM SERPL-MCNC: 3.1 MMOL/L — LOW (ref 3.5–5.3)
POTASSIUM SERPL-SCNC: 3.1 MMOL/L — LOW (ref 3.5–5.3)
PROT SERPL-MCNC: 6.6 G/DL — SIGNIFICANT CHANGE UP (ref 6.6–8.7)
PROT UR-MCNC: 30 MG/DL
RBC # BLD: 4.39 M/UL — SIGNIFICANT CHANGE UP (ref 3.8–5.2)
RBC # FLD: 17.9 % — HIGH (ref 10.3–14.5)
RBC CASTS # UR COMP ASSIST: ABNORMAL /HPF (ref 0–4)
SARS-COV-2 IGG SERPL QL IA: POSITIVE
SARS-COV-2 IGM SERPL IA-ACNC: 196 INDEX — HIGH
SODIUM SERPL-SCNC: 138 MMOL/L — SIGNIFICANT CHANGE UP (ref 135–145)
SP GR SPEC: 1.01 — SIGNIFICANT CHANGE UP (ref 1.01–1.02)
TROPONIN T SERPL-MCNC: 0.04 NG/ML — SIGNIFICANT CHANGE UP (ref 0–0.06)
TSH SERPL-MCNC: 2.79 UIU/ML — SIGNIFICANT CHANGE UP (ref 0.27–4.2)
UROBILINOGEN FLD QL: 1
WBC # BLD: 6.34 K/UL — SIGNIFICANT CHANGE UP (ref 3.8–10.5)
WBC # FLD AUTO: 6.34 K/UL — SIGNIFICANT CHANGE UP (ref 3.8–10.5)
WBC UR QL: >50

## 2021-03-12 PROCEDURE — 93306 TTE W/DOPPLER COMPLETE: CPT | Mod: 26

## 2021-03-12 PROCEDURE — 99233 SBSQ HOSP IP/OBS HIGH 50: CPT

## 2021-03-12 PROCEDURE — 99231 SBSQ HOSP IP/OBS SF/LOW 25: CPT | Mod: 24

## 2021-03-12 PROCEDURE — 71046 X-RAY EXAM CHEST 2 VIEWS: CPT | Mod: 26

## 2021-03-12 PROCEDURE — 93010 ELECTROCARDIOGRAM REPORT: CPT

## 2021-03-12 RX ORDER — METOPROLOL TARTRATE 50 MG
75 TABLET ORAL EVERY 8 HOURS
Refills: 0 | Status: DISCONTINUED | OUTPATIENT
Start: 2021-03-12 | End: 2021-03-15

## 2021-03-12 RX ORDER — ASPIRIN/CALCIUM CARB/MAGNESIUM 324 MG
81 TABLET ORAL DAILY
Refills: 0 | Status: DISCONTINUED | OUTPATIENT
Start: 2021-03-12 | End: 2021-03-15

## 2021-03-12 RX ORDER — SODIUM CHLORIDE 9 MG/ML
1000 INJECTION, SOLUTION INTRAVENOUS
Refills: 0 | Status: DISCONTINUED | OUTPATIENT
Start: 2021-03-12 | End: 2021-03-15

## 2021-03-12 RX ORDER — SPIRONOLACTONE 25 MG/1
25 TABLET, FILM COATED ORAL DAILY
Refills: 0 | Status: DISCONTINUED | OUTPATIENT
Start: 2021-03-12 | End: 2021-03-15

## 2021-03-12 RX ORDER — FUROSEMIDE 40 MG
40 TABLET ORAL
Refills: 0 | Status: DISCONTINUED | OUTPATIENT
Start: 2021-03-12 | End: 2021-03-15

## 2021-03-12 RX ORDER — DIGOXIN 250 MCG
125 TABLET ORAL DAILY
Refills: 0 | Status: DISCONTINUED | OUTPATIENT
Start: 2021-03-12 | End: 2021-03-12

## 2021-03-12 RX ORDER — PANTOPRAZOLE SODIUM 20 MG/1
40 TABLET, DELAYED RELEASE ORAL
Refills: 0 | Status: DISCONTINUED | OUTPATIENT
Start: 2021-03-12 | End: 2021-03-15

## 2021-03-12 RX ORDER — ASCORBIC ACID 60 MG
500 TABLET,CHEWABLE ORAL DAILY
Refills: 0 | Status: DISCONTINUED | OUTPATIENT
Start: 2021-03-12 | End: 2021-03-15

## 2021-03-12 RX ORDER — ONDANSETRON 8 MG/1
4 TABLET, FILM COATED ORAL EVERY 6 HOURS
Refills: 0 | Status: DISCONTINUED | OUTPATIENT
Start: 2021-03-12 | End: 2021-03-15

## 2021-03-12 RX ORDER — DEXTROSE 50 % IN WATER 50 %
25 SYRINGE (ML) INTRAVENOUS ONCE
Refills: 0 | Status: DISCONTINUED | OUTPATIENT
Start: 2021-03-12 | End: 2021-03-15

## 2021-03-12 RX ORDER — INSULIN LISPRO 100/ML
VIAL (ML) SUBCUTANEOUS
Refills: 0 | Status: DISCONTINUED | OUTPATIENT
Start: 2021-03-12 | End: 2021-03-15

## 2021-03-12 RX ORDER — INSULIN GLARGINE 100 [IU]/ML
30 INJECTION, SOLUTION SUBCUTANEOUS AT BEDTIME
Refills: 0 | Status: DISCONTINUED | OUTPATIENT
Start: 2021-03-12 | End: 2021-03-15

## 2021-03-12 RX ORDER — POTASSIUM CHLORIDE 20 MEQ
10 PACKET (EA) ORAL
Refills: 0 | Status: COMPLETED | OUTPATIENT
Start: 2021-03-12 | End: 2021-03-12

## 2021-03-12 RX ORDER — INSULIN GLARGINE 100 [IU]/ML
25 INJECTION, SOLUTION SUBCUTANEOUS EVERY MORNING
Refills: 0 | Status: DISCONTINUED | OUTPATIENT
Start: 2021-03-12 | End: 2021-03-15

## 2021-03-12 RX ORDER — POTASSIUM CHLORIDE 20 MEQ
40 PACKET (EA) ORAL ONCE
Refills: 0 | Status: COMPLETED | OUTPATIENT
Start: 2021-03-12 | End: 2021-03-12

## 2021-03-12 RX ORDER — MAGNESIUM SULFATE 500 MG/ML
2 VIAL (ML) INJECTION ONCE
Refills: 0 | Status: COMPLETED | OUTPATIENT
Start: 2021-03-12 | End: 2021-03-13

## 2021-03-12 RX ORDER — FUROSEMIDE 40 MG
60 TABLET ORAL EVERY 12 HOURS
Refills: 0 | Status: DISCONTINUED | OUTPATIENT
Start: 2021-03-12 | End: 2021-03-12

## 2021-03-12 RX ORDER — ATORVASTATIN CALCIUM 80 MG/1
40 TABLET, FILM COATED ORAL AT BEDTIME
Refills: 0 | Status: DISCONTINUED | OUTPATIENT
Start: 2021-03-12 | End: 2021-03-15

## 2021-03-12 RX ORDER — GLUCAGON INJECTION, SOLUTION 0.5 MG/.1ML
1 INJECTION, SOLUTION SUBCUTANEOUS ONCE
Refills: 0 | Status: DISCONTINUED | OUTPATIENT
Start: 2021-03-12 | End: 2021-03-15

## 2021-03-12 RX ORDER — DEXTROSE 50 % IN WATER 50 %
12.5 SYRINGE (ML) INTRAVENOUS ONCE
Refills: 0 | Status: DISCONTINUED | OUTPATIENT
Start: 2021-03-12 | End: 2021-03-15

## 2021-03-12 RX ORDER — LANOLIN ALCOHOL/MO/W.PET/CERES
3 CREAM (GRAM) TOPICAL AT BEDTIME
Refills: 0 | Status: DISCONTINUED | OUTPATIENT
Start: 2021-03-12 | End: 2021-03-15

## 2021-03-12 RX ORDER — APIXABAN 2.5 MG/1
2.5 TABLET, FILM COATED ORAL EVERY 12 HOURS
Refills: 0 | Status: DISCONTINUED | OUTPATIENT
Start: 2021-03-12 | End: 2021-03-12

## 2021-03-12 RX ORDER — SERTRALINE 25 MG/1
50 TABLET, FILM COATED ORAL AT BEDTIME
Refills: 0 | Status: DISCONTINUED | OUTPATIENT
Start: 2021-03-12 | End: 2021-03-15

## 2021-03-12 RX ORDER — DEXTROSE 50 % IN WATER 50 %
15 SYRINGE (ML) INTRAVENOUS ONCE
Refills: 0 | Status: DISCONTINUED | OUTPATIENT
Start: 2021-03-12 | End: 2021-03-15

## 2021-03-12 RX ADMIN — Medication 75 MILLIGRAM(S): at 06:09

## 2021-03-12 RX ADMIN — Medication 40 MILLIEQUIVALENT(S): at 01:38

## 2021-03-12 RX ADMIN — Medication 500 MILLIGRAM(S): at 12:42

## 2021-03-12 RX ADMIN — INSULIN GLARGINE 25 UNIT(S): 100 INJECTION, SOLUTION SUBCUTANEOUS at 09:10

## 2021-03-12 RX ADMIN — Medication 40 MILLIGRAM(S): at 17:41

## 2021-03-12 RX ADMIN — Medication 1: at 12:42

## 2021-03-12 RX ADMIN — Medication 75 MILLIGRAM(S): at 12:47

## 2021-03-12 RX ADMIN — INSULIN GLARGINE 30 UNIT(S): 100 INJECTION, SOLUTION SUBCUTANEOUS at 23:07

## 2021-03-12 RX ADMIN — SERTRALINE 50 MILLIGRAM(S): 25 TABLET, FILM COATED ORAL at 23:07

## 2021-03-12 RX ADMIN — Medication 40 MILLIEQUIVALENT(S): at 12:42

## 2021-03-12 RX ADMIN — PANTOPRAZOLE SODIUM 40 MILLIGRAM(S): 20 TABLET, DELAYED RELEASE ORAL at 06:09

## 2021-03-12 RX ADMIN — INSULIN GLARGINE 30 UNIT(S): 100 INJECTION, SOLUTION SUBCUTANEOUS at 01:57

## 2021-03-12 RX ADMIN — APIXABAN 2.5 MILLIGRAM(S): 2.5 TABLET, FILM COATED ORAL at 01:38

## 2021-03-12 RX ADMIN — Medication 100 MILLIEQUIVALENT(S): at 17:41

## 2021-03-12 RX ADMIN — Medication 125 MICROGRAM(S): at 06:09

## 2021-03-12 RX ADMIN — ATORVASTATIN CALCIUM 40 MILLIGRAM(S): 80 TABLET, FILM COATED ORAL at 23:07

## 2021-03-12 RX ADMIN — SPIRONOLACTONE 25 MILLIGRAM(S): 25 TABLET, FILM COATED ORAL at 17:41

## 2021-03-12 RX ADMIN — Medication 60 MILLIGRAM(S): at 06:09

## 2021-03-12 RX ADMIN — SERTRALINE 50 MILLIGRAM(S): 25 TABLET, FILM COATED ORAL at 01:38

## 2021-03-12 RX ADMIN — Medication 1 TABLET(S): at 12:41

## 2021-03-12 RX ADMIN — Medication 1: at 09:10

## 2021-03-12 RX ADMIN — Medication 100 MILLIEQUIVALENT(S): at 22:27

## 2021-03-12 RX ADMIN — Medication 3 MILLIGRAM(S): at 23:07

## 2021-03-12 RX ADMIN — Medication 100 MILLIEQUIVALENT(S): at 19:37

## 2021-03-12 RX ADMIN — Medication 1: at 23:07

## 2021-03-12 RX ADMIN — Medication 81 MILLIGRAM(S): at 12:41

## 2021-03-12 NOTE — PHYSICAL THERAPY INITIAL EVALUATION ADULT - CRITERIA FOR SKILLED THERAPEUTIC INTERVENTIONS
impairments found/risk reduction/prevention/rehab potential/therapy frequency/predicted duration of therapy intervention/anticipated equipment needs at discharge

## 2021-03-12 NOTE — CONSULT NOTE ADULT - SUBJECTIVE AND OBJECTIVE BOX
[facility], [facility]                                   [address] [place] [address]                                                      PHONE: [phone]                                                         FAX: [phone]  [email or web address]  ---------------------------------------------------------------------------------------------------------------------------------    Reason for Consult: CHF     HPI:  DONALD SAUNDERS is an 75y Female with past medical history significant for HTN, HLD, PVD s/p LE revasc in past, PAF on AC DM who was recently admitted acute Hypoxic Respiratory Failure, Acute HFrEF, Ischemic Cardiomyopathy, Severe Multivessel CAD, NSTEMI, Paroxysmal Afib, Moderate MR in which cath shows severe 3V disease with severe LM lesion. GERALDINE shows moderate MR and mild AS s/p CABG and MR repair.  Patient now presents with progressive worsening of shortness of breath and RENTERIA for past two weeks, orthopnea and PND without chest pain.  Patient feeling better after IV Lasix.       PAST MEDICAL & SURGICAL HISTORY:  PAD (peripheral artery disease)    Paroxysmal atrial fibrillation    Hypercholesterolemia    Diabetes    Hypertension    S/P peripheral artery angioplasty with stent placement    S/P femoral-popliteal bypass surgery    H/O hernia repair    H/O abdominal hysterectomy        OHS (Unknown)  warfarin (Rash)      MEDICATIONS  (STANDING):  ascorbic acid 500 milliGRAM(s) Oral daily  aspirin enteric coated 81 milliGRAM(s) Oral daily  atorvastatin 40 milliGRAM(s) Oral at bedtime  dextrose 40% Gel 15 Gram(s) Oral once  dextrose 5%. 1000 milliLiter(s) (50 mL/Hr) IV Continuous <Continuous>  dextrose 5%. 1000 milliLiter(s) (100 mL/Hr) IV Continuous <Continuous>  dextrose 50% Injectable 25 Gram(s) IV Push once  dextrose 50% Injectable 12.5 Gram(s) IV Push once  dextrose 50% Injectable 25 Gram(s) IV Push once  furosemide   Injectable 40 milliGRAM(s) IV Push two times a day  glucagon  Injectable 1 milliGRAM(s) IntraMuscular once  insulin glargine Injectable (LANTUS) 25 Unit(s) SubCutaneous every morning  insulin glargine Injectable (LANTUS) 30 Unit(s) SubCutaneous at bedtime  insulin lispro (ADMELOG) corrective regimen sliding scale   SubCutaneous Before meals and at bedtime  metoprolol tartrate 75 milliGRAM(s) Oral every 8 hours  multivitamin 1 Tablet(s) Oral daily  pantoprazole    Tablet 40 milliGRAM(s) Oral before breakfast  potassium chloride    Tablet ER 40 milliEquivalent(s) Oral once  sertraline 50 milliGRAM(s) Oral at bedtime  spironolactone 25 milliGRAM(s) Oral daily    MEDICATIONS  (PRN):  melatonin 3 milliGRAM(s) Oral at bedtime PRN Insomnia  ondansetron Injectable 4 milliGRAM(s) IV Push every 6 hours PRN Nausea      Social History:  Cigarettes:      none               Alchohol:    none              Illicit Drug Abuse:  none     FH negative CAD     ROS: Negative other than as mentioned in HPI.    Vital Signs Last 24 Hrs  T(C): 36.6 (12 Mar 2021 07:40), Max: 36.8 (11 Mar 2021 23:39)  T(F): 97.9 (12 Mar 2021 07:40), Max: 98.2 (11 Mar 2021 23:39)  HR: 75 (12 Mar 2021 07:40) (75 - 101)  BP: 99/60 (12 Mar 2021 07:40) (99/60 - 131/83)  BP(mean): --  RR: 18 (12 Mar 2021 07:40) (18 - 19)  SpO2: 96% (12 Mar 2021 07:40) (95% - 98%)  ICU Vital Signs Last 24 Hrs  DONALD SAUNDERS  I&O's Detail    I&O's Summary    Drug Dosing Weight  DONALD SAUNDERS      PHYSICAL EXAM:  General: Appears well developed, well nourished alert and cooperative.  HEENT: Head; normocephalic, atraumatic.  Eyes: Pupils reactive, cornea wnl.  Neck: Supple, no nodes adenopathy, no NVD or carotid bruit or thyromegaly.  CARDIOVASCULAR: Normal S1 and S2, 2/6 murmur, rub, gallop or lift.   LUNGS: Decrease BS RLL   ABDOMEN: Soft, nontender without mass or organomegaly. bowel sounds normoactive.  EXTREMITIES: No clubbing, cyanosis or edema. Distal pulses wnl.   SKIN: warm and dry with normal turgor.  NEURO: Alert/oriented x 3/normal motor exam. No pathologic reflexes.    PSYCH: normal affect.        LABS:                        11.2   6.34  )-----------( 362      ( 12 Mar 2021 08:50 )             36.1     03-12    138  |  98  |  10.0  ----------------------------<  174<H>  3.1<L>   |  23.0  |  0.54    Ca    8.5<L>      12 Mar 2021 08:50  Phos  3.5     03-12  Mg     1.4     03-12    TPro  6.6  /  Alb  3.1<L>  /  TBili  1.5  /  DBili  x   /  AST  31  /  ALT  19  /  AlkPhos  124<H>  03-12    DONALD SAUNDERS  CARDIAC MARKERS ( 12 Mar 2021 08:50 )  x     / 0.04 ng/mL / 55 U/L / x     / x      CARDIAC MARKERS ( 11 Mar 2021 18:48 )  x     / 0.05 ng/mL / x     / x     / x          PT/INR - ( 11 Mar 2021 18:48 )   PT: 21.2 sec;   INR: 1.88 ratio         PTT - ( 11 Mar 2021 18:48 )  PTT:33.5 sec      RADIOLOGY & ADDITIONAL STUDIES:    INTERPRETATION OF TELEMETRY (personally reviewed):    ECG:    Diagnosis Line *** Poor data quality, interpretation may be adversely affected  Atrial fibrillation with premature ventricular or aberrantly conducted complexes  poor r wave progressio    ECHO: pending     Summary:  1. Left ventricular ejection fraction, by visual estimation, is 45 to 50%.   2. Mildly decreased global left ventricular systolic function.   3. Abnormal septal motion consistent with post-operative status.   4. Mildly increased LV wall thickness.   5. Normal left ventricular internal cavity size.   6. There is mild concentric left ventricular hypertrophy.   7. Status-post mitral annular ring insertion.   8. Trace mitral valve regurgitation.   9. Estimated pulmonary artery systolic pressure is 42.2 mmHg assuming a right atrial pressure of 10 mmHg, which is consistent with mild pulmonary hypertension.  10. When compared with an echo 1/27/21 there are no new wall motion abnormalities.  11. Mitral valve mean gradient is 2.5 mmHg consistent with mild mitral stenosis.    [id number] Jose Turner , Electronically signed on 2/4/2021 at 11:36:44 AM      CARDIAC CATHETERIZATION:  Heparin drip at 1400U/h  Continue asa 81mg daily  Can hold plavix  Would hold beta blocker due to decompensated heart failure. Can give back  half the dose (25mg q6h) if patient become tachycardic.  Continue diuresis with lasix 40mg IV bid  CT surgery evaluation for LIMA to LAD and SVG to LCx  INTERVENTIONAL IMPRESSIONS: 90% left main coronary artery stenosis  70% proximal and 70% mid LAD stenosis  70% stenosis of proximal RCA in a very small and nondominant artery  INTERVENTIONAL RECOMMENDATIONS: Balloon pump was placed for severe LMCA  stenosis and elevated LVEDP/decompensated heart failure.  Heparin drip at 1400U/h  Continue asa 81mg daily  Can hold plavix  Would hold beta blocker due to decompensated heart failure. Can give back  half the dose (25mg q6h) if patient become tachycardic.  Continue diuresis with lasix 40mg IV bid  CT surgery evaluation for LIMA to LAD and SVG to LCx  Prepared and signed by  David Smith MD  Signed 01/13/2021 15:10:23  HEMODYNAMIC TABLES  Pressures:  Baseline  Pressures:  - HR: 98  Pressures:  - Rhythm:  Pressures:  -- Aortic Pressure (S/D/M): 123/77/96        Assessment and Plan:  In summary, DONALD SAUNEDRS is an 75y Female with past medical history significant for HTN, HLD, PVD s/p LE revasc in past, PAF on AC DM who was recently admitted acute Hypoxic Respiratory Failure, Acute HFrEF, Ischemic Cardiomyopathy, Severe Multivessel CAD, NSTEMI, Paroxysmal Afib, Moderate MR in which cath shows severe 3V disease with severe LM lesion. GERALDINE shows moderate MR and mild AS s/p CABG and MR repair.  Patient now presents with progressive worsening of shortness of breath and RENTERIA for past two weeks, orthopnea and PND without chest pain.  Patient feeling better after IV Lasix. TTE EF ~ 45% now with Acute on chronic HFpEF negative for AMI    Plan  1.  Eliquis on Hold for possible pigtail catheter   2.  Change Lasix 60 IV BID to 40 mg IV BID   3.  Start Aldactone 25 mg po daily   4.  Keep K > 3.6 meq  5.  Discontinue Dig due to hypokalemia  6.  Continue B-Blockers   7.  Check TTE to re-evaluate LV EF

## 2021-03-12 NOTE — CONSULT NOTE ADULT - REASON FOR ADMISSION
Acute Hypoxic Respiratory Failure 2/2 Acute on Chronic CHF Exacerbation and Pleural Effusion
Shortness of breath

## 2021-03-12 NOTE — PHYSICAL THERAPY INITIAL EVALUATION ADULT - ADDITIONAL COMMENTS
as per pt: resides in the private house 3 steps (+) rail to the porch and 4 steps (+) rail to the house, ambulates with RW,  available to assist as needed upon D/C home

## 2021-03-12 NOTE — PHYSICAL THERAPY INITIAL EVALUATION ADULT - PERTINENT HX OF CURRENT PROBLEM, REHAB EVAL
presented to ED with  Acute Hypoxic Respiratory Failure 2/2 Acute on Chronic CHF Exacerbation and Pleural Effusion

## 2021-03-13 ENCOUNTER — TRANSCRIPTION ENCOUNTER (OUTPATIENT)
Age: 76
End: 2021-03-13

## 2021-03-13 ENCOUNTER — RESULT REVIEW (OUTPATIENT)
Age: 76
End: 2021-03-13

## 2021-03-13 DIAGNOSIS — N30.01 ACUTE CYSTITIS WITH HEMATURIA: ICD-10-CM

## 2021-03-13 LAB
ALBUMIN SERPL ELPH-MCNC: 3.1 G/DL — LOW (ref 3.3–5.2)
ALP SERPL-CCNC: 122 U/L — HIGH (ref 40–120)
ALT FLD-CCNC: 18 U/L — SIGNIFICANT CHANGE UP
ANION GAP SERPL CALC-SCNC: 13 MMOL/L — SIGNIFICANT CHANGE UP (ref 5–17)
AST SERPL-CCNC: 30 U/L — SIGNIFICANT CHANGE UP
B PERT IGG+IGM PNL SER: ABNORMAL
BILIRUB SERPL-MCNC: 1.4 MG/DL — SIGNIFICANT CHANGE UP (ref 0.4–2)
BUN SERPL-MCNC: 8 MG/DL — SIGNIFICANT CHANGE UP (ref 8–20)
CALCIUM SERPL-MCNC: 8.9 MG/DL — SIGNIFICANT CHANGE UP (ref 8.6–10.2)
CHLORIDE SERPL-SCNC: 96 MMOL/L — LOW (ref 98–107)
CO2 SERPL-SCNC: 29 MMOL/L — SIGNIFICANT CHANGE UP (ref 22–29)
COLOR FLD: YELLOW
CREAT SERPL-MCNC: 0.56 MG/DL — SIGNIFICANT CHANGE UP (ref 0.5–1.3)
DIGOXIN SERPL-MCNC: 0.8 NG/ML — SIGNIFICANT CHANGE UP (ref 0.8–2)
FLUID INTAKE SUBSTANCE CLASS: SIGNIFICANT CHANGE UP
FLUID SEGMENTED GRANULOCYTES: 2 % — SIGNIFICANT CHANGE UP
GLUCOSE BLDC GLUCOMTR-MCNC: 113 MG/DL — HIGH (ref 70–99)
GLUCOSE BLDC GLUCOMTR-MCNC: 143 MG/DL — HIGH (ref 70–99)
GLUCOSE BLDC GLUCOMTR-MCNC: 172 MG/DL — HIGH (ref 70–99)
GLUCOSE BLDC GLUCOMTR-MCNC: 98 MG/DL — SIGNIFICANT CHANGE UP (ref 70–99)
GLUCOSE SERPL-MCNC: 89 MG/DL — SIGNIFICANT CHANGE UP (ref 70–99)
HCT VFR BLD CALC: 36.2 % — SIGNIFICANT CHANGE UP (ref 34.5–45)
HGB BLD-MCNC: 11.4 G/DL — LOW (ref 11.5–15.5)
INR BLD: 1.48 RATIO — HIGH (ref 0.88–1.16)
LYMPHOCYTES # FLD: 91 % — SIGNIFICANT CHANGE UP
MAGNESIUM SERPL-MCNC: 1.9 MG/DL — SIGNIFICANT CHANGE UP (ref 1.6–2.6)
MCHC RBC-ENTMCNC: 26.1 PG — LOW (ref 27–34)
MCHC RBC-ENTMCNC: 31.5 GM/DL — LOW (ref 32–36)
MCV RBC AUTO: 83 FL — SIGNIFICANT CHANGE UP (ref 80–100)
MONOS+MACROS # FLD: 7 % — SIGNIFICANT CHANGE UP
PH FLD: 8 — SIGNIFICANT CHANGE UP
PHOSPHATE SERPL-MCNC: 3.7 MG/DL — SIGNIFICANT CHANGE UP (ref 2.4–4.7)
PLATELET # BLD AUTO: 357 K/UL — SIGNIFICANT CHANGE UP (ref 150–400)
POTASSIUM SERPL-MCNC: 3.3 MMOL/L — LOW (ref 3.5–5.3)
POTASSIUM SERPL-SCNC: 3.3 MMOL/L — LOW (ref 3.5–5.3)
PROT SERPL-MCNC: 6.5 G/DL — LOW (ref 6.6–8.7)
PROTHROM AB SERPL-ACNC: 16.8 SEC — HIGH (ref 10.6–13.6)
RBC # BLD: 4.36 M/UL — SIGNIFICANT CHANGE UP (ref 3.8–5.2)
RBC # FLD: 18.2 % — HIGH (ref 10.3–14.5)
RCV VOL RI: 6000 /UL — HIGH (ref 0–0)
SODIUM SERPL-SCNC: 138 MMOL/L — SIGNIFICANT CHANGE UP (ref 135–145)
TOTAL NUCLEATED CELL COUNT, BODY FLUID: 412 /UL — SIGNIFICANT CHANGE UP
TUBE TYPE: SIGNIFICANT CHANGE UP
WBC # BLD: 6.21 K/UL — SIGNIFICANT CHANGE UP (ref 3.8–10.5)
WBC # FLD AUTO: 6.21 K/UL — SIGNIFICANT CHANGE UP (ref 3.8–10.5)

## 2021-03-13 PROCEDURE — 71046 X-RAY EXAM CHEST 2 VIEWS: CPT | Mod: 26

## 2021-03-13 PROCEDURE — 88305 TISSUE EXAM BY PATHOLOGIST: CPT | Mod: 26

## 2021-03-13 PROCEDURE — 71045 X-RAY EXAM CHEST 1 VIEW: CPT | Mod: 26,59

## 2021-03-13 PROCEDURE — 32557 INSERT CATH PLEURA W/ IMAGE: CPT | Mod: 79

## 2021-03-13 PROCEDURE — 88341 IMHCHEM/IMCYTCHM EA ADD ANTB: CPT | Mod: 26

## 2021-03-13 PROCEDURE — 88112 CYTOPATH CELL ENHANCE TECH: CPT | Mod: 26

## 2021-03-13 PROCEDURE — 99233 SBSQ HOSP IP/OBS HIGH 50: CPT

## 2021-03-13 PROCEDURE — 88342 IMHCHEM/IMCYTCHM 1ST ANTB: CPT | Mod: 26

## 2021-03-13 PROCEDURE — 71045 X-RAY EXAM CHEST 1 VIEW: CPT | Mod: 26,77,59

## 2021-03-13 PROCEDURE — 99232 SBSQ HOSP IP/OBS MODERATE 35: CPT | Mod: 25

## 2021-03-13 RX ORDER — POTASSIUM CHLORIDE 20 MEQ
40 PACKET (EA) ORAL EVERY 4 HOURS
Refills: 0 | Status: COMPLETED | OUTPATIENT
Start: 2021-03-13 | End: 2021-03-13

## 2021-03-13 RX ORDER — CEFTRIAXONE 500 MG/1
1000 INJECTION, POWDER, FOR SOLUTION INTRAMUSCULAR; INTRAVENOUS EVERY 24 HOURS
Refills: 0 | Status: DISCONTINUED | OUTPATIENT
Start: 2021-03-13 | End: 2021-03-15

## 2021-03-13 RX ORDER — HYDROMORPHONE HYDROCHLORIDE 2 MG/ML
0.5 INJECTION INTRAMUSCULAR; INTRAVENOUS; SUBCUTANEOUS ONCE
Refills: 0 | Status: DISCONTINUED | OUTPATIENT
Start: 2021-03-13 | End: 2021-03-13

## 2021-03-13 RX ORDER — OXYCODONE AND ACETAMINOPHEN 5; 325 MG/1; MG/1
2 TABLET ORAL EVERY 4 HOURS
Refills: 0 | Status: DISCONTINUED | OUTPATIENT
Start: 2021-03-13 | End: 2021-03-15

## 2021-03-13 RX ORDER — INFLUENZA VIRUS VACCINE 15; 15; 15; 15 UG/.5ML; UG/.5ML; UG/.5ML; UG/.5ML
0.5 SUSPENSION INTRAMUSCULAR ONCE
Refills: 0 | Status: DISCONTINUED | OUTPATIENT
Start: 2021-03-13 | End: 2021-03-15

## 2021-03-13 RX ORDER — PHENAZOPYRIDINE HCL 100 MG
100 TABLET ORAL ONCE
Refills: 0 | Status: COMPLETED | OUTPATIENT
Start: 2021-03-13 | End: 2021-03-13

## 2021-03-13 RX ORDER — OXYCODONE AND ACETAMINOPHEN 5; 325 MG/1; MG/1
1 TABLET ORAL EVERY 4 HOURS
Refills: 0 | Status: DISCONTINUED | OUTPATIENT
Start: 2021-03-13 | End: 2021-03-15

## 2021-03-13 RX ADMIN — Medication 1: at 16:31

## 2021-03-13 RX ADMIN — Medication 75 MILLIGRAM(S): at 21:21

## 2021-03-13 RX ADMIN — Medication 40 MILLIEQUIVALENT(S): at 16:31

## 2021-03-13 RX ADMIN — SPIRONOLACTONE 25 MILLIGRAM(S): 25 TABLET, FILM COATED ORAL at 06:10

## 2021-03-13 RX ADMIN — PANTOPRAZOLE SODIUM 40 MILLIGRAM(S): 20 TABLET, DELAYED RELEASE ORAL at 06:10

## 2021-03-13 RX ADMIN — Medication 100 MILLIGRAM(S): at 06:10

## 2021-03-13 RX ADMIN — SERTRALINE 50 MILLIGRAM(S): 25 TABLET, FILM COATED ORAL at 21:21

## 2021-03-13 RX ADMIN — HYDROMORPHONE HYDROCHLORIDE 0.5 MILLIGRAM(S): 2 INJECTION INTRAMUSCULAR; INTRAVENOUS; SUBCUTANEOUS at 21:19

## 2021-03-13 RX ADMIN — Medication 75 MILLIGRAM(S): at 13:00

## 2021-03-13 RX ADMIN — Medication 500 MILLIGRAM(S): at 11:45

## 2021-03-13 RX ADMIN — INSULIN GLARGINE 25 UNIT(S): 100 INJECTION, SOLUTION SUBCUTANEOUS at 08:27

## 2021-03-13 RX ADMIN — Medication 40 MILLIEQUIVALENT(S): at 11:45

## 2021-03-13 RX ADMIN — INSULIN GLARGINE 30 UNIT(S): 100 INJECTION, SOLUTION SUBCUTANEOUS at 21:20

## 2021-03-13 RX ADMIN — Medication 75 MILLIGRAM(S): at 06:10

## 2021-03-13 RX ADMIN — HYDROMORPHONE HYDROCHLORIDE 0.5 MILLIGRAM(S): 2 INJECTION INTRAMUSCULAR; INTRAVENOUS; SUBCUTANEOUS at 20:09

## 2021-03-13 RX ADMIN — Medication 50 GRAM(S): at 00:45

## 2021-03-13 RX ADMIN — Medication 40 MILLIGRAM(S): at 20:09

## 2021-03-13 RX ADMIN — CEFTRIAXONE 100 MILLIGRAM(S): 500 INJECTION, POWDER, FOR SOLUTION INTRAMUSCULAR; INTRAVENOUS at 16:31

## 2021-03-13 RX ADMIN — Medication 81 MILLIGRAM(S): at 11:45

## 2021-03-13 RX ADMIN — Medication 40 MILLIGRAM(S): at 06:10

## 2021-03-13 RX ADMIN — Medication 1 TABLET(S): at 11:45

## 2021-03-13 RX ADMIN — ATORVASTATIN CALCIUM 40 MILLIGRAM(S): 80 TABLET, FILM COATED ORAL at 21:21

## 2021-03-13 NOTE — DISCHARGE NOTE NURSING/CASE MANAGEMENT/SOCIAL WORK - PATIENT PORTAL LINK FT
You can access the FollowMyHealth Patient Portal offered by [facility] by registering at the following website: [email or web address]. By joining [facility]’s FollowMyHealth portal, you will also be able to view your health information using other applications (apps) compatible with our system.

## 2021-03-14 LAB
ALBUMIN FLD-MCNC: 2.2 G/DL — SIGNIFICANT CHANGE UP
CULTURE RESULTS: SIGNIFICANT CHANGE UP
GLUCOSE BLDC GLUCOMTR-MCNC: 149 MG/DL — HIGH (ref 70–99)
GLUCOSE BLDC GLUCOMTR-MCNC: 169 MG/DL — HIGH (ref 70–99)
GLUCOSE BLDC GLUCOMTR-MCNC: 189 MG/DL — HIGH (ref 70–99)
GLUCOSE BLDC GLUCOMTR-MCNC: 231 MG/DL — HIGH (ref 70–99)
GLUCOSE BLDC GLUCOMTR-MCNC: 82 MG/DL — SIGNIFICANT CHANGE UP (ref 70–99)
GLUCOSE FLD-MCNC: 159 MG/DL — SIGNIFICANT CHANGE UP
GRAM STN FLD: SIGNIFICANT CHANGE UP
LDH SERPL L TO P-CCNC: 199 U/L — SIGNIFICANT CHANGE UP
PROT FLD-MCNC: 3.8 G/DL — SIGNIFICANT CHANGE UP
SPECIMEN SOURCE: SIGNIFICANT CHANGE UP
SPECIMEN SOURCE: SIGNIFICANT CHANGE UP

## 2021-03-14 PROCEDURE — 99232 SBSQ HOSP IP/OBS MODERATE 35: CPT | Mod: 24

## 2021-03-14 PROCEDURE — 71045 X-RAY EXAM CHEST 1 VIEW: CPT | Mod: 26

## 2021-03-14 PROCEDURE — 99232 SBSQ HOSP IP/OBS MODERATE 35: CPT

## 2021-03-14 RX ORDER — APIXABAN 2.5 MG/1
2.5 TABLET, FILM COATED ORAL
Refills: 0 | Status: DISCONTINUED | OUTPATIENT
Start: 2021-03-14 | End: 2021-03-15

## 2021-03-14 RX ADMIN — Medication 75 MILLIGRAM(S): at 22:08

## 2021-03-14 RX ADMIN — Medication 1: at 17:11

## 2021-03-14 RX ADMIN — INSULIN GLARGINE 25 UNIT(S): 100 INJECTION, SOLUTION SUBCUTANEOUS at 09:44

## 2021-03-14 RX ADMIN — OXYCODONE AND ACETAMINOPHEN 2 TABLET(S): 5; 325 TABLET ORAL at 01:10

## 2021-03-14 RX ADMIN — CEFTRIAXONE 100 MILLIGRAM(S): 500 INJECTION, POWDER, FOR SOLUTION INTRAMUSCULAR; INTRAVENOUS at 16:23

## 2021-03-14 RX ADMIN — PANTOPRAZOLE SODIUM 40 MILLIGRAM(S): 20 TABLET, DELAYED RELEASE ORAL at 05:28

## 2021-03-14 RX ADMIN — APIXABAN 2.5 MILLIGRAM(S): 2.5 TABLET, FILM COATED ORAL at 17:11

## 2021-03-14 RX ADMIN — INSULIN GLARGINE 30 UNIT(S): 100 INJECTION, SOLUTION SUBCUTANEOUS at 22:08

## 2021-03-14 RX ADMIN — ATORVASTATIN CALCIUM 40 MILLIGRAM(S): 80 TABLET, FILM COATED ORAL at 22:08

## 2021-03-14 RX ADMIN — OXYCODONE AND ACETAMINOPHEN 2 TABLET(S): 5; 325 TABLET ORAL at 08:05

## 2021-03-14 RX ADMIN — OXYCODONE AND ACETAMINOPHEN 2 TABLET(S): 5; 325 TABLET ORAL at 00:21

## 2021-03-14 RX ADMIN — Medication 40 MILLIGRAM(S): at 17:11

## 2021-03-14 RX ADMIN — Medication 75 MILLIGRAM(S): at 05:27

## 2021-03-14 RX ADMIN — OXYCODONE AND ACETAMINOPHEN 2 TABLET(S): 5; 325 TABLET ORAL at 19:43

## 2021-03-14 RX ADMIN — Medication 40 MILLIGRAM(S): at 05:27

## 2021-03-14 RX ADMIN — Medication 500 MILLIGRAM(S): at 08:07

## 2021-03-14 RX ADMIN — Medication 81 MILLIGRAM(S): at 08:06

## 2021-03-14 RX ADMIN — Medication 2: at 12:29

## 2021-03-14 RX ADMIN — SERTRALINE 50 MILLIGRAM(S): 25 TABLET, FILM COATED ORAL at 22:08

## 2021-03-14 RX ADMIN — OXYCODONE AND ACETAMINOPHEN 2 TABLET(S): 5; 325 TABLET ORAL at 20:45

## 2021-03-14 RX ADMIN — SPIRONOLACTONE 25 MILLIGRAM(S): 25 TABLET, FILM COATED ORAL at 05:28

## 2021-03-14 RX ADMIN — OXYCODONE AND ACETAMINOPHEN 2 TABLET(S): 5; 325 TABLET ORAL at 08:35

## 2021-03-14 RX ADMIN — Medication 1 TABLET(S): at 08:07

## 2021-03-14 NOTE — DIETITIAN INITIAL EVALUATION ADULT. - ETIOLOGY
related to inability to consume sufficient protein energy intake with poor appetite following CABG 8 wks ago

## 2021-03-14 NOTE — PROGRESS NOTE ADULT - NUTRITIONAL ASSESSMENT
This patient has been assessed with a concern for Malnutrition and has been determined to have a diagnosis/diagnoses of Severe protein-calorie malnutrition.    This patient is being managed with:   Diet DASH/TLC-  Sodium & Cholesterol Restricted  Consistent Carbohydrate {Evening Snack} (CSTCHOSN)  1000mL Fluid Restriction ([id number])  Entered: Mar 12 2021  3:04AM

## 2021-03-14 NOTE — DIETITIAN INITIAL EVALUATION ADULT. - PERTINENT LABORATORY DATA
03-13 Na138 mmol/L Glu 89 mg/dL K+ 3.3 mmol/L<L> Cr  0.56 mg/dL BUN 8.0 mg/dL Phos 3.7 mg/dL Alb 3.1 g/dL<L> PAB n/a

## 2021-03-14 NOTE — DIETITIAN INITIAL EVALUATION ADULT. - OTHER INFO
Pt with h/o CAD s/p CABG (8 weeks ago), HTN, HLD, CHFrEF (LVEF 40-45%), Former Tobacco Abuse, CHF, GERD, MDD admitted for acute hypoxic respiratory failure 2/2 Acute on Chronic CHF Exacerbation with Pleural Effusion.

## 2021-03-14 NOTE — DIETITIAN INITIAL EVALUATION ADULT. - ORAL INTAKE PTA/DIET HISTORY
Pt reports good po intake this morning- states appetite starting to improve since admission.  Pt states poor appetite since CABG 8 weeks ago and reports 30lb unintentional wt loss.  Discussed importance of consuming adequate protein intake at meals to optimize nutrition status. Pt very receptive and demonstrates good understanding.  Pt dislikes nutrition supplements and will consume high protein foods at meals.

## 2021-03-14 NOTE — DIETITIAN NUTRITION RISK NOTIFICATION - TREATMENT: THE FOLLOWING DIET HAS BEEN RECOMMENDED
Diet, DASH/TLC:   Sodium & Cholesterol Restricted  Consistent Carbohydrate {Evening Snack} (CSTCHOSN)  1000mL Fluid Restriction ([id number]) (03-12-21 @ 03:05) [Active]      Encourage adequate protein intake at meals  Pt dislikes nutrition supplements

## 2021-03-15 ENCOUNTER — TRANSCRIPTION ENCOUNTER (OUTPATIENT)
Age: 76
End: 2021-03-15

## 2021-03-15 VITALS
SYSTOLIC BLOOD PRESSURE: 100 MMHG | DIASTOLIC BLOOD PRESSURE: 66 MMHG | RESPIRATION RATE: 19 BRPM | TEMPERATURE: 98 F | HEART RATE: 77 BPM | OXYGEN SATURATION: 96 %

## 2021-03-15 LAB
GLUCOSE BLDC GLUCOMTR-MCNC: 171 MG/DL — HIGH (ref 70–99)
GLUCOSE BLDC GLUCOMTR-MCNC: 238 MG/DL — HIGH (ref 70–99)
GLUCOSE BLDC GLUCOMTR-MCNC: 98 MG/DL — SIGNIFICANT CHANGE UP (ref 70–99)

## 2021-03-15 PROCEDURE — 84100 ASSAY OF PHOSPHORUS: CPT

## 2021-03-15 PROCEDURE — 36415 COLL VENOUS BLD VENIPUNCTURE: CPT

## 2021-03-15 PROCEDURE — 85025 COMPLETE CBC W/AUTO DIFF WBC: CPT

## 2021-03-15 PROCEDURE — 82550 ASSAY OF CK (CPK): CPT

## 2021-03-15 PROCEDURE — 97163 PT EVAL HIGH COMPLEX 45 MIN: CPT

## 2021-03-15 PROCEDURE — 99239 HOSP IP/OBS DSCHRG MGMT >30: CPT

## 2021-03-15 PROCEDURE — 85027 COMPLETE CBC AUTOMATED: CPT

## 2021-03-15 PROCEDURE — 83735 ASSAY OF MAGNESIUM: CPT

## 2021-03-15 PROCEDURE — 71045 X-RAY EXAM CHEST 1 VIEW: CPT

## 2021-03-15 PROCEDURE — 87186 SC STD MICRODIL/AGAR DIL: CPT

## 2021-03-15 PROCEDURE — 97116 GAIT TRAINING THERAPY: CPT

## 2021-03-15 PROCEDURE — 83615 LACTATE (LD) (LDH) ENZYME: CPT

## 2021-03-15 PROCEDURE — 71045 X-RAY EXAM CHEST 1 VIEW: CPT | Mod: 26

## 2021-03-15 PROCEDURE — 93005 ELECTROCARDIOGRAM TRACING: CPT

## 2021-03-15 PROCEDURE — 85730 THROMBOPLASTIN TIME PARTIAL: CPT

## 2021-03-15 PROCEDURE — 84484 ASSAY OF TROPONIN QUANT: CPT

## 2021-03-15 PROCEDURE — 36000 PLACE NEEDLE IN VEIN: CPT

## 2021-03-15 PROCEDURE — 86769 SARS-COV-2 COVID-19 ANTIBODY: CPT

## 2021-03-15 PROCEDURE — 84443 ASSAY THYROID STIM HORMONE: CPT

## 2021-03-15 PROCEDURE — 99232 SBSQ HOSP IP/OBS MODERATE 35: CPT | Mod: 24

## 2021-03-15 PROCEDURE — 88305 TISSUE EXAM BY PATHOLOGIST: CPT

## 2021-03-15 PROCEDURE — 89051 BODY FLUID CELL COUNT: CPT

## 2021-03-15 PROCEDURE — 87205 SMEAR GRAM STAIN: CPT

## 2021-03-15 PROCEDURE — 80053 COMPREHEN METABOLIC PANEL: CPT

## 2021-03-15 PROCEDURE — 99285 EMERGENCY DEPT VISIT HI MDM: CPT | Mod: 25

## 2021-03-15 PROCEDURE — 82962 GLUCOSE BLOOD TEST: CPT

## 2021-03-15 PROCEDURE — 83986 ASSAY PH BODY FLUID NOS: CPT

## 2021-03-15 PROCEDURE — 82042 OTHER SOURCE ALBUMIN QUAN EA: CPT

## 2021-03-15 PROCEDURE — C8929: CPT

## 2021-03-15 PROCEDURE — 88342 IMHCHEM/IMCYTCHM 1ST ANTB: CPT

## 2021-03-15 PROCEDURE — 88112 CYTOPATH CELL ENHANCE TECH: CPT

## 2021-03-15 PROCEDURE — 87086 URINE CULTURE/COLONY COUNT: CPT

## 2021-03-15 PROCEDURE — 87075 CULTR BACTERIA EXCEPT BLOOD: CPT

## 2021-03-15 PROCEDURE — 87102 FUNGUS ISOLATION CULTURE: CPT

## 2021-03-15 PROCEDURE — 82945 GLUCOSE OTHER FLUID: CPT

## 2021-03-15 PROCEDURE — 86900 BLOOD TYPING SEROLOGIC ABO: CPT

## 2021-03-15 PROCEDURE — 87070 CULTURE OTHR SPECIMN AEROBIC: CPT

## 2021-03-15 PROCEDURE — 86901 BLOOD TYPING SEROLOGIC RH(D): CPT

## 2021-03-15 PROCEDURE — 85610 PROTHROMBIN TIME: CPT

## 2021-03-15 PROCEDURE — 83880 ASSAY OF NATRIURETIC PEPTIDE: CPT

## 2021-03-15 PROCEDURE — 0225U NFCT DS DNA&RNA 21 SARSCOV2: CPT

## 2021-03-15 PROCEDURE — 84157 ASSAY OF PROTEIN OTHER: CPT

## 2021-03-15 PROCEDURE — 81001 URINALYSIS AUTO W/SCOPE: CPT

## 2021-03-15 PROCEDURE — 86850 RBC ANTIBODY SCREEN: CPT

## 2021-03-15 PROCEDURE — 71046 X-RAY EXAM CHEST 2 VIEWS: CPT

## 2021-03-15 PROCEDURE — 80162 ASSAY OF DIGOXIN TOTAL: CPT

## 2021-03-15 PROCEDURE — 97110 THERAPEUTIC EXERCISES: CPT

## 2021-03-15 PROCEDURE — 88341 IMHCHEM/IMCYTCHM EA ADD ANTB: CPT

## 2021-03-15 RX ORDER — SPIRONOLACTONE 25 MG/1
1 TABLET, FILM COATED ORAL
Qty: 30 | Refills: 0
Start: 2021-03-15 | End: 2021-04-13

## 2021-03-15 RX ORDER — FUROSEMIDE 40 MG
1 TABLET ORAL
Qty: 30 | Refills: 0
Start: 2021-03-15 | End: 2021-04-13

## 2021-03-15 RX ORDER — PANTOPRAZOLE SODIUM 20 MG/1
1 TABLET, DELAYED RELEASE ORAL
Qty: 0 | Refills: 0 | DISCHARGE
Start: 2021-03-15

## 2021-03-15 RX ORDER — ASCORBIC ACID 60 MG
1 TABLET,CHEWABLE ORAL
Qty: 0 | Refills: 0 | DISCHARGE
Start: 2021-03-15

## 2021-03-15 RX ORDER — ATORVASTATIN CALCIUM 80 MG/1
1 TABLET, FILM COATED ORAL
Qty: 0 | Refills: 0 | DISCHARGE
Start: 2021-03-15

## 2021-03-15 RX ADMIN — Medication 75 MILLIGRAM(S): at 15:08

## 2021-03-15 RX ADMIN — Medication 40 MILLIGRAM(S): at 05:50

## 2021-03-15 RX ADMIN — PANTOPRAZOLE SODIUM 40 MILLIGRAM(S): 20 TABLET, DELAYED RELEASE ORAL at 05:50

## 2021-03-15 RX ADMIN — Medication 1 TABLET(S): at 08:38

## 2021-03-15 RX ADMIN — APIXABAN 2.5 MILLIGRAM(S): 2.5 TABLET, FILM COATED ORAL at 05:50

## 2021-03-15 RX ADMIN — INSULIN GLARGINE 25 UNIT(S): 100 INJECTION, SOLUTION SUBCUTANEOUS at 08:38

## 2021-03-15 RX ADMIN — Medication 500 MILLIGRAM(S): at 08:38

## 2021-03-15 RX ADMIN — CEFTRIAXONE 100 MILLIGRAM(S): 500 INJECTION, POWDER, FOR SOLUTION INTRAMUSCULAR; INTRAVENOUS at 15:07

## 2021-03-15 RX ADMIN — Medication 1: at 12:32

## 2021-03-15 RX ADMIN — SPIRONOLACTONE 25 MILLIGRAM(S): 25 TABLET, FILM COATED ORAL at 05:50

## 2021-03-15 RX ADMIN — OXYCODONE AND ACETAMINOPHEN 2 TABLET(S): 5; 325 TABLET ORAL at 03:30

## 2021-03-15 RX ADMIN — OXYCODONE AND ACETAMINOPHEN 2 TABLET(S): 5; 325 TABLET ORAL at 02:47

## 2021-03-15 RX ADMIN — Medication 81 MILLIGRAM(S): at 08:38

## 2021-03-15 RX ADMIN — Medication 75 MILLIGRAM(S): at 05:50

## 2021-03-15 NOTE — DISCHARGE NOTE PROVIDER - DETAILS OF MALNUTRITION DIAGNOSIS/DIAGNOSES
This patient has been assessed with a concern for Malnutrition and was treated during this hospitalization for the following Nutrition diagnosis/diagnoses:     -  03/14/2021: Severe protein-calorie malnutrition   This patient has been assessed with a concern for Malnutrition and was treated during this hospitalization for the following Nutrition diagnosis/diagnoses:     -  03/14/2021: Severe protein-calorie malnutrition    This patient has been assessed with a concern for Malnutrition and was treated during this hospitalization for the following Nutrition diagnosis/diagnoses:     -  03/14/2021: Severe protein-calorie malnutrition

## 2021-03-15 NOTE — PROGRESS NOTE ADULT - SUBJECTIVE AND OBJECTIVE BOX
[place] [facility] - [facility], THE HEART CENTER                                   [address] [place] [address]                                                      PHONE: [phone]                                                         FAX: [phone]  [email or web address]  ---------------------------------------------------------------------------------------------------------------------------------    Overnight events/patient complaints: R chest tube placed last night. Patient sx's improved.      OHS (Unknown)  warfarin (Rash)    MEDICATIONS  (STANDING):  ascorbic acid 500 milliGRAM(s) Oral daily  aspirin enteric coated 81 milliGRAM(s) Oral daily  atorvastatin 40 milliGRAM(s) Oral at bedtime  cefTRIAXone   IVPB 1000 milliGRAM(s) IV Intermittent every 24 hours  dextrose 40% Gel 15 Gram(s) Oral once  dextrose 5%. 1000 milliLiter(s) (50 mL/Hr) IV Continuous <Continuous>  dextrose 5%. 1000 milliLiter(s) (100 mL/Hr) IV Continuous <Continuous>  dextrose 50% Injectable 25 Gram(s) IV Push once  dextrose 50% Injectable 12.5 Gram(s) IV Push once  dextrose 50% Injectable 25 Gram(s) IV Push once  furosemide   Injectable 40 milliGRAM(s) IV Push two times a day  glucagon  Injectable 1 milliGRAM(s) IntraMuscular once  influenza   Vaccine 0.5 milliLiter(s) IntraMuscular once  insulin glargine Injectable (LANTUS) 25 Unit(s) SubCutaneous every morning  insulin glargine Injectable (LANTUS) 30 Unit(s) SubCutaneous at bedtime  insulin lispro (ADMELOG) corrective regimen sliding scale   SubCutaneous Before meals and at bedtime  metoprolol tartrate 75 milliGRAM(s) Oral every 8 hours  multivitamin 1 Tablet(s) Oral daily  pantoprazole    Tablet 40 milliGRAM(s) Oral before breakfast  sertraline 50 milliGRAM(s) Oral at bedtime  spironolactone 25 milliGRAM(s) Oral daily    MEDICATIONS  (PRN):  melatonin 3 milliGRAM(s) Oral at bedtime PRN Insomnia  ondansetron Injectable 4 milliGRAM(s) IV Push every 6 hours PRN Nausea  oxycodone    5 mG/acetaminophen 325 mG 1 Tablet(s) Oral every 4 hours PRN Moderate Pain (4 - 6)  oxycodone    5 mG/acetaminophen 325 mG 2 Tablet(s) Oral every 4 hours PRN Severe Pain (7 - 10)      Vital Signs Last 24 Hrs  T(C): 36.4 (14 Mar 2021 05:22), Max: 37.1 (13 Mar 2021 16:25)  T(F): 97.5 (14 Mar 2021 05:22), Max: 98.7 (13 Mar 2021 16:25)  HR: 88 (14 Mar 2021 05:22) (78 - 89)  BP: 98/59 (14 Mar 2021 05:22) (95/52 - 121/57)  BP(mean): --  RR: 18 (14 Mar 2021 05:22) (18 - 18)  SpO2: 99% (14 Mar 2021 05:22) (95% - 100%)  ICU Vital Signs Last 24 Hrs  DONALD SAUNDERS  I&O's Detail    13 Mar 2021 06:01  -  14 Mar 2021 07:00  --------------------------------------------------------  IN:    Oral Fluid: 480 mL  Total IN: 480 mL    OUT:    Chest Tube (mL): 550 mL  Total OUT: 550 mL    Total NET: -70 mL      14 Mar 2021 07:01  -  14 Mar 2021 08:09  --------------------------------------------------------  IN:  Total IN: 0 mL    OUT:    Chest Tube (mL): 470 mL  Total OUT: 470 mL    Total NET: -470 mL        I&O's Summary    13 Mar 2021 06:01  -  14 Mar 2021 07:00  --------------------------------------------------------  IN: 480 mL / OUT: 550 mL / NET: -70 mL    14 Mar 2021 07:01  -  14 Mar 2021 08:09  --------------------------------------------------------  IN: 0 mL / OUT: 470 mL / NET: -470 mL      Drug Dosing Weight  DONALD CHIP      PHYSICAL EXAM:  General: NAD.  HEENT: Head; normocephalic.  Eyes: Pupils reactive.  Neck: Supple.  CARDIOVASCULAR: RRR.   LUNGS: Decreased breath sounds bilaterally; R chest tube in place.  ABDOMEN: Soft.  EXTREMITIES: No clubbing, cyanosis or edema.   SKIN: warm.  NEURO: Alert/oriented x 3.    PSYCH: normal affect.          LABS:                        11.4   6.21  )-----------( 357      ( 13 Mar 2021 08:16 )             36.2     03-13    138  |  96<L>  |  8.0  ----------------------------<  89  3.3<L>   |  29.0  |  0.56    Ca    8.9      13 Mar 2021 08:14  Phos  3.7     03-13  Mg     1.9     03-13    TPro  6.5<L>  /  Alb  3.1<L>  /  TBili  1.4  /  DBili  x   /  AST  30  /  ALT  18  /  AlkPhos  122<H>  03-13    DONALD SAUNDERS  CARDIAC MARKERS ( 12 Mar 2021 08:50 )  x     / 0.04 ng/mL / 55 U/L / x     / x          PT/INR - ( 13 Mar 2021 08:14 )   PT: 16.8 sec;   INR: 1.48 ratio           Urinalysis Basic - ( 12 Mar 2021 16:42 )    Color: Yellow / Appearance: Cloudy / S.015 / pH: x  Gluc: x / Ketone: Trace  / Bili: Negative / Urobili: 1   Blood: x / Protein: 30 mg/dL / Nitrite: Positive   Leuk Esterase: Moderate / RBC: 3-5 /HPF / WBC >50   Sq Epi: x / Non Sq Epi: Few / Bacteria: Many        RADIOLOGY & ADDITIONAL STUDIES:    INTERPRETATION OF TELEMETRY (personally reviewed): AF rate controlled in 70's.    ECHO:  1. Left ventricular ejection fraction, by visual estimation, is 45 to 50%.   2. Mildly decreased global left ventricular systolic function.   3. Abnormal septal motion consistent with post-operative status.   4. Mildly increased LV wall thickness.   5. Normal left ventricular internal cavity size.   6. There is mild concentric left ventricular hypertrophy.   7. Status-post mitral annular ring insertion.   8. Trace mitral valve regurgitation.   9. Estimated pulmonary artery systolic pressure is 42.2 mmHg assuming a right atrial pressure of 10 mmHg, which is consistent with mild pulmonary hypertension.  10. When compared with an echo 21 there are no new wall motion abnormalities.  11. Mitral valve mean gradient is 2.5 mmHg consistent with mild mitral stenosis.    [id number] Jose Turner , Electronically signed on 2021 at 11:36:44 AM     1. Technically difficult study.   2. Moderately enlarged left atrium.   3. There is mild concentric left ventricular hypertrophy.   4. Left ventricular ejection fraction, by visual estimation, is 55 to 60%.   5. Elevated mean left atrial pressure. (LAP >30 mm Hg)   6. The right atrium is normal in size.   7. Normal right ventricular size and function.   8. S/p mitral valve repair with annuloplasty ring. Mean gradien 3 mm Hg ( 81 bpm) acceptable gradients. No regurgitation.   9. Mild tricuspid regurgitation.  10. There is no evidence of pericardial effusion.  11. Moderate pleural effusion in both left and right lateral regions.    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 GERALDINE showed moderate MR and mild AS s/p CABG and MR repair.  Patient now presents with progressive worsening of shortness of breath and RENTERIA for past two weeks, orthopnea and PND with acute on chronic systolic HF.  Sx's improved with IV Lasix. R chest tube placed last night.    1.  Thoracic service following. R chest tube placed last night.  2.  Continue IV lasix. Monitor urine output and renal function.  3.  Repeat 2D echo showed EF 55-60% with mitral valve repair with annuloplasty ring with acceptable gradients.  4. Continue medical management.  5. AF rate controlled. AC with Eliquis. Held for chest tube placement.    
                [place] [facility] - [facility], THE HEART CENTER                                   [address] [place] [address]                                                      PHONE: [phone]                                                         FAX: [phone]  [email or web address]  ---------------------------------------------------------------------------------------------------------------------------------    Overnight events/patient complaints: tele reviewed AF with CVR - pt had echo in office 3/3 EF 45-50%, s/p MV repair with trace MR      OHS (Unknown)  warfarin (Rash)    MEDICATIONS  (STANDING):  apixaban 2.5 milliGRAM(s) Oral two times a day  ascorbic acid 500 milliGRAM(s) Oral daily  aspirin enteric coated 81 milliGRAM(s) Oral daily  atorvastatin 40 milliGRAM(s) Oral at bedtime  cefTRIAXone   IVPB 1000 milliGRAM(s) IV Intermittent every 24 hours  dextrose 40% Gel 15 Gram(s) Oral once  dextrose 5%. 1000 milliLiter(s) (50 mL/Hr) IV Continuous <Continuous>  dextrose 5%. 1000 milliLiter(s) (100 mL/Hr) IV Continuous <Continuous>  dextrose 50% Injectable 25 Gram(s) IV Push once  dextrose 50% Injectable 12.5 Gram(s) IV Push once  dextrose 50% Injectable 25 Gram(s) IV Push once  furosemide   Injectable 40 milliGRAM(s) IV Push two times a day  glucagon  Injectable 1 milliGRAM(s) IntraMuscular once  influenza   Vaccine 0.5 milliLiter(s) IntraMuscular once  insulin glargine Injectable (LANTUS) 30 Unit(s) SubCutaneous at bedtime  insulin glargine Injectable (LANTUS) 25 Unit(s) SubCutaneous every morning  insulin lispro (ADMELOG) corrective regimen sliding scale   SubCutaneous Before meals and at bedtime  metoprolol tartrate 75 milliGRAM(s) Oral every 8 hours  multivitamin 1 Tablet(s) Oral daily  pantoprazole    Tablet 40 milliGRAM(s) Oral before breakfast  sertraline 50 milliGRAM(s) Oral at bedtime  spironolactone 25 milliGRAM(s) Oral daily    MEDICATIONS  (PRN):  melatonin 3 milliGRAM(s) Oral at bedtime PRN Insomnia  ondansetron Injectable 4 milliGRAM(s) IV Push every 6 hours PRN Nausea  oxycodone    5 mG/acetaminophen 325 mG 1 Tablet(s) Oral every 4 hours PRN Moderate Pain (4 - 6)  oxycodone    5 mG/acetaminophen 325 mG 2 Tablet(s) Oral every 4 hours PRN Severe Pain (7 - 10)      Vital Signs Last 24 Hrs  T(C): 36.9 (15 Mar 2021 08:35), Max: 36.9 (15 Mar 2021 08:35)  T(F): 98.4 (15 Mar 2021 08:35), Max: 98.4 (15 Mar 2021 08:35)  HR: 90 (15 Mar 2021 08:35) (74 - 106)  BP: 102/62 (15 Mar 2021 08:36) (89/60 - 122/58)  BP(mean): --  RR: 20 (15 Mar 2021 08:35) (17 - 20)  SpO2: 99% (15 Mar 2021 08:35) (94% - 100%)  Daily     Daily Weight in k.4 (15 Mar 2021 05:55)  ICU Vital Signs Last 24 Hrs  DONALD SAUNDERS  I&O's Detail    14 Mar 2021 07:01  -  15 Mar 2021 07:00  --------------------------------------------------------  IN:    Oral Fluid: 250 mL  Total IN: 250 mL    OUT:    Chest Tube (mL): 600 mL    Voided (mL): 1250 mL  Total OUT: 1850 mL    Total NET: -1600 mL      15 Mar 2021 07:01  -  15 Mar 2021 09:54  --------------------------------------------------------  IN:  Total IN: 0 mL    OUT:    Voided (mL): 300 mL  Total OUT: 300 mL    Total NET: -300 mL        I&O's Summary    14 Mar 2021 07:01  -  15 Mar 2021 07:00  --------------------------------------------------------  IN: 250 mL / OUT: 1850 mL / NET: -1600 mL    15 Mar 2021 07:01  -  15 Mar 2021 09:54  --------------------------------------------------------  IN: 0 mL / OUT: 300 mL / NET: -300 mL      Drug Dosing Weight  DONALD CHIP      PHYSICAL EXAM:  General: Appears well developed, well nourished alert and cooperative.  HEENT: Head; normocephalic, atraumatic.  Eyes: Pupils reactive, cornea wnl.  Neck: Supple, no nodes adenopathy, no NVD or carotid bruit or thyromegaly.  CARDIOVASCULAR: Normal S1 and S2, No murmur, rub, gallop or lift.   LUNGS: No rales, rhonchi or wheeze. Normal breath sounds bilaterally.  ABDOMEN: Soft, nontender without mass or organomegaly. bowel sounds normoactive.  EXTREMITIES: No clubbing, cyanosis or edema. Distal pulses wnl.   SKIN: warm and dry with normal turgor.  NEURO: Alert/oriented x 3/normal motor exam. No pathologic reflexes.    PSYCH: normal affect.        LABS:          DONALD SAUNDERS            RADIOLOGY & ADDITIONAL STUDIES:< from: Xray Chest 1 View- PORTABLE-Routine (Xray Chest 1 View- PORTABLE-Routine in AM.) (21 @ 04:55) >  Findings/  Impression: Status post CABG and mitral annulus repair. Status post right pigtail. No pleural effusion. Right pneumothorax resolved.            ALLRED AL ROUBAIE MD; Attending Interventional Radiologist  This document has been electronically signed. Mar 14 2021 11:44AM    < end of copied text >      INTERPRETATION OF TELEMETRY (personally reviewed):    ECG:< from: TTE Echo Complete w/ Contrast w/ Doppler (21 @ 20:15) >    Summary:   1. Technically difficult study.   2. Moderately enlarged left atrium.   3. There is mild concentric left ventricular hypertrophy.   4. Left ventricular ejection fraction, by visual estimation, is 55 to 60%.   5. Elevated mean left atrial pressure. (LAP >30 mm Hg)   6. The right atriumis normal in size.   7. Normal right ventricular size and function.   8. S/p mitral valve repair with annuloplasty ring. Mean gradien 3 mm Hg ( 81 bpm) acceptable gradients. No regurgitation.   9. Mild tricuspid regurgitation.  10. There is no evidence of pericardial effusion.  11. Moderate pleural effusion in both left and right lateral regions.    MD Lori, RPVI Electronically signed on 3/13/2021 at 12:55:33 PM            *** Final ***                < end of copied text >    
DONALD SAUNDERS    [id number]    75y      Female    Patient is a 75y old  Female who presents with a chief complaint of Acute Hypoxic Respiratory Failure 2/2 Acute on Chronic CHF Exacerbation and Pleural Effusion (13 Mar 2021 10:52)      INTERVAL HPI/OVERNIGHT EVENTS:    patient is doing ok, she has no chest pain, her breathing issue has resolved, CXR still shows right sided effusion     REVIEW OF SYSTEMS:    CONSTITUTIONAL: No fever, some fatigue  RESPIRATORY: No cough, Improving shortness of breath  CARDIOVASCULAR: No chest pain, palpitations  GASTROINTESTINAL: No abdominal, No nausea, vomiting  NEUROLOGICAL: No headaches,  loss of strength.  MISCELLANEOUS: No joint swelling or pain       Vital Signs Last 24 Hrs  T(C): 37.1 (13 Mar 2021 16:25), Max: 37.1 (13 Mar 2021 16:25)  T(F): 98.7 (13 Mar 2021 16:25), Max: 98.7 (13 Mar 2021 16:25)  HR: 79 (13 Mar 2021 16:25) (56 - 90)  BP: 95/52 (13 Mar 2021 16:25) (95/52 - 147/77)  BP(mean): --  RR: 18 (13 Mar 2021 16:25) (18 - 20)  SpO2: 95% (13 Mar 2021 16:25) (94% - 96%)    PHYSICAL EXAM:      GENERAL: Elderly female looking comfortable   HEENT: PERRL, +EOMI  NECK: soft, Supple, No JVD,   CHEST/LUNG: Decrease air entry bilaterally espeically right lower zone; No wheezing  HEART: S1S2+, Regular rate and rhythm; No murmurs  ABDOMEN: Soft, Nontender, Nondistended; Bowel sounds present  EXTREMITIES:  1+ Peripheral Pulses, No edema  SKIN: No rashes or lesions  NEURO: AAOX3, no focal deficits, no motor r sensory loss  PSYCH: normal mood      LABS:                        11.4   6.21  )-----------( 357      ( 13 Mar 2021 08:16 )             36.2     03-13    138  |  96<L>  |  8.0  ----------------------------<  89  3.3<L>   |  29.0  |  0.56    Ca    8.9      13 Mar 2021 08:14  Phos  3.7     03-13  Mg     1.9     -    TPro  6.5<L>  /  Alb  3.1<L>  /  TBili  1.4  /  DBili  x   /  AST  30  /  ALT  18  /  AlkPhos  122<H>  -    PT/INR - ( 13 Mar 2021 08:14 )   PT: 16.8 sec;   INR: 1.48 ratio         PTT - ( 11 Mar 2021 18:48 )  PTT:33.5 sec  Urinalysis Basic - ( 12 Mar 2021 16:42 )    Color: Yellow / Appearance: Cloudy / S.015 / pH: x  Gluc: x / Ketone: Trace  / Bili: Negative / Urobili: 1   Blood: x / Protein: 30 mg/dL / Nitrite: Positive   Leuk Esterase: Moderate / RBC: 3-5 /HPF / WBC >50   Sq Epi: x / Non Sq Epi: Few / Bacteria: Many          I&O's Summary      MEDICATIONS  (STANDING):  ascorbic acid 500 milliGRAM(s) Oral daily  aspirin enteric coated 81 milliGRAM(s) Oral daily  atorvastatin 40 milliGRAM(s) Oral at bedtime  cefTRIAXone   IVPB 1000 milliGRAM(s) IV Intermittent every 24 hours  dextrose 40% Gel 15 Gram(s) Oral once  dextrose 5%. 1000 milliLiter(s) (50 mL/Hr) IV Continuous <Continuous>  dextrose 5%. 1000 milliLiter(s) (100 mL/Hr) IV Continuous <Continuous>  dextrose 50% Injectable 25 Gram(s) IV Push once  dextrose 50% Injectable 12.5 Gram(s) IV Push once  dextrose 50% Injectable 25 Gram(s) IV Push once  furosemide   Injectable 40 milliGRAM(s) IV Push two times a day  glucagon  Injectable 1 milliGRAM(s) IntraMuscular once  influenza   Vaccine 0.5 milliLiter(s) IntraMuscular once  insulin glargine Injectable (LANTUS) 25 Unit(s) SubCutaneous every morning  insulin glargine Injectable (LANTUS) 30 Unit(s) SubCutaneous at bedtime  insulin lispro (ADMELOG) corrective regimen sliding scale   SubCutaneous Before meals and at bedtime  metoprolol tartrate 75 milliGRAM(s) Oral every 8 hours  multivitamin 1 Tablet(s) Oral daily  pantoprazole    Tablet 40 milliGRAM(s) Oral before breakfast  sertraline 50 milliGRAM(s) Oral at bedtime  spironolactone 25 milliGRAM(s) Oral daily    MEDICATIONS  (PRN):  melatonin 3 milliGRAM(s) Oral at bedtime PRN Insomnia  ondansetron Injectable 4 milliGRAM(s) IV Push every 6 hours PRN Nausea        
                [place] [facility] - [facility], THE [facility]                                   [address] [place] [address]                                                      PHONE: [phone]                                                         FAX: [phone]  [email or web address]  ---------------------------------------------------------------------------------------------------------------------------------    Overnight events/patient complaints: SOB improved. On RA. Sitting in bed.      OHS (Unknown)  warfarin (Rash)    MEDICATIONS  (STANDING):  ascorbic acid 500 milliGRAM(s) Oral daily  aspirin enteric coated 81 milliGRAM(s) Oral daily  atorvastatin 40 milliGRAM(s) Oral at bedtime  dextrose 40% Gel 15 Gram(s) Oral once  dextrose 5%. 1000 milliLiter(s) (50 mL/Hr) IV Continuous <Continuous>  dextrose 5%. 1000 milliLiter(s) (100 mL/Hr) IV Continuous <Continuous>  dextrose 50% Injectable 25 Gram(s) IV Push once  dextrose 50% Injectable 12.5 Gram(s) IV Push once  dextrose 50% Injectable 25 Gram(s) IV Push once  furosemide   Injectable 40 milliGRAM(s) IV Push two times a day  glucagon  Injectable 1 milliGRAM(s) IntraMuscular once  influenza   Vaccine 0.5 milliLiter(s) IntraMuscular once  insulin glargine Injectable (LANTUS) 25 Unit(s) SubCutaneous every morning  insulin glargine Injectable (LANTUS) 30 Unit(s) SubCutaneous at bedtime  insulin lispro (ADMELOG) corrective regimen sliding scale   SubCutaneous Before meals and at bedtime  metoprolol tartrate 75 milliGRAM(s) Oral every 8 hours  multivitamin 1 Tablet(s) Oral daily  pantoprazole    Tablet 40 milliGRAM(s) Oral before breakfast  potassium chloride    Tablet ER 40 milliEquivalent(s) Oral every 4 hours  sertraline 50 milliGRAM(s) Oral at bedtime  spironolactone 25 milliGRAM(s) Oral daily    MEDICATIONS  (PRN):  melatonin 3 milliGRAM(s) Oral at bedtime PRN Insomnia  ondansetron Injectable 4 milliGRAM(s) IV Push every 6 hours PRN Nausea      Vital Signs Last 24 Hrs  T(C): 36.6 (13 Mar 2021 08:00), Max: 36.7 (12 Mar 2021 12:00)  T(F): 97.9 (13 Mar 2021 08:00), Max: 98 (12 Mar 2021 12:00)  HR: 79 (13 Mar 2021 08:00) (56 - 90)  BP: 109/65 (13 Mar 2021 08:00) (100/63 - 147/77)  BP(mean): --  RR: 19 (13 Mar 2021 08:00) (19 - 20)  SpO2: 95% (13 Mar 2021 08:00) (94% - 97%)  ICU Vital Signs Last 24 Hrs  DONALD SAUNDERS  I&O's Detail    I&O's Summary    Drug Dosing Weight  DONALD SAUNDERS      PHYSICAL EXAM:  General: NAD.  HEENT: Head; normocephalic.  Eyes: Pupils reactive.  Neck: Supple.  CARDIOVASCULAR: RRR.   LUNGS: Decreased breath sounds bilaterally, >RLL.  ABDOMEN: Soft.  EXTREMITIES: No clubbing, cyanosis or edema.   SKIN: warm.  NEURO: Alert/oriented x 3.    PSYCH: normal affect.        LABS:                        11.4   6.21  )-----------( 357      ( 13 Mar 2021 08:16 )             36.2     03-13    138  |  96<L>  |  8.0  ----------------------------<  89  3.3<L>   |  29.0  |  0.56    Ca    8.9      13 Mar 2021 08:14  Phos  3.7     03-13  Mg     1.9     -13    TPro  6.5<L>  /  Alb  3.1<L>  /  TBili  1.4  /  DBili  x   /  AST  30  /  ALT  18  /  AlkPhos  122<H>      DONALD SAUNDERS  CARDIAC MARKERS ( 12 Mar 2021 08:50 )  x     / 0.04 ng/mL / 55 U/L / x     / x      CARDIAC MARKERS ( 11 Mar 2021 18:48 )  x     / 0.05 ng/mL / x     / x     / x          PT/INR - ( 13 Mar 2021 08:14 )   PT: 16.8 sec;   INR: 1.48 ratio         PTT - ( 11 Mar 2021 18:48 )  PTT:33.5 sec  Urinalysis Basic - ( 12 Mar 2021 16:42 )    Color: Yellow / Appearance: Cloudy / S.015 / pH: x  Gluc: x / Ketone: Trace  / Bili: Negative / Urobili: 1   Blood: x / Protein: 30 mg/dL / Nitrite: Positive   Leuk Esterase: Moderate / RBC: 3-5 /HPF / WBC >50   Sq Epi: x / Non Sq Epi: Few / Bacteria: Many        RADIOLOGY & ADDITIONAL STUDIES:    INTERPRETATION OF TELEMETRY (personally reviewed): AF rate controlled in 70's.    ECG:    ECHO:  1. Left ventricular ejection fraction, by visual estimation, is 45 to 50%.   2. Mildly decreased global left ventricular systolic function.   3. Abnormal septal motion consistent with post-operative status.   4. Mildly increased LV wall thickness.   5. Normal left ventricular internal cavity size.   6. There is mild concentric left ventricular hypertrophy.   7. Status-post mitral annular ring insertion.   8. Trace mitral valve regurgitation.   9. Estimated pulmonary artery systolic pressure is 42.2 mmHg assuming a right atrial pressure of 10 mmHg, which is consistent with mild pulmonary hypertension.  10. When compared with an echo 21 there are no new wall motion abnormalities.  11. Mitral valve mean gradient is 2.5 mmHg consistent with mild mitral stenosis.    [id number] Jose Turner , Electronically signed on 2021 at 11:36:44 AM        ASSESSMENT AND PLAN:  75y Female with past medical history significant for HTN, HLD, PVD s/p LE revasc in past, PAF on AC DM who was recently admitted for acute Hypoxic Respiratory Failure, Acute HFrEF, Ischemic Cardiomyopathy, Severe Multivessel CAD, NSTEMI, Paroxysmal Afib, Moderate MR in which cath shows severe 3V disease with severe LM lesion. GERALDINE showed moderate MR and mild AS s/p CABG and MR repair.  Patient now presents with progressive worsening of shortness of breath and RENTERIA for past two weeks, orthopnea and PND with acute on chronic systolic HF.  Sx's improved with IV Lasix.    1.  Eliquis on hold for possible pigtail catheter due to R pleural effusion. CT surgery service following.  2.  Continue IV lasix. Monitor urine output and renal function.  3.  Awaiting repeat 2D echo.  4.  Awaiting repeat CXR.
  Subjective:  Pt in bed,  upset. Still remains in the ER and now developed a UTI.      T(C): 37.1 (03-13-21 @ 16:25), Max: 37.1 (03-13-21 @ 16:25)  HR: 79 (03-13-21 @ 16:25) (56 - 90)  BP: 95/52 (03-13-21 @ 16:25) (95/52 - 147/77)  ABP: --  ABP(mean): --  RR: 18 (03-13-21 @ 16:25) (18 - 20)  SpO2: 95% (03-13-21 @ 16:25) (94% - 96%) 2 L NC   Wt(kg): --  CVP(mm Hg): --  CO: --  CI: --  PA: --                                              Tele: SR           03-13    138  |  96<L>  |  8.0  ----------------------------<  89  3.3<L>   |  29.0  |  0.56    Ca    8.9      13 Mar 2021 08:14  Phos  3.7     03-13  Mg     1.9     03-13    TPro  6.5<L>  /  Alb  3.1<L>  /  TBili  1.4  /  DBili  x   /  AST  30  /  ALT  18  /  AlkPhos  122<H>  03-13                               11.4   6.21  )-----------( 357      ( 13 Mar 2021 08:16 )             36.2        PT/INR - ( 13 Mar 2021 08:14 )   PT: 16.8 sec;   INR: 1.48 ratio         PTT - ( 11 Mar 2021 18:48 )  PTT:33.5 sec         CAPILLARY BLOOD GLUCOSE      POCT Blood Glucose.: 172 mg/dL (13 Mar 2021 16:29)  POCT Blood Glucose.: 113 mg/dL (13 Mar 2021 11:42)  POCT Blood Glucose.: 98 mg/dL (13 Mar 2021 08:21)  POCT Blood Glucose.: 152 mg/dL (12 Mar 2021 23:05)  POCT Blood Glucose.: 137 mg/dL (12 Mar 2021 17:40)           CXR: < from: Xray Chest 2 Views PA/Lat (03.13.21 @ 10:20) >  INTERPRETATION:  HISTORY:  Shortness of Breath; Admitting Dxs: J90 SOB; s/p thoracentisis right pleural effusion; s/p thoracentisis right pleural effusion;  TECHNIQUE: Two views of the chest, PA and lateral.  COMPARISON: 5:07 AM.  FINDINGS:    HEART: Enlarged heart. Prosthetic mitral valve. Left atrial appendage clip.  LUNGS: There is opacity at the right base compatible with effusion/infiltrate. No significant change compared to prior. No pneumothorax    OSSEOUS STRUCTURES: Sternotomy wires. Mild degenerative changes.    IMPRESSION:  Stable right effusion/infiltrate. No pneumothorax..    < end of copied text >          Assessment  Neuro: Alert Awake NAD  Pulm:  decreased at bases b/l  decreased RT Base   CV: RRR S1 S2   Abd: soft NT ND           Assessment:  75yFemale    with PAST MEDICAL & SURGICAL HISTORY:  PAD (peripheral artery disease)    Paroxysmal atrial fibrillation    Hypercholesterolemia    Diabetes    Hypertension    S/P peripheral artery angioplasty with stent placement    S/P femoral-popliteal bypass surgery    H/O hernia repair    H/O abdominal hysterectomy          Plan:  
DONALD SAUNDERS    [id number]    75y      Female    Patient is a 75y old  Female who presents with a chief complaint of Acute Hypoxic Respiratory Failure 2/2 Acute on Chronic CHF Exacerbation and Pleural Effusion (14 Mar 2021 10:37)      INTERVAL HPI/OVERNIGHT EVENTS:    Patient is doing ok, she is s/p chest tube placement and draining right now, she has no chest pain, her breathing issue has resolved.     REVIEW OF SYSTEMS:    CONSTITUTIONAL: No fever, some fatigue  RESPIRATORY: No cough, Improving shortness of breath  CARDIOVASCULAR: No chest pain, palpitations  GASTROINTESTINAL: No abdominal, No nausea, vomiting  NEUROLOGICAL: No headaches,  loss of strength.  MISCELLANEOUS: No joint swelling or pain    Vital Signs Last 24 Hrs  T(C): 36.7 (14 Mar 2021 09:37), Max: 37.1 (13 Mar 2021 16:25)  T(F): 98 (14 Mar 2021 09:37), Max: 98.7 (13 Mar 2021 16:25)  HR: 82 (14 Mar 2021 13:12) (68 - 89)  BP: 89/60 (14 Mar 2021 13:12) (89/60 - 121/57)  RR: 18 (14 Mar 2021 13:12) (18 - 18)  SpO2: 97% (14 Mar 2021 13:12) (95% - 100%)    PHYSICAL EXAM:    GENERAL: Elderly female looking comfortable   HEENT: PERRL, +EOMI  NECK: soft, Supple, No JVD,   CHEST/LUNG: Decrease air entry bilaterally especially right lower zone; No wheezing, has right sided chest tube in  HEART: S1S2+, Regular rate and rhythm; No murmurs  ABDOMEN: Soft, Nontender, Nondistended; Bowel sounds present  EXTREMITIES:  1+ Peripheral Pulses, No edema  SKIN: No rashes or lesions  NEURO: AAOX3, no focal deficits, no motor r sensory loss  PSYCH: normal mood        LABS:                        11.4   6.21  )-----------( 357      ( 13 Mar 2021 08:16 )             36.2     03-13    138  |  96<L>  |  8.0  ----------------------------<  89  3.3<L>   |  29.0  |  0.56    Ca    8.9      13 Mar 2021 08:14  Phos  3.7     03-13  Mg     1.9         TPro  6.5<L>  /  Alb  3.1<L>  /  TBili  1.4  /  DBili  x   /  AST  30  /  ALT  18  /  AlkPhos  122<H>      PT/INR - ( 13 Mar 2021 08:14 )   PT: 16.8 sec;   INR: 1.48 ratio           Urinalysis Basic - ( 12 Mar 2021 16:42 )    Color: Yellow / Appearance: Cloudy / S.015 / pH: x  Gluc: x / Ketone: Trace  / Bili: Negative / Urobili: 1   Blood: x / Protein: 30 mg/dL / Nitrite: Positive   Leuk Esterase: Moderate / RBC: 3-5 /HPF / WBC >50   Sq Epi: x / Non Sq Epi: Few / Bacteria: Many          I&O's Summary    13 Mar 2021 06:01  -  14 Mar 2021 07:00  --------------------------------------------------------  IN: 480 mL / OUT: 550 mL / NET: -70 mL    14 Mar 2021 07:01  -  14 Mar 2021 13:33  --------------------------------------------------------  IN: 250 mL / OUT: 1220 mL / NET: -970 mL        MEDICATIONS  (STANDING):  apixaban 2.5 milliGRAM(s) Oral two times a day  ascorbic acid 500 milliGRAM(s) Oral daily  aspirin enteric coated 81 milliGRAM(s) Oral daily  atorvastatin 40 milliGRAM(s) Oral at bedtime  cefTRIAXone   IVPB 1000 milliGRAM(s) IV Intermittent every 24 hours  dextrose 40% Gel 15 Gram(s) Oral once  dextrose 5%. 1000 milliLiter(s) (50 mL/Hr) IV Continuous <Continuous>  dextrose 5%. 1000 milliLiter(s) (100 mL/Hr) IV Continuous <Continuous>  dextrose 50% Injectable 25 Gram(s) IV Push once  dextrose 50% Injectable 12.5 Gram(s) IV Push once  dextrose 50% Injectable 25 Gram(s) IV Push once  furosemide   Injectable 40 milliGRAM(s) IV Push two times a day  glucagon  Injectable 1 milliGRAM(s) IntraMuscular once  influenza   Vaccine 0.5 milliLiter(s) IntraMuscular once  insulin glargine Injectable (LANTUS) 25 Unit(s) SubCutaneous every morning  insulin glargine Injectable (LANTUS) 30 Unit(s) SubCutaneous at bedtime  insulin lispro (ADMELOG) corrective regimen sliding scale   SubCutaneous Before meals and at bedtime  metoprolol tartrate 75 milliGRAM(s) Oral every 8 hours  multivitamin 1 Tablet(s) Oral daily  pantoprazole    Tablet 40 milliGRAM(s) Oral before breakfast  sertraline 50 milliGRAM(s) Oral at bedtime  spironolactone 25 milliGRAM(s) Oral daily    MEDICATIONS  (PRN):  melatonin 3 milliGRAM(s) Oral at bedtime PRN Insomnia  ondansetron Injectable 4 milliGRAM(s) IV Push every 6 hours PRN Nausea  oxycodone    5 mG/acetaminophen 325 mG 1 Tablet(s) Oral every 4 hours PRN Moderate Pain (4 - 6)  oxycodone    5 mG/acetaminophen 325 mG 2 Tablet(s) Oral every 4 hours PRN Severe Pain (7 - 10)        
DONALD SAUNDERS    [id number]    75y      Female    Patient is a 75y old  Female who presents with a chief complaint of Acute Hypoxic Respiratory Failure 2/2 Acute on Chronic CHF Exacerbation and Pleural Effusion (12 Mar 2021 11:13)      INTERVAL HPI/OVERNIGHT EVENTS:    patient is doing ok, she has no chest pain, her breathing is improving.     REVIEW OF SYSTEMS:    CONSTITUTIONAL: No fever, some fatigue  RESPIRATORY: No cough, Improving shortness of breath  CARDIOVASCULAR: No chest pain, palpitations  GASTROINTESTINAL: No abdominal, No nausea, vomiting  NEUROLOGICAL: No headaches,  loss of strength.  MISCELLANEOUS: No joint swelling or pain       Vital Signs Last 24 Hrs  T(C): 36.6 (12 Mar 2021 07:40), Max: 36.8 (11 Mar 2021 23:39)  T(F): 97.9 (12 Mar 2021 07:40), Max: 98.2 (11 Mar 2021 23:39)  HR: 75 (12 Mar 2021 07:40) (75 - 101)  BP: 99/60 (12 Mar 2021 07:40) (99/60 - 131/83)  RR: 18 (12 Mar 2021 07:40) (18 - 19)  SpO2: 96% (12 Mar 2021 07:40) (95% - 98%)    PHYSICAL EXAM:    GENERAL: Elderly female looking comfortable   HEENT: PERRL, +EOMI  NECK: soft, Supple, No JVD,   CHEST/LUNG: Decrease air entry bilaterally; No wheezing  HEART: S1S2+, Regular rate and rhythm; No murmurs  ABDOMEN: Soft, Nontender, Nondistended; Bowel sounds present  EXTREMITIES:  1+ Peripheral Pulses, No edema  SKIN: No rashes or lesions  NEURO: AAOX3, no focal deficits, no motor r sensory loss  PSYCH: normal mood      LABS:                        11.2   6.34  )-----------( 362      ( 12 Mar 2021 08:50 )             36.1     03-12    138  |  98  |  10.0  ----------------------------<  174<H>  3.1<L>   |  23.0  |  0.54    Ca    8.5<L>      12 Mar 2021 08:50  Phos  3.5     03-12  Mg     1.4     03-12    TPro  6.6  /  Alb  3.1<L>  /  TBili  1.5  /  DBili  x   /  AST  31  /  ALT  19  /  AlkPhos  124<H>  03-12    PT/INR - ( 11 Mar 2021 18:48 )   PT: 21.2 sec;   INR: 1.88 ratio         PTT - ( 11 Mar 2021 18:48 )  PTT:33.5 sec        I&O's Summary      MEDICATIONS  (STANDING):  ascorbic acid 500 milliGRAM(s) Oral daily  aspirin enteric coated 81 milliGRAM(s) Oral daily  atorvastatin 40 milliGRAM(s) Oral at bedtime  dextrose 40% Gel 15 Gram(s) Oral once  dextrose 5%. 1000 milliLiter(s) (50 mL/Hr) IV Continuous <Continuous>  dextrose 5%. 1000 milliLiter(s) (100 mL/Hr) IV Continuous <Continuous>  dextrose 50% Injectable 25 Gram(s) IV Push once  dextrose 50% Injectable 12.5 Gram(s) IV Push once  dextrose 50% Injectable 25 Gram(s) IV Push once  furosemide   Injectable 40 milliGRAM(s) IV Push two times a day  glucagon  Injectable 1 milliGRAM(s) IntraMuscular once  insulin glargine Injectable (LANTUS) 25 Unit(s) SubCutaneous every morning  insulin glargine Injectable (LANTUS) 30 Unit(s) SubCutaneous at bedtime  insulin lispro (ADMELOG) corrective regimen sliding scale   SubCutaneous Before meals and at bedtime  metoprolol tartrate 75 milliGRAM(s) Oral every 8 hours  multivitamin 1 Tablet(s) Oral daily  pantoprazole    Tablet 40 milliGRAM(s) Oral before breakfast  sertraline 50 milliGRAM(s) Oral at bedtime  spironolactone 25 milliGRAM(s) Oral daily    MEDICATIONS  (PRN):  melatonin 3 milliGRAM(s) Oral at bedtime PRN Insomnia  ondansetron Injectable 4 milliGRAM(s) IV Push every 6 hours PRN Nausea        
Subjective - patient seen and evaluated bedside. Sitting comfortably in bed. States "my breathing is starting to feel better." Admits to improving SOB at rest. Denies CP,  HA, dizziness, n/v/d    Review of Systems: negative x 10 systems except as noted above    Brief summary:  75yFemale  76 y/o F with PMHX CAD s/p CABG, Mitral valve annuloplasty, left atrial appendage thrombectomy, and DUANE clip on 01/19/21 (under Dr. Orellana), Afib on Eliquis, HTN, HLD, Chronic CHFrEF (EF 45-50%), Mild Pulm HTN, GERD, presented with progressive worsening of shortness of breath and RENTERIA for past two weeks. Patient was discharged from [facility] [place] two weeks ago at her request . Patient endorsed compliance with her home medications    Significant/[id number] events: none      PAST MEDICAL & SURGICAL HISTORY:  PAD (peripheral artery disease)    Paroxysmal atrial fibrillation    Hypercholesterolemia    Diabetes    Hypertension    S/P peripheral artery angioplasty with stent placement    S/P femoral-popliteal bypass surgery    H/O hernia repair    H/O abdominal hysterectomy          ascorbic acid 500 milliGRAM(s) Oral daily  aspirin enteric coated 81 milliGRAM(s) Oral daily  atorvastatin 40 milliGRAM(s) Oral at bedtime  dextrose 40% Gel 15 Gram(s) Oral once  dextrose 5%. 1000 milliLiter(s) IV Continuous <Continuous>  dextrose 5%. 1000 milliLiter(s) IV Continuous <Continuous>  dextrose 50% Injectable 25 Gram(s) IV Push once  dextrose 50% Injectable 12.5 Gram(s) IV Push once  dextrose 50% Injectable 25 Gram(s) IV Push once  digoxin     Tablet 125 MICROGram(s) Oral daily  furosemide   Injectable 60 milliGRAM(s) IV Push every 12 hours  glucagon  Injectable 1 milliGRAM(s) IntraMuscular once  insulin glargine Injectable (LANTUS) 25 Unit(s) SubCutaneous every morning  insulin glargine Injectable (LANTUS) 30 Unit(s) SubCutaneous at bedtime  insulin lispro (ADMELOG) corrective regimen sliding scale   SubCutaneous Before meals and at bedtime  melatonin 3 milliGRAM(s) Oral at bedtime PRN  metoprolol tartrate 75 milliGRAM(s) Oral every 8 hours  multivitamin 1 Tablet(s) Oral daily  ondansetron Injectable 4 milliGRAM(s) IV Push every 6 hours PRN  pantoprazole    Tablet 40 milliGRAM(s) Oral before breakfast  sertraline 50 milliGRAM(s) Oral at bedtime  MEDICATIONS  (PRN):  melatonin 3 milliGRAM(s) Oral at bedtime PRN Insomnia  ondansetron Injectable 4 milliGRAM(s) IV Push every 6 hours PRN Nausea    Height (cm): 172.7 (03-11 @ 15:26)  Weight (kg): 76.2 (03-11 @ 15:26)  BMI (kg/m2): 25.5 (03-11 @ 15:26)  BSA (m2): 1.9 (03-11 @ 15:26)  Daily Height in cm: 172.72 (11 Mar 2021 15:26)    Daily                               11.2   6.34  )-----------( 362      ( 12 Mar 2021 08:50 )             36.1   03-11    139  |  97<L>  |  10.0  ----------------------------<  179<H>  3.5   |  27.0  |  0.59    Ca    9.0      11 Mar 2021 18:48    TPro  7.1  /  Alb  3.5  /  TBili  1.5  /  DBili  x   /  AST  34<H>  /  ALT  21  /  AlkPhos  140<H>  03-11    CARDIAC MARKERS ( 11 Mar 2021 18:48 )  x     / 0.05 ng/mL / x     / x     / x        PT/INR - ( 11 Mar 2021 18:48 )   PT: 21.2 sec;   INR: 1.88 ratio         PTT - ( 11 Mar 2021 18:48 )  PTT:33.5 sec      Objective:  T(C): 36.6 (03-12-21 @ 07:40), Max: 36.8 (03-11-21 @ 23:39)  HR: 75 (03-12-21 @ 07:40) (75 - 101)  BP: 99/60 (03-12-21 @ 07:40) (99/60 - 131/83)  RR: 18 (03-12-21 @ 07:40) (18 - 19)  SpO2: 96% (03-12-21 @ 07:40) (95% - 98%)  Wt(kg): --CAPILLARY BLOOD GLUCOSE      POCT Blood Glucose.: 161 mg/dL (12 Mar 2021 08:30)  POCT Blood Glucose.: 277 mg/dL (12 Mar 2021 01:41)  I&O's Summary      Physical Exam  Neuro: A+O x 3, non-focal, speech clear and intact  Pulm: Diminished slightly at right base, clear on left  CV: RRR,, +S1S2  Abd: soft, NT, ND, +BS  Ext: +DP Pulses b/l, , no edema  Inc: MSI C/D/I/stable     Imaging:  CXR -   CXR with siginficant RT sided pleural effusion. Formal read pending.            
  Subjective: "Feel good today"  OOB chair      V/S  T(C): 36.9 (03-15-21 @ 08:35), Max: 36.9 (03-15-21 @ 08:35)  HR: 90 (03-15-21 @ 08:35) (74 - 106)  BP: 102/62 (03-15-21 @ 08:36) (89/60 - 122/58)  RR: 20 (03-15-21 @ 08:35) (17 - 20)  SpO2: 99% (03-15-21 @ 08:35) (94% - 100%)      CHEST TUBE: R post pigtail                          OUTPUT:100             per 24 hours> removed  AIR LEAKS:  [ ] YES [x ] NO      MEDICATIONS  (STANDING):  apixaban 2.5 milliGRAM(s) Oral two times a day  ascorbic acid 500 milliGRAM(s) Oral daily  aspirin enteric coated 81 milliGRAM(s) Oral daily  atorvastatin 40 milliGRAM(s) Oral at bedtime  cefTRIAXone   IVPB 1000 milliGRAM(s) IV Intermittent every 24 hours  dextrose 40% Gel 15 Gram(s) Oral once  dextrose 5%. 1000 milliLiter(s) (50 mL/Hr) IV Continuous <Continuous>  dextrose 5%. 1000 milliLiter(s) (100 mL/Hr) IV Continuous <Continuous>  dextrose 50% Injectable 25 Gram(s) IV Push once  dextrose 50% Injectable 12.5 Gram(s) IV Push once  dextrose 50% Injectable 25 Gram(s) IV Push once  furosemide   Injectable 40 milliGRAM(s) IV Push two times a day  glucagon  Injectable 1 milliGRAM(s) IntraMuscular once  influenza   Vaccine 0.5 milliLiter(s) IntraMuscular once  insulin glargine Injectable (LANTUS) 25 Unit(s) SubCutaneous every morning  insulin glargine Injectable (LANTUS) 30 Unit(s) SubCutaneous at bedtime  insulin lispro (ADMELOG) corrective regimen sliding scale   SubCutaneous Before meals and at bedtime  metoprolol tartrate 75 milliGRAM(s) Oral every 8 hours  multivitamin 1 Tablet(s) Oral daily  pantoprazole    Tablet 40 milliGRAM(s) Oral before breakfast  sertraline 50 milliGRAM(s) Oral at bedtime  spironolactone 25 milliGRAM(s) Oral daily                               CAPILLARY BLOOD GLUCOSE      POCT Blood Glucose.: 98 mg/dL (15 Mar 2021 07:58)  POCT Blood Glucose.: 149 mg/dL (14 Mar 2021 21:56)  POCT Blood Glucose.: 189 mg/dL (14 Mar 2021 16:54)  POCT Blood Glucose.: 231 mg/dL (14 Mar 2021 12:07)           CXR:      Physical Exam:    Neuro: Alert Awake NAD    Pulm:  decreased at bases b/l  decreased RT Base   R pigtail insitu waterseal> removed     CV: RRR S1 S2     Abd: soft NT ND     Ext no edema                PAST MEDICAL & SURGICAL HISTORY:  PAD (peripheral artery disease)    Paroxysmal atrial fibrillation    Hypercholesterolemia    Diabetes    Hypertension    S/P peripheral artery angioplasty with stent placement    S/P femoral-popliteal bypass surgery    H/O hernia repair    H/O abdominal hysterectomy          
SUBJECTIVE:  pt sitting on the side of the stretcher. NAD  Pt states,"I am feel much better"  Patient denies acute pain with radiating or aggravating factors.  she denies chest pain, shortness of breath, palpitations, headache, dizziness, nausea, or vomiting.      Overnight events:   none      PAST MEDICAL & SURGICAL HISTORY:  PAD (peripheral artery disease)    Paroxysmal atrial fibrillation    Hypercholesterolemia    Diabetes    Hypertension    S/P peripheral artery angioplasty with stent placement    S/P femoral-popliteal bypass surgery    H/O hernia repair    H/O abdominal hysterectomy        MEDICATIONS  ascorbic acid 500 milliGRAM(s) Oral daily  aspirin enteric coated 81 milliGRAM(s) Oral daily  atorvastatin 40 milliGRAM(s) Oral at bedtime  dextrose 40% Gel 15 Gram(s) Oral once  dextrose 5%. 1000 milliLiter(s) IV Continuous <Continuous>  dextrose 5%. 1000 milliLiter(s) IV Continuous <Continuous>  dextrose 50% Injectable 25 Gram(s) IV Push once  dextrose 50% Injectable 12.5 Gram(s) IV Push once  dextrose 50% Injectable 25 Gram(s) IV Push once  furosemide   Injectable 40 milliGRAM(s) IV Push two times a day  glucagon  Injectable 1 milliGRAM(s) IntraMuscular once  insulin glargine Injectable (LANTUS) 25 Unit(s) SubCutaneous every morning  insulin glargine Injectable (LANTUS) 30 Unit(s) SubCutaneous at bedtime  insulin lispro (ADMELOG) corrective regimen sliding scale   SubCutaneous Before meals and at bedtime  magnesium sulfate  IVPB 2 Gram(s) IV Intermittent once  melatonin 3 milliGRAM(s) Oral at bedtime PRN  metoprolol tartrate 75 milliGRAM(s) Oral every 8 hours  multivitamin 1 Tablet(s) Oral daily  ondansetron Injectable 4 milliGRAM(s) IV Push every 6 hours PRN  pantoprazole    Tablet 40 milliGRAM(s) Oral before breakfast  potassium chloride    Tablet ER 40 milliEquivalent(s) Oral once  potassium chloride  10 mEq/100 mL IVPB 10 milliEquivalent(s) IV Intermittent every 1 hour  sertraline 50 milliGRAM(s) Oral at bedtime  spironolactone 25 milliGRAM(s) Oral daily  MEDICATIONS  (PRN):  melatonin 3 milliGRAM(s) Oral at bedtime PRN Insomnia  ondansetron Injectable 4 milliGRAM(s) IV Push every 6 hours PRN Nausea    Height (cm): 172.7 (03-12 @ 09:18)  Weight (kg): 76.2 (03-12 @ 09:18)  BMI (kg/m2): 25.5 (03-12 @ 09:18)  BSA (m2): 1.9 (03-12 @ 09:18)  Daily Height in cm: 172.72 (12 Mar 2021 09:18)    Daily                               11.2   6.34  )-----------( 362      ( 12 Mar 2021 08:50 )             36.1   03-12    138  |  98  |  10.0  ----------------------------<  174<H>  3.1<L>   |  23.0  |  0.54    Ca    8.5<L>      12 Mar 2021 08:50  Phos  3.5     03-12  Mg     1.4     03-12    TPro  6.6  /  Alb  3.1<L>  /  TBili  1.5  /  DBili  x   /  AST  31  /  ALT  19  /  AlkPhos  124<H>  03-12    CARDIAC MARKERS ( 12 Mar 2021 08:50 )  x     / 0.04 ng/mL / 55 U/L / x     / x      CARDIAC MARKERS ( 11 Mar 2021 18:48 )  x     / 0.05 ng/mL / x     / x     / x        PT/INR - ( 11 Mar 2021 18:48 )   PT: 21.2 sec;   INR: 1.88 ratio         PTT - ( 11 Mar 2021 18:48 )  PTT:33.5 sec      Objective:  T(C): 36.3 (03-12-21 @ 15:22), Max: 36.8 (03-11-21 @ 23:39)  HR: 69 (03-12-21 @ 15:22) (69 - 101)  BP: 125/83 (03-12-21 @ 15:22) (99/60 - 131/83)  RR: 20 (03-12-21 @ 15:22) (18 - 20)  SpO2: 97% (03-12-21 @ 15:22) (95% - 97%)  Wt(kg): --CAPILLARY BLOOD GLUCOSE      POCT Blood Glucose.: 137 mg/dL (12 Mar 2021 17:40)  POCT Blood Glucose.: 187 mg/dL (12 Mar 2021 12:40)  POCT Blood Glucose.: 161 mg/dL (12 Mar 2021 08:30)  POCT Blood Glucose.: 277 mg/dL (12 Mar 2021 01:41)  I&O's Summary    PHYSICAL EXAM   Neuro: A+O x 3, non-focal, speech clear and intact  HEENT:  NCAT, PERRL, EOMI. No conjuctival edema or icterus, no thrush.  Neck: supple, trachea midline  Pulm: CTA, with Left side inspiratory wheeze   CV: irregularly irregular   Abd: soft, NT, ND, + BS  Ext: [place] x 4, no edema, no cyanosis or clubbing, 2+ DP b/l, distal motor/neuro/circ intact.   Skin: warm, dry, well perfused        Imaging:  CXR:  < from: Xray Chest 2 Views PA/Lat (03.12.21 @ 01:42) >  FINDINGS:  The lungs show residual lung moderate RIGHT pleural effusion and/or basilar airspace disease. LEFT lung parenchyma clear. No pneumothorax.    The  heart is enlarged in transverse diameter. No hilar mass.  Status post cardiac valve replacement and atrial clipping procedure.   Visualized osseous structures are intact.        IMPRESSION:   Residual moderate RIGHT pleural effusion and/or basilar airspace disease..    < end of copied text >      ECG:    < from: 12 Lead ECG (03.12.21 @ 08:00) >    Ventricular Rate 70 BPM    Atrial Rate 74 BPM    QRS Duration 86 ms    Q-T Interval 378 ms    QTC Calculation(Bazett) 408 ms    R Axis 14 degrees    T Axis 183 degrees    Diagnosis Line Atrial fibrillation  Nonspecific ST abnormality  Abnormal ECG    < end of copied text >            
  Subjective: "Doing ok, much better breathing now"  OOB chair, watching TV    Tele> Afib  70s  V/S  T(C): 36.7 (03-14-21 @ 09:37), Max: 37.1 (03-13-21 @ 16:25)  HR: 68 (03-14-21 @ 09:37) (68 - 89)  BP: 92/56 (03-14-21 @ 09:37) (92/56 - 121/57)  RR: 18 (03-14-21 @ 09:37) (18 - 18)  SpO2: 95% (03-14-21 @ 09:37) (95% - 100%)      CHEST TUBE:   R pigtail waterseal   OUTPUT:     1100        per 24 hours     Drainage:  serosang  AIR LEAKS:  [xYES [ ] NO      MEDICATIONS  (STANDING):  ascorbic acid 500 milliGRAM(s) Oral daily  aspirin enteric coated 81 milliGRAM(s) Oral daily  atorvastatin 40 milliGRAM(s) Oral at bedtime  cefTRIAXone   IVPB 1000 milliGRAM(s) IV Intermittent every 24 hours  dextrose 40% Gel 15 Gram(s) Oral once  dextrose 5%. 1000 milliLiter(s) (50 mL/Hr) IV Continuous <Continuous>  dextrose 5%. 1000 milliLiter(s) (100 mL/Hr) IV Continuous <Continuous>  dextrose 50% Injectable 25 Gram(s) IV Push once  dextrose 50% Injectable 12.5 Gram(s) IV Push once  dextrose 50% Injectable 25 Gram(s) IV Push once  furosemide   Injectable 40 milliGRAM(s) IV Push two times a day  glucagon  Injectable 1 milliGRAM(s) IntraMuscular once  influenza   Vaccine 0.5 milliLiter(s) IntraMuscular once  insulin glargine Injectable (LANTUS) 25 Unit(s) SubCutaneous every morning  insulin glargine Injectable (LANTUS) 30 Unit(s) SubCutaneous at bedtime  insulin lispro (ADMELOG) corrective regimen sliding scale   SubCutaneous Before meals and at bedtime  metoprolol tartrate 75 milliGRAM(s) Oral every 8 hours  multivitamin 1 Tablet(s) Oral daily  pantoprazole    Tablet 40 milliGRAM(s) Oral before breakfast  sertraline 50 milliGRAM(s) Oral at bedtime  spironolactone 25 milliGRAM(s) Oral daily      03-13    138  |  96<L>  |  8.0  ----------------------------<  89  3.3<L>   |  29.0  |  0.56    Ca    8.9      13 Mar 2021 08:14  Phos  3.7     03-13  Mg     1.9     03-13    TPro  6.5<L>  /  Alb  3.1<L>  /  TBili  1.4  /  DBili  x   /  AST  30  /  ALT  18  /  AlkPhos  122<H>  03-13                               11.4   6.21  )-----------( 357      ( 13 Mar 2021 08:16 )             36.2        PT/INR - ( 13 Mar 2021 08:14 )   PT: 16.8 sec;   INR: 1.48 ratio                  CAPILLARY BLOOD GLUCOSE      POCT Blood Glucose.: 169 mg/dL (14 Mar 2021 09:27)  POCT Blood Glucose.: 82 mg/dL (14 Mar 2021 08:01)  POCT Blood Glucose.: 143 mg/dL (13 Mar 2021 21:20)  POCT Blood Glucose.: 172 mg/dL (13 Mar 2021 16:29)  POCT Blood Glucose.: 113 mg/dL (13 Mar 2021 11:42)           CXR: < from: Xray Chest 2 Views PA/Lat (03.13.21 @ 10:20) >  Stable right effusion/infiltrate. No pneumothorax..    < end of copied text >        Physical Exam:    Neuro:   Neuro: Alert Awake NAD    Pulm:  decreased at bases b/l  decreased RT Base   R pigtail insitu waterseal> sm air leak noted    CV: RRR S1 S2     Abd: soft NT ND     Ext no edema                PAST MEDICAL & SURGICAL HISTORY:  PAD (peripheral artery disease)    Paroxysmal atrial fibrillation    Hypercholesterolemia    Diabetes    Hypertension    S/P peripheral artery angioplasty with stent placement    S/P femoral-popliteal bypass surgery    H/O hernia repair    H/O abdominal hysterectomy

## 2021-03-15 NOTE — PROGRESS NOTE ADULT - PROBLEM SELECTOR PLAN 1
D/C  TO longo  Will repeat CXR in AM
CXR with pulmonary vascular congestion and right side pleural effusion   Patient with mild to moderate distress on exertion, SO2 95% on room air  Patient on Eliquis, last dose given in hospital 3/12 at 1am, INR 1.88  Will recommend medical optimization with IV diuretics   Will consider pigtail placement if patient remains symptomatic after IV diuresis  Will repeat CXR in AM  Continue tele with POX monitoring  Will continue to follow closely  Plan discussed with Dr. Christiansen in the AM
Maintain TO longo  Will repeat CXR in AM
CXR with pulmonary vascular congestion and right side pleural effusion   Patient with mild to moderate distress on exertion, SO2 95% on room air  Patient on Eliquis, last dose given in hospital 3/12 at 1am, INR 1.88  Will recommend medical optimization with IV diuretics   Will consider pigtail placement if patient remains symptomatic after IV diuresis  Will repeat CXR in AM  Continue tele with POX monitoring  Will continue to follow closely  Plan discussed with Dr. Christiansen in the AM
CXR with pulmonary vascular congestion and right side pleural effusion   Patient with mild to moderate distress on exertion, SO2 95% on room air  Patient on Eliquis, last dose given in hospital 3/12 at 1am, INR 1.88  Will recommend medical optimization of CHS with IV diuretics   Will consider pigtail placement if patient remains symptomatic after IV diuresis  Will repeat CXR in AM  Discussed with ED attending Dr. Byers  Continue tele with POX monitoring  Will continue to follow closely  Plan discussed with Dr. Crooks.    **Recommend to hold Eliquis at present in preparation for possible future pigtail catheter**

## 2021-03-15 NOTE — PROGRESS NOTE ADULT - REASON FOR ADMISSION
Acute Hypoxic Respiratory Failure 2/2 Acute on Chronic CHF Exacerbation and Pleural Effusion

## 2021-03-15 NOTE — DISCHARGE NOTE PROVIDER - NSDCCPCAREPLAN_GEN_ALL_CORE_FT
PRINCIPAL DISCHARGE DIAGNOSIS  Diagnosis: Pleural effusion  Assessment and Plan of Treatment:       SECONDARY DISCHARGE DIAGNOSES  Diagnosis: Acute CHF  Assessment and Plan of Treatment:     Diagnosis: Acute UTI  Assessment and Plan of Treatment:

## 2021-03-15 NOTE — PROGRESS NOTE ADULT - PROBLEM SELECTOR PLAN 2
Received 60 mg IV Lasix in ED, now 60 mg IV every 12H  Continue Metoprolol  continue Digoxin  Cardiology consult  Now admitted to Medicine service  Continue care as per primary team.
Received 60 mg IV Lasix in ED, now 60 mg IV every 12H  Continue Metoprolol  continue Digoxin  Cardiology consult  Now admitted to Medicine service  Continue care as per primary team.
Lasix  60 mg IV every 12H  Continue Metoprolol  continue Digoxin  Cardiology consulted
Lasix  60 mg IV every 12H  Continue Metoprolol  continue Digoxin  Cardiology consulted
Received 60 mg IV Lasix in ED, now 60 mg IV every 12H  Continue Metoprolol  continue Digoxin  Cardiology consult  Now admitted to Medicine service  Continue care as per primary team.

## 2021-03-15 NOTE — PROGRESS NOTE ADULT - PROVIDER SPECIALTY LIST ADULT
Cardiology
Electrophysiology
Electrophysiology
Hospitalist
Thoracic Surgery
Hospitalist
Hospitalist
Thoracic Surgery
Thoracic Surgery
CT Surgery
Thoracic Surgery

## 2021-03-15 NOTE — PROGRESS NOTE ADULT - ASSESSMENT
76 y/o F with PMHX CAD s/p CABG, Mitral valve annuloplasty, left atrial appendage thrombectomy, and DUANE clip on 01/19/21 (under Dr. Orellana), Afib on Eliquis, HTN, HLD, Chronic CHFrEF (EF 45-50%), Mild Pulm HTN, GERD, presented with progressive worsening of shortness of breath and RENTERIA for past two weeks, admitted with Acute on chronic CHF exacerbation with pleural effusion.  
ASSESSMENT:  74 y/o F with PMHX CAD s/p CABG (8 weeks ago), HTN, HLD, CHFrEF (LVEF 40-45%), Former Tobacco Abuse, CHF, GERD, MDD admitted for acute hypoxic respiratory failure 2/2 Acute on Chronic CHF Exacerbation with Pleural Effusion    PLAN:  Acute Hypoxic Respiratory Failure 2/2 Acute on Chronic CHFrEF Exacerbation with Pleural Effusion, Hx recent CAD s/p CABG, HTN, HLD  -Admited to Medicine  -CXR +R pleural effusion with pulmonary vascular congestion bilaterally  -EKG AFIB 92bpm with PVCs, poor R wave progression, nonspecific STTW changes, QTc 440ms  -s/p Lasix 60mg IV x1 in ER with improvement in SOB/RENTERIA  -recent dose decrease from 60mg PO BID to 40mg PO BID few days ago with worsening symptoms  -Lasix 60mg IV q12 for now, Patient would like medical management prior to surgical drainage.   -VTE PPX   -Fluid Restriction 1L/24 hrs  -Strict I&Os  -trend wts  -Defer need for repeat Echo to Cardiology  -Cont BB/Statin/ASA  -Cardiothoracic Surgery if following   -Cardiology is following      AFIB  -No longer on Amiodarone  -AC with Eliquis 2.5mg PO BID  -Rate Control with Metoprolol Tartrate 75mg PO q8  -Digoxin 125mcg PO q24  -Digoxin level  -monitor Telemetry  -replace electrolytes PRN    Hypokalemia/ Hypomagnesemia: Being replaced.     IDDM2  -Lantus 25 units qAM.   -Lantus 30 units qPM.   -Add ISS. Accuchecks. ADA Diet    MDD  -Sertraline 50mg PO q24    GERD  -Pantoprazole 40mg PO q24    COVID Status  -Recent COVID positive but then had negative tests   -COVID Screen pending  -Consider J&J Vaccine on discharge
ASSESSMENT:  74 y/o F with PMHX CAD s/p CABG (8 weeks ago), HTN, HLD, CHFrEF (LVEF 40-45%), Former Tobacco Abuse, CHF, GERD, MDD admitted for acute hypoxic respiratory failure 2/2 Acute on Chronic CHF Exacerbation with Pleural Effusion    PLAN:  Acute Hypoxic Respiratory Failure 2/2 Acute on Chronic CHFrEF Exacerbation with Pleural Effusion, Hx recent CAD s/p CABG, HTN, HLD  -Admited to Medicine  -CXR +R pleural effusion with pulmonary vascular congestion bilaterally  -EKG AFIB 92bpm with PVCs, poor R wave progression, nonspecific STTW changes, QTc 440ms  -s/p Lasix 60mg IV x1 in ER with improvement in SOB/RENTERIA  -recent dose decrease from 60mg PO BID to 40mg PO BID few days ago with worsening symptoms  -Lasix 60mg IV q12 for now, Patient would like medical management prior to surgical drainage.   -VTE PPX   -Fluid Restriction 1L/24 hrs  -Strict I&Os  -trend wts  -Defer need for repeat Echo to Cardiology  -Cont BB/Statin/ASA  -Cardiothoracic Surgery if following , ts/p chest tube placement, draining fluid, studies looks like transudative in nature, CT surgery and Cardiology is following      AFIB  -No longer on Amiodarone  -AC with Eliquis 2.5mg PO BID  -Rate Control with Metoprolol Tartrate 75mg PO q8  -Digoxin 125mcg PO q24  -Digoxin level  -monitor Telemetry  -replace electrolytes PRN    Hypokalemia/ Hypomagnesemia: Being replaced.     IDDM2  -Lantus 25 units qAM.   -Lantus 30 units qPM.   -Add ISS. Accuchecks. ADA Diet    MDD  -Sertraline 50mg PO q24    GERD  -Pantoprazole 40mg PO q24    COVID Status  -Recent COVID positive but then had negative tests   -COVID Screen pending  -Consider J&J Vaccine on discharge    Dispo: likely in 24 to 48 hours once Chest tube in out. 
Assessment  Right pleural effusion post MV repair/CABG/ preserved EF s/p pigtail drainage  postop PNA in rehab ? Covid + AB present  s/p CABG/MV repair with preop LV dysfunction now with LV recovery and normal E  AF on AC  right thigh hematoma chronic improved      Rec  dc IV lasix / lasix 40 mg day po with aldactone 25 mg  cont eliquis/lopressor/statin  hold afterload reduction give BP  may dc home , will FU in office 2 weeks  no need for covid vaccine given recent Covid infection ? PNa with Ab present
ASSESSMENT:  74 y/o F with PMHX CAD s/p CABG (8 weeks ago), HTN, HLD, CHFrEF (LVEF 40-45%), Former Tobacco Abuse, CHF, GERD, MDD admitted for acute hypoxic respiratory failure 2/2 Acute on Chronic CHF Exacerbation with Pleural Effusion    PLAN:  Acute Hypoxic Respiratory Failure 2/2 Acute on Chronic CHFrEF Exacerbation with Pleural Effusion, Hx recent CAD s/p CABG, HTN, HLD  -Admited to Medicine  -CXR +R pleural effusion with pulmonary vascular congestion bilaterally  -EKG AFIB 92bpm with PVCs, poor R wave progression, nonspecific STTW changes, QTc 440ms  -s/p Lasix 60mg IV x1 in ER with improvement in SOB/RENTERIA  -recent dose decrease from 60mg PO BID to 40mg PO BID few days ago with worsening symptoms  -Lasix 60mg IV q12 for now, Patient would like medical management prior to surgical drainage.   -VTE PPX   -Fluid Restriction 1L/24 hrs  -Strict I&Os  -trend wts  -Defer need for repeat Echo to Cardiology  -Cont BB/Statin/ASA  -Cardiothoracic Surgery if following , they will do pleurocentesis, Cardiology is following      AFIB  -No longer on Amiodarone  -AC with Eliquis 2.5mg PO BID  -Rate Control with Metoprolol Tartrate 75mg PO q8  -Digoxin 125mcg PO q24  -Digoxin level  -monitor Telemetry  -replace electrolytes PRN    Hypokalemia/ Hypomagnesemia: Being replaced.     IDDM2  -Lantus 25 units qAM.   -Lantus 30 units qPM.   -Add ISS. Accuchecks. ADA Diet    MDD  -Sertraline 50mg PO q24    GERD  -Pantoprazole 40mg PO q24    COVID Status  -Recent COVID positive but then had negative tests   -COVID Screen pending  -Consider J&J Vaccine on discharge
74 y/o F with PMHX CAD s/p CABG, Mitral valve annuloplasty, left atrial appendage thrombectomy, and DUANE clip on 01/19/21 (under Dr. Orellana), Afib on Eliquis, HTN, HLD, Chronic CHFrEF (EF 45-50%), Mild Pulm HTN, GERD, presented with progressive worsening of shortness of breath and RENTERIA for past two weeks, admitted with Acute on chronic CHF exacerbation with pleural effusion.    3/15 pigtail removed
76 y/o F with PMHX CAD s/p CABG, Mitral valve annuloplasty, left atrial appendage thrombectomy, and DUANE clip on 01/19/21 (under Dr. Orellana), Afib on Eliquis, HTN, HLD, Chronic CHFrEF (EF 45-50%), Mild Pulm HTN, GERD, presented with progressive worsening of shortness of breath and RENTERIA for past two weeks, admitted with Acute on chronic CHF exacerbation with pleural effusion.  
76 y/o F with PMHX CAD s/p CABG, Mitral valve annuloplasty, left atrial appendage thrombectomy, and DUANE clip on 01/19/21 (under Dr. Orellana), Afib on Eliquis, HTN, HLD, Chronic CHFrEF (EF 45-50%), Mild Pulm HTN, GERD, presented with progressive worsening of shortness of breath and RENTERIA for past two weeks, admitted with Acute on chronic CHF exacerbation with pleural effusion.  
74 y/o F with PMHX CAD s/p CABG, Mitral valve annuloplasty, left atrial appendage thrombectomy, and DUANE clip on 01/19/21 (under Dr. Orellana), Afib on Eliquis, HTN, HLD, Chronic CHFrEF (EF 45-50%), Mild Pulm HTN, GERD, presented with progressive worsening of shortness of breath and RENTERIA for past two weeks, admitted with Acute on chronic CHF exacerbation with pleural effusion.

## 2021-03-15 NOTE — PROGRESS NOTE ADULT - PROBLEM SELECTOR PLAN 3
On Eliquis for anticoagulation, on hold for possible pigtail   On Digoxin and Metoprolol.
On Eliquis for anticoagulation, on hold for possible pigtail   On Digoxin and Metoprolol.
Resume Eliquis  On Digoxin and Metoprolol.
Eliquis  On Digoxin and Metoprolol.
On Eliquis for anticoagulation,   On Digoxin and Metoprolol.    **Recommend hold Eliquis**

## 2021-03-15 NOTE — PROGRESS NOTE ADULT - PROBLEM SELECTOR PLAN 4
S/p CABG, mitral valve repair and left atrial thrombectomy (01/19/2021)  Continue Aspirin and Statin  Continue Metoprolol

## 2021-03-15 NOTE — DISCHARGE NOTE PROVIDER - CARE PROVIDER_API CALL
Mary Man)  Internal Medicine  [address] [place] [id number]  Phone: [phone]  Fax: [phone]  Follow Up Time:     PMD,   please call your dotors office and get an appiontment  Phone: (   )    -  Fax: (   )    -  Follow Up Time:

## 2021-03-15 NOTE — DISCHARGE NOTE PROVIDER - NSDCFUSCHEDAPPT_GEN_ALL_CORE_FT
DONALD SAUNDERS ; 04/17/2021 ; NPP Dis[facility] [address] [facility]  DONALD SAUNDERS ; 04/20/2021 ; NPP Pul[facility]ilda [address] ; 04/20/2021 ; NPP [address]

## 2021-03-15 NOTE — DISCHARGE NOTE PROVIDER - PROVIDER TOKENS
PROVIDER:[TOKEN:[[id number]]],FREE:[LAST:[PMD],PHONE:[(   )    -],FAX:[(   )    -],ADDRESS:[please call your dotors office and get an appiontment]]

## 2021-03-15 NOTE — DISCHARGE NOTE PROVIDER - HOSPITAL COURSE
74 y/o F with Hx CAD s/p CABG (8 weeks ago), HTN, HLD, CHFrEF (LVEF 40-45%), Former Tobacco Abuse, CHF, GERD, MDD admitted for acute hypoxic respiratory failure 2/2 Acute on Chronic CHFrEF Exacerbation with Pleural Effusion,  CXR came +R pleural effusion with pulmonary vascular congestion bilaterally, patient was continued with Lasix 60mg BID,   seen by Cardiology and CT surgery, Fluid Restriction 1L/24 hrs, Strict I&Os, Cont BB/Statin/ASA, patient got pleurocentasis, Studies looks like transudative in nature, his Pleural effusion resolved and chest tube was taken out, repeat CXR showed no Pneumothorax,       AFIB  -No longer on Amiodarone  -AC with Eliquis 2.5mg PO BID  -Rate Control with Metoprolol Tartrate 75mg PO q8  -Digoxin 125mcg PO q24  -Digoxin level  -monitor Telemetry  -replace electrolytes PRN    Hypokalemia/ Hypomagnesemia: Being replaced.     IDDM2  -Lantus 25 units qAM.   -Lantus 30 units qPM.   -Add ISS. Accuchecks. ADA Diet    MDD  -Sertraline 50mg PO q24    GERD  -Pantoprazole 40mg PO q24    COVID Status  -Recent COVID positive but then had negative tests   -COVID Screen pending  -Consider J&J Vaccine on discharge    Dispo: likely in 24 to 48 hours once Chest tube in out.    76 y/o F with Hx CAD s/p CABG (8 weeks ago), HTN, HLD, CHFrEF (LVEF 40-45%), Former Tobacco Abuse, CHF, GERD, MDD admitted for acute hypoxic respiratory failure 2/2 Acute on Chronic CHFrEF Exacerbation with Pleural Effusion,  CXR came +R pleural effusion with pulmonary vascular congestion bilaterally, patient was continued with Lasix 60mg BID,   seen by Cardiology and CT surgery, Fluid Restriction 1L/24 hrs, Strict I&Os, Cont BB/Statin/ASA, patient got pleurocentasis, Studies looks like transudative in nature, his Pleural effusion resolved and chest tube was taken out, repeat CXR showed no Pneumothorax, she has been weaned off from the supplemental oxygen, doing well, seen by cardiology, being switched to oral Lasix 40 mg day po with aldactone 25 mg, FU with cardiology office in 2 weeks.   Patient was noted to have UTI POA and was treated with ceftrixone for 3 days, urine cultures came contaminated, she has no more symptoms.   She has Hx of AFIB, No longer on Amiodarone, rate remained controlled and was continued with Eliquis 2.5mg PO BID, Metoprolol Tartrate 75mg PO q8,   she was noted to have Hypokalemia/ Hypomagnesemia and was replaced over the course.   For her IDDM2, she was continued with her Lantus 25 units qAM, Lantus 30 units qPM.   she hyas Hx of MDD and was continued with Sertraline 50mg PO q24.     patient is doing ok, her all symptoms has been resolved, being discharged home in a stable condition.     Vital Signs Last 24 Hrs  T(C): 36.9 (15 Mar 2021 08:35), Max: 36.9 (15 Mar 2021 08:35)  T(F): 98.4 (15 Mar 2021 08:35), Max: 98.4 (15 Mar 2021 08:35)  HR: 83 (15 Mar 2021 13:43) (74 - 106)  BP: 102/62 (15 Mar 2021 08:36) (100/59 - 122/58)  RR: 20 (15 Mar 2021 13:43) (17 - 20)  SpO2: 97% (15 Mar 2021 13:43) (94% - 100%)    PHYSICAL EXAM:    GENERAL: Elderly female looking comfortable   HEENT: PERRL, +EOMI  NECK: soft, Supple, No JVD,   CHEST/LUNG: Decrease air entry bilaterally especially right lower zone; No wheezing   HEART: S1S2+, Regular rate and rhythm; No murmurs  ABDOMEN: Soft, Nontender, Nondistended; Bowel sounds present  EXTREMITIES:  1+ Peripheral Pulses, No edema  SKIN: No rashes or lesions  NEURO: AAOX3, no focal deficits, no motor r sensory loss  PSYCH: normal mood    Total time spent 38 minutes.

## 2021-03-15 NOTE — DISCHARGE NOTE PROVIDER - NSDCMRMEDTOKEN_GEN_ALL_CORE_FT
apixaban 2.5 mg oral tablet: 1 tab(s) orally every 12 hours  ascorbic acid 500 mg oral tablet: 1 tab(s) orally once a day  aspirin 81 mg oral delayed release tablet: 1 tab(s) orally once a day  atorvastatin 40 mg oral tablet: 1 tab(s) orally once a day (at bedtime)  digoxin 125 mcg (0.125 mg) oral tablet: 1 tab(s) orally once a day  Lantus 100 units/mL subcutaneous solution: 25 unit(s) subcutaneous once a day  Lantus 100 units/mL subcutaneous solution: 30 unit(s) subcutaneous once a day (at bedtime)  Lasix 20 mg oral tablet: 3 tab(s) orally 2 times a day  metoprolol tartrate 75 mg oral tablet: 1 tab(s) orally every 8 hours  Multiple Vitamins oral tablet: 1 tab(s) orally once a day  pantoprazole 40 mg oral delayed release tablet: 1 tab(s) orally once a day  potassium chloride 20 mEq oral tablet, extended release: 2 tab(s) orally 2 times a day  sertraline 50 mg oral tablet: 1 tab(s) orally once a day   Aldactone 25 mg oral tablet: 1 tab(s) orally once a day   apixaban 2.5 mg oral tablet: 1 tab(s) orally every 12 hours  ascorbic acid 500 mg oral tablet: 1 tab(s) orally once a day  aspirin 81 mg oral delayed release tablet: 1 tab(s) orally once a day  atorvastatin 40 mg oral tablet: 1 tab(s) orally once a day (at bedtime)  Lantus 100 units/mL subcutaneous solution: 25 unit(s) subcutaneous once a day  Lantus 100 units/mL subcutaneous solution: 30 unit(s) subcutaneous once a day (at bedtime)  Lasix 40 mg oral tablet: 1 tab(s) orally once a day   metoprolol tartrate 75 mg oral tablet: 1 tab(s) orally every 8 hours  Multiple Vitamins oral tablet: 1 tab(s) orally once a day  pantoprazole 40 mg oral delayed release tablet: 1 tab(s) orally once a day (before a meal)  sertraline 50 mg oral tablet: 1 tab(s) orally once a day

## 2021-03-16 LAB
-  AMIKACIN: SIGNIFICANT CHANGE UP
-  AMIKACIN: SIGNIFICANT CHANGE UP
-  AMOXICILLIN/CLAVULANIC ACID: SIGNIFICANT CHANGE UP
-  AMOXICILLIN/CLAVULANIC ACID: SIGNIFICANT CHANGE UP
-  AMPICILLIN/SULBACTAM: SIGNIFICANT CHANGE UP
-  AMPICILLIN/SULBACTAM: SIGNIFICANT CHANGE UP
-  AMPICILLIN: SIGNIFICANT CHANGE UP
-  AMPICILLIN: SIGNIFICANT CHANGE UP
-  AZTREONAM: SIGNIFICANT CHANGE UP
-  AZTREONAM: SIGNIFICANT CHANGE UP
-  CEFAZOLIN: SIGNIFICANT CHANGE UP
-  CEFAZOLIN: SIGNIFICANT CHANGE UP
-  CEFEPIME: SIGNIFICANT CHANGE UP
-  CEFEPIME: SIGNIFICANT CHANGE UP
-  CEFOXITIN: SIGNIFICANT CHANGE UP
-  CEFOXITIN: SIGNIFICANT CHANGE UP
-  CEFTRIAXONE: SIGNIFICANT CHANGE UP
-  CEFTRIAXONE: SIGNIFICANT CHANGE UP
-  CIPROFLOXACIN: SIGNIFICANT CHANGE UP
-  CIPROFLOXACIN: SIGNIFICANT CHANGE UP
-  ERTAPENEM: SIGNIFICANT CHANGE UP
-  ERTAPENEM: SIGNIFICANT CHANGE UP
-  GENTAMICIN: SIGNIFICANT CHANGE UP
-  GENTAMICIN: SIGNIFICANT CHANGE UP
-  IMIPENEM: SIGNIFICANT CHANGE UP
-  IMIPENEM: SIGNIFICANT CHANGE UP
-  LEVOFLOXACIN: SIGNIFICANT CHANGE UP
-  LEVOFLOXACIN: SIGNIFICANT CHANGE UP
-  MEROPENEM: SIGNIFICANT CHANGE UP
-  MEROPENEM: SIGNIFICANT CHANGE UP
-  NITROFURANTOIN: SIGNIFICANT CHANGE UP
-  NITROFURANTOIN: SIGNIFICANT CHANGE UP
-  PIPERACILLIN/TAZOBACTAM: SIGNIFICANT CHANGE UP
-  PIPERACILLIN/TAZOBACTAM: SIGNIFICANT CHANGE UP
-  TIGECYCLINE: SIGNIFICANT CHANGE UP
-  TIGECYCLINE: SIGNIFICANT CHANGE UP
-  TOBRAMYCIN: SIGNIFICANT CHANGE UP
-  TOBRAMYCIN: SIGNIFICANT CHANGE UP
-  TRIMETHOPRIM/SULFAMETHOXAZOLE: SIGNIFICANT CHANGE UP
-  TRIMETHOPRIM/SULFAMETHOXAZOLE: SIGNIFICANT CHANGE UP
CULTURE RESULTS: SIGNIFICANT CHANGE UP
METHOD TYPE: SIGNIFICANT CHANGE UP
METHOD TYPE: SIGNIFICANT CHANGE UP
ORGANISM # SPEC MICROSCOPIC CNT: SIGNIFICANT CHANGE UP
SPECIMEN SOURCE: SIGNIFICANT CHANGE UP

## 2021-03-16 PROCEDURE — P9012: CPT

## 2021-03-16 PROCEDURE — 87641 MR-STAPH DNA AMP PROBE: CPT

## 2021-03-16 PROCEDURE — 85027 COMPLETE CBC AUTOMATED: CPT

## 2021-03-16 PROCEDURE — 82947 ASSAY GLUCOSE BLOOD QUANT: CPT

## 2021-03-16 PROCEDURE — P9047: CPT

## 2021-03-16 PROCEDURE — 82565 ASSAY OF CREATININE: CPT

## 2021-03-16 PROCEDURE — 82330 ASSAY OF CALCIUM: CPT

## 2021-03-16 PROCEDURE — 94640 AIRWAY INHALATION TREATMENT: CPT

## 2021-03-16 PROCEDURE — 85018 HEMOGLOBIN: CPT

## 2021-03-16 PROCEDURE — 97110 THERAPEUTIC EXERCISES: CPT

## 2021-03-16 PROCEDURE — 85576 BLOOD PLATELET AGGREGATION: CPT

## 2021-03-16 PROCEDURE — 97116 GAIT TRAINING THERAPY: CPT

## 2021-03-16 PROCEDURE — 36415 COLL VENOUS BLD VENIPUNCTURE: CPT

## 2021-03-16 PROCEDURE — 96374 THER/PROPH/DIAG INJ IV PUSH: CPT

## 2021-03-16 PROCEDURE — 86923 COMPATIBILITY TEST ELECTRIC: CPT

## 2021-03-16 PROCEDURE — 82553 CREATINE MB FRACTION: CPT

## 2021-03-16 PROCEDURE — 83520 IMMUNOASSAY QUANT NOS NONAB: CPT

## 2021-03-16 PROCEDURE — 93312 ECHO TRANSESOPHAGEAL: CPT

## 2021-03-16 PROCEDURE — 81001 URINALYSIS AUTO W/SCOPE: CPT

## 2021-03-16 PROCEDURE — C1894: CPT

## 2021-03-16 PROCEDURE — 93458 L HRT ARTERY/VENTRICLE ANGIO: CPT

## 2021-03-16 PROCEDURE — 83880 ASSAY OF NATRIURETIC PEPTIDE: CPT

## 2021-03-16 PROCEDURE — 97163 PT EVAL HIGH COMPLEX 45 MIN: CPT

## 2021-03-16 PROCEDURE — 84484 ASSAY OF TROPONIN QUANT: CPT

## 2021-03-16 PROCEDURE — 83036 HEMOGLOBIN GLYCOSYLATED A1C: CPT

## 2021-03-16 PROCEDURE — U0003: CPT

## 2021-03-16 PROCEDURE — 86965 POOLING BLOOD PLATELETS: CPT

## 2021-03-16 PROCEDURE — P9040: CPT

## 2021-03-16 PROCEDURE — 93880 EXTRACRANIAL BILAT STUDY: CPT

## 2021-03-16 PROCEDURE — 97530 THERAPEUTIC ACTIVITIES: CPT

## 2021-03-16 PROCEDURE — 85014 HEMATOCRIT: CPT

## 2021-03-16 PROCEDURE — 94660 CPAP INITIATION&MGMT: CPT

## 2021-03-16 PROCEDURE — 82962 GLUCOSE BLOOD TEST: CPT

## 2021-03-16 PROCEDURE — 80076 HEPATIC FUNCTION PANEL: CPT

## 2021-03-16 PROCEDURE — 84443 ASSAY THYROID STIM HORMONE: CPT

## 2021-03-16 PROCEDURE — 93320 DOPPLER ECHO COMPLETE: CPT

## 2021-03-16 PROCEDURE — 84132 ASSAY OF SERUM POTASSIUM: CPT

## 2021-03-16 PROCEDURE — 71045 X-RAY EXAM CHEST 1 VIEW: CPT

## 2021-03-16 PROCEDURE — 93005 ELECTROCARDIOGRAM TRACING: CPT

## 2021-03-16 PROCEDURE — 86901 BLOOD TYPING SEROLOGIC RH(D): CPT

## 2021-03-16 PROCEDURE — 87640 STAPH A DNA AMP PROBE: CPT

## 2021-03-16 PROCEDURE — 80048 BASIC METABOLIC PNL TOTAL CA: CPT

## 2021-03-16 PROCEDURE — 82435 ASSAY OF BLOOD CHLORIDE: CPT

## 2021-03-16 PROCEDURE — 82550 ASSAY OF CK (CPK): CPT

## 2021-03-16 PROCEDURE — 94760 N-INVAS EAR/PLS OXIMETRY 1: CPT

## 2021-03-16 PROCEDURE — P9016: CPT

## 2021-03-16 PROCEDURE — 94002 VENT MGMT INPAT INIT DAY: CPT

## 2021-03-16 PROCEDURE — 93306 TTE W/DOPPLER COMPLETE: CPT

## 2021-03-16 PROCEDURE — 83690 ASSAY OF LIPASE: CPT

## 2021-03-16 PROCEDURE — 84480 ASSAY TRIIODOTHYRONINE (T3): CPT

## 2021-03-16 PROCEDURE — 84145 PROCALCITONIN (PCT): CPT

## 2021-03-16 PROCEDURE — 84134 ASSAY OF PREALBUMIN: CPT

## 2021-03-16 PROCEDURE — 86769 SARS-COV-2 COVID-19 ANTIBODY: CPT

## 2021-03-16 PROCEDURE — 99285 EMERGENCY DEPT VISIT HI MDM: CPT | Mod: 25

## 2021-03-16 PROCEDURE — 83605 ASSAY OF LACTIC ACID: CPT

## 2021-03-16 PROCEDURE — 93308 TTE F-UP OR LMTD: CPT

## 2021-03-16 PROCEDURE — 86900 BLOOD TYPING SEROLOGIC ABO: CPT

## 2021-03-16 PROCEDURE — P9059: CPT

## 2021-03-16 PROCEDURE — C1769: CPT

## 2021-03-16 PROCEDURE — 84295 ASSAY OF SERUM SODIUM: CPT

## 2021-03-16 PROCEDURE — 87086 URINE CULTURE/COLONY COUNT: CPT

## 2021-03-16 PROCEDURE — 96375 TX/PRO/DX INJ NEW DRUG ADDON: CPT

## 2021-03-16 PROCEDURE — 93325 DOPPLER ECHO COLOR FLOW MAPG: CPT

## 2021-03-16 PROCEDURE — 36600 WITHDRAWAL OF ARTERIAL BLOOD: CPT

## 2021-03-16 PROCEDURE — 86850 RBC ANTIBODY SCREEN: CPT

## 2021-03-16 PROCEDURE — 87040 BLOOD CULTURE FOR BACTERIA: CPT

## 2021-03-16 PROCEDURE — 97535 SELF CARE MNGMENT TRAINING: CPT

## 2021-03-16 PROCEDURE — 97167 OT EVAL HIGH COMPLEX 60 MIN: CPT

## 2021-03-16 PROCEDURE — 83735 ASSAY OF MAGNESIUM: CPT

## 2021-03-16 PROCEDURE — U0005: CPT

## 2021-03-16 PROCEDURE — 85610 PROTHROMBIN TIME: CPT

## 2021-03-16 PROCEDURE — 36430 TRANSFUSION BLD/BLD COMPNT: CPT

## 2021-03-16 PROCEDURE — 0225U NFCT DS DNA&RNA 21 SARSCOV2: CPT

## 2021-03-16 PROCEDURE — 80053 COMPREHEN METABOLIC PANEL: CPT

## 2021-03-16 PROCEDURE — C8929: CPT

## 2021-03-16 PROCEDURE — 88304 TISSUE EXAM BY PATHOLOGIST: CPT

## 2021-03-16 PROCEDURE — 84100 ASSAY OF PHOSPHORUS: CPT

## 2021-03-16 PROCEDURE — 84436 ASSAY OF TOTAL THYROXINE: CPT

## 2021-03-16 PROCEDURE — C1889: CPT

## 2021-03-16 PROCEDURE — 85730 THROMBOPLASTIN TIME PARTIAL: CPT

## 2021-03-16 PROCEDURE — 94010 BREATHING CAPACITY TEST: CPT

## 2021-03-16 PROCEDURE — 85025 COMPLETE CBC W/AUTO DIFF WBC: CPT

## 2021-03-16 PROCEDURE — C1887: CPT

## 2021-03-16 PROCEDURE — 82803 BLOOD GASES ANY COMBINATION: CPT

## 2021-03-16 PROCEDURE — 86803 HEPATITIS C AB TEST: CPT

## 2021-03-16 PROCEDURE — 33967 INSERT I-AORT PERCUT DEVICE: CPT

## 2021-03-16 PROCEDURE — P9045: CPT

## 2021-03-18 LAB
CULTURE RESULTS: SIGNIFICANT CHANGE UP
SPECIMEN SOURCE: SIGNIFICANT CHANGE UP

## 2021-03-20 NOTE — AIRWAY REMOVAL NOTE  ADULT & PEDS - RESPIRATORY EXPANSION/ACCESSORY MUSCLES/RETRACTIONS
A/o x4, denies pain or resp distress, cms intact, pearson, verbal and responsive. Iv x2 noted. Seizure pads noted. All questions answered. Call light with pt. Curtain left open and door closed.  Pt on monitor no retractions

## 2021-03-22 LAB — NON-GYNECOLOGICAL CYTOLOGY STUDY: SIGNIFICANT CHANGE UP

## 2021-03-26 NOTE — CDI QUERY NOTE - NSCDIOTHERTXTBX_GEN_ALL_CORE_HH
There is conflicting documentation regarding the patient's nutritional status.  Well nourished is documented in the chart along with severe protein calorie malnutrition (assessed by Dietitian and documented by Hospitalist).    Can the patient's nutrition status and malnutrition diagnosis be clarified, after study?    -Severe malnutrition, assessed by Dietician and diagnosed, evidenced by ________ (please document the criteria to support the diagnosis)  -Other (please specify)  -Not clinically significant      Chart documentation:    3/15 Discharge note:  Did the Patient Present With or was Treated for Malnutrition During This Admission  Yes...  Details of Malnutrition Diagnosis/Diagnoses  This patient has been assessed with a concern for Malnutrition and was treated during this hospitalization for the following Nutrition diagnosis/diagnoses:   -  03/14/2021: Severe protein-calorie malnutrition  This patient has been assessed with a concern for Malnutrition and was treated during this hospitalization for the following Nutrition diagnosis/diagnoses:   -  03/14/2021: Severe protein-calorie malnutrition      3/15 Cardiology note:  General: Appears well developed, well nourished alert and cooperative.        3/14 Dietician Risk Notification  Nutrition Diagnosis & Parameters:  Findings Based on Comprehensive Nutrition Assessment, Consultation Performed on  14-Mar-2021  Upon Nutritional Assessment by the Registered Dietitian Your Patient was Determined to Meet Criteria/Has Evidence of the Following Diagnosis:  Severe protein-calorie malnutrition  Other Risk Factors  Not applicable  Etiology-Basis  Acute illness or injury  Malnutrition Evaluation as Demonstrated by (Adults > 20 years of age)  Inadequate energy intake...  Loss of subcutaneous fat...  Loss of muscle...  Inadequate Energy Intake  < 75% for >/= 1 month  Body Fat (loss of subcutaneous fat)  Orbital...  Orbital Depletion is  moderate  Muscle Mass (Loss of Muscle)  Temples...  Clavicles...  Shoulders...  Temples Depletion is  moderate  Clavicles Depletion is  moderate  Shoulders Depletion is  moderate

## 2021-04-03 ENCOUNTER — APPOINTMENT (OUTPATIENT)
Dept: CT IMAGING | Facility: CLINIC | Age: 76
End: 2021-04-03
Payer: MEDICARE

## 2021-04-03 ENCOUNTER — OUTPATIENT (OUTPATIENT)
Dept: OUTPATIENT SERVICES | Facility: HOSPITAL | Age: 76
LOS: 1 days | End: 2021-04-03

## 2021-04-03 DIAGNOSIS — Z95.828 PRESENCE OF OTHER VASCULAR IMPLANTS AND GRAFTS: Chronic | ICD-10-CM

## 2021-04-03 DIAGNOSIS — Z90.710 ACQUIRED ABSENCE OF BOTH CERVIX AND UTERUS: Chronic | ICD-10-CM

## 2021-04-03 DIAGNOSIS — R06.00 DYSPNEA, UNSPECIFIED: ICD-10-CM

## 2021-04-03 DIAGNOSIS — Z95.820 PERIPHERAL VASCULAR ANGIOPLASTY STATUS WITH IMPLANTS AND GRAFTS: Chronic | ICD-10-CM

## 2021-04-03 DIAGNOSIS — Z98.89 OTHER SPECIFIED POSTPROCEDURAL STATES: Chronic | ICD-10-CM

## 2021-04-03 PROCEDURE — 71250 CT THORAX DX C-: CPT | Mod: 26

## 2021-04-14 LAB
CULTURE RESULTS: SIGNIFICANT CHANGE UP
SPECIMEN SOURCE: SIGNIFICANT CHANGE UP

## 2021-04-17 ENCOUNTER — APPOINTMENT (OUTPATIENT)
Dept: DISASTER EMERGENCY | Facility: CLINIC | Age: 76
End: 2021-04-17

## 2021-04-20 ENCOUNTER — APPOINTMENT (OUTPATIENT)
Dept: PULMONOLOGY | Facility: CLINIC | Age: 76
End: 2021-04-20
Payer: MEDICARE

## 2021-04-20 VITALS — TEMPERATURE: 97.9 F | WEIGHT: 177 LBS | BODY MASS INDEX: 28.11 KG/M2 | HEIGHT: 66.5 IN

## 2021-04-20 VITALS — OXYGEN SATURATION: 99 % | SYSTOLIC BLOOD PRESSURE: 108 MMHG | HEART RATE: 66 BPM | DIASTOLIC BLOOD PRESSURE: 62 MMHG

## 2021-04-20 PROCEDURE — 99072 ADDL SUPL MATRL&STAF TM PHE: CPT

## 2021-04-20 PROCEDURE — 94729 DIFFUSING CAPACITY: CPT

## 2021-04-20 PROCEDURE — 99214 OFFICE O/P EST MOD 30 MIN: CPT | Mod: 25

## 2021-04-20 PROCEDURE — 85018 HEMOGLOBIN: CPT | Mod: QW

## 2021-04-20 PROCEDURE — 94010 BREATHING CAPACITY TEST: CPT

## 2021-04-20 PROCEDURE — 94727 GAS DIL/WSHOT DETER LNG VOL: CPT

## 2021-04-20 RX ORDER — BLOOD SUGAR DIAGNOSTIC
STRIP MISCELLANEOUS
Qty: 30 | Refills: 0 | Status: ACTIVE | COMMUNITY
Start: 2021-02-23

## 2021-04-20 RX ORDER — FUROSEMIDE 40 MG/1
40 TABLET ORAL TWICE DAILY
Refills: 0 | Status: ACTIVE | COMMUNITY

## 2021-04-20 NOTE — REASON FOR VISIT
[Follow-Up] : a follow-up visit [Abnormal CXR/ Chest CT] : an abnormal CXR/ chest CT [Shortness of Breath] : shortness of breath [TextBox_44] : Pleural effusion

## 2021-04-20 NOTE — PHYSICAL EXAM
[No Acute Distress] : no acute distress [Normal Oropharynx] : normal oropharynx [Normal Appearance] : normal appearance [No Neck Mass] : no neck mass [Normal Rate/Rhythm] : normal rate/rhythm [Normal S1, S2] : normal s1, s2 [No Murmurs] : no murmurs [No Resp Distress] : no resp distress [Clear to Auscultation Bilaterally] : clear to auscultation bilaterally [Surgical scars] : surgical scars [Benign] : benign [No Clubbing] : no clubbing [No Cyanosis] : no cyanosis [FROM] : FROM [2+ Pitting] : 2+ pitting [Normal Color/ Pigmentation] : normal color/ pigmentation [No Focal Deficits] : no focal deficits [Oriented x3] : oriented x3 [Normal Affect] : normal affect [TextBox_68] : Decreased right but better than prior [TextBox_99] : Using wheelchair

## 2021-05-12 NOTE — PATIENT PROFILE ADULT - DO YOU FEEL UNSAFE AT HOME, WORK, OR SCHOOL?
-Avoid all sexual contact - oral, vaginal, or anal - for 2 weeks to avoid transmission and to ensure the antibiotics you took today treat your infection.   -If you are positive for an STD, the Emergency Department will call you with your results in 3-5 days. Please take the call.   -Use condoms EVERY TIME to avoid most sexually transmitted infections and pregnancy. Genital herpes and genital warts are still transmittable with skin to skin contact even while wearing condoms. The only way to prevent a STI (Sexually Transmitted Infection) is complete abstinence from all sexual activity.   -There are some STIs that there is no cure for, such as HIV/AIDS. Make an appointment with your primary care doctor if you would like to arrange for testing. CDC recommends testing at least once a year if you are sexually active, especially with multiple partners.   -Return to the Emergency Department for any new or worsening symptoms. Otherwise, follow up with your Primary Care Provider. Call [phone]) if you need a doctor or specialist. Thank you for choosing [facility]!          no

## 2021-06-15 ENCOUNTER — APPOINTMENT (OUTPATIENT)
Dept: PULMONOLOGY | Facility: CLINIC | Age: 76
End: 2021-06-15
Payer: MEDICARE

## 2021-06-15 VITALS — WEIGHT: 159 LBS | TEMPERATURE: 98.5 F | BODY MASS INDEX: 24.96 KG/M2 | HEIGHT: 67 IN

## 2021-06-15 VITALS — HEART RATE: 75 BPM | DIASTOLIC BLOOD PRESSURE: 72 MMHG | SYSTOLIC BLOOD PRESSURE: 130 MMHG | OXYGEN SATURATION: 98 %

## 2021-06-15 DIAGNOSIS — Z87.891 PERSONAL HISTORY OF NICOTINE DEPENDENCE: ICD-10-CM

## 2021-06-15 PROCEDURE — 94010 BREATHING CAPACITY TEST: CPT

## 2021-06-15 PROCEDURE — 99214 OFFICE O/P EST MOD 30 MIN: CPT | Mod: 25

## 2021-06-15 NOTE — PHYSICAL EXAM
[No Acute Distress] : no acute distress [Normal Oropharynx] : normal oropharynx [Normal Appearance] : normal appearance [No Neck Mass] : no neck mass [Normal Rate/Rhythm] : normal rate/rhythm [Normal S1, S2] : normal s1, s2 [No Murmurs] : no murmurs [No Resp Distress] : no resp distress [Clear to Auscultation Bilaterally] : clear to auscultation bilaterally [Surgical scars] : surgical scars [Benign] : benign [No Clubbing] : no clubbing [No Cyanosis] : no cyanosis [No Edema] : no edema [FROM] : FROM [Normal Color/ Pigmentation] : normal color/ pigmentation [No Focal Deficits] : no focal deficits [Oriented x3] : oriented x3 [Normal Affect] : normal affect [TextBox_68] : Decreased right but better than prior [TextBox_99] : Using wheelchair

## 2021-06-15 NOTE — REVIEW OF SYSTEMS
[Fatigue] : fatigue [Dyspnea] : dyspnea [SOB on Exertion] : sob on exertion [Diabetes] : diabetes [Negative] : Psychiatric [TextBox_3] : Improved

## 2021-06-15 NOTE — REASON FOR VISIT
[Follow-Up] : a follow-up visit [Abnormal CXR/ Chest CT] : an abnormal CXR/ chest CT [Edema] : edema [Shortness of Breath] : shortness of breath

## 2021-07-17 NOTE — DISCHARGE NOTE PROVIDER - PROVIDER RX CONTACT NUMBER
Pt called today for OBN appt with no complaints. Pt is transfer of care form the Midwives, PN labs already done, RN order Varicella only due to unknown chicken poxes or vaccine. RN scheduled NPN appt with MD on 7/30.  Pt understands rotation of male and f Recent Travel (or planned travel) to [facility] for self and or partner No    Pets Yes, dog        GENETICS SCREENING    Genetic Screening    Genetic Screening/Teratology Counseling- Includes patient, baby's father, or anyone in either family with:  Selene [phone]

## 2021-09-01 ENCOUNTER — APPOINTMENT (OUTPATIENT)
Dept: CT IMAGING | Facility: CLINIC | Age: 76
End: 2021-09-01
Payer: MEDICARE

## 2021-09-01 ENCOUNTER — OUTPATIENT (OUTPATIENT)
Dept: OUTPATIENT SERVICES | Facility: HOSPITAL | Age: 76
LOS: 1 days | End: 2021-09-01

## 2021-09-01 DIAGNOSIS — Z95.828 PRESENCE OF OTHER VASCULAR IMPLANTS AND GRAFTS: Chronic | ICD-10-CM

## 2021-09-01 DIAGNOSIS — Z98.89 OTHER SPECIFIED POSTPROCEDURAL STATES: Chronic | ICD-10-CM

## 2021-09-01 DIAGNOSIS — R06.00 DYSPNEA, UNSPECIFIED: ICD-10-CM

## 2021-09-01 DIAGNOSIS — Z90.710 ACQUIRED ABSENCE OF BOTH CERVIX AND UTERUS: Chronic | ICD-10-CM

## 2021-09-01 DIAGNOSIS — Z95.820 PERIPHERAL VASCULAR ANGIOPLASTY STATUS WITH IMPLANTS AND GRAFTS: Chronic | ICD-10-CM

## 2021-09-01 PROCEDURE — 71250 CT THORAX DX C-: CPT | Mod: 26

## 2021-09-22 ENCOUNTER — APPOINTMENT (OUTPATIENT)
Dept: PULMONOLOGY | Facility: CLINIC | Age: 76
End: 2021-09-22
Payer: MEDICARE

## 2021-09-22 VITALS
RESPIRATION RATE: 16 BRPM | WEIGHT: 161 LBS | BODY MASS INDEX: 25.27 KG/M2 | SYSTOLIC BLOOD PRESSURE: 110 MMHG | OXYGEN SATURATION: 96 % | DIASTOLIC BLOOD PRESSURE: 78 MMHG | HEIGHT: 67 IN | HEART RATE: 78 BPM

## 2021-09-22 DIAGNOSIS — Z95.1 PRESENCE OF AORTOCORONARY BYPASS GRAFT: ICD-10-CM

## 2021-09-22 DIAGNOSIS — Z98.890 OTHER SPECIFIED POSTPROCEDURAL STATES: ICD-10-CM

## 2021-09-22 DIAGNOSIS — U07.1 COVID-19: ICD-10-CM

## 2021-09-22 PROCEDURE — 99214 OFFICE O/P EST MOD 30 MIN: CPT

## 2021-09-22 RX ORDER — SERTRALINE HYDROCHLORIDE 100 MG/1
100 TABLET, FILM COATED ORAL
Qty: 30 | Refills: 0 | Status: ACTIVE | COMMUNITY
Start: 2021-09-20

## 2021-09-22 RX ORDER — TRAZODONE HYDROCHLORIDE 50 MG/1
50 TABLET ORAL
Qty: 30 | Refills: 0 | Status: ACTIVE | COMMUNITY
Start: 2021-09-20

## 2021-09-22 NOTE — REASON FOR VISIT
[Follow-Up] : a follow-up visit [Abnormal CXR/ Chest CT] : an abnormal CXR/ chest CT [Edema] : edema [Shortness of Breath] : shortness of breath [TextBox_44] : Pleural effusion

## 2021-09-22 NOTE — PHYSICAL EXAM
[No Acute Distress] : no acute distress [Normal Oropharynx] : normal oropharynx [Normal Appearance] : normal appearance [No Neck Mass] : no neck mass [Normal Rate/Rhythm] : normal rate/rhythm [Normal S1, S2] : normal s1, s2 [No Murmurs] : no murmurs [No Resp Distress] : no resp distress [Clear to Auscultation Bilaterally] : clear to auscultation bilaterally [Surgical scars] : surgical scars [Benign] : benign [Normal Gait] : normal gait [No Clubbing] : no clubbing [No Cyanosis] : no cyanosis [No Edema] : no edema [FROM] : FROM [Normal Color/ Pigmentation] : normal color/ pigmentation [No Focal Deficits] : no focal deficits [Oriented x3] : oriented x3 [Normal Affect] : normal affect

## 2021-09-22 NOTE — CONSULT LETTER
[Dear  ___] : Dear  [unfilled], [Courtesy Letter:] : I had the pleasure of seeing your patient, [unfilled], in my office today. [Please see my note below.] : Please see my note below. [Consult Closing:] : Thank you very much for allowing me to participate in the care of this patient.  If you have any questions, please do not hesitate to contact me. [Sincerely,] : Sincerely, [FreeTextEntry3] : Esteban Mercer MD\par

## 2021-09-22 NOTE — REVIEW OF SYSTEMS
[Dyspnea] : no dyspnea [SOB on Exertion] : no sob on exertion [Diabetes] : diabetes [Negative] : Psychiatric

## 2021-10-19 NOTE — PROGRESS NOTE ADULT - ASSESSMENT
S/p right leg bypass, doing well, afib, PVD.  POD#2.  Faith out today.  OOB as tolerated.  Hold AC for now. ACP

## 2021-11-24 NOTE — H&P PST ADULT - FAMILY HISTORY
Previously Declined (within the last year)
Father  Still living? No  Family history of abdominal aortic aneurysm, Age at diagnosis: 51-60  Family history of thoracic aortic aneurysm, Age at diagnosis: 61-70

## 2022-07-21 ENCOUNTER — TRANSCRIPTION ENCOUNTER (OUTPATIENT)
Age: 77
End: 2022-07-21

## 2022-07-22 ENCOUNTER — EMERGENCY (EMERGENCY)
Facility: HOSPITAL | Age: 77
LOS: 1 days | Discharge: HOME | End: 2022-07-22
Attending: EMERGENCY MEDICINE | Admitting: EMERGENCY MEDICINE
Payer: MEDICARE

## 2022-07-22 VITALS
HEART RATE: 86 BPM | DIASTOLIC BLOOD PRESSURE: 69 MMHG | HEIGHT: 68 IN | SYSTOLIC BLOOD PRESSURE: 170 MMHG | WEIGHT: 175.93 LBS | OXYGEN SATURATION: 99 % | TEMPERATURE: 98 F | RESPIRATION RATE: 18 BRPM

## 2022-07-22 VITALS
RESPIRATION RATE: 18 BRPM | TEMPERATURE: 98 F | SYSTOLIC BLOOD PRESSURE: 152 MMHG | OXYGEN SATURATION: 98 % | HEART RATE: 78 BPM | DIASTOLIC BLOOD PRESSURE: 81 MMHG

## 2022-07-22 DIAGNOSIS — Z98.89 OTHER SPECIFIED POSTPROCEDURAL STATES: Chronic | ICD-10-CM

## 2022-07-22 DIAGNOSIS — Z95.820 PERIPHERAL VASCULAR ANGIOPLASTY STATUS WITH IMPLANTS AND GRAFTS: Chronic | ICD-10-CM

## 2022-07-22 DIAGNOSIS — Z90.710 ACQUIRED ABSENCE OF BOTH CERVIX AND UTERUS: Chronic | ICD-10-CM

## 2022-07-22 DIAGNOSIS — Z95.828 PRESENCE OF OTHER VASCULAR IMPLANTS AND GRAFTS: Chronic | ICD-10-CM

## 2022-07-22 PROCEDURE — 99283 EMERGENCY DEPT VISIT LOW MDM: CPT

## 2022-07-22 PROCEDURE — 99284 EMERGENCY DEPT VISIT MOD MDM: CPT

## 2022-07-22 RX ADMIN — Medication 1 TABLET(S): at 22:45

## 2022-07-22 NOTE — ED PROVIDER NOTE - NS ED ATTENDING STATEMENT MOD
This was a shared visit with the HAYDEE. I reviewed and verified the documentation and independently performed the documented:

## 2022-07-22 NOTE — ED ADULT NURSE NOTE - OBJECTIVE STATEMENT
Patient came in to ED c/o dog bite sustaining laceration on the left hand palmar area- also noted dog bite irma on the right wrist. Patient came in to ED c/o dog bite sustaining laceration on the left hand- also noted dog bite irma on the right wrist.

## 2022-07-22 NOTE — ED PROVIDER NOTE - PROGRESS NOTE DETAILS
lac repaired by Dr. Kidd. first dose of augmentin given in ED. full ROM. no evidence of tendon injury. wound care discussed. will follow up with plastic surgeon next week. augmentin previously prescribed. no indication for rabies vaccine implementation.  An opportunity to ask questions was given.  Discussed the importance of prompt, close medical follow-up.  Patient will return with any changes, concerns or persistent / worsening symptoms.  Understanding of all instructions verbalized.
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
## 2022-07-22 NOTE — ED ADULT TRIAGE NOTE - BSA (M2)
EXAMINATION TYPE: US venous doppler duplex LE BI

 

DATE OF EXAM: 6/28/2018 3:11 PM

 

COMPARISON: NONE

 

CLINICAL HISTORY: rule out dvt. post colon resection 6-, edema

 

SIDE PERFORMED: Bilateral  

 

TECHNIQUE:  The lower extremity deep venous system is examined utilizing real time linear array sonog
crow with graded compression, doppler sonography and color-flow sonography.

 

VESSELS IMAGED:

External Iliac Vein (EIV)

Common Femoral Vein

Deep Femoral Vein

Greater Saphenous Vein *

Femoral Vein

Popliteal Vein

 

(* superficial vessels)

 

Grayscale, color doppler, spectral doppler imaging performed of the deep veins of the lower extremiti
es.  There is normal flow, compressibility, vascular waveforms.

 

Right Leg:  Negative for DVT

 

Left Leg:  Negative for DVT

 

Patient uncomfortable post surgery; minimal movement of legs for positioning.

 

 

IMPRESSION:  No evidence of DVT.
1.94

## 2022-07-22 NOTE — ED PROVIDER NOTE - CLINICAL SUMMARY MEDICAL DECISION MAKING FREE TEXT BOX
76-year-old female with history of PAD, A. fib, hyperlipidemia, diabetes, and hypertension presents with laceration to left hand by a dog bite.  Patient was dog sitting for her grandson's puppy.  Was playing with the puppy and the puppy bit her left hand.  Patient is on a blood thinner.  Dog is known and can be observed for 10 days.  Dog's immunizations are up-to-date.  Patient's tetanus is up-to-date.  Patient is right-handed. pt came to er to meet dr aj of hand surg after calling dr caldera. pt on exam with bite wound laceration over left thenar eminence, sm intact. abx, wound repaired by hand, d/c on scottie long hand as directed

## 2022-07-22 NOTE — ED PROVIDER NOTE - IV ALTEPLASE INCLUSION HIDDEN
07-02    136  |  106  |  47<H>  ----------------------------<  190<H>  5.7<H>   |  18<L>  |  0.98    Ca    8.0<L>      02 Jul 2017 16:38    TPro  5.4<L>  /  Alb  2.6<L>  /  TBili  0.2  /  DBili  x   /  AST  16  /  ALT  14  /  AlkPhos  48  07-02      PT/INR - ( 02 Jul 2017 16:38 )   PT: 12.9 SEC;   INR: 1.15          PTT - ( 02 Jul 2017 16:38 )  PTT:33.4 SEC                                        7.0    12.89 )-----------( 117      ( 03 Jul 2017 01:30 )             20.2                         7.0    8.31  )-----------( 132      ( 02 Jul 2017 16:38 )             21.2 show

## 2022-07-22 NOTE — ED PROVIDER NOTE - OBJECTIVE STATEMENT
76-year-old female with history of PAD, A. fib, hyperlipidemia, diabetes, and hypertension presents with laceration to left hand by a dog bite.  Patient was dog sitting for her grandson's puppy.  Was playing with the puppy and the puppy bit her left hand.  Patient is on a blood thinner.  Dog is known and can be observed for 10 days.  Dog's immunizations are up-to-date.  Patient's tetanus is up-to-date.  Patient is right-handed.  Denies any weakness numbness or tingling.  Denies any bony tenderness.  Was seen at walk-in clinic and was referred to emergency room to meet plastic surgeon for repair.  PCP Shana Olivares

## 2022-07-22 NOTE — ED ADULT NURSE NOTE - CHIEF COMPLAINT QUOTE
76yr old female arrived to ED c/o lac to left hand from dog bite, wound noted to be actively bleeding, extremity cleaned, dressing and pressure applied, bleeding subsided, + thinners.

## 2022-07-22 NOTE — ED ADULT TRIAGE NOTE - CHIEF COMPLAINT QUOTE
76yr old female arrived to ED c/o lac to left hand, actively bleeding, lac cleaned, dressing and pressure applied, + thinners. 76yr old female arrived to ED c/o lac to left hand from dog bite, wound noted to be actively bleeding, extremity cleaned, dressing and pressure applied, bleeding subsided, + thinners.

## 2022-07-22 NOTE — ED PROVIDER NOTE - CARE PROVIDER_API CALL
Dennis Kidd)  Plastic Surgery  [address] [place] [address]  Phone: [phone]  Fax: [phone]  Follow Up Time: 4-6 Days

## 2022-07-22 NOTE — ED PROVIDER NOTE - NSFOLLOWUPINSTRUCTIONS_ED_ALL_ED_FT
keep clean and dry 24 hours  apply thin layer of bacitracin 1-2 times a day  start augmentin twice a day as previously prescribed   follow up with Dr. Kidd next week        Animal Bite, Adult      Animal bite wounds can be mild or serious. It is important to get medical treatment to prevent infection. Ask your doctor if you need treatment to prevent an infection that can spread from animals to humans (rabies).      Follow these instructions at home:      Wound care    •Follow instructions from your doctor about how to take care of your wound. Make sure you:  •Wash your hands with soap and water before you change your bandage (dressing). If you cannot use soap and water, use hand .      •Change your bandage as told by your doctor.      •Leave stitches (sutures), skin glue, or skin tape (adhesive) strips in place. They may need to stay in place for 2 weeks or longer. If tape strips get loose and curl up, you may trim the loose edges. Do not remove tape strips completely unless your doctor says it is okay.      •Check your wound every day for signs of infection. Check for:  •More redness, swelling, or pain.      •More fluid or blood.      •Warmth.      •Pus or a bad smell.        Medicines     •Take or apply over-the-counter and prescription medicines only as told by your doctor.      •If you were prescribed an antibiotic, take or apply it as told by your doctor. Do not stop using the antibiotic even if your wound gets better.        General instructions      •Keep the injured area raised (elevated) above the level of your heart while you are sitting or lying down.    •If directed, put ice on the injured area.  •Put ice in a plastic bag.      •Place a towel between your skin and the bag.      •Leave the ice on for 20 minutes, 2–3 times per day.        •Keep all follow-up visits as told by your doctor. This is important.        Contact a doctor if:    •You have more redness, swelling, or pain around your wound.      •Your wound feels warm to the touch.      •You have a fever or chills.      •You have a general feeling of sickness (malaise).      •You feel sick to your stomach (nauseous).      •You throw up (vomit).      •You have pain that does not get better.        Get help right away if:    •You have a red streak going away from your wound.    •You have any of these coming from your wound:  •Non-clear fluid.      •More blood.      •Pus or a bad smell.        •You have trouble moving your injured area.      •You lose feeling (have numbness) or feel tingling anywhere on your body.        Summary    •It is important to get the right medical treatment for animal bites. Treatment can help you to not get an infection. Ask your doctor if you need treatment to prevent an infection that can spread from animals to humans (rabies).      •Check your wound every day for signs of infection, such as more redness or swelling instead of less.      •If you have a red streak going away from your wound, get medical help right away.      This information is not intended to replace advice given to you by your health care provider. Make sure you discuss any questions you have with your health care provider.

## 2022-07-22 NOTE — ED PROVIDER NOTE - PATIENT PORTAL LINK FT
You can access the FollowMyHealth Patient Portal offered by [facility] by registering at the following website: [email or web address]. By joining [facility]’s FollowMyHealth portal, you will also be able to view your health information using other applications (apps) compatible with our system.

## 2022-08-14 ENCOUNTER — TRANSCRIPTION ENCOUNTER (OUTPATIENT)
Age: 77
End: 2022-08-14

## 2022-08-15 ENCOUNTER — EMERGENCY (EMERGENCY)
Facility: HOSPITAL | Age: 77
LOS: 1 days | Discharge: HOME | End: 2022-08-15
Attending: STUDENT IN AN ORGANIZED HEALTH CARE EDUCATION/TRAINING PROGRAM | Admitting: STUDENT IN AN ORGANIZED HEALTH CARE EDUCATION/TRAINING PROGRAM
Payer: MEDICARE

## 2022-08-15 VITALS
RESPIRATION RATE: 16 BRPM | DIASTOLIC BLOOD PRESSURE: 71 MMHG | HEART RATE: 93 BPM | TEMPERATURE: 97 F | WEIGHT: 160.06 LBS | HEIGHT: 68 IN | SYSTOLIC BLOOD PRESSURE: 148 MMHG | OXYGEN SATURATION: 96 %

## 2022-08-15 DIAGNOSIS — Z95.820 PERIPHERAL VASCULAR ANGIOPLASTY STATUS WITH IMPLANTS AND GRAFTS: Chronic | ICD-10-CM

## 2022-08-15 DIAGNOSIS — Z95.828 PRESENCE OF OTHER VASCULAR IMPLANTS AND GRAFTS: Chronic | ICD-10-CM

## 2022-08-15 DIAGNOSIS — Z98.89 OTHER SPECIFIED POSTPROCEDURAL STATES: Chronic | ICD-10-CM

## 2022-08-15 DIAGNOSIS — Z90.710 ACQUIRED ABSENCE OF BOTH CERVIX AND UTERUS: Chronic | ICD-10-CM

## 2022-08-15 PROCEDURE — 99285 EMERGENCY DEPT VISIT HI MDM: CPT | Mod: 25

## 2022-08-15 PROCEDURE — 13132 CMPLX RPR F/C/C/M/N/AX/G/H/F: CPT

## 2022-08-15 PROCEDURE — 99284 EMERGENCY DEPT VISIT MOD MDM: CPT

## 2022-08-15 NOTE — ED PROVIDER NOTE - NS ED ROS FT
Constitutional: No reported recent fever.  Neurological: No reported acute headache.  Eyes: No reported new vision changes.   Ears, Nose, Mouth, Throat: No reported acute sore throat.  Cardiovascular: No reported current chest pain.  Respiratory: No reported new shortness of breath.  Gastrointestinal: No reported vomiting.  Genitourinary: No reported new urinary problems.  Musculoskeletal: No reported acute extremity pain.  Integumentary (skin and/or breast): + laceration.

## 2022-08-15 NOTE — ED PROVIDER NOTE - PATIENT PORTAL LINK FT
You can access the FollowMyHealth Patient Portal offered by [facility] by registering at the following website: [email or web address]. By joining [employer]’s FollowMyHealth portal, you will also be able to view your health information using other applications (apps) compatible with our system.

## 2022-08-15 NOTE — ED PROVIDER NOTE - OBJECTIVE STATEMENT
76 female history of A. fib on anticoagulation here status post dog bite.  Patient's own dog bit her chin when she went to get medications.  Rabies vaccine is up-to-date.  Patient reports her tetanus vaccine is up-to-date.  Patient met in the ED by plastic surgeon for repair.  Patient denies jaw pain with movement, loose or missing teeth.  Patient denies other injury.  No known allergies 76 female history of A. fib on anticoagulation here status post dog bite.  Patient's own dog bit her chin when she went to give it a kiss.  Rabies vaccine is up-to-date.  Patient reports her tetanus vaccine is up-to-date.  Patient met in the ED by plastic surgeon for repair.  Patient denies jaw pain with movement, loose or missing teeth.  Patient denies other injury.  No known allergies

## 2022-08-15 NOTE — ED ADULT NURSE NOTE - NS ED NOTE ABUSE RESPONSE YN
From: Fifi Nettles  To:  Chioma Sheppard MD  Sent: 4/17/2017 5:47 PM CDT  Subject: Other    Subject: Medical release form regarding medications    As of Jan 1, 2017 I have changed Primary Physician from Marianna Calvillo to Dr. Megan Germain ([place] [phone] Yes

## 2022-08-15 NOTE — ED PROVIDER NOTE - CLINICAL SUMMARY MEDICAL DECISION MAKING FREE TEXT BOX
seen after plastics repair, still with oozing blood. surgi-cell and pressure dressing applied. will discharge with Augmentin.

## 2022-08-15 NOTE — ED PROVIDER NOTE - CARE PROVIDER_API CALL
Dennis Kidd)  Plastic Surgery  [address] [place] [address]  Phone: [phone]  Fax: [phone]  Established Patient  Follow Up Time: 1-3 Days

## 2022-08-15 NOTE — ED PROVIDER NOTE - NSFOLLOWUPINSTRUCTIONS_ED_ALL_ED_FT
Please follow up the plastic surgeon as directed.  Please take your medications as prescribed.  If your symptoms persist or worsen, please seek care. Either return to the Emergency Department, go to urgent care or see your primary care doctor.  Please refer to general information and instructions below:     Laceration    WHAT YOU NEED TO KNOW:    A laceration is an injury to the skin and the soft tissue underneath it. Lacerations happen when you are cut or hit by something. They can happen anywhere on the body.     DISCHARGE INSTRUCTIONS:    Return to the emergency department if:     You have heavy bleeding or bleeding that does not stop after 10 minutes of holding firm, direct pressure over the wound.       Your wound opens up.     Contact your healthcare provider if:     You have a fever or chills.       Your laceration is red, warm, or swollen.      You have red streaks on your skin coming from your wound.      You have white or yellow drainage from the wound that smells bad.      You have pain that gets worse, even after treatment.       You have questions or concerns about your condition or care.     Medicines:     Prescription pain medicine may be given. Ask how to take this medicine safely.       Antibiotics help treat or prevent a bacterial infection.       Take your medicine as directed. Contact your healthcare provider if you think your medicine is not helping or if you have side effects. Tell him or her if you are allergic to any medicine. Keep a list of the medicines, vitamins, and herbs you take. Include the amounts, and when and why you take them. Bring the list or the pill bottles to follow-up visits. Carry your medicine list with you in case of an emergency.    Care for your wound as directed:     Do not get your wound wet until your healthcare provider says it is okay. Do not soak your wound in water. Do not go swimming until your healthcare provider says it is okay. Carefully wash the wound with soap and water. Gently pat the area dry or allow it to air dry.       Change your bandages when they get wet, dirty, or after washing. Apply new, clean bandages as directed. Do not apply elastic bandages or tape too tight. Do not put powders or lotions over your incision.       Apply antibiotic ointment as directed. Your healthcare provider may give you antibiotic ointment to put over your wound if you have stitches. If you have strips of tape over your incision, let them dry up and fall off on their own. If they do not fall off within 14 days, gently remove them. If you have glue over your wound, do not remove or pick at it. If your glue comes off, do not replace it with glue that you have at home.       Check your wound every day for signs of infection such as swelling, redness, or pus.     Self-care:     Apply ice on your wound for 15 to 20 minutes every hour or as directed. Use an ice pack, or put crushed ice in a plastic bag. Cover it with a towel. Ice helps prevent tissue damage and decreases swelling and pain.      Use a splint as directed. A splint will decrease movement and stress on your wound. It may help it heal faster. A splint may be used for lacerations over joints or areas of your body that bend. Ask your healthcare provider how to apply and remove a splint.       Decrease scarring of your wound by applying ointments as directed. Do not apply ointments until your healthcare provider says it is okay. You may need to wait until your wound is healed. Ask which ointment to buy and how often to use it. After your wound is healed, use sunscreen over the area when you are out in the sun. You should do this for at least 6 months to 1 year after your injury.     Follow up with your healthcare provider as directed: You may need to follow up in 24 to 48 hours to have your wound checked for infection. You will need to return in 3 to 14 days if you have stitches or staples so they can be removed. Care for your wound as directed to prevent infection and help it heal. Write down your questions so you remember to ask them during your visits.   ====================================================================     Animal Bite    WHAT YOU NEED TO KNOW:    Animal bite injuries range from shallow cuts to deep, life-threatening wounds. An animal can cut or puncture the skin when it bites. Your skin may be torn from your body. Your skin may swell or bruise even if the bite does not break the skin. Animal bites occur more often on the hands, arms, legs, and face. Bites from dogs and cats are the most common injuries.    DISCHARGE INSTRUCTIONS:    Return to the emergency department if:   •You have a fever.      •Your wound is red, swollen, and draining pus.      •You see red streaks on the skin around the wound.      •You can no longer move the bitten area.      •Your heartbeat and breathing are much faster than usual.      •You feel dizzy and confused.      Contact your healthcare provider if:   •Your pain does not get better, even after you take pain medicine.      •You have nightmares or flashbacks about the animal bite.       •You have questions or concerns about your condition or care.      Medicines: You may need any of the following:   •Antibiotics prevent or treat a bacterial infection.      •Prescription pain medicine may be given. Ask how to take this medicine safely.      •A tetanus vaccine may be needed to prevent tetanus. Tetanus is a life-threatening bacterial infection that affects the nerves and muscles. The bacteria can be spread through animal bites.       •A rabies vaccine may be needed to prevent rabies. Rabies is a life-threatening viral infection. The virus can be spread through animal bites.      •Take your medicine as directed. Contact your healthcare provider if you think your medicine is not helping or if you have side effects. Tell him or her if you are allergic to any medicine. Keep a list of the medicines, vitamins, and herbs you take. Include the amounts, and when and why you take them. Bring the list or the pill bottles to follow-up visits. Carry your medicine list with you in case of an emergency.      Follow up with your healthcare provider in 1 to 2 days: You may need to return to have your stitches removed. Write down your questions so you remember to ask them during your visits.    Self-care:   •Apply antibiotic ointment as directed. This helps prevent infection in minor skin wounds. It is available without a doctor's order.      •Keep the wound clean and covered. Wash the wound every day with soap and water or germ-killing cleanser. Ask your healthcare provider about the kinds of bandages to use.       •Apply ice on your wound. Ice helps decrease swelling and pain. Ice may also help prevent tissue damage. Use an ice pack, or put crushed ice in a plastic bag. Cover it with a towel and place it on your wound for 15 to 20 minutes every hour or as directed.      •Elevate the wound area. Raise your wound above the level of your heart as often as you can. This will help decrease swelling and pain. Prop your wound on pillows or blankets to keep it elevated comfortably.       Prevent another animal bite:   •Learn to recognize the signs of a scared or angry pet. Avoid quick, sudden movements.      •Do not step between animals that are fighting.      •Do not leave a pet alone with a young child.      •Do not disturb an animal while it eats, sleeps, or cares for its young.      •Do not approach an animal you do not know, especially one that is tied up or caged.      •Stay away from animals that seem sick or act strangely.      •Do not feed or capture wild animals.

## 2022-08-15 NOTE — ED PROVIDER NOTE - TOBACCO USE
CHEST DISCOMFORT/DIZZINESS/Left arm pain, Left arm weakness, LLE weakness, rectal bleeding/CHEST PAIN
Unknown if ever smoked

## 2022-08-15 NOTE — ED PROVIDER NOTE - PHYSICAL EXAMINATION
Vital signs as available reviewed.  General:  No acute distress.  Head/face: multiple lacerations to chin with ecchymosis. no loose / missing teeth.   Musculoskeletal:  No obvious deformity.  Neurologic: Alert and oriented, moving all extremities.  Skin:  Warm and dry. + laceration.

## 2022-10-05 NOTE — ASU PATIENT PROFILE, ADULT - PRESSURE ULCER(S)
Likely allergic reaction to z-pack due to the time frame of events  She is already off it, and rash is overall improving  Continue benadryl as needed 2x a day, topical hydrocortisone 2 5% to the areas affected with erythematous rash for 1 week  To call in 1 week if no significant improvement 
no

## 2022-11-03 RX ORDER — CHLORHEXIDINE GLUCONATE 213 G/1000ML
1 SOLUTION TOPICAL ONCE
Refills: 0 | Status: DISCONTINUED | OUTPATIENT
Start: 2022-11-04 | End: 2022-11-05

## 2022-11-04 ENCOUNTER — INPATIENT (INPATIENT)
Facility: HOSPITAL | Age: 77
LOS: 0 days | Discharge: HOME | DRG: 247 | End: 2022-11-05
Attending: INTERNAL MEDICINE | Admitting: INTERNAL MEDICINE
Payer: MEDICARE

## 2022-11-04 ENCOUNTER — RESULT REVIEW (OUTPATIENT)
Age: 77
End: 2022-11-04

## 2022-11-04 ENCOUNTER — TRANSCRIPTION ENCOUNTER (OUTPATIENT)
Age: 77
End: 2022-11-04

## 2022-11-04 VITALS
SYSTOLIC BLOOD PRESSURE: 113 MMHG | RESPIRATION RATE: 19 BRPM | DIASTOLIC BLOOD PRESSURE: 58 MMHG | HEART RATE: 65 BPM | OXYGEN SATURATION: 97 % | TEMPERATURE: 98 F

## 2022-11-04 DIAGNOSIS — Z90.710 ACQUIRED ABSENCE OF BOTH CERVIX AND UTERUS: Chronic | ICD-10-CM

## 2022-11-04 DIAGNOSIS — I20.0 UNSTABLE ANGINA: ICD-10-CM

## 2022-11-04 DIAGNOSIS — Z95.820 PERIPHERAL VASCULAR ANGIOPLASTY STATUS WITH IMPLANTS AND GRAFTS: Chronic | ICD-10-CM

## 2022-11-04 DIAGNOSIS — R09.89 OTHER SPECIFIED SYMPTOMS AND SIGNS INVOLVING THE CIRCULATORY AND RESPIRATORY SYSTEMS: ICD-10-CM

## 2022-11-04 DIAGNOSIS — Z95.1 PRESENCE OF AORTOCORONARY BYPASS GRAFT: Chronic | ICD-10-CM

## 2022-11-04 DIAGNOSIS — Z95.2 PRESENCE OF PROSTHETIC HEART VALVE: Chronic | ICD-10-CM

## 2022-11-04 DIAGNOSIS — Z95.828 PRESENCE OF OTHER VASCULAR IMPLANTS AND GRAFTS: Chronic | ICD-10-CM

## 2022-11-04 DIAGNOSIS — Z98.89 OTHER SPECIFIED POSTPROCEDURAL STATES: Chronic | ICD-10-CM

## 2022-11-04 LAB
A1C WITH ESTIMATED AVERAGE GLUCOSE RESULT: 9.7 % — HIGH (ref 4–5.6)
ALBUMIN SERPL ELPH-MCNC: 4.1 G/DL — SIGNIFICANT CHANGE UP (ref 3.3–5.2)
ALP SERPL-CCNC: 128 U/L — HIGH (ref 40–120)
ALT FLD-CCNC: 27 U/L — SIGNIFICANT CHANGE UP
ANION GAP SERPL CALC-SCNC: 15 MMOL/L — SIGNIFICANT CHANGE UP (ref 5–17)
AST SERPL-CCNC: 33 U/L — HIGH
BASOPHILS # BLD AUTO: 0.04 K/UL — SIGNIFICANT CHANGE UP (ref 0–0.2)
BASOPHILS NFR BLD AUTO: 0.5 % — SIGNIFICANT CHANGE UP (ref 0–2)
BILIRUB SERPL-MCNC: 1.9 MG/DL — SIGNIFICANT CHANGE UP (ref 0.4–2)
BUN SERPL-MCNC: 19 MG/DL — SIGNIFICANT CHANGE UP (ref 8–20)
CALCIUM SERPL-MCNC: 8.7 MG/DL — SIGNIFICANT CHANGE UP (ref 8.4–10.5)
CHLORIDE SERPL-SCNC: 102 MMOL/L — SIGNIFICANT CHANGE UP (ref 96–108)
CHOLEST SERPL-MCNC: 70 MG/DL — SIGNIFICANT CHANGE UP
CO2 SERPL-SCNC: 25 MMOL/L — SIGNIFICANT CHANGE UP (ref 22–29)
CREAT SERPL-MCNC: 0.83 MG/DL — SIGNIFICANT CHANGE UP (ref 0.5–1.3)
EGFR: 73 ML/MIN/1.73M2 — SIGNIFICANT CHANGE UP
EOSINOPHIL # BLD AUTO: 0.15 K/UL — SIGNIFICANT CHANGE UP (ref 0–0.5)
EOSINOPHIL NFR BLD AUTO: 1.7 % — SIGNIFICANT CHANGE UP (ref 0–6)
ESTIMATED AVERAGE GLUCOSE: 232 MG/DL — HIGH (ref 68–114)
GLUCOSE BLDC GLUCOMTR-MCNC: 69 MG/DL — LOW (ref 70–99)
GLUCOSE BLDC GLUCOMTR-MCNC: 75 MG/DL — SIGNIFICANT CHANGE UP (ref 70–99)
GLUCOSE BLDC GLUCOMTR-MCNC: 76 MG/DL — SIGNIFICANT CHANGE UP (ref 70–99)
GLUCOSE BLDC GLUCOMTR-MCNC: 95 MG/DL — SIGNIFICANT CHANGE UP (ref 70–99)
GLUCOSE SERPL-MCNC: 84 MG/DL — SIGNIFICANT CHANGE UP (ref 70–99)
HCT VFR BLD CALC: 38.7 % — SIGNIFICANT CHANGE UP (ref 34.5–45)
HDLC SERPL-MCNC: 24 MG/DL — LOW
HGB BLD-MCNC: 12.7 G/DL — SIGNIFICANT CHANGE UP (ref 11.5–15.5)
IMM GRANULOCYTES NFR BLD AUTO: 0.3 % — SIGNIFICANT CHANGE UP (ref 0–0.9)
LIPID PNL WITH DIRECT LDL SERPL: 33 MG/DL — SIGNIFICANT CHANGE UP
LYMPHOCYTES # BLD AUTO: 1.08 K/UL — SIGNIFICANT CHANGE UP (ref 1–3.3)
LYMPHOCYTES # BLD AUTO: 12.5 % — LOW (ref 13–44)
MAGNESIUM SERPL-MCNC: 2.5 MG/DL — SIGNIFICANT CHANGE UP (ref 1.6–2.6)
MCHC RBC-ENTMCNC: 27.5 PG — SIGNIFICANT CHANGE UP (ref 27–34)
MCHC RBC-ENTMCNC: 32.8 GM/DL — SIGNIFICANT CHANGE UP (ref 32–36)
MCV RBC AUTO: 83.8 FL — SIGNIFICANT CHANGE UP (ref 80–100)
MONOCYTES # BLD AUTO: 0.84 K/UL — SIGNIFICANT CHANGE UP (ref 0–0.9)
MONOCYTES NFR BLD AUTO: 9.7 % — SIGNIFICANT CHANGE UP (ref 2–14)
NEUTROPHILS # BLD AUTO: 6.49 K/UL — SIGNIFICANT CHANGE UP (ref 1.8–7.4)
NEUTROPHILS NFR BLD AUTO: 75.3 % — SIGNIFICANT CHANGE UP (ref 43–77)
NON HDL CHOLESTEROL: 46 MG/DL — SIGNIFICANT CHANGE UP
PLATELET # BLD AUTO: 229 K/UL — SIGNIFICANT CHANGE UP (ref 150–400)
POTASSIUM SERPL-MCNC: 3.4 MMOL/L — LOW (ref 3.5–5.3)
POTASSIUM SERPL-SCNC: 3.4 MMOL/L — LOW (ref 3.5–5.3)
PROT SERPL-MCNC: 7 G/DL — SIGNIFICANT CHANGE UP (ref 6.6–8.7)
RBC # BLD: 4.62 M/UL — SIGNIFICANT CHANGE UP (ref 3.8–5.2)
RBC # FLD: 16 % — HIGH (ref 10.3–14.5)
SODIUM SERPL-SCNC: 142 MMOL/L — SIGNIFICANT CHANGE UP (ref 135–145)
TRIGL SERPL-MCNC: 67 MG/DL — SIGNIFICANT CHANGE UP
WBC # BLD: 8.63 K/UL — SIGNIFICANT CHANGE UP (ref 3.8–10.5)
WBC # FLD AUTO: 8.63 K/UL — SIGNIFICANT CHANGE UP (ref 3.8–10.5)

## 2022-11-04 PROCEDURE — 71275 CT ANGIOGRAPHY CHEST: CPT | Mod: 26,ME

## 2022-11-04 PROCEDURE — G1004: CPT

## 2022-11-04 PROCEDURE — 93971 EXTREMITY STUDY: CPT | Mod: 26,RT

## 2022-11-04 PROCEDURE — 93010 ELECTROCARDIOGRAM REPORT: CPT

## 2022-11-04 RX ORDER — CEFAZOLIN SODIUM 1 G
1000 VIAL (EA) INJECTION ONCE
Refills: 0 | Status: COMPLETED | OUTPATIENT
Start: 2022-11-04 | End: 2022-11-04

## 2022-11-04 RX ORDER — CEFAZOLIN SODIUM 1 G
1000 VIAL (EA) INJECTION EVERY 8 HOURS
Refills: 0 | Status: DISCONTINUED | OUTPATIENT
Start: 2022-11-04 | End: 2022-11-05

## 2022-11-04 RX ORDER — DEXTROSE 50 % IN WATER 50 %
25 SYRINGE (ML) INTRAVENOUS ONCE
Refills: 0 | Status: DISCONTINUED | OUTPATIENT
Start: 2022-11-04 | End: 2022-11-05

## 2022-11-04 RX ORDER — SERTRALINE 25 MG/1
1 TABLET, FILM COATED ORAL
Qty: 0 | Refills: 0 | DISCHARGE

## 2022-11-04 RX ORDER — SODIUM CHLORIDE 9 MG/ML
1000 INJECTION INTRAMUSCULAR; INTRAVENOUS; SUBCUTANEOUS
Refills: 0 | Status: DISCONTINUED | OUTPATIENT
Start: 2022-11-04 | End: 2022-11-05

## 2022-11-04 RX ORDER — CEFAZOLIN SODIUM 1 G
VIAL (EA) INJECTION
Refills: 0 | Status: DISCONTINUED | OUTPATIENT
Start: 2022-11-04 | End: 2022-11-04

## 2022-11-04 RX ORDER — INSULIN LISPRO 100/ML
VIAL (ML) SUBCUTANEOUS
Refills: 0 | Status: DISCONTINUED | OUTPATIENT
Start: 2022-11-04 | End: 2022-11-05

## 2022-11-04 RX ORDER — ATORVASTATIN CALCIUM 80 MG/1
40 TABLET, FILM COATED ORAL AT BEDTIME
Refills: 0 | Status: DISCONTINUED | OUTPATIENT
Start: 2022-11-04 | End: 2022-11-05

## 2022-11-04 RX ORDER — CLOPIDOGREL BISULFATE 75 MG/1
75 TABLET, FILM COATED ORAL DAILY
Refills: 0 | Status: DISCONTINUED | OUTPATIENT
Start: 2022-11-05 | End: 2022-11-05

## 2022-11-04 RX ORDER — INSULIN GLARGINE 100 [IU]/ML
30 INJECTION, SOLUTION SUBCUTANEOUS
Qty: 0 | Refills: 0 | DISCHARGE

## 2022-11-04 RX ORDER — TRAZODONE HCL 50 MG
50 TABLET ORAL AT BEDTIME
Refills: 0 | Status: DISCONTINUED | OUTPATIENT
Start: 2022-11-04 | End: 2022-11-05

## 2022-11-04 RX ORDER — ATENOLOL 25 MG/1
50 TABLET ORAL AT BEDTIME
Refills: 0 | Status: DISCONTINUED | OUTPATIENT
Start: 2022-11-04 | End: 2022-11-05

## 2022-11-04 RX ORDER — ASCORBIC ACID 60 MG
500 TABLET,CHEWABLE ORAL DAILY
Refills: 0 | Status: DISCONTINUED | OUTPATIENT
Start: 2022-11-04 | End: 2022-11-05

## 2022-11-04 RX ORDER — SERTRALINE 25 MG/1
50 TABLET, FILM COATED ORAL DAILY
Refills: 0 | Status: DISCONTINUED | OUTPATIENT
Start: 2022-11-04 | End: 2022-11-05

## 2022-11-04 RX ORDER — SODIUM CHLORIDE 9 MG/ML
1000 INJECTION, SOLUTION INTRAVENOUS
Refills: 0 | Status: DISCONTINUED | OUTPATIENT
Start: 2022-11-04 | End: 2022-11-05

## 2022-11-04 RX ORDER — INSULIN GLARGINE 100 [IU]/ML
30 INJECTION, SOLUTION SUBCUTANEOUS AT BEDTIME
Refills: 0 | Status: DISCONTINUED | OUTPATIENT
Start: 2022-11-04 | End: 2022-11-05

## 2022-11-04 RX ORDER — CEFAZOLIN SODIUM 1 G
VIAL (EA) INJECTION
Refills: 0 | Status: DISCONTINUED | OUTPATIENT
Start: 2022-11-04 | End: 2022-11-05

## 2022-11-04 RX ORDER — TRAZODONE HCL 50 MG
0 TABLET ORAL
Qty: 0 | Refills: 0 | DISCHARGE

## 2022-11-04 RX ORDER — INSULIN GLARGINE 100 [IU]/ML
25 INJECTION, SOLUTION SUBCUTANEOUS
Qty: 0 | Refills: 0 | DISCHARGE

## 2022-11-04 RX ORDER — CEPHALEXIN 500 MG
1 CAPSULE ORAL
Qty: 30 | Refills: 0
Start: 2022-11-04 | End: 2022-11-13

## 2022-11-04 RX ORDER — LEVOTHYROXINE SODIUM 125 MCG
50 TABLET ORAL DAILY
Refills: 0 | Status: DISCONTINUED | OUTPATIENT
Start: 2022-11-04 | End: 2022-11-05

## 2022-11-04 RX ORDER — ATENOLOL 25 MG/1
100 TABLET ORAL DAILY
Refills: 0 | Status: DISCONTINUED | OUTPATIENT
Start: 2022-11-04 | End: 2022-11-05

## 2022-11-04 RX ORDER — DEXTROSE 50 % IN WATER 50 %
12.5 SYRINGE (ML) INTRAVENOUS ONCE
Refills: 0 | Status: DISCONTINUED | OUTPATIENT
Start: 2022-11-04 | End: 2022-11-05

## 2022-11-04 RX ORDER — INSULIN DEGLUDEC 100 U/ML
40 INJECTION, SOLUTION SUBCUTANEOUS
Qty: 0 | Refills: 0 | DISCHARGE

## 2022-11-04 RX ORDER — ISOSORBIDE MONONITRATE 60 MG/1
30 TABLET, EXTENDED RELEASE ORAL DAILY
Refills: 0 | Status: DISCONTINUED | OUTPATIENT
Start: 2022-11-04 | End: 2022-11-05

## 2022-11-04 RX ORDER — FUROSEMIDE 40 MG
20 TABLET ORAL AT BEDTIME
Refills: 0 | Status: DISCONTINUED | OUTPATIENT
Start: 2022-11-04 | End: 2022-11-05

## 2022-11-04 RX ORDER — SERTRALINE 25 MG/1
100 TABLET, FILM COATED ORAL DAILY
Refills: 0 | Status: DISCONTINUED | OUTPATIENT
Start: 2022-11-04 | End: 2022-11-05

## 2022-11-04 RX ORDER — APIXABAN 2.5 MG/1
2.5 TABLET, FILM COATED ORAL
Refills: 0 | Status: DISCONTINUED | OUTPATIENT
Start: 2022-11-05 | End: 2022-11-05

## 2022-11-04 RX ORDER — DEXTROSE 50 % IN WATER 50 %
15 SYRINGE (ML) INTRAVENOUS ONCE
Refills: 0 | Status: DISCONTINUED | OUTPATIENT
Start: 2022-11-04 | End: 2022-11-05

## 2022-11-04 RX ORDER — POTASSIUM CHLORIDE 20 MEQ
40 PACKET (EA) ORAL ONCE
Refills: 0 | Status: COMPLETED | OUTPATIENT
Start: 2022-11-04 | End: 2022-11-04

## 2022-11-04 RX ORDER — ISOSORBIDE MONONITRATE 60 MG/1
1 TABLET, EXTENDED RELEASE ORAL
Qty: 0 | Refills: 0 | DISCHARGE

## 2022-11-04 RX ORDER — INSULIN GLARGINE 100 [IU]/ML
30 INJECTION, SOLUTION SUBCUTANEOUS EVERY MORNING
Refills: 0 | Status: DISCONTINUED | OUTPATIENT
Start: 2022-11-05 | End: 2022-11-05

## 2022-11-04 RX ORDER — FUROSEMIDE 40 MG
1 TABLET ORAL
Qty: 0 | Refills: 0 | DISCHARGE

## 2022-11-04 RX ORDER — LOSARTAN POTASSIUM 100 MG/1
25 TABLET, FILM COATED ORAL DAILY
Refills: 0 | Status: DISCONTINUED | OUTPATIENT
Start: 2022-11-04 | End: 2022-11-05

## 2022-11-04 RX ORDER — LEVOTHYROXINE SODIUM 125 MCG
1 TABLET ORAL
Qty: 0 | Refills: 0 | DISCHARGE

## 2022-11-04 RX ORDER — GLUCAGON INJECTION, SOLUTION 0.5 MG/.1ML
1 INJECTION, SOLUTION SUBCUTANEOUS ONCE
Refills: 0 | Status: DISCONTINUED | OUTPATIENT
Start: 2022-11-04 | End: 2022-11-05

## 2022-11-04 RX ORDER — FUROSEMIDE 40 MG
40 TABLET ORAL DAILY
Refills: 0 | Status: DISCONTINUED | OUTPATIENT
Start: 2022-11-04 | End: 2022-11-05

## 2022-11-04 RX ORDER — INSULIN ASPART 100 [IU]/ML
0 INJECTION, SOLUTION SUBCUTANEOUS
Qty: 0 | Refills: 0 | DISCHARGE

## 2022-11-04 RX ORDER — LOSARTAN POTASSIUM 100 MG/1
1 TABLET, FILM COATED ORAL
Qty: 0 | Refills: 0 | DISCHARGE

## 2022-11-04 RX ADMIN — SODIUM CHLORIDE 50 MILLILITER(S): 9 INJECTION INTRAMUSCULAR; INTRAVENOUS; SUBCUTANEOUS at 16:59

## 2022-11-04 RX ADMIN — Medication 20 MILLIGRAM(S): at 21:10

## 2022-11-04 RX ADMIN — ATORVASTATIN CALCIUM 40 MILLIGRAM(S): 80 TABLET, FILM COATED ORAL at 21:10

## 2022-11-04 RX ADMIN — Medication 1000 MILLIGRAM(S): at 14:32

## 2022-11-04 RX ADMIN — Medication 50 MILLIGRAM(S): at 23:37

## 2022-11-04 RX ADMIN — Medication 40 MILLIEQUIVALENT(S): at 14:04

## 2022-11-04 RX ADMIN — SERTRALINE 100 MILLIGRAM(S): 25 TABLET, FILM COATED ORAL at 23:36

## 2022-11-04 RX ADMIN — Medication 1000 MILLIGRAM(S): at 21:11

## 2022-11-04 RX ADMIN — SERTRALINE 50 MILLIGRAM(S): 25 TABLET, FILM COATED ORAL at 23:36

## 2022-11-04 RX ADMIN — ATENOLOL 50 MILLIGRAM(S): 25 TABLET ORAL at 21:10

## 2022-11-04 NOTE — H&P PST ADULT - CENTRAL VENOUS CATHETER
Have you received the Covid-19 vaccine?    YES    Booster Date: MID November, 2021   Right Arm             no
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
## 2022-11-04 NOTE — ASU PATIENT PROFILE, ADULT - FALL HARM RISK - UNIVERSAL INTERVENTIONS
Bed in lowest position, wheels locked, appropriate side rails in place/Call bell, personal items and telephone in reach/Instruct patient to call for assistance before getting out of bed or chair/Non-slip footwear when patient is out of bed/[place] to call system/Physically safe environment - no spills, clutter or unnecessary equipment/Purposeful Proactive Rounding/Room/bathroom lighting operational, light cord in reach

## 2022-11-04 NOTE — H&P PST ADULT - OTHER CARE PROVIDERS
Mary Man ([facility], [address] [place] [id number], [phone], Fax: [phone]))
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
## 2022-11-04 NOTE — DISCHARGE NOTE PROVIDER - CARE PROVIDER_API CALL
Mary Man)  Internal Medicine  [address] [place] [id number]  Phone: [phone]  Fax: [phone]  Established Patient  Follow Up Time: 2 weeks   Mary Man)  Internal Medicine  [address] [place] [phone]  Phone: [phone]  Fax: [phone]  Established Patient  Follow Up Time: 2 weeks    DIOR HARRIS  Internal Medicine  [address] [place] [id number]  Phone: [phone]  Fax: [phone]  Established Patient  Follow Up Time: 1-3 days

## 2022-11-04 NOTE — DISCHARGE NOTE PROVIDER - NSDCMRMEDTOKEN_GEN_ALL_CORE_FT
apixaban 2.5 mg oral tablet: 1 tab(s) orally every 12 hours  ascorbic acid 500 mg oral tablet: 1 tab(s) orally once a day  aspirin 81 mg oral delayed release tablet: 1 tab(s) orally once a day  atenolol 50 mg oral tablet: 2 tab(s) orally  atorvastatin 40 mg oral tablet: 1 tab(s) orally once a day (at bedtime)  Fiasp FlexTouch 100 units/mL injectable solution: for coverage  isosorbide mononitrate 30 mg oral tablet, extended release: 1 tab(s) orally once a day (in the morning)  Lasix 40 mg oral tablet: 1 tab(s) orally once a day   Lasix 80 mg oral tablet: 1 tab(s) orally once a day  levothyroxine 50 mcg (0.05 mg) oral tablet: 1 tab(s) orally once a day  losartan 25 mg oral tablet: 1 tab(s) orally once a day  Multiple Vitamins oral tablet: 1 tab(s) orally once a day  sertraline 50 mg oral tablet: 1 tab(s) orally once a day  traZODone 50 mg oral tablet: orally once (at bedtime)  Tresiba 100 units/mL subcutaneous solution: 40 unit(s) subcutaneous 2 times a day  Zoloft 100 mg oral tablet: 1 tab(s) orally once a day   apixaban 2.5 mg oral tablet: 1 tab(s) orally every 12 hours  ascorbic acid 500 mg oral tablet: 1 tab(s) orally once a day  atenolol 50 mg oral tablet: 1 tab(s) orally 2 times a day  atorvastatin 40 mg oral tablet: 1 tab(s) orally once a day (at bedtime)  clopidogrel 75 mg oral tablet: 1 tab(s) orally once a day   Fiasp FlexTouch 100 units/mL injectable solution: for coverage  isosorbide mononitrate 30 mg oral tablet, extended release: 1 tab(s) orally once a day (in the morning)  Lasix 40 mg oral tablet: 1 tab(s) orally once a day   Lasix 80 mg oral tablet: 1 tab(s) orally once a day  levothyroxine 50 mcg (0.05 mg) oral tablet: 1 tab(s) orally once a day  losartan 25 mg oral tablet: 1 tab(s) orally once a day  Multiple Vitamins oral tablet: 1 tab(s) orally once a day  traZODone 50 mg oral tablet: orally once (at bedtime)  Tresiba 100 units/mL subcutaneous solution: 40 unit(s) subcutaneous 2 times a day  Zoloft 100 mg oral tablet: 1 tab(s) orally once a day   apixaban 2.5 mg oral tablet: 1 tab(s) orally every 12 hours  ascorbic acid 500 mg oral tablet: 1 tab(s) orally once a day  atenolol 100 mg oral tablet: 1 tab(s) orally once a day  atenolol 50 mg oral tablet: 1 tab(s) orally once a day (at bedtime)  atorvastatin 40 mg oral tablet: 1 tab(s) orally once a day (at bedtime)  cephalexin 500 mg oral tablet: 1 tab(s) orally 3 times a day   clopidogrel 75 mg oral tablet: 1 tab(s) orally once a day   Fiasp FlexTouch 100 units/mL injectable solution: for coverage  isosorbide mononitrate 30 mg oral tablet, extended release: 1 tab(s) orally once a day (in the morning)  Lasix 40 mg oral tablet: 1 tab(s) orally once a day   Lasix 80 mg oral tablet: 1 tab(s) orally once a day  levothyroxine 50 mcg (0.05 mg) oral tablet: 1 tab(s) orally once a day  losartan 25 mg oral tablet: 1 tab(s) orally once a day  Multiple Vitamins oral tablet: 1 tab(s) orally once a day  traZODone 50 mg oral tablet: orally once (at bedtime)  Tresiba 100 units/mL subcutaneous solution: 40 unit(s) subcutaneous 2 times a day  Zoloft 100 mg oral tablet: 1 tab(s) orally once a day

## 2022-11-04 NOTE — DISCHARGE NOTE PROVIDER - NSDCCPTREATMENT_GEN_ALL_CORE_FT
PRINCIPAL PROCEDURE  Procedure: Insertion, stent, drug eluting, coronary artery, left main  Findings and Treatment:   Coronary Angiography   The coronary circulation is left dominant.    LM   Left main artery: Angiography shows severe atherosclerosis. There is a 90 % stenosis. It was treated with an BOBBY FRONTIER 3.50 X 22MM CARIN postdilated with a EUPHORA NC 4.0 X 15MM balloon.  LAD   Left anterior descending artery: Angiography shows severe atherosclerosis. There is an 80 % stenosis.  CX   Circumflex: Angiography shows severe atherosclerosis. There is a 90 % stenosis.  RCA   Right coronary artery: Angiography shows moderate atherosclerosis. There is a 50 % stenosis.  Graft Angiography   LIMA graft to Mid left anterior descending: Angiography shows no disease.  SVG graft to First obtuse marginal: Angiography shows no disease.    Left Heart Cath   Ejection fraction was visually estimated by radionucleotide with a value of 45%.  The left ventricular end diastolic pressure was 17 mmHg.       PRINCIPAL PROCEDURE  Procedure: Insertion, stent, drug eluting, coronary artery, left main  Findings and Treatment: Left main artery: Angiography shows severe atherosclerosis. There is a 90 % stenosis. It was treated with an BOBBY FRONTIER 3.50 X 22MM CARIN postdilated with a EUPHORA NC 4.0 X 15MM balloon.  LAD Left anterior descending artery: Angiography shows severe atherosclerosis. There is an 80 % stenosis.  CX Circumflex: Angiography shows severe atherosclerosis. There is a 90 % stenosis.  RCA Right coronary artery: Angiography shows moderate atherosclerosis. There is a 50 % stenosis.  Graft Angiography   LIMA graft to Mid left anterior descending: Angiography shows no disease.  SVG graft to First obtuse marginal: Angiography shows no disease.    Left Heart Cath   Ejection fraction was visually estimated by radionucleotide with a value of 45%.  The left ventricular end diastolic pressure was 17 mmHg.  -You had a cardiac catheterization with Dr. Smith with stent placement yesterday 11/4/22.  -He accessed your left groin yesterday during the procedure.  -You may shower today. prior to showering, you may remove the dressing from your left groin. Shower with warm water and soap. Pat dry with towel. You may place a bandaid over the groin site when you are done showering. Please change the bandaid daily.  -No heavy lifting, driving, sex, tub baths, swimming, or any activity that submerges the lower half of the body in water for 48 hours.  Limited walking and stairs for 48 hours.    -Observe the site frequently.  If bleeding or a large lump (the size of a golf ball or bigger) occurs lie flat, apply continuous direct pressure just above the puncture site for at least 10 minutes, and notify your physician immediately.  If the bleeding cannot be controlled, call 911 immediately for assistance.  Notify your physician of pain, swelling or any drainage.    Notify your physician immediately if coldness, numbness, discoloration or pain in your foot occurs.

## 2022-11-04 NOTE — DISCHARGE NOTE PROVIDER - PROVIDER TOKENS
PROVIDER:[TOKEN:[[id number]],FOLLOWUP:[2 weeks],ESTABLISHEDPATIENT:[T]] PROVIDER:[TOKEN:[[id number]],FOLLOWUP:[2 weeks],ESTABLISHEDPATIENT:[T]],PROVIDER:[TOKEN:[[id number]],FOLLOWUP:[1-3 days],ESTABLISHEDPATIENT:[T]]

## 2022-11-05 ENCOUNTER — TRANSCRIPTION ENCOUNTER (OUTPATIENT)
Age: 77
End: 2022-11-05

## 2022-11-05 VITALS
SYSTOLIC BLOOD PRESSURE: 135 MMHG | HEART RATE: 64 BPM | OXYGEN SATURATION: 98 % | RESPIRATION RATE: 18 BRPM | DIASTOLIC BLOOD PRESSURE: 63 MMHG

## 2022-11-05 LAB
ANION GAP SERPL CALC-SCNC: 10 MMOL/L — SIGNIFICANT CHANGE UP (ref 5–17)
BUN SERPL-MCNC: 19.5 MG/DL — SIGNIFICANT CHANGE UP (ref 8–20)
CALCIUM SERPL-MCNC: 9.1 MG/DL — SIGNIFICANT CHANGE UP (ref 8.4–10.5)
CHLORIDE SERPL-SCNC: 104 MMOL/L — SIGNIFICANT CHANGE UP (ref 96–108)
CO2 SERPL-SCNC: 27 MMOL/L — SIGNIFICANT CHANGE UP (ref 22–29)
CREAT SERPL-MCNC: 0.76 MG/DL — SIGNIFICANT CHANGE UP (ref 0.5–1.3)
EGFR: 82 ML/MIN/1.73M2 — SIGNIFICANT CHANGE UP
GLUCOSE BLDC GLUCOMTR-MCNC: 104 MG/DL — HIGH (ref 70–99)
GLUCOSE SERPL-MCNC: 101 MG/DL — HIGH (ref 70–99)
HCT VFR BLD CALC: 38.9 % — SIGNIFICANT CHANGE UP (ref 34.5–45)
HGB BLD-MCNC: 12.4 G/DL — SIGNIFICANT CHANGE UP (ref 11.5–15.5)
MAGNESIUM SERPL-MCNC: 2.6 MG/DL — SIGNIFICANT CHANGE UP (ref 1.6–2.6)
MCHC RBC-ENTMCNC: 27 PG — SIGNIFICANT CHANGE UP (ref 27–34)
MCHC RBC-ENTMCNC: 31.9 GM/DL — LOW (ref 32–36)
MCV RBC AUTO: 84.6 FL — SIGNIFICANT CHANGE UP (ref 80–100)
PLATELET # BLD AUTO: 191 K/UL — SIGNIFICANT CHANGE UP (ref 150–400)
POTASSIUM SERPL-MCNC: 3.8 MMOL/L — SIGNIFICANT CHANGE UP (ref 3.5–5.3)
POTASSIUM SERPL-SCNC: 3.8 MMOL/L — SIGNIFICANT CHANGE UP (ref 3.5–5.3)
RBC # BLD: 4.6 M/UL — SIGNIFICANT CHANGE UP (ref 3.8–5.2)
RBC # FLD: 16.1 % — HIGH (ref 10.3–14.5)
SODIUM SERPL-SCNC: 140 MMOL/L — SIGNIFICANT CHANGE UP (ref 135–145)
WBC # BLD: 6.65 K/UL — SIGNIFICANT CHANGE UP (ref 3.8–10.5)
WBC # FLD AUTO: 6.65 K/UL — SIGNIFICANT CHANGE UP (ref 3.8–10.5)

## 2022-11-05 PROCEDURE — 36415 COLL VENOUS BLD VENIPUNCTURE: CPT

## 2022-11-05 PROCEDURE — C1874: CPT

## 2022-11-05 PROCEDURE — 93010 ELECTROCARDIOGRAM REPORT: CPT

## 2022-11-05 PROCEDURE — 93458 L HRT ARTERY/VENTRICLE ANGIO: CPT | Mod: XU

## 2022-11-05 PROCEDURE — 92978 ENDOLUMINL IVUS OCT C 1ST: CPT | Mod: LC

## 2022-11-05 PROCEDURE — 85025 COMPLETE CBC W/AUTO DIFF WBC: CPT

## 2022-11-05 PROCEDURE — 80048 BASIC METABOLIC PNL TOTAL CA: CPT

## 2022-11-05 PROCEDURE — G1004: CPT

## 2022-11-05 PROCEDURE — 80053 COMPREHEN METABOLIC PANEL: CPT

## 2022-11-05 PROCEDURE — 83735 ASSAY OF MAGNESIUM: CPT

## 2022-11-05 PROCEDURE — 71275 CT ANGIOGRAPHY CHEST: CPT | Mod: ME

## 2022-11-05 PROCEDURE — C1753: CPT

## 2022-11-05 PROCEDURE — 83036 HEMOGLOBIN GLYCOSYLATED A1C: CPT

## 2022-11-05 PROCEDURE — 82962 GLUCOSE BLOOD TEST: CPT

## 2022-11-05 PROCEDURE — 80061 LIPID PANEL: CPT

## 2022-11-05 PROCEDURE — C1894: CPT

## 2022-11-05 PROCEDURE — 85027 COMPLETE CBC AUTOMATED: CPT

## 2022-11-05 PROCEDURE — 93971 EXTREMITY STUDY: CPT

## 2022-11-05 PROCEDURE — C1769: CPT

## 2022-11-05 PROCEDURE — 93005 ELECTROCARDIOGRAM TRACING: CPT

## 2022-11-05 PROCEDURE — C1887: CPT

## 2022-11-05 PROCEDURE — C9600: CPT | Mod: LM

## 2022-11-05 PROCEDURE — C1725: CPT

## 2022-11-05 RX ORDER — ATENOLOL 25 MG/1
1 TABLET ORAL
Qty: 0 | Refills: 0 | DISCHARGE
Start: 2022-11-05

## 2022-11-05 RX ORDER — ATENOLOL 25 MG/1
2 TABLET ORAL
Qty: 0 | Refills: 0 | DISCHARGE

## 2022-11-05 RX ORDER — CEPHALEXIN 500 MG
1 CAPSULE ORAL
Qty: 30 | Refills: 0
Start: 2022-11-05 | End: 2022-11-14

## 2022-11-05 RX ORDER — CLOPIDOGREL BISULFATE 75 MG/1
1 TABLET, FILM COATED ORAL
Qty: 90 | Refills: 3
Start: 2022-11-05 | End: 2023-10-30

## 2022-11-05 RX ORDER — SERTRALINE 25 MG/1
1 TABLET, FILM COATED ORAL
Qty: 0 | Refills: 0 | DISCHARGE

## 2022-11-05 RX ORDER — ATENOLOL 25 MG/1
1 TABLET ORAL
Qty: 0 | Refills: 0 | DISCHARGE

## 2022-11-05 RX ADMIN — INSULIN GLARGINE 30 UNIT(S): 100 INJECTION, SOLUTION SUBCUTANEOUS at 08:01

## 2022-11-05 RX ADMIN — ATENOLOL 100 MILLIGRAM(S): 25 TABLET ORAL at 08:02

## 2022-11-05 RX ADMIN — CLOPIDOGREL BISULFATE 75 MILLIGRAM(S): 75 TABLET, FILM COATED ORAL at 10:12

## 2022-11-05 RX ADMIN — LOSARTAN POTASSIUM 25 MILLIGRAM(S): 100 TABLET, FILM COATED ORAL at 08:01

## 2022-11-05 RX ADMIN — Medication 40 MILLIGRAM(S): at 08:01

## 2022-11-05 RX ADMIN — Medication 30 MILLILITER(S): at 06:21

## 2022-11-05 RX ADMIN — Medication 50 MICROGRAM(S): at 05:50

## 2022-11-05 RX ADMIN — Medication 1000 MILLIGRAM(S): at 05:50

## 2022-11-05 RX ADMIN — APIXABAN 2.5 MILLIGRAM(S): 2.5 TABLET, FILM COATED ORAL at 05:50
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
## 2022-11-05 NOTE — PROGRESS NOTE ADULT - SUBJECTIVE AND OBJECTIVE BOX
Department of Cardiology                                                                  [facility]/[place] [address] [address]                                                            Telephone: [phone]. Fax:[phone]                                                                                         PCI NOTE       Subjective:  77y  Female who had a left heart catheterization which showed:  Coronary Angiography   ·	The coronary circulation is left dominant.    LM   ·	Left main artery: Angiography shows severe atherosclerosis. There is a 90 % stenosis. It was treated with an BOBBY FRONTIER 3.50 X 22MM CARIN postdilated with a EUPHORA NC 4.0 X 15MM balloon.  LAD   ·	Left anterior descending artery: Angiography shows severe atherosclerosis. There is an 80 % stenosis.  CX   ·	Circumflex: Angiography shows severe atherosclerosis. There is a 90 % stenosis.  RCA   ·	Right coronary artery: Angiography shows moderate atherosclerosis. There is a 50 % stenosis.  Graft Angiography   ·	LIMA graft to Mid left anterior descending: Angiography shows no disease.  ·	SVG graft to First obtuse marginal: Angiography shows no disease.    Left Heart Cath   ·	Ejection fraction was visually estimated by radionucleotide with a value of 45%.  ·	The left ventricular end diastolic pressure was 17 mmHg.         Access: Left femoral artery       Hemostasis: Manual pull       Total Contrast: 95 mL Omnipaque       Total Heparin: 8,000 units       Antiplatelet Given: Plavix 600 mg, aspirin 325 mg    PAST MEDICAL & SURGICAL HISTORY:  Hypertension  Hypercholesterolemia  Paroxysmal atrial fibrillation  PAD (peripheral artery disease)  Type 2 diabetes mellitus with diabetic peripheral angiopathy without gangrene, with long-term curren  Coronary artery disease involving native coronary artery of native heart with angina pectoris  H/O abdominal hysterectomy  H/O hernia repair  S/P femoral-popliteal bypass surgery  S/P peripheral artery angioplasty with stent placement  Status post three vessel coronary artery bypass  History of mitral valve replacement with cardiopulmonary bypass    FAMILY HISTORY:  Family history of abdominal aortic aneurysm (Father)  Family history of thoracic aortic aneurysm (Father)    Home Medications:  ascorbic acid 500 mg oral tablet: 1 tab(s) orally once a day (04 Nov 2022 12:04)  atenolol 50 mg oral tablet: 2 tab(s) orally (04 Nov 2022 12:04)  atorvastatin 40 mg oral tablet: 1 tab(s) orally once a day (at bedtime) (04 Nov 2022 12:04)  Fiasp FlexTouch 100 units/mL injectable solution: for coverage (04 Nov 2022 12:04)  isosorbide mononitrate 30 mg oral tablet, extended release: 1 tab(s) orally once a day (in the morning) (04 Nov 2022 12:04)  Lasix 80 mg oral tablet: 1 tab(s) orally once a day (04 Nov 2022 12:04)  levothyroxine 50 mcg (0.05 mg) oral tablet: 1 tab(s) orally once a day (04 Nov 2022 12:04)  losartan 25 mg oral tablet: 1 tab(s) orally once a day (04 Nov 2022 12:04)  Multiple Vitamins oral tablet: 1 tab(s) orally once a day (04 Nov 2022 12:04)  sertraline 50 mg oral tablet: 1 tab(s) orally once a day (04 Nov 2022 12:04)  traZODone 50 mg oral tablet: orally once (at bedtime) (04 Nov 2022 12:04)  Tresiba 100 units/mL subcutaneous solution: 40 unit(s) subcutaneous 2 times a day (04 Nov 2022 12:04)  Zoloft 100 mg oral tablet: 1 tab(s) orally once a day (04 Nov 2022 12:04)    HPI: 76y/o female former smoker with history of CAD, CABG (LIMA to the LAD, SVG to the OM2, SVG to the D1, 1/19/2021), S/P MV repair with 30 mm Physio Ring (1/19/2021), ligation of DUANE, excision of LA thrombus, chronic AF, HFrEF, DM, HLD who has been c/o severe RENTERIA (SOB when getting dressed) for 1-2 months, with exertional fatigue. She states that she developed left lower leg swelling and tenderness approximately 2-3 days ago. She had a cardiac PET scan in May which showed a moderate area of moderate ischemia involving the inferolateral wall. She denies chest pain, orthopnea or syncope.     General: No fatigue, no fevers/chills  Respiratory: No dyspnea, no cough, no wheeze  CV: No chest pain, no palpitations  Abd: No nausea  Neuro: No headache, no dizziness    Allergies:   ·	OHS (Unknown)  ·	warfarin (Rash)    Objective:  Vital Signs Last 24 Hrs  T(C): 36.6 (04 Nov 2022 11:32), Max: 36.6 (04 Nov 2022 11:32)  T(F): 97.9 (04 Nov 2022 11:32), Max: 97.9 (04 Nov 2022 11:32)  HR: 62 (04 Nov 2022 19:11) (59 - 69)  BP: 134/63 (04 Nov 2022 19:11) (113/58 - 134/63)  RR: 22 (04 Nov 2022 19:11) (13 - 22)  SpO2: 99% (04 Nov 2022 19:11) (94% - 99%)    Parameters below as of 04 Nov 2022 19:11  Patient On (Oxygen Delivery Method): room air    General: Awake, alert, speech clear, in no acute distress.  Chest: CTA, S1, S2, no murmurs.  Abdomen, Soft, nondistended  Left Groin: 6fr. arterial sheath, site soft, no bleeding, no hematoma.  Extremities: No edema, + distal pulses.    EKG: AF                          12.7   8.63  )-----------( 229      ( 04 Nov 2022 11:16 )             38.7     11-04    142  |  102  |  19.0  ----------------------------<  84  3.4   |  25.0  |  0.83    Ca    8.7      04 Nov 2022 11:16  Mg     2.5     11-04    TPro  7.0  /  Alb  4.1  /  TBili  1.9  /  DBili  x   /  AST  33  /  ALT  27  /  AlkPhos  128<H>  11-04    Lipids       Total Cholesterol: 70       Triglycerides: 67       HDL: 24       Non-HDL: 46       LDL: 33    HgbA1C: 9.7%
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 She states that she developed left lower leg swelling and tenderness approximately 2-3 days ago. She had a cardiac PET scan in May which showed a moderate area of moderate ischemia involving the inferolateral wall. She denies chest pain, orthopnea or syncope.     Symptoms:        Angina (Class): N/A       Ischemic Symptoms: RENTERIA (CCS class 3 anginal equivalent)    Heart Failure: ACC/AHA stage C, NYHA functional class 3-4, HFrEF    Assessment of LVEF:       EF: 55%       Assessed by: Cardiac PET       Date: 5/27/2022    Prior Cardiac Interventions:       PCI's: N/A       CABG: LIMA to the LAD, SVG to the OM2, SVG to the D1, 1/19/2021    Noninvasive Testing:   Stress Test: 5/27/2022       Protocol: Cardiac PET scan       Symptoms: Lightheadedness       EKG Changes: Nondiagnostic       Myocardial Imaging: A moderate area of moderate ischemia involving the inferolateral wall.       Risk Assessment: Moderate    Echo: 11/16/2021       LV: EF 50-55%.       RV: Normal.       LA: Mildly dilated.       RA: Mildly dilated.       Mitral Valve: Trace MR.       Aortic Valve: Sclerotic, trileaflet, no stenosis/regurgitation.       Tricuspid Valve: Moderate to severe TR, RVSP: 50       Pulmonic Valve: No TX       Pericardium: No pericardial effusion    Antianginal Therapies:        Beta Blockers: Atenolol 100 mg Q AM and 50 mg Q HS       Calcium Channel Blockers: N/A       Long Acting Nitrates: Imdur 30 mg daily       Ranexa: N/A  Associated Risk Factors:        Frailty: N/A       Cerebrovascular Disease: N/A       Chronic Lung Disease: N/A       Peripheral Arterial Disease: N/A       Chronic Kidney Disease (if yes, what is GFR): N/A       Uncontrolled Diabetes (if yes, what is HgbA1C or FBS): N/A       Poorly Controlled Hypertension (if yes, what is SBP): N/A       Morbid Obesity (if yes, what is BMI): N/A       History of Recent Ventricular Arrhythmia: N/A       Inability to Ambulate Safely: N/A       Need for Therapeutic Anticoagulation: N/A       Antiplatelet or Contrast Allergy: N/A    Social History:        Marital: , lives with .       Tobacco: Former 1 ppd smoker for 30 years, quit 4 years ago.       ETOH: Denies       Caffeine: 1 cup/day      EKG: Atrial fibrillation with no significant ST changes  TELE: Atrial fibrillation    MEDICATIONS  (STANDING):  apixaban 2.5 milliGRAM(s) Oral two times a day  ascorbic acid 500 milliGRAM(s) Oral daily  ATENolol  Tablet 100 milliGRAM(s) Oral daily  ATENolol  Tablet 50 milliGRAM(s) Oral at bedtime  atorvastatin 40 milliGRAM(s) Oral at bedtime  ceFAZolin  Injectable. 1000 milliGRAM(s) IV Push every 8 hours  ceFAZolin  Injectable.      chlorhexidine 4% Liquid 1 Application(s) Topical Once  clopidogrel Tablet 75 milliGRAM(s) Oral daily  dextrose 5%. 1000 milliLiter(s) (50 mL/Hr) IV Continuous <Continuous>  dextrose 5%. 1000 milliLiter(s) (100 mL/Hr) IV Continuous <Continuous>  dextrose 50% Injectable 25 Gram(s) IV Push once  dextrose 50% Injectable 12.5 Gram(s) IV Push once  dextrose 50% Injectable 25 Gram(s) IV Push once  furosemide    Tablet 20 milliGRAM(s) Oral at bedtime  furosemide    Tablet 40 milliGRAM(s) Oral daily  glucagon  Injectable 1 milliGRAM(s) IntraMuscular once  insulin glargine Injectable (LANTUS) 30 Unit(s) SubCutaneous every morning  insulin glargine Injectable (LANTUS) 30 Unit(s) SubCutaneous at bedtime  insulin lispro (ADMELOG) corrective regimen sliding scale   SubCutaneous three times a day before meals  isosorbide   mononitrate ER Tablet (IMDUR) 30 milliGRAM(s) Oral daily  levothyroxine 50 MICROGram(s) Oral daily  losartan 25 milliGRAM(s) Oral daily  multivitamin 1 Tablet(s) Oral daily  sertraline 50 milliGRAM(s) Oral daily  sertraline 100 milliGRAM(s) Oral daily  sodium chloride 0.9%. 1000 milliLiter(s) (50 mL/Hr) IV Continuous <Continuous>  traZODone 50 milliGRAM(s) Oral at bedtime    MEDICATIONS  (PRN):  aluminum hydroxide/magnesium hydroxide/simethicone Suspension 30 milliLiter(s) Oral every 4 hours PRN Dyspepsia  dextrose Oral Gel 15 Gram(s) Oral once PRN Blood Glucose LESS THAN 70 milliGRAM(s)/deciliter      Allergies    warfarin (Rash)    Intolerances    OHS (Unknown)    PAST MEDICAL & SURGICAL HISTORY:  Hypertension      Hypercholesterolemia      Paroxysmal atrial fibrillation      PAD (peripheral artery disease)      Type 2 diabetes mellitus with diabetic peripheral angiopathy without gangrene, with long-term curren      Coronary artery disease involving native coronary artery of native heart with angina pectoris      H/O abdominal hysterectomy      H/O hernia repair      S/P femoral-popliteal bypass surgery      S/P peripheral artery angioplasty with stent placement      Status post three vessel coronary artery bypass      History of mitral valve replacement with cardiopulmonary bypass          Vital Signs Last 24 Hrs  T(C): 36.7 (05 Nov 2022 05:45), Max: 36.7 (05 Nov 2022 05:45)  T(F): 98.1 (05 Nov 2022 05:45), Max: 98.1 (05 Nov 2022 05:45)  HR: 64 (05 Nov 2022 08:51) (59 - 88)  BP: 135/63 (05 Nov 2022 08:51) (102/57 - 140/63)  BP(mean): --  RR: 18 (05 Nov 2022 08:51) (13 - 27)  SpO2: 98% (05 Nov 2022 08:51) (92% - 99%)    Parameters below as of 05 Nov 2022 08:51  Patient On (Oxygen Delivery Method): room air        Physical Exam:  Constitutional: NAD, AAOx3  Cardiovascular: +S1S2 RRR  Pulmonary: CTA b/l, unlabored  GI: soft NTND +BS  Extremities: RLE with mild pitting edema. no redness noted to RLE, some hyperpigmentation noted to b/l LE. patient states that redness has improved significantly since admission. doppler PT/ DP pulses. b/l LE remain warm, feet remain warm, toes warm. b/l LE remain acyanotic  Neuro: non focal, MCLAUGHLIN x4  POST PROCEDURE SITE: L groin site c/d/i. no active bleeding, no hematoma.     LABS:                        12.4   6.65  )-----------( 191      ( 05 Nov 2022 05:57 )             38.9     11-05    140  |  104  |  19.5  ----------------------------<  101<H>  3.8   |  27.0  |  0.76    Ca    9.1      05 Nov 2022 05:57  Mg     2.6     11-05    TPro  7.0  /  Alb  4.1  /  TBili  1.9  /  DBili  x   /  AST  33<H>  /  ALT  27  /  AlkPhos  128<H>  11-04

## 2022-11-10 NOTE — H&P ADULT - NSICDXPASTSURGICALHX_GEN_ALL_CORE_FT
Pt was seen 11/7 for cellulitis  States the abx given doesn't seem to be working   PCB PAST SURGICAL HISTORY:  H/O abdominal hysterectomy     H/O hernia repair     S/P femoral-popliteal bypass surgery     S/P peripheral artery angioplasty with stent placement

## 2022-12-16 NOTE — PROGRESS NOTE ADULT - PROBLEM/PLAN-6
Be able to describe the benefit of medication/treatment
Take all medications as prescribed
Be able to describe the benefit of medication/treatment
DISPLAY PLAN FREE TEXT
Take all medications as prescribed
Be able to describe the benefit of medication/treatment
Take all medications as prescribed
DISPLAY PLAN FREE TEXT
Take all medications as prescribed
DISPLAY PLAN FREE TEXT

## 2022-12-28 NOTE — ED ADULT TRIAGE NOTE - ACCOMPANIED BY
EMT/paramedic PAST MEDICAL HISTORY:  Alzheimer disease     Enlarged prostate     Glaucoma     Hyperlipidemia

## 2023-01-08 NOTE — OCCUPATIONAL THERAPY INITIAL EVALUATION ADULT - ADL RETRAINING, OT EVAL
Pt to perform LB dressing with contact guard assist with adaptive equipment, prn, within 3-5 sessions.
08-Jan-2023 22:31

## 2023-01-19 ENCOUNTER — EMERGENCY (EMERGENCY)
Facility: HOSPITAL | Age: 78
LOS: 1 days | Discharge: HOME | End: 2023-01-19
Attending: EMERGENCY MEDICINE
Payer: MEDICARE

## 2023-01-19 VITALS
RESPIRATION RATE: 18 BRPM | SYSTOLIC BLOOD PRESSURE: 116 MMHG | OXYGEN SATURATION: 94 % | DIASTOLIC BLOOD PRESSURE: 56 MMHG | HEART RATE: 64 BPM | TEMPERATURE: 98 F

## 2023-01-19 VITALS
WEIGHT: 179.9 LBS | SYSTOLIC BLOOD PRESSURE: 117 MMHG | HEIGHT: 71 IN | OXYGEN SATURATION: 98 % | HEART RATE: 64 BPM | DIASTOLIC BLOOD PRESSURE: 63 MMHG | TEMPERATURE: 98 F

## 2023-01-19 DIAGNOSIS — Z95.2 PRESENCE OF PROSTHETIC HEART VALVE: Chronic | ICD-10-CM

## 2023-01-19 DIAGNOSIS — Z95.828 PRESENCE OF OTHER VASCULAR IMPLANTS AND GRAFTS: Chronic | ICD-10-CM

## 2023-01-19 DIAGNOSIS — Z95.820 PERIPHERAL VASCULAR ANGIOPLASTY STATUS WITH IMPLANTS AND GRAFTS: Chronic | ICD-10-CM

## 2023-01-19 DIAGNOSIS — Z95.1 PRESENCE OF AORTOCORONARY BYPASS GRAFT: Chronic | ICD-10-CM

## 2023-01-19 DIAGNOSIS — Z90.710 ACQUIRED ABSENCE OF BOTH CERVIX AND UTERUS: Chronic | ICD-10-CM

## 2023-01-19 DIAGNOSIS — Z98.89 OTHER SPECIFIED POSTPROCEDURAL STATES: Chronic | ICD-10-CM

## 2023-01-19 LAB
ALBUMIN SERPL ELPH-MCNC: 4.2 G/DL — SIGNIFICANT CHANGE UP (ref 3.3–5.2)
ALP SERPL-CCNC: 92 U/L — SIGNIFICANT CHANGE UP (ref 40–120)
ALT FLD-CCNC: 14 U/L — SIGNIFICANT CHANGE UP
ANION GAP SERPL CALC-SCNC: 12 MMOL/L — SIGNIFICANT CHANGE UP (ref 5–17)
APTT BLD: 41.1 SEC — HIGH (ref 27.5–35.5)
AST SERPL-CCNC: 31 U/L — SIGNIFICANT CHANGE UP
BASOPHILS # BLD AUTO: 0.03 K/UL — SIGNIFICANT CHANGE UP (ref 0–0.2)
BASOPHILS NFR BLD AUTO: 0.5 % — SIGNIFICANT CHANGE UP (ref 0–2)
BILIRUB SERPL-MCNC: 1.4 MG/DL — SIGNIFICANT CHANGE UP (ref 0.4–2)
BUN SERPL-MCNC: 16.8 MG/DL — SIGNIFICANT CHANGE UP (ref 8–20)
CALCIUM SERPL-MCNC: 9.2 MG/DL — SIGNIFICANT CHANGE UP (ref 8.4–10.5)
CHLORIDE SERPL-SCNC: 103 MMOL/L — SIGNIFICANT CHANGE UP (ref 96–108)
CO2 SERPL-SCNC: 29 MMOL/L — SIGNIFICANT CHANGE UP (ref 22–29)
CREAT SERPL-MCNC: 0.79 MG/DL — SIGNIFICANT CHANGE UP (ref 0.5–1.3)
EGFR: 78 ML/MIN/1.73M2 — SIGNIFICANT CHANGE UP
EOSINOPHIL # BLD AUTO: 0.12 K/UL — SIGNIFICANT CHANGE UP (ref 0–0.5)
EOSINOPHIL NFR BLD AUTO: 1.9 % — SIGNIFICANT CHANGE UP (ref 0–6)
GLUCOSE SERPL-MCNC: 82 MG/DL — SIGNIFICANT CHANGE UP (ref 70–99)
HCT VFR BLD CALC: 35.8 % — SIGNIFICANT CHANGE UP (ref 34.5–45)
HGB BLD-MCNC: 11.4 G/DL — LOW (ref 11.5–15.5)
IMM GRANULOCYTES NFR BLD AUTO: 0.2 % — SIGNIFICANT CHANGE UP (ref 0–0.9)
INR BLD: 1.81 RATIO — HIGH (ref 0.88–1.16)
LYMPHOCYTES # BLD AUTO: 0.85 K/UL — LOW (ref 1–3.3)
LYMPHOCYTES # BLD AUTO: 13.8 % — SIGNIFICANT CHANGE UP (ref 13–44)
MCHC RBC-ENTMCNC: 25.7 PG — LOW (ref 27–34)
MCHC RBC-ENTMCNC: 31.8 GM/DL — LOW (ref 32–36)
MCV RBC AUTO: 80.8 FL — SIGNIFICANT CHANGE UP (ref 80–100)
MONOCYTES # BLD AUTO: 0.63 K/UL — SIGNIFICANT CHANGE UP (ref 0–0.9)
MONOCYTES NFR BLD AUTO: 10.2 % — SIGNIFICANT CHANGE UP (ref 2–14)
NEUTROPHILS # BLD AUTO: 4.53 K/UL — SIGNIFICANT CHANGE UP (ref 1.8–7.4)
NEUTROPHILS NFR BLD AUTO: 73.4 % — SIGNIFICANT CHANGE UP (ref 43–77)
NT-PROBNP SERPL-SCNC: 3038 PG/ML — HIGH (ref 0–300)
PLATELET # BLD AUTO: 199 K/UL — SIGNIFICANT CHANGE UP (ref 150–400)
POTASSIUM SERPL-MCNC: 3.6 MMOL/L — SIGNIFICANT CHANGE UP (ref 3.5–5.3)
POTASSIUM SERPL-SCNC: 3.6 MMOL/L — SIGNIFICANT CHANGE UP (ref 3.5–5.3)
PROT SERPL-MCNC: 7.2 G/DL — SIGNIFICANT CHANGE UP (ref 6.6–8.7)
PROTHROM AB SERPL-ACNC: 21.1 SEC — HIGH (ref 10.5–13.4)
RBC # BLD: 4.43 M/UL — SIGNIFICANT CHANGE UP (ref 3.8–5.2)
RBC # FLD: 18.6 % — HIGH (ref 10.3–14.5)
SODIUM SERPL-SCNC: 143 MMOL/L — SIGNIFICANT CHANGE UP (ref 135–145)
TROPONIN T SERPL-MCNC: <0.01 NG/ML — SIGNIFICANT CHANGE UP (ref 0–0.06)
WBC # BLD: 6.17 K/UL — SIGNIFICANT CHANGE UP (ref 3.8–10.5)
WBC # FLD AUTO: 6.17 K/UL — SIGNIFICANT CHANGE UP (ref 3.8–10.5)

## 2023-01-19 PROCEDURE — 85025 COMPLETE CBC W/AUTO DIFF WBC: CPT

## 2023-01-19 PROCEDURE — U0005: CPT

## 2023-01-19 PROCEDURE — 85730 THROMBOPLASTIN TIME PARTIAL: CPT

## 2023-01-19 PROCEDURE — G1004: CPT

## 2023-01-19 PROCEDURE — 83880 ASSAY OF NATRIURETIC PEPTIDE: CPT

## 2023-01-19 PROCEDURE — 82962 GLUCOSE BLOOD TEST: CPT

## 2023-01-19 PROCEDURE — 71045 X-RAY EXAM CHEST 1 VIEW: CPT

## 2023-01-19 PROCEDURE — 85610 PROTHROMBIN TIME: CPT

## 2023-01-19 PROCEDURE — 99285 EMERGENCY DEPT VISIT HI MDM: CPT | Mod: 25

## 2023-01-19 PROCEDURE — 99284 EMERGENCY DEPT VISIT MOD MDM: CPT

## 2023-01-19 PROCEDURE — 71275 CT ANGIOGRAPHY CHEST: CPT | Mod: 26,ME

## 2023-01-19 PROCEDURE — U0003: CPT

## 2023-01-19 PROCEDURE — 93005 ELECTROCARDIOGRAM TRACING: CPT

## 2023-01-19 PROCEDURE — 96360 HYDRATION IV INFUSION INIT: CPT | Mod: XU

## 2023-01-19 PROCEDURE — 36415 COLL VENOUS BLD VENIPUNCTURE: CPT

## 2023-01-19 PROCEDURE — 80053 COMPREHEN METABOLIC PANEL: CPT

## 2023-01-19 PROCEDURE — 84484 ASSAY OF TROPONIN QUANT: CPT

## 2023-01-19 PROCEDURE — 71045 X-RAY EXAM CHEST 1 VIEW: CPT | Mod: 26

## 2023-01-19 PROCEDURE — 93010 ELECTROCARDIOGRAM REPORT: CPT

## 2023-01-19 PROCEDURE — 71275 CT ANGIOGRAPHY CHEST: CPT | Mod: ME

## 2023-01-19 RX ORDER — SODIUM CHLORIDE 9 MG/ML
1000 INJECTION INTRAMUSCULAR; INTRAVENOUS; SUBCUTANEOUS ONCE
Refills: 0 | Status: COMPLETED | OUTPATIENT
Start: 2023-01-19 | End: 2023-01-19

## 2023-01-19 RX ADMIN — SODIUM CHLORIDE 1000 MILLILITER(S): 9 INJECTION INTRAMUSCULAR; INTRAVENOUS; SUBCUTANEOUS at 20:12

## 2023-01-19 NOTE — ED PROVIDER NOTE - NSICDXPASTSURGICALHX_GEN_ALL_CORE_FT
PAST SURGICAL HISTORY:  H/O abdominal hysterectomy     H/O hernia repair     History of mitral valve replacement with cardiopulmonary bypass     S/P femoral-popliteal bypass surgery     S/P peripheral artery angioplasty with stent placement     Status post three vessel coronary artery bypass

## 2023-01-19 NOTE — ED PROVIDER NOTE - NSICDXPASTMEDICALHX_GEN_ALL_CORE_FT
PAST MEDICAL HISTORY:  Coronary artery disease involving native coronary artery of native heart with angina pectoris     Hypercholesterolemia     Hypertension     PAD (peripheral artery disease)     Paroxysmal atrial fibrillation     Type 2 diabetes mellitus with diabetic peripheral angiopathy without gangrene, with long-term curren

## 2023-01-19 NOTE — ED STATDOCS - PROGRESS NOTE DETAILS
Thomas Puentes for ED attending, Dr. Jhaveri: 78 y/o female with SOB, ekg showing diffuse ST depression with a-fib, called charge nurse to prioritize monitored bed for this patient.

## 2023-01-19 NOTE — ED PROVIDER NOTE - PATIENT PORTAL LINK FT
You can access the FollowMyHealth Patient Portal offered by [facility] by registering at the following website: [email or web address]. By joining [employer]’s FollowMyHealth portal, you will also be able to view your health information using other applications (apps) compatible with our system.

## 2023-01-20 PROBLEM — E11.51 TYPE 2 DIABETES MELLITUS WITH DIABETIC PERIPHERAL ANGIOPATHY WITHOUT GANGRENE: Chronic | Status: ACTIVE | Noted: 2022-11-04

## 2023-01-20 PROBLEM — I25.119 ATHEROSCLEROTIC HEART DISEASE OF NATIVE CORONARY ARTERY WITH UNSPECIFIED ANGINA PECTORIS: Chronic | Status: ACTIVE | Noted: 2022-11-04

## 2023-01-20 LAB — SARS-COV-2 RNA SPEC QL NAA+PROBE: SIGNIFICANT CHANGE UP

## 2023-01-20 NOTE — CONSULT NOTE ADULT - SUBJECTIVE AND OBJECTIVE BOX
[facility], THE [facility]                                   [address] [place] [address]                                                      PHONE: [phone]                                                         FAX: [phone]  [email or web address]  ---------------------------------------------------------------------------------------------------------------------------------    Reason for Consult: RENTERIA    HPI:  DONALD SAUNDERS is an 77y Female with HTN DM COPD CAD s/p CABG/MV repair 1/2021, mild AS, chronic AF initial severe LV dysfunction, augmentation of EF on GSMT, s/p LM PCI 11/22 with symptoms of dyspnea felt well for several months now with recurrent sx of RENTERIA came to ER.    PAST MEDICAL & SURGICAL HISTORY:  Hypertension      Hypercholesterolemia      Paroxysmal atrial fibrillation      PAD (peripheral artery disease)      Type 2 diabetes mellitus with diabetic peripheral angiopathy without gangrene, with long-term curren      Coronary artery disease involving native coronary artery of native heart with angina pectoris      H/O abdominal hysterectomy      H/O hernia repair      S/P femoral-popliteal bypass surgery      S/P peripheral artery angioplasty with stent placement      Status post three vessel coronary artery bypass      History of mitral valve replacement with cardiopulmonary bypass          OHS (Unknown)  warfarin (Rash)      MEDICATIONS  (STANDING):    MEDICATIONS  (PRN):      Social History:  Cigarettes:      neg              Alchohol:        neg         Illicit Drug Abuse:  neg  neg FH CAD    ROS: Negative other than as mentioned in HPI.    Vital Signs Last 24 Hrs  T(C): 36.7 (19 Jan 2023 23:27), Max: 36.7 (19 Jan 2023 18:22)  T(F): 98.1 (19 Jan 2023 23:27), Max: 98.1 (19 Jan 2023 23:27)  HR: 64 (19 Jan 2023 23:27) (64 - 64)  BP: 116/56 (19 Jan 2023 23:27) (116/56 - 117/63)  BP(mean): --  RR: 18 (19 Jan 2023 23:27) (18 - 18)  SpO2: 94% (19 Jan 2023 23:27) (94% - 98%)    Parameters below as of 19 Jan 2023 23:27  Patient On (Oxygen Delivery Method): room air      ICU Vital Signs Last 24 Hrs  DONALD SAUNDERS  I&O's Detail    I&O's Summary    Drug Dosing Weight  DONALD CHIP      PHYSICAL EXAM:  General: Appears alert and cooperative.  HEENT: Head; normocephalic, atraumatic.  Eyes: Pupils reactive, cornea wnl.  Neck: Supple, no nodes adenopathy, no NVD or carotid bruit or thyromegaly.  CARDIOVASCULAR: Normal S1 and S2, No murmur, rub, gallop or lift.   LUNGS: No rales, rhonchi or wheeze. Normal breath sounds bilaterally.  ABDOMEN: Soft, nontender without mass or organomegaly. bowel sounds normoactive.  EXTREMITIES: No clubbing, cyanosis or edema. Distal pulses wnl.   SKIN: warm and dry with normal turgor.  NEURO: Alert/oriented x 3/normal motor exam. No pathologic reflexes.    PSYCH: normal affect.        LABS:                        11.4   6.17  )-----------( 199      ( 19 Jan 2023 20:05 )             35.8     01-19    143  |  103  |  16.8  ----------------------------<  82  3.6   |  29.0  |  0.79    Ca    9.2      19 Jan 2023 20:05    TPro  7.2  /  Alb  4.2  /  TBili  1.4  /  DBili  x   /  AST  31  /  ALT  14  /  AlkPhos  92  01-19    DONALD SAUNDERS  CARDIAC MARKERS ( 19 Jan 2023 20:05 )  x     / <0.01 ng/mL / x     / x     / x          PT/INR - ( 19 Jan 2023 20:05 )   PT: 21.1 sec;   INR: 1.81 ratio         PTT - ( 19 Jan 2023 20:05 )  PTT:41.1 sec      RADIOLOGY & ADDITIONAL STUDIES:    INTERPRETATION OF TELEMETRY (personally reviewed):    ECG: NSR NSST abnl    ECHO:  office 11/2021 EF 50% aoical hypokinesia mild AS s/p MV repair with trace MR      < from: CT Angio Chest PE Protocol w/ IV Cont (01.19.23 @ 22:55) >  IMPRESSION:  No evidence of pulmonary embolus.    Similar mosaic attenuation of the lungs, possibly related to small   airways disease.    Small loculated right pleural fluid.        --- End of Report ---            MILAN MERRITT MD; Attending Radiologist  This document has been electronically signed. Jan 19 2023 11:53PM    < end of copied text >

## 2023-02-14 ENCOUNTER — APPOINTMENT (OUTPATIENT)
Dept: PULMONOLOGY | Facility: CLINIC | Age: 78
End: 2023-02-14
Payer: MEDICARE

## 2023-02-14 VITALS
BODY MASS INDEX: 28.25 KG/M2 | DIASTOLIC BLOOD PRESSURE: 76 MMHG | WEIGHT: 180 LBS | RESPIRATION RATE: 16 BRPM | HEIGHT: 67 IN | SYSTOLIC BLOOD PRESSURE: 120 MMHG | OXYGEN SATURATION: 98 % | HEART RATE: 72 BPM

## 2023-02-14 DIAGNOSIS — R06.00 DYSPNEA, UNSPECIFIED: ICD-10-CM

## 2023-02-14 DIAGNOSIS — J90 PLEURAL EFFUSION, NOT ELSEWHERE CLASSIFIED: ICD-10-CM

## 2023-02-14 DIAGNOSIS — Z86.79 PERSONAL HISTORY OF OTHER DISEASES OF THE CIRCULATORY SYSTEM: ICD-10-CM

## 2023-02-14 DIAGNOSIS — R93.89 ABNORMAL FINDINGS ON DIAGNOSTIC IMAGING OF OTHER SPECIFIED BODY STRUCTURES: ICD-10-CM

## 2023-02-14 DIAGNOSIS — I27.21 SECONDARY PULMONARY ARTERIAL HYPERTENSION: ICD-10-CM

## 2023-02-14 PROCEDURE — 99215 OFFICE O/P EST HI 40 MIN: CPT | Mod: 25

## 2023-02-14 PROCEDURE — 94010 BREATHING CAPACITY TEST: CPT

## 2023-02-14 RX ORDER — PANTOPRAZOLE 40 MG/1
40 TABLET, DELAYED RELEASE ORAL
Refills: 0 | Status: DISCONTINUED | COMMUNITY
End: 2023-02-14

## 2023-02-14 RX ORDER — CLOPIDOGREL 75 MG/1
75 TABLET, FILM COATED ORAL
Refills: 0 | Status: ACTIVE | COMMUNITY

## 2023-02-14 RX ORDER — LOSARTAN POTASSIUM 25 MG/1
25 TABLET, FILM COATED ORAL
Refills: 0 | Status: ACTIVE | COMMUNITY

## 2023-02-14 RX ORDER — SERTRALINE HYDROCHLORIDE 25 MG/1
TABLET, FILM COATED ORAL
Refills: 0 | Status: DISCONTINUED | COMMUNITY
End: 2023-02-14

## 2023-02-14 RX ORDER — ASPIRIN 81 MG
81 TABLET, DELAYED RELEASE (ENTERIC COATED) ORAL
Refills: 0 | Status: DISCONTINUED | COMMUNITY
End: 2023-02-14

## 2023-02-14 RX ORDER — LEVOTHYROXINE SODIUM 75 UG/1
75 CAPSULE ORAL
Refills: 0 | Status: ACTIVE | COMMUNITY

## 2023-02-14 RX ORDER — INSULIN DEGLUDEC INJECTION 100 U/ML
100 INJECTION, SOLUTION SUBCUTANEOUS
Refills: 0 | Status: ACTIVE | COMMUNITY

## 2023-02-14 RX ORDER — ATORVASTATIN CALCIUM 40 MG/1
40 TABLET, FILM COATED ORAL
Refills: 0 | Status: DISCONTINUED | COMMUNITY
End: 2023-02-14

## 2023-02-14 RX ORDER — ROSUVASTATIN CALCIUM 20 MG/1
20 TABLET, FILM COATED ORAL
Refills: 0 | Status: ACTIVE | COMMUNITY

## 2023-02-14 RX ORDER — DIGOXIN 125 UG/1
125 TABLET ORAL
Refills: 0 | Status: DISCONTINUED | COMMUNITY
End: 2023-02-14

## 2023-02-14 RX ORDER — METOPROLOL TARTRATE 75 MG/1
75 TABLET, FILM COATED ORAL
Qty: 270 | Refills: 0 | Status: DISCONTINUED | COMMUNITY
End: 2023-02-14

## 2023-02-14 RX ORDER — INSULIN ASPART INJECTION 100 [IU]/ML
100 INJECTION, SOLUTION SUBCUTANEOUS
Refills: 0 | Status: ACTIVE | COMMUNITY

## 2023-02-14 RX ORDER — CYANOCOBALAMIN 1000 UG/ML
1000 INJECTION INTRAMUSCULAR; SUBCUTANEOUS
Refills: 0 | Status: DISCONTINUED | COMMUNITY
End: 2023-02-14

## 2023-02-14 RX ORDER — POTASSIUM CHLORIDE 1500 MG/1
20 TABLET, FILM COATED, EXTENDED RELEASE ORAL TWICE DAILY
Refills: 0 | Status: DISCONTINUED | COMMUNITY
End: 2023-02-14

## 2023-02-14 RX ORDER — DAPAGLIFLOZIN 10 MG/1
10 TABLET, FILM COATED ORAL
Refills: 0 | Status: ACTIVE | COMMUNITY

## 2023-02-14 NOTE — END OF VISIT
[FreeTextEntry3] : Echoes from [place] were obtained PACS reviewed with patient [Time Spent: ___ minutes] : I have spent [unfilled] minutes of time on the encounter.

## 2023-02-14 NOTE — PROCEDURE
[FreeTextEntry1] : ACC: [id number] EXAM: CT ANGIO CHEST PULM [facility] ORDERED BY: MANE BOB\par \par PROCEDURE DATE: 01/19/2023\par \par \par \par INTERPRETATION: CLINICAL INFORMATION: Evaluate for pulmonary embolus.\par \par COMPARISON: 11/4/2022.\par \par CONTRAST/COMPLICATIONS:\par IV Contrast: Omnipaque 350 65 ml cc administered 0 ml cc discarded\par Oral Contrast: NONE\par Complications: None reported at time of study completion\par \par PROCEDURE:\par CT Angiography of the Chest.\par Sagittal and coronal reformats were performed as well as 3D (MIP) reconstructions.\par \par FINDINGS:\par \par LUNGS AND AIRWAYS: Patent central airways. Mosaic attenuation of the lungs bilaterally, possibly related to small airways disease.. Minimal atelectasis of the bilateral lower lobes and lingula.\par PLEURA: Areas of loculated fluid in the right minor fissure and along the posterior aspect of the right lung apex, minimally increased compared with prior.\par MEDIASTINUM AND JEFFREY: No lymphadenopathy.\par VESSELS: No evidence of pulmonary embolus. Atherosclerotic calcifications.\par HEART: Heart size is mildly prominent, with stable prominence of the right ventricle and atrium and reflux of contrast into the hepatic vein/IVC. No pericardial effusion. Mitral valve prosthesis. Atrial appendage occlusion clip.\par CHEST WALL AND LOWER NECK: Within normal limits.\par VISUALIZED UPPER ABDOMEN: No acute findings.\par BONES: Median sternotomy wires. Degenerative changes.\par \par IMPRESSION:\par No evidence of pulmonary embolus.\par \par Similar mosaic attenuation of the lungs, possibly related to small airways disease.\par \par Small loculated right pleural fluid.\par \par MILAN MERRITT MD; Attending Radiologist\par This document has been electronically signed. Jan 19 2023 11:53PM\par \par 3/5/2021: [place] [facility] [place]-echocardiogram demonstrated ejection fraction of 60% with mild to moderate RV dilatation and RV dysfunction, moderate severe pulmonary hypertension with PA pressures in the 50s.  Status post mitral repair with +1 mitral regurgitation.  Moderate severe tricuspid regurgitation.

## 2023-02-14 NOTE — HISTORY OF PRESENT ILLNESS
[Former] : former [>= 20 pack years] : >= 20 pack years [TextBox_4] : 77-year-old female previously followed by Dr. Stevens.  Patient has a history of severe coronary artery disease status post coronary artery bypass x3 in January complicated by left ventricular dysfunction atrial fibrillation and status post mitral valve repair.  Patient was well until approximately 1 month prior to this visit when she noted marked increase shortness of breath.  She states that she was short of breath even when making her bed.  She denies any orthopnea or leg edema.  History is negative for palpitations negative for cough wheeze or sputum production.  No history of fevers chills. [YearQuit] : 2016

## 2023-02-14 NOTE — DISCUSSION/SUMMARY
[FreeTextEntry1] : 77-year-old female seen today in reevaluation for complaints of increased shortness of breath.  CAT scan is consistent with chronic pleural disease without any significant change or evidence of pulmonary emboli..  Mosaic attenuation can be seen with early heart failure versus small airways disease.  No evidence of airway obstruction on spirometry or change from previous values.  Most likely etiology is that of underlying heart disease, worsening valvular disease complicated by group 2 pulmonary hypertension.  No evidence of hypoxemia but will perform overnight oximetry to evaluate the need for oxygen to better unload her pulmonary vascular bed.  I am referring patient back to cardiology for further evaluation and treatment.

## 2023-02-14 NOTE — CONSULT LETTER
[Dear  ___] : Dear  [unfilled], [Consult Letter:] : I had the pleasure of evaluating your patient, [unfilled]. [Please see my note below.] : Please see my note below. [Consult Closing:] : Thank you very much for allowing me to participate in the care of this patient.  If you have any questions, please do not hesitate to contact me. [Sincerely,] : Sincerely, [FreeTextEntry3] : Deon Gaitan MD FCCP\par Pulmonary/Critical Care/Sleep Medicine\par Department of Internal Medicine\par \par [facility] [employer]\par

## 2023-02-17 NOTE — AIRWAY REMOVAL NOTE  ADULT & PEDS - NS REMOVAL  OF ARTIFICAL AIRWAY STRIDOR
no Pt discharged prior to evaluation. Agree with A/P. Pain well controlled, adequate tidal volumes. No surgical intervention required.

## 2023-02-28 NOTE — H&P PST ADULT - REASON FOR ADMISSION
bilateral leg pain Detail Level: Detailed Plan: Will plan to treat with destruction for the next few visits or until lesions have resolved. "bilateral leg pain"

## 2023-06-02 NOTE — OCCUPATIONAL THERAPY INITIAL EVALUATION ADULT - BALANCE TRAINING, PT EVAL
Pt to improve sitting/standing balance to improve dressing performance while seated edge of bed within 3-5 sessions.
negative

## 2023-09-12 NOTE — PATIENT PROFILE ADULT - NSPROGENBLOODRESTRICT_GEN_A_NUR
"  Assessment & Plan     Type 2 diabetes mellitus without complication, without long-term current use of insulin (H)    Not controlled.    Begin metformin.    - PRIMARY CARE FOLLOW-UP SCHEDULING  - HEMOGLOBIN A1C  - Albumin Random Urine Quantitative with Creat Ratio  - HEMOGLOBIN A1C  - Albumin Random Urine Quantitative with Creat Ratio  - metFORMIN (GLUCOPHAGE XR) 500 MG 24 hr tablet  Dispense: 90 tablet; Refill: 3  - PRIMARY CARE FOLLOW-UP SCHEDULING    Mixed hyperlipidemia    Stable.    - Lipid panel reflex to direct LDL Fasting  - Lipid panel reflex to direct LDL Fasting    Essential hypertension with goal blood pressure less than 140/90    Stable.    Need for vaccination    - INFLUENZA VACCINE 18-64Y (FLUBLOK)      Recheck in 4 months.        35 minutes spent by me on the date of the encounter doing chart review, review of test results, and patient visit        BMI:   Estimated body mass index is 38.62 kg/m  as calculated from the following:    Height as of this encounter: 1.727 m (5' 8\").    Weight as of this encounter: 115.2 kg (254 lb).           Rajiv Figueroa MD  [facility] Home[facility]ISTHIAN Tijerina is a 63 year old, presenting for the following health issues:  Diabetes and Hypertension        9/12/2023     7:15 AM   Additional Questions   Roomed by Roberta LAMB       History of Present Illness       Diabetes:   He presents for follow up of diabetes.    He is not checking blood glucose.         He has no concerns regarding his diabetes at this time.   He is not experiencing numbness or burning in feet, excessive thirst, blurry vision, weight changes or redness, sores or blisters on feet. The patient has had a diabetic eye exam in the last 12 months. Eye exam performed on April 26, 2023. Location of last eye exam [facility].            Left > right sciatica if lifting.  He built a deck for his neighbor.  Taking Aleve or tylenol.    Fasting.    Weight increased 1 # since " "May.    Current Outpatient Medications   Medication Sig Dispense Refill    amLODIPine (NORVASC) 10 MG tablet Take 1 tablet (10 mg) by mouth daily 90 tablet 3    atorvastatin (LIPITOR) 20 MG tablet Take 1 tablet (20 mg) by mouth daily 90 tablet 3    cabergoline (DOSTINEX) 0.5 MG tablet TAKE 1/2 TABLET BY MOUTH TWICE WEEKLY 8 tablet 11    cholecalciferol, vitamin D3, (VITAMIN D3) 2,000 unit cap [CHOLECALCIFEROL, VITAMIN D3, (VITAMIN D3) 2,000 UNIT CAP] Take 1 capsule by mouth daily.      dorzolamide-timolol (COSOPT) 22.3-6.8 mg/mL ophthalmic solution [DORZOLAMIDE-TIMOLOL (COSOPT) 22.3-6.8 MG/ML OPHTHALMIC SOLUTION]   10    EPINEPHrine (ANY BX GENERIC EQUIV) 0.3 MG/0.3ML injection 2-pack Inject 0.3 mLs (0.3 mg) into the muscle as needed for anaphylaxis 1 each 1    FLOVENT  MCG/ACT inhaler INHALE 1 PUFF BY MOUTH TWICE DAILY 12 g 3    hydrochlorothiazide (HYDRODIURIL) 12.5 MG tablet TAKE ONE TABLET BY MOUTH ONE TIME DAILY 90 tablet 3    latanoprost (XALATAN) 0.005 % ophthalmic solution [LATANOPROST (XALATAN) 0.005 % OPHTHALMIC SOLUTION]   10    lisinopril (ZESTRIL) 40 MG tablet TAKE ONE TABLET BY MOUTH ONE TIME DAILY 90 tablet 3    loratadine (CLARITIN) 10 mg tablet [LORATADINE (CLARITIN) 10 MG TABLET] Take 10 mg by mouth daily.      metFORMIN (GLUCOPHAGE XR) 500 MG 24 hr tablet Take 1 tablet (500 mg) by mouth daily 90 tablet 3    montelukast (SINGULAIR) 10 MG tablet TAKE ONE TABLET BY MOUTH ONE TIME DAILY 90 tablet 3    MULTIVITAMIN ORAL [MULTIVITAMIN ORAL] Take 1 tablet by mouth daily.      PROAIR  (90 Base) MCG/ACT inhaler INHALE ONE OR TWO PUFFS BY MOUTH EVERY FOUR HOURS AS NEEDED 54 g 3    venlafaxine (EFFEXOR XR) 150 MG 24 hr capsule TAKE ONE CAPSULE BY MOUTH ONE TIME DAILY 90 capsule 2           Review of Systems   No fever.      Objective    /83   Pulse 74   Temp 98  F (36.7  C) (Oral)   Resp 16   Ht 1.727 m (5' 8\")   Wt 115.2 kg (254 lb)   SpO2 95%   BMI 38.62 kg/m    Body mass index " is 38.62 kg/m .  Physical Exam   Heart normal  Lungs normal    A1c 7.7, had been 7.3                       none

## 2023-09-20 NOTE — DISCHARGE NOTE PROVIDER - NSDCQMERRANDS_GEN_ALL_CORE
Subjective:   Patient ID:  Antonio Urena is a 76 y.o. female who presents for follow-up of No chief complaint on file.  Patient denies chest pain, angina or symptoms associated with anginal equivalent.  Overall today the patient is feeling generally well heart rate blood pressure stable is no evidence of postural changes blood pressure 120/70 standing sitting and lying down.  Overall doing well pulse is 50 beats per minute heart rate is stable there was no other abnormalities.  Patient feeling well physical exam is normal today.     Overall patient feels well this time.  No acute symptoms.  Intermittent palpitations are very rare this time heart rate blood pressure stable and she is doing well new medication diltiazem otherwise stable this moment.  NO FOCAL CNS SYMPTOMS OR SIGNS TO SUGGEST TIA OR STROKE  NO ANGINA OR EQUIVALENT  NO UNUSUAL CASTELLON. NO ORTHOPNEA OR PND  NO PALPITATIONS  NO NEAR SYNCOPE OR SYNCOPE  NO EDEMA. NO CALVE TENDERNESS  This finds patient feeling well stable blood pressure medications otherwise doing well no acute changes.  No heart rate or blood pressure changes at this time.  Last device check on 12/22/2021 was stable.  No acute changes patient is having no arrhythmias noted.           This virtual visit finds patient feeling well blood pressure on her own reveals blood pressure 122/70 pulse 70 oxygenation 98.  Patient has had no exertional symptoms chest pain shortness of breath sporadic intermittent arrhythmias however doing well and continues on all medications including apixaban for anticoagulation prevention for cardiac risk of stroke.  Patient is otherwise stable all medications all medicines reviewed renewed doing well this time device check in June was stable with no arrhythmias.  Good battery life.  Otherwise doing well.        Clinically stable no recurrence symptoms doing well with anticoagulation in blood pressure control heart rate control doing well this time.  Recent check of  the pacemaker shows appropriate pacing sensing and no other arrhythmias were noted.     04/19/2023:   Overall patient doing well no exertional symptoms chest pain shortness breath no significant arrhythmias pacemaker is working well.  No acute shortness breath patient continues on chronic anticoagulation due to PAF stable.  No acute changes no chest pain stable.  All medications reviewed and renewed as necessary.       09/20/2023 overall doing well no acute changes no syncope or near syncopal episodes.  Patient has had mild tachyarrhythmias and is otherwise doing well follow-up to EP for evaluation pacemaker in the future.        Review of Systems   Constitutional: Negative for chills, diaphoresis, night sweats, weight gain and weight loss.   HENT:  Negative for congestion, hoarse voice, sore throat and stridor.    Eyes:  Negative for double vision and pain.   Cardiovascular:  Negative for chest pain, claudication, cyanosis, dyspnea on exertion, irregular heartbeat, leg swelling, near-syncope, orthopnea, palpitations, paroxysmal nocturnal dyspnea and syncope.   Respiratory:  Negative for cough, hemoptysis, shortness of breath, sleep disturbances due to breathing, snoring, sputum production and wheezing.    Endocrine: Negative for cold intolerance, heat intolerance and polydipsia.   Hematologic/Lymphatic: Negative for bleeding problem. Does not bruise/bleed easily.   Skin:  Negative for color change, dry skin and rash.   Musculoskeletal:  Negative for joint swelling and muscle cramps.   Gastrointestinal:  Negative for bloating, abdominal pain, constipation, diarrhea, dysphagia, melena, nausea and vomiting.   Genitourinary:  Negative for flank pain and urgency.   Neurological:  Negative for dizziness, focal weakness, headaches, light-headedness, loss of balance, seizures and weakness.   Psychiatric/Behavioral:  Negative for altered mental status and memory loss. The patient is not nervous/anxious.    Family History    Problem Relation Age of Onset    Cancer Mother     Cancer Father     Cancer Brother      Past Medical History:   Diagnosis Date    A-fib     A-fib     Pace maker put in 20    Allergy     Angina pectoris     Anxiety     Arthritis     Asthma     cough variant    Back pain     Cancer 2018    basal cell c    Colon polyp     COPD (chronic obstructive pulmonary disease)     Depression     Hyperlipidemia     Hypertension     Obesity     Pneumonia      Social History     Socioeconomic History    Marital status:    Tobacco Use    Smoking status: Former     Current packs/day: 0.00     Average packs/day: 0.3 packs/day for 1 year (0.3 ttl pk-yrs)     Types: Cigarettes     Start date: 1984     Quit date: 1985     Years since quittin.7    Smokeless tobacco: Former    Tobacco comments:     Quit 30 - 40 years ago   Substance and Sexual Activity    Alcohol use: Not Currently     Comment: Wine Occasionally     Drug use: Never    Sexual activity: Not Currently     Social Determinants of Health     Financial Resource Strain: Medium Risk (2023)    Overall Financial Resource Strain (CARDIA)     Difficulty of Paying Living Expenses: Somewhat hard   Food Insecurity: Food Insecurity Present (2023)    Hunger Vital Sign     Worried About Running Out of Food in the Last Year: Sometimes true     Ran Out of Food in the Last Year: Sometimes true   Transportation Needs: Unmet Transportation Needs (2023)    PRAPARE - Transportation     Lack of Transportation (Medical): Yes     Lack of Transportation (Non-Medical): Yes   Physical Activity: Unknown (2023)    Exercise Vital Sign     Days of Exercise per Week: 1 day     Minutes of Exercise per Session: Patient refused   Stress: No Stress Concern Present (2023)    [employer] - Occupational Stress Questionnaire     Feeling of Stress : Not at all   Social Connections: Moderately Isolated (2023)    Social Connection and  Isolation Panel [NHANES]     Frequency of Communication with Friends and Family: More than three times a week     Frequency of Social Gatherings with Friends and Family: Once a week     Attends Jainism Services: Never     Active Member of Clubs or Organizations: No     Attends Club or Organization Meetings: 1 to 4 times per year     Marital Status:    Housing Stability: Low Risk  (4/19/2023)    Housing Stability Vital Sign     Unable to Pay for Housing in the Last Year: No     Number of Places Lived in the Last Year: 1     Unstable Housing in the Last Year: No     Current Outpatient Medications on File Prior to Visit   Medication Sig Dispense Refill    cetirizine (ZYRTEC) 10 MG tablet Take 10 mg by mouth once daily.       COVID-19 vac, dulce,Pfizer,,PF, (PFIZER COVID-19 DULCE VACCN,PF,) 30 mcg/0.3 mL injection Inject into the muscle. 0.3 mL 0    diltiaZEM (DILT-XR) 240 MG CDCR Take 1 capsule (240 mg total) by mouth once daily. 90 capsule 3    elderberry fruit 350 mg Cap Take by mouth.      ELIQUIS 5 mg Tab TAKE 1 TABLET TWICE DAILY 180 tablet 3    flecainide (TAMBOCOR) 100 MG Tab TAKE 1 TABLET EVERY 12 HOURS 180 tablet 3    flu vac 2022 [id number],PF, (FLUAD QUAD 2022-23,65Y UP,,PF,) 60 mcg (15 mcg x 4)/0.5 mL Syrg Inject 0.5 mLs into the muscle. 0.5 mL 0    fluticasone propionate (FLONASE) 50 mcg/actuation nasal spray 2 sprays (100 mcg total) by Each Nostril route once daily. 48 g 4    fluticasone-salmeterol diskus inhaler 250-50 mcg Inhale 1 puff into the lungs 2 (two) times daily. Controller.Wash out mouth after using. 180 each 3    ketoconazole (NIZORAL) 2 % shampoo Apply topically twice a week. 120 mL 3    levalbuterol (XOPENEX HFA) 45 mcg/actuation inhaler Inhale 2 puffs into the lungs every 6 (six) hours as needed for Wheezing or Shortness of Breath. 45 g 3    lisinopriL (PRINIVIL,ZESTRIL) 20 MG tablet TAKE 1 AND 1/2 TABLETS EVERY  tablet 0    LORazepam (ATIVAN) 0.5 MG tablet Take 0.5 mg by  mouth daily as needed for Anxiety.      meclizine (ANTIVERT) 25 mg tablet Take 1 tablet (25 mg total) by mouth 3 (three) times daily as needed. 20 tablet 0    multivitamin capsule Take 1 capsule by mouth once daily.      omega-3 fatty acids/fish oil (FISH OIL-OMEGA-3 FATTY ACIDS) 300-1,000 mg capsule Take 1 capsule by mouth once daily.      rosuvastatin (CRESTOR) 20 MG tablet Take 1 tablet (20 mg total) by mouth once daily. 90 tablet 3    tiotropium bromide (SPIRIVA RESPIMAT) 2.5 mcg/actuation inhaler Inhale 2 puffs into the lungs once daily. 12 g 3    triamcinolone acetonide 0.1% (KENALOG) 0.1 % cream Apply topically 2 (two) times daily. Use up to 2 weeks at a time 454 g 1     No current facility-administered medications on file prior to visit.     Review of patient's allergies indicates:   Allergen Reactions    Lasix [furosemide]      Within 1 year of taking pt will get severe knee pain in both knees.       Objective:     Physical Exam  Eyes:      Pupils: Pupils are equal, round, and reactive to light.   Neck:      Trachea: No tracheal deviation.   Cardiovascular:      Rate and Rhythm: Normal rate and regular rhythm.      Pulses: Intact distal pulses.           Carotid pulses are 2+ on the right side and 2+ on the left side.       Radial pulses are 2+ on the right side and 2+ on the left side.        Femoral pulses are 2+ on the right side and 2+ on the left side.       Popliteal pulses are 2+ on the right side and 2+ on the left side.        Dorsalis pedis pulses are 2+ on the right side and 2+ on the left side.        Posterior tibial pulses are 2+ on the right side and 2+ on the left side.      Heart sounds: Normal heart sounds. No murmur heard.     No friction rub. No gallop.   Pulmonary:      Effort: Pulmonary effort is normal. No respiratory distress.      Breath sounds: Normal breath sounds. No stridor. No wheezing or rales.   Chest:      Chest wall: No tenderness.   Abdominal:      General: There is no  distension.      Tenderness: There is no abdominal tenderness. There is no rebound.   Musculoskeletal:         General: No tenderness.      Cervical back: Normal range of motion.   Skin:     General: Skin is warm and dry.   Neurological:      Mental Status: She is alert and oriented to person, place, and time.     Assessment:     1. Presence of cardiac pacemaker    2. Morbid obesity with BMI of 40.0-44.9, adult    3. SOB (shortness of breath)    4. Pacemaker lead malfunction, subsequent encounter    5. Atrial fibrillation and flutter    6. S/P cardiac pacemaker procedure    7. Chronic anticoagulation    8. SSS (sick sinus syndrome)    9. Cardiac arrhythmia, unspecified cardiac arrhythmia type    10. Palpitations    11. Disorder of cardiac pacemaker system, sequela    12. PAF (paroxysmal atrial fibrillation)    13. Chest pain, unspecified type    14. Typical atrial flutter    15. Essential hypertension    16. Hyperlipidemia, unspecified hyperlipidemia type    17. Symptomatic bradycardia    18. Shortness of breath    19. Displacement of electrode lead of cardiac pacemaker, subsequent encounter    20. Pacemaker lead malfunction, initial encounter        Plan:     Presence of cardiac pacemaker    Morbid obesity with BMI of 40.0-44.9, adult    SOB (shortness of breath)    Pacemaker lead malfunction, subsequent encounter    Atrial fibrillation and flutter    S/P cardiac pacemaker procedure    Chronic anticoagulation    SSS (sick sinus syndrome)    Cardiac arrhythmia, unspecified cardiac arrhythmia type    Palpitations    Disorder of cardiac pacemaker system, sequela    PAF (paroxysmal atrial fibrillation)    Chest pain, unspecified type    Typical atrial flutter    Essential hypertension    Hyperlipidemia, unspecified hyperlipidemia type    Symptomatic bradycardia    Shortness of breath    Displacement of electrode lead of cardiac pacemaker, subsequent encounter    Pacemaker lead malfunction, initial  encounter    Impression 1 pacemaker stable in Situ doing well will follow-up with EP   2. Intermittent tachyarrhythmias and stable current medications will continue on diltiazem   3. Hypertension stable current medications line follow-up evaluation for 6 months stable otherwise.       No

## 2023-11-09 NOTE — OCCUPATIONAL THERAPY INITIAL EVALUATION ADULT - RANGE OF MOTION EXAMINATION, UPPER EXTREMITY
Male
bilateral shoulder flexion assessed to 90 degrees only due to sternal precautions, bilateral elbows AROM WFL, bilateral gross grasp and digit extension AROM WFL

## 2024-01-01 NOTE — OCCUPATIONAL THERAPY INITIAL EVALUATION ADULT - PHYSICAL ASSIST/NONPHYSICAL ASSIST: STAND/SIT, REHAB EVAL
verbal cues/nonverbal cues (demo/gestures)/1 person assist Term  delivered by , current hospitalization

## 2024-04-24 ENCOUNTER — EMERGENCY (EMERGENCY)
Facility: HOSPITAL | Age: 79
LOS: 1 days | Discharge: HOME | End: 2024-04-24
Attending: EMERGENCY MEDICINE
Payer: MEDICARE

## 2024-04-24 VITALS
OXYGEN SATURATION: 95 % | DIASTOLIC BLOOD PRESSURE: 77 MMHG | TEMPERATURE: 98 F | RESPIRATION RATE: 20 BRPM | WEIGHT: 192.9 LBS | HEART RATE: 94 BPM | SYSTOLIC BLOOD PRESSURE: 150 MMHG

## 2024-04-24 DIAGNOSIS — Z95.2 PRESENCE OF PROSTHETIC HEART VALVE: Chronic | ICD-10-CM

## 2024-04-24 DIAGNOSIS — Z98.89 OTHER SPECIFIED POSTPROCEDURAL STATES: Chronic | ICD-10-CM

## 2024-04-24 DIAGNOSIS — Z95.1 PRESENCE OF AORTOCORONARY BYPASS GRAFT: Chronic | ICD-10-CM

## 2024-04-24 DIAGNOSIS — Z90.710 ACQUIRED ABSENCE OF BOTH CERVIX AND UTERUS: Chronic | ICD-10-CM

## 2024-04-24 DIAGNOSIS — Z95.820 PERIPHERAL VASCULAR ANGIOPLASTY STATUS WITH IMPLANTS AND GRAFTS: Chronic | ICD-10-CM

## 2024-04-24 DIAGNOSIS — Z95.828 PRESENCE OF OTHER VASCULAR IMPLANTS AND GRAFTS: Chronic | ICD-10-CM

## 2024-04-24 LAB
ALBUMIN SERPL ELPH-MCNC: 4.3 G/DL — SIGNIFICANT CHANGE UP (ref 3.3–5.2)
ALP SERPL-CCNC: 69 U/L — SIGNIFICANT CHANGE UP (ref 40–120)
ALT FLD-CCNC: 14 U/L — SIGNIFICANT CHANGE UP
ANION GAP SERPL CALC-SCNC: 14 MMOL/L — SIGNIFICANT CHANGE UP (ref 5–17)
APTT BLD: 34.8 SEC — SIGNIFICANT CHANGE UP (ref 24.5–35.6)
AST SERPL-CCNC: 30 U/L — SIGNIFICANT CHANGE UP
BASOPHILS # BLD AUTO: 0.05 K/UL — SIGNIFICANT CHANGE UP (ref 0–0.2)
BASOPHILS NFR BLD AUTO: 0.8 % — SIGNIFICANT CHANGE UP (ref 0–2)
BILIRUB SERPL-MCNC: 2.3 MG/DL — HIGH (ref 0.4–2)
BUN SERPL-MCNC: 27 MG/DL — HIGH (ref 8–20)
CALCIUM SERPL-MCNC: 9.4 MG/DL — SIGNIFICANT CHANGE UP (ref 8.4–10.5)
CHLORIDE SERPL-SCNC: 104 MMOL/L — SIGNIFICANT CHANGE UP (ref 96–108)
CO2 SERPL-SCNC: 24 MMOL/L — SIGNIFICANT CHANGE UP (ref 22–29)
CREAT SERPL-MCNC: 0.86 MG/DL — SIGNIFICANT CHANGE UP (ref 0.5–1.3)
EGFR: 70 ML/MIN/1.73M2 — SIGNIFICANT CHANGE UP
EOSINOPHIL # BLD AUTO: 0.16 K/UL — SIGNIFICANT CHANGE UP (ref 0–0.5)
EOSINOPHIL NFR BLD AUTO: 2.5 % — SIGNIFICANT CHANGE UP (ref 0–6)
GLUCOSE BLDC GLUCOMTR-MCNC: 305 MG/DL — HIGH (ref 70–99)
GLUCOSE SERPL-MCNC: 231 MG/DL — HIGH (ref 70–99)
HCT VFR BLD CALC: 36.8 % — SIGNIFICANT CHANGE UP (ref 34.5–45)
HGB BLD-MCNC: 11.5 G/DL — SIGNIFICANT CHANGE UP (ref 11.5–15.5)
IMM GRANULOCYTES NFR BLD AUTO: 0.3 % — SIGNIFICANT CHANGE UP (ref 0–0.9)
INR BLD: 1.38 RATIO — HIGH (ref 0.85–1.18)
LYMPHOCYTES # BLD AUTO: 0.98 K/UL — LOW (ref 1–3.3)
LYMPHOCYTES # BLD AUTO: 15.3 % — SIGNIFICANT CHANGE UP (ref 13–44)
MAGNESIUM SERPL-MCNC: 2.3 MG/DL — SIGNIFICANT CHANGE UP (ref 1.8–2.6)
MCHC RBC-ENTMCNC: 26.8 PG — LOW (ref 27–34)
MCHC RBC-ENTMCNC: 31.3 GM/DL — LOW (ref 32–36)
MCV RBC AUTO: 85.8 FL — SIGNIFICANT CHANGE UP (ref 80–100)
MONOCYTES # BLD AUTO: 0.64 K/UL — SIGNIFICANT CHANGE UP (ref 0–0.9)
MONOCYTES NFR BLD AUTO: 10 % — SIGNIFICANT CHANGE UP (ref 2–14)
NEUTROPHILS # BLD AUTO: 4.55 K/UL — SIGNIFICANT CHANGE UP (ref 1.8–7.4)
NEUTROPHILS NFR BLD AUTO: 71.1 % — SIGNIFICANT CHANGE UP (ref 43–77)
NT-PROBNP SERPL-SCNC: 2390 PG/ML — HIGH (ref 0–300)
PLATELET # BLD AUTO: 158 K/UL — SIGNIFICANT CHANGE UP (ref 150–400)
POTASSIUM SERPL-MCNC: 3.9 MMOL/L — SIGNIFICANT CHANGE UP (ref 3.5–5.3)
POTASSIUM SERPL-SCNC: 3.9 MMOL/L — SIGNIFICANT CHANGE UP (ref 3.5–5.3)
PROT SERPL-MCNC: 7.5 G/DL — SIGNIFICANT CHANGE UP (ref 6.6–8.7)
PROTHROM AB SERPL-ACNC: 15.2 SEC — HIGH (ref 9.5–13)
RBC # BLD: 4.29 M/UL — SIGNIFICANT CHANGE UP (ref 3.8–5.2)
RBC # FLD: 18.3 % — HIGH (ref 10.3–14.5)
SODIUM SERPL-SCNC: 142 MMOL/L — SIGNIFICANT CHANGE UP (ref 135–145)
TROPONIN T, HIGH SENSITIVITY RESULT: 20 NG/L — SIGNIFICANT CHANGE UP (ref 0–51)
TROPONIN T, HIGH SENSITIVITY RESULT: 21 NG/L — SIGNIFICANT CHANGE UP (ref 0–51)
WBC # BLD: 6.4 K/UL — SIGNIFICANT CHANGE UP (ref 3.8–10.5)
WBC # FLD AUTO: 6.4 K/UL — SIGNIFICANT CHANGE UP (ref 3.8–10.5)

## 2024-04-24 PROCEDURE — 93010 ELECTROCARDIOGRAM REPORT: CPT

## 2024-04-24 PROCEDURE — 71045 X-RAY EXAM CHEST 1 VIEW: CPT | Mod: 26

## 2024-04-24 PROCEDURE — 71275 CT ANGIOGRAPHY CHEST: CPT | Mod: 26,MC

## 2024-04-24 PROCEDURE — 99223 1ST HOSP IP/OBS HIGH 75: CPT

## 2024-04-24 RX ORDER — CLOPIDOGREL BISULFATE 75 MG/1
75 TABLET, FILM COATED ORAL DAILY
Refills: 0 | Status: DISCONTINUED | OUTPATIENT
Start: 2024-04-25 | End: 2024-05-01

## 2024-04-24 RX ORDER — INSULIN GLARGINE 100 [IU]/ML
10 INJECTION, SOLUTION SUBCUTANEOUS ONCE
Refills: 0 | Status: COMPLETED | OUTPATIENT
Start: 2024-04-24 | End: 2024-04-24

## 2024-04-24 RX ORDER — SODIUM CHLORIDE 9 MG/ML
1000 INJECTION, SOLUTION INTRAVENOUS
Refills: 0 | Status: DISCONTINUED | OUTPATIENT
Start: 2024-04-24 | End: 2024-05-01

## 2024-04-24 RX ORDER — DEXTROSE 10 % IN WATER 10 %
125 INTRAVENOUS SOLUTION INTRAVENOUS ONCE
Refills: 0 | Status: DISCONTINUED | OUTPATIENT
Start: 2024-04-24 | End: 2024-05-01

## 2024-04-24 RX ORDER — GLUCAGON INJECTION, SOLUTION 0.5 MG/.1ML
1 INJECTION, SOLUTION SUBCUTANEOUS ONCE
Refills: 0 | Status: DISCONTINUED | OUTPATIENT
Start: 2024-04-24 | End: 2024-05-01

## 2024-04-24 RX ORDER — DEXTROSE 50 % IN WATER 50 %
25 SYRINGE (ML) INTRAVENOUS ONCE
Refills: 0 | Status: DISCONTINUED | OUTPATIENT
Start: 2024-04-24 | End: 2024-05-01

## 2024-04-24 RX ORDER — TRAZODONE HCL 50 MG
25 TABLET ORAL ONCE
Refills: 0 | Status: COMPLETED | OUTPATIENT
Start: 2024-04-24 | End: 2024-04-24

## 2024-04-24 RX ORDER — FUROSEMIDE 40 MG
40 TABLET ORAL ONCE
Refills: 0 | Status: COMPLETED | OUTPATIENT
Start: 2024-04-24 | End: 2024-04-24

## 2024-04-24 RX ORDER — LEVOTHYROXINE SODIUM 125 MCG
100 TABLET ORAL DAILY
Refills: 0 | Status: DISCONTINUED | OUTPATIENT
Start: 2024-04-24 | End: 2024-05-01

## 2024-04-24 RX ORDER — FUROSEMIDE 40 MG
40 TABLET ORAL
Refills: 0 | Status: DISCONTINUED | OUTPATIENT
Start: 2024-04-25 | End: 2024-05-01

## 2024-04-24 RX ORDER — INSULIN GLARGINE 100 [IU]/ML
10 INJECTION, SOLUTION SUBCUTANEOUS
Refills: 0 | Status: DISCONTINUED | OUTPATIENT
Start: 2024-04-25 | End: 2024-05-01

## 2024-04-24 RX ORDER — ATORVASTATIN CALCIUM 80 MG/1
80 TABLET, FILM COATED ORAL AT BEDTIME
Refills: 0 | Status: DISCONTINUED | OUTPATIENT
Start: 2024-04-24 | End: 2024-05-01

## 2024-04-24 RX ORDER — APIXABAN 2.5 MG/1
2.5 TABLET, FILM COATED ORAL
Refills: 0 | Status: DISCONTINUED | OUTPATIENT
Start: 2024-04-24 | End: 2024-05-01

## 2024-04-24 RX ORDER — DEXTROSE 50 % IN WATER 50 %
12.5 SYRINGE (ML) INTRAVENOUS ONCE
Refills: 0 | Status: DISCONTINUED | OUTPATIENT
Start: 2024-04-24 | End: 2024-05-01

## 2024-04-24 RX ORDER — LOSARTAN POTASSIUM 100 MG/1
25 TABLET, FILM COATED ORAL DAILY
Refills: 0 | Status: DISCONTINUED | OUTPATIENT
Start: 2024-04-24 | End: 2024-05-01

## 2024-04-24 RX ORDER — INSULIN LISPRO 100/ML
VIAL (ML) SUBCUTANEOUS AT BEDTIME
Refills: 0 | Status: DISCONTINUED | OUTPATIENT
Start: 2024-04-24 | End: 2024-05-01

## 2024-04-24 RX ORDER — DEXTROSE 50 % IN WATER 50 %
15 SYRINGE (ML) INTRAVENOUS ONCE
Refills: 0 | Status: DISCONTINUED | OUTPATIENT
Start: 2024-04-24 | End: 2024-05-01

## 2024-04-24 RX ORDER — INSULIN LISPRO 100/ML
VIAL (ML) SUBCUTANEOUS
Refills: 0 | Status: DISCONTINUED | OUTPATIENT
Start: 2024-04-24 | End: 2024-05-01

## 2024-04-24 RX ADMIN — Medication 40 MILLIGRAM(S): at 17:27

## 2024-04-24 NOTE — ED ADULT NURSE NOTE - NSFALLHARMRISKINTERV_ED_ALL_ED

## 2024-04-24 NOTE — CONSULT NOTE ADULT - SUBJECTIVE AND OBJECTIVE BOX
[facility], THE HEART [facility]ENTER                                   [address] [place] [address]                                                      PHONE: [phone]                                                         FAX: [phone]  [email or web address]  ---------------------------------------------------------------------------------------------------------------------------------    Reason for Consult:    HPI:  DONALD SAUNDERS is an 78y Female with COPD chronic AF on AC, CAD s/p CABG X 3/MV repair/DUANE thrombectomy 2021, initial severe LV dysfunction with augmented EF 60% on GDMT, subsequent  PTCA/CARIN protected LM lesion ( patent LIMA to LAD, patent SV to diag, patent SVg OM, chronic R pleural effusion improved with diuresis, came to ER with dyspnea.    PAST MEDICAL & SURGICAL HISTORY:  Hypertension      Hypercholesterolemia      Paroxysmal atrial fibrillation      PAD (peripheral artery disease)      Type 2 diabetes mellitus with diabetic peripheral angiopathy without gangrene, with long-term curren      Coronary artery disease involving native coronary artery of native heart with angina pectoris      H/O abdominal hysterectomy      H/O hernia repair      S/P femoral-popliteal bypass surgery      S/P peripheral artery angioplasty with stent placement      Status post three vessel coronary artery bypass      History of mitral valve replacement with cardiopulmonary bypass          warfarin (Rash)  OHS (Unknown)      MEDICATIONS  (STANDING):    MEDICATIONS  (PRN):      Social History:  Cigarettes:              neg      Alchohol:   neg              Illicit Drug Abuse:  neg  neg FH    ROS: Negative other than as mentioned in HPI.    Vital Signs Last 24 Hrs  T(C): 36.6 (2024 15:18), Max: 36.6 (2024 15:18)  T(F): 97.8 (2024 15:18), Max: 97.8 (2024 15:18)  HR: 76 (2024 15:18) (76 - 94)  BP: 121/62 (2024 15:18) (121/62 - 150/77)  BP(mean): 80 (2024 15:18) (80 - 80)  RR: 18 (2024 15:18) (18 - 20)  SpO2: 95% (2024 15:18) (95% - 95%)    Parameters below as of 2024 15:18  Patient On (Oxygen Delivery Method): room air      ICU Vital Signs Last 24 Hrs  DONALD SAUNDERS  I&O's Detail    I&O's Summary    Drug Dosing Weight  DONALD SAUNDERS      PHYSICAL EXAM:  General: Appears alert and cooperative.  HEENT: Head; normocephalic, atraumatic.  Eyes: Pupils reactive, cornea wnl.  Neck: Supple, no nodes adenopathy, no NVD or carotid bruit or thyromegaly.  CARDIOVASCULAR: Normal S1 and S2, No murmur, rub, gallop or lift.   LUNGS: No rales, rhonchi or wheeze. Normal breath sounds bilaterally.  ABDOMEN: Soft, nontender without mass or organomegaly. bowel sounds normoactive.  EXTREMITIES: No clubbing, cyanosis or edema. Distal pulses wnl.   SKIN: warm and dry with normal turgor.  NEURO: Alert/oriented x 3/normal motor exam. No pathologic reflexes.    PSYCH: normal affect.        LABS:                        11.5   6.40  )-----------( 158      ( 2024 12:25 )             36.8     -24    142  |  104  |  27.0<H>  ----------------------------<  231<H>  3.9   |  24.0  |  0.86    Ca    9.4      2024 12:25  Mg     2.3     -    TPro  7.5  /  Alb  4.3  /  TBili  2.3<H>  /  DBili  x   /  AST  30  /  ALT  14  /  AlkPhos  69  04-24    DONALD SAUNDERS      PT/INR - ( 2024 12:25 )   PT: 15.2 sec;   INR: 1.38 ratio         PTT - ( 2024 12:25 )  PTT:34.8 sec  Urinalysis Basic - ( 2024 12:25 )    Color: x / Appearance: x / SG: x / pH: x  Gluc: 231 mg/dL / Ketone: x  / Bili: x / Urobili: x   Blood: x / Protein: x / Nitrite: x   Leuk Esterase: x / RBC: x / WBC x   Sq Epi: x / Non Sq Epi: x / Bacteria: x        RADIOLOGY & ADDITIONAL STUDIES: CXR revciewed: prom PA bilat, clear, no evidence of CHF    INTERPRETATION OF TELEMETRY (personally reviewed):    ECG:    ECHO: office2023: LVEF 60% mod RVE, mod to severe pul HTN 50 mm Hg, s/p MV repair with mild central MR, mild AS STEPH 1.9 cm2 mod TR    STRESS TEST:    CARDIAC CATHETERIZATION:< from: Cardiac Catheterization (22 @ 14:59) >  Study Date:     2022   Name:           DONALD SAUNDERS   :            1945   (77 years)   Gender:         female   MR#:            [id number]   MPI#:           [id number]   Patient Class:  Outpatient     Cath Lab Report    Diagnostic Cardiologist:       David Smith MD   Interventional Cardiologist:   David Smith MD   Referring Physician:           Mary Man MD     Procedures Performed   Procedures:              1.    Ultrasound Guided Access     2.    Arterial Access - Left Femoral   3.    Diagnostic Coronary Angiography   4.    Diagnostic Graft Angiography   5.    Left Heart Cath   6.    IVUS   7.    PCI: CARIN     Indications:               Abnormal stress test   Congestive heart failure with dyspnea    Conclusions:   Patent LIMA to LAD, SVG to D1 and SVG to OM1     90% LMCA stenosis s/p IVUS guided PTCA and CARIN (LCx system not  vascularized by the SVG to OM1 and this was the area  of ischemia on stress test)   Recommendations:     ASA today only   Plavix 75mg daily   eliquis to restart tomorrow   high dose statin   beta blocker   ace inihibitor   lifestyle and risk factor modifications   Acute complication:    No complications     < end of copied text >                         [facility], [facility]                                   [address] [place] [address]                                                      PHONE: [phone]                                                         FAX: [phone]  [email or web address]  ---------------------------------------------------------------------------------------------------------------------------------    Reason for Consult:    HPI:  DONALD SAUNDERS is an 78y Female with COPD chronic AF on AC, CAD s/p CABG X 3/MV repair/DUANE thrombectomy 2021, initial severe LV dysfunction with augmented EF 60% on GDMT, subsequent  PTCA/CARIN protected LM lesion ( patent LIMA to LAD, patent SV to diag, patent SVg OM, chronic R pleural effusion improved with diuresis, came to ER with dyspnea orthopnea and PND.  No recent weight gain or edema, no assoc chest pain.  Pt states recently her trazadone was dc'd and she has not slept well in several days.    PAST MEDICAL & SURGICAL HISTORY:  Hypertension      Hypercholesterolemia      Paroxysmal atrial fibrillation      PAD (peripheral artery disease)      Type 2 diabetes mellitus with diabetic peripheral angiopathy without gangrene, with long-term curren      Coronary artery disease involving native coronary artery of native heart with angina pectoris      H/O abdominal hysterectomy      H/O hernia repair      S/P femoral-popliteal bypass surgery      S/P peripheral artery angioplasty with stent placement      Status post three vessel coronary artery bypass      History of mitral valve replacement with cardiopulmonary bypass          warfarin (Rash)  OHS (Unknown)      MEDICATIONS  (STANDING):    MEDICATIONS  (PRN):      Social History:  Cigarettes:              neg      Alchohol:   neg              Illicit Drug Abuse:  neg  neg FH    ROS: Negative other than as mentioned in HPI.    Vital Signs Last 24 Hrs  T(C): 36.6 (2024 15:18), Max: 36.6 (2024 15:18)  T(F): 97.8 (2024 15:18), Max: 97.8 (2024 15:18)  HR: 76 (2024 15:18) (76 - 94)  BP: 121/62 (2024 15:18) (121/62 - 150/77)  BP(mean): 80 (2024 15:18) (80 - 80)  RR: 18 (2024 15:18) (18 - 20)  SpO2: 95% (2024 15:18) (95% - 95%)    Parameters below as of 2024 15:18  Patient On (Oxygen Delivery Method): room air      ICU Vital Signs Last 24 Hrs  DONALD SAUNDERS  I&O's Detail    I&O's Summary    Drug Dosing Weight  DONALD SAUNDERS      PHYSICAL EXAM:  General: Appears alert and cooperative.  HEENT: Head; normocephalic, atraumatic.  Eyes: Pupils reactive, cornea wnl.  Neck: Supple, no nodes adenopathy, no JVD or carotid bruit or thyromegaly.  CARDIOVASCULAR: Normal S1 and S2, No murmur, rub, gallop or lift.   LUNGS: essentially clear  ABDOMEN: Soft, nontender without mass or organomegaly. bowel sounds normoactive.  EXTREMITIES: No clubbing, cyanosis or edema. Distal pulses wnl.   SKIN: warm and dry with normal turgor.  NEURO: Alert/oriented x 3/normal motor exam. No pathologic reflexes.    PSYCH: normal affect.        LABS:                        11.5   6.40  )-----------( 158      ( 2024 12:25 )             36.8     04-    142  |  104  |  27.0<H>  ----------------------------<  231<H>  3.9   |  24.0  |  0.86    Ca    9.4      2024 12:25  Mg     2.3         TPro  7.5  /  Alb  4.3  /  TBili  2.3<H>  /  DBili  x   /  AST  30  /  ALT  14  /  AlkPhos  69      DONALD CHIP      PT/INR - ( 2024 12:25 )   PT: 15.2 sec;   INR: 1.38 ratio         PTT - ( 2024 12:25 )  PTT:34.8 sec  Urinalysis Basic - ( 2024 12:25 )    Color: x / Appearance: x / SG: x / pH: x  Gluc: 231 mg/dL / Ketone: x  / Bili: x / Urobili: x   Blood: x / Protein: x / Nitrite: x   Leuk Esterase: x / RBC: x / WBC x   Sq Epi: x / Non Sq Epi: x / Bacteria: x        RADIOLOGY & ADDITIONAL STUDIES: CXR revciewed: prom PA bilat, clear, no evidence of CHF    INTERPRETATION OF TELEMETRY (personally reviewed):    ECG: reviewed Atrial Flutter with CVR nonspecific ST abnl    ECHO: office2023: LVEF 60% mod RVE, mod to severe pul HTN 50 mm Hg, s/p MV repair with mild central MR, mild AS STEPH 1.9 cm2 mod TR    STRESS TEST:    CARDIAC CATHETERIZATION:< from: Cardiac Catheterization (22 @ 14:59) >  Study Date:     2022   Name:           DONALD SAUNDERS   :            1945   (77 years)   Gender:         female   MR#:            [id number]   MPI#:           [id number]   Patient Class:  Outpatient     Cath Lab Report    Diagnostic Cardiologist:       David Smith MD   Interventional Cardiologist:   David Smith MD   Referring Physician:           Mary Man MD     Procedures Performed   Procedures:              1.    Ultrasound Guided Access     2.    Arterial Access - Left Femoral   3.    Diagnostic Coronary Angiography   4.    Diagnostic Graft Angiography   5.    Left Heart Cath   6.    IVUS   7.    PCI: CARIN     Indications:               Abnormal stress test   Congestive heart failure with dyspnea    Conclusions:   Patent LIMA to LAD, SVG to D1 and SVG to OM1     90% LMCA stenosis s/p IVUS guided PTCA and CARIN (LCx system not  vascularized by the SVG to OM1 and this was the area  of ischemia on stress test)   Recommendations:     ASA today only   Plavix 75mg daily   eliquis to restart tomorrow   high dose statin   beta blocker   ace inihibitor   lifestyle and risk factor modifications   Acute complication:    No complications     < end of copied text >

## 2024-04-24 NOTE — ED PROVIDER NOTE - CLINICAL SUMMARY MEDICAL DECISION MAKING FREE TEXT BOX
pt signed out to me by Dr. Patrick   78y Female with COPD chronic AF on AC, CAD s/p CABG X 3/MV repair/DUANE thrombectomy Jan 2021, initial severe LV dysfunction with augmented EF 60% on GDMT, subsequent 2022 PTCA/CARIN protected LM lesion ( patent LIMA to LAD, patent SV to diag, patent SVg OM, chronic R pleural effusion improved with diuresis, came to ER with dyspnea orthopnea and PND. no PE, chf on ct, getting diuresis. obs for continue diuresis.

## 2024-04-24 NOTE — ED ADULT NURSE NOTE - OBJECTIVE STATEMENT
Pt A+Ox4 c/o dyspnea upon exertion for the past 2 weeks. Pt states that it has been getting worse, and she has to take breaks while walking short distances. Pt denies CP, ABD pain, N/V/D. Respirations are equal and unlabored on room air. Pt states that she has had numerous stents placed. Pt speaking coherently. Pt connected to  and cardiac monitor, sp02 98% on room air, A-fib on cardiac monitor. Pt states she has a PMH of A-fib. Pt states that she has to sleep up right on numerous pillows or else she gets anxious and feels like she can not breath. Pt left in position of comfort, wheels of stretcher locked and in the lowest position.

## 2024-04-24 NOTE — ED PROVIDER NOTE - NS ED ROS FT
Constitutional: (-) fever  (-)chills  (-)sweats  Eyes/ENT: (-)   Cardiovascular: (-) chest pain, (-) palpitations (-) edema   Respiratory: (-) cough, (+) shortness of breath   Gastrointestinal: (-)nausea  (-)vomiting, (-) diarrhea  (-) abdominal pain   :  (-)dysuria, (-)frequency, (-)urgency, (-)hematuria  Musculoskeletal: (-) neck pain, (-) back pain, (-) joint pain  Integumentary: (-) rash, (+) edema  Neurological: (-) headache, (-) altered mental status  (-)LOC

## 2024-04-24 NOTE — ED CDU PROVIDER INITIAL DAY NOTE - OBJECTIVE STATEMENT
78F with pmh COPD chronic AF on AC, CAD s/p CABG X 3/MV repair/DUANE thrombectomy Jan 2021, initial severe LV dysfunction with augmented EF 60% on GDMT, subsequent 2022 PTCA/CARIN protected LM lesion ( patent LIMA to LAD, patent SV to diag, patent SVg OM, chronic R pleural effusion, came to ER with dyspnea orthopnea and PND x 1 mo. Denies changes to medications. States she has had difficulty sleeping flat x 3 years since CABG. States she used to take zoloft and Trazadone to help with sleep since the surgery but her new PCP will not prescribe it. Has been off x 2 weeks.

## 2024-04-24 NOTE — ED CDU PROVIDER INITIAL DAY NOTE - CLINICAL SUMMARY MEDICAL DECISION MAKING FREE TEXT BOX
78F with pmh COPD chronic AF on AC, CAD s/p CABG presenting with dyspnea and orthopnea. CT without PE but with evidence of CHF. BNP 2300. Seen by cardiology, recommending diuresis and TTE.

## 2024-04-24 NOTE — ED PROVIDER NOTE - PROGRESS NOTE DETAILS
Janae: pt signed out to me  78y Female with COPD chronic AF on AC, CAD s/p CABG X 3/MV repair/DUANE thrombectomy Jan 2021, initial severe LV dysfunction with augmented EF 60% on GDMT, subsequent 2022 PTCA/CARIN protected LM lesion ( patent LIMA to LAD, patent SV to diag, patent SVg OM, chronic R pleural effusion improved with diuresis, came to ER with dyspnea orthopnea and PND. no PE, chf on ct, getting diuresis. obs for continue diuresis.

## 2024-04-24 NOTE — ED CDU PROVIDER INITIAL DAY NOTE - PHYSICAL EXAMINATION
Gen: No acute distress, non toxic  HEENT: NCAT. Mucous membranes moist, pink conjunctivae, EOMI  CV: RRR, nl s1/s2.  Resp: CTAB, normal rate and effort  GI: Abdomen soft, NT, ND. No rebound, no guarding  : No CVAT  Neuro: A&O x 3, moving all 4 extremities.   MSK: No spine or joint tenderness to palpation  Skin: No rashes. intact and perfused.

## 2024-04-24 NOTE — ED PROVIDER NOTE - OBJECTIVE STATEMENT
78yoF; with PMH COPD, chronic AF on AC, CAD s/p CABG X 3/MV repair/DUANE thrombectomy Jan 2021, initial severe LV dysfunction with augmented EF 60% on GDMT, subsequent 2022 PTCA/CARIN protected LM lesion ( patent LIMA to LAD, patent SV to diag, patent SVg OM), chronic R pleural effusion improved with diuresis; now p/w with SOB/RENTERIA and PND.  denies any associated chest pain.  reports unable to sleep well over past few days since her trazadone was discontinued.

## 2024-04-24 NOTE — CONSULT NOTE ADULT - ASSESSMENT
Assessment  Dyspnea, orthopnea in setting of sig COPD, known moderate pul HTN, no clinical or radiographic evidence of CHF  Elevated BNP likely sec to pul HTN and RVE - no evidence of volume overload  Chronic aflutter with CVR on low dose eliquis/plavix  CAD s/p CABG/MV repair with normal LVEF  s/p protected LM PCI 2022  COPD  DM        Rec  CT chest with IV contrast to r/o PE  monitor in  observation unit, lasix 40 mg IV X 1  cont lasix 80 mg day  cont eliquis/plavix/atenolol/statin/imdur  FU echo in am to assess LV/RV function and PAP  if worsening pul HTN pt may require RHC with vasoreactive testing  cont

## 2024-04-24 NOTE — ED PROVIDER NOTE - PHYSICAL EXAMINATION
General:     NAD  Head:     NC/AT, EOMI, oral mucosa moist  Neck:     trachea midline  Lungs:   trace crackles at bases  CVS:     S1S2, RRR, no m/g/r  Abd:     +BS, s/nt/nd, no organomegaly  Ext:   trace pedal edema  Neuro: grossly intact

## 2024-04-24 NOTE — ED CDU PROVIDER INITIAL DAY NOTE - ATTENDING APP SHARED VISIT CONTRIBUTION OF CARE
I agree with the PA's note and was available for any issues/concerns. I was directly involved in patient care. My brief overall assessment is as follows: hx afib, chf, copd, with dyspnea on exertion, cta without PE, with chf, obs for diuresis, being followed by cards. to get echo.

## 2024-04-25 ENCOUNTER — RESULT REVIEW (OUTPATIENT)
Age: 79
End: 2024-04-25

## 2024-04-25 VITALS
TEMPERATURE: 99 F | DIASTOLIC BLOOD PRESSURE: 68 MMHG | RESPIRATION RATE: 18 BRPM | HEART RATE: 92 BPM | OXYGEN SATURATION: 97 % | SYSTOLIC BLOOD PRESSURE: 114 MMHG

## 2024-04-25 LAB
GLUCOSE BLDC GLUCOMTR-MCNC: 244 MG/DL — HIGH (ref 70–99)
GLUCOSE BLDC GLUCOMTR-MCNC: 245 MG/DL — HIGH (ref 70–99)

## 2024-04-25 PROCEDURE — 82962 GLUCOSE BLOOD TEST: CPT

## 2024-04-25 PROCEDURE — 84484 ASSAY OF TROPONIN QUANT: CPT

## 2024-04-25 PROCEDURE — 83735 ASSAY OF MAGNESIUM: CPT

## 2024-04-25 PROCEDURE — 99285 EMERGENCY DEPT VISIT HI MDM: CPT | Mod: 25

## 2024-04-25 PROCEDURE — 80053 COMPREHEN METABOLIC PANEL: CPT

## 2024-04-25 PROCEDURE — 36415 COLL VENOUS BLD VENIPUNCTURE: CPT

## 2024-04-25 PROCEDURE — 71275 CT ANGIOGRAPHY CHEST: CPT | Mod: MC

## 2024-04-25 PROCEDURE — 93306 TTE W/DOPPLER COMPLETE: CPT | Mod: 26

## 2024-04-25 PROCEDURE — 83880 ASSAY OF NATRIURETIC PEPTIDE: CPT

## 2024-04-25 PROCEDURE — 85610 PROTHROMBIN TIME: CPT

## 2024-04-25 PROCEDURE — 93306 TTE W/DOPPLER COMPLETE: CPT

## 2024-04-25 PROCEDURE — 85025 COMPLETE CBC W/AUTO DIFF WBC: CPT

## 2024-04-25 PROCEDURE — G0378: CPT

## 2024-04-25 PROCEDURE — 96376 TX/PRO/DX INJ SAME DRUG ADON: CPT | Mod: XU

## 2024-04-25 PROCEDURE — 85730 THROMBOPLASTIN TIME PARTIAL: CPT

## 2024-04-25 PROCEDURE — 76604 US EXAM CHEST: CPT

## 2024-04-25 PROCEDURE — 99239 HOSP IP/OBS DSCHRG MGMT >30: CPT

## 2024-04-25 PROCEDURE — 96374 THER/PROPH/DIAG INJ IV PUSH: CPT | Mod: XU

## 2024-04-25 PROCEDURE — 93005 ELECTROCARDIOGRAM TRACING: CPT

## 2024-04-25 PROCEDURE — 71045 X-RAY EXAM CHEST 1 VIEW: CPT

## 2024-04-25 RX ORDER — TRAZODONE HCL 50 MG
1 TABLET ORAL
Qty: 21 | Refills: 0
Start: 2024-04-25 | End: 2024-05-15

## 2024-04-25 RX ADMIN — Medication 40 MILLIGRAM(S): at 06:52

## 2024-04-25 RX ADMIN — INSULIN GLARGINE 10 UNIT(S): 100 INJECTION, SOLUTION SUBCUTANEOUS at 10:09

## 2024-04-25 RX ADMIN — Medication 100 MICROGRAM(S): at 06:53

## 2024-04-25 RX ADMIN — Medication 2: at 12:28

## 2024-04-25 RX ADMIN — LOSARTAN POTASSIUM 25 MILLIGRAM(S): 100 TABLET, FILM COATED ORAL at 06:52

## 2024-04-25 RX ADMIN — Medication 2: at 10:09

## 2024-04-25 RX ADMIN — APIXABAN 2.5 MILLIGRAM(S): 2.5 TABLET, FILM COATED ORAL at 06:52

## 2024-04-25 RX ADMIN — Medication 25 MILLIGRAM(S): at 00:13

## 2024-04-25 RX ADMIN — INSULIN GLARGINE 10 UNIT(S): 100 INJECTION, SOLUTION SUBCUTANEOUS at 00:13

## 2024-04-25 NOTE — ED CDU PROVIDER DISPOSITION NOTE - NSFOLLOWUPINSTRUCTIONS_ED_ALL_ED_FT
Patient education: Chest pain (The Basics)  Written by the doctors and editors at [facility]  Please read the Disclaimer at the end of this page.    Should I call for an ambulance if I have chest pain?  You should call for an ambulance (in the [place] and [place], call 9-1-1) if the pain:    ?Is new or severe    ?Happens along with shortness of breath    ?Lasts more than a few minutes    ?Gets worse when you walk, climb stairs, or do other types of physical activity    ?Scares or worries you    Having chest pain does not necessarily mean that you are having a heart attack. Most people who go to the emergency department with chest pain are not having a heart attack. Their pain is usually caused by less serious problems, such as muscle pain, heartburn, or anxiety. Even so, you should not take any chances.    People often delay seeking help for a heart attack. They might think that their symptoms are not serious or will go away. But waiting to get help can risk permanent damage to the heart, or even death.    Can a heart attack cause other symptoms besides chest pain?  Yes. Chest pain or discomfort is the most common symptom of a heart attack. But there can be other symptoms, too. Sometimes, people do not go to the hospital because they do not have any pain at all. But it is possible to have a heart attack without chest pain. This is more likely in females, people with diabetes, and people older than 60.    Symptoms of a heart attack (figure 1) can include:    ?Pain, pressure, or discomfort in the center of the chest    ?Pain or discomfort in other parts of the upper body, including the shoulders, arms, back, neck, jaw, or stomach    ?Shortness of breath    ?Nausea, vomiting, burping, or heartburn    ?Sweating or having cold, clammy skin    ?Racing or uneven heart rate    ?Feeling dizzy or lightheaded, or even fainting    These symptoms are important if they last more than a few minutes or come and go. If you think that you might be having a heart attack, call for an ambulance (in the [place] and [place], call 9-1-1) right away. Do not try to get to the hospital on your own.    Is heart attack the only cause of chest pain?  No. Chest pain can be caused by lots of other problems. Examples include:    ?Heart problems other than a heart attack, such as infection around the heart    ?Muscle soreness after an activity that involves the chest muscles    ?Diseases that cause pain, such as arthritis    ?Shingles (herpes zoster), a condition linked to the chickenpox virus that also causes a painful rash    ?Any kind of injury to the chest, including surgery    ?Digestive problems like heartburn, acid reflux, stomach ulcers, or irritable bowel syndrome    ?Problems in the lungs, such as pneumonia or blood clots    ?Mental health problems, such as panic disorder or depression    ?Weakening of the lining of the big blood vessel in the chest (called the aorta)    What will happen if I go to the emergency department?  The staff in the emergency department will do an exam. They will also run tests to try to find the cause of your pain. But don't be surprised if you do not find out right away why you have pain. The cause of chest pain is not always easy to find. Even so, doctors can usually tell if your heart is in trouble.    Some tests are done right away in the emergency department. The goal is to figure out as quickly as possible if something serious is causing your chest pain. Other tests are done after this, in the hospital.    Tests might include:    ?Electrocardiogram ("ECG") – This test measures the electrical activity in your heart (figure 2). It can help doctors find out if you are having a heart attack.    ?Blood tests – During a heart attack, the heart releases certain chemicals. If these chemicals are in your blood, it might mean you are having a heart attack.    ?Chest X-ray – This can show some of the problems that can cause chest pain.    ?Stress test – During a stress test, you might be asked to run or walk on a treadmill while you also have an ECG (figure 3). Physical activity increases the heart's need for blood. This test helps doctors see if the heart is getting enough blood. If you cannot walk or run, your doctor might give you a medicine to make your heart pump faster.    ?Cardiac catheterization (also called "cardiac cath") – During this test, the doctor puts a thin tube into a blood vessel in your leg or arm. Then, they move the tube up to your heart. Next, the doctor puts a dye that shows up on X-ray into the tube. This part of the test is called "coronary angiography." It can show whether any of the arteries in your heart are clogged.    ?Echocardiogram – This test uses sound waves to create an image of your heart as it beats. During a heart attack, not all parts of the heart pump normally.    ?CT scan – This is a special kind of X-ray. Your doctor might use this to look at the blood vessels going to your heart.    If it turns out that your pain is related to something other than your heart, your doctor will talk to you about what to do next. You might need other tests or treatments.    What if I am having a heart attack?  If you are having a heart attack, the doctor will give you treatments to reduce the damage to your heart and relieve your pain.    The sooner you get treated for a heart attack, the better treatment will work. Every minute counts when it comes to keeping your heart muscle alive!

## 2024-04-25 NOTE — ED CDU PROVIDER DISPOSITION NOTE - WR ORDER ID 1
Patient Seen in: [facility] AND [facility]    History   Patient presents with:  Fever    Stated Complaint: fever    HPI    5year old female presents to the emergency department with her mother who states she has had a cough for the last 5 d range of motion. Neck supple. Cardiovascular: Regular rhythm. No murmur heard. Pulmonary/Chest: Effort normal. No respiratory distress. She has no wheezes. Congested cough noted   Abdominal: Soft. She exhibits no distension.    Musculoskeletal: She [id number]

## 2024-04-25 NOTE — ED CDU PROVIDER DISPOSITION NOTE - CLINICAL COURSE
78F with pmh COPD chronic AF on AC, CAD s/p CABG X 3n came to ER with dyspnea orthopnea and PND x 1 month and difficulty sleeping making raspatory symptoms worse. PT states  she used to take   Trazadone to help with sleep since the surgery but her new PCP will not prescribe it she has been off x 2 weeks. Pt placed in obs for further [id number] and evaluation of SOB, PT with no acute findings while in the ED, PT with improved symptoms after dose of Trazadone pt to be dc home with follow up to PCP restarted on Trazadone Pt educated about when to return to the ED if needed. PT verbalizes that he understands all instructions and results. Pt informed that ED is open and available 24/7 365 days a yr, encouraged to return to the ED if they have any change in condition, or feel the need for revaluation.

## 2024-04-25 NOTE — ED ADULT NURSE REASSESSMENT NOTE - NS ED NURSE REASSESS COMMENT FT1
Pt A+Ox4. Respirations are equal and unlabored at this time. Pt speaking complete full sentences. Pt denies CP at this time. Pt connected to  and cardiac monitor, Sp02 99% and HR 81, A-fib rhythm. Pt left in position of comfort, wheels of stretcher locked and in the lowest position.
Pt resting comfortably, denies HA, dizziness, SOB, N/V/D, CP, palpitations, fevers, chills, pain at this time. Pt AOx4, speaking coherently, respirations even and unlabored on RA, skin warm and dry, afib on CM. Cm and  in place. Pt pending CT and possible OBS placement.
Pt taken to Echo.
Assumed care of pt from ADELITA Fragoso.    Pt sitting up in bed, A&Ox4 in NAD @ this time. RR even & unlabored. Pt in Afib on cardiac monitor. Pt denies any pain, complaints, or needs @ this time. Pt awaiting Echo. Safety maintained.
Patient received a&ox4, no complaints of pain. Patient has no shortness of breath at this time, is on room air. Afib on telemetry. Patient OOB independently. Voiding without difficulty. Patient IV site WNL. Safety maintained.
Pt sitting up in bed, A&Ox4 in NAD @ this time. RR even & unlabored. Pt in Afib on cardiac monitor. Pt denies any pain, complaints, or needs @ this time. Pt in OBS. Safety maintained.     Report given to ADELITA Whiting & ADELITA Figueredo. Pt taken to A6R.
rounds frequently provided for pt comfort and safety. no acute distress noted. pt expresses no other needs at this time. no further concerns as of present. call bell within reach. plan of care ongoing. awaiting TTE.
rounds frequently provided for pt comfort and safety. no acute distress noted. pt expresses no other needs at this time. no further concerns as of present. call bell within reach. plan of care ongoing. awaiting TTE.
plan of care assumed from TRUDI UREÑA @1920. no S&S of acute distress, pt resting comfortably on stretcher. c/o sob/patel with activity and "can only sleep sitting up". pt denies CP/pressure/tightness, palpitations, dizziness/headache/lightheadedness/blurry vision, tingling/numbness, fevers/chills, N/V/D, urinary S&S. awaiting CTA results and TTE. plan of care reviewed with pt. pt in understanding of plan of care. CM+ maintained. O2 sat wdl.

## 2024-04-25 NOTE — ED CDU PROVIDER DISPOSITION NOTE - ADDITIONAL NOTES AND INSTRUCTIONS:
PT was evaluated At [facility] ED and was found to have a condition that warranted time of to rest and heal from WORK/SCHOOL.   Clarence Hopson PA-C

## 2024-04-25 NOTE — PROGRESS NOTE ADULT - SUBJECTIVE AND OBJECTIVE BOX
[place] [facility], THE HEART CENTER                                   [address] [place] [address]                                                      PHONE: [phone]                                                         FAX: [phone]  [email or web address]  ---------------------------------------------------------------------------------------------------------------------------------    Overnight events/patient complaints:     No CP or SOB overnight Feels back to baseline   Neg trops   patient presently in the ER   Had received IV lasix 40 mg x2   Patient had not been taking her prescribed lasix of 40 mg BID she was taking 20 mg for the last few weeks she reports   TTE reviewed with her in detail     PAST MEDICAL & SURGICAL HISTORY:  Hypertension      Hypercholesterolemia      Paroxysmal atrial fibrillation      PAD (peripheral artery disease)      Type 2 diabetes mellitus with diabetic peripheral angiopathy without gangrene, with long-term curren      Coronary artery disease involving native coronary artery of native heart with angina pectoris      H/O abdominal hysterectomy      H/O hernia repair      S/P femoral-popliteal bypass surgery      S/P peripheral artery angioplasty with stent placement      Status post three vessel coronary artery bypass      History of mitral valve replacement with cardiopulmonary bypass        warfarin (Rash)  OHS (Unknown)    MEDICATIONS  (STANDING):  apixaban 2.5 milliGRAM(s) Oral two times a day  atorvastatin 80 milliGRAM(s) Oral at bedtime  clopidogrel Tablet 75 milliGRAM(s) Oral daily  dextrose 10% Bolus 125 milliLiter(s) IV Bolus once  dextrose 5%. 1000 milliLiter(s) (50 mL/Hr) IV Continuous <Continuous>  dextrose 5%. 1000 milliLiter(s) (100 mL/Hr) IV Continuous <Continuous>  dextrose 50% Injectable 12.5 Gram(s) IV Push once  dextrose 50% Injectable 25 Gram(s) IV Push once  furosemide   Injectable 40 milliGRAM(s) IV Push two times a day  glucagon  Injectable 1 milliGRAM(s) IntraMuscular once  insulin glargine Injectable (LANTUS) 10 Unit(s) SubCutaneous two times a day  insulin lispro (ADMELOG) corrective regimen sliding scale   SubCutaneous three times a day before meals  insulin lispro (ADMELOG) corrective regimen sliding scale   SubCutaneous at bedtime  levothyroxine 100 MICROGram(s) Oral daily  losartan 25 milliGRAM(s) Oral daily    MEDICATIONS  (PRN):  dextrose Oral Gel 15 Gram(s) Oral once PRN Blood Glucose LESS THAN 70 milliGRAM(s)/deciliter      Vital Signs Last 24 Hrs  T(C): 37.1 (25 Apr 2024 11:48), Max: 37.1 (25 Apr 2024 11:48)  T(F): 98.8 (25 Apr 2024 11:48), Max: 98.8 (25 Apr 2024 11:48)  HR: 92 (25 Apr 2024 11:48) (76 - 93)  BP: 114/68 (25 Apr 2024 11:48) (114/68 - 133/73)  BP(mean): 71 (24 Apr 2024 19:31) (71 - 87)  RR: 18 (25 Apr 2024 11:48) (16 - 20)  SpO2: 97% (25 Apr 2024 11:48) (95% - 100%)    Parameters below as of 25 Apr 2024 11:48  Patient On (Oxygen Delivery Method): room air      ICU Vital Signs Last 24 Hrs  DONALD SAUNDERS  I&O's Detail    I&O's Summary    Drug Dosing Weight  DONALD SAUNDERS    REVIEW OF SYSTEMS:    Constitutional: No fever, weight loss or fatigue  Eyes: No eye pain, visual disturbances, or discharge  ENMT:  No difficulty hearing, tinnitus, vertigo; No sinus or throat pain  Neck: No pain or stiffness  Respiratory: No cough, wheezing, chills or hemoptysis  Cardiovascular: No chest pain, palpitations, shortness of breath, dizziness or leg swelling  Gastrointestinal: No abdominal or epigastric pain. No nausea, vomiting or hematemesis; No diarrhea or constipation. No melena or hematochezia.  Genitourinary: No dysuria, frequency, hematuria or incontinence  Rectal: No pain, hemorrhoids or incontinence  Neurological: No headaches, memory loss, loss of strength, numbness or tremors  Skin: No itching, burning, rashes or lesions   Lymph Nodes: No enlarged glands  Endocrine: No heat or cold intolerance; No hair loss  Musculoskeletal: No joint pain or swelling; No muscle, back or extremity pain  Psychiatric: No depression, anxiety, mood swings or difficulty sleeping  Heme/Lymph: No easy bruising or bleeding gums  Allergy and Immunologic: No hives or eczema    PHYSICAL EXAM:  General: Appears alert and cooperative.  HEENT: Head; normocephalic, atraumatic.  Eyes: Pupils reactive, cornea wnl.  Neck: Supple, no nodes adenopathy, no NVD or carotid bruit or thyromegaly.  CARDIOVASCULAR: Normal S1 and S2, No murmur, rub, gallop or lift.   LUNGS: No rales, rhonchi or wheeze. Normal breath sounds bilaterally.  ABDOMEN: Soft, nontender without mass or organomegaly. bowel sounds normoactive.  EXTREMITIES: No clubbing, cyanosis or edema. Distal pulses wnl.   SKIN: warm and dry with normal turgor.  NEURO: Alert/oriented x 3/normal motor exam. No pathologic reflexes.    PSYCH: normal affect.        LABS:                        11.5   6.40  )-----------( 158      ( 24 Apr 2024 12:25 )             36.8     04-24    142  |  104  |  27.0<H>  ----------------------------<  231<H>  3.9   |  24.0  |  0.86    Ca    9.4      24 Apr 2024 12:25  Mg     2.3     04-24    TPro  7.5  /  Alb  4.3  /  TBili  2.3<H>  /  DBili  x   /  AST  30  /  ALT  14  /  AlkPhos  69  04-24    DONALD SAUNDERS      PT/INR - ( 24 Apr 2024 12:25 )   PT: 15.2 sec;   INR: 1.38 ratio         PTT - ( 24 Apr 2024 12:25 )  PTT:34.8 sec  Urinalysis Basic - ( 24 Apr 2024 12:25 )    Color: x / Appearance: x / SG: x / pH: x  Gluc: 231 mg/dL / Ketone: x  / Bili: x / Urobili: x   Blood: x / Protein: x / Nitrite: x   Leuk Esterase: x / RBC: x / WBC x   Sq Epi: x / Non Sq Epi: x / Bacteria: x        RADIOLOGY & ADDITIONAL STUDIES:    INTERPRETATION OF TELEMETRY (personally reviewed):    ECG: < from: 12 Lead ECG (04.24.24 @ 10:28) >  Atrial flutter variable conduction  with pvcs  ST & T wave abnormality, consider inferolateral ischemia  Abnormal ECG    < end of copied text >      ECHO: < from: TTE W or WO Ultrasound Enhancing Agent (04.25.24 @ 08:18) >  1. Theinterventricular septum is flattened in systole and diastole consistent with right ventricular pressure and volume overload. Left ventricular systolic function is normal with an ejection fraction of 64 % by Shearer's method of disks.   2. Elevated filling pressure.   3. Mildly enlarged right ventricular cavity size and borderline reduced systolic function.   4. Severe tricuspid regurgitation.   5. Mild aortic stenosis.   6. Mild mitral valve stenosis.   7. Mild pulmonic regurgitation.   8. Pulmonary artery systolic pressure may be underestimated due to severe tricuspid regurgitation.   9. No prior echocardiogram is available for comparison.    ________________________________________________________________________________________  FINDINGS:     Left Ventricle:  The left ventricular cavity is normal in size. The interventricular septum is flattened in systole and diastole consistent with right ventricular pressure and volume overload. Left ventricular systolic function is normal with a calculated ejection fraction of 64 % by the Shearer's biplane method of disks. The left ventricular diastolic function is indeterminate, with elevated filling pressure. There is normal LV mass and concentric remodeling.     Right Ventricle:  The right ventricular cavity is mildly enlarged in size and borderline reduced systolic function.     Left Atrium:  The left atrium is normal in size with an indexed volume of 30.57 ml/m².     Right Atrium:  The right atrium is mildly dilated with an indexed area of 11.18 cm²/m².     Interatrial Septum:  The interatrial septum appears intact.     Aortic Valve:  The aortic valve appears trileaflet with reduced systolic excursion. There is mild aortic stenosis. There is no evidence of aortic regurgitation.     Mitral Valve:  There is severe calcification of the mitral valve annulus. There is moderate leaflet calcification. There is mild mitral valve stenosis. The transmitral peak gradient is 18.3 mmHg and mean gradient is 7.00 mmHg. There is trace mitral regurgitation.     Tricuspid Valve:  Structurally normal tricuspid valve with normal leaflet excursion. There is severe tricuspid regurgitation. Estimated pulmonary artery systolic pressure is 62 mmHg, consistent with elevated pulmonary artery pressure.     Pulmonic Valve:  Structurally normal pulmonic valve with normal leaflet excursion. There is mild pulmonic regurgitation.     Pulmonary Artery:  The main pulmonary artery is normal in size, origin, and position. Pulmonary artery systolic pressuremay be underestimated due to severe tricuspid regurgitation.     Aorta:  The aortic root and ascending aorta appear normal in size.     Pericardium:  No pericardial effusion seen.     Systemic Veins:  The inferior vena cava is dilated measuring 2.87 cm in diameter, (dilated >2.1cm) with abnormal inspiratory collapse (abnormal <50%) consistent with elevated right atrial pressure (~15, range 10-20mmHg).  ____________________________________________________________________    < end of copied text >          CARDIAC CATHETERIZATION: < from: Cardiac Catheterization (11.04.22 @ 14:59) >  Coronary Angiography   The coronary circulation is left dominant.      LM   Left main artery: Angiography shows severe atherosclerosis. There is a  90 % stenosis.    LAD   Left anterior descending artery: Angiography shows severe  atherosclerosis. There is an 80 % stenosis.    CX   Circumflex: Angiography shows severe atherosclerosis. There is a 90 %  stenosis.    RCA   Right coronary artery: Angiography shows moderate atherosclerosis.  There is a 50 % stenosis.    Graft Angiography   LIMA graft to Mid left anterior descending: Angiography shows no  disease.  SVG graft to First obtuse marginal: Angiography shows no disease.      Left Heart Cath   Ejection fraction was visually estimated by radionucleotide with a  value of 45%. The left ventricular end diastolic pressure was  17 mmHg.      Interventional Findings:     Interventional Details   Left main artery: The initial stenosis was 90 %. Guidewire crossing  was successful.    < end of copied text >      ACTIVE PROBLEMS:  HEALTH ISSUES - PROBLEM Dx:

## 2024-04-25 NOTE — PROGRESS NOTE ADULT - ASSESSMENT
Plan    Will restart lasix 40 mg po BID  TTE findings discussed with patient in detail will need further work up for Pulmonary HTN as outpatient   Cont current CV meds including AC  Thanks    DANIELLE GUADARRAMA MD, FACC, ROLANDOE DONALD SAUNDERS is an 78y Female with COPD chronic AF on AC, CAD s/p CABG X 3/MV repair/DUANE thrombectomy Jan 2021, initial severe LV dysfunction with augmented EF 60% on GDMT, subsequent 2022 PTCA/CARIN protected LM lesion ( patent LIMA to LAD, patent SV to diag, patent SVg OM, chronic R pleural effusion improved with diuresis, came to ER with dyspnea orthopnea and PND.  No recent weight gain or edema, no assoc chest pain.  Pt states recently her trazadone was dc'd and she has not slept well in several days.  Had received IV lasix 40 mg x2  Patient had not been taking her prescribed lasix of 40 mg BID she was taking 20 mg for the last few weeks she reports    Plan    Will restart lasix 40 mg po BID  TTE findings discussed with patient in detail will need further work up for Pulmonary HTN as outpatient   Cont current CV meds including AC  Thanks    DANIELLE GUADARRAMA MD, FACC, BRANDYN

## 2024-04-25 NOTE — ED CDU PROVIDER SUBSEQUENT DAY NOTE - ATTENDING APP SHARED VISIT CONTRIBUTION OF CARE
patient seen on morning rounds.  Feeling markedly better and can lay down supine.  normal respirations.  VSS.  pending cards consult.  will restart lasix.  Non toxic.  Well appearing. Uneventful ED observation period.

## 2024-04-25 NOTE — ED CDU PROVIDER DISPOSITION NOTE - ATTENDING CONTRIBUTION TO CARE
Patient seen with PA on rounds.  Here with dyspnea.  Improved in ED.  Has follow up. VSS.  Uneventful ED observation period. Non toxic.  Well appearing.

## 2024-04-25 NOTE — ED CDU PROVIDER DISPOSITION NOTE - PATIENT PORTAL LINK FT
You can access the FollowMyHealth Patient Portal offered by [facility] by registering at the following website: [email or web address]. By joining [employer]’s FollowMyHealth portal, you will also be able to view your health information using other applications (apps) compatible with our system.

## 2024-06-13 NOTE — PROGRESS NOTE ADULT - PROVIDER SPECIALTY LIST ADULT
Hospitalist
Cardiology
Hospitalist
Physiatry
Cardiology
Hospitalist
Cardiology
Hospitalist
Cardiology
Hospitalist
Physiatry
Hospitalist
Cardiology
13-Jun-2024 10:45

## 2024-10-23 ENCOUNTER — INPATIENT (INPATIENT)
Facility: HOSPITAL | Age: 79
LOS: 5 days | Discharge: HOME | DRG: 811 | End: 2024-10-29
Attending: INTERNAL MEDICINE | Admitting: STUDENT IN AN ORGANIZED HEALTH CARE EDUCATION/TRAINING PROGRAM
Payer: MEDICARE

## 2024-10-23 VITALS
HEART RATE: 135 BPM | SYSTOLIC BLOOD PRESSURE: 128 MMHG | TEMPERATURE: 98 F | DIASTOLIC BLOOD PRESSURE: 62 MMHG | OXYGEN SATURATION: 99 % | RESPIRATION RATE: 19 BRPM | WEIGHT: 167.55 LBS

## 2024-10-23 DIAGNOSIS — Z98.89 OTHER SPECIFIED POSTPROCEDURAL STATES: Chronic | ICD-10-CM

## 2024-10-23 DIAGNOSIS — Z95.820 PERIPHERAL VASCULAR ANGIOPLASTY STATUS WITH IMPLANTS AND GRAFTS: Chronic | ICD-10-CM

## 2024-10-23 DIAGNOSIS — Z95.2 PRESENCE OF PROSTHETIC HEART VALVE: Chronic | ICD-10-CM

## 2024-10-23 DIAGNOSIS — Z90.710 ACQUIRED ABSENCE OF BOTH CERVIX AND UTERUS: Chronic | ICD-10-CM

## 2024-10-23 DIAGNOSIS — K92.2 GASTROINTESTINAL HEMORRHAGE, UNSPECIFIED: ICD-10-CM

## 2024-10-23 DIAGNOSIS — Z95.828 PRESENCE OF OTHER VASCULAR IMPLANTS AND GRAFTS: Chronic | ICD-10-CM

## 2024-10-23 DIAGNOSIS — Z95.1 PRESENCE OF AORTOCORONARY BYPASS GRAFT: Chronic | ICD-10-CM

## 2024-10-23 LAB
ACANTHOCYTES BLD QL SMEAR: SLIGHT — SIGNIFICANT CHANGE UP
ALBUMIN SERPL ELPH-MCNC: 3.8 G/DL — SIGNIFICANT CHANGE UP (ref 3.3–5.2)
ALP SERPL-CCNC: 52 U/L — SIGNIFICANT CHANGE UP (ref 40–120)
ALT FLD-CCNC: 11 U/L — SIGNIFICANT CHANGE UP
ANION GAP SERPL CALC-SCNC: 12 MMOL/L — SIGNIFICANT CHANGE UP (ref 5–17)
ANISOCYTOSIS BLD QL: SIGNIFICANT CHANGE UP
APTT BLD: 35.1 SEC — SIGNIFICANT CHANGE UP (ref 24.5–35.6)
AST SERPL-CCNC: 24 U/L — SIGNIFICANT CHANGE UP
BASOPHILS # BLD AUTO: 0.04 K/UL — SIGNIFICANT CHANGE UP (ref 0–0.2)
BASOPHILS NFR BLD AUTO: 0.7 % — SIGNIFICANT CHANGE UP (ref 0–2)
BILIRUB SERPL-MCNC: 1.5 MG/DL — SIGNIFICANT CHANGE UP (ref 0.4–2)
BLD GP AB SCN SERPL QL: SIGNIFICANT CHANGE UP
BUN SERPL-MCNC: 23.1 MG/DL — HIGH (ref 8–20)
BURR CELLS BLD QL SMEAR: PRESENT — SIGNIFICANT CHANGE UP
CALCIUM SERPL-MCNC: 8.9 MG/DL — SIGNIFICANT CHANGE UP (ref 8.4–10.5)
CHLORIDE SERPL-SCNC: 100 MMOL/L — SIGNIFICANT CHANGE UP (ref 96–108)
CO2 SERPL-SCNC: 25 MMOL/L — SIGNIFICANT CHANGE UP (ref 22–29)
CREAT SERPL-MCNC: 1.05 MG/DL — SIGNIFICANT CHANGE UP (ref 0.5–1.3)
EGFR: 55 ML/MIN/1.73M2 — LOW
EOSINOPHIL # BLD AUTO: 0.15 K/UL — SIGNIFICANT CHANGE UP (ref 0–0.5)
EOSINOPHIL NFR BLD AUTO: 2.8 % — SIGNIFICANT CHANGE UP (ref 0–6)
GLUCOSE SERPL-MCNC: 254 MG/DL — HIGH (ref 70–99)
HCT VFR BLD CALC: 21.5 % — LOW (ref 34.5–45)
HGB BLD-MCNC: 6.2 G/DL — CRITICAL LOW (ref 11.5–15.5)
HYPOCHROMIA BLD QL: SLIGHT — SIGNIFICANT CHANGE UP
IMM GRANULOCYTES NFR BLD AUTO: 0.4 % — SIGNIFICANT CHANGE UP (ref 0–0.9)
INR BLD: 1.79 RATIO — HIGH (ref 0.85–1.16)
LYMPHOCYTES # BLD AUTO: 0.94 K/UL — LOW (ref 1–3.3)
LYMPHOCYTES # BLD AUTO: 17.2 % — SIGNIFICANT CHANGE UP (ref 13–44)
MANUAL SMEAR VERIFICATION: SIGNIFICANT CHANGE UP
MCHC RBC-ENTMCNC: 19.2 PG — LOW (ref 27–34)
MCHC RBC-ENTMCNC: 28.8 GM/DL — LOW (ref 32–36)
MCV RBC AUTO: 66.6 FL — LOW (ref 80–100)
MICROCYTES BLD QL: SIGNIFICANT CHANGE UP
MONOCYTES # BLD AUTO: 0.69 K/UL — SIGNIFICANT CHANGE UP (ref 0–0.9)
MONOCYTES NFR BLD AUTO: 12.7 % — SIGNIFICANT CHANGE UP (ref 2–14)
NEUTROPHILS # BLD AUTO: 3.61 K/UL — SIGNIFICANT CHANGE UP (ref 1.8–7.4)
NEUTROPHILS NFR BLD AUTO: 66.2 % — SIGNIFICANT CHANGE UP (ref 43–77)
OVALOCYTES BLD QL SMEAR: SIGNIFICANT CHANGE UP
PLAT MORPH BLD: NORMAL — SIGNIFICANT CHANGE UP
PLATELET # BLD AUTO: 219 K/UL — SIGNIFICANT CHANGE UP (ref 150–400)
POIKILOCYTOSIS BLD QL AUTO: SIGNIFICANT CHANGE UP
POLYCHROMASIA BLD QL SMEAR: SLIGHT — SIGNIFICANT CHANGE UP
POTASSIUM SERPL-MCNC: 3.3 MMOL/L — LOW (ref 3.5–5.3)
POTASSIUM SERPL-SCNC: 3.3 MMOL/L — LOW (ref 3.5–5.3)
PROT SERPL-MCNC: 6.4 G/DL — LOW (ref 6.6–8.7)
PROTHROM AB SERPL-ACNC: 20.7 SEC — HIGH (ref 9.9–13.4)
RBC # BLD: 3.23 M/UL — LOW (ref 3.8–5.2)
RBC # FLD: 22 % — HIGH (ref 10.3–14.5)
RBC BLD AUTO: ABNORMAL
SODIUM SERPL-SCNC: 137 MMOL/L — SIGNIFICANT CHANGE UP (ref 135–145)
TROPONIN T, HIGH SENSITIVITY RESULT: 28 NG/L — SIGNIFICANT CHANGE UP (ref 0–51)
TROPONIN T, HIGH SENSITIVITY RESULT: 30 NG/L — SIGNIFICANT CHANGE UP (ref 0–51)
WBC # BLD: 5.45 K/UL — SIGNIFICANT CHANGE UP (ref 3.8–10.5)
WBC # FLD AUTO: 5.45 K/UL — SIGNIFICANT CHANGE UP (ref 3.8–10.5)

## 2024-10-23 PROCEDURE — 99285 EMERGENCY DEPT VISIT HI MDM: CPT

## 2024-10-23 PROCEDURE — 99222 1ST HOSP IP/OBS MODERATE 55: CPT

## 2024-10-23 PROCEDURE — 93010 ELECTROCARDIOGRAM REPORT: CPT

## 2024-10-23 RX ORDER — POTASSIUM CHLORIDE 10 MEQ
40 TABLET, EXTENDED RELEASE ORAL ONCE
Refills: 0 | Status: COMPLETED | OUTPATIENT
Start: 2024-10-23 | End: 2024-10-23

## 2024-10-23 RX ORDER — PANTOPRAZOLE SODIUM 40 MG/1
80 TABLET, DELAYED RELEASE ORAL ONCE
Refills: 0 | Status: COMPLETED | OUTPATIENT
Start: 2024-10-23 | End: 2024-10-23

## 2024-10-23 RX ORDER — MAGNESIUM, ALUMINUM HYDROXIDE 200-200 MG
30 TABLET,CHEWABLE ORAL EVERY 4 HOURS
Refills: 0 | Status: DISCONTINUED | OUTPATIENT
Start: 2024-10-23 | End: 2024-10-29

## 2024-10-23 RX ORDER — MELATONIN 5 MG
3 TABLET ORAL AT BEDTIME
Refills: 0 | Status: DISCONTINUED | OUTPATIENT
Start: 2024-10-23 | End: 2024-10-29

## 2024-10-23 RX ORDER — ONDANSETRON HYDROCHLORIDE 2 MG/ML
4 INJECTION, SOLUTION INTRAMUSCULAR; INTRAVENOUS EVERY 8 HOURS
Refills: 0 | Status: DISCONTINUED | OUTPATIENT
Start: 2024-10-23 | End: 2024-10-29

## 2024-10-23 RX ORDER — ACETAMINOPHEN 500 MG
650 TABLET ORAL EVERY 6 HOURS
Refills: 0 | Status: DISCONTINUED | OUTPATIENT
Start: 2024-10-23 | End: 2024-10-29

## 2024-10-23 RX ORDER — PANTOPRAZOLE SODIUM 40 MG/1
8 TABLET, DELAYED RELEASE ORAL
Qty: 80 | Refills: 0 | Status: DISCONTINUED | OUTPATIENT
Start: 2024-10-23 | End: 2024-10-24

## 2024-10-23 RX ADMIN — PANTOPRAZOLE SODIUM 10 MG/HR: 40 TABLET, DELAYED RELEASE ORAL at 17:53

## 2024-10-23 RX ADMIN — PANTOPRAZOLE SODIUM 80 MILLIGRAM(S): 40 TABLET, DELAYED RELEASE ORAL at 17:53

## 2024-10-23 RX ADMIN — Medication 40 MILLIEQUIVALENT(S): at 19:05

## 2024-10-23 NOTE — H&P ADULT - NSHPPHYSICALEXAM_GEN_ALL_CORE
Vital Signs Last 24 Hrs  T(C): 36.4 (23 Oct 2024 18:35), Max: 36.6 (23 Oct 2024 16:42)  T(F): 97.6 (23 Oct 2024 18:35), Max: 97.9 (23 Oct 2024 16:42)  HR: 85 (23 Oct 2024 18:35) (84 - 135)  BP: 107/59 (23 Oct 2024 18:35) (107/59 - 128/62)  BP(mean): --  RR: 20 (23 Oct 2024 18:35) (18 - 20)  SpO2: 100% (23 Oct 2024 18:35) (97% - 100%)    Parameters below as of 23 Oct 2024 18:35  Patient On (Oxygen Delivery Method): room air Vital Signs Last 24 Hrs  T(C): 36.4 (23 Oct 2024 18:35), Max: 36.6 (23 Oct 2024 16:42)  T(F): 97.6 (23 Oct 2024 18:35), Max: 97.9 (23 Oct 2024 16:42)  HR: 85 (23 Oct 2024 18:35) (84 - 135)  BP: 107/59 (23 Oct 2024 18:35) (107/59 - 128/62)  BP(mean): --  RR: 20 (23 Oct 2024 18:35) (18 - 20)  SpO2: 100% (23 Oct 2024 18:35) (97% - 100%)    Parameters below as of 23 Oct 2024 18:35  Patient On (Oxygen Delivery Method): room air    PHYSICAL EXAM:  Constitutional: Alert, NAD, comfortable   Head and Face: Atraumatic, head and face were normal in appearance  Eyes: Sclera and conjunctiva were normal, pupils were equal in size, round, eyelids normal  ENT: Ears and nose were normal in appearance, tongue moist  Neck: Appearance was normal, neck was supple, No JVD  Pulmonary: Clear to auscultation bilaterally, no rales/crackles, or wheezing  Heart: Regular rate and rhythm, no murmurs, gallops, or pericardial rubs  Abdominal: Soft, nontender, nondistended. Bowel sounds normal  Skin: Normal color and intact without appreciable rash or abnormal skin lesion  Extremities: Warm without edema. No clubbing.  Pulse: 2+ radial pulse

## 2024-10-23 NOTE — CONSULT NOTE ADULT - SUBJECTIVE AND OBJECTIVE BOX
[facility], located within [facility], THE HEART CENTER                                   [address] [place] [address]                                                      PHONE: [phone]                                                         FAX: [phone]  [email or web address]  ---------------------------------------------------------------------------------------------------------------------------------    Reason for Consult: anemia/dyspnea    HPI:  DONALD SAUNDERS is an 79y Female with DM  HLD PAD, CAD s/p CABGX3/MV repair/LAAthrombectomy 1/2021 initial sig LV dysfunction, augmented EF on GDMT, COPD, chronic AF on NOAC, postop protected LM PCI 2023, progressive RENTERIA with recent cardiac cath 8/2024 revealed patent LIMA to LAD, mod mid LAD disease, prox Cx disease s/p CARIN EF 45% moderate pul HTN with elevated PCWP WHO Group2 Pul HtN on increasing doses diuretics, seen in office yesterday with progressive RENTERIA and fatigue.  Labs this am revealed Hgb 6.6 and pt sent to ER for transfusion.  Post Cx PCI PET this month no sig ischemia.      PAST MEDICAL & SURGICAL HISTORY:  Hypertension      Hypercholesterolemia      Paroxysmal atrial fibrillation      PAD (peripheral artery disease)      Type 2 diabetes mellitus with diabetic peripheral angiopathy without gangrene, with long-term curren      Coronary artery disease involving native coronary artery of native heart with angina pectoris      H/O abdominal hysterectomy      H/O hernia repair      S/P femoral-popliteal bypass surgery      S/P peripheral artery angioplasty with stent placement      Status post three vessel coronary artery bypass      History of mitral valve replacement with cardiopulmonary bypass          warfarin (Rash)  OHS (Unknown)      MEDICATIONS  (STANDING):    MEDICATIONS  (PRN):      Social History:  Cigarettes:  neg                  Alchohol:           neg      Illicit Drug Abuse:  neg  neg FH    ROS: Negative other than as mentioned in HPI.    Vital Signs Last 24 Hrs  T(C): 36.4 (23 Oct 2024 14:30), Max: 36.4 (23 Oct 2024 14:30)  T(F): 97.6 (23 Oct 2024 14:30), Max: 97.6 (23 Oct 2024 14:30)  HR: 135 (23 Oct 2024 14:30) (135 - 135)  BP: 128/62 (23 Oct 2024 14:30) (128/62 - 128/62)  BP(mean): --  RR: 19 (23 Oct 2024 14:30) (19 - 19)  SpO2: 99% (23 Oct 2024 14:30) (99% - 99%)      ICU Vital Signs Last 24 Hrs  DONALD SAUNDERS  I&O's Detail    I&O's Summary    Drug Dosing Weight  DONALD CHIP      PHYSICAL EXAM:  General: Appears alert and cooperative.  HEENT: Head; normocephalic, atraumatic.  Eyes: Pupils reactive, cornea wnl.  Neck: Supple, no nodes adenopathy, no NVD or carotid bruit or thyromegaly.  CARDIOVASCULAR: Normal S1 and S2, No murmur, rub, gallop or lift.   LUNGS: No rales, rhonchi or wheeze. Normal breath sounds bilaterally.  ABDOMEN: Soft, nontender without mass or organomegaly. bowel sounds normoactive.  EXTREMITIES: No clubbing, cyanosis or edema. Distal pulses wnl.   SKIN: warm and dry with normal turgor.  NEURO: Alert/oriented x 3/normal motor exam. No pathologic reflexes.    PSYCH: normal affect.        LABS:          DONALD SAUNDERS            RADIOLOGY & ADDITIONAL STUDIES:    INTERPRETATION OF TELEMETRY (personally reviewed):    ECG:    ECHO: office 10/2024: LVEF 60% flattening IVS c/w RVPO, s/p MV repair with mild functional MS, trace MR, mild AS STEPH 1.6 cm2, severe TR est PAP 45 mm Hg, mod RVE    STRESS TEST: PET 10/9/37932 : low risk study mild apical ischemia    CARDIAC CATHETERIZATION: 8/2024 as above                       [facility], THE HEART CENTER                                   [address] [place] [address]                                                      PHONE: [phone]                                                         FAX: [phone]  [email or web address]  ---------------------------------------------------------------------------------------------------------------------------------    Reason for Consult: anemia/dyspnea    HPI:  DONALD SAUNDERS is an 79y Female with DM  HLD PAD, CAD s/p CABGX3/MV repair/LAAthrombectomy 1/2021 initial sig LV dysfunction, augmented EF on GDMT, COPD, chronic AF on NOAC, postop protected LM PCI 2023, progressive RENTERIA with recent cardiac cath 8/2024 revealed patent LIMA to LAD, mod mid LAD disease, prox Cx disease s/p CARIN EF 45% moderate pul HTN with elevated PCWP WHO Group2 Pul HtN on increasing doses diuretics, seen in office yesterday with progressive RENTERIA and fatigue.  Labs this am revealed Hgb 6.6 and pt sent to ER for transfusion.  Post Cx PCI PET this month no sig ischemia.  Pt denies assoc chest pain or palps , at rest asx.    PAST MEDICAL & SURGICAL HISTORY:  Hypertension      Hypercholesterolemia      Paroxysmal atrial fibrillation      PAD (peripheral artery disease)      Type 2 diabetes mellitus with diabetic peripheral angiopathy without gangrene, with long-term curren      Coronary artery disease involving native coronary artery of native heart with angina pectoris      H/O abdominal hysterectomy      H/O hernia repair      S/P femoral-popliteal bypass surgery      S/P peripheral artery angioplasty with stent placement      Status post three vessel coronary artery bypass      History of mitral valve replacement with cardiopulmonary bypass          warfarin (Rash)  OHS (Unknown)      MEDICATIONS  (STANDING):    MEDICATIONS  (PRN):      Social History:  Cigarettes:  neg                  Alchohol:           neg      Illicit Drug Abuse:  neg  neg FH    ROS: Negative other than as mentioned in HPI.    Vital Signs Last 24 Hrs  T(C): 36.4 (23 Oct 2024 14:30), Max: 36.4 (23 Oct 2024 14:30)  T(F): 97.6 (23 Oct 2024 14:30), Max: 97.6 (23 Oct 2024 14:30)  HR: 135 (23 Oct 2024 14:30) (135 - 135)  BP: 128/62 (23 Oct 2024 14:30) (128/62 - 128/62)  BP(mean): --  RR: 19 (23 Oct 2024 14:30) (19 - 19)  SpO2: 99% (23 Oct 2024 14:30) (99% - 99%)      ICU Vital Signs Last 24 Hrs  DONALD SAUNDERS  I&O's Detail    I&O's Summary    Drug Dosing Weight  DONALD SAUNDERS      PHYSICAL EXAM:  General: Appears alert and cooperative.  HEENT: Head; normocephalic, atraumatic.  Eyes: Pupils reactive, cornea wnl.  Neck: Supple, no nodes adenopathy, no NVD or carotid bruit or thyromegaly.  CARDIOVASCULAR: Normal S1 and S2, No murmur, rub, gallop or lift.   LUNGS: No rales, rhonchi or wheeze. Normal breath sounds bilaterally.  ABDOMEN: Soft, nontender without mass or organomegaly. bowel sounds normoactive.  EXTREMITIES: No clubbing, cyanosis or edema. Distal pulses wnl.   SKIN: warm and dry with normal turgor.  NEURO: Alert/oriented x 3/normal motor exam. No pathologic reflexes.    PSYCH: normal affect.        LABS:          DONALD SAUNDERS            RADIOLOGY & ADDITIONAL STUDIES:    INTERPRETATION OF TELEMETRY (personally reviewed): AF with NVR    ECG:pending      ECHO: office 10/2024: LVEF 60% flattening IVS c/w RVPO, s/p MV repair with mild functional MS, trace MR, mild AS STEPH 1.6 cm2, severe TR est PAP 45 mm Hg, mod RVE    STRESS TEST: PET 10/9/07245 : low risk study mild apical ischemia    CARDIAC CATHETERIZATION: 8/2024 as above

## 2024-10-23 NOTE — ED ADULT NURSE NOTE - OBJECTIVE STATEMENT
Pt A&Ox4, NAD. Pt sent in from MD for low hgb. Pt also with complaints of black tarry stools. Breathing even and unlabored. Placed on CM. ED workup in progress.

## 2024-10-23 NOTE — ED ADULT NURSE NOTE - NS ED NURSE RECORD ANOTHER VITAL SIGN
Physical Therapy    Visit Type: initial evaluation  Co-treat with: Occupational therapist    Relevant History/Co-morbidities: A 89 year old female referred to skilled PT for further Evaluation and Treatment , Patient admitted to hospital diagnosed with Rt Ankle bimaleollar Fracture and a minimally displaced rt  femur subcapital fracture per reports leaning towards conservative management PAtient currently NWB to Rt le .     SUBJECTIVE  Patient agreed to participate in therapy this date.  Patient with c/o pain to rt. Leg   Patient / Family Goal: unable to state    Pain   RN informed on pain level.    Location: To Rt leg     At onset of session (out of 10): 7     OBJECTIVE     Cognitive Status   Orientation    - Oriented to: person and place   - Disoriented to: situation and time  Functional Communication   - Overall Status: impaired   - Forms of Communication: verbal  Attention Span    - Attention: - attends with cues to redirect   - Attention impairment: fatigue  Following Direction   - follows one step commands with increased time and follows one step commands with repetition  Transition Between Tasks   - transitions with cues  Memory   - impaired  Safety Awareness/Insight   - impaired  Awareness of Deficits   - decreased awareness of deficits     Range of Motion (ROM)   (degrees unless noted; active unless noted; norms in ( ); negative=lacking to 0, positive=beyond 0)  Comments: Left le rom of major joints- WFL, rt. Le  Hip and ankle NT due to fractures   Rt knee = wfl     Strength  (out of 5 unless noted, standard test position unless noted)   Hip:    - Flexion:        • Left: 3        Hip flexion R: nt.  Knee:    - Flexion:        • Left: 3        • Right: 3-    - Extension:        • Left: 3        • Right: 3-  Ankle:    - Dorsiflexion:        • Left: 3        Ankle Dorsiflexion R: nt.    - Plantar Flexion:        • Left: 3        Ankle Plantar Flexion R: nt.      Standing Balance  (CORBIN = base of  support)  Standing Balance Static= Poor- with RW ( NWB to rt. Le )assist required to maintain   Standing Balance Dynamic= Unable to assess at this time        Bed Mobility  - Supine to sit: maximal assist  - Sit to supine: maximal assist  Transfers  Assistive devices: gait belt, 2-wheeled walker  - Sit to stand: maximal assist, 2 persons  - Stand to sit: maximal assist, 2 persons (decreased eccentric ontrol )    Ambulation / Gait  - Weight Bearing:  • Left: full weight bearing • Right: non weight bearing  : Unable to perfom at this time   - Distance (feet unless otherwise indicated): 0     Interventions     Training provided: activity tolerance, balance retraining, bed mobility training, transfer training, positioning, neuromuscular reeducation, safety training and use of assistive device    Skilled input: Verbal instruction/cues, tactile instruction/cues and posture correction  Verbal Consent: Writer verbally educated and received verbal consent for hand placement, positioning of patient, and techniques to be performed today from patient for therapist position for techniques as described above and how they are pertinent to the patient's plan of care.         Education:   - Present and ready to learn: patient  Education provided during session:  - Results of above outlined education: Verbalizes understanding and Demonstrates understanding    ASSESSMENT   Patient will benefit from skilled therapy to address listed impairments and functional limitations.  Interferring components: medical status limitations and medical precautions    Summary of function and discharge needs based on today's assessment:   - Current level of function: significantly below baseline level of function   - Therapy needs at discharge: therapy 5 or more times per week   - Activities of daily living (ADLs) requiring support at discharge: transfers, bed mobility and ambulation   - Impairments that require further therapy intervention: activity  Yes tolerance, pain, ROM, strength and balance    AM-PAC  - Generalized Prior Level of Function: IND/MOD I ([facility] 22-24)       Key: MOD A=moderate assistance, IND/MOD I=independent/modified independent  - Generalized Current Level of Function     - Current Mobility Score: 8       AM-PAC Scoring Key= >21 Modified Independent; 20-21 Supervision; 18-19 Minimal assist; 13-18 Moderate assist; 9-12 Max assist; <9 Total assist       • Predicted patient presentation: Moderate (evolving) - Patient comorbidities and complexities, as defined above, may have varying impact on steady progress for prescribed plan of care.    PLAN   Suggestions for next session as indicated: Patient lives alone , PTA hs been mod-I with functional mobility and ambulation with RW Vandana continue to follow up.  PT Frequency: 3-5 x per week      PT/OT Mobility Equipment for Discharge: TBD  Interventions: balance, bed mobility, endurance training, functional transfer training, neuromuscular re-education, ROM, strengthening, gait training and safety education  Agreement to plan and goals: patient agrees with goals and treatment plan        GOALS  Long Term Goals: (to be met by time of discharge from hospital)  Sit to supine: Patient will complete sit to supine moderate assist.  Supine to sit: Patient will complete supine to sit moderate assist.  Sit to stand: Patient will complete sit to stand transfer with gait belt and 2-wheeled walker, moderate assist.   Stand to sit: Patient will complete stand to sit transfer with gait belt and 2-wheeled walker, moderate assist.   Stand pivot: Patient will complete stand pivot transfer with gait belt and 2-wheeled walker, moderate assist.   Ambulation (even): Patient will ambulate on even surface for 10 feet with gait belt and 2-wheeled walker, moderate assist.   Grooming status: NWB to Rt Le.    Documented in the chart in the following areas: Prior Level of Function. Assessment/Plan.      Patient at End of Session:    Location: in bed  Safety measures: alarm system in place/re-engaged and bed rails x2      Therapy procedure time and total treatment time can be found documented on the Time Entry flowsheet

## 2024-10-23 NOTE — H&P ADULT - ATTENDING COMMENTS
#Acute blood loss anemia  #?Melena  Hb was 11.5 in 4/2024  NPO  Monitor H/H closely  Transfuse to keep Hb>8, if clinically indicated  Maintain active type and screen, 2 large bore IVs  Protonix 40mg IV q12  GI consulted by ED, will follow recs  Obtain CT abd/pelvis  HOLD ASA, Plavix, Eliquis as per Cardio recs  Rest of plan as stated above #Acute blood loss anemia  #?Melena  Hb was 11.5 in 4/2024  ADRIANE (Chaperone: ) showed ?  NPO  Monitor H/H closely  Transfuse to keep Hb>8, if clinically indicated  Maintain active type and screen, 2 large bore IVs  Protonix 40mg IV q12  GI consulted by ED, will follow recs  Obtain CT abd/pelvis  HOLD ASA, Plavix, Eliquis as per Cardio recs  Rest of plan as stated above #Acute blood loss anemia  #Dark stools (for past 2 weeks)  Hb was 11.5 in 4/2024  ADRIANE (Chaperone: ADELITA Green) showed dark brown stool, no melena, no blood, external hemorrhoids noted.   NPO  Monitor H/H closely  Transfuse to keep Hb>8, if clinically indicated  Maintain active type and screen, 2 large bore IVs  Protonix 40mg IV q12  GI consulted by ED, will follow recs  Obtain CT abd/pelvis  HOLD ASA, Plavix, Eliquis as per Cardio recs  Rest of plan as stated above #Acute blood loss anemia  #Dark stools (for past 2 weeks)  Hb was 11.5 in 4/2024  ADRIANE (Chaperone: ADELITA Green) showed dark brown stool, no melena, no blood, external hemorrhoids noted.   NPO  Monitor H/H closely  Transfuse to keep Hb>8, if clinically indicated  Maintain active type and screen, 2 large bore IVs  Protonix 40mg IV q12  GI consulted by ED, will follow recs  Obtain CT abd/pelvis  HOLD ASA, Plavix, Eliquis as per Cardio recs  SCD's for DVT PPX  Rest of plan as stated above #Acute blood loss anemia  #Dark stools (for past 2 weeks)  Hb was 11.5 in 4/2024  s/ 2u pPRBC  ADRIANE (Chaperone: ADELITA Green) showed dark brown stool, no melena, no blood, external hemorrhoids noted.   NPO  Monitor H/H closely  Transfuse to keep Hb>8, if clinically indicated  Maintain active type and screen, 2 large bore IVs  Protonix 40mg IV q12  GI consulted by ED, will follow recs  Obtain CT abd/pelvis  HOLD ASA, Plavix, Eliquis as per Cardio recs  SCD's for DVT PPX  Rest of plan as stated above #Acute blood loss anemia  #Dark stools (for past 2 weeks)  Hb was 11.5 in 4/2024  s/ 2u pPRBC  ADRIANE (Chaperone: ADELITA Green) showed brown stool, no melena, no blood, external hemorrhoids noted.   NPO  Monitor H/H closely  Transfuse to keep Hb>8, if clinically indicated  Maintain active type and screen, 2 large bore IVs  Protonix 40mg IV q12  GI consulted by ED, will follow recs  Obtain CT abd/pelvis w/ IV contrast  HOLD ASA, Plavix, Eliquis as per Cardio recs  SCD's for DVT PPX  Rest of plan as stated above

## 2024-10-23 NOTE — H&P ADULT - HISTORY OF PRESENT ILLNESS
79F with PMHx of COPD, Chronic AFib on Eliquis, CAD s/p CABG w/ 3VD repair/DUANE thrombectomy        79F with PMHx of COPD, Chronic AFib on Eliquis, DM2, HLD, PAD, CAD s/p CABG w/ 3VD repair/DUANE thrombectomy 1/2021, PCI to LM 2023 and recent C 8/2024 revealed moderate mid-LAD and pCx disease s/p CARIN (EF 45%), moderate pHTN,   presenting with outpatient Hgb 6.6. 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 Pt reports she is not able to stand up or do any activity without feeling significant SOB. Pt endorses darker brown stool than normal but denies melena or hematochezia, hematuria, prior similar symptoms,  chest pain, palpitation, headache, , N/V/D, fever, chills, abdominal or back pain, dysuria, sick contact. Pt never had a colonoscopy due to being on AC. In ED, H/H 6.2/21.5 and received 1U pRBC and started Protonix IV. ADRIANE negative. GI consulted.  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 Pt reports she is not able to stand up or do any activity without feeling significant SOB. Pt endorses darker brown stool than normal but denies melena or hematochezia, hematuria, prior similar symptoms,  chest pain, palpitation, headache, N/V/D, fever, chills, abdominal or back pain, dysuria, sick contact. Pt never had a colonoscopy due to being on AC. In ED, H/H 6.2/21.5 and received 1U pRBC and started Protonix IV. ADRIANE negative. GI consulted.

## 2024-10-23 NOTE — H&P ADULT - NSHPREVIEWOFSYSTEMS_GEN_ALL_CORE
REVIEW OF SYSTEMS:  CONSTITUTIONAL: + Fatigue, No fever, weight loss  RESPIRATORY: + SOB, No cough, wheezing, chills or hemoptysis  CARDIOVASCULAR: No chest pain, palpitations, or leg swelling  GASTROINTESTINAL: No abdominal or epigastric pain. No nausea, vomiting, or hematemesis; No diarrhea or constipation. No melena or hematochezia.  NEUROLOGICAL: No headaches, loss of strength, numbness, or tremors  MUSCULOSKELETAL: No joint pain or swelling; No muscle, back, or extremity pain

## 2024-10-23 NOTE — ED PROVIDER NOTE - OBJECTIVE STATEMENT
79F hx CAD s/p CABG, recent PCI several months ago on ASA/Plavix, pAF on Eliquis, moderate pHTN, HTN, HLD, IDDM presents with anemia to 6 on outpt labs. Spoke with pt and pt's cardiologist who state pt is on aspirin, Plavix, Eliquis. Over the past few months pt has become progressive dyspneic on exertion, now with minimal activity. Stool has become progressively darker over the past few weeks as well, and associated with lightheadedness. Saw her cardiologist in the office yesterday where she was noted to be paler and more short of breath, bloodwork demonstrated anemia so pt was instructed to present to ED. Pt denies fever, chills, abd pain, back pain, CP

## 2024-10-23 NOTE — ED PROVIDER NOTE - ATTENDING CONTRIBUTION TO CARE
I have personally performed a history and physical examination of the patient and discussed management with the resident as well as the patient.  I reviewed the resident's note and agree with the documented findings and plan of care.  I have authored and modified critical sections of the Provider Note, including but not limited to HPI, Physical Exam and MDM.    79F hx CAD s/p CABG, recent PCI several months ago on ASA/Plavix, pAF on Eliquis, moderate pHTN, HTN, HLD, IDDM presents with anemia to 6 on outpt labs. Spoke with pt and pt's cardiologist who state pt is on aspirin, Plavix, Eliquis. Over the past few months pt has become progressive dyspneic on exertion, now with minimal activity. Stool has become progressively darker over the past few weeks as well, and associated with lightheadedness. Saw her cardiologist in the office yesterday where she was noted to be paler and more short of breath, bloodwork demonstrated anemia so pt was instructed to present to ED. Case discussed with Dr. Mayorga, TTE shows EF of 64% with mild aortic and mitral valve stenosis with trace pulmonic valve regurgitation.  Hemoglobin 6.2, will transfuse PRBC.  Will obtain CT for possible GI bleed given reported melanotic stool.  Will treat with PPI.  Potassium 3.3, will replete.  Admit.

## 2024-10-23 NOTE — H&P ADULT - ASSESSMENT
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    PLAN  Acute blood loss anemia   - Dark brown stools  - Hb goal of >7 (Transfuse as needed)  - Trend CBC  - Protonix IV  - Avoid ASA, Plavix, anticoagulants  - GI consult  - Hemodynamically stable  - IVF NS @ 100 cc/hr    - H/H 6.2/21.5, cont to monitor CBC  - Type and screen done   - s/p 1U pRBC, 2nd unit ordered   - Started on Protonix IV  - Held ASA/Plavix/Eliquis per Cardiology recs   - Will not reverse Eliquis given h/o DUANE thrombus   - Maintain Hgb >8.5, transfuse PRN  - ADRIANE negative   - Never had a colonoscopy   - GI consulted for evaluation   - NPO after midnight, pending GI evaluation     Hypokalemia  - s/p Repletion  - Monitor BMP      VTE ppx: SCD, held AC at this time due to severe anemia   Diet:   Dispo: Acute, admit inpatient, pending GI eval  ASSESSMENT  79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    PLAN  Acute blood loss anemia   - Dark brown stools  - Hb goal of >7 (Transfuse as needed)  - Avoid ASA, Plavix, anticoagulants  - Hemodynamically stable  - IVF NS @ 100 cc/hr  - H/H 6.2/21.5, cont to monitor CBC  - Type and screen done   - s/p 1U pRBC, 2nd unit ordered   - Started on Protonix IV  - Held ASA/Plavix/Eliquis per Cardiology recs   - Will not reverse Eliquis given h/o DUANE thrombus   - Maintain Hgb >8.5, transfuse PRN  - ADRIANE negative   - Never had a colonoscopy   - GI consulted for evaluation   - NPO after midnight, pending GI evaluation     Hypokalemia  - s/p Repletion  - Monitor BMP    Chronic AFib   - Held Eliquis given ABLA and per cards recs    CAD/ CHF  - Held ASA/Plavix as above   - Resumed Farxiga 10mg QD, Losartan 25mg QD,   - Resumed Lasix 40mg po QD    DM2  -  A1C 11/4/22: 9.7  - on Tresiba 40U in AM  - ISS + Acchecheck     HLD  - Resumed Atorvastatin 40mg QD    Depression/ insomnia   - Resumed Zoloft 100mg QD  - Resumed Trazodone 50mg QD    VTE ppx: SCD, held AC at this time due to severe anemia   Diet: DASH + NPO after midnight   Dispo: Acute, admit inpatient, pending GI eval  ASSESSMENT  79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    PLAN  Acute blood loss anemia   - Dark brown stools  - Hemodynamically stable  - IVF NS @ 100 cc/hr  - H/H 6.2/21.5, cont to monitor CBC  - Type and screen done   - s/p 1U pRBC, 2nd unit ordered   - No need for iron panel at this time given transfusion   - Started on Protonix IV Q12hr  - Held ASA/Plavix/Eliquis per Cardiology recs   - Will not reverse Eliquis given h/o DUANE thrombus   - Maintain Hgb >8, transfuse PRN  - ADRIANE negative   - Never had a colonoscopy   - GI consulted for evaluation   - NPO after midnight, pending GI evaluation     Hypokalemia  - s/p Repletion  - Monitor BMP    Chronic AFib   - Held Eliquis given ABLA and per cards recs    CAD/ CHF  - Held ASA/Plavix as above   - Resumed Farxiga 10mg QD, Losartan 25mg QD,   - Resumed Lasix 40mg po QD    DM2  -  A1C 11/4/22: 9.7  - on Tresiba 40U in AM  - ISS + Acchecheck     HLD  - Resumed Atorvastatin 40mg QD    Depression/ insomnia   - Resumed Zoloft 100mg QD  - Resumed Trazodone 50mg QD    VTE ppx: SCD, held AC at this time due to severe anemia   Diet: DASH + NPO after midnight   Dispo: Acute, admit inpatient, pending GI eval

## 2024-10-23 NOTE — ED PROVIDER NOTE - NSCAREINITIATED _GEN_ER
[facility] PROGRESS NOTE    Follow up for: NING over ckd     Subjective:   Patient seen and examined. Chart, notes, labs, imaging, results all reviewed.      Seen in icu after an episode of seizure in rehab     Off Cardene and nipride drip         ROS:  Gen - no fever, no chills, appetite okay  CV - no chest pain, no orthopnea  Lung - no shortness of breath, no cough  Abd - no tenderness, no nausea, no vomiting  Ext - no edema    Objective:   Exam:  Vitals:    08/09/19 0832 08/09/19 0845 08/09/19 0859 08/09/19 0900   BP:       Pulse:  92  69   Resp: 11 17 11    Temp:       SpO2:  100%  100%   Weight:             Intake/Output Summary (Last 24 hours) at 8/9/2019 0936  Last data filed at 8/9/2019 0900  Gross per 24 hour   Intake 6633.33 ml   Output 3225 ml   Net 3408.33 ml       Current Facility-Administered Medications   Medication Dose Route Frequency    ondansetron (ZOFRAN) 4 mg/2 mL injection        aspirin tablet 325 mg  325 mg Oral DAILY    metoprolol tartrate (LOPRESSOR) tablet 100 mg  100 mg Oral BID    clopidogrel (PLAVIX) tablet 75 mg  75 mg Oral DAILY    citalopram (CELEXA) tablet 10 mg  10 mg Oral DAILY    modafinil (PROVIGIL) tablet 200 mg  200 mg Oral DAILY    nicotine (NICODERM CQ) 14 mg/24 hr patch 1 Patch  1 Patch TransDERmal Q24H    famotidine (PEPCID) tablet 20 mg  20 mg Oral DAILY    hydrALAZINE (APRESOLINE) tablet 25 mg  25 mg Oral TID    ALPRAZolam (XANAX) tablet 0.5 mg  0.5 mg Oral Q8H    sodium chloride (NS) flush 5-40 mL  5-40 mL IntraVENous PRN    HYDROcodone-acetaminophen (NORCO) 7.5-325 mg per tablet 1 Tab  1 Tab Oral Q4H PRN    ondansetron (ZOFRAN) injection 4 mg  4 mg IntraVENous Q4H PRN    heparin (porcine) injection 5,000 Units  5,000 Units SubCUTAneous Q8H    levETIRAcetam (KEPPRA) 1,000 mg in 0.9% sodium chloride 100 mL IVPB  1,000 mg IntraVENous Q12H    fentaNYL citrate (PF) injection 50 mcg  50 mcg IntraVENous Q2H PRN       EXAM  GEN - Alert, oriented, in no distress  CV - S1, S2, RRR, no rub, murmur, or gallop  Lung - clear to auscultation bilaterally  Abd - soft, nontender, BS present  Ext - no edema    Recent Labs     08/09/19 0337 08/08/19 0353 08/07/19  0439   WBC 7.2 8.7 10.0   HGB 7.8* 8.2* 9.7*   HCT 24.4* 25.3* 29.6*    150 145*        Recent Labs     08/09/19  0337 08/08/19  0353 08/07/19 2021 08/07/19  0440 08/07/19  0439    142  --   --   --  141   K 3.3* 3.7  --   --   --  3.9   * 111*  --   --   --  108*   CO2 21 23  --   --   --  24   BUN 19 27*  --   --   --  35*   CREA 3.07* 3.40*  --   --   --  3.81*   CA 8.2* 8.4  --   --  9.2 9.1   * 102*  --   --   --  92   MG 2.1 2.7* 2.9*   < >  --   --     < > = values in this interval not displayed. 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 No family h/o of PCKD known so far .     Discussed with ICU nurse     Miranda Topete MD Harriett Finnegan(PA)

## 2024-10-23 NOTE — ED PROVIDER NOTE - PHYSICAL EXAMINATION
General: Awake, alert, lying in bed in NAD  HEENT: Normocephalic, atraumatic. No scleral icterus or conjunctival injection. EOMI. Moist mucous membranes. Oropharynx clear.   Neck:. Soft and supple.  Cardiac: Irr irr, tachy. Peripheral pulses 2+ and symmetric. No LE edema.  Resp: Lungs CTAB. No accessory muscle use  Abd: Soft, non-tender, non-distended. No guarding, rebound, or rigidity.  Back: Spine midline and non-tender.   Skin: No rashes, abrasions, or lacerations.  Neuro: AO x 4. Moves all extremities symmetrically. Motor strength and sensation grossly intact.  Psych: Appropriate mood and affect

## 2024-10-23 NOTE — ED ADULT NURSE REASSESSMENT NOTE - NS ED NURSE REASSESS COMMENT FT1
blood transfusion started at this time. Consent obtained. Pt verbalized understanding of s/s of transfusion reaction.

## 2024-10-23 NOTE — ED ADULT TRIAGE NOTE - CHIEF COMPLAINT QUOTE
sent by MD for low H/H on outpatient labs today, pt c/o intermittent SOB "for months". on Plavix, Eliquis.

## 2024-10-23 NOTE — CONSULT NOTE ADULT - ASSESSMENT
Assessment  Symptomatic anemia  ? melena on asa/plavix/NOAC  Mod Pul HTN Group2   HFpEF  RV failure ? sec Pul Htn/Left heart disease/severe TR   Severe TR  Chronic AF rate controlled on NOAC  CAD s/p remote CABG/MV repair   Remote protected  LM/LAD PCI  Recent Cx PCI CARIN  Mild functional MS/mild AS preserved EF  COPD   Assessment  Symptomatic anemia  ? melena on asa/plavix/NOAC  Mod Pul HTN Group2   HFpEF  RV failure ? sec Pul Htn/Left heart disease/severe TR   Severe TR  Chronic AF rate controlled on NOAC  CAD s/p remote CABG/MV repair   Remote protected  LM/LAD PCI  Recent Cx PCI CARIN  Mild functional MS/mild AS preserved EF  COPD    Rec  Admit tele  ECG  Hold asa/plavix/eliquis for now  would not reverse eliquis given h/o DUANE thrombus  cont lasix 40 BID for now  cont atenolol 50 BID  cont farxiga/statin  transfuse Hgb > 8.5 with IV lasix   stool giuiac if confirms pos occult blood - GI consult  If cleared by GI would resume plavix given recent Cx PCU 8/2024  will follow  Discussion regarding cardiomems/ severe TR treatment deferred at this time given profound anemia

## 2024-10-23 NOTE — H&P ADULT - CONVERSATION DETAILS
I have explained to the patient their overall medical condition, prognosis and treatment options. Discussed the indication for resuscitation and mechanical ventilation in the event of an emergency including both invasive and non-invasive and the benefits, risk and possible complications of each. Patient had verbalized her understanding and expressed that she is okay with CPR and trial of NIV but would not want to be intubated.

## 2024-10-23 NOTE — ED PROVIDER NOTE - CLINICAL SUMMARY MEDICAL DECISION MAKING FREE TEXT BOX
79F hx CAD s/p CABG, recent PCI several months ago on ASA/Plavix, pAF on Eliquis, moderate pHTN, HTN, HLD, IDDM presents with anemia to 6 on outpt labs. Suspect GIB 2/2 DAPT and AC. Transfuse as needed, PPI, likely admit for GI evaluation

## 2024-10-24 LAB
ALBUMIN SERPL ELPH-MCNC: 3.9 G/DL — SIGNIFICANT CHANGE UP (ref 3.3–5.2)
ALP SERPL-CCNC: 52 U/L — SIGNIFICANT CHANGE UP (ref 40–120)
ALT FLD-CCNC: 13 U/L — SIGNIFICANT CHANGE UP
ANION GAP SERPL CALC-SCNC: 16 MMOL/L — SIGNIFICANT CHANGE UP (ref 5–17)
AST SERPL-CCNC: 42 U/L — HIGH
BASOPHILS # BLD AUTO: 0.05 K/UL — SIGNIFICANT CHANGE UP (ref 0–0.2)
BASOPHILS NFR BLD AUTO: 0.9 % — SIGNIFICANT CHANGE UP (ref 0–2)
BILIRUB SERPL-MCNC: 3 MG/DL — HIGH (ref 0.4–2)
BUN SERPL-MCNC: 20.7 MG/DL — HIGH (ref 8–20)
CALCIUM SERPL-MCNC: 8.8 MG/DL — SIGNIFICANT CHANGE UP (ref 8.4–10.5)
CHLORIDE SERPL-SCNC: 101 MMOL/L — SIGNIFICANT CHANGE UP (ref 96–108)
CO2 SERPL-SCNC: 21 MMOL/L — LOW (ref 22–29)
CREAT SERPL-MCNC: 0.84 MG/DL — SIGNIFICANT CHANGE UP (ref 0.5–1.3)
EGFR: 72 ML/MIN/1.73M2 — SIGNIFICANT CHANGE UP
EOSINOPHIL # BLD AUTO: 0.17 K/UL — SIGNIFICANT CHANGE UP (ref 0–0.5)
EOSINOPHIL NFR BLD AUTO: 3.1 % — SIGNIFICANT CHANGE UP (ref 0–6)
GLUCOSE BLDC GLUCOMTR-MCNC: 150 MG/DL — HIGH (ref 70–99)
GLUCOSE BLDC GLUCOMTR-MCNC: 177 MG/DL — HIGH (ref 70–99)
GLUCOSE BLDC GLUCOMTR-MCNC: 183 MG/DL — HIGH (ref 70–99)
GLUCOSE BLDC GLUCOMTR-MCNC: 370 MG/DL — HIGH (ref 70–99)
GLUCOSE SERPL-MCNC: 157 MG/DL — HIGH (ref 70–99)
HCT VFR BLD CALC: 28.1 % — LOW (ref 34.5–45)
HGB BLD-MCNC: 8.3 G/DL — LOW (ref 11.5–15.5)
IMM GRANULOCYTES NFR BLD AUTO: 0.4 % — SIGNIFICANT CHANGE UP (ref 0–0.9)
LYMPHOCYTES # BLD AUTO: 1.14 K/UL — SIGNIFICANT CHANGE UP (ref 1–3.3)
LYMPHOCYTES # BLD AUTO: 20.7 % — SIGNIFICANT CHANGE UP (ref 13–44)
MAGNESIUM SERPL-MCNC: 2.2 MG/DL — SIGNIFICANT CHANGE UP (ref 1.6–2.6)
MCHC RBC-ENTMCNC: 20.7 PG — LOW (ref 27–34)
MCHC RBC-ENTMCNC: 29.5 GM/DL — LOW (ref 32–36)
MCV RBC AUTO: 70.1 FL — LOW (ref 80–100)
MONOCYTES # BLD AUTO: 0.73 K/UL — SIGNIFICANT CHANGE UP (ref 0–0.9)
MONOCYTES NFR BLD AUTO: 13.3 % — SIGNIFICANT CHANGE UP (ref 2–14)
NEUTROPHILS # BLD AUTO: 3.39 K/UL — SIGNIFICANT CHANGE UP (ref 1.8–7.4)
NEUTROPHILS NFR BLD AUTO: 61.6 % — SIGNIFICANT CHANGE UP (ref 43–77)
PHOSPHATE SERPL-MCNC: 3.3 MG/DL — SIGNIFICANT CHANGE UP (ref 2.4–4.7)
PLATELET # BLD AUTO: 235 K/UL — SIGNIFICANT CHANGE UP (ref 150–400)
POTASSIUM SERPL-MCNC: 3.8 MMOL/L — SIGNIFICANT CHANGE UP (ref 3.5–5.3)
POTASSIUM SERPL-SCNC: 3.8 MMOL/L — SIGNIFICANT CHANGE UP (ref 3.5–5.3)
PROT SERPL-MCNC: 6.3 G/DL — LOW (ref 6.6–8.7)
RBC # BLD: 4.01 M/UL — SIGNIFICANT CHANGE UP (ref 3.8–5.2)
RBC # FLD: 23.5 % — HIGH (ref 10.3–14.5)
SODIUM SERPL-SCNC: 137 MMOL/L — SIGNIFICANT CHANGE UP (ref 135–145)
WBC # BLD: 5.5 K/UL — SIGNIFICANT CHANGE UP (ref 3.8–10.5)
WBC # FLD AUTO: 5.5 K/UL — SIGNIFICANT CHANGE UP (ref 3.8–10.5)

## 2024-10-24 PROCEDURE — 99233 SBSQ HOSP IP/OBS HIGH 50: CPT

## 2024-10-24 PROCEDURE — 99223 1ST HOSP IP/OBS HIGH 75: CPT

## 2024-10-24 PROCEDURE — 74177 CT ABD & PELVIS W/CONTRAST: CPT | Mod: 26

## 2024-10-24 RX ORDER — DAPAGLIFLOZIN 10 MG/1
10 TABLET, FILM COATED ORAL DAILY
Refills: 0 | Status: DISCONTINUED | OUTPATIENT
Start: 2024-10-24 | End: 2024-10-24

## 2024-10-24 RX ORDER — SERTRALINE HYDROCHLORIDE 50 MG/1
100 TABLET, FILM COATED ORAL DAILY
Refills: 0 | Status: DISCONTINUED | OUTPATIENT
Start: 2024-10-24 | End: 2024-10-27

## 2024-10-24 RX ORDER — B-COMPLEX WITH VITAMIN C
1 VIAL (ML) INJECTION DAILY
Refills: 0 | Status: DISCONTINUED | OUTPATIENT
Start: 2024-10-24 | End: 2024-10-29

## 2024-10-24 RX ORDER — TRAZODONE HYDROCHLORIDE 100 MG/1
50 TABLET ORAL AT BEDTIME
Refills: 0 | Status: DISCONTINUED | OUTPATIENT
Start: 2024-10-24 | End: 2024-10-29

## 2024-10-24 RX ORDER — LOSARTAN POTASSIUM 25 MG/1
25 TABLET ORAL DAILY
Refills: 0 | Status: DISCONTINUED | OUTPATIENT
Start: 2024-10-24 | End: 2024-10-26

## 2024-10-24 RX ORDER — INSULIN LISPRO 100/ML
VIAL (ML) SUBCUTANEOUS
Refills: 0 | Status: DISCONTINUED | OUTPATIENT
Start: 2024-10-24 | End: 2024-10-29

## 2024-10-24 RX ORDER — GLUCAGON INJECTION, SOLUTION 1 MG/.2ML
1 INJECTION, SOLUTION SUBCUTANEOUS ONCE
Refills: 0 | Status: DISCONTINUED | OUTPATIENT
Start: 2024-10-24 | End: 2024-10-29

## 2024-10-24 RX ORDER — PANTOPRAZOLE SODIUM 40 MG/1
40 TABLET, DELAYED RELEASE ORAL EVERY 12 HOURS
Refills: 0 | Status: DISCONTINUED | OUTPATIENT
Start: 2024-10-24 | End: 2024-10-29

## 2024-10-24 RX ORDER — INSULIN LISPRO 100/ML
VIAL (ML) SUBCUTANEOUS AT BEDTIME
Refills: 0 | Status: DISCONTINUED | OUTPATIENT
Start: 2024-10-24 | End: 2024-10-29

## 2024-10-24 RX ORDER — FUROSEMIDE 40 MG
40 TABLET ORAL DAILY
Refills: 0 | Status: DISCONTINUED | OUTPATIENT
Start: 2024-10-24 | End: 2024-10-24

## 2024-10-24 RX ORDER — SODIUM CHLORIDE 9 MG/ML
1000 INJECTION, SOLUTION INTRAMUSCULAR; INTRAVENOUS; SUBCUTANEOUS
Refills: 0 | Status: DISCONTINUED | OUTPATIENT
Start: 2024-10-24 | End: 2024-10-24

## 2024-10-24 RX ORDER — ASCORBIC ACID 500 MG
500 TABLET ORAL DAILY
Refills: 0 | Status: DISCONTINUED | OUTPATIENT
Start: 2024-10-24 | End: 2024-10-29

## 2024-10-24 RX ADMIN — Medication 500 MILLIGRAM(S): at 17:00

## 2024-10-24 RX ADMIN — Medication 1 TABLET(S): at 17:00

## 2024-10-24 RX ADMIN — Medication 2: at 09:20

## 2024-10-24 RX ADMIN — SODIUM CHLORIDE 100 MILLILITER(S): 9 INJECTION, SOLUTION INTRAMUSCULAR; INTRAVENOUS; SUBCUTANEOUS at 09:15

## 2024-10-24 RX ADMIN — Medication 40 MILLIGRAM(S): at 21:55

## 2024-10-24 RX ADMIN — SERTRALINE HYDROCHLORIDE 100 MILLIGRAM(S): 50 TABLET, FILM COATED ORAL at 21:55

## 2024-10-24 RX ADMIN — Medication 40 MILLIGRAM(S): at 05:25

## 2024-10-24 RX ADMIN — LOSARTAN POTASSIUM 25 MILLIGRAM(S): 25 TABLET ORAL at 05:24

## 2024-10-24 RX ADMIN — PANTOPRAZOLE SODIUM 40 MILLIGRAM(S): 40 TABLET, DELAYED RELEASE ORAL at 05:25

## 2024-10-24 RX ADMIN — TRAZODONE HYDROCHLORIDE 50 MILLIGRAM(S): 100 TABLET ORAL at 21:55

## 2024-10-24 RX ADMIN — Medication 2: at 12:45

## 2024-10-24 RX ADMIN — PANTOPRAZOLE SODIUM 40 MILLIGRAM(S): 40 TABLET, DELAYED RELEASE ORAL at 17:00

## 2024-10-24 RX ADMIN — Medication 6: at 21:56

## 2024-10-24 NOTE — CONSULT NOTE ADULT - NS ATTEND AMEND GEN_ALL_CORE FT
Patient seen and examined at bedside. Here with anemia and decompensated CHF. She has never had an EGD/COLON. No signs of acute GIB. Please hold farxiga (this needs to be held for 72 hours prior to EGD/COLON). PPI daily. Avoid nsaids. Once she is optimized, plan for EGD/COLON on Monday.

## 2024-10-24 NOTE — PATIENT PROFILE ADULT - FUNCTIONAL ASSESSMENT - DAILY ACTIVITY ASSESSMENT TYPE
Anesthesia Pre Eval Note    Anesthesia ROS/Med Hx        Anesthetic Complication History:  Patient does not have a history of anesthetic complications      Pulmonary Review:  Patient does not have a pulmonary history      Neuro/Psych Review:  Patient does not have a neuro/psych history       Cardiovascular Review:  Patient does not have a cardiovascular history       GI/HEPATIC/RENAL Review:  Comments: Abdominal pain, appendicitis    End/Other Review:  Patient does not have an endo/other history        Relevant Problems   No relevant active problems       Physical Exam     Airway     Unable to Examine: Uncooperative    Cardiovascular    Cardio Rhythm: Regular    Head Assessment  Head assessment: Normocephalic    General Assessment  General Assessment: Mild distress      Anesthesia Plan  No phone call attempted due to:  ASA Status: 1    Anesthesia Type: General    Induction: Intravenous  Preferred Airway Type: ETT      Post-op Pain Management: Per Surgeon        
Admission

## 2024-10-24 NOTE — CONSULT NOTE ADULT - ASSESSMENT
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    Anemia  - Hgb 6.2 on admission   - S/P 2 U PRBC  - Hgb now 8.8  - No need for iron panel at this time given transfusion   - Started on Protonix IV Q12hr  - Held ASA/Plavix/Eliquis per Cardiology recs   - Will not reverse Eliquis given h/o DUANE thrombus   - Maintain Hgb >8, transfuse PRN  - ADRIANE negative   - Never had a colonoscopy   - GI following -- CT A/P pending, further recs pending results - No plans for urgent or emergent egd/colonoscopy at this time - Patient will need to be off Farxiga for 5 days prior to colonoscopy   - Will check complete anemia work up    Chronic AFib   - Held Eliquis given ABLA and per cards recs    Will follow  79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    Anemia  - Hgb 6.2 on admission   - S/P 2 U PRBC  - Hgb now 8.8  - Started on Protonix IV Q12hr  - Held ASA/Plavix/Eliquis per Cardiology recs   - Will not reverse Eliquis given h/o DUANE thrombus   - Maintain Hgb >8, transfuse PRN  - ADRIANE negative   - Never had a colonoscopy   - GI following -- CT A/P pending, further recs pending results - No plans for urgent or emergent egd/colonoscopy at this time - Patient will need to be off Farxiga for 5 days prior to colonoscopy   - T Bili 3- will check hemolysis work up   - Will check complete anemia work up    Chronic AFib   - Held Eliquis given ABLA and per cards recs    Will follow

## 2024-10-24 NOTE — PROGRESS NOTE ADULT - ASSESSMENT
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    PLAN  Acute blood loss anemia   - Dark brown stools  - Hemodynamically stable  -s/p  IVF NS @ 100 cc/hr dc @1500 10/24  - H/H 6.2/21.5 s/p 2 u PRBC   - Started on Protonix IV Q12hr  - Held ASA/Plavix/Eliquis per Cardiology recs   - Will not reverse Eliquis given h/o DUANE thrombus   - Maintain Hgb >8, transfuse PRN  - ADRIANE negative   - Never had a colonoscopy   - GI following- farxiga held- as per GI needs to be held 5d prior to csope  -Heme consulted    Hypokalemia  - s/p Repletion  - Monitor BMP    Chronic AFib   - Held Eliquis given ABLA and per cards recs    CAD/ CHF  - Held ASA/Plavix as above   - Resumed Farxiga 10mg QD, Losartan 25mg QD,   - Resumed Lasix 40mg po QD    DM2  -  A1C 11/4/22: 9.7  - on Tresiba 40U in AM  - ISS + Acchecheck     HLD  - Resumed Atorvastatin 40mg QD    Depression/ insomnia   - Resumed Zoloft 100mg QD  - Resumed Trazodone 50mg QD    VTE ppx: SCD, held AC at this time due to severe anemia   Diet: DASH   Dispo: Acute, monitor for gib   79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    PLAN  Acute blood loss anemia   - Dark brown stools  - Hemodynamically stable  -s/p  IVF NS @ 100 cc/hr dc @1500 10/24  - H/H 6.2/21.5 s/p 2 u PRBC   - Started on Protonix IV Q12hr  - Held ASA/Plavix/Eliquis per Cardiology recs   - Will not reverse Eliquis given h/o DUANE thrombus   - Maintain Hgb >8, transfuse PRN  - ADRIANE negative   - Never had a colonoscopy   - GI following- farxiga held- as per GI needs to be held 5d prior to csope  -Heme consulted  Addendum  CTA/P noted lytic lesion in femur (b/l but right reported to be increased in size)  will follow up w MRI femur.   Obtain echo, probnp    Hypokalemia  - s/p Repletion  - Monitor BMP    Chronic AFib   - Held Eliquis given ABLA and per cards recs    CAD/ CHF  - Held ASA/Plavix as above   - Resumed Farxiga 10mg QD, Losartan 25mg QD,   - Resumed Lasix 40mg po QD    DM2  -  A1C 11/4/22: 9.7  - on Tresiba 40U in AM  - ISS + Acchecheck     HLD  - Resumed Atorvastatin 40mg QD    Depression/ insomnia   - Resumed Zoloft 100mg QD  - Resumed Trazodone 50mg QD    VTE ppx: SCD, held AC at this time due to severe anemia   Diet: DASH   Dispo: Acute, monitor for gib

## 2024-10-24 NOTE — PROGRESS NOTE ADULT - SUBJECTIVE AND OBJECTIVE BOX
DONALD SAUNDERS  79y  Female      Patient is a 79y old  Female who presents with a chief complaint of Acute blood loss anemia (24 Oct 2024 11:30)      INTERVAL HPI/OVERNIGHT EVENTS:  Seen and examined, sitting in chair very pleasant  Reports brown BM  No acute distress      REVIEW OF SYSTEMS:  CONSTITUTIONAL: No fever, weight loss, or fatigue  EYES: No eye pain, visual disturbances, or discharge  ENMT:  No difficulty hearing, tinnitus, vertigo; No sinus or throat pain  NECK: No pain or stiffness  BREASTS: No pain, masses, or nipple discharge  RESPIRATORY: No cough, wheezing, chills or hemoptysis; No shortness of breath  CARDIOVASCULAR: No chest pain, palpitations, dizziness, or leg swelling  GASTROINTESTINAL: No abdominal or epigastric pain. No nausea, vomiting, or hematemesis; No diarrhea or constipation. No melena or hematochezia.  GENITOURINARY: No dysuria, frequency, hematuria, or incontinence  NEUROLOGICAL: No headaches, memory loss, loss of strength, numbness, or tremors  SKIN: No itching, burning, rashes, or lesions   LYMPH NODES: No enlarged glands  ENDOCRINE: No heat or cold intolerance; No hair loss  MUSCULOSKELETAL: No joint pain or swelling; No muscle, back, or extremity pain  PSYCHIATRIC: No depression, anxiety, mood swings, or difficulty sleeping  HEME/LYMPH: No easy bruising, or bleeding gums  ALLERY AND IMMUNOLOGIC: No hives or eczema    T(C): 36.6 (10-24-24 @ 08:07), Max: 36.9 (10-24-24 @ 00:40)  HR: 94 (10-24-24 @ 08:07) (68 - 100)  BP: 124/66 (10-24-24 @ 08:07) (103/60 - 125/65)  RR: 18 (10-24-24 @ 08:07) (16 - 20)  SpO2: 97% (10-24-24 @ 08:07) (97% - 100%)  Wt(kg): --Vital Signs Last 24 Hrs  T(C): 36.6 (24 Oct 2024 08:07), Max: 36.9 (24 Oct 2024 00:40)  T(F): 97.8 (24 Oct 2024 08:07), Max: 98.5 (24 Oct 2024 00:40)  HR: 94 (24 Oct 2024 08:07) (68 - 100)  BP: 124/66 (24 Oct 2024 08:07) (103/60 - 125/65)  BP(mean): --  RR: 18 (24 Oct 2024 08:07) (16 - 20)  SpO2: 97% (24 Oct 2024 08:07) (97% - 100%)    Parameters below as of 24 Oct 2024 08:07  Patient On (Oxygen Delivery Method): room air        PHYSICAL EXAM:  GENERAL: NAD, well-groomed, well-developed  HEAD:  Atraumatic, Normocephalic  EYES: EOMI, PERRLA, conjunctiva and sclera clear  ENMT: No tonsillar erythema, exudates, or enlargement; Moist mucous membranes, Good dentition, No lesions  NECK: Supple, No JVD, Normal thyroid  NERVOUS SYSTEM:  Alert & Oriented X3, Good concentration;    CHEST/LUNG: Clear to percussion bilaterally; No rales, rhonchi, wheezing, or rubs  HEART: Regular rate and rhythm; No murmurs, rubs, or gallops  ABDOMEN: Soft, Nontender, Nondistended; Bowel sounds present  EXTREMITIES:  +2 edema bl  LYMPH: No lymphadenopathy noted  SKIN: No rashes or lesions    Consultant(s) Notes Reviewed:  [x ] YES  [ ] NO  Care Discussed with Consultants/Other Providers [ x] YES  [ ] NO    LABS:                        8.3    5.50  )-----------( 235      ( 24 Oct 2024 05:27 )             28.1     10-24    137  |  101  |  20.7[H]  ----------------------------<  157[H]  3.8   |  21.0[L]  |  0.84    Ca    8.8      24 Oct 2024 05:27  Phos  3.3     10-24  Mg     2.2     10-24    TPro  6.3[L]  /  Alb  3.9  /  TBili  3.0[H]  /  DBili  x   /  AST  42[H]  /  ALT  13  /  AlkPhos  52  10-24    PT/INR - ( 23 Oct 2024 16:55 )   PT: 20.7 sec;   INR: 1.79 ratio         PTT - ( 23 Oct 2024 16:55 )  PTT:35.1 sec  Urinalysis Basic - ( 24 Oct 2024 05:27 )    Color: x / Appearance: x / SG: x / pH: x  Gluc: 157 mg/dL / Ketone: x  / Bili: x / Urobili: x   Blood: x / Protein: x / Nitrite: x   Leuk Esterase: x / RBC: x / WBC x   Sq Epi: x / Non Sq Epi: x / Bacteria: x      CAPILLARY BLOOD GLUCOSE      POCT Blood Glucose.: 177 mg/dL (24 Oct 2024 12:28)  POCT Blood Glucose.: 183 mg/dL (24 Oct 2024 09:17)        Urinalysis Basic - ( 24 Oct 2024 05:27 )    Color: x / Appearance: x / SG: x / pH: x  Gluc: 157 mg/dL / Ketone: x  / Bili: x / Urobili: x   Blood: x / Protein: x / Nitrite: x   Leuk Esterase: x / RBC: x / WBC x   Sq Epi: x / Non Sq Epi: x / Bacteria: x        RADIOLOGY & ADDITIONAL TESTS:    Imaging Personally Reviewed:  [ ] YES  [ ] NO    HEALTH ISSUES - PROBLEM Dx:

## 2024-10-24 NOTE — PATIENT PROFILE ADULT - NSPROPATIENTLACTATING_GEN_A_NUR
Patient: Christin Shah    Procedure Summary     Date: 04/11/23 Room / Location: [address] [place] [address] ENDOSCOPY 05 / [address] [place] [address] ENDOSCOPY    Anesthesia Start: 6108 Anesthesia Stop:     Procedures:       COLONOSCOPY      ESOPHAGOGASTRODUODENOSCOPY (EGD) Diagnosis:       Dyspepsia      Bloating      Hx of colonic polyp      (gastritis, hemorrhoids)    Surgeons: Corbin Krabbe, MD Anesthesiologist: Andria Escobar MD    Anesthesia Type: MAC ASA Status: 2          Anesthesia Type: MAC    Vitals Value Taken Time   /69 04/11/23 1328   Temp 97.1 04/11/23 1328   Pulse 63 04/11/23 1328   Resp 16 04/11/23 1328   SpO2 100 % 04/11/23 1328       [address] [place] [address] AN Post Evaluation:   Patient Evaluated in PACU  Patient Participation: complete - patient participated  Level of Consciousness: awake  Pain Score: 0  Pain Management: adequate  Airway Patency:patent  Dental exam unchanged from preop  Yes    Cardiovascular Status: acceptable  Respiratory Status: acceptable  Postoperative Hydration acceptable      Bruna Pugh CRNA  4/11/2023 1:28 PM no

## 2024-10-24 NOTE — CONSULT NOTE ADULT - ASSESSMENT
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 GI consulted for symptomatic anemia    #symptomatic anemia     Hgb on admission 6.2gm, s/p 2U pRBCs, repeat 8.8gm  BUN slightly elevated    - CT A/P pending, further recs pending results    - Trend CBC, transfuse as needed. Monitor for signs of bleeding.   - Avoid NSAIDs  - IV PPI BID for GI mucosal cytoprotection  - No plans for urgent or emergent egd/colonoscopy at this time  - Patient will need to be off Farxiga for 5 days prior to colonoscopy   _________________________________________________________________  Assessment and recommendations are final when note is signed by the attending physician.  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 GI consulted for symptomatic anemia    #symptomatic anemia     Hgb on admission 6.2gm, s/p 2U pRBCs, repeat 8.8gm  BUN slightly elevated    - CT A/P pending, further recs pending results    - Trend CBC, transfuse as needed. Monitor for signs of bleeding.   - Avoid NSAIDs  - IV PPI BID for GI mucosal cytoprotection  - No plans for urgent or emergent egd/colonoscopy at this time  - Patient will need to be off Farxiga for 5 days prior to colonoscopy   _________________________________________________________________  Assessment and recommendations are final when note is signed by the attending physician.

## 2024-10-24 NOTE — PATIENT PROFILE ADULT - FALL HARM RISK - HARM RISK INTERVENTIONS

## 2024-10-24 NOTE — PATIENT PROFILE ADULT - NSPROGENPREVTRANSF_GEN_A_NUR
Spoke w/ pt regarding inpatient status and d/c from hospital for  labor and pre-eclamptic issues. Pt advised if she started feeling that she was jon consistently or bleeding or elevated B/P  she needed to present to L & D/ ED at [facility]. Was inpatient for observation at [facility] but being d/c'd 22.     ----- Message from Radha White sent at 2022 12:29 PM CDT -----  Regarding: pt advice  Contact: Pt  Pt is calling to speak to nurse. States that she has been admitted to [facility]. States that she asked about being transferred but is being told that her bp is not stable. Please call back at [phone]//thank you acc        with patient no

## 2024-10-24 NOTE — CONSULT NOTE ADULT - SUBJECTIVE AND OBJECTIVE BOX
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 Patient reports she is not able to stand up or do any activity without feeling significant SOB. GI consulted for anemia. Patient currently on Eliquis, Plavix/Asa. Reports progressively dyspnea and exertion for about 5 weeks. She tired to make an appointment with her cardiologist for an appointment, however she could not get a timely appointment. Hgb on admission 6.2gm, s/p 2U pRBCs. No prior EGD/colonoscopy in past. Denies nausea, vomiting, abdominal pain, unintentional weight loss, hematochezia and melena. No family history of colon cancer. Former smoker. Denies alcohol and illicit drug use.       PAST MEDICAL & SURGICAL HISTORY:  Hypertension      Hypercholesterolemia      Paroxysmal atrial fibrillation      PAD (peripheral artery disease)      Type 2 diabetes mellitus with diabetic peripheral angiopathy without gangrene, with long-term curren      Coronary artery disease involving native coronary artery of native heart with angina pectoris      H/O abdominal hysterectomy      H/O hernia repair      S/P femoral-popliteal bypass surgery      S/P peripheral artery angioplasty with stent placement      Status post three vessel coronary artery bypass      History of mitral valve replacement with cardiopulmonary bypass          REVIEW OF SYSTEMS:   General: Negative  HEENT: Negative  CV: Negative  Respiratory: Negative  GI: See HPI  : Negative  MSK: Negative  Hematologic: Negative  Skin: Negative    MEDICATIONS:   MEDICATIONS  (STANDING):  ascorbic acid 500 milliGRAM(s) Oral daily  atorvastatin 40 milliGRAM(s) Oral at bedtime  dapagliflozin 10 milliGRAM(s) Oral daily  dextrose 5%. 1000 milliLiter(s) (50 mL/Hr) IV Continuous <Continuous>  dextrose 5%. 1000 milliLiter(s) (100 mL/Hr) IV Continuous <Continuous>  dextrose 50% Injectable 25 Gram(s) IV Push once  dextrose 50% Injectable 12.5 Gram(s) IV Push once  dextrose 50% Injectable 25 Gram(s) IV Push once  glucagon  Injectable 1 milliGRAM(s) IntraMuscular once  insulin lispro (ADMELOG) corrective regimen sliding scale   SubCutaneous three times a day before meals  insulin lispro (ADMELOG) corrective regimen sliding scale   SubCutaneous at bedtime  losartan 25 milliGRAM(s) Oral daily  multivitamin 1 Tablet(s) Oral daily  pantoprazole  Injectable 40 milliGRAM(s) IV Push every 12 hours  sertraline 100 milliGRAM(s) Oral daily  sodium chloride 0.9%. 1000 milliLiter(s) (100 mL/Hr) IV Continuous <Continuous>  traZODone 50 milliGRAM(s) Oral at bedtime    MEDICATIONS  (PRN):  acetaminophen     Tablet .. 650 milliGRAM(s) Oral every 6 hours PRN Temp greater or equal to 38C (100.4F), Mild Pain (1 - 3)  aluminum hydroxide/magnesium hydroxide/simethicone Suspension 30 milliLiter(s) Oral every 4 hours PRN Dyspepsia  dextrose Oral Gel 15 Gram(s) Oral once PRN Blood Glucose LESS THAN 70 milliGRAM(s)/deciliter  melatonin 3 milliGRAM(s) Oral at bedtime PRN Insomnia  ondansetron Injectable 4 milliGRAM(s) IV Push every 8 hours PRN Nausea and/or Vomiting      Packed Red Cells Order:  1 Unit  Indication: Hgb <7 gm/dL  Infuse Unit : 3 Hours (10-23-24 @ 18:31)  Packed Red Cells Order:  1 Unit  Indication: Hgb <7 gm/dL  Infuse Unit : 3 Hours (10-23-24 @ 17:30)      DIET:  Diet, NPO after Midnight:      NPO Start Date: 23-Oct-2024,   NPO Start Time: 23:59 (10-23-24 @ 17:30) [Active]  Diet, DASH/TLC:   Sodium & Cholesterol Restricted (10-23-24 @ 17:30) [Active]          ALLERGIES:   Allergies    warfarin (Rash)  codeine (Rash)    Intolerances    OHS (Unknown)      Substance Use:   (  ) never used  (  ) other:  Tobacco Usage:  (   ) never smoked   (   ) former smoker   (   ) current smoker  (     ) pack year  (        ) last cigarette date  Alcohol Usage:    Family History   IBD (  ) Yes   (  ) No  GI Malignancy (  )  Yes    (  ) No    Health Management  Last Colonoscopy:  Last Endoscopy:     VITAL SIGNS:   Vital Signs Last 24 Hrs  T(C): 36.6 (24 Oct 2024 08:07), Max: 36.9 (24 Oct 2024 00:40)  T(F): 97.8 (24 Oct 2024 08:07), Max: 98.5 (24 Oct 2024 00:40)  HR: 94 (24 Oct 2024 08:07) (68 - 135)  BP: 124/66 (24 Oct 2024 08:07) (103/60 - 128/62)  BP(mean): --  RR: 18 (24 Oct 2024 08:07) (16 - 20)  SpO2: 97% (24 Oct 2024 08:07) (97% - 100%)    Parameters below as of 24 Oct 2024 08:07  Patient On (Oxygen Delivery Method): room air      I&O's Summary      PHYSICAL EXAM:   GENERAL:  No acute distress  HEENT:  NC/AT, conjunctiva clear, sclera anicteric  CHEST:  No increased effort  HEART:  Regular rate  ABDOMEN:  Soft, non-tender, non-distended, normoactive bowel sounds, no rebound or guarding  EXTREMITIES: No edema  SKIN:  Warm, dry  NEURO:  Calm, cooperative    LABS:                        8.3    5.50  )-----------( 235      ( 24 Oct 2024 05:27 )             28.1     Hemoglobin: 8.3 g/dL (10-24-24 @ 05:27)  Hemoglobin: 6.2 g/dL (10-23-24 @ 16:55)    10-24    137  |  101  |  20.7[H]  ----------------------------<  157[H]  3.8   |  21.0[L]  |  0.84    Ca    8.8      24 Oct 2024 05:27  Phos  3.3     10-24  Mg     2.2     10-24    TPro  6.3[L]  /  Alb  3.9  /  TBili  3.0[H]  /  DBili  x   /  AST  42[H]  /  ALT  13  /  AlkPhos  52  10-24    LIVER FUNCTIONS - ( 24 Oct 2024 05:27 )  Alb: 3.9 g/dL / Pro: 6.3 g/dL / ALK PHOS: 52 U/L / ALT: 13 U/L / AST: 42 U/L / GGT: x             PT/INR - ( 23 Oct 2024 16:55 )   PT: 20.7 sec;   INR: 1.79 ratio         PTT - ( 23 Oct 2024 16:55 )  PTT:35.1 sec      RADIOLOGY & ADDITIONAL STUDIES:         Chief Complaint:  Patient is a 79y old  Female who presents with a chief complaint of Acute blood loss anemia (24 Oct 2024 10:07)    HPI:  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 Patient reports she is not able to stand up or do any activity without feeling significant SOB. GI consulted for anemia. Patient currently on Eliquis, Plavix/Asa. Reports progressively dyspnea and exertion for about 5 weeks. She tired to make an appointment with her cardiologist for an appointment, however she could not get a timely appointment. Hgb on admission 6.2gm, s/p 2U pRBCs. No prior EGD/colonoscopy in past. Denies nausea, vomiting, abdominal pain, unintentional weight loss, hematochezia and melena. No family history of colon cancer. Former smoker. Denies alcohol and illicit drug use.       PAST MEDICAL & SURGICAL HISTORY:  Hypertension      Hypercholesterolemia      Paroxysmal atrial fibrillation      PAD (peripheral artery disease)      Type 2 diabetes mellitus with diabetic peripheral angiopathy without gangrene, with long-term curren      Coronary artery disease involving native coronary artery of native heart with angina pectoris      H/O abdominal hysterectomy      H/O hernia repair      S/P femoral-popliteal bypass surgery      S/P peripheral artery angioplasty with stent placement      Status post three vessel coronary artery bypass      History of mitral valve replacement with cardiopulmonary bypass          REVIEW OF SYSTEMS:   General: Negative  HEENT: Negative  CV: Negative  Respiratory: Negative  GI: See HPI  : Negative  MSK: Negative  Hematologic: Negative  Skin: Negative    MEDICATIONS:   MEDICATIONS  (STANDING):  ascorbic acid 500 milliGRAM(s) Oral daily  atorvastatin 40 milliGRAM(s) Oral at bedtime  dapagliflozin 10 milliGRAM(s) Oral daily  dextrose 5%. 1000 milliLiter(s) (50 mL/Hr) IV Continuous <Continuous>  dextrose 5%. 1000 milliLiter(s) (100 mL/Hr) IV Continuous <Continuous>  dextrose 50% Injectable 25 Gram(s) IV Push once  dextrose 50% Injectable 12.5 Gram(s) IV Push once  dextrose 50% Injectable 25 Gram(s) IV Push once  glucagon  Injectable 1 milliGRAM(s) IntraMuscular once  insulin lispro (ADMELOG) corrective regimen sliding scale   SubCutaneous three times a day before meals  insulin lispro (ADMELOG) corrective regimen sliding scale   SubCutaneous at bedtime  losartan 25 milliGRAM(s) Oral daily  multivitamin 1 Tablet(s) Oral daily  pantoprazole  Injectable 40 milliGRAM(s) IV Push every 12 hours  sertraline 100 milliGRAM(s) Oral daily  sodium chloride 0.9%. 1000 milliLiter(s) (100 mL/Hr) IV Continuous <Continuous>  traZODone 50 milliGRAM(s) Oral at bedtime    MEDICATIONS  (PRN):  acetaminophen     Tablet .. 650 milliGRAM(s) Oral every 6 hours PRN Temp greater or equal to 38C (100.4F), Mild Pain (1 - 3)  aluminum hydroxide/magnesium hydroxide/simethicone Suspension 30 milliLiter(s) Oral every 4 hours PRN Dyspepsia  dextrose Oral Gel 15 Gram(s) Oral once PRN Blood Glucose LESS THAN 70 milliGRAM(s)/deciliter  melatonin 3 milliGRAM(s) Oral at bedtime PRN Insomnia  ondansetron Injectable 4 milliGRAM(s) IV Push every 8 hours PRN Nausea and/or Vomiting      Packed Red Cells Order:  1 Unit  Indication: Hgb <7 gm/dL  Infuse Unit : 3 Hours (10-23-24 @ 18:31)  Packed Red Cells Order:  1 Unit  Indication: Hgb <7 gm/dL  Infuse Unit : 3 Hours (10-23-24 @ 17:30)      DIET:  Diet, NPO after Midnight:      NPO Start Date: 23-Oct-2024,   NPO Start Time: 23:59 (10-23-24 @ 17:30) [Active]  Diet, DASH/TLC:   Sodium & Cholesterol Restricted (10-23-24 @ 17:30) [Active]          ALLERGIES:   Allergies    warfarin (Rash)  codeine (Rash)    Intolerances    OHS (Unknown)      Substance Use:   (  ) never used  (  ) other:  Tobacco Usage:  (   ) never smoked   (   ) former smoker   (   ) current smoker  (     ) pack year  (        ) last cigarette date  Alcohol Usage:    Family History   IBD (  ) Yes   (  ) No  GI Malignancy (  )  Yes    (  ) No    Health Management  Last Colonoscopy:  Last Endoscopy:     VITAL SIGNS:   Vital Signs Last 24 Hrs  T(C): 36.6 (24 Oct 2024 08:07), Max: 36.9 (24 Oct 2024 00:40)  T(F): 97.8 (24 Oct 2024 08:07), Max: 98.5 (24 Oct 2024 00:40)  HR: 94 (24 Oct 2024 08:07) (68 - 135)  BP: 124/66 (24 Oct 2024 08:07) (103/60 - 128/62)  BP(mean): --  RR: 18 (24 Oct 2024 08:07) (16 - 20)  SpO2: 97% (24 Oct 2024 08:07) (97% - 100%)    Parameters below as of 24 Oct 2024 08:07  Patient On (Oxygen Delivery Method): room air      I&O's Summary      PHYSICAL EXAM:   GENERAL:  No acute distress  HEENT:  NC/AT, conjunctiva clear, sclera anicteric  CHEST:  No increased effort  HEART:  Regular rate  ABDOMEN:  Soft, non-tender, non-distended, normoactive bowel sounds, no rebound or guarding  EXTREMITIES: No edema  SKIN:  Warm, dry  NEURO:  Calm, cooperative    LABS:                        8.3    5.50  )-----------( 235      ( 24 Oct 2024 05:27 )             28.1     Hemoglobin: 8.3 g/dL (10-24-24 @ 05:27)  Hemoglobin: 6.2 g/dL (10-23-24 @ 16:55)    10-24    137  |  101  |  20.7[H]  ----------------------------<  157[H]  3.8   |  21.0[L]  |  0.84    Ca    8.8      24 Oct 2024 05:27  Phos  3.3     10-24  Mg     2.2     10-24    TPro  6.3[L]  /  Alb  3.9  /  TBili  3.0[H]  /  DBili  x   /  AST  42[H]  /  ALT  13  /  AlkPhos  52  10-24    LIVER FUNCTIONS - ( 24 Oct 2024 05:27 )  Alb: 3.9 g/dL / Pro: 6.3 g/dL / ALK PHOS: 52 U/L / ALT: 13 U/L / AST: 42 U/L / GGT: x             PT/INR - ( 23 Oct 2024 16:55 )   PT: 20.7 sec;   INR: 1.79 ratio         PTT - ( 23 Oct 2024 16:55 )  PTT:35.1 sec      RADIOLOGY & ADDITIONAL STUDIES:

## 2024-10-24 NOTE — CONSULT NOTE ADULT - SUBJECTIVE AND OBJECTIVE BOX
79F hx CAD s/p CABG, recent PCI several months ago on ASA/Plavix, pAF on Eliquis, moderate pHTN, HTN, HLD, IDDM presents with anemia to 6 on outpt labs. Spoke with pt and pt's cardiologist who state pt is on aspirin, Plavix, Eliquis. Over the past few months pt has become progressive dyspneic on exertion, now with minimal activity. Stool has become progressively darker over the past few weeks as well, and associated with lightheadedness. Saw her cardiologist in the office yesterday where she was noted to be paler and more short of breath, bloodwork demonstrated anemia so pt was instructed to present to ED. Pt denies fever, chills, abd pain, back pain, CP    We are consulted for anemia. Pt reports she was anemia after childbirth when she was younger but not since. She follows with PCP and endo regularly and get bloodwork at least every 3 months Has never had a colonoscopy. Denies any black stoold or rectal bleeding.    PAST MEDICAL & SURGICAL HISTORY:  Hypertension  Hypercholesterolemia  Paroxysmal atrial fibrillation  PAD (peripheral artery disease)  Type 2 diabetes mellitus with diabetic peripheral angiopathy without gangrene, with long-term curren  Coronary artery disease involving native coronary artery of native heart with angina pectoris  H/O abdominal hysterectomy  H/O hernia repair  S/P femoral-popliteal bypass surgery  S/P peripheral artery angioplasty with stent placement  Status post three vessel coronary artery bypass  History of mitral valve replacement with cardiopulmonary bypass    Allergies  warfarin (Rash)  codeine (Rash)    MEDICATIONS  (STANDING):  ascorbic acid 500 milliGRAM(s) Oral daily  atorvastatin 40 milliGRAM(s) Oral at bedtime  dapagliflozin 10 milliGRAM(s) Oral daily  dextrose 5%. 1000 milliLiter(s) (50 mL/Hr) IV Continuous <Continuous>  dextrose 5%. 1000 milliLiter(s) (100 mL/Hr) IV Continuous <Continuous>  dextrose 50% Injectable 25 Gram(s) IV Push once  dextrose 50% Injectable 12.5 Gram(s) IV Push once  dextrose 50% Injectable 25 Gram(s) IV Push once  glucagon  Injectable 1 milliGRAM(s) IntraMuscular once  insulin lispro (ADMELOG) corrective regimen sliding scale   SubCutaneous three times a day before meals  insulin lispro (ADMELOG) corrective regimen sliding scale   SubCutaneous at bedtime  losartan 25 milliGRAM(s) Oral daily  multivitamin 1 Tablet(s) Oral daily  pantoprazole  Injectable 40 milliGRAM(s) IV Push every 12 hours  sertraline 100 milliGRAM(s) Oral daily  sodium chloride 0.9%. 1000 milliLiter(s) (100 mL/Hr) IV Continuous <Continuous>  traZODone 50 milliGRAM(s) Oral at bedtime    MEDICATIONS  (PRN):  acetaminophen     Tablet .. 650 milliGRAM(s) Oral every 6 hours PRN Temp greater or equal to 38C (100.4F), Mild Pain (1 - 3)  aluminum hydroxide/magnesium hydroxide/simethicone Suspension 30 milliLiter(s) Oral every 4 hours PRN Dyspepsia  dextrose Oral Gel 15 Gram(s) Oral once PRN Blood Glucose LESS THAN 70 milliGRAM(s)/deciliter  melatonin 3 milliGRAM(s) Oral at bedtime PRN Insomnia  ondansetron Injectable 4 milliGRAM(s) IV Push every 8 hours PRN Nausea and/or Vomiting    Vital Signs Last 24 Hrs  T(C): 36.6 (24 Oct 2024 08:07), Max: 36.9 (24 Oct 2024 00:40)  T(F): 97.8 (24 Oct 2024 08:07), Max: 98.5 (24 Oct 2024 00:40)  HR: 94 (24 Oct 2024 08:07) (68 - 135)  BP: 124/66 (24 Oct 2024 08:07) (103/60 - 128/62)  BP(mean): --  RR: 18 (24 Oct 2024 08:07) (16 - 20)  SpO2: 97% (24 Oct 2024 08:07) (97% - 100%)    Parameters below as of 24 Oct 2024 08:07  Patient On (Oxygen Delivery Method): room air    PHYSICAL EXAM:  Constitutional: Alert, NAD, comfortable   Head and Face: Atraumatic,   Eyes: Sclera and conjunctiva were normal,   ENT: MMM  NECK:  supple  Pulmonary: Clear to auscultation bilaterally  Heart: Regular rate and rhythm   Abdominal: Soft   Skin: Normal color and intact    Extremities: Warm without edema. No clubbing.     CBC                          8.3    5.50  )-----------( 235      ( 24 Oct 2024 05:27 )             28.1     CHEM    10-24    137  |  101  |  20.7[H]  ----------------------------<  157[H]  3.8   |  21.0[L]  |  0.84    Ca    8.8      24 Oct 2024 05:27  Phos  3.3     10-24  Mg     2.2     10-24    TPro  6.3[L]  /  Alb  3.9  /  TBili  3.0[H]  /  DBili  x   /  AST  42[H]  /  ALT  13  /  AlkPhos  52  10-24

## 2024-10-24 NOTE — PROGRESS NOTE ADULT - SUBJECTIVE AND OBJECTIVE BOX
[facility] - [facility]                                   [address] [place] [address]                                                      PHONE: [phone]                                                         FAX: [phone]  [email or web address]  ---------------------------------------------------------------------------------------------------------------------------------    Overnight events/patient complaints:  Pt feels well no complaints    warfarin (Rash)  codeine (Rash)  OHS (Unknown)    MEDICATIONS  (STANDING):  ascorbic acid 500 milliGRAM(s) Oral daily  atorvastatin 40 milliGRAM(s) Oral at bedtime  dapagliflozin 10 milliGRAM(s) Oral daily  dextrose 5%. 1000 milliLiter(s) (100 mL/Hr) IV Continuous <Continuous>  dextrose 5%. 1000 milliLiter(s) (50 mL/Hr) IV Continuous <Continuous>  dextrose 50% Injectable 25 Gram(s) IV Push once  dextrose 50% Injectable 12.5 Gram(s) IV Push once  dextrose 50% Injectable 25 Gram(s) IV Push once  furosemide    Tablet 40 milliGRAM(s) Oral daily  glucagon  Injectable 1 milliGRAM(s) IntraMuscular once  insulin lispro (ADMELOG) corrective regimen sliding scale   SubCutaneous three times a day before meals  insulin lispro (ADMELOG) corrective regimen sliding scale   SubCutaneous at bedtime  losartan 25 milliGRAM(s) Oral daily  multivitamin 1 Tablet(s) Oral daily  pantoprazole  Injectable 40 milliGRAM(s) IV Push every 12 hours  sertraline 100 milliGRAM(s) Oral daily  sodium chloride 0.9%. 1000 milliLiter(s) (100 mL/Hr) IV Continuous <Continuous>  traZODone 50 milliGRAM(s) Oral at bedtime    MEDICATIONS  (PRN):  acetaminophen     Tablet .. 650 milliGRAM(s) Oral every 6 hours PRN Temp greater or equal to 38C (100.4F), Mild Pain (1 - 3)  aluminum hydroxide/magnesium hydroxide/simethicone Suspension 30 milliLiter(s) Oral every 4 hours PRN Dyspepsia  dextrose Oral Gel 15 Gram(s) Oral once PRN Blood Glucose LESS THAN 70 milliGRAM(s)/deciliter  melatonin 3 milliGRAM(s) Oral at bedtime PRN Insomnia  ondansetron Injectable 4 milliGRAM(s) IV Push every 8 hours PRN Nausea and/or Vomiting      Vital Signs Last 24 Hrs  T(C): 36.6 (24 Oct 2024 08:07), Max: 36.9 (24 Oct 2024 00:40)  T(F): 97.8 (24 Oct 2024 08:07), Max: 98.5 (24 Oct 2024 00:40)  HR: 94 (24 Oct 2024 08:07) (68 - 135)  BP: 124/66 (24 Oct 2024 08:07) (103/60 - 128/62)  BP(mean): --  RR: 18 (24 Oct 2024 08:07) (16 - 20)  SpO2: 97% (24 Oct 2024 08:07) (97% - 100%)    Parameters below as of 24 Oct 2024 08:07  Patient On (Oxygen Delivery Method): room air      ICU Vital Signs Last 24 Hrs  DONALD SAUNDERS  I&O's Detail    Drug Dosing Weight  DONALD SAUNDERS      PHYSICAL EXAM:  General:  alert and cooperative.  HEENT: Head; normocephalic, atraumatic.  Eyes: Pupils reactive, cornea wnl.  Neck: Supple, no nodes adenopathy, no NVD or carotid bruit or thyromegaly.  CARDIOVASCULAR: Normal S1 and S2, No murmur, rub, gallop or lift.   LUNGS: No rales, rhonchi or wheeze. Normal breath sounds bilaterally.  ABDOMEN: Soft, nontender without mass or organomegaly. bowel sounds normoactive.  EXTREMITIES: +1edema. Distal pulses wnl.   SKIN: warm and dry with normal turgor.  NEURO: Alert/oriented x 3/normal motor exam. No pathologic reflexes.    PSYCH: normal affect.        LABS:                        8.3    5.50  )-----------( 235      ( 24 Oct 2024 05:27 )             28.1     10-24    137  |  101  |  20.7[H]  ----------------------------<  157[H]  3.8   |  21.0[L]  |  0.84    Ca    8.8      24 Oct 2024 05:27  Phos  3.3     10-24  Mg     2.2     10-24    TPro  6.3[L]  /  Alb  3.9  /  TBili  3.0[H]  /  DBili  x   /  AST  42[H]  /  ALT  13  /  AlkPhos  52  10-24    DONALD CHIP      PT/INR - ( 23 Oct 2024 16:55 )   PT: 20.7 sec;   INR: 1.79 ratio         PTT - ( 23 Oct 2024 16:55 )  PTT:35.1 sec  Urinalysis Basic - ( 24 Oct 2024 05:27 )    Color: x / Appearance: x / SG: x / pH: x  Gluc: 157 mg/dL / Ketone: x  / Bili: x / Urobili: x   Blood: x / Protein: x / Nitrite: x   Leuk Esterase: x / RBC: x / WBC x   Sq Epi: x / Non Sq Epi: x / Bacteria: x        RADIOLOGY & ADDITIONAL STUDIES:    INTERPRETATION OF TELEMETRY (personally reviewed): no events    ECHO: office 10/2024: LVEF 60% flattening IVS c/w RVPO, s/p MV repair with mild functional MS, trace MR, mild AS STEPH 1.6 cm2, severe TR est PAP 45 mm Hg, mod RVE    STRESS TEST: PET 10/9/16541 : low risk study mild apical ischemia    CARDIAC CATHETERIZATION:             ASSESSMENT AND PLAN:   78 F PMHx DM  HLD PAD, CAD s/p CABGX3/MV repair/LAAthrombectomy 1/2021 initial sig LV dysfunction, augmented EF on GDMT, COPD, chronic AF on NOAC, postop protected LM PCI 2023, progressive RENTERIA with recent cardiac cath 8/2024 revealed patent LIMA to LAD, mod mid LAD disease, prox Cx disease s/p CARIN EF 45% moderate pul HTN with elevated PCWP WHO Group2 Pul HtN on increasing doses diuretics, seen in office yesterday with progressive RENTERIA and fatigue.  Labs this am revealed Hgb 6.6     1) Transfuse PRBC as needed  2) Pt states she is on lasix 80mg in AM and 40mg in PM. Would change to torseemide 20mg daily and assess as she gets PRBC  3) Hold Antiplatelts and AC but would restart as soon as possible (Plavix/NOAC) if and when ok with GI/heme  4) no contrainiidcation to Gi workup if needed

## 2024-10-25 ENCOUNTER — RESULT REVIEW (OUTPATIENT)
Age: 79
End: 2024-10-25

## 2024-10-25 LAB
A1C WITH ESTIMATED AVERAGE GLUCOSE RESULT: 7.7 % — HIGH (ref 4–5.6)
ALBUMIN SERPL ELPH-MCNC: 3.6 G/DL — SIGNIFICANT CHANGE UP (ref 3.3–5.2)
ALP SERPL-CCNC: 49 U/L — SIGNIFICANT CHANGE UP (ref 40–120)
ALT FLD-CCNC: 11 U/L — SIGNIFICANT CHANGE UP
ANION GAP SERPL CALC-SCNC: 12 MMOL/L — SIGNIFICANT CHANGE UP (ref 5–17)
AST SERPL-CCNC: 24 U/L — SIGNIFICANT CHANGE UP
BILIRUB SERPL-MCNC: 2 MG/DL — SIGNIFICANT CHANGE UP (ref 0.4–2)
BUN SERPL-MCNC: 16.3 MG/DL — SIGNIFICANT CHANGE UP (ref 8–20)
CALCIUM SERPL-MCNC: 8.5 MG/DL — SIGNIFICANT CHANGE UP (ref 8.4–10.5)
CHLORIDE SERPL-SCNC: 103 MMOL/L — SIGNIFICANT CHANGE UP (ref 96–108)
CO2 SERPL-SCNC: 25 MMOL/L — SIGNIFICANT CHANGE UP (ref 22–29)
CREAT SERPL-MCNC: 0.96 MG/DL — SIGNIFICANT CHANGE UP (ref 0.5–1.3)
EGFR: 61 ML/MIN/1.73M2 — SIGNIFICANT CHANGE UP
ESTIMATED AVERAGE GLUCOSE: 174 MG/DL — HIGH (ref 68–114)
FERRITIN SERPL-MCNC: 29 NG/ML — SIGNIFICANT CHANGE UP (ref 13–330)
FOLATE SERPL-MCNC: 17.5 NG/ML — SIGNIFICANT CHANGE UP
GLUCOSE BLDC GLUCOMTR-MCNC: 203 MG/DL — HIGH (ref 70–99)
GLUCOSE BLDC GLUCOMTR-MCNC: 287 MG/DL — HIGH (ref 70–99)
GLUCOSE BLDC GLUCOMTR-MCNC: 321 MG/DL — HIGH (ref 70–99)
GLUCOSE SERPL-MCNC: 277 MG/DL — HIGH (ref 70–99)
HAPTOGLOB SERPL-MCNC: 100 MG/DL — SIGNIFICANT CHANGE UP (ref 34–200)
HCT VFR BLD CALC: 27.8 % — LOW (ref 34.5–45)
HGB BLD-MCNC: 8.3 G/DL — LOW (ref 11.5–15.5)
IGA FLD-MCNC: 276 MG/DL — SIGNIFICANT CHANGE UP (ref 84–499)
IGG FLD-MCNC: 650 MG/DL — SIGNIFICANT CHANGE UP (ref 610–1660)
IGM SERPL-MCNC: 88 MG/DL — SIGNIFICANT CHANGE UP (ref 35–242)
IRON SATN MFR SERPL: 16 UG/DL — LOW (ref 37–145)
IRON SATN MFR SERPL: 4 % — LOW (ref 14–50)
KAPPA LC SER QL IFE: 2.71 MG/DL — HIGH (ref 0.33–1.94)
KAPPA/LAMBDA FREE LIGHT CHAIN RATIO, SERUM: 1.32 RATIO — SIGNIFICANT CHANGE UP (ref 0.26–1.65)
LAMBDA LC SER QL IFE: 2.05 MG/DL — SIGNIFICANT CHANGE UP (ref 0.57–2.63)
LDH SERPL L TO P-CCNC: 215 U/L — HIGH (ref 98–192)
MAGNESIUM SERPL-MCNC: 2.1 MG/DL — SIGNIFICANT CHANGE UP (ref 1.6–2.6)
MCHC RBC-ENTMCNC: 20 PG — LOW (ref 27–34)
MCHC RBC-ENTMCNC: 29.9 GM/DL — LOW (ref 32–36)
MCV RBC AUTO: 67.1 FL — LOW (ref 80–100)
NT-PROBNP SERPL-SCNC: 2363 PG/ML — HIGH (ref 0–300)
PHOSPHATE SERPL-MCNC: 3.2 MG/DL — SIGNIFICANT CHANGE UP (ref 2.4–4.7)
PLATELET # BLD AUTO: 237 K/UL — SIGNIFICANT CHANGE UP (ref 150–400)
POTASSIUM SERPL-MCNC: 3.5 MMOL/L — SIGNIFICANT CHANGE UP (ref 3.5–5.3)
POTASSIUM SERPL-SCNC: 3.5 MMOL/L — SIGNIFICANT CHANGE UP (ref 3.5–5.3)
PROT SERPL-MCNC: 5.8 G/DL — LOW (ref 6.6–8.7)
PROT SERPL-MCNC: 5.9 G/DL — LOW (ref 6–8.3)
RBC # BLD: 4.14 M/UL — SIGNIFICANT CHANGE UP (ref 3.8–5.2)
RBC # BLD: 4.14 M/UL — SIGNIFICANT CHANGE UP (ref 3.8–5.2)
RBC # FLD: 23.4 % — HIGH (ref 10.3–14.5)
RETICS #: 84.9 K/UL — SIGNIFICANT CHANGE UP (ref 25–125)
RETICS/RBC NFR: 2.1 % — SIGNIFICANT CHANGE UP (ref 0.5–2.5)
SODIUM SERPL-SCNC: 140 MMOL/L — SIGNIFICANT CHANGE UP (ref 135–145)
TIBC SERPL-MCNC: 449 UG/DL — HIGH (ref 220–430)
TRANSFERRIN SERPL-MCNC: 314 MG/DL — SIGNIFICANT CHANGE UP (ref 192–382)
TSH SERPL-MCNC: 2.14 UIU/ML — SIGNIFICANT CHANGE UP (ref 0.27–4.2)
VIT B12 SERPL-MCNC: 1645 PG/ML — HIGH (ref 232–1245)
WBC # BLD: 5.31 K/UL — SIGNIFICANT CHANGE UP (ref 3.8–10.5)
WBC # FLD AUTO: 5.31 K/UL — SIGNIFICANT CHANGE UP (ref 3.8–10.5)

## 2024-10-25 PROCEDURE — 93325 DOPPLER ECHO COLOR FLOW MAPG: CPT | Mod: 26

## 2024-10-25 PROCEDURE — 99233 SBSQ HOSP IP/OBS HIGH 50: CPT

## 2024-10-25 PROCEDURE — 93308 TTE F-UP OR LMTD: CPT | Mod: 26

## 2024-10-25 PROCEDURE — 93321 DOPPLER ECHO F-UP/LMTD STD: CPT | Mod: 26

## 2024-10-25 PROCEDURE — 77075 RADEX OSSEOUS SURVEY COMPL: CPT | Mod: 26

## 2024-10-25 RX ADMIN — Medication 1 TABLET(S): at 17:45

## 2024-10-25 RX ADMIN — LOSARTAN POTASSIUM 25 MILLIGRAM(S): 25 TABLET ORAL at 05:37

## 2024-10-25 RX ADMIN — Medication 500 MILLIGRAM(S): at 17:45

## 2024-10-25 RX ADMIN — Medication 6: at 08:20

## 2024-10-25 RX ADMIN — SERTRALINE HYDROCHLORIDE 100 MILLIGRAM(S): 50 TABLET, FILM COATED ORAL at 22:03

## 2024-10-25 RX ADMIN — PANTOPRAZOLE SODIUM 40 MILLIGRAM(S): 40 TABLET, DELAYED RELEASE ORAL at 05:37

## 2024-10-25 RX ADMIN — TRAZODONE HYDROCHLORIDE 50 MILLIGRAM(S): 100 TABLET ORAL at 22:03

## 2024-10-25 RX ADMIN — Medication 8: at 17:45

## 2024-10-25 RX ADMIN — Medication 40 MILLIGRAM(S): at 22:03

## 2024-10-25 RX ADMIN — PANTOPRAZOLE SODIUM 40 MILLIGRAM(S): 40 TABLET, DELAYED RELEASE ORAL at 17:45

## 2024-10-25 NOTE — PROGRESS NOTE ADULT - SUBJECTIVE AND OBJECTIVE BOX
79F hx CAD s/p CABG, recent PCI several months ago on ASA/Plavix, pAF on Eliquis, moderate pHTN, HTN, HLD, IDDM presents with anemia to 6 on outpt labs. Spoke with pt and pt's cardiologist who state pt is on aspirin, Plavix, Eliquis. Over the past few months pt has become progressive dyspneic on exertion, now with minimal activity. Stool has become progressively darker over the past few weeks as well, and associated with lightheadedness. Saw her cardiologist in the office yesterday where she was noted to be paler and more short of breath, bloodwork demonstrated anemia so pt was instructed to present to ED. Pt denies fever, chills, abd pain, back pain, CP    We are consulted for anemia. Pt reports she was anemia after childbirth when she was younger but not since. She follows with PCP and endo regularly and get bloodwork at least every 3 months Has never had a colonoscopy. Denies any black stoold or rectal bleeding.    PAST MEDICAL & SURGICAL HISTORY:  Hypertension  Hypercholesterolemia  Paroxysmal atrial fibrillation  PAD (peripheral artery disease)  Type 2 diabetes mellitus with diabetic peripheral angiopathy without gangrene, with long-term curren  Coronary artery disease involving native coronary artery of native heart with angina pectoris  H/O abdominal hysterectomy  H/O hernia repair  S/P femoral-popliteal bypass surgery  S/P peripheral artery angioplasty with stent placement  Status post three vessel coronary artery bypass  History of mitral valve replacement with cardiopulmonary bypass    Allergies  warfarin (Rash)  codeine (Rash)    MEDICATIONS  (STANDING):  ascorbic acid 500 milliGRAM(s) Oral daily  atorvastatin 40 milliGRAM(s) Oral at bedtime  dapagliflozin 10 milliGRAM(s) Oral daily  dextrose 5%. 1000 milliLiter(s) (50 mL/Hr) IV Continuous <Continuous>  dextrose 5%. 1000 milliLiter(s) (100 mL/Hr) IV Continuous <Continuous>  dextrose 50% Injectable 25 Gram(s) IV Push once  dextrose 50% Injectable 12.5 Gram(s) IV Push once  dextrose 50% Injectable 25 Gram(s) IV Push once  glucagon  Injectable 1 milliGRAM(s) IntraMuscular once  insulin lispro (ADMELOG) corrective regimen sliding scale   SubCutaneous three times a day before meals  insulin lispro (ADMELOG) corrective regimen sliding scale   SubCutaneous at bedtime  losartan 25 milliGRAM(s) Oral daily  multivitamin 1 Tablet(s) Oral daily  pantoprazole  Injectable 40 milliGRAM(s) IV Push every 12 hours  sertraline 100 milliGRAM(s) Oral daily  sodium chloride 0.9%. 1000 milliLiter(s) (100 mL/Hr) IV Continuous <Continuous>  traZODone 50 milliGRAM(s) Oral at bedtime    MEDICATIONS  (PRN):  acetaminophen     Tablet .. 650 milliGRAM(s) Oral every 6 hours PRN Temp greater or equal to 38C (100.4F), Mild Pain (1 - 3)  aluminum hydroxide/magnesium hydroxide/simethicone Suspension 30 milliLiter(s) Oral every 4 hours PRN Dyspepsia  dextrose Oral Gel 15 Gram(s) Oral once PRN Blood Glucose LESS THAN 70 milliGRAM(s)/deciliter  melatonin 3 milliGRAM(s) Oral at bedtime PRN Insomnia  ondansetron Injectable 4 milliGRAM(s) IV Push every 8 hours PRN Nausea and/or Vomiting    Vital Signs Last 24 Hrs  T(C): 36.6 (25 Oct 2024 09:34), Max: 37 (25 Oct 2024 04:30)  T(F): 97.9 (25 Oct 2024 09:34), Max: 98.6 (25 Oct 2024 04:30)  HR: 73 (25 Oct 2024 09:34) (71 - 100)  BP: 122/55 (25 Oct 2024 09:34) (104/68 - 122/55)  BP(mean): --  RR: 18 (25 Oct 2024 09:34) (18 - 18)  SpO2: 99% (25 Oct 2024 09:34) (95% - 99%)    Parameters below as of 25 Oct 2024 09:34  Patient On (Oxygen Delivery Method): room air      PHYSICAL EXAM:  Constitutional: Alert, NAD, comfortable   Head and Face: Atraumatic,   Eyes: Sclera and conjunctiva were normal,   ENT: MMM  NECK:  supple  Pulmonary: Clear to auscultation bilaterally  Heart: Regular rate and rhythm   Abdominal: Soft   Skin: Normal color and intact    Extremities: Warm without edema. No clubbing.     CBC                        8.3    5.31  )-----------( 237      ( 25 Oct 2024 06:45 )             27.8                           8.3    5.50  )-----------( 235      ( 24 Oct 2024 05:27 )             28.1     CHEM    10-25    140  |  103  |  16.3  ----------------------------<  277[H]  3.5   |  25.0  |  0.96    Ca    8.5      25 Oct 2024 06:45  Phos  3.2     10-25  Mg     2.1     10-25    TPro  5.8[L]  /  Alb  3.6  /  TBili  2.0  /  DBili  x   /  AST  24  /  ALT  11  /  AlkPhos  49  10-25      10-24    137  |  101  |  20.7[H]  ----------------------------<  157[H]  3.8   |  21.0[L]  |  0.84    Ca    8.8      24 Oct 2024 05:27  Phos  3.3     10-24  Mg     2.2     10-24    TPro  6.3[L]  /  Alb  3.9  /  TBili  3.0[H]  /  DBili  x   /  AST  42[H]  /  ALT  13  /  AlkPhos  52  10-24

## 2024-10-25 NOTE — PROGRESS NOTE ADULT - ASSESSMENT
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 GI consulted for symptomatic anemia    #symptomatic anemia   Hgb on admission 6.2gm, s/p 2U pRBCs,  CT A/P w/ IVC (10.24.24) Hepatosplenomegaly. Cholelithiasis.    - Hgb 8.3gm, no overt signs/symptoms of active GIB  - Trend CBC, transfuse as needed. Monitor for signs of bleeding.   - Avoid NSAIDs  - PPI BID for GI mucosal cytoprotection  - Hold Farxiga for EGD/colonoscopy Monday  - Clear liquid diet Sunday, prep to start Sunday evening   - Cardiology clearance obtained by Dr. Harvey  - Active T&S, INR < 1.5, platelet > 50  _________________________________________________________________  Assessment and recommendations are final when note is signed by the attending physician.

## 2024-10-25 NOTE — PROGRESS NOTE ADULT - ASSESSMENT
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    PLAN  Acute blood loss anemia   - Dark brown stools  - Hemodynamically stable  -s/p  IVF NS @ 100 cc/hr dc @1500 10/24  - H/H 6.2/21.5 s/p 2 u PRBC -->8.3  - Started on Protonix IV Q12hr  - Held ASA/Plavix/Eliquis per Cardiology recs   - Will not reverse Eliquis given h/o DUANE thrombus   - Maintain Hgb >8, transfuse PRN  - ADRIANE negative   - Never had a colonoscopy   CTA/P noted lytic lesion in femur (b/l but right reported to be increased in size)  will follow up w MRI femur.   -+Hepatosplenomegaly  - GI following- farxiga held- as per GI needs to be held 5d prior to csope  -EGD and C scope planned on 10/28  -Heme following    Hypokalemia  - s/p Repletion  - Monitor BMP    Chronic AFib   - Held Eliquis given ABLA and per cards recs    CAD/ CHF  - Held ASA/Plavix as above   - Resumed Farxiga 10mg QD, Losartan 25mg QD,   - Resumed Lasix 40mg po QD  -ProBNP elv, LE edema, check Echo    DM2  -  A1C 11/4/22: 9.7m on 10/25 7.7   - on Tresiba 40U in AM  - ISS + Acchecheck     HLD  - Resumed Atorvastatin 40mg QD    Depression/ insomnia   - Resumed Zoloft 100mg QD  - Resumed Trazodone 50mg QD    VTE ppx: SCD, held AC at this time due to severe anemia   Diet: DASH   Dispo: Acute, anemia w/u including EGD/Cscope on 10/28, MDS workup as per Heme/Onc, MR Femurs approx 4 more days · Assessment	  79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    PLAN  Acute blood loss anemia   - Dark brown stools  - Hemodynamically stable  -s/p  IVF NS @ 100 cc/hr dc @1500 10/24  - H/H 6.2/21.5 s/p 2 u PRBC -->8.3  - Started on Protonix IV Q12hr  - Held ASA/Plavix/Eliquis per Cardiology recs   - Will not reverse Eliquis given h/o DUANE thrombus   - Maintain Hgb >8, transfuse PRN  - ADRIANE negative   - Never had a colonoscopy   CTA/P noted lytic lesion in femur (b/l but right reported to be increased in size)  will follow up w MRI femur.   -+Hepatosplenomegaly  - GI following- farxiga held- as per GI needs to be held 5d prior to csope  -EGD and C scope planned on 10/28  -Heme following    Hypokalemia  - s/p Repletion  - Monitor BMP    Chronic AFib   - Held Eliquis given ABLA and per cards recs    CAD/ CHF  - Held ASA/Plavix as above   - Resumed Farxiga 10mg QD, Losartan 25mg QD,   - Resumed Lasix 40mg po QD  -ProBNP elv, LE edema, check Echo    DM2  -  A1C 11/4/22: 9.7m on 10/25 7.7   - on Tresiba 40U in AM  - ISS + Acchecheck     HLD  - Resumed Atorvastatin 40mg QD    Depression/ insomnia   - Resumed Zoloft 100mg QD  - Resumed Trazodone 50mg QD    VTE ppx: SCD, held AC at this time due to severe anemia   Diet: DASH   Dispo: Acute, anemia w/u including EGD/Cscope on 10/28, Mulitple Myeloma workup as per Heme/Onc, MR Femurs approx 4 more days

## 2024-10-25 NOTE — PROGRESS NOTE ADULT - ASSESSMENT
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    Anemia  - Hgb 6.2 on admission   - S/P 2 U PRBC  - Hgb now 8.3  - on Protonix   - Held ASA/Plavix/Eliquis per Cardiology recs   - Will not reverse Eliquis given h/o DUANE thrombus   - ADRIANE negative   - Never had a colonoscopy   - GI following - - Hold Farxiga for EGD/colonoscopy Monday  -    Hapto 100 Retic 2.1 T bili 3- no concern for hemolysis   - B12 and folate are adequate  - Iron studies pending=- will add to todays labs - if ferritin is < 100 - will give IV iron     Abnormal CT  - Ct A/P Suggestion of right heart dysfunction and fluid overload Hepatosplenomegaly. Cholelithiasis.Bilateral femoral lytic lesions as above. -+Hepatosplenomegaly  - Recommend MRI of both femurs   - Multiple myeloma work up pending SPEP and JESS   - So far quiggs,  FLC ratio are normal  - given lytic lesion on CT scan will check skeletal survey for other lytic lesions  - Will check CEA and     Chronic AFib   - Held Eliquis   - Cardiology following     Will follow

## 2024-10-25 NOTE — PROGRESS NOTE ADULT - SUBJECTIVE AND OBJECTIVE BOX
DONALD SAUNDERS  79y  Female      Patient is a 79y old  Female who presents with a chief complaint of Acute blood loss anemia (24 Oct 2024 14:59)      INTERVAL HPI/OVERNIGHT EVENTS:  Seen and examined, comfortable.   Pending MRI femur  Poss EGD and C scope on Monday as per GI      REVIEW OF SYSTEMS:  CONSTITUTIONAL: No fever, weight loss, or fatigue  EYES: No eye pain, visual disturbances, or discharge  ENMT:  No difficulty hearing, tinnitus, vertigo; No sinus or throat pain  NECK: No pain or stiffness  BREASTS: No pain, masses, or nipple discharge  RESPIRATORY: No cough, wheezing, chills or hemoptysis; No shortness of breath  CARDIOVASCULAR: No chest pain, palpitations, dizziness, or leg swelling  GASTROINTESTINAL: No abdominal or epigastric pain. No nausea, vomiting, or hematemesis; No diarrhea or constipation. No melena or hematochezia.  GENITOURINARY: No dysuria, frequency, hematuria, or incontinence  NEUROLOGICAL: No headaches, memory loss, loss of strength, numbness, or tremors  SKIN: No itching, burning, rashes, or lesions   LYMPH NODES: No enlarged glands  ENDOCRINE: No heat or cold intolerance; No hair loss  MUSCULOSKELETAL: No joint pain or swelling; No muscle, back, or extremity pain  PSYCHIATRIC: No depression, anxiety, mood swings, or difficulty sleeping  HEME/LYMPH: No easy bruising, or bleeding gums  ALLERY AND IMMUNOLOGIC: No hives or eczema    T(C): 36.6 (10-25-24 @ 09:34), Max: 37 (10-25-24 @ 04:30)  HR: 73 (10-25-24 @ 09:34) (71 - 100)  BP: 122/55 (10-25-24 @ 09:34) (103/60 - 122/55)  RR: 18 (10-25-24 @ 09:34) (18 - 18)  SpO2: 99% (10-25-24 @ 09:34) (95% - 99%)  Wt(kg): --Vital Signs Last 24 Hrs  T(C): 36.6 (25 Oct 2024 09:34), Max: 37 (25 Oct 2024 04:30)  T(F): 97.9 (25 Oct 2024 09:34), Max: 98.6 (25 Oct 2024 04:30)  HR: 73 (25 Oct 2024 09:34) (71 - 100)  BP: 122/55 (25 Oct 2024 09:34) (103/60 - 122/55)  BP(mean): --  RR: 18 (25 Oct 2024 09:34) (18 - 18)  SpO2: 99% (25 Oct 2024 09:34) (95% - 99%)    Parameters below as of 25 Oct 2024 09:34  Patient On (Oxygen Delivery Method): room air        PHYSICAL EXAM:  GENERAL: NAD, well-groomed, well-developed  HEAD:  Atraumatic, Normocephalic  EYES: EOMI, PERRLA, conjunctiva and sclera clear  ENMT: No tonsillar erythema, exudates, or enlargement; Moist mucous membranes, Good dentition, No lesions  NECK: Supple, No JVD, Normal thyroid  NERVOUS SYSTEM:  Alert & Oriented X3, Good concentration;    CHEST/LUNG: Clear to percussion bilaterally; No rales, rhonchi, wheezing, or rubs  HEART: Regular rate and rhythm; No murmurs, rubs, or gallops  ABDOMEN: Soft, Nontender, Nondistended; Bowel sounds present  EXTREMITIES:  +2 edema bl  LYMPH: No lymphadenopathy noted  SKIN: No rashes or lesions    Consultant(s) Notes Reviewed:  [x ] YES  [ ] NO  Care Discussed with Consultants/Other Providers [ x] YES  [ ] NO      LABS:                        8.3    5.31  )-----------( 237      ( 25 Oct 2024 06:45 )             27.8     10-25    140  |  103  |  16.3  ----------------------------<  277[H]  3.5   |  25.0  |  0.96    Ca    8.5      25 Oct 2024 06:45  Phos  3.2     10-25  Mg     2.1     10-25    TPro  5.8[L]  /  Alb  3.6  /  TBili  2.0  /  DBili  x   /  AST  24  /  ALT  11  /  AlkPhos  49  10-25    PT/INR - ( 23 Oct 2024 16:55 )   PT: 20.7 sec;   INR: 1.79 ratio         PTT - ( 23 Oct 2024 16:55 )  PTT:35.1 sec  Urinalysis Basic - ( 25 Oct 2024 06:45 )    Color: x / Appearance: x / SG: x / pH: x  Gluc: 277 mg/dL / Ketone: x  / Bili: x / Urobili: x   Blood: x / Protein: x / Nitrite: x   Leuk Esterase: x / RBC: x / WBC x   Sq Epi: x / Non Sq Epi: x / Bacteria: x      CAPILLARY BLOOD GLUCOSE      POCT Blood Glucose.: 287 mg/dL (25 Oct 2024 08:19)  POCT Blood Glucose.: 370 mg/dL (24 Oct 2024 21:54)  POCT Blood Glucose.: 150 mg/dL (24 Oct 2024 16:58)  POCT Blood Glucose.: 177 mg/dL (24 Oct 2024 12:28)        Urinalysis Basic - ( 25 Oct 2024 06:45 )    Color: x / Appearance: x / SG: x / pH: x  Gluc: 277 mg/dL / Ketone: x  / Bili: x / Urobili: x   Blood: x / Protein: x / Nitrite: x   Leuk Esterase: x / RBC: x / WBC x   Sq Epi: x / Non Sq Epi: x / Bacteria: x        RADIOLOGY & ADDITIONAL TESTS:    Imaging Personally Reviewed:  [ ] YES  [ ] NO    HEALTH ISSUES - PROBLEM Dx:

## 2024-10-25 NOTE — PROGRESS NOTE ADULT - SUBJECTIVE AND OBJECTIVE BOX
Chief Complaint:  Patient is a 79y old  Female who presents with a chief complaint of Acute blood loss anemia (24 Oct 2024 14:59)      HPI/ 24 hr events: Patient seen and examined at bedside. Patient feeling well this morning. Tolerating diet. Reports brown stool yesterday. Denies nausea, vomiting, diarrhea, abdominal pain, hematemesis, hematochezia, melena. Vitals are overall stable, Hgb 8.3gm.      REVIEW OF SYSTEMS:   General: Negative  HEENT: Negative  CV: Negative  Respiratory: Negative  GI: See HPI  : Negative  MSK: Negative  Hematologic: Negative  Skin: Negative    MEDICATIONS:   MEDICATIONS  (STANDING):  ascorbic acid 500 milliGRAM(s) Oral daily  atorvastatin 40 milliGRAM(s) Oral at bedtime  dextrose 5%. 1000 milliLiter(s) (50 mL/Hr) IV Continuous <Continuous>  dextrose 5%. 1000 milliLiter(s) (100 mL/Hr) IV Continuous <Continuous>  dextrose 50% Injectable 25 Gram(s) IV Push once  dextrose 50% Injectable 12.5 Gram(s) IV Push once  dextrose 50% Injectable 25 Gram(s) IV Push once  glucagon  Injectable 1 milliGRAM(s) IntraMuscular once  insulin lispro (ADMELOG) corrective regimen sliding scale   SubCutaneous at bedtime  insulin lispro (ADMELOG) corrective regimen sliding scale   SubCutaneous three times a day before meals  losartan 25 milliGRAM(s) Oral daily  multivitamin 1 Tablet(s) Oral daily  pantoprazole  Injectable 40 milliGRAM(s) IV Push every 12 hours  sertraline 100 milliGRAM(s) Oral daily  traZODone 50 milliGRAM(s) Oral at bedtime    MEDICATIONS  (PRN):  acetaminophen     Tablet .. 650 milliGRAM(s) Oral every 6 hours PRN Temp greater or equal to 38C (100.4F), Mild Pain (1 - 3)  aluminum hydroxide/magnesium hydroxide/simethicone Suspension 30 milliLiter(s) Oral every 4 hours PRN Dyspepsia  dextrose Oral Gel 15 Gram(s) Oral once PRN Blood Glucose LESS THAN 70 milliGRAM(s)/deciliter  melatonin 3 milliGRAM(s) Oral at bedtime PRN Insomnia  ondansetron Injectable 4 milliGRAM(s) IV Push every 8 hours PRN Nausea and/or Vomiting      Packed Red Cells Order:  1 Unit  Indication: Hgb <7 gm/dL  Infuse Unit : 3 Hours (10-23-24 @ 18:31)  Packed Red Cells Order:  1 Unit  Indication: Hgb <7 gm/dL  Infuse Unit : 3 Hours (10-23-24 @ 17:30)        DIET:  Diet, DASH/TLC:   Sodium & Cholesterol Restricted (10-23-24 @ 17:30) [Active]          ALLERGIES:   Allergies    warfarin (Rash)  codeine (Rash)    Intolerances    OHS (Unknown)      VITAL SIGNS:   Vital Signs Last 24 Hrs  T(C): 36.6 (25 Oct 2024 09:34), Max: 37 (25 Oct 2024 04:30)  T(F): 97.9 (25 Oct 2024 09:34), Max: 98.6 (25 Oct 2024 04:30)  HR: 73 (25 Oct 2024 09:34) (71 - 100)  BP: 122/55 (25 Oct 2024 09:34) (103/60 - 122/55)  BP(mean): --  RR: 18 (25 Oct 2024 09:34) (18 - 18)  SpO2: 99% (25 Oct 2024 09:34) (95% - 99%)    Parameters below as of 25 Oct 2024 09:34  Patient On (Oxygen Delivery Method): room air      I&O's Summary      PHYSICAL EXAM:   GENERAL:  No acute distress  HEENT:  NC/AT, conjunctiva clear, sclera anicteric  CHEST:  No increased effort  HEART:  Regular rate  ABDOMEN:  Soft, non-tender, non-distended, normoactive bowel sounds, no rebound or guarding  EXTREMITIES: No edema  SKIN:  Warm, dry  NEURO:  Calm, cooperative    LABS:                        8.3    5.31  )-----------( 237      ( 25 Oct 2024 06:45 )             27.8     Hemoglobin: 8.3 g/dL (10-25-24 @ 06:45)  Hemoglobin: 8.3 g/dL (10-24-24 @ 05:27)  Hemoglobin: 6.2 g/dL (10-23-24 @ 16:55)    10-25    140  |  103  |  16.3  ----------------------------<  277[H]  3.5   |  25.0  |  0.96    Ca    8.5      25 Oct 2024 06:45  Phos  3.2     10-25  Mg     2.1     10-25    TPro  5.8[L]  /  Alb  3.6  /  TBili  2.0  /  DBili  x   /  AST  24  /  ALT  11  /  AlkPhos  49  10-25    LIVER FUNCTIONS - ( 25 Oct 2024 06:45 )  Alb: 3.6 g/dL / Pro: 5.8 g/dL / ALK PHOS: 49 U/L / ALT: 11 U/L / AST: 24 U/L / GGT: x             PT/INR - ( 23 Oct 2024 16:55 )   PT: 20.7 sec;   INR: 1.79 ratio         PTT - ( 23 Oct 2024 16:55 )  PTT:35.1 sec      RADIOLOGY & ADDITIONAL STUDIES:      ACC: [id number] EXAM:  CT ABDOMEN AND PELVIS IC   ORDERED BY: IZZY MYERS     PROCEDURE DATE:  10/24/2024          INTERPRETATION:  CLINICAL INFORMATION: 79 years  Female with ALBA.    COMPARISON: CTA abdomen and pelvis 1/10/2021 and chest CT of 4/24/2024    CONTRAST/COMPLICATIONS:  IV Contrast: Omnipaque 350  90 cc administered   10 cc discarded  Oral Contrast: NONE  Complications: None reported at time of study completion    PROCEDURE:  CT of the Abdomen and Pelvis was performed.  Sagittal and coronal reformats were performed.    FINDINGS:  LOWER CHEST: Within normal limits.    LIVER: Hepatomegaly 19.8 cm.  BILE DUCTS: Normal caliber.  GALLBLADDER: Cholelithiasis.  SPLEEN: 1 cm anterior hypodensity not seen previously. Mild splenomegaly   measuring 14.0 cm  PANCREAS: Within normal limits.  ADRENALS: Within normal limits.  KIDNEYS/URETERS: Right renal cysts. No hydroureteronephrosis bilaterally.   Vascular calcifications limited evaluation for nonobstructing calculi   bilaterally.    BLADDER: Within normal limits.  REPRODUCTIVE ORGANS: Hysterectomy.    BOWEL: No bowel obstruction. Appendix is not visualized. No evidence of   inflammation in the pericecal region.  PERITONEUM/RETROPERITONEUM: Small ascites.  VESSELS: Distended suprahepatic IVC and hepatic veins suggestive of right   heart dysfunction. Atherosclerotic aorta and iliac arteries.  LYMPH NODES: No lymphadenopathy.  ABDOMINAL WALL: Small paraumbilical hernia containing fat and minimal   ascites. Anasarca.  BONES: 4.2 cm lytic lesion in the right proximal femur, previously 3.6   cm. Soft tissue attenuation matrix. Similar smaller partially imaged   lesion in the left proximal femur measuring 1.6 cm (3:150).    IMPRESSION:  Suggestion of right heart dysfunction and fluid overload as above.    Hepatosplenomegaly. Cholelithiasis.    Bilateral femoral lytic lesions as above. Recommend MRI of both femurs   for additional characterization.      --- End of Report ---        ROXANA ROMERO MD; Attending Radiologist  This document has been electronically signed. Oct 24 2024 12:38PM  10-24-24 @ 11:45

## 2024-10-26 LAB
ANION GAP SERPL CALC-SCNC: 11 MMOL/L — SIGNIFICANT CHANGE UP (ref 5–17)
BASOPHILS # BLD AUTO: 0.03 K/UL — SIGNIFICANT CHANGE UP (ref 0–0.2)
BASOPHILS NFR BLD AUTO: 0.6 % — SIGNIFICANT CHANGE UP (ref 0–2)
BUN SERPL-MCNC: 15.1 MG/DL — SIGNIFICANT CHANGE UP (ref 8–20)
CALCIUM SERPL-MCNC: 8.5 MG/DL — SIGNIFICANT CHANGE UP (ref 8.4–10.5)
CANCER AG125 SERPL-ACNC: 46 U/ML — HIGH
CEA SERPL-MCNC: 2.3 NG/ML — SIGNIFICANT CHANGE UP (ref 0–3.8)
CHLORIDE SERPL-SCNC: 106 MMOL/L — SIGNIFICANT CHANGE UP (ref 96–108)
CO2 SERPL-SCNC: 24 MMOL/L — SIGNIFICANT CHANGE UP (ref 22–29)
CREAT SERPL-MCNC: 1.04 MG/DL — SIGNIFICANT CHANGE UP (ref 0.5–1.3)
EGFR: 55 ML/MIN/1.73M2 — LOW
EOSINOPHIL # BLD AUTO: 0.17 K/UL — SIGNIFICANT CHANGE UP (ref 0–0.5)
EOSINOPHIL NFR BLD AUTO: 3.6 % — SIGNIFICANT CHANGE UP (ref 0–6)
FOLATE SERPL-MCNC: 15.1 NG/ML — SIGNIFICANT CHANGE UP
GLUCOSE BLDC GLUCOMTR-MCNC: 267 MG/DL — HIGH (ref 70–99)
GLUCOSE BLDC GLUCOMTR-MCNC: 277 MG/DL — HIGH (ref 70–99)
GLUCOSE BLDC GLUCOMTR-MCNC: 282 MG/DL — HIGH (ref 70–99)
GLUCOSE BLDC GLUCOMTR-MCNC: 312 MG/DL — HIGH (ref 70–99)
GLUCOSE SERPL-MCNC: 264 MG/DL — HIGH (ref 70–99)
HCT VFR BLD CALC: 25.7 % — LOW (ref 34.5–45)
HCT VFR BLD CALC: 30.9 % — LOW (ref 34.5–45)
HGB BLD-MCNC: 7.4 G/DL — LOW (ref 11.5–15.5)
HGB BLD-MCNC: 9.2 G/DL — LOW (ref 11.5–15.5)
IMM GRANULOCYTES NFR BLD AUTO: 0.4 % — SIGNIFICANT CHANGE UP (ref 0–0.9)
LYMPHOCYTES # BLD AUTO: 0.99 K/UL — LOW (ref 1–3.3)
LYMPHOCYTES # BLD AUTO: 21.1 % — SIGNIFICANT CHANGE UP (ref 13–44)
MCHC RBC-ENTMCNC: 20.7 PG — LOW (ref 27–34)
MCHC RBC-ENTMCNC: 21.4 PG — LOW (ref 27–34)
MCHC RBC-ENTMCNC: 28.8 GM/DL — LOW (ref 32–36)
MCHC RBC-ENTMCNC: 29.8 GM/DL — LOW (ref 32–36)
MCV RBC AUTO: 71.8 FL — LOW (ref 80–100)
MCV RBC AUTO: 72 FL — LOW (ref 80–100)
MONOCYTES # BLD AUTO: 0.59 K/UL — SIGNIFICANT CHANGE UP (ref 0–0.9)
MONOCYTES NFR BLD AUTO: 12.6 % — SIGNIFICANT CHANGE UP (ref 2–14)
NEUTROPHILS # BLD AUTO: 2.89 K/UL — SIGNIFICANT CHANGE UP (ref 1.8–7.4)
NEUTROPHILS NFR BLD AUTO: 61.7 % — SIGNIFICANT CHANGE UP (ref 43–77)
PLATELET # BLD AUTO: 198 K/UL — SIGNIFICANT CHANGE UP (ref 150–400)
PLATELET # BLD AUTO: 212 K/UL — SIGNIFICANT CHANGE UP (ref 150–400)
POTASSIUM SERPL-MCNC: 3.5 MMOL/L — SIGNIFICANT CHANGE UP (ref 3.5–5.3)
POTASSIUM SERPL-SCNC: 3.5 MMOL/L — SIGNIFICANT CHANGE UP (ref 3.5–5.3)
RBC # BLD: 3.58 M/UL — LOW (ref 3.8–5.2)
RBC # BLD: 4.29 M/UL — SIGNIFICANT CHANGE UP (ref 3.8–5.2)
RBC # FLD: 23.7 % — HIGH (ref 10.3–14.5)
RBC # FLD: 23.9 % — HIGH (ref 10.3–14.5)
SODIUM SERPL-SCNC: 141 MMOL/L — SIGNIFICANT CHANGE UP (ref 135–145)
VIT B12 SERPL-MCNC: 1510 PG/ML — HIGH (ref 232–1245)
WBC # BLD: 4.69 K/UL — SIGNIFICANT CHANGE UP (ref 3.8–10.5)
WBC # BLD: 5.94 K/UL — SIGNIFICANT CHANGE UP (ref 3.8–10.5)
WBC # FLD AUTO: 4.69 K/UL — SIGNIFICANT CHANGE UP (ref 3.8–10.5)
WBC # FLD AUTO: 5.94 K/UL — SIGNIFICANT CHANGE UP (ref 3.8–10.5)

## 2024-10-26 PROCEDURE — 99232 SBSQ HOSP IP/OBS MODERATE 35: CPT

## 2024-10-26 RX ADMIN — SERTRALINE HYDROCHLORIDE 100 MILLIGRAM(S): 50 TABLET, FILM COATED ORAL at 21:42

## 2024-10-26 RX ADMIN — PANTOPRAZOLE SODIUM 40 MILLIGRAM(S): 40 TABLET, DELAYED RELEASE ORAL at 17:39

## 2024-10-26 RX ADMIN — Medication 1 TABLET(S): at 12:44

## 2024-10-26 RX ADMIN — Medication 2: at 21:39

## 2024-10-26 RX ADMIN — Medication 8: at 12:48

## 2024-10-26 RX ADMIN — PANTOPRAZOLE SODIUM 40 MILLIGRAM(S): 40 TABLET, DELAYED RELEASE ORAL at 05:52

## 2024-10-26 RX ADMIN — LOSARTAN POTASSIUM 25 MILLIGRAM(S): 25 TABLET ORAL at 05:52

## 2024-10-26 RX ADMIN — TRAZODONE HYDROCHLORIDE 50 MILLIGRAM(S): 100 TABLET ORAL at 21:32

## 2024-10-26 RX ADMIN — Medication 500 MILLIGRAM(S): at 12:45

## 2024-10-26 RX ADMIN — Medication 6: at 17:30

## 2024-10-26 RX ADMIN — Medication 6: at 08:46

## 2024-10-26 RX ADMIN — Medication 40 MILLIGRAM(S): at 21:32

## 2024-10-26 NOTE — PROGRESS NOTE ADULT - ATTENDING COMMENTS
Pt seen and examined at bedside. Pt has no complaints. States feels well. Denies CP, SOB, N/V/D, abd pain, melena, hematochezia, fever, chills. Rest of ROS (-).  Plan:  EGD/Colonoscopy planned for Monday  Pending MRI femur  F/u Heme/Onc recs  IVF hydration   D/c Losartan  Monitor H/H  Transfuse to keep Hb>8, will transfuse 1u today obtain post-transfusion CBC 1hr after completion  Rest of plan as stated above.

## 2024-10-26 NOTE — PROGRESS NOTE ADULT - SUBJECTIVE AND OBJECTIVE BOX
79F hx CAD s/p CABG, recent PCI several months ago on ASA/Plavix, pAF on Eliquis, moderate pHTN, HTN, HLD, IDDM presents with anemia to 6 on outpt labs. Spoke with pt and pt's cardiologist who state pt is on aspirin, Plavix, Eliquis. Over the past few months pt has become progressive dyspneic on exertion, now with minimal activity. Stool has become progressively darker over the past few weeks as well, and associated with lightheadedness. Saw her cardiologist in the office yesterday where she was noted to be paler and more short of breath, bloodwork demonstrated anemia so pt was instructed to present to ED. Pt denies fever, chills, abd pain, back pain, CP    We are consulted for anemia. Pt reports she was anemia after childbirth when she was younger but not since. She follows with PCP and endo regularly and get bloodwork at least every 3 months Has never had a colonoscopy. Denies any black stool or rectal bleeding.    10/26 - drop in Hgb noted  no active bleed      MEDICATIONS  (STANDING):  ascorbic acid 500 milliGRAM(s) Oral daily  atorvastatin 40 milliGRAM(s) Oral at bedtime  dextrose 5%. 1000 milliLiter(s) (100 mL/Hr) IV Continuous <Continuous>  dextrose 5%. 1000 milliLiter(s) (50 mL/Hr) IV Continuous <Continuous>  dextrose 50% Injectable 25 Gram(s) IV Push once  dextrose 50% Injectable 12.5 Gram(s) IV Push once  dextrose 50% Injectable 25 Gram(s) IV Push once  glucagon  Injectable 1 milliGRAM(s) IntraMuscular once  insulin lispro (ADMELOG) corrective regimen sliding scale   SubCutaneous at bedtime  insulin lispro (ADMELOG) corrective regimen sliding scale   SubCutaneous three times a day before meals  multivitamin 1 Tablet(s) Oral daily  pantoprazole  Injectable 40 milliGRAM(s) IV Push every 12 hours  sertraline 100 milliGRAM(s) Oral daily  traZODone 50 milliGRAM(s) Oral at bedtime    MEDICATIONS  (PRN):  acetaminophen     Tablet .. 650 milliGRAM(s) Oral every 6 hours PRN Temp greater or equal to 38C (100.4F), Mild Pain (1 - 3)  aluminum hydroxide/magnesium hydroxide/simethicone Suspension 30 milliLiter(s) Oral every 4 hours PRN Dyspepsia  dextrose Oral Gel 15 Gram(s) Oral once PRN Blood Glucose LESS THAN 70 milliGRAM(s)/deciliter  melatonin 3 milliGRAM(s) Oral at bedtime PRN Insomnia  ondansetron Injectable 4 milliGRAM(s) IV Push every 8 hours PRN Nausea and/or Vomiting      ICU Vital Signs Last 24 Hrs  T(C): 36.7 (26 Oct 2024 08:45), Max: 36.7 (25 Oct 2024 17:15)  T(F): 98.1 (26 Oct 2024 08:45), Max: 98.1 (26 Oct 2024 08:45)  HR: 75 (26 Oct 2024 08:45) (72 - 87)  BP: 100/49 (26 Oct 2024 08:45) (100/49 - 122/55)  BP(mean): --  ABP: --  ABP(mean): --  RR: 18 (26 Oct 2024 08:45) (17 - 18)  SpO2: 95% (26 Oct 2024 08:45) (94% - 99%)    O2 Parameters below as of 26 Oct 2024 08:45  Patient On (Oxygen Delivery Method): room air    PHYSICAL EXAM:  Constitutional: Alert, NAD, comfortable   Head and Face: Atraumatic,   Eyes: Sclera and conjunctiva were normal,   ENT: MMM  NECK:  supple  Pulmonary: Clear to auscultation bilaterally  Heart: Regular rate and rhythm   Abdominal: Soft   Skin: Normal color and intact    Extremities: Warm without edema. No clubbing.     CBC                          7.4    4.69  )-----------( 198      ( 26 Oct 2024 06:00 )             25.7                           8.3    5.31  )-----------( 237      ( 25 Oct 2024 06:45 )             27.8                           8.3    5.50  )-----------( 235      ( 24 Oct 2024 05:27 )             28.1     CHEM    10-25    140  |  103  |  16.3  ----------------------------<  277[H]  3.5   |  25.0  |  0.96    Ca    8.5      25 Oct 2024 06:45  Phos  3.2     10-25  Mg     2.1     10-25    TPro  5.8[L]  /  Alb  3.6  /  TBili  2.0  /  DBili  x   /  AST  24  /  ALT  11  /  AlkPhos  49  10-25      10-24    137  |  101  |  20.7[H]  ----------------------------<  157[H]  3.8   |  21.0[L]  |  0.84    Ca    8.8      24 Oct 2024 05:27  Phos  3.3     10-24  Mg     2.2     10-24    TPro  6.3[L]  /  Alb  3.9  /  TBili  3.0[H]  /  DBili  x   /  AST  42[H]  /  ALT  13  /  AlkPhos  52  10-24

## 2024-10-26 NOTE — PROGRESS NOTE ADULT - ASSESSMENT
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    PLAN  Acute blood loss anemia   - Dark brown stools  - Hemodynamically stable  -s/p  IVF NS @ 100 cc/hr dc @1500 10/24  - H/H 6.2/21.5 s/p 2 u PRBC -->8.3  - Started on Protonix IV Q12hr  - Held ASA/Plavix/Eliquis per Cardiology recs   - Will not reverse Eliquis given h/o DUANE thrombus   - Maintain Hgb >8, transfuse PRN  - ADRIANE negative   - Never had a colonoscopy   CTA/P noted lytic lesion in femur (b/l but right reported to be increased in size)  will follow up w MRI femur.   -+Hepatosplenomegaly  - GI following- farxiga held- as per GI needs to be held 5d prior to csope  -EGD and C scope planned on 10/28  -Heme following    Hypokalemia  - s/p Repletion  - Monitor BMP    Chronic AFib   - Held Eliquis given ABLA and per cards recs    CAD/ CHF  - Held ASA/Plavix as above   - Resumed Farxiga 10mg QD, Losartan 25mg QD,   - Resumed Lasix 40mg po QD  -ProBNP elv, LE edema, check Echo    DM2  -  A1C 11/4/22: 9.7m on 10/25 7.7   - on Tresiba 40U in AM  - ISS + Acchecheck     HLD  - Resumed Atorvastatin 40mg QD    Depression/ insomnia   - Resumed Zoloft 100mg QD  - Resumed Trazodone 50mg QD    VTE ppx: SCD, held AC at this time due to severe anemia   Diet: DASH   Dispo: Acute, anemia w/u including EGD/Cscope on 10/28, Mulitple Myeloma workup as per Heme/Onc, MR Femurs approx 4 more days     79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    PLAN  Acute blood loss anemia   - Dark brown stools  - Hemodynamically stable  -s/p  IVF NS @ 100 cc/hr dc @1500 10/24  - H/H 6.2/21.5 s/p 2 u PRBC -->8.3  - Started on Protonix IV Q12hr  - Held ASA/Plavix/Eliquis per Cardiology recs   - Will not reverse Eliquis given h/o DUANE thrombus   - Maintain Hgb >8, transfuse PRN  - ADRIANE negative   - Never had a colonoscopy   -CTA/P noted lytic lesion in femur (b/l but right reported to be increased in size)  will follow up w MRI femur.   -+Hepatosplenomegaly  - GI following- farxiga held- as per GI needs to be held 5d prior to csope  -EGD and C scope planned on 10/28  -Heme following    Hypokalemia  - s/p Repletion  - Monitor BMP    Chronic AFib   - Held Eliquis given ABLA and per cards recs    CAD/ CHF  - Held ASA/Plavix as above   - Resumed Farxiga 10mg QD, Losartan 25mg QD,   - Resumed Lasix 40mg po QD  -ProBNP elv, LE edema, check Echo    DM2  -  A1C 11/4/22: 9.7m on 10/25 7.7   - on Tresiba 40U in AM  - ISS + Acchecheck     HLD  - Resumed Atorvastatin 40mg QD    Depression/ insomnia   - Resumed Zoloft 100mg QD  - Resumed Trazodone 50mg QD    VTE ppx: SCD, held AC at this time due to severe anemia   Diet: DASH   Dispo: Acute, anemia w/u including EGD/Cscope on 10/28, Mulitple Myeloma workup as per Heme/Onc, MR Femurs approx 4 more days     79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    PLAN  Acute blood loss anemia   - Dark brown stools  - Hemodynamically stable  -s/p  IVF NS @ 100 cc/hr dc @1500 10/24  - H/H 6.2/21.5 s/p 2 u PRBC -->8.3  - Started on Protonix IV Q12hr  - Held ASA/Plavix/Eliquis per Cardiology recs   - Will not reverse Eliquis given h/o DUANE thrombus   - Maintain Hgb >8, transfuse PRN  - today (10/26) Hgb 7.3, transfuse 1 unit pRBC  - ADRIANE negative   - Never had a colonoscopy   -CTA/P noted lytic lesion in femur (b/l but right reported to be increased in size)  will follow up w MRI femur.   -+Hepatosplenomegaly  - GI following- farxiga held- as per GI needs to be held 5d prior to c scope  -EGD and C scope planned on 10/28  -Heme following    Hypokalemia  - s/p Repletion  - Monitor BMP    Chronic AFib   - Held Eliquis given ABLA and per cards recs    CAD/ CHF  - Held ASA/Plavix as above   - Resumed Farxiga 10mg QD, Losartan 25mg QD,   - Resumed Lasix 40mg po QD  -ProBNP elv, LE edema, check Echo    DM2  -  A1C 11/4/22: 9.7m on 10/25 7.7   - on Tresiba 40U in AM  - ISS + Acchecheck     HLD  - Resumed Atorvastatin 40mg QD    Depression/ insomnia   - Resumed Zoloft 100mg QD  - Resumed Trazodone 50mg QD    VTE ppx: SCD, held AC at this time due to severe anemia   Diet: DASH   Dispo: Acute, anemia w/u including EGD/Cscope on 10/28, Mulitple Myeloma workup as per Heme/Onc, MR Femurs approx 4 more days

## 2024-10-26 NOTE — PROGRESS NOTE ADULT - ASSESSMENT
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    Anemia  - Hgb 6.2 on admission   - S/P 2 U PRBC  - Hgb now 8.3  - on Protonix   - Held ASA/Plavix/Eliquis per Cardiology recs   - Will not reverse Eliquis given h/o DUANE thrombus   - ADRIANE negative   - Never had a colonoscopy   - GI following - - Hold Farxiga for EGD/colonoscopy Monday  -    Hapto 100 Retic 2.1 T bili 3- no concern for hemolysis   - B12 and folate are adequate  -   drop in hgb   w/u consistent with iron def anemia  please give IV iron x 3 days  plan for EGD/colonoscopy     Abnormal CT  - Ct A/P Suggestion of right heart dysfunction and fluid overload Hepatosplenomegaly. Cholelithiasis.Bilateral femoral lytic lesions as above. -+Hepatosplenomegaly  - Recommend MRI of both femurs   - Multiple myeloma work up pending SPEP and JESS   - So far quiggs,  FLC ratio are normal  - given lytic lesion on CT scan will check skeletal survey for other lytic lesions  - Will check CEA and     Chronic AFib   - Held Eliquis   - Cardiology following     Will follow

## 2024-10-26 NOTE — PROGRESS NOTE ADULT - SUBJECTIVE AND OBJECTIVE BOX
[facility]                                   [address] [place] [address]                                                      PHONE: [phone]                                                         FAX: [phone]  [email or web address]  ---------------------------------------------------------------------------------------------------------------------------------    Overnight events/patient complaints: Comfortable; no CP/SOB/dizziness. Awaiting EGD/colonoscopy Monday.      warfarin (Rash)  codeine (Rash)  OHS (Unknown)    MEDICATIONS  (STANDING):  ascorbic acid 500 milliGRAM(s) Oral daily  atorvastatin 40 milliGRAM(s) Oral at bedtime  dextrose 5%. 1000 milliLiter(s) (50 mL/Hr) IV Continuous <Continuous>  dextrose 5%. 1000 milliLiter(s) (100 mL/Hr) IV Continuous <Continuous>  dextrose 50% Injectable 25 Gram(s) IV Push once  dextrose 50% Injectable 12.5 Gram(s) IV Push once  dextrose 50% Injectable 25 Gram(s) IV Push once  glucagon  Injectable 1 milliGRAM(s) IntraMuscular once  insulin lispro (ADMELOG) corrective regimen sliding scale   SubCutaneous at bedtime  insulin lispro (ADMELOG) corrective regimen sliding scale   SubCutaneous three times a day before meals  multivitamin 1 Tablet(s) Oral daily  pantoprazole  Injectable 40 milliGRAM(s) IV Push every 12 hours  sertraline 100 milliGRAM(s) Oral daily  traZODone 50 milliGRAM(s) Oral at bedtime    MEDICATIONS  (PRN):  acetaminophen     Tablet .. 650 milliGRAM(s) Oral every 6 hours PRN Temp greater or equal to 38C (100.4F), Mild Pain (1 - 3)  aluminum hydroxide/magnesium hydroxide/simethicone Suspension 30 milliLiter(s) Oral every 4 hours PRN Dyspepsia  dextrose Oral Gel 15 Gram(s) Oral once PRN Blood Glucose LESS THAN 70 milliGRAM(s)/deciliter  melatonin 3 milliGRAM(s) Oral at bedtime PRN Insomnia  ondansetron Injectable 4 milliGRAM(s) IV Push every 8 hours PRN Nausea and/or Vomiting      Vital Signs Last 24 Hrs  T(C): 36.7 (26 Oct 2024 08:45), Max: 36.7 (25 Oct 2024 17:15)  T(F): 98.1 (26 Oct 2024 08:45), Max: 98.1 (26 Oct 2024 08:45)  HR: 75 (26 Oct 2024 08:45) (72 - 87)  BP: 100/49 (26 Oct 2024 08:45) (100/49 - 122/55)  BP(mean): --  RR: 18 (26 Oct 2024 08:45) (17 - 18)  SpO2: 95% (26 Oct 2024 08:45) (94% - 99%)    Parameters below as of 26 Oct 2024 08:45  Patient On (Oxygen Delivery Method): room air      ICU Vital Signs Last 24 Hrs  DONALD SAUNDERS  I&O's Detail    I&O's Summary    Drug Dosing Weight  DONALDCAIT SAUNDERS      PHYSICAL EXAM:  General: NAD.  HEENT: Head; normocephalic.  Eyes: Pupils reactive.  Neck: Supple.  CARDIOVASCULAR: RRR.   LUNGS: Normal breath sounds bilaterally.  ABDOMEN: Soft.  EXTREMITIES: No clubbing, cyanosis or edema.   SKIN: warm.  NEURO: Alert/oriented x 3.    PSYCH: normal affect.        LABS:                        7.4    4.69  )-----------( 198      ( 26 Oct 2024 06:00 )             25.7     10-26    141  |  106  |  15.1  ----------------------------<  264[H]  3.5   |  24.0  |  1.04    Ca    8.5      26 Oct 2024 06:00  Phos  3.2     10-25  Mg     2.1     10-25    TPro  5.9[L]  /  Alb  3.6  /  TBili  2.0  /  DBili  x   /  AST  24  /  ALT  11  /  AlkPhos  49  10-25    DONALD SAUNDERS        Urinalysis Basic - ( 26 Oct 2024 06:00 )    Color: x / Appearance: x / SG: x / pH: x  Gluc: 264 mg/dL / Ketone: x  / Bili: x / Urobili: x   Blood: x / Protein: x / Nitrite: x   Leuk Esterase: x / RBC: x / WBC x   Sq Epi: x / Non Sq Epi: x / Bacteria: x        RADIOLOGY & ADDITIONAL STUDIES:    INTERPRETATION OF TELEMETRY (personally reviewed): NSR 80's    ECG: AF/atypical 's 10/23/24    ECHO:   1. Left ventricular cavity is normal in size. Left ventricular wall thickness is normal. Septal motion is abnormal consistent with previous cardiac surgery and The interventricular septum is flattened in systole and diastole consistent with right ventricular pressure and volume overload. Left ventricular systolic function is normal with an ejection fraction of 62 % by Shearer's method of disks.   2. The left ventricular diastolic function is indeterminate.   3. Moderately enlarged right ventricular cavity size and moderately reduced right ventricular systolic function.   4. The right atrium is severely dilated.   5. S/p mitral valve repair. Trace MR visualized.   6. The transmitral mean gradient is 5.00 mmHg at 63bpm.   7. Mild aortic stenosis.   8. The peak transaortic velocity is 2.08 m/s, peak transaortic gradient is 17.3 mmHg and mean transaortic gradient is 8.0 mmHg with an LVOT/aortic valve VTI ratio of 0.40.   9. Severe tricuspid regurgitation.  10. Estimated pulmonary artery systolic pressure is 70 mmHg, consistent with severe pulmonary hypertension.  11. Mild pulmonic regurgitation.  12. Compared to the transthoracic echocardiogram performed on 4/25/2024, there have been no significant interval changes.      ASSESSMENT AND PLAN:  78 F PMHx DM  HLD PAD, CAD s/p CABGX3/MV repair/LAAthrombectomy 1/2021 initial sig LV dysfunction, augmented EF on GDMT, COPD, chronic AF on NOAC, postop protected LM PCI 2023, progressive RENTERIA with recent cardiac cath 8/2024 revealed patent LIMA to LAD, mod mid LAD disease, prox Cx disease s/p CARIN EF 45% moderate pul HTN with elevated PCWP WHO Group2 Pul HtN on increasing doses diuretics, seen in office this past week with progressive RENTERIA and fatigue.  Labs then revealed anemia.    1. No absolute cardiac contraindications to proceeding with GI work up (EGD/colonoscopy) planned for Monday.  2. AC and Plavix held due to anemia and possible GIB.  3. GI note appreciated.

## 2024-10-26 NOTE — PROGRESS NOTE ADULT - SUBJECTIVE AND OBJECTIVE BOX
DONALD SAUNDERS 79y Female  MRN#: [id number]       SUBJECTIVE  Patient is a 79y old Female who presents with a chief complaint of Acute blood loss anemia (25 Oct 2024 16:31)  Currently admitted to medicine with the primary diagnosis of GI bleeding      Today is hospital day 3d, and this morning she is _________ and reports no overnight events.       OBJECTIVE  PAST MEDICAL & SURGICAL HISTORY  Hypertension    Hypercholesterolemia    Paroxysmal atrial fibrillation    PAD (peripheral artery disease)    Type 2 diabetes mellitus with diabetic peripheral angiopathy without gangrene, with long-term curren    Coronary artery disease involving native coronary artery of native heart with angina pectoris    H/O abdominal hysterectomy    H/O hernia repair    S/P femoral-popliteal bypass surgery    S/P peripheral artery angioplasty with stent placement    Status post three vessel coronary artery bypass    History of mitral valve replacement with cardiopulmonary bypass      Home Meds:  apixaban 2.5 mg oral tablet: 1 tab(s) orally every 12 hours  ascorbic acid 500 mg oral tablet: 1 tab(s) orally once a day  Aspir 81 oral delayed release tablet: 1 tab(s) orally once a day  atorvastatin 40 mg oral tablet: 1 tab(s) orally once a day (at bedtime)  clopidogrel 75 mg oral tablet: 1 tab(s) orally once a day   Farxiga 10 mg oral tablet: 1 tab(s) orally once a day (in the morning)  isosorbide mononitrate 30 mg oral tablet, extended release: 1 tab(s) orally once a day (in the morning)  Lasix 40 mg oral tablet: 1 tab(s) orally once a day   losartan 25 mg oral tablet: 1 tab(s) orally once a day  Multiple Vitamins oral tablet: 1 tab(s) orally once a day  traZODone 50 mg oral tablet: 1 tab(s) orally once a day (at bedtime)  Tresiba 100 units/mL subcutaneous solution: 40 unit(s) subcutaneous 2 times a day  Zoloft 100 mg oral tablet: 1 tab(s) orally once a day    ALLERGIES:  warfarin (Rash)  codeine (Rash)    MEDICATIONS:  STANDING MEDICATIONS  ascorbic acid 500 milliGRAM(s) Oral daily  atorvastatin 40 milliGRAM(s) Oral at bedtime  dextrose 5%. 1000 milliLiter(s) IV Continuous <Continuous>  dextrose 5%. 1000 milliLiter(s) IV Continuous <Continuous>  dextrose 50% Injectable 25 Gram(s) IV Push once  dextrose 50% Injectable 12.5 Gram(s) IV Push once  dextrose 50% Injectable 25 Gram(s) IV Push once  glucagon  Injectable 1 milliGRAM(s) IntraMuscular once  insulin lispro (ADMELOG) corrective regimen sliding scale   SubCutaneous three times a day before meals  insulin lispro (ADMELOG) corrective regimen sliding scale   SubCutaneous at bedtime  losartan 25 milliGRAM(s) Oral daily  multivitamin 1 Tablet(s) Oral daily  pantoprazole  Injectable 40 milliGRAM(s) IV Push every 12 hours  sertraline 100 milliGRAM(s) Oral daily  traZODone 50 milliGRAM(s) Oral at bedtime    PRN MEDICATIONS  acetaminophen     Tablet .. 650 milliGRAM(s) Oral every 6 hours PRN  aluminum hydroxide/magnesium hydroxide/simethicone Suspension 30 milliLiter(s) Oral every 4 hours PRN  dextrose Oral Gel 15 Gram(s) Oral once PRN  melatonin 3 milliGRAM(s) Oral at bedtime PRN  ondansetron Injectable 4 milliGRAM(s) IV Push every 8 hours PRN      VITAL SIGNS: Last 24 Hours  T(C): 36.7 (26 Oct 2024 04:35), Max: 36.7 (25 Oct 2024 17:15)  T(F): 98 (26 Oct 2024 04:35), Max: 98 (25 Oct 2024 17:15)  HR: 78 (26 Oct 2024 04:35) (72 - 87)  BP: 107/62 (26 Oct 2024 04:35) (103/64 - 122/55)  BP(mean): --  RR: 18 (26 Oct 2024 04:35) (17 - 18)  SpO2: 94% (26 Oct 2024 04:35) (94% - 99%)    LABS:                        7.4    4.69  )-----------( 198      ( 26 Oct 2024 06:00 )             25.7     10-26    141  |  106  |  15.1  ----------------------------<  264[H]  3.5   |  24.0  |  1.04    Ca    8.5      26 Oct 2024 06:00  Phos  3.2     10-25  Mg     2.1     10-25    TPro  5.9[L]  /  Alb  3.6  /  TBili  2.0  /  DBili  x   /  AST  24  /  ALT  11  /  AlkPhos  49  10-25        RADIOLOGY:  Reviewed     PHYSICAL EXAM:  General: NAD, AAOx3  HEENT: atraumatic, normocephalic  Pulmonary: clear to auscultation bilaterally; No wheeze  Cardiovascular: Regular rate and rhythm; no murmurs, rubs or gallops. Normal S1S2  Gastrointestinal: Soft, nontender, nondistended; bowel sounds present  Musculoskeletal: 2+ peripheral pulses, no clubbing, cyanosis or edema  Neurology: Pt. alert and oriented, fluent speech, able to move all extremities  Skin: no rashes or lesions             DONALD SAUNDERS 79y Female  MRN#: [id number]       SUBJECTIVE  Patient is a 79y old Female who presents with a chief complaint of Acute blood loss anemia (25 Oct 2024 16:31)  Currently admitted to medicine with the primary diagnosis of GI bleeding      Today is hospital day 3d, and this morning she is comfortable, reports sleeping and eating well, ambulating with a walker, and reports no overnight events. Denies fever, chills, chest pain, SOB, new bruising, hematochezia, hematuria.      OBJECTIVE  PAST MEDICAL & SURGICAL HISTORY  Hypertension    Hypercholesterolemia    Paroxysmal atrial fibrillation    PAD (peripheral artery disease)    Type 2 diabetes mellitus with diabetic peripheral angiopathy without gangrene, with long-term curren    Coronary artery disease involving native coronary artery of native heart with angina pectoris    H/O abdominal hysterectomy    H/O hernia repair    S/P femoral-popliteal bypass surgery    S/P peripheral artery angioplasty with stent placement    Status post three vessel coronary artery bypass    History of mitral valve replacement with cardiopulmonary bypass      Home Meds:  apixaban 2.5 mg oral tablet: 1 tab(s) orally every 12 hours  ascorbic acid 500 mg oral tablet: 1 tab(s) orally once a day  Aspir 81 oral delayed release tablet: 1 tab(s) orally once a day  atorvastatin 40 mg oral tablet: 1 tab(s) orally once a day (at bedtime)  clopidogrel 75 mg oral tablet: 1 tab(s) orally once a day   Farxiga 10 mg oral tablet: 1 tab(s) orally once a day (in the morning)  isosorbide mononitrate 30 mg oral tablet, extended release: 1 tab(s) orally once a day (in the morning)  Lasix 40 mg oral tablet: 1 tab(s) orally once a day   losartan 25 mg oral tablet: 1 tab(s) orally once a day  Multiple Vitamins oral tablet: 1 tab(s) orally once a day  traZODone 50 mg oral tablet: 1 tab(s) orally once a day (at bedtime)  Tresiba 100 units/mL subcutaneous solution: 40 unit(s) subcutaneous 2 times a day  Zoloft 100 mg oral tablet: 1 tab(s) orally once a day    ALLERGIES:  warfarin (Rash)  codeine (Rash)    MEDICATIONS:  STANDING MEDICATIONS  ascorbic acid 500 milliGRAM(s) Oral daily  atorvastatin 40 milliGRAM(s) Oral at bedtime  dextrose 5%. 1000 milliLiter(s) IV Continuous <Continuous>  dextrose 5%. 1000 milliLiter(s) IV Continuous <Continuous>  dextrose 50% Injectable 25 Gram(s) IV Push once  dextrose 50% Injectable 12.5 Gram(s) IV Push once  dextrose 50% Injectable 25 Gram(s) IV Push once  glucagon  Injectable 1 milliGRAM(s) IntraMuscular once  insulin lispro (ADMELOG) corrective regimen sliding scale   SubCutaneous three times a day before meals  insulin lispro (ADMELOG) corrective regimen sliding scale   SubCutaneous at bedtime  losartan 25 milliGRAM(s) Oral daily  multivitamin 1 Tablet(s) Oral daily  pantoprazole  Injectable 40 milliGRAM(s) IV Push every 12 hours  sertraline 100 milliGRAM(s) Oral daily  traZODone 50 milliGRAM(s) Oral at bedtime    PRN MEDICATIONS  acetaminophen     Tablet .. 650 milliGRAM(s) Oral every 6 hours PRN  aluminum hydroxide/magnesium hydroxide/simethicone Suspension 30 milliLiter(s) Oral every 4 hours PRN  dextrose Oral Gel 15 Gram(s) Oral once PRN  melatonin 3 milliGRAM(s) Oral at bedtime PRN  ondansetron Injectable 4 milliGRAM(s) IV Push every 8 hours PRN      VITAL SIGNS: Last 24 Hours  T(C): 36.7 (26 Oct 2024 04:35), Max: 36.7 (25 Oct 2024 17:15)  T(F): 98 (26 Oct 2024 04:35), Max: 98 (25 Oct 2024 17:15)  HR: 78 (26 Oct 2024 04:35) (72 - 87)  BP: 107/62 (26 Oct 2024 04:35) (103/64 - 122/55)  BP(mean): --  RR: 18 (26 Oct 2024 04:35) (17 - 18)  SpO2: 94% (26 Oct 2024 04:35) (94% - 99%)    LABS:                        7.4    4.69  )-----------( 198      ( 26 Oct 2024 06:00 )             25.7     10-26    141  |  106  |  15.1  ----------------------------<  264[H]  3.5   |  24.0  |  1.04    Ca    8.5      26 Oct 2024 06:00  Phos  3.2     10-25  Mg     2.1     10-25    TPro  5.9[L]  /  Alb  3.6  /  TBili  2.0  /  DBili  x   /  AST  24  /  ALT  11  /  AlkPhos  49  10-25        RADIOLOGY:  Reviewed     PHYSICAL EXAM:  General: NAD, AAOx3  HEENT: atraumatic, normocephalic  Pulmonary: clear to auscultation bilaterally; No wheeze  Cardiovascular: Regular rate and rhythm; no murmurs, rubs or gallops. Normal S1S2  Gastrointestinal: Soft, nontender, nondistended; bowel sounds present  Musculoskeletal: +2 pitting edema b/l LE, 2+ peripheral pulses, no clubbing, cyanosis  Neurology: Pt. alert and oriented, fluent speech, able to move all extremities  Skin: no rashes or lesions

## 2024-10-27 LAB
ALBUMIN SERPL ELPH-MCNC: 3.4 G/DL — SIGNIFICANT CHANGE UP (ref 3.3–5.2)
ALP SERPL-CCNC: 47 U/L — SIGNIFICANT CHANGE UP (ref 40–120)
ALT FLD-CCNC: 10 U/L — SIGNIFICANT CHANGE UP
ANION GAP SERPL CALC-SCNC: 14 MMOL/L — SIGNIFICANT CHANGE UP (ref 5–17)
AST SERPL-CCNC: 17 U/L — SIGNIFICANT CHANGE UP
BILIRUB SERPL-MCNC: 1.7 MG/DL — SIGNIFICANT CHANGE UP (ref 0.4–2)
BUN SERPL-MCNC: 13.1 MG/DL — SIGNIFICANT CHANGE UP (ref 8–20)
CALCIUM SERPL-MCNC: 9 MG/DL — SIGNIFICANT CHANGE UP (ref 8.4–10.5)
CHLORIDE SERPL-SCNC: 105 MMOL/L — SIGNIFICANT CHANGE UP (ref 96–108)
CO2 SERPL-SCNC: 21 MMOL/L — LOW (ref 22–29)
CREAT SERPL-MCNC: 0.74 MG/DL — SIGNIFICANT CHANGE UP (ref 0.5–1.3)
EGFR: 83 ML/MIN/1.73M2 — SIGNIFICANT CHANGE UP
GLUCOSE BLDC GLUCOMTR-MCNC: 147 MG/DL — HIGH (ref 70–99)
GLUCOSE BLDC GLUCOMTR-MCNC: 205 MG/DL — HIGH (ref 70–99)
GLUCOSE BLDC GLUCOMTR-MCNC: 258 MG/DL — HIGH (ref 70–99)
GLUCOSE BLDC GLUCOMTR-MCNC: 311 MG/DL — HIGH (ref 70–99)
GLUCOSE BLDC GLUCOMTR-MCNC: 320 MG/DL — HIGH (ref 70–99)
GLUCOSE SERPL-MCNC: 247 MG/DL — HIGH (ref 70–99)
HCT VFR BLD CALC: 28.1 % — LOW (ref 34.5–45)
HGB BLD-MCNC: 8.3 G/DL — LOW (ref 11.5–15.5)
MAGNESIUM SERPL-MCNC: 1.9 MG/DL — SIGNIFICANT CHANGE UP (ref 1.6–2.6)
MCHC RBC-ENTMCNC: 21.4 PG — LOW (ref 27–34)
MCHC RBC-ENTMCNC: 29.5 GM/DL — LOW (ref 32–36)
MCV RBC AUTO: 72.6 FL — LOW (ref 80–100)
PHOSPHATE SERPL-MCNC: 3.2 MG/DL — SIGNIFICANT CHANGE UP (ref 2.4–4.7)
PLATELET # BLD AUTO: 179 K/UL — SIGNIFICANT CHANGE UP (ref 150–400)
POTASSIUM SERPL-MCNC: 3.1 MMOL/L — LOW (ref 3.5–5.3)
POTASSIUM SERPL-SCNC: 3.1 MMOL/L — LOW (ref 3.5–5.3)
PROT SERPL-MCNC: 5.6 G/DL — LOW (ref 6.6–8.7)
RBC # BLD: 3.87 M/UL — SIGNIFICANT CHANGE UP (ref 3.8–5.2)
RBC # FLD: 24.2 % — HIGH (ref 10.3–14.5)
SODIUM SERPL-SCNC: 139 MMOL/L — SIGNIFICANT CHANGE UP (ref 135–145)
WBC # BLD: 4.59 K/UL — SIGNIFICANT CHANGE UP (ref 3.8–10.5)
WBC # FLD AUTO: 4.59 K/UL — SIGNIFICANT CHANGE UP (ref 3.8–10.5)

## 2024-10-27 PROCEDURE — 99232 SBSQ HOSP IP/OBS MODERATE 35: CPT

## 2024-10-27 PROCEDURE — 73719 MRI LOWER EXTREMITY W/DYE: CPT | Mod: 26,50

## 2024-10-27 RX ORDER — SERTRALINE HYDROCHLORIDE 50 MG/1
100 TABLET, FILM COATED ORAL AT BEDTIME
Refills: 0 | Status: DISCONTINUED | OUTPATIENT
Start: 2024-10-27 | End: 2024-10-29

## 2024-10-27 RX ORDER — POLYETHYLENE GLYCOL 3350, SODIUM SULFATE ANHYDROUS, SODIUM BICARBONATE, SODIUM CHLORIDE, POTASSIUM CHLORIDE 236; 22.74; 6.74; 5.86; 2.97 G/4L; G/4L; G/4L; G/4L; G/4L
4000 POWDER, FOR SOLUTION ORAL ONCE
Refills: 0 | Status: COMPLETED | OUTPATIENT
Start: 2024-10-27 | End: 2024-10-27

## 2024-10-27 RX ADMIN — POLYETHYLENE GLYCOL 3350, SODIUM SULFATE ANHYDROUS, SODIUM BICARBONATE, SODIUM CHLORIDE, POTASSIUM CHLORIDE 4000 MILLILITER(S): 236; 22.74; 6.74; 5.86; 2.97 POWDER, FOR SOLUTION ORAL at 18:50

## 2024-10-27 RX ADMIN — PANTOPRAZOLE SODIUM 40 MILLIGRAM(S): 40 TABLET, DELAYED RELEASE ORAL at 17:50

## 2024-10-27 RX ADMIN — Medication 8: at 17:53

## 2024-10-27 RX ADMIN — SERTRALINE HYDROCHLORIDE 100 MILLIGRAM(S): 50 TABLET, FILM COATED ORAL at 21:14

## 2024-10-27 RX ADMIN — Medication 500 MILLIGRAM(S): at 12:28

## 2024-10-27 RX ADMIN — Medication 40 MILLIGRAM(S): at 21:14

## 2024-10-27 RX ADMIN — Medication 6: at 06:37

## 2024-10-27 RX ADMIN — Medication 20 MILLIGRAM(S): at 12:31

## 2024-10-27 RX ADMIN — Medication 1 TABLET(S): at 12:27

## 2024-10-27 RX ADMIN — TRAZODONE HYDROCHLORIDE 50 MILLIGRAM(S): 100 TABLET ORAL at 21:14

## 2024-10-27 RX ADMIN — Medication 8: at 12:27

## 2024-10-27 RX ADMIN — PANTOPRAZOLE SODIUM 40 MILLIGRAM(S): 40 TABLET, DELAYED RELEASE ORAL at 06:38

## 2024-10-27 NOTE — PROGRESS NOTE ADULT - SUBJECTIVE AND OBJECTIVE BOX
79F hx CAD s/p CABG, recent PCI several months ago on ASA/Plavix, pAF on Eliquis, moderate pHTN, HTN, HLD, IDDM presents with anemia to 6 on outpt labs. Spoke with pt and pt's cardiologist who state pt is on aspirin, Plavix, Eliquis. Over the past few months pt has become progressive dyspneic on exertion, now with minimal activity. Stool has become progressively darker over the past few weeks as well, and associated with lightheadedness. Saw her cardiologist in the office yesterday where she was noted to be paler and more short of breath, bloodwork demonstrated anemia so pt was instructed to present to ED. Pt denies fever, chills, abd pain, back pain, CP    We are consulted for anemia. Pt reports she was anemia after childbirth when she was younger but not since. She follows with PCP and endo regularly and get bloodwork at least every 3 months Has never had a colonoscopy. Denies any black stool or rectal bleeding.    10/27 - stable CBC , no acute events      MEDICATIONS  (STANDING):  ascorbic acid 500 milliGRAM(s) Oral daily  atorvastatin 40 milliGRAM(s) Oral at bedtime  bisacodyl 20 milliGRAM(s) Oral once  dextrose 5%. 1000 milliLiter(s) (100 mL/Hr) IV Continuous <Continuous>  dextrose 5%. 1000 milliLiter(s) (50 mL/Hr) IV Continuous <Continuous>  dextrose 50% Injectable 25 Gram(s) IV Push once  dextrose 50% Injectable 12.5 Gram(s) IV Push once  dextrose 50% Injectable 25 Gram(s) IV Push once  glucagon  Injectable 1 milliGRAM(s) IntraMuscular once  insulin lispro (ADMELOG) corrective regimen sliding scale   SubCutaneous at bedtime  insulin lispro (ADMELOG) corrective regimen sliding scale   SubCutaneous three times a day before meals  multivitamin 1 Tablet(s) Oral daily  pantoprazole  Injectable 40 milliGRAM(s) IV Push every 12 hours  polyethylene glycol/electrolyte Solution. 4000 milliLiter(s) Oral once  sertraline 100 milliGRAM(s) Oral daily  traZODone 50 milliGRAM(s) Oral at bedtime      ICU Vital Signs Last 24 Hrs  T(C): 36.8 (27 Oct 2024 04:55), Max: 36.9 (26 Oct 2024 13:35)  T(F): 98.3 (27 Oct 2024 04:55), Max: 98.5 (26 Oct 2024 13:35)  HR: 79 (27 Oct 2024 04:55) (69 - 89)  BP: 114/66 (27 Oct 2024 04:55) (104/63 - 123/60)  BP(mean): --  ABP: --  ABP(mean): --  RR: 18 (27 Oct 2024 04:55) (15 - 18)  SpO2: 95% (27 Oct 2024 04:55) (95% - 98%)    O2 Parameters below as of 27 Oct 2024 04:55  Patient On (Oxygen Delivery Method): room air            PHYSICAL EXAM:  Constitutional: Alert, NAD, comfortable   Head and Face: Atraumatic,   Eyes: Sclera and conjunctiva were normal,   ENT: MMM  NECK:  supple  Pulmonary: Clear to auscultation bilaterally  Heart: Regular rate and rhythm   Abdominal: Soft   Skin: Normal color and intact    Extremities: Warm without edema. No clubbing.     CBC                          8.3    4.59  )-----------( 179      ( 27 Oct 2024 06:50 )             28.1                           7.4    4.69  )-----------( 198      ( 26 Oct 2024 06:00 )             25.7                           8.3    5.31  )-----------( 237      ( 25 Oct 2024 06:45 )             27.8                           8.3    5.50  )-----------( 235      ( 24 Oct 2024 05:27 )             28.1     CHEM    10-25    140  |  103  |  16.3  ----------------------------<  277[H]  3.5   |  25.0  |  0.96    Ca    8.5      25 Oct 2024 06:45  Phos  3.2     10-25  Mg     2.1     10-25    TPro  5.8[L]  /  Alb  3.6  /  TBili  2.0  /  DBili  x   /  AST  24  /  ALT  11  /  AlkPhos  49  10-25      10-24    137  |  101  |  20.7[H]  ----------------------------<  157[H]  3.8   |  21.0[L]  |  0.84    Ca    8.8      24 Oct 2024 05:27  Phos  3.3     10-24  Mg     2.2     10-24    TPro  6.3[L]  /  Alb  3.9  /  TBili  3.0[H]  /  DBili  x   /  AST  42[H]  /  ALT  13  /  AlkPhos  52  10-24

## 2024-10-27 NOTE — PROGRESS NOTE ADULT - ASSESSMENT
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    PLAN  Acute blood loss anemia   - Dark brown stools  - Hemodynamically stable  -s/p  IVF NS @ 100 cc/hr dc @1500 10/24  - H/H 6.2/21.5 s/p 2 u PRBC -->8.3  - Started on Protonix IV Q12hr  - Held ASA/Plavix/Eliquis per Cardiology recs   - Will not reverse Eliquis given h/o DUANE thrombus   - Maintain Hgb >8, transfuse PRN  -Hemoglobin stable.   - ADRIANE negative   - Never had a colonoscopy   -CTA/P noted lytic lesion in femur (b/l but right reported to be increased in size)  will follow up w MRI femur.   -+Hepatosplenomegaly  - GI following- farxiga held- as per GI needs to be held 5d prior to c scope  -EGD and C scope planned on 10/28  -Heme following    Hypokalemia  - s/p Repletion  - Monitor BMP    Chronic AFib   - Held Eliquis given ABLA and per cards recs    CAD/ CHF  - Held ASA/Plavix as above   - Resumed Farxiga 10mg QD, Losartan 25mg QD,   - Resumed Lasix 40mg po QD  -ProBNP elv, LE edema,   -Echo shows septal movement abnormalities with mildly reduced RV function and mild diastolic dysfunction of right ventricle.. Normal LVEF    DM2  -  A1C 11/4/22: 9.7m on 10/25 7.7   - on Tresiba 40U in AM  - ISS + Acchecheck     HLD  - Resumed Atorvastatin 40mg QD    Depression/ insomnia   - Resumed Zoloft 100mg QD  - Resumed Trazodone 50mg QD    VTE ppx: SCD, held AC at this time due to severe anemia   Diet: DASH   Dispo: Acute, anemia w/u including EGD/Cscope on 10/28, Mulitple Myeloma workup as per Heme/Onc, MR Femurs approx 4 more days

## 2024-10-27 NOTE — PROGRESS NOTE ADULT - SUBJECTIVE AND OBJECTIVE BOX
Chief Complaint:  Patient is a 79y old  Female who presents with a chief complaint of Acute blood loss anemia (27 Oct 2024 11:36)      HPI/ 24 hr events: Patient seen and examined at bedside. Pt feeling well this morning. Tolerating diet. Denies nausea, vomiting, diarrhea, abdominal pain, hematemesis, hematochezia, melena. Vitals are overall stable, no leukocytosis, Hgb stable at 8.3.      REVIEW OF SYSTEMS:   General: Negative  HEENT: Negative  CV: Negative  Respiratory: Negative  GI: See HPI  : Negative  MSK: Negative  Hematologic: Negative  Skin: Negative    MEDICATIONS:   MEDICATIONS  (STANDING):  ascorbic acid 500 milliGRAM(s) Oral daily  atorvastatin 40 milliGRAM(s) Oral at bedtime  bisacodyl 20 milliGRAM(s) Oral once  dextrose 5%. 1000 milliLiter(s) (100 mL/Hr) IV Continuous <Continuous>  dextrose 5%. 1000 milliLiter(s) (50 mL/Hr) IV Continuous <Continuous>  dextrose 50% Injectable 25 Gram(s) IV Push once  dextrose 50% Injectable 12.5 Gram(s) IV Push once  dextrose 50% Injectable 25 Gram(s) IV Push once  glucagon  Injectable 1 milliGRAM(s) IntraMuscular once  insulin lispro (ADMELOG) corrective regimen sliding scale   SubCutaneous at bedtime  insulin lispro (ADMELOG) corrective regimen sliding scale   SubCutaneous three times a day before meals  multivitamin 1 Tablet(s) Oral daily  pantoprazole  Injectable 40 milliGRAM(s) IV Push every 12 hours  polyethylene glycol/electrolyte Solution. 4000 milliLiter(s) Oral once  sertraline 100 milliGRAM(s) Oral daily  traZODone 50 milliGRAM(s) Oral at bedtime    MEDICATIONS  (PRN):  acetaminophen     Tablet .. 650 milliGRAM(s) Oral every 6 hours PRN Temp greater or equal to 38C (100.4F), Mild Pain (1 - 3)  aluminum hydroxide/magnesium hydroxide/simethicone Suspension 30 milliLiter(s) Oral every 4 hours PRN Dyspepsia  dextrose Oral Gel 15 Gram(s) Oral once PRN Blood Glucose LESS THAN 70 milliGRAM(s)/deciliter  melatonin 3 milliGRAM(s) Oral at bedtime PRN Insomnia  ondansetron Injectable 4 milliGRAM(s) IV Push every 8 hours PRN Nausea and/or Vomiting      Packed Red Cells Order:  1 Unit  Indication: Anemia due to Active Hemorrhage - Medical Conditions e.  Infuse Unit : 3 Hours --- none (10-27-24 @ 09:47)  Packed Red Cells Order:  1 Unit  Indication: Hgb <8 gm/dL -Stable Cardiovascular Disease or Acute Co  Infuse Unit : 3.5 Hours (10-26-24 @ 08:42)  Packed Red Cells Order:  1 Unit  Indication: Hgb <7 gm/dL  Infuse Unit : 3 Hours (10-23-24 @ 18:31)  Packed Red Cells Order:  1 Unit  Indication: Hgb <7 gm/dL  Infuse Unit : 3 Hours (10-23-24 @ 17:30)        DIET:  Diet, NPO after Midnight:      NPO Start Date: 27-Oct-2024,   NPO Start Time: 23:59 (10-27-24 @ 09:51) [Active]  Diet, Clear Liquid (10-27-24 @ 09:02) [Active]          ALLERGIES:   Allergies    warfarin (Rash)  codeine (Rash)    Intolerances    OHS (Unknown)      VITAL SIGNS:   Vital Signs Last 24 Hrs  T(C): 36.8 (27 Oct 2024 04:55), Max: 36.9 (26 Oct 2024 13:35)  T(F): 98.3 (27 Oct 2024 04:55), Max: 98.5 (26 Oct 2024 13:35)  HR: 79 (27 Oct 2024 04:55) (69 - 81)  BP: 114/66 (27 Oct 2024 04:55) (114/66 - 121/63)  BP(mean): --  RR: 18 (27 Oct 2024 04:55) (16 - 18)  SpO2: 95% (27 Oct 2024 04:55) (95% - 98%)    Parameters below as of 27 Oct 2024 04:55  Patient On (Oxygen Delivery Method): room air      I&O's Summary      PHYSICAL EXAM:   GENERAL:  No acute distress  HEENT:  NC/AT, conjunctiva clear, sclera anicteric  CHEST:  No increased effort  HEART:  Regular rate  ABDOMEN:  Soft, non-tender, non-distended, normoactive bowel sounds, no rebound or guarding  EXTREMITIES: No edema  SKIN:  Warm, dry  NEURO:  Calm, cooperative    LABS:                        8.3    4.59  )-----------( 179      ( 27 Oct 2024 06:50 )             28.1     Hemoglobin: 8.3 g/dL (10-27-24 @ 06:50)  Hemoglobin: 9.2 g/dL (10-26-24 @ 14:05)  Hemoglobin: 7.4 g/dL (10-26-24 @ 06:00)  Hemoglobin: 8.3 g/dL (10-25-24 @ 06:45)    10-27    139  |  105  |  13.1  ----------------------------<  247[H]  3.1[L]   |  21.0[L]  |  0.74    Ca    9.0      27 Oct 2024 06:50  Phos  3.2     10-27  Mg     1.9     10-27    TPro  5.6[L]  /  Alb  3.4  /  TBili  1.7  /  DBili  x   /  AST  17  /  ALT  10  /  AlkPhos  47  10-27    LIVER FUNCTIONS - ( 27 Oct 2024 06:50 )  Alb: 3.4 g/dL / Pro: 5.6 g/dL / ALK PHOS: 47 U/L / ALT: 10 U/L / AST: 17 U/L / GGT: x                   Carcinoembryonic Antigen: 2.3 ng/mL (10-26-24 @ 06:00)  Cancer Antigen, 125: 46 U/mL (10-26-24 @ 06:00)                                    RADIOLOGY & ADDITIONAL STUDIES:         Chief Complaint:  Patient is a 79y old  Female who presents with a chief complaint of Acute blood loss anemia (27 Oct 2024 11:36)    HPI/ 24 hr events: Patient seen and examined at bedside. Pt feeling well this morning. Tolerating diet. Denies nausea, vomiting, diarrhea, abdominal pain, hematemesis, hematochezia, melena. Vitals are overall stable, no leukocytosis, Hgb stable at 8.3.      REVIEW OF SYSTEMS:   General: Negative  HEENT: Negative  CV: Negative  Respiratory: Negative  GI: See HPI  : Negative  MSK: Negative  Hematologic: Negative  Skin: Negative    MEDICATIONS:   MEDICATIONS  (STANDING):  ascorbic acid 500 milliGRAM(s) Oral daily  atorvastatin 40 milliGRAM(s) Oral at bedtime  bisacodyl 20 milliGRAM(s) Oral once  dextrose 5%. 1000 milliLiter(s) (100 mL/Hr) IV Continuous <Continuous>  dextrose 5%. 1000 milliLiter(s) (50 mL/Hr) IV Continuous <Continuous>  dextrose 50% Injectable 25 Gram(s) IV Push once  dextrose 50% Injectable 12.5 Gram(s) IV Push once  dextrose 50% Injectable 25 Gram(s) IV Push once  glucagon  Injectable 1 milliGRAM(s) IntraMuscular once  insulin lispro (ADMELOG) corrective regimen sliding scale   SubCutaneous at bedtime  insulin lispro (ADMELOG) corrective regimen sliding scale   SubCutaneous three times a day before meals  multivitamin 1 Tablet(s) Oral daily  pantoprazole  Injectable 40 milliGRAM(s) IV Push every 12 hours  polyethylene glycol/electrolyte Solution. 4000 milliLiter(s) Oral once  sertraline 100 milliGRAM(s) Oral daily  traZODone 50 milliGRAM(s) Oral at bedtime    MEDICATIONS  (PRN):  acetaminophen     Tablet .. 650 milliGRAM(s) Oral every 6 hours PRN Temp greater or equal to 38C (100.4F), Mild Pain (1 - 3)  aluminum hydroxide/magnesium hydroxide/simethicone Suspension 30 milliLiter(s) Oral every 4 hours PRN Dyspepsia  dextrose Oral Gel 15 Gram(s) Oral once PRN Blood Glucose LESS THAN 70 milliGRAM(s)/deciliter  melatonin 3 milliGRAM(s) Oral at bedtime PRN Insomnia  ondansetron Injectable 4 milliGRAM(s) IV Push every 8 hours PRN Nausea and/or Vomiting      Packed Red Cells Order:  1 Unit  Indication: Anemia due to Active Hemorrhage - Medical Conditions e.  Infuse Unit : 3 Hours --- none (10-27-24 @ 09:47)  Packed Red Cells Order:  1 Unit  Indication: Hgb <8 gm/dL -Stable Cardiovascular Disease or Acute Co  Infuse Unit : 3.5 Hours (10-26-24 @ 08:42)  Packed Red Cells Order:  1 Unit  Indication: Hgb <7 gm/dL  Infuse Unit : 3 Hours (10-23-24 @ 18:31)  Packed Red Cells Order:  1 Unit  Indication: Hgb <7 gm/dL  Infuse Unit : 3 Hours (10-23-24 @ 17:30)        DIET:  Diet, NPO after Midnight:      NPO Start Date: 27-Oct-2024,   NPO Start Time: 23:59 (10-27-24 @ 09:51) [Active]  Diet, Clear Liquid (10-27-24 @ 09:02) [Active]          ALLERGIES:   Allergies    warfarin (Rash)  codeine (Rash)    Intolerances    OHS (Unknown)      VITAL SIGNS:   Vital Signs Last 24 Hrs  T(C): 36.8 (27 Oct 2024 04:55), Max: 36.9 (26 Oct 2024 13:35)  T(F): 98.3 (27 Oct 2024 04:55), Max: 98.5 (26 Oct 2024 13:35)  HR: 79 (27 Oct 2024 04:55) (69 - 81)  BP: 114/66 (27 Oct 2024 04:55) (114/66 - 121/63)  BP(mean): --  RR: 18 (27 Oct 2024 04:55) (16 - 18)  SpO2: 95% (27 Oct 2024 04:55) (95% - 98%)    Parameters below as of 27 Oct 2024 04:55  Patient On (Oxygen Delivery Method): room air      I&O's Summary      PHYSICAL EXAM:   GENERAL:  No acute distress  HEENT:  NC/AT, conjunctiva clear, sclera anicteric  CHEST:  No increased effort  HEART:  Regular rate  ABDOMEN:  Soft, non-tender, non-distended, normoactive bowel sounds, no rebound or guarding  EXTREMITIES: No edema  SKIN:  Warm, dry  NEURO:  Calm, cooperative    LABS:                        8.3    4.59  )-----------( 179      ( 27 Oct 2024 06:50 )             28.1     Hemoglobin: 8.3 g/dL (10-27-24 @ 06:50)  Hemoglobin: 9.2 g/dL (10-26-24 @ 14:05)  Hemoglobin: 7.4 g/dL (10-26-24 @ 06:00)  Hemoglobin: 8.3 g/dL (10-25-24 @ 06:45)    10-27    139  |  105  |  13.1  ----------------------------<  247[H]  3.1[L]   |  21.0[L]  |  0.74    Ca    9.0      27 Oct 2024 06:50  Phos  3.2     10-27  Mg     1.9     10-27    TPro  5.6[L]  /  Alb  3.4  /  TBili  1.7  /  DBili  x   /  AST  17  /  ALT  10  /  AlkPhos  47  10-27    LIVER FUNCTIONS - ( 27 Oct 2024 06:50 )  Alb: 3.4 g/dL / Pro: 5.6 g/dL / ALK PHOS: 47 U/L / ALT: 10 U/L / AST: 17 U/L / GGT: x                   Carcinoembryonic Antigen: 2.3 ng/mL (10-26-24 @ 06:00)  Cancer Antigen, 125: 46 U/mL (10-26-24 @ 06:00)                                    RADIOLOGY & ADDITIONAL STUDIES:

## 2024-10-27 NOTE — PROGRESS NOTE ADULT - ATTENDING COMMENTS
Pt seen and examined at bedside. Pt has no complaints. States feels well. Denies CP, SOB, N/V/D, abd pain, fever, chills, melena, hematochezia. Rest of ROS (-).   Plan:  F/u GI recs - plan for EGD/Colonoscopy tomorrow  NPO at midnight  Bowel prep ordered  Rest of plan as stated above. Pt seen and examined at bedside. Pt has no complaints. States feels well. Denies CP, SOB, N/V/D, abd pain, fever, chills, melena, hematochezia. Rest of ROS (-).   Plan:  F/u GI recs - plan for EGD/Colonoscopy tomorrow  NPO at midnight  Bowel prep ordered  C/t hold A/C and Plavix as per Cardio recs  Rest of plan as stated above.

## 2024-10-27 NOTE — PROGRESS NOTE ADULT - SUBJECTIVE AND OBJECTIVE BOX
SUBJECTIVE  BRIEF HOSPITAL COURSE SUMMARY: Pt is a 79yFemale with a PMH of - who presented with -. In the ED, -. Now, -.    LAST 24 HOURS:  TODAY:    OBJECTIVE  PHYSICAL EXAM:    Vital Signs Last 24 Hrs  T(C): 36.8 (27 Oct 2024 04:55), Max: 36.9 (26 Oct 2024 13:35)  T(F): 98.3 (27 Oct 2024 04:55), Max: 98.5 (26 Oct 2024 13:35)  HR: 79 (27 Oct 2024 04:55) (69 - 89)  BP: 114/66 (27 Oct 2024 04:55) (100/49 - 123/60)  BP(mean): --  RR: 18 (27 Oct 2024 04:55) (15 - 18)  SpO2: 95% (27 Oct 2024 04:55) (95% - 98%)    Parameters below as of 27 Oct 2024 04:55  Patient On (Oxygen Delivery Method): room air            MEDICATIONS  (STANDING):  ascorbic acid 500 milliGRAM(s) Oral daily  atorvastatin 40 milliGRAM(s) Oral at bedtime  dextrose 5%. 1000 milliLiter(s) (100 mL/Hr) IV Continuous <Continuous>  dextrose 5%. 1000 milliLiter(s) (50 mL/Hr) IV Continuous <Continuous>  dextrose 50% Injectable 25 Gram(s) IV Push once  dextrose 50% Injectable 12.5 Gram(s) IV Push once  dextrose 50% Injectable 25 Gram(s) IV Push once  glucagon  Injectable 1 milliGRAM(s) IntraMuscular once  insulin lispro (ADMELOG) corrective regimen sliding scale   SubCutaneous three times a day before meals  insulin lispro (ADMELOG) corrective regimen sliding scale   SubCutaneous at bedtime  multivitamin 1 Tablet(s) Oral daily  pantoprazole  Injectable 40 milliGRAM(s) IV Push every 12 hours  sertraline 100 milliGRAM(s) Oral daily  traZODone 50 milliGRAM(s) Oral at bedtime    MEDICATIONS  (PRN):  acetaminophen     Tablet .. 650 milliGRAM(s) Oral every 6 hours PRN Temp greater or equal to 38C (100.4F), Mild Pain (1 - 3)  aluminum hydroxide/magnesium hydroxide/simethicone Suspension 30 milliLiter(s) Oral every 4 hours PRN Dyspepsia  dextrose Oral Gel 15 Gram(s) Oral once PRN Blood Glucose LESS THAN 70 milliGRAM(s)/deciliter  melatonin 3 milliGRAM(s) Oral at bedtime PRN Insomnia  ondansetron Injectable 4 milliGRAM(s) IV Push every 8 hours PRN Nausea and/or Vomiting    Allergies    warfarin (Rash)  codeine (Rash)    Intolerances    OHS (Unknown)      LABS:                        9.2    5.94  )-----------( 212      ( 26 Oct 2024 14:05 )             30.9     10-26    141  |  106  |  15.1  ----------------------------<  264[H]  3.5   |  24.0  |  1.04    Ca    8.5      26 Oct 2024 06:00        Urinalysis Basic - ( 26 Oct 2024 06:00 )    Color: x / Appearance: x / SG: x / pH: x  Gluc: 264 mg/dL / Ketone: x  / Bili: x / Urobili: x   Blood: x / Protein: x / Nitrite: x   Leuk Esterase: x / RBC: x / WBC x   Sq Epi: x / Non Sq Epi: x / Bacteria: x      CAPILLARY BLOOD GLUCOSE      POCT Blood Glucose.: 258 mg/dL (27 Oct 2024 06:35)  POCT Blood Glucose.: 267 mg/dL (26 Oct 2024 21:36)  POCT Blood Glucose.: 282 mg/dL (26 Oct 2024 16:52)  POCT Blood Glucose.: 312 mg/dL (26 Oct 2024 12:47)  POCT Blood Glucose.: 277 mg/dL (26 Oct 2024 08:45)      CULTURE DATA:      RADIOLOGY & ADDITIONAL TESTS: SUBJECTIVE  Patient is a 79y old Female who presents with a chief complaint of Acute blood loss anemia (25 Oct 2024 16:31)  Currently admitted to medicine with the primary diagnosis of GI bleeding    LAST 24 HOURS:  -Patient went for MRI of Femurs today  -Made NPO for colonoscopy/EGD tomorrow  TODAY:  Seen and examined bedside. Feels well. Patient states her bowel movements have been regular and brown. Denies Chest pain, abdominal pain, shortness of breath, fevers, chills, nausea, vomiting, diarrhea, dysuria, headache, dizziness.   OBJECTIVE  PHYSICAL EXAM:  GENERAL: no acute distress, comfortably in bed  HEAD: NC/AT  EYES: PERRLA, EOMI, Non-icteric  ENT: Mucous membranes moist, neck supple  NEURO: No focal deficits, moving all extremities spontaneously, A&Ox3, no dysarthria, CN II-XII grossly intact  PSYCH: Normal affect, calm, appropriate insight and judgment, fluent speech  RESP: CTAB, no wrr, non-labored breathing  CVS: RRR, no murmur appreciated  GI: Soft, non-tender, non-distended, no organomegaly, no appreciable masses, +bs all 4 quadrants  : No shaw, no suprapubic tenderness  EXTREMITIES: Nontender, no clubbing, cyanosis, or 2+ pitting edema bilaterally  SKIN: No rashes or lesions   Vital Signs Last 24 Hrs  T(C): 36.8 (27 Oct 2024 04:55), Max: 36.9 (26 Oct 2024 13:35)  T(F): 98.3 (27 Oct 2024 04:55), Max: 98.5 (26 Oct 2024 13:35)  HR: 79 (27 Oct 2024 04:55) (69 - 89)  BP: 114/66 (27 Oct 2024 04:55) (100/49 - 123/60)  BP(mean): --  RR: 18 (27 Oct 2024 04:55) (15 - 18)  SpO2: 95% (27 Oct 2024 04:55) (95% - 98%)    Parameters below as of 27 Oct 2024 04:55  Patient On (Oxygen Delivery Method): room air            MEDICATIONS  (STANDING):  ascorbic acid 500 milliGRAM(s) Oral daily  atorvastatin 40 milliGRAM(s) Oral at bedtime  dextrose 5%. 1000 milliLiter(s) (100 mL/Hr) IV Continuous <Continuous>  dextrose 5%. 1000 milliLiter(s) (50 mL/Hr) IV Continuous <Continuous>  dextrose 50% Injectable 25 Gram(s) IV Push once  dextrose 50% Injectable 12.5 Gram(s) IV Push once  dextrose 50% Injectable 25 Gram(s) IV Push once  glucagon  Injectable 1 milliGRAM(s) IntraMuscular once  insulin lispro (ADMELOG) corrective regimen sliding scale   SubCutaneous three times a day before meals  insulin lispro (ADMELOG) corrective regimen sliding scale   SubCutaneous at bedtime  multivitamin 1 Tablet(s) Oral daily  pantoprazole  Injectable 40 milliGRAM(s) IV Push every 12 hours  sertraline 100 milliGRAM(s) Oral daily  traZODone 50 milliGRAM(s) Oral at bedtime    MEDICATIONS  (PRN):  acetaminophen     Tablet .. 650 milliGRAM(s) Oral every 6 hours PRN Temp greater or equal to 38C (100.4F), Mild Pain (1 - 3)  aluminum hydroxide/magnesium hydroxide/simethicone Suspension 30 milliLiter(s) Oral every 4 hours PRN Dyspepsia  dextrose Oral Gel 15 Gram(s) Oral once PRN Blood Glucose LESS THAN 70 milliGRAM(s)/deciliter  melatonin 3 milliGRAM(s) Oral at bedtime PRN Insomnia  ondansetron Injectable 4 milliGRAM(s) IV Push every 8 hours PRN Nausea and/or Vomiting    Allergies    warfarin (Rash)  codeine (Rash)    Intolerances    OHS (Unknown)      LABS:                        9.2    5.94  )-----------( 212      ( 26 Oct 2024 14:05 )             30.9     10-26    141  |  106  |  15.1  ----------------------------<  264[H]  3.5   |  24.0  |  1.04    Ca    8.5      26 Oct 2024 06:00        Urinalysis Basic - ( 26 Oct 2024 06:00 )    Color: x / Appearance: x / SG: x / pH: x  Gluc: 264 mg/dL / Ketone: x  / Bili: x / Urobili: x   Blood: x / Protein: x / Nitrite: x   Leuk Esterase: x / RBC: x / WBC x   Sq Epi: x / Non Sq Epi: x / Bacteria: x      CAPILLARY BLOOD GLUCOSE      POCT Blood Glucose.: 258 mg/dL (27 Oct 2024 06:35)  POCT Blood Glucose.: 267 mg/dL (26 Oct 2024 21:36)  POCT Blood Glucose.: 282 mg/dL (26 Oct 2024 16:52)  POCT Blood Glucose.: 312 mg/dL (26 Oct 2024 12:47)  POCT Blood Glucose.: 277 mg/dL (26 Oct 2024 08:45)      CULTURE DATA:      RADIOLOGY & ADDITIONAL TESTS:

## 2024-10-27 NOTE — PROGRESS NOTE ADULT - ASSESSMENT
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 GI consulted for symptomatic anemia    #symptomatic anemia   Hgb on admission 6.2gm, s/p 2U pRBCs  CT A/P w/ IVC (10.24.24) Hepatosplenomegaly. Cholelithiasis.  - Hgb remains stable at 8.3gm, no overt signs/symptoms of active GIB  - Trend CBC, transfuse as needed. Monitor for signs of bleeding.   - Avoid NSAIDs  - PPI BID for GI mucosal cytoprotection  - Hold Farxiga for EGD/colonoscopy tomorrow, 10/28/24  - Clear liquid diet  today   - NPO after MN  - Cardiology clearance obtained by Dr. Harvey appreciated   - Maintain current T&S, INR <1.5, Hgb >7.0, Plt >50 prior to procedure  _________________________________________________________________  Assessment and recommendations are final when note is signed by the attending physician.

## 2024-10-27 NOTE — PROGRESS NOTE ADULT - SUBJECTIVE AND OBJECTIVE BOX
[facility]                                   [address] [place] [address]                                                      PHONE: [phone]                                                         FAX: [phone]  [email or web address]  ---------------------------------------------------------------------------------------------------------------------------------    Overnight events/patient complaints: Comfortable. No events.      warfarin (Rash)  codeine (Rash)  OHS (Unknown)    MEDICATIONS  (STANDING):  ascorbic acid 500 milliGRAM(s) Oral daily  atorvastatin 40 milliGRAM(s) Oral at bedtime  bisacodyl 20 milliGRAM(s) Oral once  dextrose 5%. 1000 milliLiter(s) (50 mL/Hr) IV Continuous <Continuous>  dextrose 5%. 1000 milliLiter(s) (100 mL/Hr) IV Continuous <Continuous>  dextrose 50% Injectable 25 Gram(s) IV Push once  dextrose 50% Injectable 12.5 Gram(s) IV Push once  dextrose 50% Injectable 25 Gram(s) IV Push once  glucagon  Injectable 1 milliGRAM(s) IntraMuscular once  insulin lispro (ADMELOG) corrective regimen sliding scale   SubCutaneous three times a day before meals  insulin lispro (ADMELOG) corrective regimen sliding scale   SubCutaneous at bedtime  multivitamin 1 Tablet(s) Oral daily  pantoprazole  Injectable 40 milliGRAM(s) IV Push every 12 hours  polyethylene glycol/electrolyte Solution. 4000 milliLiter(s) Oral once  sertraline 100 milliGRAM(s) Oral daily  traZODone 50 milliGRAM(s) Oral at bedtime    MEDICATIONS  (PRN):  acetaminophen     Tablet .. 650 milliGRAM(s) Oral every 6 hours PRN Temp greater or equal to 38C (100.4F), Mild Pain (1 - 3)  aluminum hydroxide/magnesium hydroxide/simethicone Suspension 30 milliLiter(s) Oral every 4 hours PRN Dyspepsia  dextrose Oral Gel 15 Gram(s) Oral once PRN Blood Glucose LESS THAN 70 milliGRAM(s)/deciliter  melatonin 3 milliGRAM(s) Oral at bedtime PRN Insomnia  ondansetron Injectable 4 milliGRAM(s) IV Push every 8 hours PRN Nausea and/or Vomiting      Vital Signs Last 24 Hrs  T(C): 36.8 (27 Oct 2024 04:55), Max: 36.9 (26 Oct 2024 13:35)  T(F): 98.3 (27 Oct 2024 04:55), Max: 98.5 (26 Oct 2024 13:35)  HR: 79 (27 Oct 2024 04:55) (69 - 81)  BP: 114/66 (27 Oct 2024 04:55) (114/66 - 121/63)  BP(mean): --  RR: 18 (27 Oct 2024 04:55) (16 - 18)  SpO2: 95% (27 Oct 2024 04:55) (95% - 98%)    Parameters below as of 27 Oct 2024 04:55  Patient On (Oxygen Delivery Method): room air      ICU Vital Signs Last 24 Hrs  DONALD SAUNDERS  I&O's Detail    I&O's Summary    Drug Dosing Weight  DONALD CHIP      PHYSICAL EXAM:  General: NAD.  HEENT: Head; normocephalic.  Eyes: Pupils reactive.  Neck: Supple.  CARDIOVASCULAR: RRR.   LUNGS: Normal breath sounds bilaterally.  ABDOMEN: Soft.  EXTREMITIES: No clubbing, cyanosis or edema.   SKIN: warm.  NEURO: Alert/oriented x 3.    PSYCH: normal affect.        LABS:                        8.3    4.59  )-----------( 179      ( 27 Oct 2024 06:50 )             28.1     10-27    139  |  105  |  13.1  ----------------------------<  247[H]  3.1[L]   |  21.0[L]  |  0.74    Ca    9.0      27 Oct 2024 06:50  Phos  3.2     10-27  Mg     1.9     10-27    TPro  5.6[L]  /  Alb  3.4  /  TBili  1.7  /  DBili  x   /  AST  17  /  ALT  10  /  AlkPhos  47  10-27    DONALD SAUNDERS        Urinalysis Basic - ( 27 Oct 2024 06:50 )    Color: x / Appearance: x / SG: x / pH: x  Gluc: 247 mg/dL / Ketone: x  / Bili: x / Urobili: x   Blood: x / Protein: x / Nitrite: x   Leuk Esterase: x / RBC: x / WBC x   Sq Epi: x / Non Sq Epi: x / Bacteria: x        RADIOLOGY & ADDITIONAL STUDIES:    INTERPRETATION OF TELEMETRY (personally reviewed): NSR 80's    ECG: AF/atypical 's 10/23/24    ECHO:   1. Left ventricular cavity is normal in size. Left ventricular wall thickness is normal. Septal motion is abnormal consistent with previous cardiac surgery and The interventricular septum is flattened in systole and diastole consistent with right ventricular pressure and volume overload. Left ventricular systolic function is normal with an ejection fraction of 62 % by Shearer's method of disks.   2. The left ventricular diastolic function is indeterminate.   3. Moderately enlarged right ventricular cavity size and moderately reduced right ventricular systolic function.   4. The right atrium is severely dilated.   5. S/p mitral valve repair. Trace MR visualized.   6. The transmitral mean gradient is 5.00 mmHg at 63bpm.   7. Mild aortic stenosis.   8. The peak transaortic velocity is 2.08 m/s, peak transaortic gradient is 17.3 mmHg and mean transaortic gradient is 8.0 mmHg with an LVOT/aortic valve VTI ratio of 0.40.   9. Severe tricuspid regurgitation.  10. Estimated pulmonary artery systolic pressure is 70 mmHg, consistent with severe pulmonary hypertension.  11. Mild pulmonic regurgitation.  12. Compared to the transthoracic echocardiogram performed on 4/25/2024, there have been no significant interval changes.      ASSESSMENT AND PLAN:  78 F PMHx DM  HLD PAD, CAD s/p CABGX3/MV repair/LAAthrombectomy 1/2021 initial sig LV dysfunction, augmented EF on GDMT, COPD, chronic AF on NOAC, postop protected LM PCI 2023, progressive RENTERIA with recent cardiac cath 8/2024 revealed patent LIMA to LAD, mod mid LAD disease, prox Cx disease s/p CARIN EF 45% moderate pul HTN with elevated PCWP WHO Group2 Pul HtN on increasing doses diuretics, seen in office this past week with progressive RENTERIA and fatigue.  Labs then revealed anemia.    1. No absolute cardiac contraindications to proceeding with GI work up (EGD/colonoscopy) planned for tomorrow.  2. AC and Plavix held due to anemia and possible GIB.

## 2024-10-27 NOTE — PROGRESS NOTE ADULT - ASSESSMENT
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    Anemia  - Hgb 6.2 on admission   - S/P 2 U PRBC  - Hgb now 8.3  - on Protonix   - Held ASA/Plavix/Eliquis per Cardiology recs   - Will not reverse Eliquis given h/o DUANE thrombus   - ADRIANE negative   - Never had a colonoscopy   - GI following - - Hold Farxiga for EGD/colonoscopy Monday  -    Hapto 100 Retic 2.1 T bili 3- no concern for hemolysis   - B12 and folate are adequate  -   drop in hgb   w/u consistent with iron def anemia  Hgb 8.3   give IV iron x 3 days  plan for EGD/colonoscopy     Abnormal CT  - Ct A/P Suggestion of right heart dysfunction and fluid overload Hepatosplenomegaly. Cholelithiasis.Bilateral femoral lytic lesions as above. -+Hepatosplenomegaly  - Recommend MRI of both femurs   - Multiple myeloma work up pending SPEP and JESS   - So far quiggs,  FLC ratio are normal  - given lytic lesion on CT scan will check skeletal survey for other lytic lesions  - Will check CEA and     Chronic AFib   - Held Eliquis   - Cardiology following     Will follow

## 2024-10-28 ENCOUNTER — RESULT REVIEW (OUTPATIENT)
Age: 79
End: 2024-10-28

## 2024-10-28 ENCOUNTER — TRANSCRIPTION ENCOUNTER (OUTPATIENT)
Age: 79
End: 2024-10-28

## 2024-10-28 LAB
ALBUMIN SERPL ELPH-MCNC: 3.5 G/DL — SIGNIFICANT CHANGE UP (ref 3.3–5.2)
ALP SERPL-CCNC: 47 U/L — SIGNIFICANT CHANGE UP (ref 40–120)
ALT FLD-CCNC: 10 U/L — SIGNIFICANT CHANGE UP
ANION GAP SERPL CALC-SCNC: 12 MMOL/L — SIGNIFICANT CHANGE UP (ref 5–17)
AST SERPL-CCNC: 20 U/L — SIGNIFICANT CHANGE UP
BILIRUB SERPL-MCNC: 1.7 MG/DL — SIGNIFICANT CHANGE UP (ref 0.4–2)
BLD GP AB SCN SERPL QL: SIGNIFICANT CHANGE UP
BUN SERPL-MCNC: 8 MG/DL — SIGNIFICANT CHANGE UP (ref 8–20)
CALCIUM SERPL-MCNC: 9 MG/DL — SIGNIFICANT CHANGE UP (ref 8.4–10.5)
CHLORIDE SERPL-SCNC: 106 MMOL/L — SIGNIFICANT CHANGE UP (ref 96–108)
CO2 SERPL-SCNC: 24 MMOL/L — SIGNIFICANT CHANGE UP (ref 22–29)
CREAT SERPL-MCNC: 0.7 MG/DL — SIGNIFICANT CHANGE UP (ref 0.5–1.3)
EGFR: 89 ML/MIN/1.73M2 — SIGNIFICANT CHANGE UP
GLUCOSE BLDC GLUCOMTR-MCNC: 178 MG/DL — HIGH (ref 70–99)
GLUCOSE BLDC GLUCOMTR-MCNC: 205 MG/DL — HIGH (ref 70–99)
GLUCOSE BLDC GLUCOMTR-MCNC: 296 MG/DL — HIGH (ref 70–99)
GLUCOSE SERPL-MCNC: 156 MG/DL — HIGH (ref 70–99)
HCT VFR BLD CALC: 29 % — LOW (ref 34.5–45)
HGB BLD-MCNC: 8.5 G/DL — LOW (ref 11.5–15.5)
INR BLD: 1.33 RATIO — HIGH (ref 0.85–1.16)
MAGNESIUM SERPL-MCNC: 1.8 MG/DL — SIGNIFICANT CHANGE UP (ref 1.6–2.6)
MCHC RBC-ENTMCNC: 21.1 PG — LOW (ref 27–34)
MCHC RBC-ENTMCNC: 29.3 GM/DL — LOW (ref 32–36)
MCV RBC AUTO: 72.1 FL — LOW (ref 80–100)
PHOSPHATE SERPL-MCNC: 2.6 MG/DL — SIGNIFICANT CHANGE UP (ref 2.4–4.7)
PLATELET # BLD AUTO: 161 K/UL — SIGNIFICANT CHANGE UP (ref 150–400)
POTASSIUM SERPL-MCNC: 3 MMOL/L — LOW (ref 3.5–5.3)
POTASSIUM SERPL-SCNC: 3 MMOL/L — LOW (ref 3.5–5.3)
PROT SERPL-MCNC: 5.7 G/DL — LOW (ref 6.6–8.7)
PROTHROM AB SERPL-ACNC: 15 SEC — HIGH (ref 9.9–13.4)
RBC # BLD: 4.02 M/UL — SIGNIFICANT CHANGE UP (ref 3.8–5.2)
RBC # FLD: 24.7 % — HIGH (ref 10.3–14.5)
SODIUM SERPL-SCNC: 142 MMOL/L — SIGNIFICANT CHANGE UP (ref 135–145)
WBC # BLD: 4.59 K/UL — SIGNIFICANT CHANGE UP (ref 3.8–10.5)
WBC # FLD AUTO: 4.59 K/UL — SIGNIFICANT CHANGE UP (ref 3.8–10.5)

## 2024-10-28 PROCEDURE — 88305 TISSUE EXAM BY PATHOLOGIST: CPT | Mod: 26

## 2024-10-28 PROCEDURE — 99233 SBSQ HOSP IP/OBS HIGH 50: CPT | Mod: GC

## 2024-10-28 PROCEDURE — 88342 IMHCHEM/IMCYTCHM 1ST ANTB: CPT | Mod: 26

## 2024-10-28 PROCEDURE — 93010 ELECTROCARDIOGRAM REPORT: CPT

## 2024-10-28 PROCEDURE — 43239 EGD BIOPSY SINGLE/MULTIPLE: CPT

## 2024-10-28 PROCEDURE — 45388 COLONOSCOPY W/ABLATION: CPT

## 2024-10-28 DEVICE — NAIL OSTEO 1.5X16MM STRL: Type: IMPLANTABLE DEVICE | Status: FUNCTIONAL

## 2024-10-28 RX ORDER — POTASSIUM CHLORIDE 10 MEQ
2 TABLET, EXTENDED RELEASE ORAL
Qty: 30 | Refills: 0
Start: 2024-10-28 | End: 2024-11-11

## 2024-10-28 RX ORDER — POTASSIUM CHLORIDE 10 MEQ
40 TABLET, EXTENDED RELEASE ORAL EVERY 4 HOURS
Refills: 0 | Status: COMPLETED | OUTPATIENT
Start: 2024-10-28 | End: 2024-10-28

## 2024-10-28 RX ORDER — FERROUS GLUCONATE 325 MG
1 TABLET ORAL
Qty: 30 | Refills: 0
Start: 2024-10-28 | End: 2024-11-26

## 2024-10-28 RX ORDER — ASPIRIN/MAG CARB/ALUMINUM AMIN 325 MG
1 TABLET ORAL
Refills: 0 | DISCHARGE

## 2024-10-28 RX ORDER — PANTOPRAZOLE SODIUM 40 MG/1
1 TABLET, DELAYED RELEASE ORAL
Qty: 0 | Refills: 0 | DISCHARGE
Start: 2024-10-28

## 2024-10-28 RX ORDER — POTASSIUM PHOSPHATE 236; 224 MG/ML; MG/ML
15 INJECTION, SOLUTION INTRAVENOUS ONCE
Refills: 0 | Status: COMPLETED | OUTPATIENT
Start: 2024-10-28 | End: 2024-10-28

## 2024-10-28 RX ORDER — IRON SUCROSE 20 MG/ML
200 INJECTION, SOLUTION INTRAVENOUS EVERY 24 HOURS
Refills: 0 | Status: DISCONTINUED | OUTPATIENT
Start: 2024-10-28 | End: 2024-10-29

## 2024-10-28 RX ADMIN — Medication 40 MILLIEQUIVALENT(S): at 17:56

## 2024-10-28 RX ADMIN — PANTOPRAZOLE SODIUM 40 MILLIGRAM(S): 40 TABLET, DELAYED RELEASE ORAL at 05:35

## 2024-10-28 RX ADMIN — TRAZODONE HYDROCHLORIDE 50 MILLIGRAM(S): 100 TABLET ORAL at 21:10

## 2024-10-28 RX ADMIN — IRON SUCROSE 110 MILLIGRAM(S): 20 INJECTION, SOLUTION INTRAVENOUS at 13:43

## 2024-10-28 RX ADMIN — Medication 40 MILLIEQUIVALENT(S): at 13:43

## 2024-10-28 RX ADMIN — Medication 2: at 06:34

## 2024-10-28 RX ADMIN — Medication 1 TABLET(S): at 17:56

## 2024-10-28 RX ADMIN — Medication 500 MILLIGRAM(S): at 17:56

## 2024-10-28 RX ADMIN — PANTOPRAZOLE SODIUM 40 MILLIGRAM(S): 40 TABLET, DELAYED RELEASE ORAL at 17:57

## 2024-10-28 RX ADMIN — Medication 6: at 17:56

## 2024-10-28 RX ADMIN — Medication 40 MILLIGRAM(S): at 21:10

## 2024-10-28 RX ADMIN — POTASSIUM PHOSPHATE 62.5 MILLIMOLE(S): 236; 224 INJECTION, SOLUTION INTRAVENOUS at 17:57

## 2024-10-28 RX ADMIN — SERTRALINE HYDROCHLORIDE 100 MILLIGRAM(S): 50 TABLET, FILM COATED ORAL at 21:10

## 2024-10-28 NOTE — DISCHARGE NOTE PROVIDER - CARE PROVIDER_API CALL
Richar Ronquillo [facility]  Cardiology  [address] [place] [address]  Phone: [phone]  Fax: [phone]  Follow Up Time:     Sydnee [place]  Gastroenterology  [address], [address]  [place] [address]  Phone: [phone]  Fax: [phone]  Follow Up Time:     Marina Casey  Medical Oncology  [address]  [place] [address]  Phone: [phone]  Fax: [phone]  Follow Up Time:

## 2024-10-28 NOTE — PROGRESS NOTE ADULT - ASSESSMENT
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    Microcytic iron deficiency anemia 2/2 possible occult GI bleed vs nutritional deficiency  -Hemoglobin 6.6 on admission was 11.5 in April suggesting acute change  -Patient s/p 2u PRBC --> 8.3  -Started on protonix q12hrs  -Held ASA/Plavix/eliquis per cardiology recs  -Maintain Hgb >8, transfuse PRN  -Hemoglobin stable  -ADRIANE negative  -Colonoscopy and EGD to evaluate for possible occult bleed.   -CTA/P showed lytic lesion in femur and hepatosplenomegaly evaluated for possible myeloproliferative disorder. Immunoglobins negative with no increase in protein.  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    Microcytic iron deficiency anemia 2/2 possible occult GI bleed vs nutritional deficiency  -Hemoglobin 6.6 on admission was 11.5 in April suggesting acute change  -Patient s/p 2u PRBC --> 8.3  -Started on protonix q12hrs  -B12, folate within normal limits  -Started on IV iron inpatient  -JESS Kappa: 2.71  -JESS lambda: 2.05  -IgM, IgA and IgG within normal limits.   -TIBC elevated  -Iron total and iron saturation low.   -Held ASA/Plavix/eliquis per cardiology recs  -Maintain Hgb >8, transfuse PRN  -Hemoglobin stable  -ADRIANE negative  -Colonoscopy and EGD to evaluate for possible occult bleed.   -CTA/P showed lytic lesion in femur and hepatosplenomegaly evaluated for possible myeloproliferative disorder. Immunoglobins negative with no increase in protein.  -Heme following    Femur lesion  -CT showing possible lytic lesion of femur  - MRI showing Well-defined lesion in the intertrochanteric region of the right proximal femur measuring 3.5 x 2.5 cm. Lesion is overall similar in size and appearance to prior CT of 2021. Differential diagnosis includes fibrous dysplasia versus liposclerosing myxofibrous tumor.  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 Normal LVEF    DM2  -  A1C 11/4/22: 9.7m on 10/25 7.7   - on Tresiba 40U in AM  - ISS + Acchecheck     HLD  - Resumed Atorvastatin 40mg QD    Depression/ insomnia   - Resumed Zoloft 100mg QD  - Resumed Trazodone 50mg QD    DISPO: patient stable for discharge

## 2024-10-28 NOTE — DISCHARGE NOTE PROVIDER - NSCORESITESY/N_GEN_A_CORE_RD
No Other Procedure: Shave removal Instructions (Optional): E. Left superior shoulder \\n0.3x0.3 cm r/o:  DN \\n\\nF. left mid helix \\n0.3x 0.3 cm \\nDN \\n\\nG. left pre auricular\\n0.2x0.2 DN\\n\\nH.  Left lateral cheek\\n0.4x0.2 cm DN Introduction Text (Please End With A Colon): The following procedure was deferred: Detail Level: Detailed

## 2024-10-28 NOTE — DISCHARGE NOTE PROVIDER - ATTENDING DISCHARGE PHYSICAL EXAMINATION:
Vital Signs Last 24 Hrs  T(C): 36.9 (10-28-24 @ 10:10), Max: 37 (10-28-24 @ 04:38)  T(F): 98.5 (10-28-24 @ 10:10), Max: 98.6 (10-28-24 @ 04:38)  HR: 72 (10-28-24 @ 10:10) (70 - 95)  BP: 126/72 (10-28-24 @ 10:10) (126/72 - 134/67)  BP(mean): --  RR: 18 (10-28-24 @ 10:10) (18 - 18)  SpO2: 96% (10-28-24 @ 10:10) (94% - 97%)    General: comfortable, tolerating PO diet post EGD now, AAOx3  HEENT: atraumatic, normocephalic  Pulmonary: clear to auscultation bilaterally; No wheeze  Cardiovascular: Regular rate and rhythm; no murmurs, rubs or gallops. Normal S1S2  Gastrointestinal: Soft, nontender, nondistended; bowel sounds present  Musculoskeletal: +2 pitting edema b/l LE, 2+ peripheral pulses, no clubbing, cyanosis  Neurology: Pt. alert and oriented, fluent speech, able to move all extremities  Skin: no rashes or lesions   Vital Signs Last 24 Hrs  T(C): 36.8 (29 Oct 2024 08:55), Max: 37 (28 Oct 2024 20:10)  T(F): 98.3 (29 Oct 2024 08:55), Max: 98.6 (28 Oct 2024 20:10)  HR: 102 (29 Oct 2024 08:55) (82 - 140)  BP: 129/72 (29 Oct 2024 08:55) (129/72 - 166/98)  BP(mean): --  RR: 18 (29 Oct 2024 08:55) (18 - 18)  SpO2: 99% (29 Oct 2024 08:55) (97% - 99%)    Parameters below as of 29 Oct 2024 08:55  Patient On (Oxygen Delivery Method): room air        General: comfortable, tolerating PO diet post EGD now, AAOx3  HEENT: atraumatic, normocephalic  Pulmonary: clear to auscultation bilaterally; No wheeze  Cardiovascular: Regular rate and rhythm; no murmurs, rubs or gallops. Normal S1S2  Gastrointestinal: Soft, nontender, nondistended; bowel sounds present  Musculoskeletal: +2 pitting edema b/l LE, 2+ peripheral pulses, no clubbing, cyanosis  Neurology: Pt. alert and oriented, fluent speech, able to move all extremities  Skin: no rashes or lesions

## 2024-10-28 NOTE — PROGRESS NOTE ADULT - ATTENDING COMMENTS
pt had U and L endoscopy.  no clear cause to support blood loss noted.    MRI hip with benign findings    stable for discharge home.    d/w cards about continuing Eliquis as there is no DUANE thrombus on TTE  cards to d/w pt about possible o/p watchman and then make eliquis decision.    cards f/u- cont eliquis and o/p f/u for further plans. cont plavix.    holding ASA at discahrge.

## 2024-10-28 NOTE — DISCHARGE NOTE PROVIDER - HOSPITAL COURSE
79 F with pmx of chronic AFIB on elquis, DM2, HLD, PAD, CAD s/p CABG w/ 3VD repair/DUANE thrombectomy 1/2021, PCI to LM 2023 and recent LHC 8/2024 revealed moderate midLAD and pCx disease s/p CARIN (EF 45%), moderate pHTN, presenting with outpatient Hgb of 6.6. Previous hemoglobin in April of 2024 showed a hemoglobin of 11.5. Patient has been having dyspnea on exertion and weak for the past 2 weeks. Patient received 2x pRBC in ED. CT A/P showed possible lytic lesion in right femur and hepatosplenomegaly.  Patient wroked up for possibl 79 F with pmx of chronic AFIB on elquis, DM2, HLD, PAD, CAD s/p CABG w/ 3VD repair/DUANE thrombectomy 1/2021, PCI to LM 2023 and recent LHC 8/2024 revealed moderate midLAD and pCx disease s/p CARIN (EF 45%), moderate pHTN, presenting with outpatient Hgb of 6.6. Previous hemoglobin in April of 2024 showed a hemoglobin of 11.5. Patient has been having dyspnea on exertion and weak for the past 2 weeks. Patient received 2x pRBC in ED. CT A/P showed possible lytic lesion in right femur and hepatosplenomegaly.  Patient worked up for possible myeloproliferative disorder. Immunofixation test showed very mildly elevated JESS kappa with normal JESS lambda and IgG. Patient was shown to have iron deficiency anemia and was given IV iron. Quantitative IgA and IgM and Herberth/Lambda free light chain ration were also within normal limits. B12 and folate were within normal limits. Patient protein total were also low during hospital stay.  Patient hemoglobin improved s/p RBC given. Patient received colonoscopy and EGD. Echo showed severe tricuspid regurgitation with dilated right atrium. No atrial appendage noted in left atrium. Findings as below:    Colonoscopy showed:    There was mild pan-diverticulosis. In the cecum, there was an approximately 1cm erythematous area without any active bleeding. The area was treated with APC. The colon was otherwise unremarkable. Retroflexion was performed in the rectum and revealed internal hemorrhoids.     EGD showed    Normal mucosa in the whole esophagus. Nodularity in the whole stomach compatible with nodular gastritis. (Biopsy). Normal mucosa in the whole examined duodenum.    Discharge diagnosis:  symptomatic anemia  iron deficiency  mild hepatosplenomegaly  nodular gastritis  Incidental finding of- Well-defined lesion in the intertrochanteric region of the right   proximal femur measuring 3.5 x 2.5 cm. Lesion is overall similar in size   and appearance to prior CT of 2021. Differential diagnosis includes   fibrous dysplasia versus liposclerosing myxofibrous tumor.       79 F with pmx of chronic AFIB on elquis, DM2, HLD, PAD, CAD s/p CABG w/ 3VD repair/DUANE thrombectomy 1/2021, PCI to LM 2023 and recent LHC 8/2024 revealed moderate midLAD and pCx disease s/p CARIN (EF 45%), moderate pHTN, presenting with outpatient Hgb of 6.6. Previous hemoglobin in April of 2024 showed a hemoglobin of 11.5. Patient has been having dyspnea on exertion and weak for the past 2 weeks. Patient received 2x pRBC in ED. CT A/P showed possible lytic lesion in right femur and hepatosplenomegaly.  Patient worked up for possible myeloproliferative disorder. Immunofixation test showed very mildly elevated JESS kappa with normal JESS lambda and IgG. Patient was shown to have iron deficiency anemia and was given IV iron. Quantitative IgA and IgM and Herberth/Lambda free light chain ration were also within normal limits. B12 and folate were within normal limits. Patient protein total were also low during hospital stay.  Patient hemoglobin improved s/p RBC given. Patient received colonoscopy and EGD. Echo showed severe tricuspid regurgitation with dilated right atrium. No atrial appendage noted in left atrium. EKG showed prolonged QTC and occassional PVCs. Patient was also found to be tachycardic. QTC on repeat EKG showed QT of 442 and QTc of 483, improved from previous. Patient should follow with cardiologist as outpatient. Findings as below:    Colonoscopy showed:    There was mild pan-diverticulosis. In the cecum, there was an approximately 1cm erythematous area without any active bleeding. The area was treated with APC. The colon was otherwise unremarkable. Retroflexion was performed in the rectum and revealed internal hemorrhoids.     EGD showed    Normal mucosa in the whole esophagus. Nodularity in the whole stomach compatible with nodular gastritis. (Biopsy). Normal mucosa in the whole examined duodenum.    Discharge diagnosis:  symptomatic anemia  iron deficiency  mild hepatosplenomegaly  nodular gastritis  Incidental finding of- Well-defined lesion in the intertrochanteric region of the right   proximal femur measuring 3.5 x 2.5 cm. Lesion is overall similar in size   and appearance to prior CT of 2021. Differential diagnosis includes   fibrous dysplasia versus liposclerosing myxofibrous tumor.       79 F with pmx of chronic AFIB on elquis, DM2, HLD, PAD, CAD s/p CABG w/ 3VD repair/DUANE thrombectomy 1/2021, PCI to LM 2023 and recent LHC 8/2024 revealed moderate midLAD and pCx disease s/p CARIN (EF 45%), moderate pHTN, presenting with outpatient Hgb of 6.6. Previous hemoglobin in April of 2024 showed a hemoglobin of 11.5. Patient has been having dyspnea on exertion and weak for the past 2 weeks. Patient received 2x pRBC in ED. CT A/P showed possible lytic lesion in right femur and hepatosplenomegaly.  Patient worked up for possible myeloproliferative disorder. Immunofixation test showed very mildly elevated JESS kappa with normal JESS lambda and IgG. Patient was shown to have iron deficiency anemia and was given IV iron. Quantitative IgA and IgM and Herberth/Lambda free light chain ration were also within normal limits. B12 and folate were within normal limits. Patient protein total were also low during hospital stay.  Patient hemoglobin improved s/p RBC given. Patient received colonoscopy and EGD. Echo showed severe tricuspid regurgitation with dilated right atrium. No atrial appendage noted in left atrium. EKG showed prolonged QTC and occassional PVCs. Patient was also found to be tachycardic. QTC on repeat EKG showed QT of 442 and QTc of 483, improved from previous. Patient should follow with cardiologist as outpatient. Findings as below:    Colonoscopy showed:    There was mild pan-diverticulosis. In the cecum, there was an approximately 1cm erythematous area without any active bleeding. The area was treated with APC. The colon was otherwise unremarkable. Retroflexion was performed in the rectum and revealed internal hemorrhoids.     EGD showed    Normal mucosa in the whole esophagus. Nodularity in the whole stomach compatible with nodular gastritis. (Biopsy). Normal mucosa in the whole examined duodenum.      Discharge diagnosis:  symptomatic anemia  iron deficiency  mild hepatosplenomegaly  nodular gastritis  Incidental finding of- Well-defined lesion in the intertrochanteric region of the right   proximal femur measuring 3.5 x 2.5 cm. Lesion is overall similar in size   and appearance to prior CT of 2021. Differential diagnosis includes   fibrous dysplasia versus liposclerosing myxofibrous tumor.

## 2024-10-28 NOTE — DIETITIAN NUTRITION RISK NOTIFICATION - MUSCLE MASS (LOSS OF MUSCLE)
Shruthi Mina requesting Medication Refill for:  Medication name:  90 days   Medication Quantity Refills Start End   ALPRAZolam 0.5 MG Oral Tab 20 tablet 0 4/19/2024 --   Sig:   TAKE 1 TABLET(0.5 MG) BY MOUTH DAILY AS NEEDED FOR SLEEP OR ANXIETY     Route:   (none)     Order #:   [id number]         LOV: 5/9/2024   Last Refill date: 04/19/24  Preferred Pharmacy:   [id number] [address] AT [facility] [address], [phone], [phone]   [address] [place] [id number]   Phone: [phone] Fax: [phone]   Hours: Not open 24 hours     Next Scheduled appointment:   Future Appointments   Date Time Provider Department Center   10/28/2024  7:40 AM Kala Cat APRN [facility]           Temples.../Clavicles...

## 2024-10-28 NOTE — PROGRESS NOTE ADULT - NUTRITIONAL ASSESSMENT
This patient has been assessed with a concern for Malnutrition and has been determined to have a diagnosis/diagnoses of Moderate protein-calorie malnutrition.    This patient is being managed with:   Diet DASH/TLC-  Sodium & Cholesterol Restricted  Entered: Oct 28 2024  1:54PM

## 2024-10-28 NOTE — DIETITIAN INITIAL EVALUATION ADULT - ORAL INTAKE PTA/DIET HISTORY
Pt reports  lbs. c/o weakness, SOB and Lethargy for 2 months PTA with approx 17lb weight loss. Pt endorses improved appetite and intake now. HBV protein foods encouraged. Agreeable to supplement.

## 2024-10-28 NOTE — DISCHARGE NOTE PROVIDER - DETAILS OF MALNUTRITION DIAGNOSIS/DIAGNOSES
This patient has been assessed with a concern for Malnutrition and was treated during this hospitalization for the following Nutrition diagnosis/diagnoses:     -  10/28/2024: Moderate protein-calorie malnutrition

## 2024-10-28 NOTE — DIETITIAN INITIAL EVALUATION ADULT - PERTINENT LABORATORY DATA
10-28    142  |  106  |  8.0  ----------------------------<  156[H]  3.0[L]   |  24.0  |  0.70    Ca    9.0      28 Oct 2024 05:30  Phos  2.6     10-28  Mg     1.8     10-28    TPro  5.7[L]  /  Alb  3.5  /  TBili  1.7  /  DBili  x   /  AST  20  /  ALT  10  /  AlkPhos  47  10-28  POCT Blood Glucose.: 178 mg/dL (10-28-24 @ 06:31)  A1C with Estimated Average Glucose Result: 7.7 % (10-25-24 @ 06:45)

## 2024-10-28 NOTE — DISCHARGE NOTE PROVIDER - PROVIDER TOKENS
PROVIDER:[TOKEN:[[id number]]],PROVIDER:[TOKEN:[[id number]]],PROVIDER:[TOKEN:[[id number]]]

## 2024-10-28 NOTE — PROGRESS NOTE ADULT - SUBJECTIVE AND OBJECTIVE BOX
SUBJECTIVE  BRIEF HOSPITAL COURSE SUMMARY: Pt is a 79yFemale who presents with a chief complaint of acute blood loss anemia.     LAST 24 HOURS:  TODAY:  Seen and examined at bedside. Patient states she feels well. Has been having brown stool. 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 Denies Chest pain, abdominal pain, shortness of breath, fevers, chills, nausea, vomiting, diarrhea, dysuria, headache, dizziness.   Review of systems is otherwise negative, except as mentioned in HPI above.   -Patient had colonoscopy/EGD today  OBJECTIVE  PHYSICAL EXAM:  GENERAL: no acute distress, comfortably in bed  HEAD: NC/AT  EYES: PERRLA, EOMI, Non-icteric  ENT: Mucous membranes moist, neck supple  NEURO: No focal deficits, moving all extremities spontaneously, A&Ox3, no dysarthria, CN II-XII grossly intact  PSYCH: Normal affect, calm, appropriate insight and judgment, fluent speech  RESP: CTAB, no wrr, non-labored breathing  CVS: RRR, no murmur appreciated  GI: Soft, non-tender, non-distended, no organomegaly, no appreciable masses, +bs all 4 quadrants  : No shaw, no suprapubic tenderness  EXTREMITIES: Nontender, no clubbing, cyanosis, or edema  SKIN: No rashes or lesions   Vital Signs Last 24 Hrs  T(C): 36.7 (29 Oct 2024 04:27), Max: 37 (28 Oct 2024 20:10)  T(F): 98 (29 Oct 2024 04:27), Max: 98.6 (28 Oct 2024 20:10)  HR: 98 (29 Oct 2024 04:27) (72 - 140)  BP: 136/72 (29 Oct 2024 04:27) (126/72 - 166/98)  BP(mean): --  RR: 18 (29 Oct 2024 04:27) (18 - 18)  SpO2: 98% (29 Oct 2024 04:27) (96% - 99%)    Parameters below as of 29 Oct 2024 04:27  Patient On (Oxygen Delivery Method): room air            MEDICATIONS  (STANDING):  ascorbic acid 500 milliGRAM(s) Oral daily  atorvastatin 40 milliGRAM(s) Oral at bedtime  dextrose 5%. 1000 milliLiter(s) (50 mL/Hr) IV Continuous <Continuous>  dextrose 5%. 1000 milliLiter(s) (100 mL/Hr) IV Continuous <Continuous>  dextrose 50% Injectable 25 Gram(s) IV Push once  dextrose 50% Injectable 12.5 Gram(s) IV Push once  dextrose 50% Injectable 25 Gram(s) IV Push once  glucagon  Injectable 1 milliGRAM(s) IntraMuscular once  insulin lispro (ADMELOG) corrective regimen sliding scale   SubCutaneous three times a day before meals  insulin lispro (ADMELOG) corrective regimen sliding scale   SubCutaneous at bedtime  iron sucrose IVPB 200 milliGRAM(s) IV Intermittent every 24 hours  multivitamin 1 Tablet(s) Oral daily  pantoprazole  Injectable 40 milliGRAM(s) IV Push every 12 hours  sertraline 100 milliGRAM(s) Oral at bedtime  traZODone 50 milliGRAM(s) Oral at bedtime    MEDICATIONS  (PRN):  acetaminophen     Tablet .. 650 milliGRAM(s) Oral every 6 hours PRN Temp greater or equal to 38C (100.4F), Mild Pain (1 - 3)  aluminum hydroxide/magnesium hydroxide/simethicone Suspension 30 milliLiter(s) Oral every 4 hours PRN Dyspepsia  dextrose Oral Gel 15 Gram(s) Oral once PRN Blood Glucose LESS THAN 70 milliGRAM(s)/deciliter  melatonin 3 milliGRAM(s) Oral at bedtime PRN Insomnia  ondansetron Injectable 4 milliGRAM(s) IV Push every 8 hours PRN Nausea and/or Vomiting    Allergies    warfarin (Rash)  codeine (Rash)    Intolerances    OHS (Unknown)      LABS:                        8.5    4.59  )-----------( 161      ( 28 Oct 2024 05:30 )             29.0     10-28    142  |  106  |  8.0  ----------------------------<  156[H]  3.0[L]   |  24.0  |  0.70    Ca    9.0      28 Oct 2024 05:30  Phos  2.6     10-28  Mg     1.8     10-28    TPro  5.7[L]  /  Alb  3.5  /  TBili  1.7  /  DBili  x   /  AST  20  /  ALT  10  /  AlkPhos  47  10-28    PT/INR - ( 28 Oct 2024 05:30 )   PT: 15.0 sec;   INR: 1.33 ratio           Urinalysis Basic - ( 28 Oct 2024 05:30 )    Color: x / Appearance: x / SG: x / pH: x  Gluc: 156 mg/dL / Ketone: x  / Bili: x / Urobili: x   Blood: x / Protein: x / Nitrite: x   Leuk Esterase: x / RBC: x / WBC x   Sq Epi: x / Non Sq Epi: x / Bacteria: x      CAPILLARY BLOOD GLUCOSE      POCT Blood Glucose.: 365 mg/dL (29 Oct 2024 06:09)  POCT Blood Glucose.: 205 mg/dL (28 Oct 2024 21:09)  POCT Blood Glucose.: 296 mg/dL (28 Oct 2024 17:52)      CULTURE DATA:      RADIOLOGY & ADDITIONAL TESTS:

## 2024-10-28 NOTE — DISCHARGE NOTE PROVIDER - NSDCMRMEDTOKEN_GEN_ALL_CORE_FT
apixaban 2.5 mg oral tablet: 1 tab(s) orally every 12 hours  ascorbic acid 500 mg oral tablet: 1 tab(s) orally once a day  Aspir 81 oral delayed release tablet: 1 tab(s) orally once a day  atorvastatin 40 mg oral tablet: 1 tab(s) orally once a day (at bedtime)  clopidogrel 75 mg oral tablet: 1 tab(s) orally once a day   Farxiga 10 mg oral tablet: 1 tab(s) orally once a day (in the morning)  isosorbide mononitrate 30 mg oral tablet, extended release: 1 tab(s) orally once a day (in the morning)  Lasix 40 mg oral tablet: 1 tab(s) orally once a day   losartan 25 mg oral tablet: 1 tab(s) orally once a day  Multiple Vitamins oral tablet: 1 tab(s) orally once a day  traZODone 50 mg oral tablet: 1 tab(s) orally once a day (at bedtime)  Tresiba 100 units/mL subcutaneous solution: 40 unit(s) subcutaneous 2 times a day  Zoloft 100 mg oral tablet: 1 tab(s) orally once a day   apixaban 2.5 mg oral tablet: 1 tab(s) orally every 12 hours  ascorbic acid 500 mg oral tablet: 1 tab(s) orally once a day  Aspir 81 oral delayed release tablet: 1 tab(s) orally once a day  atorvastatin 40 mg oral tablet: 1 tab(s) orally once a day (at bedtime)  clopidogrel 75 mg oral tablet: 1 tab(s) orally once a day   Farxiga 10 mg oral tablet: 1 tab(s) orally once a day (in the morning)  isosorbide mononitrate 30 mg oral tablet, extended release: 1 tab(s) orally once a day (in the morning)  Lasix 40 mg oral tablet: 1 tab(s) orally once a day   losartan 25 mg oral tablet: 1 tab(s) orally once a day  Multiple Vitamins oral tablet: 1 tab(s) orally once a day  Protonix 40 mg oral delayed release tablet: 1 tab(s) orally 2 times a day  traZODone 50 mg oral tablet: 1 tab(s) orally once a day (at bedtime)  Tresiba 100 units/mL subcutaneous solution: 40 unit(s) subcutaneous 2 times a day  Zoloft 100 mg oral tablet: 1 tab(s) orally once a day   apixaban 2.5 mg oral tablet: 1 tab(s) orally every 12 hours  ascorbic acid 500 mg oral tablet: 1 tab(s) orally once a day  atorvastatin 40 mg oral tablet: 1 tab(s) orally once a day (at bedtime)  clopidogrel 75 mg oral tablet: 1 tab(s) orally once a day   Farxiga 10 mg oral tablet: 1 tab(s) orally once a day (in the morning)  isosorbide mononitrate 30 mg oral tablet, extended release: 1 tab(s) orally once a day (in the morning)  Lasix 40 mg oral tablet: 1 tab(s) orally once a day   losartan 25 mg oral tablet: 1 tab(s) orally once a day  Multiple Vitamins oral tablet: 1 tab(s) orally once a day  Protonix 40 mg oral delayed release tablet: 1 tab(s) orally 2 times a day  traZODone 50 mg oral tablet: 1 tab(s) orally once a day (at bedtime)  Tresiba 100 units/mL subcutaneous solution: 40 unit(s) subcutaneous 2 times a day  Zoloft 100 mg oral tablet: 1 tab(s) orally once a day   apixaban 2.5 mg oral tablet: 1 tab(s) orally every 12 hours  ascorbic acid 500 mg oral tablet: 1 tab(s) orally once a day  atorvastatin 40 mg oral tablet: 1 tab(s) orally once a day (at bedtime)  clopidogrel 75 mg oral tablet: 1 tab(s) orally once a day   Farxiga 10 mg oral tablet: 1 tab(s) orally once a day (in the morning)  isosorbide mononitrate 30 mg oral tablet, extended release: 1 tab(s) orally once a day (in the morning)  Lasix 40 mg oral tablet: 1 tab(s) orally once a day   losartan 25 mg oral tablet: 1 tab(s) orally once a day  Multiple Vitamins oral tablet: 1 tab(s) orally once a day  potassium chloride 20 mEq oral tablet, extended release: 2 tab(s) orally once a day  Protonix 40 mg oral delayed release tablet: 1 tab(s) orally 2 times a day  traZODone 50 mg oral tablet: 1 tab(s) orally once a day (at bedtime)  Tresiba 100 units/mL subcutaneous solution: 40 unit(s) subcutaneous 2 times a day  Zoloft 100 mg oral tablet: 1 tab(s) orally once a day

## 2024-10-28 NOTE — DISCHARGE NOTE PROVIDER - DID THE PATIENT PRESENT WITH OR WAS TREATED FOR MALNUTRITION DURING THIS ADMISSION
As per ems, [place], in own faeces, wearing jeans, Pt responding to painful stimuli, P3earl, slurring speech, nil obvious evidence of injury.trauma
Yes...

## 2024-10-28 NOTE — DIETITIAN INITIAL EVALUATION ADULT - PERTINENT MEDS FT
MEDICATIONS  (STANDING):  ascorbic acid 500 milliGRAM(s) Oral daily  atorvastatin 40 milliGRAM(s) Oral at bedtime  dextrose 5%. 1000 milliLiter(s) (100 mL/Hr) IV Continuous <Continuous>  dextrose 5%. 1000 milliLiter(s) (50 mL/Hr) IV Continuous <Continuous>  dextrose 50% Injectable 25 Gram(s) IV Push once  dextrose 50% Injectable 12.5 Gram(s) IV Push once  dextrose 50% Injectable 25 Gram(s) IV Push once  glucagon  Injectable 1 milliGRAM(s) IntraMuscular once  insulin lispro (ADMELOG) corrective regimen sliding scale   SubCutaneous at bedtime  insulin lispro (ADMELOG) corrective regimen sliding scale   SubCutaneous three times a day before meals  iron sucrose IVPB 200 milliGRAM(s) IV Intermittent every 24 hours  multivitamin 1 Tablet(s) Oral daily  pantoprazole  Injectable 40 milliGRAM(s) IV Push every 12 hours  potassium chloride    Tablet ER 40 milliEquivalent(s) Oral every 4 hours  potassium phosphate IVPB 15 milliMole(s) IV Intermittent once  sertraline 100 milliGRAM(s) Oral at bedtime  traZODone 50 milliGRAM(s) Oral at bedtime    MEDICATIONS  (PRN):  acetaminophen     Tablet .. 650 milliGRAM(s) Oral every 6 hours PRN Temp greater or equal to 38C (100.4F), Mild Pain (1 - 3)  aluminum hydroxide/magnesium hydroxide/simethicone Suspension 30 milliLiter(s) Oral every 4 hours PRN Dyspepsia  dextrose Oral Gel 15 Gram(s) Oral once PRN Blood Glucose LESS THAN 70 milliGRAM(s)/deciliter  melatonin 3 milliGRAM(s) Oral at bedtime PRN Insomnia  ondansetron Injectable 4 milliGRAM(s) IV Push every 8 hours PRN Nausea and/or Vomiting

## 2024-10-28 NOTE — DISCHARGE NOTE PROVIDER - NSDCFUSCHEDAPPT_GEN_ALL_CORE_FT
Deon Gaitan  [facility]  [address]  Scheduled Appointment: 12/31/2024

## 2024-10-28 NOTE — DIETITIAN INITIAL EVALUATION ADULT - OTHER INFO
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 GI consulted for symptomatic anemia

## 2024-10-28 NOTE — DISCHARGE NOTE PROVIDER - NSDCCPCAREPLAN_GEN_ALL_CORE_FT
PRINCIPAL DISCHARGE DIAGNOSIS  Diagnosis: Iron deficiency anemia  Assessment and Plan of Treatment: Anemia  You were anemic on presentation. Your hemoglobin was 6.6, it was 11.5 in April. You recieved two units of packed red blood cells. It was shown that you had iron deficiency anemia. We gave you IV iron. Due to being on eliquis a colonoscopy and endoscopy was preformed by GI in order to evaluate for possible bleeding. It showed no signs of active bleeding. Please follow up with PCP for future iron infusions and treatment of symptomatic anemia.   Anemia is a condition in which the concentration of red blood cells or hemoglobin in the blood is below normal. Hemoglobin is a substance in red blood cells that carries oxygen to the tissues of the body. Anemia results in not enough oxygen reaching these tissues which can cause symptoms such as weakness, dizziness/lightheadedness, shortness of breath, chest pain, paleness, or nausea. The cause of your anemia may or may not be determined immediately. If your hemoglobin was dangerously low, you may have received a blood transfusion. Usually reactions to transfusions occur immediately but monitor yourself for any fevers, rash, or shortness of breath.  SEEK IMMEDIATE MEDICAL CARE IF YOU HAVE ANY OF THE FOLLOWING SYMPTOMS: extreme weakness/chest pain/shortness of breath, black or bloody stools, vomiting blood, fainting, fever, or any signs of dehydration.      SECONDARY DISCHARGE DIAGNOSES  Diagnosis: Left atrial thrombus  Assessment and Plan of Treatment: Patient was on eliquis for left atrial thrombus treatment. Patient elquis was held due to anemia and possible GIB. TTE did not demonstrate any left atrial thrombus. PAtient will follow with cardiology for further management for eliquis or possible watchman procedure.    Diagnosis: Tricuspid regurgitation  Assessment and Plan of Treatment: Patient demonstrated severe tricupsid regurgitation on TTE. Patient should follow up with cardiology.    Diagnosis: Lesion of right femur  Assessment and Plan of Treatment: Patient had lesion of right femur on CT scan. 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    Diagnosis: Diverticulosis of intestine without bleeding  Assessment and Plan of Treatment:      PRINCIPAL DISCHARGE DIAGNOSIS  Diagnosis: Iron deficiency anemia  Assessment and Plan of Treatment: Anemia  You were anemic on presentation. Your hemoglobin was 6.6, it was 11.5 in April. You recieved two units of packed red blood cells. It was shown that you had iron deficiency anemia. We gave you IV iron. Due to being on eliquis a colonoscopy and endoscopy was preformed by GI in order to evaluate for possible bleeding. It showed no signs of active bleeding. Please follow up with PCP for future iron infusions and treatment of symptomatic anemia.   Anemia is a condition in which the concentration of red blood cells or hemoglobin in the blood is below normal. Hemoglobin is a substance in red blood cells that carries oxygen to the tissues of the body. Anemia results in not enough oxygen reaching these tissues which can cause symptoms such as weakness, dizziness/lightheadedness, shortness of breath, chest pain, paleness, or nausea. The cause of your anemia may or may not be determined immediately. If your hemoglobin was dangerously low, you may have received a blood transfusion. Usually reactions to transfusions occur immediately but monitor yourself for any fevers, rash, or shortness of breath.  SEEK IMMEDIATE MEDICAL CARE IF YOU HAVE ANY OF THE FOLLOWING SYMPTOMS: extreme weakness/chest pain/shortness of breath, black or bloody stools, vomiting blood, fainting, fever, or any signs of dehydration.      SECONDARY DISCHARGE DIAGNOSES  Diagnosis: Left atrial thrombus  Assessment and Plan of Treatment: Patient was on eliquis for left atrial thrombus treatment. Patient elquis was held due to anemia and possible GIB. TTE did not demonstrate any left atrial thrombus. PAtient will follow with cardiology for further management for eliquis or possible watchman procedure.    Diagnosis: Tricuspid regurgitation  Assessment and Plan of Treatment: Patient demonstrated severe tricupsid regurgitation on TTE. Patient should follow up with cardiology.    Diagnosis: Lesion of right femur  Assessment and Plan of Treatment: Patient had lesion of right femur on CT scan. 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    Diagnosis: Diverticulosis of intestine without bleeding  Assessment and Plan of Treatment: There was mild pan-diverticulosis. In the cecum, there was an approximately 1cm erythematous area without any active bleeding. The area was treated with APC. The colon was otherwise unremarkable. Retroflexion was performed in the rectum and revealed internal hemorrhoids.       Diagnosis: Prolonged QT interval  Assessment and Plan of Treatment: EKG on 10/23 showed QTc was 508 repeat QTc was 483. There were also some abnormal beats on your ekg called premature ventricular complexes. You did not expierence symptoms during this time. Please follow up with cardiology.    Diagnosis: Chronic heart failure  Assessment and Plan of Treatment: The pumping function of your heart is mildly reduced. Your lasix was initially held due to possible bleed. You started to have edema of ankles and lasix was restarted. It improved with restart of lasix.

## 2024-10-28 NOTE — PROGRESS NOTE ADULT - SUBJECTIVE AND OBJECTIVE BOX
[place] [facility] - [facility]                                   [address] [place] [address]                                                      PHONE: [phone]                                                         FAX: [phone]  [email or web address]  ---------------------------------------------------------------------------------------------------------------------------------    Overnight events/patient complaints: patient seen at bedside.       warfarin (Rash)  codeine (Rash)  OHS (Unknown)    MEDICATIONS  (STANDING):  ascorbic acid 500 milliGRAM(s) Oral daily  atorvastatin 40 milliGRAM(s) Oral at bedtime  dextrose 5%. 1000 milliLiter(s) (100 mL/Hr) IV Continuous <Continuous>  dextrose 5%. 1000 milliLiter(s) (50 mL/Hr) IV Continuous <Continuous>  dextrose 50% Injectable 25 Gram(s) IV Push once  dextrose 50% Injectable 12.5 Gram(s) IV Push once  dextrose 50% Injectable 25 Gram(s) IV Push once  glucagon  Injectable 1 milliGRAM(s) IntraMuscular once  insulin lispro (ADMELOG) corrective regimen sliding scale   SubCutaneous at bedtime  insulin lispro (ADMELOG) corrective regimen sliding scale   SubCutaneous three times a day before meals  iron sucrose IVPB 200 milliGRAM(s) IV Intermittent every 24 hours  multivitamin 1 Tablet(s) Oral daily  pantoprazole  Injectable 40 milliGRAM(s) IV Push every 12 hours  potassium chloride    Tablet ER 40 milliEquivalent(s) Oral every 4 hours  potassium phosphate IVPB 15 milliMole(s) IV Intermittent once  sertraline 100 milliGRAM(s) Oral at bedtime  traZODone 50 milliGRAM(s) Oral at bedtime    MEDICATIONS  (PRN):  acetaminophen     Tablet .. 650 milliGRAM(s) Oral every 6 hours PRN Temp greater or equal to 38C (100.4F), Mild Pain (1 - 3)  aluminum hydroxide/magnesium hydroxide/simethicone Suspension 30 milliLiter(s) Oral every 4 hours PRN Dyspepsia  dextrose Oral Gel 15 Gram(s) Oral once PRN Blood Glucose LESS THAN 70 milliGRAM(s)/deciliter  melatonin 3 milliGRAM(s) Oral at bedtime PRN Insomnia  ondansetron Injectable 4 milliGRAM(s) IV Push every 8 hours PRN Nausea and/or Vomiting      Vital Signs Last 24 Hrs  T(C): 36.9 (28 Oct 2024 10:10), Max: 37 (28 Oct 2024 04:38)  T(F): 98.5 (28 Oct 2024 10:10), Max: 98.6 (28 Oct 2024 04:38)  HR: 72 (28 Oct 2024 10:10) (72 - 95)  BP: 126/72 (28 Oct 2024 10:10) (126/72 - 134/67)  BP(mean): --  RR: 18 (28 Oct 2024 10:10) (18 - 18)  SpO2: 96% (28 Oct 2024 10:10) (94% - 97%)    Parameters below as of 28 Oct 2024 10:10  Patient On (Oxygen Delivery Method): room air      ICU Vital Signs Last 24 Hrs  DONALD CHIP  I&O's Detail    Drug Dosing Weight  DONALD SAUNDERS      PHYSICAL EXAM:  General: NAD  HEENT: Head; normocephalic, atraumatic.  Eyes: Pupils reactive, cornea wnl.  Neck: Supple, no nodes adenopathy, no NVD or carotid bruit or thyromegaly.  CARDIOVASCULAR: Normal S1 and S2, No murmur, rub, gallop or lift.   LUNGS: No rales, rhonchi or wheeze. Normal breath sounds bilaterally.  ABDOMEN: Soft, nontender without mass or organomegaly. bowel sounds normoactive.  EXTREMITIES: No clubbing, cyanosis or edema. Distal pulses wnl.   SKIN: warm and dry with normal turgor.  NEURO: Alert/oriented x 3  PSYCH: normal affect.        LABS:                        8.5    4.59  )-----------( 161      ( 28 Oct 2024 05:30 )             29.0     10-28    142  |  106  |  8.0  ----------------------------<  156[H]  3.0[L]   |  24.0  |  0.70    Ca    9.0      28 Oct 2024 05:30  Phos  2.6     10-28  Mg     1.8     10-28    TPro  5.7[L]  /  Alb  3.5  /  TBili  1.7  /  DBili  x   /  AST  20  /  ALT  10  /  AlkPhos  47  10-28    DONALD SAUNDERS      PT/INR - ( 28 Oct 2024 05:30 )   PT: 15.0 sec;   INR: 1.33 ratio           Urinalysis Basic - ( 28 Oct 2024 05:30 )    Color: x / Appearance: x / SG: x / pH: x  Gluc: 156 mg/dL / Ketone: x  / Bili: x / Urobili: x   Blood: x / Protein: x / Nitrite: x   Leuk Esterase: x / RBC: x / WBC x   Sq Epi: x / Non Sq Epi: x / Bacteria: x        RADIOLOGY & ADDITIONAL STUDIES:    INTERPRETATION OF TELEMETRY (personally reviewed): atrial fibrillation at controlled ventricular rate        ASSESSMENT AND PLAN:  78 F PMHx DM  HLD PAD, CAD s/p CABGX3/MV repair/LAAthrombectomy 1/2021 initial sig LV dysfunction, augmented EF on GDMT, COPD, chronic AF on NOAC, postop protected LM PCI 2023, progressive RENTERIA with recent cardiac cath 8/2024 revealed patent LIMA to LAD, mod mid LAD disease, prox Cx disease s/p CARIN EF 45% moderate pul HTN with elevated PCWP WHO Group2 Pul HtN on increasing doses diuretics, seen in office this past week with progressive RENTERIA and fatigue.  Labs then revealed anemia.    - EGD/colon today negative for source of bleeding  - tele reviewed showing atrial fibrillation -- plan to resume Eliquis and will arrange outpatient Watchman evaluation  - continue off Plavix given persistent anemia without unknown source     Discussed with Dr. Christian. I will arrange close outpatient follow up with Dr. Man.

## 2024-10-28 NOTE — DISCHARGE NOTE PROVIDER - CARE PROVIDERS DIRECT ADDRESSES
,[email or web address]

## 2024-10-29 ENCOUNTER — TRANSCRIPTION ENCOUNTER (OUTPATIENT)
Age: 79
End: 2024-10-29

## 2024-10-29 VITALS
HEART RATE: 98 BPM | DIASTOLIC BLOOD PRESSURE: 82 MMHG | TEMPERATURE: 98 F | OXYGEN SATURATION: 97 % | RESPIRATION RATE: 18 BRPM | SYSTOLIC BLOOD PRESSURE: 124 MMHG

## 2024-10-29 LAB
GLUCOSE BLDC GLUCOMTR-MCNC: 343 MG/DL — HIGH (ref 70–99)
GLUCOSE BLDC GLUCOMTR-MCNC: 365 MG/DL — HIGH (ref 70–99)

## 2024-10-29 PROCEDURE — 99239 HOSP IP/OBS DSCHRG MGMT >30: CPT | Mod: GC

## 2024-10-29 PROCEDURE — 99232 SBSQ HOSP IP/OBS MODERATE 35: CPT

## 2024-10-29 RX ORDER — FUROSEMIDE 40 MG
20 TABLET ORAL ONCE
Refills: 0 | Status: COMPLETED | OUTPATIENT
Start: 2024-10-29 | End: 2024-10-29

## 2024-10-29 RX ADMIN — Medication 20 MILLIGRAM(S): at 01:58

## 2024-10-29 RX ADMIN — Medication 500 MILLIGRAM(S): at 12:17

## 2024-10-29 RX ADMIN — Medication 3 MILLIGRAM(S): at 01:58

## 2024-10-29 RX ADMIN — Medication 8: at 12:16

## 2024-10-29 RX ADMIN — IRON SUCROSE 110 MILLIGRAM(S): 20 INJECTION, SOLUTION INTRAVENOUS at 08:30

## 2024-10-29 RX ADMIN — PANTOPRAZOLE SODIUM 40 MILLIGRAM(S): 40 TABLET, DELAYED RELEASE ORAL at 05:08

## 2024-10-29 RX ADMIN — Medication 10: at 06:10

## 2024-10-29 RX ADMIN — Medication 1 TABLET(S): at 12:17

## 2024-10-29 NOTE — PROGRESS NOTE ADULT - PROVIDER SPECIALTY LIST ADULT
Patient:   ASHLIE MELENDEZ            MRN: [id number]            FIN: [id number]               Age:   35 years     Sex:  Male     :  1984   Associated Diagnoses:   None   Author:   Billy Clay      History of Present Illness   36 y/o male presents to the ED for eval of lower back pain onset 3 days ago. hx per with the help of the .  Pain to the lower back onset 3 days ago.  No radiation to the chest, abdomen, groin, testicle.  No urinary symptoms.  Movement and activity makes symptoms worse.  Nothing makes symptoms better.  Using aspirin and not helping.  No bowel or bladder changes.  No numbness or tingling.  No radiation to the legs.  No fevers.  No injury or trauma or new activities.    Hx Migraine   Sx Denies  Meds ASA  Social smoker, occ etoh. uses marijuana and last use 3 days ago  PCP none  Allergy none.        Review of Systems   Constitutional symptoms:  No fever, no chills.    Skin symptoms:  No rash,    Eye symptoms:  No recent vision problems, no icterus.    ENMT symptoms:  No ear pain, no sore throat, no nasal congestion.    Respiratory symptoms:  No shortness of breath, no cough.    Cardiovascular symptoms:  No chest pain, no palpitations, no diaphoresis, no peripheral edema.    Gastrointestinal symptoms:  No abdominal pain, no nausea, no vomiting, no diarrhea, no constipation.    Genitourinary symptoms:  No dysuria, no hematuria, no testicular pain.    Musculoskeletal symptoms:  Negative except as documented in HPI.   Neurologic symptoms:  No headache, no dizziness.    Hematologic/Lymphatic symptoms:  Bleeding tendency negative, bruising tendency negative.    Allergy/immunologic symptoms:  No recurrent infections,       Physical Examination               Vital Signs   Vital Signs   2020 8:22            Temperature Tympanic      98.2 F                             Peripheral Pulse Rate     81 bpm                             Respiratory Rate          16 br/min       
Cardiology
Cardiology
Hospitalist
Internal Medicine
                      Systolic Blood Pressure   118 mmHg                             Diastolic Blood Pressure  83 mmHg                             Mean Arterial Pressure    95 mmHg                             Oxygen Saturation         98 %  .   Measurements   2/1/2020 8:22            Height                    183 cm                             Weight                    118.3 kg                             Weight Type               Measured                             Weight Method             Standing  .   General:  Alert,  Skin:  Warm, dry, no pallor, no rash, normal for ethnicity.   Head:  Atraumatic.  Neck:  Trachea midline.  Eye:  Normal conjunctiva.  Ears, nose, mouth and throat:  Oral mucosa moist.  Cardiovascular:  Normal peripheral perfusion.  Respiratory:  Breath sounds are equal, Symmetrical chest wall expansion, pulse ox 98 percent and normal not hypoxic, Respirations: Regular, Breath sounds: Bilateral, clear, Retractions: None.   Chest wall:  No deformity.  Back:  Pain to generalized lower back  bilat, no  lower extremity pain. Strenght is 5/5,Distal neurovascular status intact. Distal pulse equal and strong. NO CV tenderness.  Lumar  vertebral tenderness noted.  Pain to  back wortse with flexion and hyperextension and twisting movement .  Musculoskeletal:  Normal ROM, normal strength, no tenderness, no swelling, no deformity.   Gastrointestinal:  Soft, Nontender, Non distended, Normal bowel sounds,   Neurological:  Alert and oriented to person, place, time, and situation.  Lymphatics:  No lymphadenopathy.  Psychiatric:  Appropriate mood & affect.         Medical Decision Making   Orders  Launch Orders   Pharmacy:  Ultram 50 mg oral tablet (Order): 50 mg, PO, Once  Flexeril 10 mg oral tablet (Order): 10 mg, PO, Once, PRN: Muscle spasm  ketorolac (Toradol) 30 mg/ml injection (Order): 60 mg, IM, Once.   History exam as above. Multiple differential diagnosis considered.  Pt here for nontraumatic back pain 
Electrophysiology
Heme/Onc
Hospitalist
Cardiology
Electrophysiology
Gastroenterology
Heme/Onc
that is reproducible with movement and position without any midline vertebral tenderness suspect muscle strain. Gait safe and stable.  No Red flags  NO urinary symptoms  Afebrile  No radiation to abd, chest, upper back, groin or legs.  Imaging discussed and deferred with  for supportive care  REturn precautions and follow up instructions discussed.         Impression and Plan   Diagnosis   Lumbar strain    Plan   Condition: Stable.    Disposition: Discharged: to home.    Prescriptions: Launch prescriptions   Pharmacy:  Ultram 50 mg oral tablet (Prescribe): 50 mg, 1 tab, PO, q6hr, [id number]  Disp Fifteen   Dr Blanchard, PRN: for pain, 15 tab, 0 Refill(s)  Lidoderm 5% topical film (Prescribe): 1 patch, Topical, qDay, remove patches after 12 hours    Dr blanchard, 30 patch, 0 Refill(s)  Flexeril 10 mg oral tablet (Prescribe): 10 mg, 1 tab, PO, TID, PRN: for spasm, 15 tab, 0 Refill(s)  naproxen 500 mg oral tablet (Prescribe): 500 mg, 1 tab, PO, BID, Dr blanchard  with food, PRN: for pain, 20 tab, 0 Refill(s).    Patient was given the following educational materials: Muscle Strain(Haitian), Back Pain, Adult(Haitian).    Follow up with: Follow up with primary care provider Follow up with primary care provider  call [phone]  for assist in finding a PCP   naproxen with food  flexeril as needed  Lidoderm patches as needed   Ultram for severe pain  avoid painful movements  reutrn for changes or worse or concenr  Heat to back 20 mins 6 times per day  avoid strenous activity but do not do nothing as you will get stiff    .             Electronically Signed On 02.01.2020 09:10  ___________________________________________________   Billy Clay    
Gastroenterology
Gastroenterology
Heme/Onc
Heme/Onc

## 2024-10-29 NOTE — PROGRESS NOTE ADULT - TIME BILLING
Time spent reviewing the chart documentation, reviewing labs and imaging studies, evaluating the patient, discussing the plan of care with the consultants & medical team, and documenting.
Time spent reviewing the chart documentation, reviewing labs and imaging studies, evaluating the patient, discussing the plan of care with the consultants & medical team, and documenting.
Reviewing her EGD report and lab work.
Review of chart and discussion with patient

## 2024-10-29 NOTE — PROGRESS NOTE ADULT - SUBJECTIVE AND OBJECTIVE BOX
Chief Complaint:  Patient is a 79y old  Female who presents with a chief complaint of Acute blood loss anemia (29 Oct 2024 10:16)      HPI/ 24 hr events: Patient seen and examined at bedside. S/p EGD/Colonoscopy (10/28/24) with  mild pan-diverticulosis. In the cecum, there was an approximately 1cm erythematous area without any active bleeding, treated with APC. Pt feeling well this morning. Tolerating diet. Denies nausea, vomiting, diarrhea, abdominal pain, hematemesis, hematochezia, melena. Vitals are overall stable, no repeat labs this morning.      REVIEW OF SYSTEMS:   General: Negative  HEENT: Negative  CV: Negative  Respiratory: Negative  GI: See HPI  : Negative  MSK: Negative  Hematologic: Negative  Skin: Negative    MEDICATIONS:   MEDICATIONS  (STANDING):  ascorbic acid 500 milliGRAM(s) Oral daily  atorvastatin 40 milliGRAM(s) Oral at bedtime  dextrose 5%. 1000 milliLiter(s) (100 mL/Hr) IV Continuous <Continuous>  dextrose 5%. 1000 milliLiter(s) (50 mL/Hr) IV Continuous <Continuous>  dextrose 50% Injectable 25 Gram(s) IV Push once  dextrose 50% Injectable 12.5 Gram(s) IV Push once  dextrose 50% Injectable 25 Gram(s) IV Push once  glucagon  Injectable 1 milliGRAM(s) IntraMuscular once  insulin lispro (ADMELOG) corrective regimen sliding scale   SubCutaneous at bedtime  insulin lispro (ADMELOG) corrective regimen sliding scale   SubCutaneous three times a day before meals  iron sucrose IVPB 200 milliGRAM(s) IV Intermittent every 24 hours  multivitamin 1 Tablet(s) Oral daily  pantoprazole  Injectable 40 milliGRAM(s) IV Push every 12 hours  sertraline 100 milliGRAM(s) Oral at bedtime  traZODone 50 milliGRAM(s) Oral at bedtime    MEDICATIONS  (PRN):  acetaminophen     Tablet .. 650 milliGRAM(s) Oral every 6 hours PRN Temp greater or equal to 38C (100.4F), Mild Pain (1 - 3)  aluminum hydroxide/magnesium hydroxide/simethicone Suspension 30 milliLiter(s) Oral every 4 hours PRN Dyspepsia  dextrose Oral Gel 15 Gram(s) Oral once PRN Blood Glucose LESS THAN 70 milliGRAM(s)/deciliter  melatonin 3 milliGRAM(s) Oral at bedtime PRN Insomnia  ondansetron Injectable 4 milliGRAM(s) IV Push every 8 hours PRN Nausea and/or Vomiting      Packed Red Cells Order:  1 Unit  Indication: Anemia due to Active Hemorrhage - Medical Conditions e.  Infuse Unit : 3 Hours --- none (10-27-24 @ 09:47)  Packed Red Cells Order:  1 Unit  Indication: Hgb <8 gm/dL -Stable Cardiovascular Disease or Acute Co  Infuse Unit : 3.5 Hours (10-26-24 @ 08:42)  Packed Red Cells Order:  1 Unit  Indication: Hgb <7 gm/dL  Infuse Unit : 3 Hours (10-23-24 @ 18:31)  Packed Red Cells Order:  1 Unit  Indication: Hgb <7 gm/dL  Infuse Unit : 3 Hours (10-23-24 @ 17:30)        DIET:  Diet, DASH/TLC:   Sodium & Cholesterol Restricted (10-28-24 @ 13:54) [Active]          ALLERGIES:   Allergies    warfarin (Rash)  codeine (Rash)    Intolerances    OHS (Unknown)      VITAL SIGNS:   Vital Signs Last 24 Hrs  T(C): 36.8 (29 Oct 2024 08:55), Max: 37 (28 Oct 2024 20:10)  T(F): 98.3 (29 Oct 2024 08:55), Max: 98.6 (28 Oct 2024 20:10)  HR: 102 (29 Oct 2024 08:55) (82 - 140)  BP: 129/72 (29 Oct 2024 08:55) (129/72 - 166/98)  BP(mean): --  RR: 18 (29 Oct 2024 08:55) (18 - 18)  SpO2: 99% (29 Oct 2024 08:55) (97% - 99%)    Parameters below as of 29 Oct 2024 08:55  Patient On (Oxygen Delivery Method): room air      I&O's Summary      PHYSICAL EXAM:   GENERAL:  No acute distress  HEENT:  NC/AT, conjunctiva clear, sclera anicteric  CHEST:  No increased effort  HEART:  Regular rate  ABDOMEN:  Soft, non-tender, non-distended, normoactive bowel sounds, no rebound or guarding  EXTREMITIES: No edema  SKIN:  Warm, dry  NEURO:  Calm, cooperative    LABS:                        8.5    4.59  )-----------( 161      ( 28 Oct 2024 05:30 )             29.0     Hemoglobin: 8.5 g/dL (10-28-24 @ 05:30)  Hemoglobin: 8.3 g/dL (10-27-24 @ 06:50)  Hemoglobin: 9.2 g/dL (10-26-24 @ 14:05)    10-28    142  |  106  |  8.0  ----------------------------<  156[H]  3.0[L]   |  24.0  |  0.70    Ca    9.0      28 Oct 2024 05:30  Phos  2.6     10-28  Mg     1.8     10-28    TPro  5.7[L]  /  Alb  3.5  /  TBili  1.7  /  DBili  x   /  AST  20  /  ALT  10  /  AlkPhos  47  10-28    LIVER FUNCTIONS - ( 28 Oct 2024 05:30 )  Alb: 3.5 g/dL / Pro: 5.7 g/dL / ALK PHOS: 47 U/L / ALT: 10 U/L / AST: 20 U/L / GGT: x             PT/INR - ( 28 Oct 2024 05:30 )   PT: 15.0 sec;   INR: 1.33 ratio               Carcinoembryonic Antigen: 2.3 ng/mL (10-26-24 @ 06:00)  Cancer Antigen, 125: 46 U/mL (10-26-24 @ 06:00)                                    RADIOLOGY & ADDITIONAL STUDIES:          EGD-Colonoscopy Report        Date: 10/28/2024 10:58 AM        Patient Name: DONALD SAUNDERS        MRN: [id number]        Account Number:        [id number]        Gender: Female         (age): 1945 (79)        EGD Instrument(s):        [id number]        Colonoscopy Instrument(s):        [id number]        Attending/Fellow:        JAIMIE HUMPHREY MD                Procedure Room #:        PROCEDURE ROOM 2                ASA Class:        P3 - 10/28/2024 12:11 PM JAIMIE HUMPHREY MD        History of Present Illness:        Anemia without overt bleeding, Afib on Eliquis, CAD on DAPT        EGD Indications:        Anemia: 285.9 - D64.9        Colonoscopy Indications:        Anemia: 285.9 - D64.9        General Procedure:        The procedure, indications, preparation and potential complications were    explained to the patient, who indicated understanding and signed the    corresponding consent forms. MAC was administered. Continuous pulse oximetry and    blood pressure monitoring were used throughout the procedure. Supplemental    oxygen was used.        EGD        EGD Procedure:        Patient was placed in left lateral decubitus position. The endoscope was    introduced through mouth and advanced under direct visualization until second    part of the duodenum reached. Patient tolerance to the procedure was good. The    procedure was not difficult.        EGD Findings:        Esophagus Mucosa Normal mucosa was noted in the whole esophagus. Regular Z-line    at 40cm from incisors.        Stomach Mucosa Diffuse nodularity of the mucosa was noted in the whole stomach.    These findings are compatible with nodular gastritis. Biopsies were obtained to    evaluate for H.Pylori infection.Multiple cold forceps biopsies were performed    for histology.        Duodenum Mucosa Normal mucosa was noted in the whole examined duodenum.        EGD Impressions:        Normal mucosa in the whole esophagus.        Nodularity in the whole stomach compatible with nodular gastritis. (Biopsy).        Normal mucosa in the whole examined duodenum.        EGD Limitations/Complications:        There were no apparent limitations or complications        Colonoscopy        Colonoscopy Procedure:        undergoing diagnostic colonoscopy. The quality of preparation was 6-9 on the BBP    scale/adequate. Patient was placed in left lateral decubitus position. The    colonoscope was introduced through rectum and advanced under direct    visualization until cecum reached. The appendiceal orifice and the ileocecal    valve were identified. Retroflexion in the rectum was performed successfully and    there were no remarkable findings. Patient tolerance to the procedure was    excellent. The procedure was not difficult. Digital exam was normal with the    following finding(s): hemorrhoids. Blood loss was none.        Willard Bowel Preparation Score:        Using the Willard Bowel Preparation Score, each segment of the colon was graded    as follows:        Left Colon: Excellent, 3 | Transverse Colon: Excellent, 3  | Right Colon:    Excellent, 3        Colonoscopy Findings:        Additional findings There was mild pan-diverticulosis. In the cecum, there was    an approximately 1cm erythematous area without any active bleeding. The area was    treated with APC. The colon was otherwise unremarkable. Retroflexion was    performed in the rectum and revealed internal hemorrhoids..        Colonoscopy Impressions:        There was mild pan-diverticulosis. In the cecum, there was an approximately    1cm erythematous area without any active bleeding. The area was treated with    APC. The colon was otherwise unremarkable. Retroflexion was performed in the    rectum and revealed internal hemorrhoids. .                Colonoscopy Limitations/Complications:        There were no apparent limitations or complications        Plan:        Resume diet. Resume DAPT and Eliquis if indicated. Monitor Hgb. If further    concern for downtrending Hgb, patient should follow up with GI in the office to    discuss video capsule endoscopy to evaluate the small bowel for possible    source.        Specimens:        Jar # 1 :        Biopsy in the stomach antrum and stomach body        Test(s) requested: Histology        Pathology:        Pathology was sent to lab, waiting for results        JAIMIE HUMPHREY MD        Version 1, Electronically signed on 10/28/2024 12:27:42 PM by JAIMIE HUMPHREY MD

## 2024-10-29 NOTE — DISCHARGE NOTE NURSING/CASE MANAGEMENT/SOCIAL WORK - NSDCPEFALRISK_GEN_ALL_CORE
For information on Fall & Injury Prevention, visit: [email or web address] OR  [email or web address] OR  https://www.cdc.gov/steadi/patient.html

## 2024-10-29 NOTE — PROGRESS NOTE ADULT - ASSESSMENT
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    Anemia  - Hgb 6.2 on admission   - S/P 2 U PRBC  - Hgb 8.5 yesterday   - on Protonix   - Held ASA/Plavix/Eliquis per Cardiology recs   - Will not reverse Eliquis given h/o DUANE thrombus   - ADRIANE negative   - Never had a colonoscopy   - GI following - - Hold Farxiga for EGD/colonoscopy Monday  -    Hapto 100 Retic 2.1 T bili 3- no concern for hemolysis   - B12 and folate are adequate  -  iron def anemia with ferritin 29 Sat 4%- receiving IV iron x 3 days  - plan for EGD/colonoscopy today    Abnormal CT  - Ct A/P Suggestion of right heart dysfunction and fluid overload Hepatosplenomegaly. Cholelithiasis.Bilateral femoral lytic lesions as above. -+Hepatosplenomegaly  -  MRI of both femurs shows 1.  Well-defined lesion in the intertrochanteric region of the right proximal femur measuring 3.5 x 2.5 cm. Lesion is overall similar in size and appearance to prior CT of 2021. Differential diagnosis includes fibrous dysplasia versus liposclerosing myxofibrous tumor.2.  No additional focal marrow replacing lesions are identified.3.  No acute fracture  - Multiple myeloma work up pending JESS   - So far quiggs, SPEP  FLC ratio are normal  - given lytic lesion on CT scan will check skeletal survey for other lytic lesions- preformed awaiting read   -  CEA is 23 and  is 46    Chronic AFib   - Held Eliquis   - Cardiology following     Will follow  Quality 110: Preventive Care And Screening: Influenza Immunization: Influenza Immunization Administered during Influenza season Quality 111:Pneumonia Vaccination Status For Older Adults: Pneumococcal Vaccination Previously Received Detail Level: Detailed

## 2024-10-29 NOTE — PROGRESS NOTE ADULT - ASSESSMENT
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 GI consulted for symptomatic anemia    #symptomatic anemia   EGD/Colonoscopy (10/28/24) with  mild pan-diverticulosis. In the cecum, there was an approximately 1cm erythematous area without any active bleeding, treated with APC.  CT A/P w/ IVC (10.24.24) Hepatosplenomegaly. Cholelithiasis.  - Trend CBC, transfuse as needed. Monitor for signs of bleeding.   - Avoid NSAIDs  - PPI BID for GI mucosal cytoprotection  - OK to resume DAPT and Eliquis  - Regular diet as tolerated   - If hgb continued to decline, follow up outpatient for capsule endoscopy  _________________________________________________________________  Assessment and recommendations are final when note is signed by the attending physician.

## 2024-10-29 NOTE — PROGRESS NOTE ADULT - REASON FOR ADMISSION
Acute blood loss anemia

## 2024-10-29 NOTE — DISCHARGE NOTE NURSING/CASE MANAGEMENT/SOCIAL WORK - PATIENT PORTAL LINK FT
You can access the FollowMyHealth Patient Portal offered by [facility] by registering at the following website: [email or web address]. By joining [facility]’s FollowMyHealth portal, you will also be able to view your health information using other applications (apps) compatible with our system.

## 2024-10-29 NOTE — PROGRESS NOTE ADULT - SUBJECTIVE AND OBJECTIVE BOX
79F hx CAD s/p CABG, recent PCI several months ago on ASA/Plavix, pAF on Eliquis, moderate pHTN, HTN, HLD, IDDM presents with anemia to 6 on outpt labs. Spoke with pt and pt's cardiologist who state pt is on aspirin, Plavix, Eliquis. Over the past few months pt has become progressive dyspneic on exertion, now with minimal activity. Stool has become progressively darker over the past few weeks as well, and associated with lightheadedness. Saw her cardiologist in the office yesterday where she was noted to be paler and more short of breath, bloodwork demonstrated anemia so pt was instructed to present to ED. Pt denies fever, chills, abd pain, back pain, CP    We are consulted for anemia. Pt reports she was anemia after childbirth when she was younger but not since. She follows with PCP and endo regularly and get bloodwork at least every 3 months Has never had a colonoscopy. Denies any black stool or rectal bleeding.    MEDICATIONS  (STANDING):  ascorbic acid 500 milliGRAM(s) Oral daily  atorvastatin 40 milliGRAM(s) Oral at bedtime  bisacodyl 20 milliGRAM(s) Oral once  dextrose 5%. 1000 milliLiter(s) (100 mL/Hr) IV Continuous <Continuous>  dextrose 5%. 1000 milliLiter(s) (50 mL/Hr) IV Continuous <Continuous>  dextrose 50% Injectable 25 Gram(s) IV Push once  dextrose 50% Injectable 12.5 Gram(s) IV Push once  dextrose 50% Injectable 25 Gram(s) IV Push once  glucagon  Injectable 1 milliGRAM(s) IntraMuscular once  insulin lispro (ADMELOG) corrective regimen sliding scale   SubCutaneous at bedtime  insulin lispro (ADMELOG) corrective regimen sliding scale   SubCutaneous three times a day before meals  multivitamin 1 Tablet(s) Oral daily  pantoprazole  Injectable 40 milliGRAM(s) IV Push every 12 hours  polyethylene glycol/electrolyte Solution. 4000 milliLiter(s) Oral once  sertraline 100 milliGRAM(s) Oral daily  traZODone 50 milliGRAM(s) Oral at bedtime      Vital Signs Last 24 Hrs  T(C): 36.8 (29 Oct 2024 08:55), Max: 37 (28 Oct 2024 20:10)  T(F): 98.3 (29 Oct 2024 08:55), Max: 98.6 (28 Oct 2024 20:10)  HR: 102 (29 Oct 2024 08:55) (82 - 140)  BP: 129/72 (29 Oct 2024 08:55) (129/72 - 166/98)  BP(mean): --  RR: 18 (29 Oct 2024 08:55) (18 - 18)  SpO2: 99% (29 Oct 2024 08:55) (97% - 99%)    Parameters below as of 29 Oct 2024 08:55  Patient On (Oxygen Delivery Method): room air    PHYSICAL EXAM:  Constitutional: Alert, NAD, comfortable   Head and Face: Atraumatic,   Eyes: Sclera and conjunctiva were normal,   ENT: MMM  NECK:  supple  Pulmonary: Clear to auscultation bilaterally  Heart: Regular rate and rhythm   Abdominal: Soft   Skin: Normal color and intact    Extremities: Warm without edema. No clubbing.     CBC                        8.5    4.59  )-----------( 161      ( 28 Oct 2024 05:30 )             29.0                           8.3    4.59  )-----------( 179      ( 27 Oct 2024 06:50 )             28.1                           7.4    4.69  )-----------( 198      ( 26 Oct 2024 06:00 )             25.7                           8.3    5.31  )-----------( 237      ( 25 Oct 2024 06:45 )             27.8                           8.3    5.50  )-----------( 235      ( 24 Oct 2024 05:27 )             28.1     CHEM  10-28    142  |  106  |  8.0  ----------------------------<  156[H]  3.0[L]   |  24.0  |  0.70    Ca    9.0      28 Oct 2024 05:30  Phos  2.6     10-28  Mg     1.8     10-28    TPro  5.7[L]  /  Alb  3.5  /  TBili  1.7  /  DBili  x   /  AST  20  /  ALT  10  /  AlkPhos  47  10-28      10-25    140  |  103  |  16.3  ----------------------------<  277[H]  3.5   |  25.0  |  0.96    Ca    8.5      25 Oct 2024 06:45  Phos  3.2     10-25  Mg     2.1     10-25    TPro  5.8[L]  /  Alb  3.6  /  TBili  2.0  /  DBili  x   /  AST  24  /  ALT  11  /  AlkPhos  49  10-25      10-24    137  |  101  |  20.7[H]  ----------------------------<  157[H]  3.8   |  21.0[L]  |  0.84    Ca    8.8      24 Oct 2024 05:27  Phos  3.3     10-24  Mg     2.2     10-24    TPro  6.3[L]  /  Alb  3.9  /  TBili  3.0[H]  /  DBili  x   /  AST  42[H]  /  ALT  13  /  AlkPhos  52  10-24

## 2024-10-29 NOTE — PROGRESS NOTE ADULT - NS ATTEND AMEND GEN_ALL_CORE FT
I agree with assessment and plan as above. Pt is 78yo F with PMH of chronic Afib on Eliquis, DM2, HLD, PAD, CAD s/p CABG, recent stents, moderate pHTN, presented with low hemoglobin. GI consulted for anemia work up. Patient is planned for EGD and colonoscopy on Monday after holding Farxiga. Continue PPI. No need to hold antiplatelet or AC in the setting of recent cardiac stent. EGD and colonoscopy would be diagnostic if antiplatelets are on board.
Patient is a 79-year-old woman with a history of chronic A-fib on Eliquis, PAD, CAD status post CABG and heart failure with reduced ejection fraction who presented with symptomatic anemia.  He is planned for an endoscopy and colonoscopy tomorrow.  Though she continues to be anemic she has not and is not passing blood from her GI tract.  Farxiga has been held.  Plan for GoLytely today, CLD today and n.p.o. at midnight, hold anticoagulation/DVT prophylaxis at midnight and EGD/colon Tomorrow.
I evaluated this pt. with my ACP and agree with the above assessment and management plan. Pt with ABLA and the above EGD findings with APC of erythematous area in stomach. OK to restart DAPT. OPT VCE. Pantoprazole 40 mg. daily to Rx the above. I reviewed her EGD report from yesterday.

## 2024-10-29 NOTE — PROGRESS NOTE ADULT - SUBJECTIVE AND OBJECTIVE BOX
[facility], [facility]                              [address] [address]                                                 PHONE: [phone]                                                 FAX: [phone]  -----------------------------------------------------------------------------------------------  Pt seen and examined. FU for  shortness of breath    Overnight events/Complaints: Pt without complains. Reports breathing better. HR was elevated with activity.    RELEVANT PHYSICAL EXAM:  Neck: No obvious JVD  Cardiovascular: regular S1, S2  Respiratory: Lungs clear to auscultation; no crepitations, no wheeze  Musculoskeletal: No edema    Vital Signs Last 24 Hrs  T(C): 36.8 (29 Oct 2024 08:55), Max: 37 (28 Oct 2024 20:10)  T(F): 98.3 (29 Oct 2024 08:55), Max: 98.6 (28 Oct 2024 20:10)  HR: 102 (29 Oct 2024 08:55) (72 - 140)  BP: 129/72 (29 Oct 2024 08:55) (126/72 - 166/98)  BP(mean): --  RR: 18 (29 Oct 2024 08:55) (18 - 18)  SpO2: 99% (29 Oct 2024 08:55) (96% - 99%)    Parameters below as of 29 Oct 2024 08:55  Patient On (Oxygen Delivery Method): room air      I&O's Summary          LABS:                        8.5    4.59  )-----------( 161      ( 28 Oct 2024 05:30 )             29.0     10-28    142  |  106  |  8.0  ----------------------------<  156[H]  3.0[L]   |  24.0  |  0.70    Ca    9.0      28 Oct 2024 05:30  Phos  2.6     10-28  Mg     1.8     10-28    TPro  5.7[L]  /  Alb  3.5  /  TBili  1.7  /  DBili  x   /  AST  20  /  ALT  10  /  AlkPhos  47  10-28        PT/INR - ( 28 Oct 2024 05:30 )   PT: 15.0 sec;   INR: 1.33 ratio             RADIOLOGY & ADDITIONAL STUDIES: (reviewed)  CXR was independently visualized/reviewed  and demonstrated: clear lungs    CARDIOLOGY TESTING:(reviewed)     TELEMETRY independently visualized/reviewed and demonstrated :  atrial fibrillation with mild RVR    12 lead EKG independently visualized/reviewed  and demonstrated    ECHO: office 10/2024: LVEF 60% flattening IVS c/w RVPO, s/p MV repair with mild functional MS, trace MR, mild AS STEPH 1.6 cm2, severe TR est PAP 45 mm Hg, mod RVE    STRESS TEST: PET 10/9/22143 : low risk study mild apical ischemia      MEDICATIONS:(reviewed)  MEDICATIONS  (STANDING):  ascorbic acid 500 milliGRAM(s) Oral daily  atorvastatin 40 milliGRAM(s) Oral at bedtime  dextrose 5%. 1000 milliLiter(s) (50 mL/Hr) IV Continuous <Continuous>  dextrose 5%. 1000 milliLiter(s) (100 mL/Hr) IV Continuous <Continuous>  dextrose 50% Injectable 25 Gram(s) IV Push once  dextrose 50% Injectable 12.5 Gram(s) IV Push once  dextrose 50% Injectable 25 Gram(s) IV Push once  glucagon  Injectable 1 milliGRAM(s) IntraMuscular once  insulin lispro (ADMELOG) corrective regimen sliding scale   SubCutaneous three times a day before meals  insulin lispro (ADMELOG) corrective regimen sliding scale   SubCutaneous at bedtime  iron sucrose IVPB 200 milliGRAM(s) IV Intermittent every 24 hours  multivitamin 1 Tablet(s) Oral daily  pantoprazole  Injectable 40 milliGRAM(s) IV Push every 12 hours  sertraline 100 milliGRAM(s) Oral at bedtime  traZODone 50 milliGRAM(s) Oral at bedtime      ASSESSMENT AND PLAN:    79 F PMHx DM  HLD PAD, CAD s/p CABGX3/MV repair/LAAthrombectomy 1/2021 initial sig LV dysfunction, augmented EF on GDMT, COPD, chronic AF on NOAC, postop protected LM PCI 2023, progressive RENTERIA with recent cardiac cath 8/2024 revealed patent LIMA to LAD, mod mid LAD disease, prox Cx disease s/p CARIN EF 45% moderate pul HTN with elevated PCWP WHO Group2 Pul HtN on increasing doses diuretics, seen in office this past week with progressive RENTERIA and fatigue.  Labs then revealed anemia.    - EGD/colon negative for source of bleeding  - tele reviewed showing atrial fibrillation -- plan to resume Eliquis and Plavix. Will arrange outpatient Watchman evaluation  - resume Atenolol at outpt dose    Discussed with Dr. Christian. Outpatient follow up with Dr. Man.

## 2024-10-29 NOTE — DISCHARGE NOTE NURSING/CASE MANAGEMENT/SOCIAL WORK - FINANCIAL ASSISTANCE
[facility] provides services at a reduced cost to those who are determined to be eligible through [facility]’s financial assistance program. Information regarding [facility]’s financial assistance program can be found by going to [email or web address] or by calling [phone].

## 2024-10-30 LAB
% ALBUMIN: 59.6 % — SIGNIFICANT CHANGE UP
% ALPHA 1: 5.4 % — SIGNIFICANT CHANGE UP
% ALPHA 2: 11.4 % — SIGNIFICANT CHANGE UP
% BETA: 12.1 % — SIGNIFICANT CHANGE UP
% GAMMA: 11.5 % — SIGNIFICANT CHANGE UP
ALBUMIN SERPL ELPH-MCNC: 3.5 G/DL — LOW (ref 3.6–5.5)
ALBUMIN/GLOB SERPL ELPH: 1.5 RATIO — SIGNIFICANT CHANGE UP
ALPHA1 GLOB SERPL ELPH-MCNC: 0.3 G/DL — SIGNIFICANT CHANGE UP (ref 0.1–0.4)
ALPHA2 GLOB SERPL ELPH-MCNC: 0.7 G/DL — SIGNIFICANT CHANGE UP (ref 0.5–1)
B-GLOBULIN SERPL ELPH-MCNC: 0.7 G/DL — SIGNIFICANT CHANGE UP (ref 0.5–1)
GAMMA GLOBULIN: 0.7 G/DL — SIGNIFICANT CHANGE UP (ref 0.6–1.6)
INTERPRETATION SERPL IFE-IMP: SIGNIFICANT CHANGE UP
PROT PATTERN SERPL ELPH-IMP: SIGNIFICANT CHANGE UP
PROT SERPL-MCNC: 5.9 G/DL — LOW (ref 6–8.3)
SURGICAL PATHOLOGY STUDY: SIGNIFICANT CHANGE UP

## 2024-11-07 ENCOUNTER — TRANSCRIPTION ENCOUNTER (OUTPATIENT)
Age: 79
End: 2024-11-07

## 2024-11-11 ENCOUNTER — APPOINTMENT (OUTPATIENT)
Dept: PULMONOLOGY | Facility: CLINIC | Age: 79
End: 2024-11-11
Payer: MEDICARE

## 2024-11-11 VITALS — OXYGEN SATURATION: 97 % | HEART RATE: 65 BPM | DIASTOLIC BLOOD PRESSURE: 68 MMHG | SYSTOLIC BLOOD PRESSURE: 113 MMHG

## 2024-11-11 VITALS — RESPIRATION RATE: 16 BRPM | WEIGHT: 171 LBS | HEIGHT: 67 IN | BODY MASS INDEX: 26.84 KG/M2

## 2024-11-11 DIAGNOSIS — Z98.890 OTHER SPECIFIED POSTPROCEDURAL STATES: ICD-10-CM

## 2024-11-11 DIAGNOSIS — I27.21 SECONDARY PULMONARY ARTERIAL HYPERTENSION: ICD-10-CM

## 2024-11-11 DIAGNOSIS — R93.89 ABNORMAL FINDINGS ON DIAGNOSTIC IMAGING OF OTHER SPECIFIED BODY STRUCTURES: ICD-10-CM

## 2024-11-11 DIAGNOSIS — Z95.1 PRESENCE OF AORTOCORONARY BYPASS GRAFT: ICD-10-CM

## 2024-11-11 DIAGNOSIS — Z09 ENCOUNTER FOR FOLLOW-UP EXAMINATION AFTER COMPLETED TREATMENT FOR CONDITIONS OTHER THAN MALIGNANT NEOPLASM: ICD-10-CM

## 2024-11-11 DIAGNOSIS — R06.09 OTHER FORMS OF DYSPNEA: ICD-10-CM

## 2024-11-11 PROCEDURE — G2211 COMPLEX E/M VISIT ADD ON: CPT

## 2024-11-11 PROCEDURE — 99215 OFFICE O/P EST HI 40 MIN: CPT

## 2024-11-20 ENCOUNTER — INPATIENT (INPATIENT)
Facility: HOSPITAL | Age: 79
LOS: 0 days | Discharge: HOME | DRG: 293 | End: 2024-11-21
Attending: INTERNAL MEDICINE | Admitting: INTERNAL MEDICINE
Payer: MEDICARE

## 2024-11-20 VITALS
DIASTOLIC BLOOD PRESSURE: 97 MMHG | RESPIRATION RATE: 20 BRPM | OXYGEN SATURATION: 100 % | SYSTOLIC BLOOD PRESSURE: 117 MMHG | HEART RATE: 66 BPM | TEMPERATURE: 98 F

## 2024-11-20 DIAGNOSIS — I50.9 HEART FAILURE, UNSPECIFIED: ICD-10-CM

## 2024-11-20 DIAGNOSIS — Z95.820 PERIPHERAL VASCULAR ANGIOPLASTY STATUS WITH IMPLANTS AND GRAFTS: Chronic | ICD-10-CM

## 2024-11-20 DIAGNOSIS — Z90.710 ACQUIRED ABSENCE OF BOTH CERVIX AND UTERUS: Chronic | ICD-10-CM

## 2024-11-20 DIAGNOSIS — Z98.89 OTHER SPECIFIED POSTPROCEDURAL STATES: Chronic | ICD-10-CM

## 2024-11-20 DIAGNOSIS — Z95.2 PRESENCE OF PROSTHETIC HEART VALVE: Chronic | ICD-10-CM

## 2024-11-20 DIAGNOSIS — Z95.1 PRESENCE OF AORTOCORONARY BYPASS GRAFT: Chronic | ICD-10-CM

## 2024-11-20 DIAGNOSIS — Z95.828 PRESENCE OF OTHER VASCULAR IMPLANTS AND GRAFTS: Chronic | ICD-10-CM

## 2024-11-20 LAB
ALBUMIN SERPL ELPH-MCNC: 4.2 G/DL — SIGNIFICANT CHANGE UP (ref 3.3–5.2)
ALP SERPL-CCNC: 72 U/L — SIGNIFICANT CHANGE UP (ref 40–120)
ALT FLD-CCNC: 13 U/L — SIGNIFICANT CHANGE UP
ANION GAP SERPL CALC-SCNC: 11 MMOL/L — SIGNIFICANT CHANGE UP (ref 5–17)
APTT BLD: 34.4 SEC — SIGNIFICANT CHANGE UP (ref 24.5–35.6)
AST SERPL-CCNC: 28 U/L — SIGNIFICANT CHANGE UP
BASOPHILS # BLD AUTO: 0.05 K/UL — SIGNIFICANT CHANGE UP (ref 0–0.2)
BASOPHILS NFR BLD AUTO: 0.9 % — SIGNIFICANT CHANGE UP (ref 0–2)
BILIRUB SERPL-MCNC: 1.4 MG/DL — SIGNIFICANT CHANGE UP (ref 0.4–2)
BLD GP AB SCN SERPL QL: SIGNIFICANT CHANGE UP
BUN SERPL-MCNC: 12.6 MG/DL — SIGNIFICANT CHANGE UP (ref 8–20)
CALCIUM SERPL-MCNC: 9.3 MG/DL — SIGNIFICANT CHANGE UP (ref 8.4–10.5)
CHLORIDE SERPL-SCNC: 97 MMOL/L — SIGNIFICANT CHANGE UP (ref 96–108)
CO2 SERPL-SCNC: 30 MMOL/L — HIGH (ref 22–29)
CREAT SERPL-MCNC: 0.81 MG/DL — SIGNIFICANT CHANGE UP (ref 0.5–1.3)
EGFR: 75 ML/MIN/1.73M2 — SIGNIFICANT CHANGE UP
EOSINOPHIL # BLD AUTO: 0.22 K/UL — SIGNIFICANT CHANGE UP (ref 0–0.5)
EOSINOPHIL NFR BLD AUTO: 4 % — SIGNIFICANT CHANGE UP (ref 0–6)
GLUCOSE BLDC GLUCOMTR-MCNC: 144 MG/DL — HIGH (ref 70–99)
GLUCOSE SERPL-MCNC: 211 MG/DL — HIGH (ref 70–99)
HCT VFR BLD CALC: 37.9 % — SIGNIFICANT CHANGE UP (ref 34.5–45)
HGB BLD-MCNC: 11.4 G/DL — LOW (ref 11.5–15.5)
IMM GRANULOCYTES NFR BLD AUTO: 0.4 % — SIGNIFICANT CHANGE UP (ref 0–0.9)
INR BLD: 1.23 RATIO — HIGH (ref 0.85–1.16)
LYMPHOCYTES # BLD AUTO: 0.83 K/UL — LOW (ref 1–3.3)
LYMPHOCYTES # BLD AUTO: 15.1 % — SIGNIFICANT CHANGE UP (ref 13–44)
MAGNESIUM SERPL-MCNC: 2 MG/DL — SIGNIFICANT CHANGE UP (ref 1.6–2.6)
MCHC RBC-ENTMCNC: 23.5 PG — LOW (ref 27–34)
MCHC RBC-ENTMCNC: 30.1 G/DL — LOW (ref 32–36)
MCV RBC AUTO: 78.1 FL — LOW (ref 80–100)
MONOCYTES # BLD AUTO: 0.52 K/UL — SIGNIFICANT CHANGE UP (ref 0–0.9)
MONOCYTES NFR BLD AUTO: 9.5 % — SIGNIFICANT CHANGE UP (ref 2–14)
NEUTROPHILS # BLD AUTO: 3.84 K/UL — SIGNIFICANT CHANGE UP (ref 1.8–7.4)
NEUTROPHILS NFR BLD AUTO: 70.1 % — SIGNIFICANT CHANGE UP (ref 43–77)
PLATELET # BLD AUTO: 179 K/UL — SIGNIFICANT CHANGE UP (ref 150–400)
POTASSIUM SERPL-MCNC: 4 MMOL/L — SIGNIFICANT CHANGE UP (ref 3.5–5.3)
POTASSIUM SERPL-SCNC: 4 MMOL/L — SIGNIFICANT CHANGE UP (ref 3.5–5.3)
PROT SERPL-MCNC: 7.3 G/DL — SIGNIFICANT CHANGE UP (ref 6.6–8.7)
PROTHROM AB SERPL-ACNC: 13.9 SEC — HIGH (ref 9.9–13.4)
RBC # BLD: 4.85 M/UL — SIGNIFICANT CHANGE UP (ref 3.8–5.2)
RBC # FLD: 29.3 % — HIGH (ref 10.3–14.5)
SODIUM SERPL-SCNC: 138 MMOL/L — SIGNIFICANT CHANGE UP (ref 135–145)
WBC # BLD: 5.48 K/UL — SIGNIFICANT CHANGE UP (ref 3.8–10.5)
WBC # FLD AUTO: 5.48 K/UL — SIGNIFICANT CHANGE UP (ref 3.8–10.5)

## 2024-11-20 PROCEDURE — 93010 ELECTROCARDIOGRAM REPORT: CPT

## 2024-11-20 RX ORDER — CHLORHEXIDINE GLUCONATE 1.2 MG/ML
1 RINSE ORAL ONCE
Refills: 0 | Status: DISCONTINUED | OUTPATIENT
Start: 2024-11-20 | End: 2024-11-21

## 2024-11-20 RX ORDER — LEVOTHYROXINE SODIUM 150 MCG
100 TABLET ORAL DAILY
Refills: 0 | Status: DISCONTINUED | OUTPATIENT
Start: 2024-11-20 | End: 2024-11-21

## 2024-11-20 RX ORDER — ATENOLOL 100 MG/1
25 TABLET ORAL EVERY 12 HOURS
Refills: 0 | Status: DISCONTINUED | OUTPATIENT
Start: 2024-11-20 | End: 2024-11-21

## 2024-11-20 RX ORDER — TRAZODONE HYDROCHLORIDE 150 MG/1
50 TABLET ORAL AT BEDTIME
Refills: 0 | Status: DISCONTINUED | OUTPATIENT
Start: 2024-11-20 | End: 2024-11-21

## 2024-11-20 RX ORDER — LOSARTAN POTASSIUM 100 MG/1
25 TABLET, FILM COATED ORAL DAILY
Refills: 0 | Status: DISCONTINUED | OUTPATIENT
Start: 2024-11-20 | End: 2024-11-20

## 2024-11-20 RX ORDER — FERROUS SULFATE 325(65) MG
325 TABLET ORAL DAILY
Refills: 0 | Status: DISCONTINUED | OUTPATIENT
Start: 2024-11-20 | End: 2024-11-21

## 2024-11-20 RX ORDER — SERTRALINE HYDROCHLORIDE 100 MG/1
100 TABLET, FILM COATED ORAL ONCE
Refills: 0 | Status: COMPLETED | OUTPATIENT
Start: 2024-11-20 | End: 2024-11-20

## 2024-11-20 RX ORDER — 0.9 % SODIUM CHLORIDE 0.9 %
1000 INTRAVENOUS SOLUTION INTRAVENOUS
Refills: 0 | Status: DISCONTINUED | OUTPATIENT
Start: 2024-11-20 | End: 2024-11-21

## 2024-11-20 RX ORDER — ROSUVASTATIN CALCIUM 5 MG/1
20 TABLET, FILM COATED ORAL AT BEDTIME
Refills: 0 | Status: DISCONTINUED | OUTPATIENT
Start: 2024-11-20 | End: 2024-11-21

## 2024-11-20 RX ORDER — MULTIVIT WITH MINERALS/LUTEIN
500 TABLET ORAL DAILY
Refills: 0 | Status: DISCONTINUED | OUTPATIENT
Start: 2024-11-20 | End: 2024-11-21

## 2024-11-20 RX ORDER — FUROSEMIDE 40 MG/1
2 TABLET ORAL
Refills: 0 | DISCHARGE

## 2024-11-20 RX ORDER — ROSUVASTATIN CALCIUM 5 MG/1
1 TABLET, FILM COATED ORAL
Refills: 0 | DISCHARGE

## 2024-11-20 RX ORDER — LEVOTHYROXINE SODIUM 150 MCG
1 TABLET ORAL
Refills: 0 | DISCHARGE

## 2024-11-20 RX ORDER — APIXABAN 2.5 MG/1
2.5 TABLET, FILM COATED ORAL EVERY 12 HOURS
Refills: 0 | Status: DISCONTINUED | OUTPATIENT
Start: 2024-11-21 | End: 2024-11-21

## 2024-11-20 RX ORDER — GLUCAGON INJECTION, SOLUTION 0.5 MG/.1ML
1 INJECTION, SOLUTION SUBCUTANEOUS ONCE
Refills: 0 | Status: DISCONTINUED | OUTPATIENT
Start: 2024-11-20 | End: 2024-11-21

## 2024-11-20 RX ORDER — DAPAGLIFLOZIN 10 MG/1
1 TABLET, FILM COATED ORAL
Refills: 0 | DISCHARGE

## 2024-11-20 RX ORDER — DAPAGLIFLOZIN 10 MG/1
10 TABLET, FILM COATED ORAL DAILY
Refills: 0 | Status: DISCONTINUED | OUTPATIENT
Start: 2024-11-21 | End: 2024-11-21

## 2024-11-20 RX ORDER — FUROSEMIDE 40 MG/1
40 TABLET ORAL
Refills: 0 | Status: DISCONTINUED | OUTPATIENT
Start: 2024-11-20 | End: 2024-11-21

## 2024-11-20 RX ORDER — SERTRALINE HYDROCHLORIDE 100 MG/1
100 TABLET, FILM COATED ORAL DAILY
Refills: 0 | Status: DISCONTINUED | OUTPATIENT
Start: 2024-11-20 | End: 2024-11-21

## 2024-11-20 RX ORDER — FUROSEMIDE 40 MG/1
80 TABLET ORAL DAILY
Refills: 0 | Status: DISCONTINUED | OUTPATIENT
Start: 2024-11-21 | End: 2024-11-21

## 2024-11-20 RX ORDER — ATENOLOL 100 MG/1
1 TABLET ORAL
Refills: 0 | DISCHARGE

## 2024-11-20 RX ORDER — CLOPIDOGREL 75 MG/1
75 TABLET, FILM COATED ORAL DAILY
Refills: 0 | Status: DISCONTINUED | OUTPATIENT
Start: 2024-11-20 | End: 2024-11-21

## 2024-11-20 RX ORDER — INSULIN ASPART 100 [IU]/ML
0 INJECTION, SOLUTION INTRAVENOUS; SUBCUTANEOUS
Refills: 0 | DISCHARGE

## 2024-11-20 RX ORDER — FERROUS SULFATE 325(65) MG
1 TABLET ORAL
Refills: 0 | DISCHARGE

## 2024-11-20 RX ORDER — FUROSEMIDE 40 MG/1
1 TABLET ORAL
Refills: 0 | DISCHARGE

## 2024-11-20 RX ADMIN — ROSUVASTATIN CALCIUM 20 MILLIGRAM(S): 5 TABLET, FILM COATED ORAL at 22:44

## 2024-11-20 RX ADMIN — SERTRALINE HYDROCHLORIDE 100 MILLIGRAM(S): 100 TABLET, FILM COATED ORAL at 22:44

## 2024-11-20 RX ADMIN — FUROSEMIDE 40 MILLIGRAM(S): 40 TABLET ORAL at 19:03

## 2024-11-20 RX ADMIN — TRAZODONE HYDROCHLORIDE 50 MILLIGRAM(S): 150 TABLET ORAL at 22:44

## 2024-11-20 RX ADMIN — CLOPIDOGREL 75 MILLIGRAM(S): 75 TABLET, FILM COATED ORAL at 19:03

## 2024-11-20 RX ADMIN — ATENOLOL 25 MILLIGRAM(S): 100 TABLET ORAL at 19:03

## 2024-11-20 NOTE — ASU PATIENT PROFILE, ADULT - AS SC BRADEN MOISTURE
came to ED due to chest palpitations since about 11 pm today , denies chest pain or shortness of breath, on remote tele, afib 114 /min. (4) rarely moist

## 2024-11-20 NOTE — H&P PST ADULT - HISTORY OF PRESENT ILLNESS
Narrative: 78 F PMHx DM  HLD PAD, CAD s/p CABGX3/MV repair/LAAthrombectomy 1/2021 initial sig LV dysfunction, augmented EF on GDMT, COPD, chronic AF on NOAC, postop protected LM PCI 2023, progressive RENTERIA with recent cardiac cath 8/2024 revealed patent LIMA to LAD, mod mid LAD disease, prox Cx disease s/p CARIN EF 45% moderate pul HTN with elevated PCWP WHO Group2 Pul HtN on increasing doses diuretics, seen in office this past week with progressive RENTERIA and fatigue. Presents today for RHC/Cardiomems.    Review of Systems: negative unless mentioned in HPI    Symptoms:        Angina (Class):        Ischemic Symptoms:     Heart Failure:        Systolic/Diastolic/Combined: Diastolic       NYHA Class (within 2 weeks):     Assessment of LVEF (Must be within 6 months):       EF: 62%       Assessed by: TTE       Date: 10/25/24    Prior Cardiac Interventions (LHC, stents, CABG):       PCI's (Date, Stents, Vessels):        CABG (Date, Grafts): CABGX3/MV repair/LAAthrombectomy 1/2021    Stress Test (Date, Findings):     Echo (Date, Findings):   10/25/24 TTE CONCLUSIONS:   1. Left ventricular cavity is normal in size. Left ventricular wall thickness is normal. Septal motion is abnormal consistent with previous cardiac surgery and The interventricular septum is flattened in systole and diastole consistent with right ventricular pressure and volume overload. Left ventricular systolic function is normal with an ejection fraction of 62 % by Shearer's method of disks.   2. The left ventricular diastolic function is indeterminate.   3. Moderately enlarged right ventricular cavity size and moderately reduced right ventricular systolic function.   4. The right atrium is severely dilated.   5. S/p mitral valve repair. Trace MR visualized.   6. The transmitral mean gradient is 5.00 mmHg at 63bpm.   7. Mild aortic stenosis.   8. The peak transaortic velocity is 2.08 m/s, peak transaortic gradient is 17.3 mmHg and mean transaortic gradient is 8.0 mmHg with an LVOT/aortic valve VTI ratio of 0.40.   9. Severe tricuspid regurgitation.  10. Estimated pulmonary artery systolic pressure is 70 mmHg, consistent with severe pulmonary hypertension.  11. Mild pulmonic regurgitation.  12. Compared to the transthoracic echocardiogram performed on 4/25/2024, there have been no significant interval changes.    Antianginal Therapies:        Beta Blockers:         Calcium Channel Blockers:        Long Acting Nitrates: Imdur 30mg PO QD       Ranexa:     Associated Risk Factors:        Frailty Score: (N/A, mild, moderate, severe)       Cerebrovascular Disease: N/A       Chronic Lung Disease: N/A       Peripheral Arterial Disease: N/A       Chronic Kidney Disease (if yes, what is GFR): N/A       Uncontrolled Diabetes (if yes, what is HgbA1C or FBS): N/A       Poorly Controlled Hypertension (if yes, what is SBP): N/A       Morbid Obesity (if yes, what is BMI): N/A       History of Recent Ventricular Arrhythmia: N/A       Inability to Ambulate Safely: N/A       Need for Therapeutic Anticoagulation: Yes, AF       Antiplatelet or Contrast Allergy: N/A    VITAL SIGNS:    PHYSICAL EXAM:  Constitutional: A & O x 3, NAD  HEENT:  Normal oral mucosa, PERRL, EOMI	  Cardiovascular: S1 S2, III/VI *** murmur, No JVD  Respiratory: Lungs clear to auscultation	  Gastrointestinal:  Soft, Non-tender, + BS	  Skin: No rashes or cyanosis  Neurologic: No deficit appreciated  Extremities: Normal range of motion, *** edema  Vascular: distal pulses +     Risk Stratification:  ASA:   Mallampati:   Bleeding Risk:   Creatinine:   GFR:   Pt assessed, appropriate for sedation, pt educated regarding the plan for Versed/Fentanyl as needed.    Plan/Recommendations:   -plan for RHC/Cardiomems  -preferred access: RFV  -patient seen and examined  -confirmed appropriate NPO duration  -ECG and Labs reviewed  -procedure discussed with patient; risks and benefits explained, questions answered  -consent obtained by attending IC    Risks, benefits, and alternatives reviewed.  Risks including but not limited to MI, death, stroke, bleeding, infection, vessel injury, hematoma, renal failure, allergic reaction, urgent open heart surgery, restenosis and stent thrombosis were reviewed.  All questions answered.  Patient is agreeable to proceed. Narrative: 78 F PMHx DM  HLD PAD, CAD s/p CABGX3/MV repair/LAAthrombectomy 1/2021 initial sig LV dysfunction, augmented EF on GDMT, COPD, chronic AF on NOAC, postop protected LM PCI 2023, progressive RENTERIA with recent cardiac cath 8/2024 revealed patent LIMA to LAD, mod mid LAD disease, prox Cx disease s/p CARIN EF 45% moderate pulm HTN with elevated PCWP WHO Group2 Pul HtN on increasing doses diuretics, seen in office this past week with progressive RENTERIA and fatigue. Presents today for RHC/Cardiomems.    Review of Systems: negative unless mentioned in HPI    Symptoms: RENTERIA       Angina (Class): 2       Ischemic Symptoms: RENTERIA    Heart Failure:        Systolic/Diastolic/Combined: Diastolic       NYHA Class (within 2 weeks): 2    Assessment of LVEF (Must be within 6 months):       EF: 62%       Assessed by: TTE       Date: 10/25/24    Prior Cardiac Interventions (LHC, stents, CABG):       PCI's (Date, Stents, Vessels):        CABG (Date, Grafts): CABGX3/MV repair/LAAthrombectomy 1/2021    Stress Test (Date, Findings):     Echo (Date, Findings):   10/25/24 TTE CONCLUSIONS:   1. Left ventricular cavity is normal in size. Left ventricular wall thickness is normal. Septal motion is abnormal consistent with previous cardiac surgery and The interventricular septum is flattened in systole and diastole consistent with right ventricular pressure and volume overload. Left ventricular systolic function is normal with an ejection fraction of 62 % by Shearer's method of disks.   2. The left ventricular diastolic function is indeterminate.   3. Moderately enlarged right ventricular cavity size and moderately reduced right ventricular systolic function.   4. The right atrium is severely dilated.   5. S/p mitral valve repair. Trace MR visualized.   6. The transmitral mean gradient is 5.00 mmHg at 63bpm.   7. Mild aortic stenosis.   8. The peak transaortic velocity is 2.08 m/s, peak transaortic gradient is 17.3 mmHg and mean transaortic gradient is 8.0 mmHg with an LVOT/aortic valve VTI ratio of 0.40.   9. Severe tricuspid regurgitation.  10. Estimated pulmonary artery systolic pressure is 70 mmHg, consistent with severe pulmonary hypertension.  11. Mild pulmonic regurgitation.  12. Compared to the transthoracic echocardiogram performed on 4/25/2024, there have been no significant interval changes.    Antianginal Therapies:        Beta Blockers:         Calcium Channel Blockers:        Long Acting Nitrates: Imdur 30mg PO QD       Ranexa:     Associated Risk Factors:        Frailty Score: (N/A, mild, moderate, severe)       Cerebrovascular Disease: N/A       Chronic Lung Disease: N/A       Peripheral Arterial Disease: N/A       Chronic Kidney Disease (if yes, what is GFR): N/A       Uncontrolled Diabetes (if yes, what is HgbA1C or FBS): N/A       Poorly Controlled Hypertension (if yes, what is SBP): N/A       Morbid Obesity (if yes, what is BMI): N/A       History of Recent Ventricular Arrhythmia: N/A       Inability to Ambulate Safely: N/A       Need for Therapeutic Anticoagulation: Yes, AF       Antiplatelet or Contrast Allergy: N/A    VITAL SIGNS:  Vital Signs Last 24 Hrs  HR: 65  BP: 118/66  RR: 18  SpO2: 100%    PHYSICAL EXAM:  Constitutional: A & O x 3, NAD  HEENT:  Normal oral mucosa, PERRL, EOMI	  Cardiovascular: S1 S2, no murmur, No JVD  Respiratory: Lungs clear to auscultation	  Gastrointestinal:  Soft, Non-tender, + BS	  Skin: No rashes or cyanosis  Neurologic: No deficit appreciated  Extremities: Normal range of motion, no LE edema, + sclerotic tissue b/l LE  Vascular: distal pulses +     Risk Stratification:  ASA: 3  Mallampati: 3  Bleeding Risk: N/A  Creatinine: N/A  GFR: N/A  Pt assessed, appropriate for sedation, pt educated regarding the plan for Versed/Fentanyl as needed.    Plan/Recommendations:   -plan for RHC/Cardiomems  -preferred access: RFV  -patient seen and examined  -confirmed appropriate NPO duration  -ECG and Labs reviewed  -procedure discussed with patient; risks and benefits explained, questions answered  -consent obtained by attending IC    Risks, benefits, and alternatives reviewed.  Risks including but not limited to MI, death, stroke, bleeding, infection, vessel injury, hematoma, renal failure, allergic reaction, urgent open heart surgery, restenosis and stent thrombosis were reviewed.  All questions answered.  Patient is agreeable to proceed. Narrative: 78 F PMHx DM  HLD PAD, CAD s/p CABGX3/MV repair/LAAthrombectomy 1/2021 initial sig LV dysfunction, augmented EF on GDMT, COPD, chronic AF on NOAC, postop protected LM PCI 2023, progressive RENTERIA with recent cardiac cath 8/2024 revealed patent LIMA to LAD, mod mid LAD disease, prox Cx disease s/p CARIN EF 45% moderate pulm HTN with elevated PCWP WHO Group2 Pul HtN on increasing doses diuretics, seen in office this past week with progressive RENTERIA and fatigue. Presents today for RHC/Cardiomems.    Review of Systems: negative unless mentioned in HPI    Symptoms: RENTERIA       Angina (Class): 2       Ischemic Symptoms: RENTERIA    Heart Failure:        Systolic/Diastolic/Combined: Diastolic       NYHA Class (within 2 weeks): 2    Assessment of LVEF (Must be within 6 months):       EF: 62%       Assessed by: TTE       Date: 10/25/24    Prior Cardiac Interventions (LHC, stents, CABG):       PCI's (Date, Stents, Vessels):        CABG (Date, Grafts): CABGX3/MV repair/LAAthrombectomy 1/2021    Stress Test (Date, Findings):     Echo (Date, Findings):   10/25/24 TTE CONCLUSIONS:   1. Left ventricular cavity is normal in size. Left ventricular wall thickness is normal. Septal motion is abnormal consistent with previous cardiac surgery and The interventricular septum is flattened in systole and diastole consistent with right ventricular pressure and volume overload. Left ventricular systolic function is normal with an ejection fraction of 62 % by Shearer's method of disks.   2. The left ventricular diastolic function is indeterminate.   3. Moderately enlarged right ventricular cavity size and moderately reduced right ventricular systolic function.   4. The right atrium is severely dilated.   5. S/p mitral valve repair. Trace MR visualized.   6. The transmitral mean gradient is 5.00 mmHg at 63bpm.   7. Mild aortic stenosis.   8. The peak transaortic velocity is 2.08 m/s, peak transaortic gradient is 17.3 mmHg and mean transaortic gradient is 8.0 mmHg with an LVOT/aortic valve VTI ratio of 0.40.   9. Severe tricuspid regurgitation.  10. Estimated pulmonary artery systolic pressure is 70 mmHg, consistent with severe pulmonary hypertension.  11. Mild pulmonic regurgitation.  12. Compared to the transthoracic echocardiogram performed on 4/25/2024, there have been no significant interval changes.    Antianginal Therapies:        Beta Blockers:         Calcium Channel Blockers:        Long Acting Nitrates: Imdur 30mg PO QD       Ranexa:     Associated Risk Factors:        Frailty Score: (N/A, mild, moderate, severe)       Cerebrovascular Disease: N/A       Chronic Lung Disease: N/A       Peripheral Arterial Disease: N/A       Chronic Kidney Disease (if yes, what is GFR): N/A       Uncontrolled Diabetes (if yes, what is HgbA1C or FBS): N/A       Poorly Controlled Hypertension (if yes, what is SBP): N/A       Morbid Obesity (if yes, what is BMI): N/A       History of Recent Ventricular Arrhythmia: N/A       Inability to Ambulate Safely: N/A       Need for Therapeutic Anticoagulation: Yes, AF       Antiplatelet or Contrast Allergy: N/A    VITAL SIGNS:  Vital Signs Last 24 Hrs  HR: 65  BP: 118/66  RR: 18  SpO2: 100%    PHYSICAL EXAM:  Constitutional: A & O x 3, NAD  HEENT:  Normal oral mucosa, PERRL, EOMI	  Cardiovascular: S1 S2, no murmur, No JVD  Respiratory: Lungs clear to auscultation	  Gastrointestinal:  Soft, Non-tender, + BS	  Skin: No rashes or cyanosis  Neurologic: No deficit appreciated  Extremities: Normal range of motion, no LE edema, + sclerotic tissue b/l LE. R groin with + asymmetric mass palpated.  Vascular: distal pulses +     Risk Stratification:  ASA: 3  Mallampati: 3  Bleeding Risk: N/A  Creatinine: N/A  GFR: N/A  Pt assessed, appropriate for sedation, pt educated regarding the plan for Versed/Fentanyl as needed.    Plan/Recommendations:   -LFA duplex as outpatient per Dr. Smith  -plan for RHC/Cardiomems  -preferred access: LFV  -patient seen and examined  -confirmed appropriate NPO duration  -ECG and Labs reviewed  -procedure discussed with patient; risks and benefits explained, questions answered  -consent obtained by attending IC    Risks, benefits, and alternatives reviewed.  Risks including but not limited to MI, death, stroke, bleeding, infection, vessel injury, hematoma, renal failure, allergic reaction, urgent open heart surgery, restenosis and stent thrombosis were reviewed.  All questions answered.  Patient is agreeable to proceed.

## 2024-11-20 NOTE — PROGRESS NOTE ADULT - SUBJECTIVE AND OBJECTIVE BOX
Now s/p RHC with Successful Implantation of CardioMems device via LFV with Dr. David Smith, pt tolerated procedure well. Pt arrived to recovery in NAD and HDS,LFV access site stable with Figure 8 Suture in place, no bleed/hematoma, distal pulse +1. LLE remains acyanotic; warm to touch; motor/sensory function intact. palpable left distal DP pulse.    Intraprocedural findings: RHC with Successful Implantation of CardioMems Device. PAD on Cardiomems after calibration was 20.  RHC findings: Moderately elevated pulmonary artery pressures; mildly elevated wedge and PA diastolic pressures.  RA: 23/23/13  RV: 68/5/11  PA: 64/20/38  PCW: 22  SAT AO: 95.1%  SAT RA: 51.6%  SAT PA: 58%  CO/CI: 4.49/ 2.34    Medications:  Lidocaine 1%: 10ml  Omnipaque: 10ml  Post Cath EKG:     Plan:  1. S/P CardioMems Implant  -Post procedure management/monitoring per protocol  -Access site precautions  -HOB flat with LLE straight x 3 hours. If left groin site stable, ok to raise HOB to 30 degrees in 3 hours at 9 pm  -bedrest x 4 hours. If left groin site stable, ok to ambulate at 10 pm  -RIGHT GROIN WITH HARDENING/firmness - PATIENT REPORTS THIS OCCURRED DURING HER LAST PCI 8/2024 at [facility]. D/W DR SMITH, OBTAIN RIGHT GROIN ARTERIAL DUPLEX WHILE PATIENT IS INPATIENT  -Labs in AM  -Repeat ECG if any clinical indication or change on tele  -Continue current medical therapy  -Continue plavix 75mg daily  -resume eliquis 2.5mg BID 11/21 AM if right groin site stable.   -Cont BB with atenolol 25mg BID  -Cont statin therapy with Rosuvastatin 20mg po qHS   -Educated regarding strict adherence with antiplatelet/ eliquis   -Educated regarding post procedure management and care  -Discussed the importance of RF modification  -F/U outpt in 1-2 weeks with Cardiologist Dr. Mary Steiner  -DISPO:  Plan for D/C in am if remains HDS, ECG and labs in am stable and without complications  -CATH LAB NP TO REMOVE FIGURE 8 SUTURE 11/21 AM    2. HFpEF:  GDMT       ACE/ARB: Patient reports losartan was d/c'd as outpatient. patient to follow up with dr steiner about ?Resuming       MRA: n/a       Diuretic: Lasix 80mg PO every AM/ Lasix 40mg PO every PM       SGLT2i: Farxiga 10mg daily       Other:   Strict I&O's  Daily standing weights (if able)     Now s/p RHC with Successful Implantation of CardioMems device via LFV with Dr. David Smith, pt tolerated procedure well. Pt arrived to recovery in NAD and HDS,LFV access site stable with Figure 8 Suture in place, no bleed/hematoma, distal pulse +1. LLE remains acyanotic; warm to touch; motor/sensory function intact. palpable left distal DP pulse.    Intraprocedural findings: RHC with Successful Implantation of CardioMems Device. PAD on Cardiomems after calibration was 20.  RHC findings: Moderately elevated pulmonary artery pressures; mildly elevated wedge and PA diastolic pressures.  RA: 23/23/13  RV: 68/5/11  PA: 64/20/38  PCW: 22  SAT AO: 95.1%  SAT RA: 51.6%  SAT PA: 58%  CO/CI: 4.49/ 2.34    Medications:  Lidocaine 1%: 10ml  Omnipaque: 10ml      Plan:  1. S/P CardioMems Implant  -Post procedure management/monitoring per protocol  -Access site precautions  -HOB flat with LLE straight x 3 hours. If left groin site stable, ok to raise HOB to 30 degrees in 3 hours at 9 pm  -bedrest x 4 hours. If left groin site stable, ok to ambulate at 10 pm  -RIGHT GROIN WITH HARDENING/firmness - PATIENT REPORTS THIS OCCURRED DURING HER LAST PCI 8/2024 at [facility]. D/W DR SMITH, OBTAIN RIGHT GROIN ARTERIAL DUPLEX WHILE PATIENT IS INPATIENT  -Labs in AM  -Repeat ECG if any clinical indication or change on tele  -Continue current medical therapy  -Continue plavix 75mg daily  -resume eliquis 2.5mg BID 11/21 AM if right groin site stable.   -Cont BB with atenolol 25mg BID  -Cont statin therapy with Rosuvastatin 20mg po qHS   -Educated regarding strict adherence with antiplatelet/ eliquis   -Educated regarding post procedure management and care  -Discussed the importance of RF modification  -F/U outpt in 1-2 weeks with Cardiologist Dr. Mary Steiner  -DISPO:  Plan for D/C in am if remains HDS, ECG and labs in am stable and without complications  -CATH LAB NP TO REMOVE FIGURE 8 SUTURE 11/21 AM    2. HFpEF:  GDMT       ACE/ARB: Patient reports losartan was d/c'd as outpatient. patient to follow up with dr steiner about ?Resuming       MRA: n/a       Diuretic: Lasix 80mg PO every AM/ Lasix 40mg PO every PM       SGLT2i: Farxiga 10mg daily       Other:   Strict I&O's  Daily standing weights (if able)     Now s/p RHC with Successful Implantation of CardioMems device via LFV with Dr. David Smith, pt tolerated procedure well. Pt arrived to recovery in NAD and HDS,LFV access site stable with Figure 8 Suture in place, no bleed/hematoma, distal pulse +1. LLE remains acyanotic; warm to touch; motor/sensory function intact. palpable left distal DP pulse.    Intraprocedural findings: RHC with Successful Implantation of CardioMems Device. PAD on Cardiomems after calibration was 20.  RHC findings: Moderately elevated pulmonary artery pressures; mildly elevated wedge and PA diastolic pressures.  RA: 23/23/13  RV: 68/5/11  PA: 64/20/38  PCW: 22  SAT AO: 95.1%  SAT RA: 51.6%  SAT PA: 58%  CO/CI: 4.49/ 2.34    Medications:  Lidocaine 1%: 10ml  Omnipaque: 10ml      Plan:  1. S/P CardioMems Implant  -Post procedure management/monitoring per protocol  -Access site precautions  -HOB flat with LLE straight x 3 hours. If left groin site stable, ok to raise HOB to 30 degrees in 3 hours at 9 pm  -bedrest x 4 hours. If left groin site stable, ok to ambulate at 10 pm  -RIGHT GROIN WITH HARDENING/firmness - PATIENT REPORTS THIS OCCURRED DURING HER LAST PCI 8/2024 at [facility]. D/W DR SMITH, OBTAIN RIGHT GROIN ARTERIAL DUPLEX WHILE PATIENT IS INPATIENT  -Labs in AM  -Repeat ECG if any clinical indication or change on tele  -Continue current medical therapy  -Continue plavix 75mg daily  -resume eliquis 2.5mg BID 11/21 AM if left groin site stable.   -Cont BB with atenolol 25mg BID  -Cont statin therapy with Rosuvastatin 20mg po qHS   -Educated regarding strict adherence with antiplatelet/ eliquis   -Educated regarding post procedure management and care  -Discussed the importance of RF modification  -F/U outpt in 1-2 weeks with Cardiologist Dr. Mary Steiner  -DISPO:  Plan for D/C in am if remains HDS, ECG and labs in am stable and without complications  -CATH LAB NP TO REMOVE FIGURE 8 SUTURE 11/21 AM    2. HFpEF:  GDMT       ACE/ARB: Patient reports losartan was d/c'd as outpatient. patient to follow up with dr steiner about ?Resuming       MRA: n/a       Diuretic: Lasix 80mg PO every AM/ Lasix 40mg PO every PM       SGLT2i: Farxiga 10mg daily       Other:   Strict I&O's  Daily standing weights (if able)

## 2024-11-20 NOTE — DISCHARGE NOTE PROVIDER - HOSPITAL COURSE
78 F PMHx DM  HLD PAD, CAD s/p CABGX3/MV repair/LAAthrombectomy 1/2021 initial sig LV dysfunction, augmented EF on GDMT, COPD, chronic AF on NOAC, postop protected LM PCI 2023, progressive RENTERIA with recent cardiac cath 8/2024 revealed patent LIMA to LAD, mod mid LAD disease, prox Cx disease s/p CARIN EF 45% moderate pulm HTN with elevated PCWP WHO Group2 Pul HtN on increasing doses diuretics, seen in office this past week with progressive RENTERIA and fatigue. Presents today for RHC/Cardiomems.    Now s/p RHC with Successful Implantation of CardioMems device via LFV with Dr. David Smith, pt tolerated procedure well. Pt arrived to recovery in NAD and HDS,LFV access site stable with Figure 8 Suture in place, no bleed/hematoma, distal pulse +1. LLE remains acyanotic; warm to touch; motor/sensory function intact. palpable left distal DP pulse.    Intraprocedural findings: RHC with Successful Implantation of CardioMems Device. PAD on Cardiomems after calibration was 20.  RHC findings: Moderately elevated pulmonary artery pressures; mildly elevated wedge and PA diastolic pressures.  RA: 23/23/13  RV: 68/5/11  PA: 64/20/38  PCW: 22  SAT AO: 95.1%  SAT RA: 51.6%  SAT PA: 58%  CO/CI: 4.49/ 2.34    Medications:  Lidocaine 1%: 10ml  Omnipaque: 10ml  Post Cath EKG:     Plan:  1. S/P CardioMems Implant  -Post procedure management/monitoring per protocol  -Access site precautions  -HOB flat with LLE straight x 3 hours. If left groin site stable, ok to raise HOB to 30 degrees in 3 hours at 9 pm  -bedrest x 4 hours. If left groin site stable, ok to ambulate at 10 pm  -RIGHT GROIN WITH HARDENING/firmness - PATIENT REPORTS THIS OCCURRED DURING HER LAST PCI 8/2024 at [facility]. D/W DR SMITH, OBTAIN RIGHT GROIN ARTERIAL DUPLEX WHILE PATIENT IS INPATIENT  -Labs in AM  -Repeat ECG if any clinical indication or change on tele  -Continue current medical therapy  -Continue plavix 75mg daily  -resume eliquis 2.5mg BID 11/21 AM if right groin site stable.   -Cont BB with atenolol 25mg BID  -Cont statin therapy with Rosuvastatin 20mg po qHS   -Educated regarding strict adherence with antiplatelet/ eliquis   -Educated regarding post procedure management and care  -Discussed the importance of RF modification  -F/U outpt in 1-2 weeks with Cardiologist Dr. Mray Man  -DISPO:  Plan for D/C in am if remains HDS, ECG and labs in am stable and without complications  -CATH LAB NP TO REMOVE FIGURE 8 SUTURE 11/21 AM    2. HFpEF:  GDMT       ACE/ARB: Patient reports losartan was d/c'd as outpatient. patient to follow up with dr man about ?Resuming       MRA: n/a       Diuretic: Lasix 80mg PO every AM/ Lasix 40mg PO every PM       SGLT2i: Farxiga 10mg daily       Other:   Strict I&O's  Daily standing weights (if able)   78 F PMHx DM  HLD PAD, CAD s/p CABGX3/MV repair/LAAthrombectomy 1/2021 initial sig LV dysfunction, augmented EF on GDMT, COPD, chronic AF on NOAC, postop protected LM PCI 2023, progressive RENTERIA with recent cardiac cath 8/2024 revealed patent LIMA to LAD, mod mid LAD disease, prox Cx disease s/p CARIN EF 45% moderate pulm HTN with elevated PCWP WHO Group2 Pul HtN on increasing doses diuretics, seen in office this past week with progressive RENTERIA and fatigue. Presented for RHC/Cardiomems.    Now POD #1 s/p RHC with Successful Implantation of CardioMems device via LFV with Dr. David Smith, pt tolerated procedure well. Pt arrived to recovery in NAD and HDS,LFV access site stable with Figure 8 Suture in place, no bleed/hematoma, distal pulse +1. LLE remains acyanotic; warm to touch; motor/sensory function intact. palpable left distal DP pulse.    Intraprocedural findings: RHC with Successful Implantation of CardioMems Device. PAD on Cardiomems after calibration was 20.  RHC findings: Moderately elevated pulmonary artery pressures; mildly elevated wedge and PA diastolic pressures.  RA: 23/23/13  RV: 68/5/11  PA: 64/20/38  PCW: 22  SAT AO: 95.1%  SAT RA: 51.6%  SAT PA: 58%  CO/CI: 4.49/ 2.34      Plan:  1. S/P CardioMems Implant  -Post procedure management/monitoring per protocol  -RIGHT GROIN WITH HARDENING/firmness - PATIENT REPORTS THIS OCCURRED DURING HER LAST PCI 8/2024 at [facility]. D/W DR SMITH, OBTAIN RIGHT GROIN ARTERIAL DUPLEX WHILE PATIENT IS INPATIENT  -Continue current medical therapy  -Continue plavix 75mg daily  -resume eliquis 2.5mg BID 11/21 AM if right groin site stable.   -Cont BB with atenolol 25mg BID  -Cont statin therapy with Rosuvastatin 20mg po qHS   -F/U outpt in 1-2 weeks with Cardiologist Dr. Mary Man      2. HFpEF:  GDMT       ACE/ARB: Patient reports losartan was d/c'd as outpatient. patient to follow up with dr man about ?Resuming       MRA: n/a       Diuretic: Lasix 80mg PO every AM/ Lasix 40mg PO every PM       SGLT2i: Farxiga 10mg daily          78 F PMHx DM  HLD PAD, CAD s/p CABGX3/MV repair/LAAthrombectomy 1/2021 initial sig LV dysfunction, augmented EF on GDMT, COPD, chronic AF on NOAC, postop protected LM PCI 2023, progressive RENTERIA with recent cardiac cath 8/2024 revealed patent LIMA to LAD, mod mid LAD disease, prox Cx disease s/p CARIN EF 45% moderate pulm HTN with elevated PCWP WHO Group2 Pul HtN on increasing doses diuretics, seen in office this past week with progressive RENTERIA and fatigue. Presented for RHC/Cardiomems.    Now POD #1 s/p RHC with Successful Implantation of CardioMems device via LFV with Dr. David Smith, pt tolerated procedure well. Pt arrived to recovery in NAD and HDS,LFV access site stable with Figure 8 Suture in place, no bleed/hematoma, distal pulse +1. LLE remains acyanotic; warm to touch; motor/sensory function intact. palpable left distal DP pulse.    Intraprocedural findings: RHC with Successful Implantation of CardioMems Device. PAD on Cardiomems after calibration was 20.  RHC findings: Moderately elevated pulmonary artery pressures; mildly elevated wedge and PA diastolic pressures.  RA: 23/23/13  RV: 68/5/11  PA: 64/20/38  PCW: 22  SAT AO: 95.1%  SAT RA: 51.6%  SAT PA: 58%  CO/CI: 4.49/ 2.34      Plan:  1. S/P CardioMems Implant  -Post procedure management/monitoring per protocol  -RIGHT GROIN WITH HARDENING/firmness - PATIENT REPORTS THIS OCCURRED DURING HER LAST PCI 8/2024 at [facility]. D/W DR SMITH, OBTAIN RIGHT GROIN ARTERIAL DUPLEX WHILE PATIENT IS INPATIENT  -Continue current medical therapy  -Continue plavix 75mg daily  -resume eliquis 2.5mg BID 11/21 AM if right groin site stable.   -Cont BB with atenolol 25mg BID  -Cont statin therapy with Rosuvastatin 20mg po qHS   -F/U outpt in 1-2 weeks with Cardiologist Dr. Mary Man      2. HFpEF:  GDMT       ACE/ARB: Patient reports losartan was d/c'd as outpatient. patient to follow up with dr man about ?Resuming       MRA: n/a       Diuretic: Lasix 80mg PO every AM/ Lasix 40mg PO every PM       SGLT2i: Farxiga 10mg daily    US done on left groin:   IMPRESSION:    In the region of concern at the right groin, there is antegrade flow of   the right common femoral artery and proximal bypass graft. Normal   adjacent venous flow is also identified. There is no clear evidence of   right groin pseudoaneurysm, hematoma, or AV fistula. A small right groin   lymph node is identified measuring 2.0 x 0.6 x 1.2 cm. Correlate with   cross-sectional imaging if there is persistent clinical concern for acute   pathology related to recent vascular access.

## 2024-11-20 NOTE — DISCHARGE NOTE PROVIDER - NSDCCPCAREPLAN_GEN_ALL_CORE_FT
PRINCIPAL DISCHARGE DIAGNOSIS  Diagnosis: Chronic heart failure with preserved ejection fraction (HFpEF)  Assessment and Plan of Treatment: -Heart Failure with Preserved Ejection Fraction is a form of heart failure in which the left ventricle of the heart is stiffened and impairs the heart's ability to relax after pumping blood out.  -Please continue to take all of your medications as previously prescribed.  -Please return to nearest ER if you develop any shortness of breath, chest pain,chest pressure, palpitations, dizziness, passing out, or swelling in the legs or decrease in urinary output.   -Please follow up with your cardiologist Dr. Man in 1 week.  -Please keep a log of your weight. Please weigh yourself the same time every morning with the same amount of clothes on. (AFTER YOU URINATED). Call your doctor if you are gaining more than 2lbs in 3 days or more than 3lbs in a week.  -Please have a diet that is low in salt and low in cholesterol. Salt will retain water and cause you to become swollen/ gain weight/ retain water in your body

## 2024-11-20 NOTE — DISCHARGE NOTE PROVIDER - NSDCCPTREATMENT_GEN_ALL_CORE_FT
PRINCIPAL PROCEDURE  Procedure: Cardiac catheterization, right heart  Findings and Treatment: -You had a right heart cardiac catheterization with Dr. Smith on 11/20/24. He implanted a cardioMEMS device which is a device that sits in your pulmonary artery and measures the pressures in your lungs/ your overall fluid status. He accessed your left groin (vein) for the procedure.    -Please continue to monitor your left groin when you go home. If you develop any bleeding, lay down where you are and apply direct firm pressure until the bleeding stops. If the bleeding is excessive, please call 911.   -If heavy bleeding or large lumps form, hold pressure at the spot and come to the Emergency Room.  -No submerging the leg in water for 48 hours. This includes hot tubs, swimming pools and jacuzzis.  You may start showering today. Shower with warm water and soap. Pat dry with towel. You may place a bandaid over the site. change the bandaid every day.   -No heavy lifting greater than 5 lbs x 5 days. Avoid stair climbing x 2 days.  -You may walk indoors/ outdoors as tolerated. No strenuous exercise, gym, sports or heavy lifting x5 days.  -Please return to nearest ED if you develop any fever, chills, drainage from the incision site, or any change of temperature, color, sensation of the affected extremity.  -Call your doctor for any bleeding, swelling, loss of sensation in the leg or foot or toes turning blue.  -Please return to nearest ED if you develop any chest pain, chest pressure, shortness of breath, jaw pain, pain radiating down the arm, palpitations, dizziness, palpitations, abdominal complaints including abdominal pain, nausea and vomiting.   -Please follow up with your cardiologist in 1-2 weeks

## 2024-11-20 NOTE — PROGRESS NOTE ADULT - REASON FOR ADMISSION
RHC with CardioMems NEPHROLOGY CONSULTATION NOTE    SANDRA DSA  48y  Male  MRN-[id number]    CC:   Patient is a 48y old  Male who presents with a chief complaint of found down unresponsive (2021 11:39)      HPI:  TRAUMA ACTIVATION LEVEL:  Code trauma    HPI:    Patient is a 48yM w/ PMHx of Etoh abuse and seizures on keppra seen as Trauma Alert upgraded to a Code trauma during examination in the ED where the patient was found to have a GCS of 4 s/p found down unresponsive.  Trauma assessment in ED: intubated for airway protection   (06 May 2021 22:29)    s/p craniectomy for large L SDH      PAST MEDICAL & SURGICAL HISTORY:  Alcohol abuse    GERD (gastroesophageal reflux disease)    ETOH abuse    Hypomagnesemia    Seizure disorder    H/O hemorrhoidectomy      Allergies:  No Known Allergies    Home Medications Reviewed  Hospital Medications:   MEDICATIONS  (STANDING):  artificial tears (preservative free) Ophthalmic Solution 2 Drop(s) Both EYES three times a day  chlorhexidine 0.12% Liquid 15 milliLiter(s) Oral Mucosa two times a day  chlorhexidine 4% Liquid 1 Application(s) Topical <User Schedule>  enoxaparin Injectable 40 milliGRAM(s) SubCutaneous every 24 hours  fludroCORTISONE 0.1 milliGRAM(s) Oral <User Schedule>  insulin lispro (ADMELOG) corrective regimen sliding scale   SubCutaneous every 6 hours  levETIRAcetam  IVPB 500 milliGRAM(s) IV Intermittent every 12 hours  lisinopril 20 milliGRAM(s) Oral daily  magnesium sulfate  IVPB 2 Gram(s) IV Intermittent once  meropenem  IVPB 2000 milliGRAM(s) IV Intermittent every 8 hours  pantoprazole   Suspension 40 milliGRAM(s) Oral daily  propranolol 20 milliGRAM(s) Oral every 8 hours  sodium chloride 8 Gram(s) Oral every 6 hours  sodium chloride 0.9%. 1000 milliLiter(s) (50 mL/Hr) IV Continuous <Continuous>  sodium chloride 3%. 500 milliLiter(s) (50 mL/Hr) IV Continuous <Continuous>  vancomycin  IVPB 1500 milliGRAM(s) IV Intermittent every 8 hours    MEDICATIONS  (PRN):  acetaminophen   Tablet .. 650 milliGRAM(s) Oral every 6 hours PRN Temp greater or equal to 38.5C (101.3F), Moderate Pain (4 - 6), Severe Pain (7 - 10)    Home medications:  Home Medications:      SOCIAL HISTORY:  Social History:      FAMILY HISTORY:  Family history of essential hypertension (Father)        REVIEW OF SYSTEMS:   All other review of systems is negative unless indicated above.    VITALS:  T(F): 98.1 (21 @ 08:00), Max: 99.5 (21 @ 15:24)  HR: 102 (21 @ 12:00)  BP: 127/79 (21 @ 12:00)  RR: 65 (21 @ 12:00)  SpO2: 100% (21 @ 12:00)      I&O's Detail    2021 07:  -  2021 07:00  --------------------------------------------------------  IN:    IV PiggyBack: 100 mL    IV PiggyBack: 100 mL    IV PiggyBack: 100 mL    IV PiggyBack: 500 mL    Osmolite: 600 mL    sodium chloride 0.9%: 1200 mL    sodium chloride 3%: 1200 mL  Total IN: 3800 mL    OUT:    PEG (Percutaneous Endoscopic Gastrostomy) Tube (mL): 350 mL    Voided (mL): 3675 mL  Total OUT: 4025 mL    Total NET: -225 mL      2021 07:  -  2021 13:39  --------------------------------------------------------  IN:    Osmolite: 300 mL    sodium chloride 0.9%: 300 mL    sodium chloride 3%: 300 mL  Total IN: 900 mL    OUT:    Voided (mL): 1025 mL  Total OUT: 1025 mL    Total NET: -125 mL            I&O's Summary    2021 07:  -  2021 07:00  --------------------------------------------------------  IN: 3800 mL / OUT: 4025 mL / NET: -225 mL    2021 07:01  -  2021 13:39  --------------------------------------------------------  IN: 900 mL / OUT: 1025 mL / NET: -125 mL        PHYSICAL EXAM:  Gen: NAD  resp:   card: S1/S2  abd: soft  ext: no edema  vascular access:     LABS:  Daily     Daily     Blood Gas Arterial, Lactate: 0.6 mmoL/L (21 @ 03:13)  Blood Gas Arterial - Potassium: 3.6 mmoL/L (21 @ 15:39)  Blood Gas Arterial, Lactate: 1.2 mmoL/L (21 @ 15:39)        127<L>  |  94<L>  |  5<L>  ----------------------------<  107<H>  4.2   |  25  |  <0.5<L>    Ca    8.7      2021 05:25  Phos  3.9       Mg     1.7           eGFR if Non African American: 160 mL/min/1.73M2 (21 @ 05:25)  eGFR if : 186 mL/min/1.73M2 (21 @ 05:25)  eGFR if Non African American: 160 mL/min/1.73M2 (21 @ 02:10)  eGFR if : 186 mL/min/1.73M2 (21 @ 02:10)    Creatinine Trend:   Creatinine, Serum: <0.5 mg/dL (21 @ 05:25)  Creatinine, Serum: <0.5 mg/dL (21 @ 02:10)  Creatinine, Serum: <0.5 mg/dL (21 @ 20:00)  Creatinine, Serum: <0.5 mg/dL (21 @ 16:45)  Creatinine, Serum: <0.5 mg/dL (21 @ 13:35)      Sodium, Serum: 127 mmol/L (21 @ 05:25)  Sodium, Serum: 132 mmol/L (21 @ 02:10)  Sodium, Serum: 132 mmol/L (21 @ 20:00)  Sodium, Serum: 133 mmol/L (21 @ 02:00)  Sodium, Serum: 131 mmol/L (21 @ 22:29)  Sodium, Serum: 128 mmol/L (21 @ 17:00)  Sodium, Serum: 129 mmol/L (21 @ 12:00)  Sodium, Serum: 127 mmol/L (21 @ 00:50)  Sodium, Serum: 131 mmol/L (21 @ 23:50)  Sodium, Serum: 153 mmol/L (21 @ 17:09)                              8.3    7.18  )-----------( 281      ( 2021 02:10 )             25.0     Mean Cell Volume: 84.2 fL (21 @ 02:10)    Urine Studies:  Urinalysis Basic - ( 2021 11:29 )    Color: Colorless / Appearance: Clear / S.012 / pH:   Gluc:  / Ketone: Negative  / Bili: Negative / Urobili: <2 mg/dL   Blood:  / Protein: Negative / Nitrite: Negative   Leuk Esterase: Negative / RBC:  / WBC    Sq Epi:  / Non Sq Epi:  / Bacteria:       Osmolality, Random Urine: 508 mos/kg ( @ 11:29)  Sodium, Random Urine: 228.0 mmoL/L ( @ 11:29)        RADIOLOGY & ADDITIONAL STUDIES:        Xray Chest 1 View- PORTABLE-Routine:   EXAM:  XR CHEST PORTABLE ROUTINE 1V            PROCEDURE DATE:  2021      INTERPRETATION:  Clinical History / Reason for exam: ICU    Comparison : Chest radiograph 2021.    Technique/Positioning: Frontal.    Findings:    Supportdevices: Stable positioning.    Cardiac/mediastinum/hilum: Unremarkable.    Lung parenchyma/Pleura: Within normal limits.    Skeleton/soft tissues: Unchanged.    Impression:    No radiographic evidence of acute cardiopulmonary disease.    VIDYA MADRID MD; Attending Radiologist  This document has been electronically signed. Hayden  3 2021  9:41AM (21 @ 05:45)                     NEPHROLOGY CONSULTATION NOTE    SANDRA DAS  48y  Male  MRN-[id number]    CC:   Patient is a 48y old  Male who presents with a chief complaint of found down unresponsive (2021 11:39)      HPI:  TRAUMA ACTIVATION LEVEL:  Code trauma    HPI:    Patient is a 48yM w/ PMHx of Etoh abuse and seizures on keppra seen as Trauma Alert upgraded to a Code trauma during examination in the ED where the patient was found to have a GCS of 4 s/p found down unresponsive.  Trauma assessment in ED: intubated for airway protection   (06 May 2021 22:29)  s/p craniectomy for large L SDH      PAST MEDICAL & SURGICAL HISTORY:  Alcohol abuse    GERD (gastroesophageal reflux disease)    ETOH abuse    Hypomagnesemia    Seizure disorder    H/O hemorrhoidectomy      Allergies:  No Known Allergies    Home Medications Reviewed  Hospital Medications:   MEDICATIONS  (STANDING):  artificial tears (preservative free) Ophthalmic Solution 2 Drop(s) Both EYES three times a day  chlorhexidine 0.12% Liquid 15 milliLiter(s) Oral Mucosa two times a day  chlorhexidine 4% Liquid 1 Application(s) Topical <User Schedule>  enoxaparin Injectable 40 milliGRAM(s) SubCutaneous every 24 hours  fludroCORTISONE 0.1 milliGRAM(s) Oral <User Schedule>  insulin lispro (ADMELOG) corrective regimen sliding scale   SubCutaneous every 6 hours  levETIRAcetam  IVPB 500 milliGRAM(s) IV Intermittent every 12 hours  lisinopril 20 milliGRAM(s) Oral daily  magnesium sulfate  IVPB 2 Gram(s) IV Intermittent once  meropenem  IVPB 2000 milliGRAM(s) IV Intermittent every 8 hours  pantoprazole   Suspension 40 milliGRAM(s) Oral daily  propranolol 20 milliGRAM(s) Oral every 8 hours  sodium chloride 8 Gram(s) Oral every 6 hours  sodium chloride 0.9%. 1000 milliLiter(s) (50 mL/Hr) IV Continuous <Continuous>  sodium chloride 3%. 500 milliLiter(s) (50 mL/Hr) IV Continuous <Continuous>  vancomycin  IVPB 1500 milliGRAM(s) IV Intermittent every 8 hours    MEDICATIONS  (PRN):  acetaminophen   Tablet .. 650 milliGRAM(s) Oral every 6 hours PRN Temp greater or equal to 38.5C (101.3F), Moderate Pain (4 - 6), Severe Pain (7 - 10)    Home medications:  Home Medications:      SOCIAL HISTORY:  Social History:      FAMILY HISTORY:  Family history of essential hypertension (Father)        REVIEW OF SYSTEMS:   All other review of systems is negative unless indicated above.    VITALS:  T(F): 98.1 (21 @ 08:00), Max: 99.5 (21 @ 15:24)  HR: 102 (21 @ 12:00)  BP: 127/79 (21 @ 12:00)  RR: 65 (21 @ 12:00)  SpO2: 100% (21 @ 12:00)      I&O's Detail    2021 07:  -  2021 07:00  --------------------------------------------------------  IN:    IV PiggyBack: 100 mL    IV PiggyBack: 100 mL    IV PiggyBack: 100 mL    IV PiggyBack: 500 mL    Osmolite: 600 mL    sodium chloride 0.9%: 1200 mL    sodium chloride 3%: 1200 mL  Total IN: 3800 mL    OUT:    PEG (Percutaneous Endoscopic Gastrostomy) Tube (mL): 350 mL    Voided (mL): 3675 mL  Total OUT: 4025 mL    Total NET: -225 mL      2021 07:  -  2021 13:39  --------------------------------------------------------  IN:    Osmolite: 300 mL    sodium chloride 0.9%: 300 mL    sodium chloride 3%: 300 mL  Total IN: 900 mL    OUT:    Voided (mL): 1025 mL  Total OUT: 1025 mL    Total NET: -125 mL            I&O's Summary    2021 07:  -  2021 07:00  --------------------------------------------------------  IN: 3800 mL / OUT: 4025 mL / NET: -225 mL    2021 07:01  -  2021 13:39  --------------------------------------------------------  IN: 900 mL / OUT: 1025 mL / NET: -125 mL        PHYSICAL EXAM:  Gen: trach/ventilated  resp: b/l breath sounds  card: S1/S2  abd: soft  ext: no edema        LABS:    Blood Gas Arterial, Lactate: 0.6 mmoL/L (21 @ 03:13)  Blood Gas Arterial - Potassium: 3.6 mmoL/L (21 @ 15:39)  Blood Gas Arterial, Lactate: 1.2 mmoL/L (21 @ 15:39)        127<L>  |  94<L>  |  5<L>  ----------------------------<  107<H>  4.2   |  25  |  <0.5<L>    Ca    8.7      2021 05:25  Phos  3.9       Mg     1.7           eGFR if Non African American: 160 mL/min/1.73M2 (21 @ 05:25)  eGFR if : 186 mL/min/1.73M2 (21 @ 05:25)    Creatinine Trend:   Creatinine, Serum: <0.5 mg/dL (21 @ 05:25)  Creatinine, Serum: <0.5 mg/dL (21 @ 02:10)  Creatinine, Serum: <0.5 mg/dL (21 @ 20:00)  Creatinine, Serum: <0.5 mg/dL (21 @ 16:45)  Creatinine, Serum: <0.5 mg/dL (21 @ 13:35)      Sodium, Serum: 127 mmol/L (21 @ 05:25)  Sodium, Serum: 132 mmol/L (21 @ 02:10)  Sodium, Serum: 132 mmol/L (21 @ 20:00)  Sodium, Serum: 133 mmol/L (21 @ 02:00)  Sodium, Serum: 131 mmol/L (21 @ 22:29)  Sodium, Serum: 128 mmol/L (21 @ 17:00)  Sodium, Serum: 129 mmol/L (21 @ 12:00)  Sodium, Serum: 127 mmol/L (21 @ 00:50)  Sodium, Serum: 131 mmol/L (21 @ 23:50)  Sodium, Serum: 153 mmol/L (21 @ 17:09)                              8.3    7.18  )-----------( 281      ( 2021 02:10 )             25.0     Mean Cell Volume: 84.2 fL (21 @ 02:10)    Urine Studies:  Urinalysis Basic - ( 2021 11:29 )    Color: Colorless / Appearance: Clear / S.012 / pH:   Gluc:  / Ketone: Negative  / Bili: Negative / Urobili: <2 mg/dL   Blood:  / Protein: Negative / Nitrite: Negative   Leuk Esterase: Negative / RBC:  / WBC    Sq Epi:  / Non Sq Epi:  / Bacteria:       Osmolality, Random Urine: 508 mos/kg ( @ 11:29)  Sodium, Random Urine: 228.0 mmoL/L ( @ 11:29)        RADIOLOGY & ADDITIONAL STUDIES:        Xray Chest 1 View- PORTABLE-Routine:   EXAM:  XR CHEST PORTABLE ROUTINE 1V            PROCEDURE DATE:  2021      INTERPRETATION:  Clinical History / Reason for exam: ICU    Comparison : Chest radiograph 2021.    Technique/Positioning: Frontal.    Findings:    Supportdevices: Stable positioning.    Cardiac/mediastinum/hilum: Unremarkable.    Lung parenchyma/Pleura: Within normal limits.    Skeleton/soft tissues: Unchanged.    Impression:    No radiographic evidence of acute cardiopulmonary disease.    VIDYA MADRID MD; Attending Radiologist  This document has been electronically signed. Hayden  3 2021  9:41AM (21 @ 05:45)

## 2024-11-20 NOTE — DISCHARGE NOTE PROVIDER - NSDCMRMEDTOKEN_GEN_ALL_CORE_FT
apixaban 2.5 mg oral tablet: 1 tab(s) orally every 12 hours  ascorbic acid 500 mg oral tablet: 1 tab(s) orally once a day  atenolol 25 mg oral tablet: 1 tab(s) orally 2 times a day  clopidogrel 75 mg oral tablet: 1 tab(s) orally once a day   Farxiga 10 mg oral tablet: 1 tab(s) orally once a day (in the morning)  ferrous sulfate 325 mg (65 mg elemental iron) oral tablet: 1 tab(s) orally once a day  Fiasp FlexTouch 100 units/mL injectable solution: injectable 3 times a day insulin parameters, 4-22 units  Lasix 40 mg oral tablet: 2 tab(s) orally once a day (in the morning)  Lasix 40 mg oral tablet: 1 tab(s) orally once a day with dinner  levothyroxine 100 mcg (0.1 mg) oral tablet: 1 tab(s) orally once a day  Multiple Vitamins oral tablet: 1 tab(s) orally once a day  potassium chloride 20 mEq oral tablet, extended release: 2 tab(s) orally once a day  rosuvastatin 20 mg oral tablet: 1 tab(s) orally once a day (at bedtime)  traZODone 50 mg oral tablet: 1 tab(s) orally once a day (at bedtime)  Tresiba 100 units/mL subcutaneous solution: 20 unit(s) subcutaneous 2 times a day  Zoloft 100 mg oral tablet: 1 tab(s) orally once a day

## 2024-11-21 ENCOUNTER — TRANSCRIPTION ENCOUNTER (OUTPATIENT)
Age: 79
End: 2024-11-21

## 2024-11-21 VITALS
OXYGEN SATURATION: 99 % | DIASTOLIC BLOOD PRESSURE: 67 MMHG | SYSTOLIC BLOOD PRESSURE: 116 MMHG | RESPIRATION RATE: 18 BRPM | HEART RATE: 65 BPM | TEMPERATURE: 98 F

## 2024-11-21 LAB
ANION GAP SERPL CALC-SCNC: 14 MMOL/L — SIGNIFICANT CHANGE UP (ref 5–17)
BUN SERPL-MCNC: 17.7 MG/DL — SIGNIFICANT CHANGE UP (ref 8–20)
CALCIUM SERPL-MCNC: 8.7 MG/DL — SIGNIFICANT CHANGE UP (ref 8.4–10.5)
CHLORIDE SERPL-SCNC: 101 MMOL/L — SIGNIFICANT CHANGE UP (ref 96–108)
CO2 SERPL-SCNC: 24 MMOL/L — SIGNIFICANT CHANGE UP (ref 22–29)
CREAT SERPL-MCNC: 0.82 MG/DL — SIGNIFICANT CHANGE UP (ref 0.5–1.3)
EGFR: 74 ML/MIN/1.73M2 — SIGNIFICANT CHANGE UP
GLUCOSE BLDC GLUCOMTR-MCNC: 219 MG/DL — HIGH (ref 70–99)
GLUCOSE BLDC GLUCOMTR-MCNC: 250 MG/DL — HIGH (ref 70–99)
GLUCOSE SERPL-MCNC: 207 MG/DL — HIGH (ref 70–99)
HCT VFR BLD CALC: 33.4 % — LOW (ref 34.5–45)
HGB BLD-MCNC: 10.4 G/DL — LOW (ref 11.5–15.5)
MAGNESIUM SERPL-MCNC: 1.9 MG/DL — SIGNIFICANT CHANGE UP (ref 1.6–2.6)
MCHC RBC-ENTMCNC: 23.9 PG — LOW (ref 27–34)
MCHC RBC-ENTMCNC: 31.1 G/DL — LOW (ref 32–36)
MCV RBC AUTO: 76.8 FL — LOW (ref 80–100)
PLATELET # BLD AUTO: 167 K/UL — SIGNIFICANT CHANGE UP (ref 150–400)
POTASSIUM SERPL-MCNC: 3.9 MMOL/L — SIGNIFICANT CHANGE UP (ref 3.5–5.3)
POTASSIUM SERPL-SCNC: 3.9 MMOL/L — SIGNIFICANT CHANGE UP (ref 3.5–5.3)
RBC # BLD: 4.35 M/UL — SIGNIFICANT CHANGE UP (ref 3.8–5.2)
RBC # FLD: 29.5 % — HIGH (ref 10.3–14.5)
SODIUM SERPL-SCNC: 138 MMOL/L — SIGNIFICANT CHANGE UP (ref 135–145)
WBC # BLD: 5.74 K/UL — SIGNIFICANT CHANGE UP (ref 3.8–10.5)
WBC # FLD AUTO: 5.74 K/UL — SIGNIFICANT CHANGE UP (ref 3.8–10.5)

## 2024-11-21 PROCEDURE — 93926 LOWER EXTREMITY STUDY: CPT | Mod: 26,RT

## 2024-11-21 RX ADMIN — ATENOLOL 25 MILLIGRAM(S): 100 TABLET ORAL at 05:22

## 2024-11-21 RX ADMIN — Medication 4: at 08:30

## 2024-11-21 RX ADMIN — DAPAGLIFLOZIN 10 MILLIGRAM(S): 10 TABLET, FILM COATED ORAL at 08:29

## 2024-11-21 RX ADMIN — FUROSEMIDE 80 MILLIGRAM(S): 40 TABLET ORAL at 05:21

## 2024-11-21 RX ADMIN — Medication 325 MILLIGRAM(S): at 08:30

## 2024-11-21 RX ADMIN — Medication 500 MILLIGRAM(S): at 08:29

## 2024-11-21 RX ADMIN — Medication 4: at 12:03

## 2024-11-21 RX ADMIN — Medication 100 MICROGRAM(S): at 05:22

## 2024-11-21 RX ADMIN — CLOPIDOGREL 75 MILLIGRAM(S): 75 TABLET, FILM COATED ORAL at 08:30

## 2024-11-21 RX ADMIN — APIXABAN 2.5 MILLIGRAM(S): 2.5 TABLET, FILM COATED ORAL at 05:22

## 2024-11-21 NOTE — DISCHARGE NOTE NURSING/CASE MANAGEMENT/SOCIAL WORK - NSDCPEFALRISK_GEN_ALL_CORE
For information on Fall & Injury Prevention, visit: [email or web address] OR  [email or web address] OR  https://www.cdc.gov/steadi/patient.html

## 2024-11-21 NOTE — PATIENT PROFILE ADULT - SAFE PLACE TO LIVE
ACUPUNCTURE FOLLOW-UP VISIT    Roxann Alas  11/10/2017    Reason for Treatment: Deliver healthy baby    Traditional [facility] Medicine Diagnosis:  (Previous): KD qi vacuity        Current (only if different): Normal    Subjective:  Cody Cifuentes reports she had her final US 4 days ago and baby girl is doing fine. Annamarie reports she's 10 weeks 5 days pregnant and was graduated from the clinic. I also will d/c her with the understanding she can return any time as needed, especially if her migraines return. Cody Cifuentes said she hasn't had a migraine in a long time. Objective: Alert x3, PREGNANT 10 weeks, 5 days. USE PILLOW UNDER KNEES NOT BLOSTER    Tongue:    Â· Body: normal  Â· Color: pink  Â· Coating: greasy, white, thin    Pulses:  Left - slippery and tense liver Right - slippery and tense     TREATMENT PLAN    Acupuncture: Right:SJ1,4,SP1,4,LV3. Left:HT3,7,PC6,ST36,GB34,41.   Yintang, GV20      Number of needles used:  13  Number of needles out: 13    Ear Needles:  yes  bilateral  nguyen men    Electro-Stimulation: No    TDP Lamp: Yes  knee and foot    Guasha/Cupping:  No    Tui Na:  No    Ear Seeds:  no    Moxibustion:  No    Herbs:    Isaiah Miller  11/10/2017
no

## 2024-11-21 NOTE — PROGRESS NOTE ADULT - SUBJECTIVE AND OBJECTIVE BOX
INTERVENTIONAL CARDIOLOGY NURSE PRACTITIONER                                                                                                 PROGRESS NOTE                                                                               Reason for follow up: POST cardiomems  Overnight: No new events.     PMH:  78 F PMHx DM  HLD PAD, CAD s/p CABGX3/MV repair/LAAthrombectomy 1/2021 initial sig LV dysfunction, augmented EF on GDMT, COPD, chronic AF on NOAC, postop protected LM PCI 2023, progressive RENTERIA with recent cardiac cath 8/2024 revealed patent LIMA to LAD, mod mid LAD disease, prox Cx disease s/p CARIN EF 45% moderate pulm HTN with elevated PCWP WHO Group2 Pul HtN on increasing doses diuretics, seen in office this past week with progressive RENTERIA and fatigue. Presented for RHC/Cardiomems    CARDIAC CATH : Mildly elevated RH pressures.  Cardiomems placed    Review of symptoms:   Cardiac:  No chest pain. No dyspnea. No palpitations.  Respiratory: No cough. No dyspnea  Gastrointestinal: No diarrhea. No abdominal pain. No bleeding.     	  Vitals:  T(C): 36.8 (11-21-24 @ 08:00), Max: 37.1 (11-21-24 @ 05:00)  HR: 67 (11-21-24 @ 08:00) (63 - 78)  BP: 101/63 (11-21-24 @ 08:00) (95/44 - 133/62)  RR: 19 (11-21-24 @ 08:00) (14 - 20)  SpO2: 98% (11-21-24 @ 08:00) (96% - 100%)      PHYSICAL EXAM:  Appearance: Comfortable. No acute distress  Neurologic: A&Ox 3, no focal deficits.   Cardiovascular: Normal S1 S2, No murmur, rubs/gallops. No JVD, No edema  Respiratory: Lungs clear to auscultation  Gastrointestinal:  Soft, Non-tender, + BS  Lower Extremities: No edema.  Right groin with hardened area descending down thigh  Psychiatry: Patient is calm. No agitation. Mood & affect appropriate  Site check: Left groin suture removed.  Site benign.  No bleeding/hematoma/ecchymosis.  + distal pulse      CURRENT MEDICATIONS:  atenolol  Tablet 25 milliGRAM(s) Oral every 12 hours  furosemide    Tablet 80 milliGRAM(s) Oral daily  furosemide    Tablet 40 milliGRAM(s) Oral <User Schedule>    sertraline  traZODone  dapagliflozin  dextrose 50% Injectable  dextrose 50% Injectable  dextrose 50% Injectable  glucagon  Injectable  insulin lispro (ADMELOG) corrective regimen sliding scale  levothyroxine  rosuvastatin  apixaban  ascorbic acid  chlorhexidine 4% Liquid  clopidogrel Tablet  dextrose 5%.  dextrose 5%.  ferrous    sulfate          LABS:	 	                            10.4   5.74  )-----------( 167      ( 21 Nov 2024 04:31 )             33.4     11-21    138  |  101  |  17.7  ----------------------------<  207[H]  3.9   |  24.0  |  0.82    Ca    8.7      21 Nov 2024 04:31  Mg     1.9     11-21    TPro  7.3  /  Alb  4.2  /  TBili  1.4  /  DBili  x   /  AST  28  /  ALT  13  /  AlkPhos  72  11-20        TELEMETRY: Reviewed    	    ASSESSMENT:   S/P Cardiomems placement    PLAN:     -Groin precautions reviewed with pt  -Plavix 75 mg daily  -Continue current medical therapy  -Follow up with Dr. Man/Luis  -Discussed with pt risks and benefits of healthy living  including diet, exercise, diabetes, smoking cessation  -Left groin US pending.    -Discharge pending US results

## 2024-11-21 NOTE — DISCHARGE NOTE NURSING/CASE MANAGEMENT/SOCIAL WORK - PATIENT PORTAL LINK FT
You can access the FollowMyHealth Patient Portal offered by [facility] by registering at the following website: [email or web address]. By joining "[employer]’s FollowMyHealth portal, you will also be able to view your health information using other applications (apps) compatible with our system.

## 2024-11-21 NOTE — PATIENT PROFILE ADULT - FALL HARM RISK - HARM RISK INTERVENTIONS

## 2024-11-21 NOTE — DISCHARGE NOTE NURSING/CASE MANAGEMENT/SOCIAL WORK - FINANCIAL ASSISTANCE
[facility] provides services at a reduced cost to those who are determined to be eligible through [facility]’s financial assistance program. Information regarding [facility]’s financial assistance program can be found by going to [email or web address] or by calling [phone].

## 2024-11-26 PROCEDURE — 85027 COMPLETE CBC AUTOMATED: CPT

## 2024-11-26 PROCEDURE — 83615 LACTATE (LD) (LDH) ENZYME: CPT

## 2024-11-26 PROCEDURE — C2624: CPT

## 2024-11-26 PROCEDURE — 74177 CT ABD & PELVIS W/CONTRAST: CPT | Mod: MC

## 2024-11-26 PROCEDURE — 82728 ASSAY OF FERRITIN: CPT

## 2024-11-26 PROCEDURE — C1889: CPT

## 2024-11-26 PROCEDURE — 84155 ASSAY OF PROTEIN SERUM: CPT

## 2024-11-26 PROCEDURE — 93325 DOPPLER ECHO COLOR FLOW MAPG: CPT

## 2024-11-26 PROCEDURE — 82378 CARCINOEMBRYONIC ANTIGEN: CPT

## 2024-11-26 PROCEDURE — 86900 BLOOD TYPING SEROLOGIC ABO: CPT

## 2024-11-26 PROCEDURE — 36415 COLL VENOUS BLD VENIPUNCTURE: CPT

## 2024-11-26 PROCEDURE — 80048 BASIC METABOLIC PNL TOTAL CA: CPT

## 2024-11-26 PROCEDURE — 93005 ELECTROCARDIOGRAM TRACING: CPT

## 2024-11-26 PROCEDURE — 83550 IRON BINDING TEST: CPT

## 2024-11-26 PROCEDURE — P9040: CPT

## 2024-11-26 PROCEDURE — P9016: CPT

## 2024-11-26 PROCEDURE — 86901 BLOOD TYPING SEROLOGIC RH(D): CPT

## 2024-11-26 PROCEDURE — 80053 COMPREHEN METABOLIC PANEL: CPT

## 2024-11-26 PROCEDURE — 85025 COMPLETE CBC W/AUTO DIFF WBC: CPT

## 2024-11-26 PROCEDURE — 83036 HEMOGLOBIN GLYCOSYLATED A1C: CPT

## 2024-11-26 PROCEDURE — 77075 RADEX OSSEOUS SURVEY COMPL: CPT

## 2024-11-26 PROCEDURE — 86850 RBC ANTIBODY SCREEN: CPT

## 2024-11-26 PROCEDURE — 83010 ASSAY OF HAPTOGLOBIN QUANT: CPT

## 2024-11-26 PROCEDURE — 83735 ASSAY OF MAGNESIUM: CPT

## 2024-11-26 PROCEDURE — 86923 COMPATIBILITY TEST ELECTRIC: CPT

## 2024-11-26 PROCEDURE — 93926 LOWER EXTREMITY STUDY: CPT

## 2024-11-26 PROCEDURE — 33289 TCAT IMPL WRLS P-ART PRS SNR: CPT

## 2024-11-26 PROCEDURE — 85610 PROTHROMBIN TIME: CPT

## 2024-11-26 PROCEDURE — 83540 ASSAY OF IRON: CPT

## 2024-11-26 PROCEDURE — 85045 AUTOMATED RETICULOCYTE COUNT: CPT

## 2024-11-26 PROCEDURE — 85730 THROMBOPLASTIN TIME PARTIAL: CPT

## 2024-11-26 PROCEDURE — 84100 ASSAY OF PHOSPHORUS: CPT

## 2024-11-26 PROCEDURE — 93308 TTE F-UP OR LMTD: CPT

## 2024-11-26 PROCEDURE — 86304 IMMUNOASSAY TUMOR CA 125: CPT

## 2024-11-26 PROCEDURE — 83880 ASSAY OF NATRIURETIC PEPTIDE: CPT

## 2024-11-26 PROCEDURE — 86334 IMMUNOFIX E-PHORESIS SERUM: CPT

## 2024-11-26 PROCEDURE — 82962 GLUCOSE BLOOD TEST: CPT

## 2024-11-26 PROCEDURE — 93321 DOPPLER ECHO F-UP/LMTD STD: CPT

## 2024-11-26 PROCEDURE — 82746 ASSAY OF FOLIC ACID SERUM: CPT

## 2024-11-26 PROCEDURE — 84443 ASSAY THYROID STIM HORMONE: CPT

## 2024-11-26 PROCEDURE — C1894: CPT

## 2024-11-26 PROCEDURE — 99285 EMERGENCY DEPT VISIT HI MDM: CPT | Mod: 25

## 2024-11-26 PROCEDURE — 88305 TISSUE EXAM BY PATHOLOGIST: CPT

## 2024-11-26 PROCEDURE — 96374 THER/PROPH/DIAG INJ IV PUSH: CPT

## 2024-11-26 PROCEDURE — C1769: CPT

## 2024-11-26 PROCEDURE — 36430 TRANSFUSION BLD/BLD COMPNT: CPT

## 2024-11-26 PROCEDURE — 73719 MRI LOWER EXTREMITY W/DYE: CPT | Mod: MC

## 2024-11-26 PROCEDURE — 88342 IMHCHEM/IMCYTCHM 1ST ANTB: CPT

## 2024-11-26 PROCEDURE — 82784 ASSAY IGA/IGD/IGG/IGM EACH: CPT

## 2024-11-26 PROCEDURE — 84484 ASSAY OF TROPONIN QUANT: CPT

## 2024-11-26 PROCEDURE — 83521 IG LIGHT CHAINS FREE EACH: CPT

## 2024-11-26 PROCEDURE — 84466 ASSAY OF TRANSFERRIN: CPT

## 2024-11-26 PROCEDURE — 82607 VITAMIN B-12: CPT

## 2024-11-26 PROCEDURE — 84165 PROTEIN E-PHORESIS SERUM: CPT

## 2024-11-27 NOTE — PROGRESS NOTE ADULT - SUBJECTIVE AND OBJECTIVE BOX
POD # 1 s/p bilateral lower extremity revascularization.  2+ PT pulse bilaterally.  Dressing clean and dry.  Labs pending.  Plan advance diet, ambulation, heplock IV, remove shaw, start plavix, resume Xarelto in 1-2 days. outpatient behavioral health

## 2025-01-09 NOTE — PHYSICAL THERAPY INITIAL EVALUATION ADULT - SHORT TERM MEMORY, REHAB EVAL
HPI:  Brought in by grandma today with a few months of right lower back pain.  She states it happens with movement, and exercise.  No specific known trauma event to cause her symptoms.  She is currently on a club volleyball team.  Practiced twice weekly and has several games on the weekends.  The pain has not stopped her from playing, or walking around to perform her activities of daily living.  Has been taking Aleve daily for an undetermined time.  Is unsure of what makes her pain better.  The pain does not move.      ROS:   negative other than stated above in HPI    Vitals:    01/09/25 0821   Temp: 37.1 °C (98.8 °F)   Weight: 55.9 kg        Current Outpatient Medications:     albuterol 90 mcg/actuation inhaler, Inhale 2 puffs 2 times a day as needed for wheezing or shortness of breath., Disp: 18 g, Rfl: 1    hydrocortisone 2.5 % cream, Hydrocortisone 2.5 % External Cream  Quantity: 30  Refills: 0      Start : 7-Jun-2022  Active (Patient not taking: Reported on 1/9/2025), Disp: , Rfl:     ketoconazole (NIZOral) 2 % cream, , Disp: , Rfl:     triamcinolone (Kenalog) 0.1 % ointment, APPLY SMALL AMOUNT TO THE SPOT ON THE LEFT CHEEK TWICE DAILY. (Patient not taking: Reported on 1/9/2025), Disp: , Rfl:      Physical Exam:  CONSTITUTIONAL: Alert. No Distress. Interactive. Comfortable.  HEENT: Normocephalic. Atraumatic.   ABD: soft,non tender,non distended. No hepatosplenomegaly.  Skin; No rashes or lesions. Warm, and well perfused.  Moderate palpation tenderness of paraspinal musculature in the right lumbar area.  Negative straight leg raise test.  Normal range of motion at the hips knees and ankles.  Assessment and Plan:  Back strain.  Advised 1 week of rest-no exercise, stretching or lifting.  May use a few doses of Skelaxin to reduce the spasm and pain.  I do not advise daily use of ibuprofen and/or Aleve.  Discussed ways to reduce pain.  If symptoms persist over the next 7 to 10 days I asked her to return to see me.   intact

## 2025-02-13 NOTE — H&P PST ADULT - OTHER CARE PROVIDERS
Last office visit: 01/24/25    Per note from 01/24/25:    MEDICATIONS  Current: Gabapentin- She is currently on a nightly regimen of gabapentin 900 mg, which aids in sleep by alleviating the pain.           PDMP reviewed:    Gabapentin  [id number]  300MG / Capsule  Rx# [id number] Other Qty: 90  Days: 30  Refills: 0 Prescribed: 1/16/2025  Dispensed: 1/16/2025  Sold: 1/17/2025 Kellee Aguilera  [address] [address] [id number] - [place], [place] - [address]  [address]   [facility] [address]     Last fill date: 01/17/25  # dispensed: 90  Due for refill: yes  Next scheduled OV: 03/14/25- Marylin Rocha PA-C    Refill sent to preferred pharmacy.    Donovan Lovell

## 2025-02-19 NOTE — PATIENT PROFILE ADULT - HOME ACCESSIBILITY CONCERNS
Refill Decision Note   lEizabeth Giordano  is requesting a refill authorization.  Brief Assessment and Rationale for Refill:  Approve     Medication Therapy Plan:         Alert overridden per protocol: Yes   Comments:     Note composed:11:04 PM 02/18/2025             Appointments     Last Visit   1/27/2025 CARRIE Espinosa MD   Next Visit   Visit date not found CARRIE Espinosa MD      
No care due was identified.  [facility] Embedded Care Due Messages. Reference number: [id number].   2/18/2025 12:35:53 PM CST  
none

## 2025-02-26 NOTE — DISCHARGE NOTE NURSING/CASE MANAGEMENT/SOCIAL WORK - NSDCPETBCESMAN_GEN_ALL_CORE
Patient is aware of test results and recommendations. Referral placed to OB/GYN.    No further questions/concerns at this time.   If you are a smoker, it is important for your health to stop smoking. Please be aware that second hand smoke is also harmful.

## 2025-03-08 ENCOUNTER — EMERGENCY (EMERGENCY)
Facility: HOSPITAL | Age: 80
LOS: 1 days | Discharge: HOME | End: 2025-03-08
Attending: EMERGENCY MEDICINE
Payer: MEDICARE

## 2025-03-08 VITALS
TEMPERATURE: 97 F | SYSTOLIC BLOOD PRESSURE: 177 MMHG | DIASTOLIC BLOOD PRESSURE: 71 MMHG | HEART RATE: 74 BPM | WEIGHT: 177.47 LBS | RESPIRATION RATE: 18 BRPM | OXYGEN SATURATION: 95 %

## 2025-03-08 DIAGNOSIS — Z95.2 PRESENCE OF PROSTHETIC HEART VALVE: Chronic | ICD-10-CM

## 2025-03-08 DIAGNOSIS — Z90.710 ACQUIRED ABSENCE OF BOTH CERVIX AND UTERUS: Chronic | ICD-10-CM

## 2025-03-08 DIAGNOSIS — Z95.820 PERIPHERAL VASCULAR ANGIOPLASTY STATUS WITH IMPLANTS AND GRAFTS: Chronic | ICD-10-CM

## 2025-03-08 DIAGNOSIS — Z98.89 OTHER SPECIFIED POSTPROCEDURAL STATES: Chronic | ICD-10-CM

## 2025-03-08 DIAGNOSIS — Z95.828 PRESENCE OF OTHER VASCULAR IMPLANTS AND GRAFTS: Chronic | ICD-10-CM

## 2025-03-08 DIAGNOSIS — Z95.1 PRESENCE OF AORTOCORONARY BYPASS GRAFT: Chronic | ICD-10-CM

## 2025-03-08 PROCEDURE — 12001 RPR S/N/AX/GEN/TRNK 2.5CM/<: CPT | Mod: F8,F9

## 2025-03-08 PROCEDURE — 90471 IMMUNIZATION ADMIN: CPT

## 2025-03-08 PROCEDURE — 99284 EMERGENCY DEPT VISIT MOD MDM: CPT | Mod: 25

## 2025-03-08 PROCEDURE — 99283 EMERGENCY DEPT VISIT LOW MDM: CPT | Mod: 25

## 2025-03-08 PROCEDURE — 73130 X-RAY EXAM OF HAND: CPT | Mod: 26,RT

## 2025-03-08 PROCEDURE — 73130 X-RAY EXAM OF HAND: CPT

## 2025-03-08 PROCEDURE — 12001 RPR S/N/AX/GEN/TRNK 2.5CM/<: CPT

## 2025-03-08 PROCEDURE — 90715 TDAP VACCINE 7 YRS/> IM: CPT

## 2025-03-08 RX ORDER — CEPHALEXIN 250 MG/1
500 CAPSULE ORAL ONCE
Refills: 0 | Status: COMPLETED | OUTPATIENT
Start: 2025-03-08 | End: 2025-03-08

## 2025-03-08 RX ORDER — TRANEXAMIC ACID 1000 MG/10
5 AMPUL (ML) INTRAVENOUS ONCE
Refills: 0 | Status: COMPLETED | OUTPATIENT
Start: 2025-03-08 | End: 2025-03-08

## 2025-03-08 RX ORDER — CEPHALEXIN 250 MG/1
1 CAPSULE ORAL
Qty: 14 | Refills: 0
Start: 2025-03-08 | End: 2025-03-14

## 2025-03-08 RX ADMIN — Medication 5 MILLILITER(S): at 15:47

## 2025-03-08 RX ADMIN — CEPHALEXIN 500 MILLIGRAM(S): 250 CAPSULE ORAL at 14:55

## 2025-03-28 NOTE — PATIENT PROFILE ADULT. - TEACHING/LEARNING LEARNING PREFERENCES
What Type Of Note Output Would You Prefer (Optional)?: Bullet Format Hpi Title: Evaluation of Skin Lesions Location: Right upper arm Year Removed: 2023 Additional History: Check left cheek irritated lesion increase size would like removed individual instruction/verbal instruction/written material

## 2025-05-05 LAB — GLUCOSE BLDC GLUCOMTR-MCNC: 8.2

## 2025-05-05 NOTE — PHYSICAL THERAPY INITIAL EVALUATION ADULT - PHYSICAL ASSIST/NONPHYSICAL ASSIST: CHAIR TO BED, REHAB EVAL
Verbal consent was acquired by the patient to use [employer] ambient listening note generation during this visit.    Subjective:     HPI:   History of Present Illness  The patient presents for an initial consultation.    She is currently undergoing titration of nortriptyline, starting at 50 mg, with plans to increase the dosage. Adverse effects include xerostomia and cough, necessitating frequent hydration, as well as somnolence. Her rescue medication is meclizine. Due to her condition, she is unable to drive on the freeway and requires assistance in crowded environments due to balance disturbances. She is under the care of an otoneurologist at [facility] for medication management. Multiple consultations with specialists in otology, neuro-oncology, ophthalmology, and cardiology have yielded no significant findings.    The patient has a history of hypertension, managed with antihypertensive therapy. She initiated a treadmill exercise regimen two years ago, which she adheres to 5 to 6 days per week for 30 minutes per session, reporting symptomatic improvement since commencement.    She has a history of cutaneous malignancies, specifically squamous cell carcinoma, with two lesions on one leg and one on the other. She undergoes dermatological evaluations biannually.    The patient has a history of osteopenia, which has shown improvement. She is at increased risk for this condition due to her  ethnicity.    She has undergone a thyroidectomy, initially involving hemithyroidectomy followed by completion thyroidectomy six months later. She requests monitoring of her thyroid function as her otolaryngologist has retired. No adjustments to her thyroid medication have been necessary since initiation. She administers her medication six days per week, with one day off.    The patient underwent a hysterectomy at age 40, with ovarian preservation. She experienced menopause within six months postoperatively. She has been on the  "minipill for 20 years, with a recent dosage increase due to vasomotor symptoms and mood changes, reporting well-being on the current dose.    She has a history of knee surgeries and thumb arthrodesis. She reports persistent pain and edema in her thumb over several months and has consulted a new orthopedic specialist.    The patient has a history of a heart murmur, initially mild, now progressed to moderate severity over the past year. She undergoes annual echocardiographic monitoring.    She reports dysphagia, which she attributes to aging and a history of gastroesophageal reflux disease (GERD). She manages this by taking small bites.    PAST SURGICAL HISTORY:  - Thyroidectomy  - Hysterectomy  - Knee surgeries  - Thumb arthrodesis    SOCIAL HISTORY  She does not smoke or drink alcohol.    FAMILY HISTORY  Her mother had hypertension and  from a stroke at age 76. Her father had prostate cancer and Parkinson's disease. Her sister has asthma. Her brother had cerebral palsy. Her older brother had hypertension and peripheral arterial disease and  at age 77.    Health Maintenance: Completed    Objective:     Exam:  /70 (BP Location: Left arm, Patient Position: Sitting, BP Cuff Size: Adult)   Pulse 66   Temp 36.6 °C (97.8 °F) (Temporal)   Ht 1.626 m (5' 4\")   Wt 66.2 kg (146 lb)   LMP  (LMP Unknown)   SpO2 97%   BMI 25.06 kg/m²  Body mass index is 25.06 kg/m².    Physical Exam  Vitals reviewed.   Constitutional:       Appearance: Normal appearance.   HENT:      Head: Normocephalic and atraumatic.      Right Ear: External ear normal.      Left Ear: External ear normal.      Nose: Nose normal.   Cardiovascular:      Rate and Rhythm: Normal rate and regular rhythm.      Pulses: Normal pulses.      Heart sounds: Murmur heard.   Pulmonary:      Effort: Pulmonary effort is normal. No respiratory distress.      Breath sounds: Normal breath sounds. No wheezing.   Abdominal:      General: Abdomen is flat.      " Palpations: Abdomen is soft.   Skin:     General: Skin is warm and dry.   Neurological:      Mental Status: She is alert and oriented to person, place, and time.   Psychiatric:         Mood and Affect: Mood normal.         Behavior: Behavior normal.             Results  Labs   - Triglycerides: 08/2024, Slightly high   - A1c: 08/2024, Normal    Imaging   - Mammogram: 02/2024, Heterogeneously dense breast tissue    Assessment & Plan:     1. Pure hypercholesterolemia  CBC WITHOUT DIFFERENTIAL    TSH WITH REFLEX TO FT4    HEMOGLOBIN A1C    Lipid Profile    Comp Metabolic Panel      2. Vestibular migraine  CBC WITHOUT DIFFERENTIAL    TSH WITH REFLEX TO FT4    HEMOGLOBIN A1C    Lipid Profile    Comp Metabolic Panel      3. Hot flashes due to menopause  CBC WITHOUT DIFFERENTIAL    TSH WITH REFLEX TO FT4    HEMOGLOBIN A1C    Lipid Profile    Comp Metabolic Panel      4. H/O partial thyroidectomy  CBC WITHOUT DIFFERENTIAL    TSH WITH REFLEX TO FT4    HEMOGLOBIN A1C    Lipid Profile    Comp Metabolic Panel      5. Gastroesophageal reflux disease without esophagitis  CBC WITHOUT DIFFERENTIAL    TSH WITH REFLEX TO FT4    HEMOGLOBIN A1C    Lipid Profile    Comp Metabolic Panel      6. IFG (impaired fasting glucose)  CBC WITHOUT DIFFERENTIAL    TSH WITH REFLEX TO FT4    HEMOGLOBIN A1C    Lipid Profile    Comp Metabolic Panel      7. Heterogeneously dense tissue of both breasts on mammography  US-SCREENING WHOLE BREAST BILATERAL (3D SCREENING)      8. Osteopenia, unspecified location        9. Screening for HPV (human papillomavirus)  DS-BONE DENSITY STUDY (DEXA)          Assessment & Plan  1. Vestibular migraine.  - Currently on nortriptyline 50 mg for vestibular migraines, which causes dry mouth and drowsiness.  - Meclizine is used as a rescue medication.  - Will continue with the current dose of nortriptyline and may increase it in the future if symptoms persist.  - Follow-up with an otoneurologist for specialized care.    2.  Hypertension.  - Blood pressure is well-controlled with the current medication regimen.  - Continues antihypertensive medications and maintains an exercise routine of 30 minutes daily on the treadmill.  - Monitors blood pressure regularly to ensure it remains within the target range.  - Discussed the importance of medication adherence to prevent complications.    3. Skin cancer.  - History of squamous cell carcinoma on the legs.  - Sees a dermatologist every 6 months for follow-up.  - No new lesions were reported at this visit.  - Continues regular dermatological evaluations to monitor for any recurrence.    4. Osteopenia.  - History of osteopenia, with the last bone density scan done in 2022.  - A repeat bone density scan will be ordered to monitor her condition.  - Takes calcium and vitamin D supplements as part of her management plan.  - Encouraged to maintain weight-bearing exercises to support bone health.    5. Thyroid management.  - Had a thyroidectomy and is on levothyroxine 100 mcg daily, taken 6 days a week.  - A thyroid function test will be ordered to monitor her levels.  - Reports stable thyroid function with no recent dose changes.  - Will continue to monitor for any symptoms of thyroid dysfunction.    6. Hysterectomy.  - Had a hysterectomy at age 40 and is on hormone replacement therapy with estrogen patches (0.375 mg).  - No changes to her current regimen are needed.  - Reports good symptom control with the current hormone therapy.  - Continues regular follow-up to assess hormone therapy effectiveness.    7. Heart murmur.  - Has a heart murmur that is monitored annually with echocardiograms.  - No changes in management are needed unless symptoms worsen.  - Reports that the murmur has remained stable over time.  - Will continue annual echocardiograms to monitor the condition.    8. Dysphagia.  - Reports difficulty swallowing, possibly related to GERD.  - Will continue to take small bites and monitor  her symptoms.  - Advised to avoid foods that exacerbate GERD symptoms.  - Consider referral to a gastroenterologist if symptoms persist or worsen.    9. Health maintenance.  - Up-to-date with her colonoscopy, having had one in 2017.  - Mammogram conducted in 2024, indicating that she is due for another one this year.  - Pap smear performed in 2019, and informed that no further Pap smears are necessary.  - Bone density test conducted in 2022, advised to repeat this test every 2 years due to her history of osteopenia.  - Last lab work done in August 2024, revealed slightly elevated triglycerides but a normal A1c level.  - An ultrasound of the breast will be ordered to be performed every 6 months due to her dense breast tissue.            Return in about 6 months (around 11/5/2025) for F/u labs, Med check.    Please note that this dictation was created using voice recognition software. I have made every reasonable attempt to correct obvious errors, but I expect that there are errors of grammar and possibly content that I did not discover before finalizing the note.    Claudia Tan MD  Family Medicine and Non - Operative Sports Medicine   [facility]         1 person assist

## 2025-05-06 ENCOUNTER — APPOINTMENT (OUTPATIENT)
Dept: ENDOCRINOLOGY | Facility: CLINIC | Age: 80
End: 2025-05-06
Payer: MEDICARE

## 2025-05-06 VITALS
DIASTOLIC BLOOD PRESSURE: 59 MMHG | OXYGEN SATURATION: 95 % | HEART RATE: 49 BPM | SYSTOLIC BLOOD PRESSURE: 124 MMHG | BODY MASS INDEX: 26.06 KG/M2 | HEIGHT: 67 IN | WEIGHT: 166 LBS

## 2025-05-06 DIAGNOSIS — E03.9 HYPOTHYROIDISM, UNSPECIFIED: ICD-10-CM

## 2025-05-06 DIAGNOSIS — E11.69 TYPE 2 DIABETES MELLITUS WITH OTHER SPECIFIED COMPLICATION: ICD-10-CM

## 2025-05-06 LAB — GLUCOSE BLDC GLUCOMTR-MCNC: 129

## 2025-05-06 PROCEDURE — 99204 OFFICE O/P NEW MOD 45 MIN: CPT

## 2025-05-06 PROCEDURE — 82962 GLUCOSE BLOOD TEST: CPT

## 2025-05-06 PROCEDURE — G2211 COMPLEX E/M VISIT ADD ON: CPT

## 2025-05-06 RX ORDER — ROSUVASTATIN CALCIUM 20 MG/1
20 TABLET, FILM COATED ORAL
Refills: 0 | Status: ACTIVE | COMMUNITY

## 2025-05-06 RX ORDER — ISOSORBIDE MONONITRATE 30 MG/1
30 TABLET, EXTENDED RELEASE ORAL
Refills: 0 | Status: ACTIVE | COMMUNITY

## 2025-05-06 RX ORDER — ATORVASTATIN CALCIUM 40 MG/1
40 TABLET, FILM COATED ORAL
Refills: 0 | Status: ACTIVE | COMMUNITY

## 2025-05-06 RX ORDER — DABIGATRAN ETEXILATE MESYLATE 150 MG/1
150 CAPSULE ORAL
Refills: 0 | Status: ACTIVE | COMMUNITY

## 2025-05-06 RX ORDER — ATENOLOL 50 MG/1
50 TABLET ORAL
Refills: 0 | Status: ACTIVE | COMMUNITY

## 2025-05-06 RX ORDER — SERTRALINE HYDROCHLORIDE 100 MG/1
100 TABLET, FILM COATED ORAL
Refills: 0 | Status: ACTIVE | COMMUNITY

## 2025-05-07 ENCOUNTER — TRANSCRIPTION ENCOUNTER (OUTPATIENT)
Age: 80
End: 2025-05-07

## 2025-05-15 RX ORDER — BLOOD-GLUCOSE SENSOR
EACH MISCELLANEOUS
Qty: 9 | Refills: 1 | Status: ACTIVE | COMMUNITY
Start: 2025-05-15 | End: 1900-01-01

## 2025-05-16 ENCOUNTER — NON-APPOINTMENT (OUTPATIENT)
Age: 80
End: 2025-05-16

## 2025-05-17 DIAGNOSIS — R79.89 OTHER SPECIFIED ABNORMAL FINDINGS OF BLOOD CHEMISTRY: ICD-10-CM

## 2025-05-29 ENCOUNTER — NON-APPOINTMENT (OUTPATIENT)
Age: 80
End: 2025-05-29

## 2025-06-03 ENCOUNTER — APPOINTMENT (OUTPATIENT)
Dept: ENDOCRINOLOGY | Facility: CLINIC | Age: 80
End: 2025-06-03
Payer: MEDICARE

## 2025-06-03 PROCEDURE — G0108 DIAB MANAGE TRN  PER INDIV: CPT

## 2025-06-06 ENCOUNTER — APPOINTMENT (OUTPATIENT)
Dept: ENDOCRINOLOGY | Facility: CLINIC | Age: 80
End: 2025-06-06
Payer: MEDICARE

## 2025-06-06 PROCEDURE — G0108 DIAB MANAGE TRN  PER INDIV: CPT

## 2025-06-06 RX ORDER — INSULIN ASPART INJECTION 100 [IU]/ML
100 INJECTION, SOLUTION SUBCUTANEOUS
Qty: 4 | Refills: 1 | Status: ACTIVE | COMMUNITY
Start: 2025-06-06 | End: 1900-01-01

## 2025-06-06 RX ORDER — PEN NEEDLE, DIABETIC 32GX 5/32"
32G X 4 MM NEEDLE, DISPOSABLE MISCELLANEOUS
Qty: 4 | Refills: 1 | Status: ACTIVE | COMMUNITY
Start: 2025-06-06 | End: 1900-01-01

## 2025-08-22 RX ORDER — LEVOTHYROXINE SODIUM 0.1 MG/1
100 TABLET ORAL
Qty: 90 | Refills: 0 | Status: ACTIVE | COMMUNITY
Start: 2025-08-22 | End: 1900-01-01

## (undated) DEVICE — GOWN IMPERV XL

## (undated) DEVICE — DENTURE CUP PINK

## (undated) DEVICE — SYR LUER SLIP TIP 50CC

## (undated) DEVICE — TUBING IV EXTENSION MACRO W CLAVE 7"

## (undated) DEVICE — MASK PROCEED EARLOOP LVL 2 50/BX

## (undated) DEVICE — WARMING BLANKET FULL ADULT

## (undated) DEVICE — DRSG 2X2

## (undated) DEVICE — Device

## (undated) DEVICE — TUBING ALARIS PUMP MODULE NON-DEHP

## (undated) DEVICE — FORCEP RADIAL JAW 4 W NDL 2.4MM 2.8MM 240CM ORANGE DISP

## (undated) DEVICE — SENSOR O2 FINGER ADULT

## (undated) DEVICE — PACK IV START WITH CHG

## (undated) DEVICE — SOL BAG NS 0.9% 1000ML

## (undated) DEVICE — BITE BLOCK ADULT 20 X 27MM (GREEN)

## (undated) DEVICE — UNDERPAD LINEN SAVER 23 X 36"

## (undated) DEVICE — VENODYNE/SCD SLEEVE CALF MEDIUM

## (undated) DEVICE — SYR IV FLUSH SALINE 10ML 30/TY

## (undated) DEVICE — ELCTR GROUNDING PAD ADULT COVIDIEN

## (undated) DEVICE — SOL IRR BAG NS 0.9% 1000ML

## (undated) DEVICE — DRSG CURITY GAUZE SPONGE 4 X 4" 12-PLY NON-STERILE

## (undated) DEVICE — FORCEP BIOPSY ENDOSCOPIC 2.8MM DISP

## (undated) DEVICE — SOL IRR BAG H2O 1000ML

## (undated) DEVICE — SSH-ERBE RM1 11351341: Type: DURABLE MEDICAL EQUIPMENT

## (undated) DEVICE — SYR SLIP 10CC

## (undated) DEVICE — CATH IV SAFE BC 22G X 1" (BLUE)